# Patient Record
Sex: MALE | Race: BLACK OR AFRICAN AMERICAN | NOT HISPANIC OR LATINO | URBAN - METROPOLITAN AREA
[De-identification: names, ages, dates, MRNs, and addresses within clinical notes are randomized per-mention and may not be internally consistent; named-entity substitution may affect disease eponyms.]

---

## 2017-03-30 ENCOUNTER — INPATIENT (INPATIENT)
Facility: HOSPITAL | Age: 67
LOS: 1 days | Discharge: HOME | DRG: 645 | End: 2017-04-01
Attending: INTERNAL MEDICINE | Admitting: INTERNAL MEDICINE
Payer: COMMERCIAL

## 2017-03-30 VITALS
OXYGEN SATURATION: 96 % | HEIGHT: 67 IN | WEIGHT: 175.93 LBS | HEART RATE: 64 BPM | DIASTOLIC BLOOD PRESSURE: 70 MMHG | SYSTOLIC BLOOD PRESSURE: 154 MMHG | TEMPERATURE: 98 F | RESPIRATION RATE: 16 BRPM

## 2017-03-30 NOTE — ED ADULT TRIAGE NOTE - CHIEF COMPLAINT QUOTE
c/o head ache,swelling to lower lip and to rt. side of face after eating shrimp about 2 weeks a go,denies sob c/o head ache,swelling to lower lip and to rt. side of face after eating shrimp about 2 hours a go,denies sob

## 2017-03-30 NOTE — ED PROVIDER NOTE - NS ED MD SCRIBE ATTENDING SCRIBE SECTIONS
HISTORY OF PRESENT ILLNESS/VITAL SIGNS( Pullset)/DISPOSITION/REVIEW OF SYSTEMS/PHYSICAL EXAM/PAST MEDICAL/SURGICAL/SOCIAL HISTORY/HIV

## 2017-03-30 NOTE — ED PROVIDER NOTE - MEDICAL DECISION MAKING DETAILS
66 y/o M presents to the ED with R facial swelling s/p eating shrimp. 68 y/o M presents to the ED with R facial swelling s/p eating shrimp. Swelling can be allergic reaction vs SVC syndrome. Will obtain labs and imaging and reassess. Provided meds for possible allergic reaction.   Labs show Hypothyroid. CT chest shows goiter with narrowing of trachea. Breathing well now. Will need to admit to see endocrine and start on meds and observe for airway compromise. Pt and family amenable.

## 2017-03-30 NOTE — ED PROVIDER NOTE - OBJECTIVE STATEMENT
68 y/o M pt with PMHx of HTN, hypothyroid and diverticulosis presents to the ED c/o facial swelling x today. Pt reports eating shrimp [id number]. Pt then noticed his cheek began to swell at 2100. Pt states eating shrimp in the past and never developed swelling. Pt denies SOB, drooling, cough, fever, abdominal pain, CP or any other complaints at this time. NKDA. 66 y/o M pt with PMHx of HTN, hypothyroid and diverticulosis presents to the ED c/o facial swelling x today. Pt currently is not on any medications as told by PMD. Pt reports eating shrimp [id number] (1 hour prior to arrival) when symptoms started. Never happened before but has had shrimp multiple times in past. Did not have any abnormal intake or exposures. Pt then noticed his cheek began to swell at . Pt states eating shrimp in the past and never developed swelling. Pt denies SOB, drooling, cough, fever, abdominal pain, CP or any other complaints at this time. NKDA. 68 y/o M pt with PMHx of HTN, hypothyroid and diverticulosis presents to the ED c/o facial swelling x today. Pt currently is not on any medications as told by PMD. Pt reports eating shrimp [id number] (1 hour prior to arrival) when symptoms started. Never happened before but has had shrimp multiple times in past. Did not have any abnormal intake or exposures. Pt then noticed his cheek began to swell at right mandible and advised by son to come to ED. Pt states eating shrimp in the past and never developed swelling. Pt denies SOB, drooling, cough, fever, abdominal pain, CP or any other complaints at this time. NKDA.

## 2017-03-30 NOTE — ED ADULT NURSE NOTE - OBJECTIVE STATEMENT
pt awake alert oriented x 3 not in distress,swelling of lips and right side of face and neck started 2 hours ago after eating dinner

## 2017-03-30 NOTE — ED ADULT NURSE NOTE - CHIEF COMPLAINT QUOTE
c/o head ache,swelling to lower lip and to rt. side of face after eating shrimp about 2 weeks a go,denies sob

## 2017-03-30 NOTE — ED PROVIDER NOTE - PHYSICAL EXAMINATION
GENERAL: No acute distress, non toxic  HEAD: Atraumatic, normocephalic; R mandible swelling   EARS: Externally normal, atraumatic, TMs normal bilaterally  EYES: No jaundice, not injected, no rupture, no foreign bodies  MOUTH: Moist mucous membranes, no open lesion, uvula midline without edema, no exudates, no peritonsilar abscess bilaterally.  NECK: Supple, full range of motion, no swelling, no lymphadenopathy; no stridor   HEART: Regular rate and rhythm, no murmurs, no rubs, no gallops  LUNGS: Clear to auscultation bilaterally without rhonci, rales, or wheezing  ABDOMEN: Soft and non tender in all 4 quadrants, normal bowel sounds, no signs of trauma, no costovertebral tenderness bilaterally  BACK/SPINE: Non tender spine in cervical/thoracic/lumbar regions, no stepoffs palpable  EXTREMITIES: No gross deformities  VASCULAR: Pulses palpable in all extremities, no pitting edema, capillary refill <2 secs  SKIN: Grossly intact without rash or open wounds  PSYCH: Alert and oriented x 3  GAIT: Normal without need for assistance GENERAL: No acute distress, non toxic  HEAD: Atraumatic, normocephalic; R mandible swelling no erythema, non tender at mastoids  EARS: Externally normal, atraumatic, TMs normal bilaterally  EYES: No jaundice, not injected, no rupture, no foreign bodies  MOUTH: Moist mucous membranes, no open lesion, uvula midline without edema, no exudates, no peritonsilar abscess bilaterally.  NECK: Supple, full range of motion, no swelling, no lymphadenopathy; no stridor, no goiter trachea midline  HEART: Regular rate and rhythm, no murmurs, no rubs, no gallops  LUNGS: Clear to auscultation bilaterally without rhonci, rales, or wheezing  ABDOMEN: Soft and non tender in all 4 quadrants, normal bowel sounds, no signs of trauma, no costovertebral tenderness bilaterally  BACK/SPINE: Non tender spine in cervical/thoracic/lumbar regions, no stepoffs palpable  EXTREMITIES: No gross deformities  VASCULAR: Pulses palpable in all extremities, no pitting edema, capillary refill <2 secs  SKIN: Grossly intact without rash or open wounds  PSYCH: Alert and oriented x 3  GAIT: Normal without need for assistance

## 2017-03-30 NOTE — ED PROVIDER NOTE - PROGRESS NOTE DETAILS
No drooling, no stridor. Speaking full sentences. When asked about thyroid issues, pt reports told in past but is not on meds for this. Does not take table salt.

## 2017-03-31 DIAGNOSIS — R22.0 LOCALIZED SWELLING, MASS AND LUMP, HEAD: ICD-10-CM

## 2017-03-31 DIAGNOSIS — Z41.8 ENCOUNTER FOR OTHER PROCEDURES FOR PURPOSES OTHER THAN REMEDYING HEALTH STATE: ICD-10-CM

## 2017-03-31 DIAGNOSIS — E04.9 NONTOXIC GOITER, UNSPECIFIED: ICD-10-CM

## 2017-03-31 DIAGNOSIS — I10 ESSENTIAL (PRIMARY) HYPERTENSION: ICD-10-CM

## 2017-03-31 DIAGNOSIS — E03.9 HYPOTHYROIDISM, UNSPECIFIED: ICD-10-CM

## 2017-03-31 LAB
ALBUMIN SERPL ELPH-MCNC: 3.6 G/DL — SIGNIFICANT CHANGE UP (ref 3.5–5)
ALBUMIN SERPL ELPH-MCNC: 3.6 G/DL — SIGNIFICANT CHANGE UP (ref 3.5–5)
ALP SERPL-CCNC: 85 U/L — SIGNIFICANT CHANGE UP (ref 40–120)
ALP SERPL-CCNC: 92 U/L — SIGNIFICANT CHANGE UP (ref 40–120)
ALT FLD-CCNC: 18 U/L DA — SIGNIFICANT CHANGE UP (ref 10–60)
ALT FLD-CCNC: 18 U/L DA — SIGNIFICANT CHANGE UP (ref 10–60)
ANION GAP SERPL CALC-SCNC: 7 MMOL/L — SIGNIFICANT CHANGE UP (ref 5–17)
ANION GAP SERPL CALC-SCNC: 8 MMOL/L — SIGNIFICANT CHANGE UP (ref 5–17)
AST SERPL-CCNC: 21 U/L — SIGNIFICANT CHANGE UP (ref 10–40)
AST SERPL-CCNC: 24 U/L — SIGNIFICANT CHANGE UP (ref 10–40)
BASOPHILS # BLD AUTO: 0 K/UL — SIGNIFICANT CHANGE UP (ref 0–0.2)
BASOPHILS # BLD AUTO: 0.1 K/UL — SIGNIFICANT CHANGE UP (ref 0–0.2)
BASOPHILS NFR BLD AUTO: 0.4 % — SIGNIFICANT CHANGE UP (ref 0–2)
BASOPHILS NFR BLD AUTO: 1 % — SIGNIFICANT CHANGE UP (ref 0–2)
BILIRUB SERPL-MCNC: 0.2 MG/DL — SIGNIFICANT CHANGE UP (ref 0.2–1.2)
BILIRUB SERPL-MCNC: 0.4 MG/DL — SIGNIFICANT CHANGE UP (ref 0.2–1.2)
BUN SERPL-MCNC: 14 MG/DL — SIGNIFICANT CHANGE UP (ref 7–18)
BUN SERPL-MCNC: 16 MG/DL — SIGNIFICANT CHANGE UP (ref 7–18)
CALCIUM SERPL-MCNC: 8.7 MG/DL — SIGNIFICANT CHANGE UP (ref 8.4–10.5)
CALCIUM SERPL-MCNC: 9.3 MG/DL — SIGNIFICANT CHANGE UP (ref 8.4–10.5)
CHLORIDE SERPL-SCNC: 105 MMOL/L — SIGNIFICANT CHANGE UP (ref 96–108)
CHLORIDE SERPL-SCNC: 106 MMOL/L — SIGNIFICANT CHANGE UP (ref 96–108)
CHOLEST SERPL-MCNC: 139 MG/DL — SIGNIFICANT CHANGE UP (ref 10–199)
CO2 SERPL-SCNC: 26 MMOL/L — SIGNIFICANT CHANGE UP (ref 22–31)
CO2 SERPL-SCNC: 27 MMOL/L — SIGNIFICANT CHANGE UP (ref 22–31)
CREAT SERPL-MCNC: 1.15 MG/DL — SIGNIFICANT CHANGE UP (ref 0.5–1.3)
CREAT SERPL-MCNC: 1.17 MG/DL — SIGNIFICANT CHANGE UP (ref 0.5–1.3)
EOSINOPHIL # BLD AUTO: 0 K/UL — SIGNIFICANT CHANGE UP (ref 0–0.5)
EOSINOPHIL # BLD AUTO: 0.2 K/UL — SIGNIFICANT CHANGE UP (ref 0–0.5)
EOSINOPHIL NFR BLD AUTO: 0 % — SIGNIFICANT CHANGE UP (ref 0–6)
EOSINOPHIL NFR BLD AUTO: 2.4 % — SIGNIFICANT CHANGE UP (ref 0–6)
ERYTHROCYTE [SEDIMENTATION RATE] IN BLOOD: 33 MM/HR — HIGH (ref 0–20)
GLUCOSE SERPL-MCNC: 121 MG/DL — HIGH (ref 70–99)
GLUCOSE SERPL-MCNC: 143 MG/DL — HIGH (ref 70–99)
HBA1C BLD-MCNC: 5.6 % — SIGNIFICANT CHANGE UP (ref 4–5.6)
HCT VFR BLD CALC: 33.6 % — LOW (ref 39–50)
HCT VFR BLD CALC: 36.4 % — LOW (ref 39–50)
HDLC SERPL-MCNC: 82 MG/DL — SIGNIFICANT CHANGE UP (ref 40–125)
HGB BLD-MCNC: 11 G/DL — LOW (ref 13–17)
HGB BLD-MCNC: 11.7 G/DL — LOW (ref 13–17)
LIPID PNL WITH DIRECT LDL SERPL: 45 MG/DL — SIGNIFICANT CHANGE UP
LYMPHOCYTES # BLD AUTO: 0.8 K/UL — LOW (ref 1–3.3)
LYMPHOCYTES # BLD AUTO: 1.9 K/UL — SIGNIFICANT CHANGE UP (ref 1–3.3)
LYMPHOCYTES # BLD AUTO: 10.4 % — LOW (ref 13–44)
LYMPHOCYTES # BLD AUTO: 29.7 % — SIGNIFICANT CHANGE UP (ref 13–44)
MAGNESIUM SERPL-MCNC: 2.1 MG/DL — SIGNIFICANT CHANGE UP (ref 1.8–2.4)
MCHC RBC-ENTMCNC: 28.9 PG — SIGNIFICANT CHANGE UP (ref 27–34)
MCHC RBC-ENTMCNC: 29.4 PG — SIGNIFICANT CHANGE UP (ref 27–34)
MCHC RBC-ENTMCNC: 32.1 GM/DL — SIGNIFICANT CHANGE UP (ref 32–36)
MCHC RBC-ENTMCNC: 32.7 GM/DL — SIGNIFICANT CHANGE UP (ref 32–36)
MCV RBC AUTO: 89.7 FL — SIGNIFICANT CHANGE UP (ref 80–100)
MCV RBC AUTO: 90 FL — SIGNIFICANT CHANGE UP (ref 80–100)
MONOCYTES # BLD AUTO: 0.1 K/UL — SIGNIFICANT CHANGE UP (ref 0–0.9)
MONOCYTES # BLD AUTO: 0.4 K/UL — SIGNIFICANT CHANGE UP (ref 0–0.9)
MONOCYTES NFR BLD AUTO: 1.4 % — LOW (ref 2–14)
MONOCYTES NFR BLD AUTO: 6.8 % — SIGNIFICANT CHANGE UP (ref 2–14)
NEUTROPHILS # BLD AUTO: 3.8 K/UL — SIGNIFICANT CHANGE UP (ref 1.8–7.4)
NEUTROPHILS # BLD AUTO: 6.7 K/UL — SIGNIFICANT CHANGE UP (ref 1.8–7.4)
NEUTROPHILS NFR BLD AUTO: 60 % — SIGNIFICANT CHANGE UP (ref 43–77)
NEUTROPHILS NFR BLD AUTO: 87.7 % — HIGH (ref 43–77)
PHOSPHATE SERPL-MCNC: 3.2 MG/DL — SIGNIFICANT CHANGE UP (ref 2.5–4.5)
PLATELET # BLD AUTO: 307 K/UL — SIGNIFICANT CHANGE UP (ref 150–400)
PLATELET # BLD AUTO: 315 K/UL — SIGNIFICANT CHANGE UP (ref 150–400)
POTASSIUM SERPL-MCNC: 4 MMOL/L — SIGNIFICANT CHANGE UP (ref 3.5–5.3)
POTASSIUM SERPL-MCNC: 4.2 MMOL/L — SIGNIFICANT CHANGE UP (ref 3.5–5.3)
POTASSIUM SERPL-SCNC: 4 MMOL/L — SIGNIFICANT CHANGE UP (ref 3.5–5.3)
POTASSIUM SERPL-SCNC: 4.2 MMOL/L — SIGNIFICANT CHANGE UP (ref 3.5–5.3)
PROT SERPL-MCNC: 9.4 G/DL — HIGH (ref 6–8.3)
PROT SERPL-MCNC: 9.5 G/DL — HIGH (ref 6–8.3)
RBC # BLD: 3.74 M/UL — LOW (ref 4.2–5.8)
RBC # BLD: 4.05 M/UL — LOW (ref 4.2–5.8)
RBC # FLD: 15.3 % — HIGH (ref 10.3–14.5)
RBC # FLD: 15.4 % — HIGH (ref 10.3–14.5)
SODIUM SERPL-SCNC: 139 MMOL/L — SIGNIFICANT CHANGE UP (ref 135–145)
SODIUM SERPL-SCNC: 140 MMOL/L — SIGNIFICANT CHANGE UP (ref 135–145)
T3 SERPL-MCNC: 67 NG/DL — LOW (ref 80–200)
T3FREE SERPL-MCNC: 1.72 PG/ML — LOW (ref 1.8–4.6)
T4 AB SER-ACNC: 3.1 UG/DL — LOW (ref 4.6–12)
T4 FREE SERPL-MCNC: 0.3 NG/DL — LOW (ref 0.9–1.8)
TOTAL CHOLESTEROL/HDL RATIO MEASUREMENT: 1.7 RATIO — LOW (ref 3.4–9.6)
TRIGL SERPL-MCNC: 62 MG/DL — SIGNIFICANT CHANGE UP (ref 10–149)
TSH SERPL-MCNC: 22.4 UU/ML — HIGH (ref 0.34–4.82)
WBC # BLD: 6.3 K/UL — SIGNIFICANT CHANGE UP (ref 3.8–10.5)
WBC # BLD: 7.7 K/UL — SIGNIFICANT CHANGE UP (ref 3.8–10.5)
WBC # FLD AUTO: 6.3 K/UL — SIGNIFICANT CHANGE UP (ref 3.8–10.5)
WBC # FLD AUTO: 7.7 K/UL — SIGNIFICANT CHANGE UP (ref 3.8–10.5)

## 2017-03-31 PROCEDURE — 71020: CPT | Mod: 26

## 2017-03-31 PROCEDURE — 99285 EMERGENCY DEPT VISIT HI MDM: CPT | Mod: 25

## 2017-03-31 PROCEDURE — 71260 CT THORAX DX C+: CPT | Mod: 26

## 2017-03-31 PROCEDURE — 76536 US EXAM OF HEAD AND NECK: CPT | Mod: 26

## 2017-03-31 RX ORDER — LEVOTHYROXINE SODIUM 125 MCG
50 TABLET ORAL DAILY
Qty: 0 | Refills: 0 | Status: DISCONTINUED | OUTPATIENT
Start: 2017-04-01 | End: 2017-04-01

## 2017-03-31 RX ORDER — FAMOTIDINE 10 MG/ML
20 INJECTION INTRAVENOUS ONCE
Qty: 0 | Refills: 0 | Status: COMPLETED | OUTPATIENT
Start: 2017-03-31 | End: 2017-03-31

## 2017-03-31 RX ORDER — AMLODIPINE BESYLATE 2.5 MG/1
5 TABLET ORAL ONCE
Qty: 0 | Refills: 0 | Status: COMPLETED | OUTPATIENT
Start: 2017-03-31 | End: 2017-03-31

## 2017-03-31 RX ORDER — SODIUM CHLORIDE 9 MG/ML
1000 INJECTION INTRAMUSCULAR; INTRAVENOUS; SUBCUTANEOUS ONCE
Qty: 0 | Refills: 0 | Status: COMPLETED | OUTPATIENT
Start: 2017-03-31 | End: 2017-03-31

## 2017-03-31 RX ORDER — ACETAMINOPHEN 500 MG
650 TABLET ORAL ONCE
Qty: 0 | Refills: 0 | Status: COMPLETED | OUTPATIENT
Start: 2017-03-31 | End: 2017-03-31

## 2017-03-31 RX ORDER — AMLODIPINE BESYLATE 2.5 MG/1
2.5 TABLET ORAL DAILY
Qty: 0 | Refills: 0 | Status: DISCONTINUED | OUTPATIENT
Start: 2017-03-31 | End: 2017-03-31

## 2017-03-31 RX ORDER — AMLODIPINE BESYLATE 2.5 MG/1
10 TABLET ORAL DAILY
Qty: 0 | Refills: 0 | Status: DISCONTINUED | OUTPATIENT
Start: 2017-03-31 | End: 2017-04-01

## 2017-03-31 RX ORDER — DIPHENHYDRAMINE HCL 50 MG
25 CAPSULE ORAL ONCE
Qty: 0 | Refills: 0 | Status: COMPLETED | OUTPATIENT
Start: 2017-03-31 | End: 2017-03-31

## 2017-03-31 RX ORDER — LEVOTHYROXINE SODIUM 125 MCG
25 TABLET ORAL DAILY
Qty: 0 | Refills: 0 | Status: DISCONTINUED | OUTPATIENT
Start: 2017-03-31 | End: 2017-03-31

## 2017-03-31 RX ORDER — SODIUM CHLORIDE 9 MG/ML
750 INJECTION INTRAMUSCULAR; INTRAVENOUS; SUBCUTANEOUS
Qty: 0 | Refills: 0 | Status: DISCONTINUED | OUTPATIENT
Start: 2017-03-31 | End: 2017-03-31

## 2017-03-31 RX ADMIN — Medication 650 MILLIGRAM(S): at 00:14

## 2017-03-31 RX ADMIN — Medication 25 MICROGRAM(S): at 05:39

## 2017-03-31 RX ADMIN — SODIUM CHLORIDE 75 MILLILITER(S): 9 INJECTION INTRAMUSCULAR; INTRAVENOUS; SUBCUTANEOUS at 14:55

## 2017-03-31 RX ADMIN — AMLODIPINE BESYLATE 2.5 MILLIGRAM(S): 2.5 TABLET ORAL at 06:47

## 2017-03-31 RX ADMIN — AMLODIPINE BESYLATE 5 MILLIGRAM(S): 2.5 TABLET ORAL at 17:46

## 2017-03-31 RX ADMIN — SODIUM CHLORIDE 1000 MILLILITER(S): 9 INJECTION INTRAMUSCULAR; INTRAVENOUS; SUBCUTANEOUS at 00:20

## 2017-03-31 RX ADMIN — Medication 25 MILLIGRAM(S): at 00:15

## 2017-03-31 RX ADMIN — FAMOTIDINE 20 MILLIGRAM(S): 10 INJECTION INTRAVENOUS at 00:15

## 2017-03-31 RX ADMIN — Medication 125 MILLIGRAM(S): at 00:14

## 2017-03-31 NOTE — H&P ADULT. - ASSESSMENT
67 year old male with past medical history of HTN , diverticulitis , hypothyroidism not on any medication known goiter ( has known about it for 2 years) presented to the ER after his son noticed that his right mandible was swollen iasis being admitted for concern of airway protection because of enlarged goiter , uncontrolled hypothyroidism and right mandible swelling of unclear etiology.

## 2017-03-31 NOTE — H&P ADULT. - PROBLEM SELECTOR PLAN 1
ct chest:noo evidence of SVC obstruction.  Small pericardial effusion. Mild cardiomegaly.  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  TSH 22.40  F/u FT3 T4  per prior [facility] records was on 50 microgram of synthyroid daily   monitor vs and respiratory status  will start on low dose synthyroid   f/u repeat Tsh  Dr Cao endocrinology consult   consider surgery evaluation for possible removal of goiter  not in respiratory distress ct chest:noo evidence of SVC obstruction.  Small pericardial effusion. Mild cardiomegaly.  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  TSH 22.40  F/u FT3 T4  per prior [facility] records was on 50 microgram of synthyroid daily   monitor vs and respiratory status  will start on low dose synthyroid   f/u repeat Tsh  Dr Cao endocrinology consult   consider surgery/surgery evaluation for possible removal of goiter  not in respiratory distress  will inform Dr Cao and Dr Knight for ENT

## 2017-03-31 NOTE — H&P ADULT. - HISTORY OF PRESENT ILLNESS
67 year old male with past medical history of HTN , diverticulitis , hypothyroidism not on any medication known goiter ( has known about it for 2 years) presented to the ER after his son noticed that his right mandible was swollen , patient states he was in his usual state of health till this PM.Pt reports eating shrimp [id number] (1 hour prior to arrival) when symptoms started. Never happened before but has had shrimp multiple times in past. Did not have any abnormal intake or exposures. Pt then noticed his cheek began to swell at right mandible and advised by son to come to ED. Pt states eating shrimp in the past and never developed swelling. Pt denies SOB, drooling, cough, fever, abdominal pain, Chest pain or any other symptoms . he denies any pain at the site and believes the swelling has gone down . patient states he took thyroid medications in the past but has not been taking them for a couple of years , His PCP retired a couple of years and his new PCP never started him on medications , he reports that he has been told in the past that he has goiter , but it has always been asymtomatic . patient denies any constipation , cold intolerance , dry skin , hair loss , he denies easy fatigue or weakness.  patient denies any recent infections , trauma while eating ROS is negative excpet above.    In the ED patient's VS T 98.5 HR 64 bp of 154/70 RR of 16 pulse ox of 98 , initial concern in the ED for allergic reaction patient was givem benadryl , pepcid and one full dose solumedrol . on routine lab work patient was found to have elevated esr of 33 ,  TSH of 22.40, other labs was within normal limits . CXR ( official report pending) showed mediastinal widening , cta chest was done in the ER showing "no evidence of SVC obstruction. Small pericardial effusion. Mild cardiomegaly. 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

## 2017-03-31 NOTE — H&P ADULT. - PROBLEM SELECTOR PLAN 3
right mandible swelling:  unclear etiology  s/p benadryl , solumedrol ,famotidine  no trauma in mouth , no swelling , no signs of airway compromise

## 2017-04-01 ENCOUNTER — TRANSCRIPTION ENCOUNTER (OUTPATIENT)
Age: 67
End: 2017-04-01

## 2017-04-01 VITALS
TEMPERATURE: 98 F | SYSTOLIC BLOOD PRESSURE: 154 MMHG | OXYGEN SATURATION: 100 % | DIASTOLIC BLOOD PRESSURE: 73 MMHG | HEART RATE: 57 BPM | RESPIRATION RATE: 17 BRPM

## 2017-04-01 PROCEDURE — 99238 HOSP IP/OBS DSCHRG MGMT 30/<: CPT

## 2017-04-01 PROCEDURE — 71046 X-RAY EXAM CHEST 2 VIEWS: CPT

## 2017-04-01 PROCEDURE — 96374 THER/PROPH/DIAG INJ IV PUSH: CPT | Mod: 59

## 2017-04-01 PROCEDURE — 71260 CT THORAX DX C+: CPT

## 2017-04-01 PROCEDURE — 96375 TX/PRO/DX INJ NEW DRUG ADDON: CPT

## 2017-04-01 PROCEDURE — 70491 CT SOFT TISSUE NECK W/DYE: CPT | Mod: 26

## 2017-04-01 PROCEDURE — 93005 ELECTROCARDIOGRAM TRACING: CPT

## 2017-04-01 PROCEDURE — 70491 CT SOFT TISSUE NECK W/DYE: CPT

## 2017-04-01 PROCEDURE — 99285 EMERGENCY DEPT VISIT HI MDM: CPT | Mod: 25

## 2017-04-01 PROCEDURE — 80053 COMPREHEN METABOLIC PANEL: CPT

## 2017-04-01 PROCEDURE — 76536 US EXAM OF HEAD AND NECK: CPT

## 2017-04-01 PROCEDURE — 85652 RBC SED RATE AUTOMATED: CPT

## 2017-04-01 PROCEDURE — 85027 COMPLETE CBC AUTOMATED: CPT

## 2017-04-01 PROCEDURE — 84443 ASSAY THYROID STIM HORMONE: CPT

## 2017-04-01 RX ORDER — LEVOTHYROXINE SODIUM 125 MCG
1 TABLET ORAL
Qty: 30 | Refills: 0
Start: 2017-04-01 | End: 2017-05-01

## 2017-04-01 RX ORDER — AMLODIPINE BESYLATE 2.5 MG/1
1 TABLET ORAL
Qty: 30 | Refills: 0
Start: 2017-04-01 | End: 2017-05-01

## 2017-04-01 RX ADMIN — AMLODIPINE BESYLATE 10 MILLIGRAM(S): 2.5 TABLET ORAL at 05:31

## 2017-04-01 RX ADMIN — Medication 50 MICROGRAM(S): at 05:31

## 2017-04-01 NOTE — DISCHARGE NOTE ADULT - MEDICATION SUMMARY - MEDICATIONS TO TAKE
I will START or STAY ON the medications listed below when I get home from the hospital:    amLODIPine 10 mg oral tablet  -- 1 tab(s) by mouth once a day  -- Indication: For HTN (hypertension)    levothyroxine 50 mcg (0.05 mg) oral tablet  -- 1 tab(s) by mouth once a day  -- Indication: For Hypothyroid

## 2017-04-01 NOTE — DISCHARGE NOTE ADULT - PROVIDER TOKENS
TOKEN:'[id number]:'[id number] TOKEN:'[id number]:'[id number]',FREE:[LAST:[Dr. Kaye],FIRST:[Eric],PHONE:[phone]],FAX:[(   )    -],ADDRESS:[[address] [place] [address]]]

## 2017-04-01 NOTE — DISCHARGE NOTE ADULT - CARE PROVIDER_API CALL
Nikky Cao (DAKOTAH), Endocrinology[facility]abDiabetes  [address] [place] [address]  Phone: [phone]  Fax: [phone]    Glen Knight), Otolaryngology  [address] [address]  Phone: [phone]  Fax: [phone] Nikky Cao (DAKOTAH), Endocrinology[facility]abDiabetes  [address] [place] [address]  Phone: [phone]  Fax: [phone]    Glen Knight), Otolaryngology  [address] [address]  Phone: [phone]  Fax: [phone]    [place] [address]  Phone: [phone]  Fax: (   )    -

## 2017-04-01 NOTE — DISCHARGE NOTE ADULT - CARE PLAN
Principal Discharge DX:	Goiter  Goal:	treatment of goiter  Instructions for follow-up, activity and diet:	You have a goiter (enlarged thyroid gland). The gland located in your neck. Imaging (CAT scan of your head and neck) was concerning for the goiter compressing your trachea (the tube in your throat which brings the air to your lungs). Dr. Cao (endocrinologist) started you back on synthroid which may help decrease the size of your goiter. Dr. Knight (ENT) did an exam and was not concerned for compression of upper airway. Follow-up with Dr. Cao in clinic (her information is located below). Also follow-up with Dr. Knight (ear, nose, throat doctor) in clinic [phone].  Secondary Diagnosis:	Facial swelling  Goal:	improvement of facial swelling  Instructions for follow-up, activity and diet:	The swelling of your cheek and jaw have improved, however, due to your poor dentition (teeth) we care concerned of possible infection, although imaging did not show any evidence of an abscess. See your dentist within the week for further evaluation. Follow-up with your primary care doctor.  Secondary Diagnosis:	HTN (hypertension)  Goal:	to control blood pressure  Instructions for follow-up, activity and diet:	You were started on norvasc for your high blood pressure. Follow-up with your primary care doctor.  Secondary Diagnosis:	Hypothyroid  Goal:	to control thyroid hormone levels  Instructions for follow-up, activity and diet:	You were restarted on a medication for your thyroid called synthroid. Take synthroid 50mcg as prescribed. This medication will increase the thyroid hormone levels in the blood and may help to decrease the size of your goiter. Follow-up with Dr. Cao (endocrinologist) in clinic. Principal Discharge DX:	Goiter  Goal:	treatment of goiter  Instructions for follow-up, activity and diet:	You have a goiter (enlarged thyroid gland). The gland located in your neck. Imaging (CAT scan of your head and neck) was concerning for the goiter compressing your trachea (the tube in your throat which brings the air to your lungs). Dr. Cao (endocrinologist) started you back on synthroid which may help decrease the size of your goiter. Dr. Knight (ENT) did an exam and was not concerned for compression of upper airway. Follow-up with Dr. Cao in clinic (her information is located below). Also follow-up with Dr. Knight (ear, nose, throat doctor) in clinic [phone].  Secondary Diagnosis:	Facial swelling  Goal:	improvement of facial swelling  Instructions for follow-up, activity and diet:	The swelling of your cheek and jaw have improved, however, due to your poor dentition (teeth) we care concerned of possible infection, although imaging did not show any evidence of an abscess. See your dentist within the week for further evaluation. Also follow-up with your primary care doctor for allergy testing to seafood. We recommend that you avoid seafood until then.  Secondary Diagnosis:	HTN (hypertension)  Goal:	to control blood pressure  Instructions for follow-up, activity and diet:	You were started on norvasc for your high blood pressure. Follow-up with your primary care doctor.  Secondary Diagnosis:	Hypothyroid  Goal:	to control thyroid hormone levels  Instructions for follow-up, activity and diet:	You were restarted on a medication for your thyroid called synthroid. Take synthroid 50mcg as prescribed. This medication will increase the thyroid hormone levels in the blood and may help to decrease the size of your goiter. Follow-up with Dr. Cao (endocrinologist) in clinic. Principal Discharge DX:	Goiter  Goal:	treatment of goiter  Instructions for follow-up, activity and diet:	You have a goiter (enlarged thyroid gland). The gland located in your neck. Imaging (CAT scan of your head and neck) was concerning for the goiter compressing your trachea (the tube in your throat which brings the air to your lungs). Dr. Cao (endocrinologist) started you back on synthroid which may help decrease the size of your goiter. Dr. Knight (ENT) did an exam and was not concerned for compression of upper airway. Follow-up with Dr. Cao in clinic (her information is located below). Also follow-up with Dr. Knight (ear, nose, throat doctor) in clinic [phone].  Secondary Diagnosis:	Facial swelling  Goal:	improvement of facial swelling  Instructions for follow-up, activity and diet:	The swelling of your cheek and jaw have improved, however, due to your poor dentition (teeth) we care concerned of possible infection, although imaging did not show any evidence of an abscess. See your dentist within the week for further evaluation. Also follow-up with your primary care doctor for allergy testing to seafood. We recommend that you avoid seafood until then.  Secondary Diagnosis:	HTN (hypertension)  Goal:	to control blood pressure  Instructions for follow-up, activity and diet:	You were started on norvasc for your high blood pressure. Follow-up with your primary care doctor.  Secondary Diagnosis:	Hypothyroid  Goal:	to control thyroid hormone levels  Instructions for follow-up, activity and diet:	You were restarted on a medication for your thyroid called synthroid. Take synthroid 50mcg as prescribed. This medication will increase the thyroid hormone levels in the blood and may help to decrease the size of your goiter. Follow-up with Dr. Cao (endocrinologist) in clinic.

## 2017-04-01 NOTE — DISCHARGE NOTE ADULT - PLAN OF CARE
to control blood pressure to control thyroid hormone levels improvement of facial swelling treatment of goiter You have a goiter (enlarged thyroid gland). The gland located in your neck. Imaging (CAT scan of your head and neck) was concerning for the goiter compressing your trachea (the tube in your throat which brings the air to your lungs). Dr. Cao (endocrinologist) started you back on synthroid which may help decrease the size of your goiter. Dr. Knight (ENT) did an exam and was not concerned for compression of upper airway. Follow-up with Dr. Cao in clinic (her information is located below). Also follow-up with Dr. Knight (ear, nose, throat doctor) in clinic [phone]. You were started on norvasc for your high blood pressure. Follow-up with your primary care doctor. You were restarted on a medication for your thyroid called synthroid. Take synthroid 50mcg as prescribed. This medication will increase the thyroid hormone levels in the blood and may help to decrease the size of your goiter. Follow-up with Dr. Cao (endocrinologist) in clinic. The swelling of your cheek and jaw have improved, however, due to your poor dentition (teeth) we care concerned of possible infection, although imaging did not show any evidence of an abscess. See your dentist within the week for further evaluation. Follow-up with your primary care doctor. The swelling of your cheek and jaw have improved, however, due to your poor dentition (teeth) we care concerned of possible infection, although imaging did not show any evidence of an abscess. See your dentist within the week for further evaluation. Also follow-up with your primary care doctor for allergy testing to seafood. We recommend that you avoid seafood until then.

## 2017-04-01 NOTE — DISCHARGE NOTE ADULT - HOSPITAL COURSE
67 year old male with past medical history of HTN , diverticulitis , hypothyroidism not on any medication known goiter ( has known about it for 2 years) presented to the ER after his son noticed that his right mandible was swollen , patient states he was in his usual state of health till this PM.Pt reports eating shrimp [id number] (1 hour prior to arrival) when symptoms started. Never happened before but has had shrimp multiple times in past. Did not have any abnormal intake or exposures. Pt then noticed his cheek began to swell at right mandible and advised by son to come to ED. Pt states eating shrimp in the past and never developed swelling. Pt denies SOB, drooling, cough, fever, abdominal pain, Chest pain or any other symptoms . he denies any pain at the site and believes the swelling has gone down . patient states he took thyroid medications in the past but has not been taking them for a couple of years , His PCP retired a couple of years and his new PCP never started him on medications , he reports that he has been told in the past that he has goiter , but it has always been asymtomatic . patient denies any constipation , cold intolerance , dry skin , hair loss , he denies easy fatigue or weakness.  patient denies any recent infections , trauma while eating ROS is negative excpet above.    In the ED patient's VS T 98.5 HR 64 bp of 154/70 RR of 16 pulse ox of 98 , initial concern in the ED for allergic reaction patient was givem benadryl , pepcid and one full dose solumedrol . on routine lab work patient was found to have elevated esr of 33 ,  TSH of 22.40, other labs was within normal limits . CXR ( official report pending) showed mediastinal widening , cta chest was done in the ER showing "no evidence of SVC obstruction. Small pericardial effusion. Mild cardiomegaly. 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 Swelling of mandible and cheek improved on the right, however, patient may have infection as he has swelling of his right cheek visible within his mouth. US thyroid showed Heterogeneous, enlarged thyroid gland without discrete nodule. Dr. Cao (endo) was consulted. Patient had been initially started on synthroid 25mcg. Per endo synthroid was increased to 50mcg which he will need to continue on discharge. CT neck showed Diffuse thyromegaly with mild tracheal compression at the thoracic inlet and mild rightward tracheal deviation, Bilateral aryepiglottic fold thickening with narrowing of the airway. ENT Dr. Knight was consulted. On laryngoscopic examination upper airway was widely patent. He can follow-up with Dr. Caal in clinic [phone].    Patient is asymptomatic and medically stable for discharge. He will need to follow-up with Dr. Knight and Dr. Cao in clinic.       Patient will need to follow-up with his dentist as he has poor dentition and these findings could be due to possible infection.

## 2017-04-04 DIAGNOSIS — R22.0 LOCALIZED SWELLING, MASS AND LUMP, HEAD: ICD-10-CM

## 2017-04-04 DIAGNOSIS — E03.9 HYPOTHYROIDISM, UNSPECIFIED: ICD-10-CM

## 2017-04-04 DIAGNOSIS — E04.9 NONTOXIC GOITER, UNSPECIFIED: ICD-10-CM

## 2017-04-04 DIAGNOSIS — Z87.891 PERSONAL HISTORY OF NICOTINE DEPENDENCE: ICD-10-CM

## 2017-04-04 DIAGNOSIS — I10 ESSENTIAL (PRIMARY) HYPERTENSION: ICD-10-CM

## 2017-12-13 ENCOUNTER — APPOINTMENT (OUTPATIENT)
Dept: PEDIATRIC ALLERGY IMMUNOLOGY | Facility: CLINIC | Age: 67
End: 2017-12-13

## 2022-04-12 ENCOUNTER — INPATIENT (INPATIENT)
Facility: HOSPITAL | Age: 72
LOS: 21 days | Discharge: NURSING, REHAB OR LONG-TERM CARE | DRG: 64 | End: 2022-05-04
Attending: INTERNAL MEDICINE | Admitting: STUDENT IN AN ORGANIZED HEALTH CARE EDUCATION/TRAINING PROGRAM
Payer: COMMERCIAL

## 2022-04-12 ENCOUNTER — TRANSCRIPTION ENCOUNTER (OUTPATIENT)
Age: 72
End: 2022-04-12

## 2022-04-12 ENCOUNTER — EMERGENCY (EMERGENCY)
Facility: HOSPITAL | Age: 72
LOS: 1 days | Discharge: ANOTHER HOSPITAL | End: 2022-04-12
Attending: STUDENT IN AN ORGANIZED HEALTH CARE EDUCATION/TRAINING PROGRAM
Payer: COMMERCIAL

## 2022-04-12 VITALS
HEIGHT: 67 IN | RESPIRATION RATE: 15 BRPM | OXYGEN SATURATION: 97 % | TEMPERATURE: 98 F | WEIGHT: 167.99 LBS | HEART RATE: 96 BPM | SYSTOLIC BLOOD PRESSURE: 157 MMHG | DIASTOLIC BLOOD PRESSURE: 89 MMHG

## 2022-04-12 VITALS — HEIGHT: 71 IN | WEIGHT: 167.99 LBS

## 2022-04-12 VITALS — HEIGHT: 71 IN

## 2022-04-12 DIAGNOSIS — I61.4 NONTRAUMATIC INTRACEREBRAL HEMORRHAGE IN CEREBELLUM: ICD-10-CM

## 2022-04-12 DIAGNOSIS — G40.901 EPILEPSY, UNSPECIFIED, NOT INTRACTABLE, WITH STATUS EPILEPTICUS: ICD-10-CM

## 2022-04-12 DIAGNOSIS — E03.9 HYPOTHYROIDISM, UNSPECIFIED: ICD-10-CM

## 2022-04-12 DIAGNOSIS — R56.9 UNSPECIFIED CONVULSIONS: ICD-10-CM

## 2022-04-12 DIAGNOSIS — Z20.822 CONTACT WITH AND (SUSPECTED) EXPOSURE TO COVID-19: ICD-10-CM

## 2022-04-12 DIAGNOSIS — I10 ESSENTIAL (PRIMARY) HYPERTENSION: ICD-10-CM

## 2022-04-12 LAB
ALBUMIN SERPL ELPH-MCNC: 3.7 G/DL — SIGNIFICANT CHANGE UP (ref 3.3–5)
ALBUMIN SERPL ELPH-MCNC: 4.2 G/DL — SIGNIFICANT CHANGE UP (ref 3.3–5)
ALP SERPL-CCNC: 87 U/L — SIGNIFICANT CHANGE UP (ref 40–120)
ALP SERPL-CCNC: 89 U/L — SIGNIFICANT CHANGE UP (ref 40–120)
ALT FLD-CCNC: 12 U/L — SIGNIFICANT CHANGE UP (ref 10–45)
ALT FLD-CCNC: 23 U/L — SIGNIFICANT CHANGE UP (ref 12–78)
AMPHET UR-MCNC: NEGATIVE — SIGNIFICANT CHANGE UP
ANION GAP SERPL CALC-SCNC: 10 MMOL/L — SIGNIFICANT CHANGE UP (ref 5–17)
ANION GAP SERPL CALC-SCNC: 13 MMOL/L — SIGNIFICANT CHANGE UP (ref 5–17)
ANION GAP SERPL CALC-SCNC: 13 MMOL/L — SIGNIFICANT CHANGE UP (ref 5–17)
APAP SERPL-MCNC: < 2 UG/ML (ref 10–30)
APPEARANCE UR: ABNORMAL
APTT BLD: 27.9 SEC — SIGNIFICANT CHANGE UP (ref 27.5–35.5)
AST SERPL-CCNC: 23 U/L — SIGNIFICANT CHANGE UP (ref 10–40)
AST SERPL-CCNC: 24 U/L — SIGNIFICANT CHANGE UP (ref 15–37)
BACTERIA # UR AUTO: NEGATIVE — SIGNIFICANT CHANGE UP
BARBITURATES UR SCN-MCNC: NEGATIVE — SIGNIFICANT CHANGE UP
BASE EXCESS BLDA CALC-SCNC: -3.7 MMOL/L — LOW (ref -2–3)
BASOPHILS # BLD AUTO: 0 K/UL — SIGNIFICANT CHANGE UP (ref 0–0.2)
BASOPHILS # BLD AUTO: 0.01 K/UL — SIGNIFICANT CHANGE UP (ref 0–0.2)
BASOPHILS NFR BLD AUTO: 0 % — SIGNIFICANT CHANGE UP (ref 0–2)
BASOPHILS NFR BLD AUTO: 0.1 % — SIGNIFICANT CHANGE UP (ref 0–2)
BENZODIAZ UR-MCNC: POSITIVE
BILIRUB SERPL-MCNC: 0.3 MG/DL — SIGNIFICANT CHANGE UP (ref 0.2–1.2)
BILIRUB SERPL-MCNC: 0.6 MG/DL — SIGNIFICANT CHANGE UP (ref 0.2–1.2)
BILIRUB UR-MCNC: NEGATIVE — SIGNIFICANT CHANGE UP
BLD GP AB SCN SERPL QL: NEGATIVE — SIGNIFICANT CHANGE UP
BLD GP AB SCN SERPL QL: SIGNIFICANT CHANGE UP
BLOOD GAS COMMENTS: SIGNIFICANT CHANGE UP
BLOOD GAS SOURCE: SIGNIFICANT CHANGE UP
BUN SERPL-MCNC: 16 MG/DL — SIGNIFICANT CHANGE UP (ref 7–23)
BUN SERPL-MCNC: 20 MG/DL — SIGNIFICANT CHANGE UP (ref 7–23)
BUN SERPL-MCNC: 21 MG/DL — SIGNIFICANT CHANGE UP (ref 7–23)
CALCIUM SERPL-MCNC: 7.9 MG/DL — LOW (ref 8.4–10.5)
CALCIUM SERPL-MCNC: 8.5 MG/DL — SIGNIFICANT CHANGE UP (ref 8.5–10.1)
CALCIUM SERPL-MCNC: 8.6 MG/DL — SIGNIFICANT CHANGE UP (ref 8.4–10.5)
CHLORIDE SERPL-SCNC: 104 MMOL/L — SIGNIFICANT CHANGE UP (ref 96–108)
CHLORIDE SERPL-SCNC: 105 MMOL/L — SIGNIFICANT CHANGE UP (ref 96–108)
CHLORIDE SERPL-SCNC: 107 MMOL/L — SIGNIFICANT CHANGE UP (ref 96–108)
CO2 BLDA-SCNC: 23 MMOL/L — SIGNIFICANT CHANGE UP (ref 19–24)
CO2 SERPL-SCNC: 21 MMOL/L — LOW (ref 22–31)
COCAINE METAB.OTHER UR-MCNC: NEGATIVE — SIGNIFICANT CHANGE UP
COLOR SPEC: SIGNIFICANT CHANGE UP
CREAT SERPL-MCNC: 1.06 MG/DL — SIGNIFICANT CHANGE UP (ref 0.5–1.3)
CREAT SERPL-MCNC: 1.16 MG/DL — SIGNIFICANT CHANGE UP (ref 0.5–1.3)
CREAT SERPL-MCNC: 1.74 MG/DL — HIGH (ref 0.5–1.3)
DIFF PNL FLD: ABNORMAL
EGFR: 41 ML/MIN/1.73M2 — LOW
EGFR: 67 ML/MIN/1.73M2 — SIGNIFICANT CHANGE UP
EGFR: 75 ML/MIN/1.73M2 — SIGNIFICANT CHANGE UP
EOSINOPHIL # BLD AUTO: 0.02 K/UL — SIGNIFICANT CHANGE UP (ref 0–0.5)
EOSINOPHIL # BLD AUTO: 0.04 K/UL — SIGNIFICANT CHANGE UP (ref 0–0.5)
EOSINOPHIL NFR BLD AUTO: 0.3 % — SIGNIFICANT CHANGE UP (ref 0–6)
EOSINOPHIL NFR BLD AUTO: 1 % — SIGNIFICANT CHANGE UP (ref 0–6)
EPI CELLS # UR: 2 /HPF — SIGNIFICANT CHANGE UP
ETHANOL SERPL-MCNC: <10 MG/DL — SIGNIFICANT CHANGE UP (ref 0–10)
FLUAV AG NPH QL: SIGNIFICANT CHANGE UP
FLUBV AG NPH QL: SIGNIFICANT CHANGE UP
GAS PNL BLDA: SIGNIFICANT CHANGE UP
GLUCOSE BLDC GLUCOMTR-MCNC: 115 MG/DL — HIGH (ref 70–99)
GLUCOSE BLDC GLUCOMTR-MCNC: 194 MG/DL — HIGH (ref 70–99)
GLUCOSE SERPL-MCNC: 118 MG/DL — HIGH (ref 70–99)
GLUCOSE SERPL-MCNC: 119 MG/DL — HIGH (ref 70–99)
GLUCOSE SERPL-MCNC: 201 MG/DL — HIGH (ref 70–99)
GLUCOSE UR QL: NEGATIVE — SIGNIFICANT CHANGE UP
HCO3 BLDA-SCNC: 22 MMOL/L — SIGNIFICANT CHANGE UP (ref 21–28)
HCT VFR BLD CALC: 13.5 % — CRITICAL LOW (ref 39–50)
HCT VFR BLD CALC: 29 % — LOW (ref 39–50)
HCT VFR BLD CALC: 34.1 % — LOW (ref 39–50)
HGB BLD-MCNC: 11.2 G/DL — LOW (ref 13–17)
HGB BLD-MCNC: 4.2 G/DL — CRITICAL LOW (ref 13–17)
HGB BLD-MCNC: 9.5 G/DL — LOW (ref 13–17)
HOROWITZ INDEX BLDA+IHG-RTO: 80 — SIGNIFICANT CHANGE UP
HYALINE CASTS # UR AUTO: 3 /LPF — HIGH (ref 0–2)
IMM GRANULOCYTES NFR BLD AUTO: 0.6 % — SIGNIFICANT CHANGE UP (ref 0–1.5)
INR BLD: 0.98 RATIO — SIGNIFICANT CHANGE UP (ref 0.88–1.16)
KETONES UR-MCNC: NEGATIVE — SIGNIFICANT CHANGE UP
LACTATE SERPL-SCNC: 12.5 MMOL/L — CRITICAL HIGH (ref 0.7–2)
LEUKOCYTE ESTERASE UR-ACNC: NEGATIVE — SIGNIFICANT CHANGE UP
LYMPHOCYTES # BLD AUTO: 0.18 K/UL — LOW (ref 1–3.3)
LYMPHOCYTES # BLD AUTO: 0.56 K/UL — LOW (ref 1–3.3)
LYMPHOCYTES # BLD AUTO: 5 % — LOW (ref 13–44)
LYMPHOCYTES # BLD AUTO: 7 % — LOW (ref 13–44)
MAGNESIUM SERPL-MCNC: 2.3 MG/DL — SIGNIFICANT CHANGE UP (ref 1.6–2.6)
MCHC RBC-ENTMCNC: 29.4 PG — SIGNIFICANT CHANGE UP (ref 27–34)
MCHC RBC-ENTMCNC: 30 PG — SIGNIFICANT CHANGE UP (ref 27–34)
MCHC RBC-ENTMCNC: 30 PG — SIGNIFICANT CHANGE UP (ref 27–34)
MCHC RBC-ENTMCNC: 31.1 G/DL — LOW (ref 32–36)
MCHC RBC-ENTMCNC: 32.8 GM/DL — SIGNIFICANT CHANGE UP (ref 32–36)
MCHC RBC-ENTMCNC: 32.8 GM/DL — SIGNIFICANT CHANGE UP (ref 32–36)
MCV RBC AUTO: 89.5 FL — SIGNIFICANT CHANGE UP (ref 80–100)
MCV RBC AUTO: 91.5 FL — SIGNIFICANT CHANGE UP (ref 80–100)
MCV RBC AUTO: 96.4 FL — SIGNIFICANT CHANGE UP (ref 80–100)
METHADONE UR-MCNC: NEGATIVE — SIGNIFICANT CHANGE UP
MICROCYTES BLD QL: SLIGHT — SIGNIFICANT CHANGE UP
MONOCYTES # BLD AUTO: 0.18 K/UL — SIGNIFICANT CHANGE UP (ref 0–0.9)
MONOCYTES # BLD AUTO: 0.34 K/UL — SIGNIFICANT CHANGE UP (ref 0–0.9)
MONOCYTES NFR BLD AUTO: 4.3 % — SIGNIFICANT CHANGE UP (ref 2–14)
MONOCYTES NFR BLD AUTO: 5 % — SIGNIFICANT CHANGE UP (ref 2–14)
NEUTROPHILS # BLD AUTO: 3.12 K/UL — SIGNIFICANT CHANGE UP (ref 1.8–7.4)
NEUTROPHILS # BLD AUTO: 6.98 K/UL — SIGNIFICANT CHANGE UP (ref 1.8–7.4)
NEUTROPHILS NFR BLD AUTO: 87 % — HIGH (ref 43–77)
NEUTROPHILS NFR BLD AUTO: 87.7 % — HIGH (ref 43–77)
NEUTS BAND # BLD: 2 % — SIGNIFICANT CHANGE UP (ref 0–8)
NITRITE UR-MCNC: NEGATIVE — SIGNIFICANT CHANGE UP
NRBC # BLD: 0 /100 WBCS — SIGNIFICANT CHANGE UP (ref 0–0)
NRBC # BLD: 0 /100 WBCS — SIGNIFICANT CHANGE UP (ref 0–0)
NRBC # BLD: 0 /100 — SIGNIFICANT CHANGE UP (ref 0–0)
NRBC # BLD: SIGNIFICANT CHANGE UP /100 WBCS (ref 0–0)
OPIATES UR-MCNC: NEGATIVE — SIGNIFICANT CHANGE UP
OVALOCYTES BLD QL SMEAR: SLIGHT — SIGNIFICANT CHANGE UP
OXYCODONE UR-MCNC: NEGATIVE — SIGNIFICANT CHANGE UP
PCO2 BLDA: 42 MMHG — SIGNIFICANT CHANGE UP (ref 32–46)
PCP SPEC-MCNC: SIGNIFICANT CHANGE UP
PCP UR-MCNC: NEGATIVE — SIGNIFICANT CHANGE UP
PH BLD: 7.33 — LOW (ref 7.35–7.45)
PH UR: 5.5 — SIGNIFICANT CHANGE UP (ref 5–8)
PHOSPHATE SERPL-MCNC: 1.6 MG/DL — LOW (ref 2.5–4.5)
PLAT MORPH BLD: NORMAL — SIGNIFICANT CHANGE UP
PLATELET # BLD AUTO: 219 K/UL — SIGNIFICANT CHANGE UP (ref 150–400)
PLATELET # BLD AUTO: 246 K/UL — SIGNIFICANT CHANGE UP (ref 150–400)
PLATELET # BLD AUTO: 85 K/UL — LOW (ref 150–400)
PO2 BLDA: 269 MMHG — HIGH (ref 83–108)
POTASSIUM SERPL-MCNC: 3.5 MMOL/L — SIGNIFICANT CHANGE UP (ref 3.5–5.3)
POTASSIUM SERPL-MCNC: 3.6 MMOL/L — SIGNIFICANT CHANGE UP (ref 3.5–5.3)
POTASSIUM SERPL-MCNC: 3.8 MMOL/L — SIGNIFICANT CHANGE UP (ref 3.5–5.3)
POTASSIUM SERPL-SCNC: 3.5 MMOL/L — SIGNIFICANT CHANGE UP (ref 3.5–5.3)
POTASSIUM SERPL-SCNC: 3.6 MMOL/L — SIGNIFICANT CHANGE UP (ref 3.5–5.3)
POTASSIUM SERPL-SCNC: 3.8 MMOL/L — SIGNIFICANT CHANGE UP (ref 3.5–5.3)
PROT SERPL-MCNC: 8.6 G/DL — HIGH (ref 6–8.3)
PROT SERPL-MCNC: 9.8 GM/DL — HIGH (ref 6–8.3)
PROT UR-MCNC: 100 — SIGNIFICANT CHANGE UP
PROTHROM AB SERPL-ACNC: 11.7 SEC — SIGNIFICANT CHANGE UP (ref 10.5–13.4)
RBC # BLD: 1.4 M/UL — LOW (ref 4.2–5.8)
RBC # BLD: 3.17 M/UL — LOW (ref 4.2–5.8)
RBC # BLD: 3.81 M/UL — LOW (ref 4.2–5.8)
RBC # FLD: 13.5 % — SIGNIFICANT CHANGE UP (ref 10.3–14.5)
RBC # FLD: 13.7 % — SIGNIFICANT CHANGE UP (ref 10.3–14.5)
RBC # FLD: 13.9 % — SIGNIFICANT CHANGE UP (ref 10.3–14.5)
RBC BLD AUTO: ABNORMAL
RBC CASTS # UR COMP ASSIST: 143 /HPF — HIGH (ref 0–4)
RH IG SCN BLD-IMP: POSITIVE — SIGNIFICANT CHANGE UP
SALICYLATES SERPL-MCNC: <1.7 MG/DL — LOW (ref 2.8–20)
SAO2 % BLDA: 99.8 % — HIGH (ref 94–98)
SARS-COV-2 RNA SPEC QL NAA+PROBE: SIGNIFICANT CHANGE UP
SODIUM SERPL-SCNC: 138 MMOL/L — SIGNIFICANT CHANGE UP (ref 135–145)
SODIUM SERPL-SCNC: 138 MMOL/L — SIGNIFICANT CHANGE UP (ref 135–145)
SODIUM SERPL-SCNC: 139 MMOL/L — SIGNIFICANT CHANGE UP (ref 135–145)
SP GR SPEC: 1.04 — HIGH (ref 1.01–1.02)
T3 SERPL-MCNC: 70 NG/DL — LOW (ref 80–200)
T4 AB SER-ACNC: 2.3 UG/DL — LOW (ref 4.6–12)
T4 FREE SERPL-MCNC: 0.3 NG/DL — LOW (ref 0.9–1.8)
THC UR QL: NEGATIVE — SIGNIFICANT CHANGE UP
TROPONIN T, HIGH SENSITIVITY RESULT: 33 NG/L — SIGNIFICANT CHANGE UP (ref 0–51)
TSH SERPL-MCNC: 14.4 UIU/ML — HIGH (ref 0.27–4.2)
TSH SERPL-MCNC: 27.5 UIU/ML — HIGH (ref 0.36–3.74)
UROBILINOGEN FLD QL: NEGATIVE — SIGNIFICANT CHANGE UP
WBC # BLD: 3.51 K/UL — LOW (ref 3.8–10.5)
WBC # BLD: 6.13 K/UL — SIGNIFICANT CHANGE UP (ref 3.8–10.5)
WBC # BLD: 7.96 K/UL — SIGNIFICANT CHANGE UP (ref 3.8–10.5)
WBC # FLD AUTO: 3.51 K/UL — LOW (ref 3.8–10.5)
WBC # FLD AUTO: 6.13 K/UL — SIGNIFICANT CHANGE UP (ref 3.8–10.5)
WBC # FLD AUTO: 7.96 K/UL — SIGNIFICANT CHANGE UP (ref 3.8–10.5)
WBC UR QL: 4 /HPF — SIGNIFICANT CHANGE UP (ref 0–5)

## 2022-04-12 PROCEDURE — 93010 ELECTROCARDIOGRAM REPORT: CPT

## 2022-04-12 PROCEDURE — 99291 CRITICAL CARE FIRST HOUR: CPT

## 2022-04-12 PROCEDURE — 71045 X-RAY EXAM CHEST 1 VIEW: CPT | Mod: 26

## 2022-04-12 PROCEDURE — 95720 EEG PHY/QHP EA INCR W/VEEG: CPT

## 2022-04-12 PROCEDURE — 93306 TTE W/DOPPLER COMPLETE: CPT | Mod: 26

## 2022-04-12 PROCEDURE — 70496 CT ANGIOGRAPHY HEAD: CPT | Mod: 26,MA

## 2022-04-12 PROCEDURE — 70450 CT HEAD/BRAIN W/O DYE: CPT | Mod: 26,59,MA

## 2022-04-12 PROCEDURE — 99223 1ST HOSP IP/OBS HIGH 75: CPT | Mod: GC

## 2022-04-12 PROCEDURE — 0042T: CPT

## 2022-04-12 PROCEDURE — 71045 X-RAY EXAM CHEST 1 VIEW: CPT | Mod: 26,77

## 2022-04-12 PROCEDURE — 70498 CT ANGIOGRAPHY NECK: CPT | Mod: 26,MA

## 2022-04-12 PROCEDURE — 70450 CT HEAD/BRAIN W/O DYE: CPT | Mod: 26,77

## 2022-04-12 RX ORDER — LEVOTHYROXINE SODIUM 125 MCG
88 TABLET ORAL DAILY
Refills: 0 | Status: DISCONTINUED | OUTPATIENT
Start: 2022-04-12 | End: 2022-04-13

## 2022-04-12 RX ORDER — CALCIUM GLUCONATE 100 MG/ML
2 VIAL (ML) INTRAVENOUS ONCE
Refills: 0 | Status: COMPLETED | OUTPATIENT
Start: 2022-04-12 | End: 2022-04-13

## 2022-04-12 RX ORDER — ROCURONIUM BROMIDE 10 MG/ML
100 VIAL (ML) INTRAVENOUS ONCE
Refills: 0 | Status: COMPLETED | OUTPATIENT
Start: 2022-04-12 | End: 2022-04-12

## 2022-04-12 RX ORDER — PROPOFOL 10 MG/ML
50 INJECTION, EMULSION INTRAVENOUS
Qty: 1000 | Refills: 0 | Status: DISCONTINUED | OUTPATIENT
Start: 2022-04-12 | End: 2022-04-15

## 2022-04-12 RX ORDER — SENNA PLUS 8.6 MG/1
2 TABLET ORAL AT BEDTIME
Refills: 0 | Status: DISCONTINUED | OUTPATIENT
Start: 2022-04-12 | End: 2022-05-04

## 2022-04-12 RX ORDER — SODIUM CHLORIDE 9 MG/ML
1000 INJECTION, SOLUTION INTRAVENOUS ONCE
Refills: 0 | Status: COMPLETED | OUTPATIENT
Start: 2022-04-12 | End: 2022-04-12

## 2022-04-12 RX ORDER — CHLORHEXIDINE GLUCONATE 213 G/1000ML
15 SOLUTION TOPICAL EVERY 12 HOURS
Refills: 0 | Status: DISCONTINUED | OUTPATIENT
Start: 2022-04-12 | End: 2022-04-15

## 2022-04-12 RX ORDER — LEVETIRACETAM 250 MG/1
1000 TABLET, FILM COATED ORAL EVERY 12 HOURS
Refills: 0 | Status: DISCONTINUED | OUTPATIENT
Start: 2022-04-12 | End: 2022-04-12

## 2022-04-12 RX ORDER — POTASSIUM CHLORIDE 20 MEQ
40 PACKET (EA) ORAL ONCE
Refills: 0 | Status: COMPLETED | OUTPATIENT
Start: 2022-04-12 | End: 2022-04-12

## 2022-04-12 RX ORDER — LEVETIRACETAM 250 MG/1
1000 TABLET, FILM COATED ORAL ONCE
Refills: 0 | Status: COMPLETED | OUTPATIENT
Start: 2022-04-12 | End: 2022-04-12

## 2022-04-12 RX ORDER — LEVETIRACETAM 250 MG/1
750 TABLET, FILM COATED ORAL EVERY 12 HOURS
Refills: 0 | Status: DISCONTINUED | OUTPATIENT
Start: 2022-04-12 | End: 2022-04-12

## 2022-04-12 RX ORDER — SODIUM CHLORIDE 9 MG/ML
500 INJECTION INTRAMUSCULAR; INTRAVENOUS; SUBCUTANEOUS ONCE
Refills: 0 | Status: COMPLETED | OUTPATIENT
Start: 2022-04-12 | End: 2022-04-12

## 2022-04-12 RX ORDER — POTASSIUM CHLORIDE 20 MEQ
20 PACKET (EA) ORAL ONCE
Refills: 0 | Status: DISCONTINUED | OUTPATIENT
Start: 2022-04-12 | End: 2022-04-13

## 2022-04-12 RX ORDER — CHLORHEXIDINE GLUCONATE 213 G/1000ML
15 SOLUTION TOPICAL EVERY 12 HOURS
Refills: 0 | Status: DISCONTINUED | OUTPATIENT
Start: 2022-04-12 | End: 2022-04-12

## 2022-04-12 RX ORDER — SODIUM CHLORIDE 9 MG/ML
1000 INJECTION, SOLUTION INTRAVENOUS
Refills: 0 | Status: DISCONTINUED | OUTPATIENT
Start: 2022-04-12 | End: 2022-04-12

## 2022-04-12 RX ORDER — SODIUM CHLORIDE 9 MG/ML
1000 INJECTION, SOLUTION INTRAVENOUS ONCE
Refills: 0 | Status: DISCONTINUED | OUTPATIENT
Start: 2022-04-12 | End: 2022-04-15

## 2022-04-12 RX ORDER — NICARDIPINE HYDROCHLORIDE 30 MG/1
7.5 CAPSULE, EXTENDED RELEASE ORAL
Qty: 40 | Refills: 0 | Status: DISCONTINUED | OUTPATIENT
Start: 2022-04-12 | End: 2022-04-12

## 2022-04-12 RX ORDER — POTASSIUM PHOSPHATE, MONOBASIC POTASSIUM PHOSPHATE, DIBASIC 236; 224 MG/ML; MG/ML
30 INJECTION, SOLUTION INTRAVENOUS ONCE
Refills: 0 | Status: COMPLETED | OUTPATIENT
Start: 2022-04-12 | End: 2022-04-13

## 2022-04-12 RX ORDER — INSULIN LISPRO 100/ML
VIAL (ML) SUBCUTANEOUS EVERY 6 HOURS
Refills: 0 | Status: DISCONTINUED | OUTPATIENT
Start: 2022-04-12 | End: 2022-04-14

## 2022-04-12 RX ORDER — SODIUM CHLORIDE 9 MG/ML
1000 INJECTION INTRAMUSCULAR; INTRAVENOUS; SUBCUTANEOUS
Refills: 0 | Status: DISCONTINUED | OUTPATIENT
Start: 2022-04-12 | End: 2022-04-12

## 2022-04-12 RX ORDER — LABETALOL HCL 100 MG
5 TABLET ORAL
Refills: 0 | Status: DISCONTINUED | OUTPATIENT
Start: 2022-04-12 | End: 2022-04-12

## 2022-04-12 RX ORDER — LEVETIRACETAM 250 MG/1
750 TABLET, FILM COATED ORAL EVERY 12 HOURS
Refills: 0 | Status: DISCONTINUED | OUTPATIENT
Start: 2022-04-13 | End: 2022-04-13

## 2022-04-12 RX ORDER — SODIUM CHLORIDE 9 MG/ML
500 INJECTION, SOLUTION INTRAVENOUS ONCE
Refills: 0 | Status: COMPLETED | OUTPATIENT
Start: 2022-04-12 | End: 2022-04-12

## 2022-04-12 RX ORDER — GLUCAGON INJECTION, SOLUTION 0.5 MG/.1ML
1 INJECTION, SOLUTION SUBCUTANEOUS ONCE
Refills: 0 | Status: DISCONTINUED | OUTPATIENT
Start: 2022-04-12 | End: 2022-04-12

## 2022-04-12 RX ORDER — FENTANYL CITRATE 50 UG/ML
2 INJECTION INTRAVENOUS
Qty: 2500 | Refills: 0 | Status: DISCONTINUED | OUTPATIENT
Start: 2022-04-12 | End: 2022-04-12

## 2022-04-12 RX ORDER — ETOMIDATE 2 MG/ML
20 INJECTION INTRAVENOUS ONCE
Refills: 0 | Status: COMPLETED | OUTPATIENT
Start: 2022-04-12 | End: 2022-04-12

## 2022-04-12 RX ORDER — PROPOFOL 10 MG/ML
45 INJECTION, EMULSION INTRAVENOUS
Qty: 1000 | Refills: 0 | Status: DISCONTINUED | OUTPATIENT
Start: 2022-04-12 | End: 2022-04-12

## 2022-04-12 RX ORDER — FENTANYL CITRATE 50 UG/ML
50 INJECTION INTRAVENOUS ONCE
Refills: 0 | Status: DISCONTINUED | OUTPATIENT
Start: 2022-04-12 | End: 2022-04-12

## 2022-04-12 RX ORDER — HYDRALAZINE HCL 50 MG
5 TABLET ORAL
Refills: 0 | Status: DISCONTINUED | OUTPATIENT
Start: 2022-04-12 | End: 2022-04-13

## 2022-04-12 RX ORDER — CHLORHEXIDINE GLUCONATE 213 G/1000ML
15 SOLUTION TOPICAL
Refills: 0 | Status: DISCONTINUED | OUTPATIENT
Start: 2022-04-12 | End: 2022-04-12

## 2022-04-12 RX ORDER — DEXTROSE 50 % IN WATER 50 %
12.5 SYRINGE (ML) INTRAVENOUS ONCE
Refills: 0 | Status: DISCONTINUED | OUTPATIENT
Start: 2022-04-12 | End: 2022-04-12

## 2022-04-12 RX ORDER — DEXMEDETOMIDINE HYDROCHLORIDE IN 0.9% SODIUM CHLORIDE 4 UG/ML
0.3 INJECTION INTRAVENOUS
Qty: 200 | Refills: 0 | Status: DISCONTINUED | OUTPATIENT
Start: 2022-04-12 | End: 2022-04-12

## 2022-04-12 RX ORDER — DEXTROSE 50 % IN WATER 50 %
25 SYRINGE (ML) INTRAVENOUS ONCE
Refills: 0 | Status: DISCONTINUED | OUTPATIENT
Start: 2022-04-12 | End: 2022-04-12

## 2022-04-12 RX ORDER — DEXTROSE 50 % IN WATER 50 %
15 SYRINGE (ML) INTRAVENOUS ONCE
Refills: 0 | Status: DISCONTINUED | OUTPATIENT
Start: 2022-04-12 | End: 2022-04-12

## 2022-04-12 RX ORDER — SODIUM CHLORIDE 9 MG/ML
1000 INJECTION, SOLUTION INTRAVENOUS
Refills: 0 | Status: DISCONTINUED | OUTPATIENT
Start: 2022-04-12 | End: 2022-04-13

## 2022-04-12 RX ORDER — CHLORHEXIDINE GLUCONATE 213 G/1000ML
1 SOLUTION TOPICAL
Refills: 0 | Status: DISCONTINUED | OUTPATIENT
Start: 2022-04-12 | End: 2022-04-14

## 2022-04-12 RX ORDER — NICARDIPINE HYDROCHLORIDE 30 MG/1
5 CAPSULE, EXTENDED RELEASE ORAL
Qty: 40 | Refills: 0 | Status: DISCONTINUED | OUTPATIENT
Start: 2022-04-12 | End: 2022-04-15

## 2022-04-12 RX ORDER — FENTANYL CITRATE 50 UG/ML
50 INJECTION INTRAVENOUS
Refills: 0 | Status: DISCONTINUED | OUTPATIENT
Start: 2022-04-12 | End: 2022-04-13

## 2022-04-12 RX ORDER — FENTANYL CITRATE 50 UG/ML
2 INJECTION INTRAVENOUS
Qty: 5000 | Refills: 0 | Status: DISCONTINUED | OUTPATIENT
Start: 2022-04-12 | End: 2022-04-12

## 2022-04-12 RX ORDER — LEVOTHYROXINE SODIUM 125 MCG
88 TABLET ORAL DAILY
Refills: 0 | Status: DISCONTINUED | OUTPATIENT
Start: 2022-04-12 | End: 2022-04-12

## 2022-04-12 RX ORDER — PROPOFOL 10 MG/ML
10 INJECTION, EMULSION INTRAVENOUS
Qty: 1000 | Refills: 0 | Status: DISCONTINUED | OUTPATIENT
Start: 2022-04-12 | End: 2022-04-13

## 2022-04-12 RX ORDER — HYDRALAZINE HCL 50 MG
5 TABLET ORAL
Refills: 0 | Status: DISCONTINUED | OUTPATIENT
Start: 2022-04-12 | End: 2022-04-12

## 2022-04-12 RX ORDER — CHLORHEXIDINE GLUCONATE 213 G/1000ML
15 SOLUTION TOPICAL
Refills: 0 | Status: DISCONTINUED | OUTPATIENT
Start: 2022-04-12 | End: 2022-04-13

## 2022-04-12 RX ORDER — PANTOPRAZOLE SODIUM 20 MG/1
40 TABLET, DELAYED RELEASE ORAL DAILY
Refills: 0 | Status: DISCONTINUED | OUTPATIENT
Start: 2022-04-12 | End: 2022-04-13

## 2022-04-12 RX ADMIN — SODIUM CHLORIDE 2000 MILLILITER(S): 9 INJECTION, SOLUTION INTRAVENOUS at 21:00

## 2022-04-12 RX ADMIN — PROPOFOL 4.57 MICROGRAM(S)/KG/MIN: 10 INJECTION, EMULSION INTRAVENOUS at 19:00

## 2022-04-12 RX ADMIN — PROPOFOL 20.6 MICROGRAM(S)/KG/MIN: 10 INJECTION, EMULSION INTRAVENOUS at 06:52

## 2022-04-12 RX ADMIN — FENTANYL CITRATE 50 MICROGRAM(S): 50 INJECTION INTRAVENOUS at 07:22

## 2022-04-12 RX ADMIN — SODIUM CHLORIDE 1000 MILLILITER(S): 9 INJECTION, SOLUTION INTRAVENOUS at 04:49

## 2022-04-12 RX ADMIN — SODIUM CHLORIDE 1000 MILLILITER(S): 9 INJECTION INTRAMUSCULAR; INTRAVENOUS; SUBCUTANEOUS at 17:41

## 2022-04-12 RX ADMIN — CHLORHEXIDINE GLUCONATE 1 APPLICATION(S): 213 SOLUTION TOPICAL at 21:50

## 2022-04-12 RX ADMIN — PANTOPRAZOLE SODIUM 40 MILLIGRAM(S): 20 TABLET, DELAYED RELEASE ORAL at 13:28

## 2022-04-12 RX ADMIN — Medication 40 MILLIEQUIVALENT(S): at 13:28

## 2022-04-12 RX ADMIN — SODIUM CHLORIDE 100 MILLILITER(S): 9 INJECTION, SOLUTION INTRAVENOUS at 13:28

## 2022-04-12 RX ADMIN — SENNA PLUS 2 TABLET(S): 8.6 TABLET ORAL at 21:50

## 2022-04-12 RX ADMIN — FENTANYL CITRATE 50 MICROGRAM(S): 50 INJECTION INTRAVENOUS at 06:51

## 2022-04-12 RX ADMIN — LEVETIRACETAM 600 MILLIGRAM(S): 250 TABLET, FILM COATED ORAL at 13:28

## 2022-04-12 RX ADMIN — PROPOFOL 22.9 MICROGRAM(S)/KG/MIN: 10 INJECTION, EMULSION INTRAVENOUS at 05:48

## 2022-04-12 RX ADMIN — FENTANYL CITRATE 7.62 MICROGRAM(S)/KG/HR: 50 INJECTION INTRAVENOUS at 06:48

## 2022-04-12 RX ADMIN — Medication 88 MICROGRAM(S): at 13:27

## 2022-04-12 RX ADMIN — Medication 100 MILLIGRAM(S): at 04:48

## 2022-04-12 RX ADMIN — FENTANYL CITRATE 15.2 MICROGRAM(S)/KG/HR: 50 INJECTION INTRAVENOUS at 06:48

## 2022-04-12 RX ADMIN — FENTANYL CITRATE 50 MICROGRAM(S): 50 INJECTION INTRAVENOUS at 16:06

## 2022-04-12 RX ADMIN — SODIUM CHLORIDE 100 MILLILITER(S): 9 INJECTION, SOLUTION INTRAVENOUS at 19:00

## 2022-04-12 RX ADMIN — FENTANYL CITRATE 50 MICROGRAM(S): 50 INJECTION INTRAVENOUS at 15:52

## 2022-04-12 RX ADMIN — ETOMIDATE 20 MILLIGRAM(S): 2 INJECTION INTRAVENOUS at 04:46

## 2022-04-12 RX ADMIN — NICARDIPINE HYDROCHLORIDE 37.5 MG/HR: 30 CAPSULE, EXTENDED RELEASE ORAL at 06:52

## 2022-04-12 NOTE — H&P ADULT - ASSESSMENT
71 yo M with PMH HTN, hypothyroidism who presented to [place] after witnessed seizure at home x5 minutes. Upon arrival to [place], two more witnessed seizures requiring versed 10 mg total, intubated for GCS 6. CTH / CTA showed small R cerebellar hemorrhage. Transferred to [facility] for neurosurgical evaluation and EEG monitoring.       Plan:   Neuro:  - Q1 hour Neuro checks, Q1 hour Vitals  - HOB 30 degrees, Neck midline position  - Maintain normothermia, PO acetaminophen for temp>38 C or pain  - Imaging reviewed, pending repeat CTH   - EEG  - AED: Keppra 750 BID, propofol drip   - Pain management & Sedation: propofol drip, fentanyl PRN   - Turn and Position Q2  /  Activity ad latisha, with assistance  	  CV:  - SBP Goal 100-140  - BP regimen:  	Access: Central line /Midline catheter /PICC  	Arterial line    Pulm:  	Mode: AC/ CMV (Assist Control/ Continuous Mandatory Ventilation)  RR (machine): 14  TV (machine): 450  FiO2: 50  PEEP: 5  ITime: 0.76  MAP: 10  PIP: 30    	Supplemental O2 PRN to maintain Spo2>92%  	Chest PT, OOB, Pulmonary Toilet    GI:  	Nutrition:  	GI prophylaxis  	Bowel regimen  	  Gu:  	Gibson / Voiding / Condom Cath / Pure-wick  	I&O Q1 hour  	Monitor Electrolytes & Renal Function    Heme:  	Monitor H&H  	Antiplatelet / Anticoagulation / ARU / PRU  	Chemical DVT prophylaxis:   		* Lovenox SQ  		* Chemical DVT prophylaxis is contraindicated due to risk of bleeding  	Mechanical DVT Prophylaxis: Maintain B/L LE sequential compression devices  	  ID:  	Monitor WBC and Temperature  	Antibiotic Regimen:  		* Ancef Postoperatively: "Drain Prophylaxis" as per neurosurgeon  	Follow cultures: Pending [  ]   Not applicable [x]  		    Endo  	Monitor BGL, maintain <180  	AUDREY   		100-150 no correction  		150-200  2units  		200-250  4units  		250-300	 6units  		300-350  8units  		350-400  10units  		400+	12units      73 yo M with PMH HTN, hypothyroidism who presented to [place] after witnessed seizure at home x5 minutes. Upon arrival to [place], two more witnessed seizures requiring versed 10 mg total, intubated for GCS 6. CTH / CTA showed small R cerebellar hemorrhage. Transferred to [facility] for neurosurgical evaluation and EEG monitoring.       Plan:   Neuro:  - Q1 hour Neuro checks, Q1 hour Vitals  - HOB 30 degrees, Neck midline position  - Maintain normothermia, PO acetaminophen for temp>38 C or pain  - Imaging reviewed, pending repeat CTH   - EEG  - AED: Keppra 750 BID, propofol drip   - Pain management & Sedation: propofol drip, fentanyl PRN   - Turn and Position Q2  /  Activity ad latisha, with assistance  	  CV:  - SBP Goal 100-140  - BP regimen: hydralazine / labetalol PRN     Pulm:  - Mode: AC/ CMV 14/450/50/5  - Chest PT, OOB, Pulmonary Toilet    GI:  - Nutrition: NPO / OGT   - GI prophylaxis: protonix 2/2 intubation   	  Gu:  - Voiding  - NS @ 75  - I&O Q1 hour  - Monitor Electrolytes & Renal Function    Heme:  - Monitor H&H  - Chemical DVT prophylaxis: * Chemical DVT prophylaxis is contraindicated due to risk of bleeding  - Mechanical DVT Prophylaxis: Maintain B/L LE sequential compression devices  	  ID:  - Monitor WBC and Temperature    Endo  - Monitor BGL, maintain <180  - MISS  - Synthroid 88 mcg    73 yo M with PMH HTN, hypothyroidism who presented to [place] after witnessed seizure at home x5 minutes. Upon arrival to [place], two more witnessed seizures requiring versed 10 mg total, intubated for GCS 6. CTH / CTA showed small R cerebellar hemorrhage. Transferred to [facility] for neurosurgical evaluation and EEG monitoring.       Plan:   Neuro:  - Q1 hour Neuro checks, Q1 hour Vitals  - HOB 30 degrees, Neck midline position  - Maintain normothermia, PO acetaminophen for temp>38 C or pain  - Imaging reviewed, pending repeat CTH   - MRI w/wo after EEG   - EEG  - AED: Keppra 750 BID with 1g load, propofol drip   - Pain management & Sedation: propofol drip, fentanyl PRN   - Turn and Position Q2  /  Activity ad latisha, with assistance  	  CV:  - SBP Goal 100-160  - BP regimen: hydralazine / labetalol PRN   - Lipid panel  - TTE     Pulm:  - Mode: AC/ CMV 14/450/50/5  - Chest PT, OOB, Pulmonary Toilet    GI:  - Nutrition: NPO / OGT, starting Jevity goal @ 50 ml/hr  - GI prophylaxis: protonix 2/2 intubation   - Senna   	  Gu:  - Gibson  - Plasmalyte @ 100   - I&O Q1 hour  - Monitor Electrolytes & Renal Function    Heme:  - Monitor H&H  - Chemical DVT prophylaxis: * Chemical DVT prophylaxis is contraindicated due to risk of bleeding  - Mechanical DVT Prophylaxis: Maintain B/L LE sequential compression devices  	  ID:  - Monitor WBC and Temperature    Endo  - Monitor BGL, maintain <180  - MISS  - Synthroid 88 mcg   - T4, Free T3, thyroid US    71 yo M with PMH HTN, hypothyroidism who presented to [place] after witnessed seizure at home x5 minutes. Upon arrival to [place], two more witnessed seizures requiring versed 10 mg total, intubated for GCS 6. CTH / CTA showed small R cerebellar hemorrhage. Transferred to [facility] for neurosurgical evaluation and EEG monitoring.       Plan:   Neuro:  - Q1 hour Neuro checks, Q1 hour Vitals  - HOB 30 degrees, Neck midline position   - Maintain normothermia, PO acetaminophen for temp>38 C or pain  - Imaging reviewed, pending repeat CTH   - MRI w/wo after EEG   - EEG  - AED: Keppra 750 BID with 1g load, propofol drip   - Pain management & Sedation: propofol drip, fentanyl PRN   - Turn and Position Q2  /  Activity ad latisha, with assistance  	  CV:  - SBP Goal 100-160  - BP regimen: hydralazine / labetalol PRN   - Lipid panel  - TTE     Pulm:  - Mode: AC/ CMV 14/450/50/5  - Chest PT, OOB, Pulmonary Toilet    GI:  - Nutrition: NPO / OGT, starting Jevity goal @ 50 ml/hr  - GI prophylaxis: protonix 2/2 intubation   - Senna   	  Gu:  - Gibson  - Plasmalyte @ 100   - I&O Q1 hour  - Monitor Electrolytes & Renal Function    Heme:  - Monitor H&H  - Chemical DVT prophylaxis: * Chemical DVT prophylaxis is contraindicated due to risk of bleeding  - Mechanical DVT Prophylaxis: Maintain B/L LE sequential compression devices  	  ID:  - Monitor WBC and Temperature    Endo  - Monitor BGL, maintain <180  - MISS  - Synthroid 88 mcg   - T4, Free T3, thyroid US     FULL code  ICU  critically ill due to GTC seizures, status epilepticus, respiratory failure requiring intubation

## 2022-04-12 NOTE — ED PROVIDER NOTE - CRITICAL CARE ATTENDING CONTRIBUTION TO CARE
Due to the a high probability of clinically significant, life threatening deterioration, the patient required high level of preparedness to intervene emergently. I personally spent critical care time directly and personally managing the patient. This included (but not limited to) reviewing  vitals, managing orders, and determining and adjusting plan depending on response to interventions.

## 2022-04-12 NOTE — ED PROVIDER NOTE - OBJECTIVE STATEMENT
72M pmhx htn, hypothyroidism, presents via EMS after family called 911 following a witness seizure, by family, reportedly lasting ~5min. On arrival by EMS pt subsequently had two additional witnessed seizures, each lasting ~1 minute. Following each seizure the patient was given 5mg IV versed (received 10mg collectively and brought to the ED). Pt arrives to the ED obtunded with blood from his mouth (tongue laceration), responsive only to pain /sternal rub. No further history obtainable given patients condition on arrival. Family reports no prior seizure history.

## 2022-04-12 NOTE — ED ADULT NURSE NOTE - CODE STROKE DETAILS
Patient had a witnessed seizure by family , Patient has no history of seizures . Patient was unable to protect his airway on arrival to ED and had to be intubated .

## 2022-04-12 NOTE — ED PROVIDER NOTE - PROGRESS NOTE DETAILS
Attending note (Edison): patient endorsed to me at signout; intubated, borderline hypotensive now, titrated off Cardene infusion and decreased propofol; case discussed with neurosurgery, to be admitted to nsicu.  Hgb 4 at osh but repeat here 11, cancelled transfusion, likely erroneous lab results. Attending note (Edison): case discussed with neurosurgery, to be admitted to NSCU.

## 2022-04-12 NOTE — ED ADULT NURSE NOTE - NSIMPLEMENTINTERV_GEN_ALL_ED
Implemented All Universal Safety Interventions:  [place] to call system. Call bell, personal items and telephone within reach. Instruct patient to call for assistance. Room bathroom lighting operational. Non-slip footwear when patient is off stretcher. Physically safe environment: no spills, clutter or unnecessary equipment. Stretcher in lowest position, wheels locked, appropriate side rails in place.

## 2022-04-12 NOTE — H&P ADULT - NSHPPHYSICALEXAM_GEN_ALL_CORE
Constitutional: intubated  HEENT: PERRLA, EOMI,   Neck: Supple.  Respiratory: Breath sounds are clear bilaterally  Cardiovascular: S1 and S2, regular / irregular rhythm  Gastrointestinal: soft, nontender  Extremities:  no edema  Musculoskeletal: no joint swelling/tenderness, no abnormal movements  Skin: No rashes    Neurological exam:  Off propofol for 15 min; No OE to stim, PERRL 2mm, UE no movement, LLE WD. Constitutional: intubated  HEENT: PERRLA, EOMI,   Neck: Supple.  Respiratory: Breath sounds are clear bilaterally  Cardiovascular: S1 and S2, regular / irregular rhythm  Gastrointestinal: soft, nontender  Extremities:  no edema  Musculoskeletal: no joint swelling/tenderness, no abnormal movements  Skin: No rashes     Neurological exam:  Off propofol for 15 min; No OE to stim, PERRL 2mm, UE no movement, LLE WD.

## 2022-04-12 NOTE — ED ADULT NURSE NOTE - OBJECTIVE STATEMENT
Patient was received in ED unresponsive being ventilated by EMS . Patient was found by family in bed having 2 back to back seizures .Patient was unable to control his airway and had to be intubated .

## 2022-04-12 NOTE — CHART NOTE - NSCHARTNOTEFT_GEN_A_CORE
[facility] EPILEPSY CENTER    ** PRELIMINARY EEG reviewed until  12:35    - Background slowing/suppression.  - No seizures recorded so far.      Final report to be produced after completion of the study tomorrow morning.    -----------------------------  Artem Mustafa MD, AYANNA  Epilepsy Fellow    --------------------------------  EEG Reading Room: [phone]  (weekdays)  On Call Service After Hours: [phone]

## 2022-04-12 NOTE — PATIENT PROFILE ADULT - LIVING ENVIRONMENT
Visit Vitals  /88   Pulse 62   Resp 20   Ht 5' 7\" (1.702 m)   Wt 280 lb (127 kg)   SpO2 98%   BMI 43.85 kg/m²     Denies CP,SOB,dizziness or edema.     BP Log ranges 130/80 to 140/80    EKG today  ECHO today no

## 2022-04-12 NOTE — PROGRESS NOTE ADULT - SUBJECTIVE AND OBJECTIVE BOX
EVENTS:   No acute events overnight.    VITALS:  T(C): , Max: 36.8 (04-12-22 @ 04:08)  HR:  (41 - 108)  BP:  (87/54 - 188/125)  ABP: --  RR:  (14 - 19)  SpO2:  (97% - 100%)  Wt(kg): --  Device: Avea, Mode: AC/ CMV (Assist Control/ Continuous Mandatory Ventilation), RR (machine): 14, TV (machine): 450, FiO2: 40, PEEP: 5, ITime: 1, MAP: 11, PIP: 25    04-12-22 @ 07:01  -  04-12-22 @ 19:05  --------------------------------------------------------  IN: 1608.2 mL / OUT: 500 mL / NET: 1108.2 mL      LABS:  Na: 138 (04-12 @ 07:30), 139 (04-12 @ 05:05)  K: 3.6 (04-12 @ 07:30), 3.5 (04-12 @ 05:05)  Cl: 104 (04-12 @ 07:30), 105 (04-12 @ 05:05)  CO2: 21 (04-12 @ 07:30), 21 (04-12 @ 05:05)  BUN: 20 (04-12 @ 07:30), 21 (04-12 @ 05:05)  Cr: 1.16 (04-12 @ 07:30), 1.74 (04-12 @ 05:05)  Glu: 119(04-12 @ 07:30), 201(04-12 @ 05:05)    Hgb: 11.2 (04-12 @ 07:30), 4.2 (04-12 @ 05:33)  Hct: 34.1 (04-12 @ 07:30), 13.5 (04-12 @ 05:33)  WBC: 7.96 (04-12 @ 07:30), 3.51 (04-12 @ 05:33)  Plt: 246 (04-12 @ 07:30), 85 (04-12 @ 05:33)    INR: 0.98 04-12-22 @ 05:05  PTT: 27.9 04-12-22 @ 05:05    MEDICATIONS:  fentaNYL    Injectable 50 MICROGram(s) IV Push every 2 hours PRN  hydrALAZINE Injectable 5 milliGRAM(s) IV Push every 2 hours PRN  insulin lispro (ADMELOG) corrective regimen sliding scale   SubCutaneous every 6 hours  labetalol Injectable 5 milliGRAM(s) IV Push every 2 hours PRN  levothyroxine 88 MICROGram(s) Oral daily  multiple electrolytes Injection Type 1 1000 milliLiter(s) IV Continuous <Continuous>  pantoprazole  Injectable 40 milliGRAM(s) IV Push daily  propofol Infusion 10 MICROgram(s)/kG/Min IV Continuous <Continuous>  senna 2 Tablet(s) Oral at bedtime    EXAMINATION:  General:  in NAD  HEENT:  MMM  Neuro:  awake, alert, oriented x 3, follows commands, EOMI, face symmetric, no PD, DF 5/5   Cards:  RRR  Respiratory:  no respiratory distress  Abdomen:  soft  Extremities:  no LE edema    Assessment/Plan:   71 yo man with h/o HTN and hypothyroidism,  EVENTS:   Patient seen on evening rounds, no acute events.  VEEG with no seizures.   Afebrile.     VITALS:  T(C): , Max: 36.8 (04-12-22 @ 04:08)  HR:  (41 - 108)  BP:  (87/54 - 188/125)  ABP: --  RR:  (14 - 19)  SpO2:  (97% - 100%)  Wt(kg): --  Device: Avea, Mode: AC/ CMV (Assist Control/ Continuous Mandatory Ventilation), RR (machine): 14, TV (machine): 450, FiO2: 40, PEEP: 5, ITime: 1, MAP: 11, PIP: 25    04-12-22 @ 07:01  -  04-12-22 @ 19:05  --------------------------------------------------------  IN: 1608.2 mL / OUT: 500 mL / NET: 1108.2 mL      LABS:  Na: 138 (04-12 @ 07:30), 139 (04-12 @ 05:05)  K: 3.6 (04-12 @ 07:30), 3.5 (04-12 @ 05:05)  Cl: 104 (04-12 @ 07:30), 105 (04-12 @ 05:05)  CO2: 21 (04-12 @ 07:30), 21 (04-12 @ 05:05)  BUN: 20 (04-12 @ 07:30), 21 (04-12 @ 05:05)  Cr: 1.16 (04-12 @ 07:30), 1.74 (04-12 @ 05:05)  Glu: 119(04-12 @ 07:30), 201(04-12 @ 05:05)    Hgb: 11.2 (04-12 @ 07:30), 4.2 (04-12 @ 05:33)  Hct: 34.1 (04-12 @ 07:30), 13.5 (04-12 @ 05:33)  WBC: 7.96 (04-12 @ 07:30), 3.51 (04-12 @ 05:33)  Plt: 246 (04-12 @ 07:30), 85 (04-12 @ 05:33)    INR: 0.98 04-12-22 @ 05:05  PTT: 27.9 04-12-22 @ 05:05    MEDICATIONS:  fentaNYL    Injectable 50 MICROGram(s) IV Push every 2 hours PRN  hydrALAZINE Injectable 5 milliGRAM(s) IV Push every 2 hours PRN  insulin lispro (ADMELOG) corrective regimen sliding scale   SubCutaneous every 6 hours  labetalol Injectable 5 milliGRAM(s) IV Push every 2 hours PRN  levothyroxine 88 MICROGram(s) Oral daily  multiple electrolytes Injection Type 1 1000 milliLiter(s) IV Continuous <Continuous>  pantoprazole  Injectable 40 milliGRAM(s) IV Push daily  propofol Infusion 10 MICROgram(s)/kG/Min IV Continuous <Continuous>  senna 2 Tablet(s) Oral at bedtime    EXAMINATION:  General:  in NAD  HEENT:  MMM  Neuro:  awake, alert, oriented x 3, follows commands, EOMI, face symmetric, no PD, DF 5/5   Cards:  RRR  Respiratory:  no respiratory distress  Abdomen:  soft  Extremities:  no LE edema    Assessment/Plan:   73 yo man with h/o HTN and hypothyroidism, admitted with 3 seizures at home, intubated at OSH for airway protection, CT head with a tiny hyperdensity in the R cerebellum, stable on subsequent head CT.    - confirm whether EtOH use  - VEEG  - MRI brain w/wo, MRV after VEEG  - restart home lisinopril and metoprolol, hold home HCTZ  - on home synthroid   - Utox EVENTS:   Patient seen on evening rounds, no acute events.  VEEG with no seizures.   Afebrile. No fevers at home as per family.   EtOH use on the weekends only.     VITALS:  T(C): , Max: 36.8 (04-12-22 @ 04:08)  HR:  (41 - 108)  BP:  (87/54 - 188/125)  ABP: --  RR:  (14 - 19)  SpO2:  (97% - 100%)  Wt(kg): --  Device: Avea, Mode: AC/ CMV (Assist Control/ Continuous Mandatory Ventilation), RR (machine): 14, TV (machine): 450, FiO2: 40, PEEP: 5, ITime: 1, MAP: 11, PIP: 25    04-12-22 @ 07:01  -  04-12-22 @ 19:05  --------------------------------------------------------  IN: 1608.2 mL / OUT: 500 mL / NET: 1108.2 mL      LABS:  Na: 138 (04-12 @ 07:30), 139 (04-12 @ 05:05)  K: 3.6 (04-12 @ 07:30), 3.5 (04-12 @ 05:05)  Cl: 104 (04-12 @ 07:30), 105 (04-12 @ 05:05)  CO2: 21 (04-12 @ 07:30), 21 (04-12 @ 05:05)  BUN: 20 (04-12 @ 07:30), 21 (04-12 @ 05:05)  Cr: 1.16 (04-12 @ 07:30), 1.74 (04-12 @ 05:05)  Glu: 119(04-12 @ 07:30), 201(04-12 @ 05:05)    Hgb: 11.2 (04-12 @ 07:30), 4.2 (04-12 @ 05:33)  Hct: 34.1 (04-12 @ 07:30), 13.5 (04-12 @ 05:33)  WBC: 7.96 (04-12 @ 07:30), 3.51 (04-12 @ 05:33)  Plt: 246 (04-12 @ 07:30), 85 (04-12 @ 05:33)    INR: 0.98 04-12-22 @ 05:05  PTT: 27.9 04-12-22 @ 05:05    MEDICATIONS:  fentaNYL    Injectable 50 MICROGram(s) IV Push every 2 hours PRN  hydrALAZINE Injectable 5 milliGRAM(s) IV Push every 2 hours PRN  insulin lispro (ADMELOG) corrective regimen sliding scale   SubCutaneous every 6 hours  labetalol Injectable 5 milliGRAM(s) IV Push every 2 hours PRN  levothyroxine 88 MICROGram(s) Oral daily  multiple electrolytes Injection Type 1 1000 milliLiter(s) IV Continuous <Continuous>  pantoprazole  Injectable 40 milliGRAM(s) IV Push daily  propofol Infusion 10 MICROgram(s)/kG/Min IV Continuous <Continuous>  senna 2 Tablet(s) Oral at bedtime    EXAMINATION:  General:  in NAD  HEENT:  MMM  Neuro:  awake, alert, oriented x 3, follows commands, EOMI, face symmetric, no PD, DF 5/5   Cards:  RRR  Respiratory:  no respiratory distress  Abdomen:  soft  Extremities:  no LE edema    Assessment/Plan:   73 yo man with h/o HTN and hypothyroidism, admitted with 3 seizures at home, intubated at OSH for airway protection, CT head with a tiny hyperdensity in the R cerebellum, stable on subsequent head CT.    - confirm whether EtOH use  - VEEG  - MRI brain w/wo, MRV after VEEG  - restart home lisinopril and metoprolol, hold home HCTZ  - on home synthroid, consult endocrinology in the AM   - Utox EVENTS:   Patient seen on evening rounds, no acute events.  VEEG with no seizures.   Afebrile. No fevers at home as per family.   EtOH use on the weekends only.     VITALS:  T(C): , Max: 36.8 (04-12-22 @ 04:08)  HR:  (41 - 108)  BP:  (87/54 - 188/125)  ABP: --  RR:  (14 - 19)  SpO2:  (97% - 100%)  Wt(kg): --  Device: Avea, Mode: AC/ CMV (Assist Control/ Continuous Mandatory Ventilation), RR (machine): 14, TV (machine): 450, FiO2: 40, PEEP: 5, ITime: 1, MAP: 11, PIP: 25    04-12-22 @ 07:01  -  04-12-22 @ 19:05  --------------------------------------------------------  IN: 1608.2 mL / OUT: 500 mL / NET: 1108.2 mL      LABS:  Na: 138 (04-12 @ 07:30), 139 (04-12 @ 05:05)  K: 3.6 (04-12 @ 07:30), 3.5 (04-12 @ 05:05)  Cl: 104 (04-12 @ 07:30), 105 (04-12 @ 05:05)  CO2: 21 (04-12 @ 07:30), 21 (04-12 @ 05:05)  BUN: 20 (04-12 @ 07:30), 21 (04-12 @ 05:05)  Cr: 1.16 (04-12 @ 07:30), 1.74 (04-12 @ 05:05)  Glu: 119(04-12 @ 07:30), 201(04-12 @ 05:05)    Hgb: 11.2 (04-12 @ 07:30), 4.2 (04-12 @ 05:33)  Hct: 34.1 (04-12 @ 07:30), 13.5 (04-12 @ 05:33)  WBC: 7.96 (04-12 @ 07:30), 3.51 (04-12 @ 05:33)  Plt: 246 (04-12 @ 07:30), 85 (04-12 @ 05:33)    INR: 0.98 04-12-22 @ 05:05  PTT: 27.9 04-12-22 @ 05:05    MEDICATIONS:  fentaNYL    Injectable 50 MICROGram(s) IV Push every 2 hours PRN  hydrALAZINE Injectable 5 milliGRAM(s) IV Push every 2 hours PRN  insulin lispro (ADMELOG) corrective regimen sliding scale   SubCutaneous every 6 hours  labetalol Injectable 5 milliGRAM(s) IV Push every 2 hours PRN  levothyroxine 88 MICROGram(s) Oral daily  multiple electrolytes Injection Type 1 1000 milliLiter(s) IV Continuous <Continuous>  pantoprazole  Injectable 40 milliGRAM(s) IV Push daily  propofol Infusion 10 MICROgram(s)/kG/Min IV Continuous <Continuous>  senna 2 Tablet(s) Oral at bedtime    EXAMINATION: briefly off propofol   General:  in NAD  HEENT:  MMM, intubated   Neuro:  awake, attends, follows some commands, EOMI, face symmetric, arm 5/5 b/l, b/l legs AG  Cards:  RRR  Respiratory:  no respiratory distress  Abdomen:  soft  Extremities:  no LE edema    Assessment/Plan:   73 yo man with h/o HTN and hypothyroidism, admitted with 3 seizures at home (first lifetime seizures), intubated at OSH for airway protection, CT head with a tiny hyperdensity in the R cerebellum, stable on subsequent head CT. Etiology of new onset seizures is not clear, no fevers or AMS at home concerning for meningitis/encephalitis.     - VEEG  - MRI brain w/wo, MRV after VEEG  - propofol for sedation, cannot use precedex 2/2 bradycardia  - switch keppra to Briviact   - SBP goal <160   - hold home lisinopril, metoprolol and HCTZ  - plasmalyte at 100cc/hr   - on home synthroid, consult endocrinology in the AM   - Utox  - hold tube feeds, CPAP, potential extubation tonight  - hold Lov ppx     Patient seen and examined by attending on 4/12/2022.    Patient is critically ill due to seizures and at high risk for neurological deterioration or death due to: seizures, ICH, respiratory failure requiring

## 2022-04-12 NOTE — ED PROVIDER NOTE - OBJECTIVE STATEMENT
72M PMH htn, hypothyroidism, presented to [place] ED via EMS after family called 911 following a witnessed seizure, by family, reportedly lasting ~5min. On arrival by EMS, pt subsequently had two additional witnessed seizures, each lasting ~1 minute. Following each seizure the patient was given 5mg IV versed (received 10mg collectively and brought to [place] ED). Pt arrived to the ED obtunded with blood from his mouth (tongue laceration), responsive only to pain/sternal rub. Patient was subsequently intubated for GCS 6 and CT/CTA performed showing a small cerebellar hemorrhage. He was transferred to [facility] for NSGY eval on Cardene and Propofol gtt. 72M PMH htn, hypothyroidism, presented to [place] ED via EMS after family called 911 following a witnessed seizure, by family, reportedly lasting ~5min. On arrival by EMS, pt subsequently had two additional witnessed seizures, each lasting ~1 minute. Following each seizure the patient was given 5mg IV versed (received 10mg collectively and brought to [place] ED). Pt arrived to the ED obtunded with blood from his mouth (tongue laceration), responsive only to pain/sternal rub. Patient was subsequently intubated for GCS 6 and CT/CTA performed showing a small cerebellar hemorrhage. He was transferred to [facility] for NSGY eval on Cardene and Propofol gtt.    Attendinyo male presents intubated from outside hospital.  pt had seizure x2 prior to arrival at outside hospital.  no fever.  was acting normally yesterday.  found to have small cerebellar hemorrhage on CT and transferred for neurosurgery eval.

## 2022-04-12 NOTE — H&P ADULT - HISTORY OF PRESENT ILLNESS
72M PMH htn, hypothyroidism, presented to [place] ED via EMS after family called 911 following a witnessed seizure, by family, reportedly lasting ~5min.  Enroute to [place], it was reported patient had another 2 witnessed seizures by EMS, each lasting ~1 minute. Following each seizure the patient was given 5mg IV versed (received 10mg collectively and brought to [place] ED). Pt arrived to the ED obtunded with blood from his mouth (tongue laceration), responsive only to pain/sternal rub. Patient was subsequently intubated for GCS 6 and CT/CTA performed showing a small cerebellar hemorrhage ON NO A/C/ THINNERS. He was transferred to [facility] for NSGY eval on Cardene and Propofol gtt.      Upon arrival, wife at bedside and confirms story, [place] 10PM,  sleeping 12am, 2am wife found him frothing at mouth, called EMS, patient subsequently awoke and refused transfer to hospital.  About 1 hour later, seized again, witnessed sounds like GTC.  EMS called again and transferred to hospital with another 2 witnessed seizures.  Hypertensive on arrival to [place].

## 2022-04-12 NOTE — H&P ADULT - NSHPLABSRESULTS_GEN_ALL_CORE
11.2   7.96  )-----------( 246      ( 12 Apr 2022 07:30 )             34.1   04-12    138  |  104  |  20  ----------------------------<  119<H>  3.6   |  21<L>  |  1.16    Ca    8.6      12 Apr 2022 07:30    TPro  8.6<H>  /  Alb  4.2  /  TBili  0.6  /  DBili  x   /  AST  23  /  ALT  12  /  AlkPhos  87  04-12    PT/INR - ( 12 Apr 2022 05:05 )   PT: 11.7 sec;   INR: 0.98 ratio    PTT - ( 12 Apr 2022 05:05 )  PTT:27.9 sec      < from: CT Head No Cont (04.12.22 @ 04:43) >    IMPRESSION:    0.5 cm focus of intraparenchymal hemorrhage inthe right cerebellum as   described.    The findings were discussed with DR. LANTIGUA  on 4/12/2022 4:54 AM.  Hospital policies for call back including read back policies were   followed. The verbal communication call back supplements this written   report.    --- End of Report ---    GRACIE GRAY MD; Attending Radiologist  This document has been electronically signed. Apr 12 2022  5:01AM    < end of copied text >

## 2022-04-12 NOTE — PATIENT PROFILE ADULT - FALL HARM RISK - RISK INTERVENTIONS

## 2022-04-12 NOTE — CONSULT NOTE ADULT - ASSESSMENT
72yoM pm, hx htn, hypothyroid diverticulitis presents to ED intubated, s/p seizures; found to have small RT cerebellar heme on CT head.  Transferred for Neurosurg eval and seizure management    Plan:    No neurosurgical intervention at this time  REPEAT CT HEAD in 4 hours, last CT 4:30am.  Strict BP management until follow-up CTH.  Remain on propofol until in ICU for seizure control.   Neurology consult for seizure    Discussed with patient plans, activity, and medications. Questions answered, and they verbalized understanding.    D/W Dr. Romero   #[phone]

## 2022-04-12 NOTE — ED PROVIDER NOTE - NSRECPHYSICIAN_ED_A_ED_FT
[place] (sp?) / [facility] neurosurgery [place] (sp?) / [facility] neurosurgery

## 2022-04-12 NOTE — ED ADULT TRIAGE NOTE - CHIEF COMPLAINT QUOTE
s/p witnessed seizure at home, that lasted for about 5-10 minutes as per family member. witnessed seizure by emt was given IV versed 10mg. no known hx of seizure  hx of diverticulitis and htn

## 2022-04-12 NOTE — ED PROVIDER NOTE - PHYSICAL EXAMINATION
Constitutional: Unresponsive to verbal. +grimaces to sternal rub, withdraws from pain  ENMT: Airway patent.  Eyes: Clear bilaterally, pupils equal, round and reactive to light. 5mm b/l  Cardiac: Tachycardic, regular rhythm.  Heart sounds S1, S2.  Respiratory: Breath sounds clear and equal bilaterally.  Gastrointestinal: Abdomen soft, non-tender, no guarding.  Neurological: GCS approx. 6 on arrival E1 V2 M3  Skin: No evidence of rash.      Level of Consciousness: 2  Knows Month and Age: 2  Follows commands: 2  Horizontal EOM: 0  Visual Fields: 0  Facial Palsy: 0  Left Arm Motor Drift: 4  Right Arm Motor Drift: 4  Left Leg Motor Drift: 4  Right Leg Motor Drift: 4  Limb Ataxia: 0  Sensation: 2  Language Aphasia: 3  Dysarthria: 0  Extinction / Inattention: 2  NIHSS: 25

## 2022-04-12 NOTE — ED PROVIDER NOTE - CLINICAL SUMMARY MEDICAL DECISION MAKING FREE TEXT BOX
pt presents for status epilepticus without previous seizure history. Pt arrives to ED obtunded responsive to sternal rub only. PERRLA. Likely 2/2 post ictal state and/or over sedation.  concern for possible IC bleed given pressure 200s/100s in field per EMS  Intubated on arrival. Sent to CT scanner for CT/CTA head/neck.

## 2022-04-12 NOTE — PHARMACOTHERAPY INTERVENTION NOTE - COMMENTS
Medication reconciliation completed. Please refer to specifics in home medication list (outpatient medication review). Medications verified with patient's pharmacy: [facility] (# [phone])    Home Medications:  hydroCHLOROthiazide 25 mg oral tablet: 1 tab(s) orally once a day  levothyroxine 88 mcg (0.088 mg) oral tablet: 1 tab(s) orally once a day  lisinopril 40 mg oral tablet: 1 tab(s) orally once a day  metoprolol succinate 50 mg oral tablet, extended release: 1 tab(s) orally once a day    According to pharmacy, patient has not picked up medications since October 7th 2021 (last fill was 90 day supply). Wife confirmed history of non-adherence while at bedside. Reached out to primary care provider's office to confirm medication history but they are closed for the remainder of the week. Answering service will respond to inquiry at earliest convenience.    Recommendations:  > continue levothyroxine for hypothyroidism  > hold home antihypertensives due to renal impairment and bradycardia    Thank you,    Chantelle Acosta PharmD, PGY-1 Pharmacy Resident  Available on [employer] [phone]

## 2022-04-12 NOTE — DISCHARGE NOTE NURSING/CASE MANAGEMENT/SOCIAL WORK - NSDCPEFALRISK_GEN_ALL_CORE
For information on Fall & Injury Prevention, visit: [email or web address] OR  [email or web address] OR  https://www.cdc.gov/steadi/patient.html

## 2022-04-12 NOTE — CONSULT NOTE ADULT - ASSESSMENT
Patient is a 73yo M with pmh of HTN, Hypothyroidism Diverticulosis ( Blood transfusions x2 8 yrs ago presents to [facility] as a transfer from [place] for ICH. Patient LWK was at 2200pm on 4/11. Patient was noted at 0200am have been obtunded with saliva coming out of his mouth. EMS was called, patient came back to baseline and did not want to go to Hospital and EMS left. Patient had a seizure like event witnessed by wife with UE shaking, foaming at the mouth with urinary/fecal incontinence. Neurology consulted for new onset seizures in the setting of ICH. Will start AEDS as patient had 3 seizures.     Impression   New onset of seizures in the setting of IPH in the Rt Cerebellum   Recommendations  serial CT scan to assess bleed   Keep SBP less than 160   Give Keppra 1g IV x1   Followed by Keppra 500mg Q12 hr   vEEG   MRI brain w/wo contrast   Neuro checks   Continue to wean down on sedation   Correction of electrolyte abnormalities  hgb 4.9 will need to repeat CBC   Telemetry   baseline eKG   Continue MIVF       Case to be discussed with Neurology Attending, Dr. Frias.  Patient is a 71yo M with pmh of HTN, Hypothyroidism Diverticulosis ( Blood transfusions x2 8 yrs ago presents to [facility] as a transfer from [place] for ICH. Patient LWK was at 2200pm on 4/11. Patient was noted at 0200am have been obtunded with saliva coming out of his mouth. EMS was called, patient came back to baseline and did not want to go to Hospital and EMS left. Patient had a seizure like event witnessed by wife with UE shaking, foaming at the mouth with urinary/fecal incontinence. Neurology consulted for new onset seizures in the setting of ICH. Will start AEDS as patient had 3 seizures.     Impression   New onset of seizures of unknown cause, With Rt cerebellar IPH.   Recommendations  serial CT scan to assess bleed   Keep SBP less than 160   Give Keppra 1g IV x1   Followed by Keppra 5000mg Q12 hr   vEEG   MRI brain w/wo contrast   LP to further assess once patient is stable   Neuro checks   Continue to wean down on sedation   Correction of electrolyte abnormalities  hgb 4.9 will need to repeat CBC   Telemetry   baseline eKG   Continue MIVF       Case to be discussed with Neurology Attending, Dr. Frias.

## 2022-04-12 NOTE — ED PROVIDER NOTE - CLINICAL SUMMARY MEDICAL DECISION MAKING FREE TEXT BOX
Sky Sellers, PGY3: 72 year old male transfer from [facility] after new onset seizure in the setting of cerebellar hemorrhage. Arrives intubated and sedated on Propofol w/ Cardene gtt for BP control. Multiple lab abnormalities noted however patient does not clinically appear severely anemic. Will repeat labs and transfuse prn. Plan for repeat head CT, labs, neuro and nsgy consults, admit to ICU.

## 2022-04-12 NOTE — PROGRESS NOTE ADULT - ASSESSMENT
73 yo M with PMH HTN, hypothyroidism who presented to [place] after witnessed seizure at home x5 minutes. Upon arrival to [place], two more witnessed seizures requiring versed 10 mg total, intubated for GCS 6. CTH / CTA showed small R cerebellar hemorrhage. Transferred to [facility] for neurosurgical evaluation and EEG monitoring.     status epilepticus  cerebellar heme     Plan:   Neuro:  propofol gtt; vEEG now wean prop if no seizures  - Q1 hour Neuro checks, Q1 hour Vitals  - HOB 30 degrees, Neck midline position   - Maintain normothermia, PO acetaminophen for temp>38 C or pain  - Imaging reviewed, pending repeat CTH   - MRI w/wo after EEG   - AED: Keppra 750 BID with 1g load, propofol drip   - Pain management & Sedation: propofol drip-->transition to precedex if no seizures, fentanyl PRN   - Turn and Position Q2  /  Activity ad latisha, with assistance  	  CV:  - SBP Goal 100-160  - BP regimen: hydralazine / labetalol PRN   - EKG now   - Lipid panel  - TTE     Pulm:  - Mode: AC/ CMV 14/450/40/5  - Chest PT, OOB, Pulmonary Toilet    GI:  - Nutrition: NPO / OGT, starting Jevity goal @ 50 ml/hr  - GI prophylaxis: protonix 2/2 intubation   - Senna   	  Gu:  - Gibson -->d/c  - Plasmalyte @ 100   - I&O Q1 hour  - Monitor Electrolytes & Renal Function    Heme:  - Monitor H&H  - Chemical DVT prophylaxis: * Chemical DVT prophylaxis is contraindicated due to risk of bleeding  - Mechanical DVT Prophylaxis: Maintain B/L LE sequential compression devices  	  ID:  - Monitor WBC and Temperature    Endo  - Monitor BG, goal 120-180  - MISS  - Synthroid 88 mcg   - free T4, T3, thyroid US     FULL code  ICU  critically ill due to GTC seizures, status epilepticus, respiratory failure requiring intubation

## 2022-04-12 NOTE — DISCHARGE NOTE NURSING/CASE MANAGEMENT/SOCIAL WORK - PATIENT PORTAL LINK FT
You can access the FollowMyHealth Patient Portal offered by [facility] by registering at the following website: [email or web address]. By joining [employer]’s FollowMyHealth portal, you will also be able to view your health information using other applications (apps) compatible with our system.

## 2022-04-12 NOTE — ED ADULT NURSE NOTE - OBJECTIVE STATEMENT
Pt is a 72 yr old male with pmh of HTN, hypothyroidism, and seizure disorder coming as a transfer from [place] for a 0.5 cm intraparenchymal hemorrhage in the right cerebellum. Per EMS patient was at home and had a witnessed 5 minute tonic clonic seizure. EMS was called and two more seizures were witnessed- each lasting approx 1 minute- and 10 mg IV Versed was used to stop the seizures. Pt arrived to [place] where he was obtunded with a tongue lac- pt was only responsive to painful stimuli at that time- pt was intubated and placed on a propofol drip at 30 mcg. Pt maintained BP's of over 160's systolic - so Cardene drip was stated as well (at 7.5 mg). Pt arrived to [facility] with size 7.5 ET tube- 25 at the lip- vitals showing stable vitals within blood pressure parameters. Pt is a 72 yr old male with pmh of HTN, hypothyroidism, and seizure disorder coming as a transfer from [place] for a 0.5 cm intraparenchymal hemorrhage in the right cerebellum. Per EMS patient was at home and had a witnessed 5 minute tonic clonic seizure. EMS was called and two more seizures were witnessed- each lasting approx 1 minute- and 10 mg IV Versed was used to stop the seizures. Pt arrived to [place] where he was obtunded with a tongue lac- pt was only responsive to painful stimuli at that time- pt was intubated and placed on a propofol drip at 30 mcg. Pt maintained BP's of over 160's systolic - so Cardene drip was stated as well (at 7.5 mg). Pt arrived to [facility] with a rogel- size 7.5 ET tube- 25 at the lip- vitals showing stable vitals within blood pressure parameters.

## 2022-04-12 NOTE — ED PROVIDER NOTE - PHYSICAL EXAMINATION
GENERAL: unresponsive, sedated  HEENT: NCAT, EOMI, oral mucosa moist, tongue lac w/ scant blood, normal conjunctiva  RESP: CTAB, mechanically ventilated, no wheezes/rhonchi/rales, 7.5 ETT 25cm lip line  CV: tachycardic, no murmurs/rubs/gallops  ABDOMEN: soft, non-tender, distended, no guarding  MSK: no visible deformities  NEURO: L pupil 4mm and reactive, R pupil 3mm and reactive  SKIN: warm, normal color, well perfused, no rash

## 2022-04-12 NOTE — CONSULT NOTE ADULT - SUBJECTIVE AND OBJECTIVE BOX
HPI: 72M PMH htn, hypothyroidism, presented to [place] ED via EMS after family called 911 following a witnessed seizure, by family, reportedly lasting ~5min.  Enroute to [place], it was reported patient had another 2 witnessed seizures by EMS, each lasting ~1 minute. Following each seizure the patient was given 5mg IV versed (received 10mg collectively and brought to [place] ED). Pt arrived to the ED obtunded with blood from his mouth (tongue laceration), responsive only to pain/sternal rub. Patient was subsequently intubated for GCS 6 and CT/CTA performed showing a small cerebellar hemorrhage ON NO A/C/ THINNERS. He was transferred to [facility] for NSGY eval on Cardene and Propofol gtt.      Upon arrival, wife at bedside and confirms story, LKN 10PM,  sleeping 12am, 2am wife found him frothing at mouth, called EMS, patient subsequently awoke and refused transfer to hospital.  About 1 hour later, seized again, witnessed sounds like GTC.  EMS called again and transferred to hospital with another 2 witnessed seizures.  Hypertensive on arrival to [facility].       PAST MEDICAL & SURGICAL HISTORY:  HTN (hypertension)    Diverticulosis    Hypothyroid    No significant past surgical history        FAMILY HISTORY:      Social Hx:  Nonsmoker, no drug or alcohol use    MEDICATIONS  (STANDING):  chlorhexidine 0.12% Liquid 15 milliLiter(s) Oral Mucosa every 12 hours  fentaNYL   Infusion... 2 MICROgram(s)/kG/Hr (7.62 mL/Hr) IV Continuous <Continuous>  niCARdipine Infusion 7.5 mG/Hr (37.5 mL/Hr) IV Continuous <Continuous>  propofol Infusion 45 MICROgram(s)/kG/Min (20.6 mL/Hr) IV Continuous <Continuous>       Allergies    No Known Allergies    Intolerances        ROS: Pertinent positives in HPI, all other ROS were reviewed and are negative.      Vital Signs Last 24 Hrs  T(C): 35.8 (12 Apr 2022 06:30), Max: 36.8 (12 Apr 2022 04:08)  T(F): 96.4 (12 Apr 2022 06:30), Max: 98.2 (12 Apr 2022 04:08)  HR: 59 (12 Apr 2022 08:05) (59 - 108)  BP: 138/74 (12 Apr 2022 08:05) (133/70 - 188/125)  BP(mean): 88 (12 Apr 2022 06:53) (88 - 144)  RR: 18 (12 Apr 2022 08:05) (15 - 19)  SpO2: 100% (12 Apr 2022 08:05) (97% - 100%)        Constitutional: awake and alert.  HEENT: PERRLA, EOMI,   Neck: Supple.  Respiratory: Breath sounds are clear bilaterally  Cardiovascular: S1 and S2, regular / irregular rhythm  Gastrointestinal: soft, nontender  Extremities:  no edema  Vascular: Caritid Bruit - no  Musculoskeletal: no joint swelling/tenderness, no abnormal movements  Skin: No rashes    Neurological exam:    Off propofol for 15 min; No OE to stim, PERRL 2mm, UE nothing, LLE WD.     Labs:     04-12    138  |  104  |  20  ----------------------------<  119<H>  3.6   |  21<L>  |  1.16    Ca    8.6      12 Apr 2022 07:30    TPro  8.6<H>  /  Alb  4.2  /  TBili  0.6  /  DBili  x   /  AST  23  /  ALT  12  /  AlkPhos  87  04-12                          11.2   7.96  )-----------( 246      ( 12 Apr 2022 07:30 )             34.1     < from: CT Head No Cont (04.12.22 @ 04:43) >  IMPRESSION:    0.5 cm focus of intraparenchymal hemorrhage inthe right cerebellum as   described.    The findings were discussed with DR. LANTIGUA  on 4/12/2022 4:54 AM.  Hospital policies for call back including read back policies were   followed. The verbal communication call back supplements this written   report.    < end of copied text >      < from: CT Angio Neck w/ IV Cont (04.12.22 @ 04:48) >  IMPRESSION:    CT PERFUSION: The areas of elevated Tmax do not follow a vascular   distribution and are likely artifactual, related to patient motion.    If symptoms persist consider follow up head CT or MRI, MRA  if no   contraindication.    CTA COW:  Patent intracranial circulation without flow limiting stenosis    CTA NECK: Patent, ECAs, ICAs, no  hemodynamically significant stenosis at    ICA origins by NASCET criteria.    Bilateral vertebral arteries are patent without flow limiting stenosis.    The findings were discussed with DR. LANTIGUA  on 4/12/2022 4:54 AM.  Hospital policies for call back including read back policies were   followed. The verbal communication call back supplements this written   report.    --- End of Report ---    < end of copied text >   HPI: 72M PMH htn, hypothyroidism, presented to [place] ED via EMS after family called 911 following a witnessed seizure, by family, reportedly lasting ~5min.  Enroute to [place], it was reported patient had another 2 witnessed seizures by EMS, each lasting ~1 minute. Following each seizure the patient was given 5mg IV versed (received 10mg collectively and brought to [place] ED). Pt arrived to the ED obtunded with blood from his mouth (tongue laceration), responsive only to pain/sternal rub. Patient was subsequently intubated for GCS 6 and CT/CTA performed showing a small cerebellar hemorrhage ON NO A/C/ THINNERS. He was transferred to [facility] for NSGY eval on Cardene and Propofol gtt.      Upon arrival, wife at bedside and confirms story, LKN 10PM,  sleeping 12am, 2am wife found him frothing at mouth, called EMS, patient subsequently awoke and refused transfer to hospital.  About 1 hour later, seized again, witnessed sounds like GTC.  EMS called again and transferred to hospital with another 2 witnessed seizures.  Hypertensive on arrival to [facility].     ICH 1  NIHSS n/a patient sedated, will re-assess after sedation weaned     PAST MEDICAL & SURGICAL HISTORY:  HTN (hypertension)    Diverticulosis    Hypothyroid    No significant past surgical history        FAMILY HISTORY:      Social Hx:  Nonsmoker, no drug or alcohol use    MEDICATIONS  (STANDING):  chlorhexidine 0.12% Liquid 15 milliLiter(s) Oral Mucosa every 12 hours  fentaNYL   Infusion... 2 MICROgram(s)/kG/Hr (7.62 mL/Hr) IV Continuous <Continuous>  niCARdipine Infusion 7.5 mG/Hr (37.5 mL/Hr) IV Continuous <Continuous>  propofol Infusion 45 MICROgram(s)/kG/Min (20.6 mL/Hr) IV Continuous <Continuous>       Allergies    No Known Allergies    Intolerances        ROS: Pertinent positives in HPI, all other ROS were reviewed and are negative.      Vital Signs Last 24 Hrs  T(C): 35.8 (12 Apr 2022 06:30), Max: 36.8 (12 Apr 2022 04:08)  T(F): 96.4 (12 Apr 2022 06:30), Max: 98.2 (12 Apr 2022 04:08)  HR: 59 (12 Apr 2022 08:05) (59 - 108)  BP: 138/74 (12 Apr 2022 08:05) (133/70 - 188/125)  BP(mean): 88 (12 Apr 2022 06:53) (88 - 144)  RR: 18 (12 Apr 2022 08:05) (15 - 19)  SpO2: 100% (12 Apr 2022 08:05) (97% - 100%)        Constitutional: awake and alert.  HEENT: PERRLA, EOMI,   Neck: Supple.  Respiratory: Breath sounds are clear bilaterally  Cardiovascular: S1 and S2, regular / irregular rhythm  Gastrointestinal: soft, nontender  Extremities:  no edema  Vascular: Caritid Bruit - no  Musculoskeletal: no joint swelling/tenderness, no abnormal movements  Skin: No rashes    Neurological exam:    Off propofol for 15 min; No OE to stim, PERRL 2mm, UE nothing, LLE WD.     Labs:     04-12    138  |  104  |  20  ----------------------------<  119<H>  3.6   |  21<L>  |  1.16    Ca    8.6      12 Apr 2022 07:30    TPro  8.6<H>  /  Alb  4.2  /  TBili  0.6  /  DBili  x   /  AST  23  /  ALT  12  /  AlkPhos  87  04-12                          11.2   7.96  )-----------( 246      ( 12 Apr 2022 07:30 )             34.1     < from: CT Head No Cont (04.12.22 @ 04:43) >  IMPRESSION:    0.5 cm focus of intraparenchymal hemorrhage inthe right cerebellum as   described.    The findings were discussed with DR. LANTIGUA  on 4/12/2022 4:54 AM.  Hospital policies for call back including read back policies were   followed. The verbal communication call back supplements this written   report.    < end of copied text >      < from: CT Angio Neck w/ IV Cont (04.12.22 @ 04:48) >  IMPRESSION:    CT PERFUSION: The areas of elevated Tmax do not follow a vascular   distribution and are likely artifactual, related to patient motion.    If symptoms persist consider follow up head CT or MRI, MRA  if no   contraindication.    CTA COW:  Patent intracranial circulation without flow limiting stenosis    CTA NECK: Patent, ECAs, ICAs, no  hemodynamically significant stenosis at    ICA origins by NASCET criteria.    Bilateral vertebral arteries are patent without flow limiting stenosis.    The findings were discussed with DR. LANTIGUA  on 4/12/2022 4:54 AM.  Hospital policies for call back including read back policies were   followed. The verbal communication call back supplements this written   report.    --- End of Report ---    < end of copied text >

## 2022-04-12 NOTE — ED PROVIDER NOTE - WET READ LAUNCH FT
Per [facility] Protocol, patient was asked prior to biopsy how she would like notified of her breast biopsy results and she elected telephone call from breast navigator. Call to patient today to instruct her that the pathology report from her recent right breast biopsy indicates a benign finding: stromal fibrosis and microcalcifications. Instructed that the performing radiologist reviewed her breast images and the pathology results for concordance. Instructed that she should perform monthly self breast exams, have annual mammogram, and follow her physician's recommendations for any follow up care. Verbalizes understanding. Pathology report routed to Dr. Doroteo Merino.  Electronically signed by David Santillan RN, BSN on 3/12/2021 at 2:54 PM There are no Wet Read(s) to document.

## 2022-04-12 NOTE — CONSULT NOTE ADULT - SUBJECTIVE AND OBJECTIVE BOX
MRN-[id number]  Patient is a 72y old  Male who presents with a chief complaint of   HPI:  Patient is a 71yo M with pmh of HTN, Hypothyroidism Diverticulosis ( Blood transfusions x2 8 yrs ago presents to [facility] as a transfer from [place] for ICH. Patient's wife at bedside and provided history. Patient LWK was at 2200pm on 4/11. Patient was seen going to bed at bed. Patient's wife saw patient at midnight and was asleep. Patient was noted at 0200am have been obtunded with saliva coming out of his mouth. EMS was called, patient came back to [facility] and did not want to go to Hospital and EMS left. Patient had a seizure like event witnessed by wife with UE shaking, foaming at the mouth with urinary/fecal incontinence. Patient was sent to [place] and patient was intubated and given sedation. Patient had reported 1 seizure in the ED at [place] and was transferred to [facility].   Neurology consulted for new onset seizures in the setting of ICH.     PAST MEDICAL & SURGICAL HISTORY:  HTN (hypertension)    Diverticulosis    Hypothyroid    No significant past surgical history      FAMILY HISTORY:    Social Hx:  Nonsmoker, no drug or alcohol use    Home Medications:    MEDICATIONS  (STANDING):  chlorhexidine 0.12% Liquid 15 milliLiter(s) Oral Mucosa every 12 hours  fentaNYL   Infusion... 2 MICROgram(s)/kG/Hr (7.62 mL/Hr) IV Continuous <Continuous>  niCARdipine Infusion 7.5 mG/Hr (37.5 mL/Hr) IV Continuous <Continuous>  propofol Infusion 45 MICROgram(s)/kG/Min (20.6 mL/Hr) IV Continuous <Continuous>    MEDICATIONS  (PRN):    Allergies  No Known Allergies    Intolerances      REVIEW OF SYSTEMS- unable to obtain     Vital Signs Last 24 Hrs  T(C): 35.8 (12 Apr 2022 06:30), Max: 36.8 (12 Apr 2022 04:08)  T(F): 96.4 (12 Apr 2022 06:30), Max: 98.2 (12 Apr 2022 04:08)  HR: 59 (12 Apr 2022 08:05) (59 - 108)  BP: 138/74 (12 Apr 2022 08:05) (133/70 - 188/125)  BP(mean): 88 (12 Apr 2022 06:53) (88 - 144)  RR: 18 (12 Apr 2022 08:05) (15 - 19)  SpO2: 100% (12 Apr 2022 08:05) (97% - 100%)    GENERAL EXAM:  Constitutional: sedated.   HEENT: pupils symmetric  Neck: intubated   Musculoskeletal: no joint swelling/tenderness, no abnormal movements  Skin: no rashes    NEUROLOGICAL EXAM:  MS: Awake to noxious stimuli. Patient on Propofol and Fentanyl.   CN: Pupils symmetric b/l. OCR intact, cough/gag intact   V1-3 intact, no facial asymmetry, t/p midline,   Motor: Strength: Moves all extremities antigravity   Tone: normal. Bulk: normal.   DTR 2+ symm. in biceps/triceps/brachoradialis/patellar b/l.    Plantar flex b/l.   Sensation: w/d to noxious stimuli    Coordination: Deferred  Gait:  Deferred    GCS score E(2) V(NT) M(5)   ICH score 2 ( elevated due to GCS done while intubated and coming out of sedation )   [facility]     Labs:   cbc                      11.2   7.96  )-----------( 246      ( 12 Apr 2022 07:30 )             34.1     [id number]    138  |  104  |  20  ----------------------------<  119<H>  3.6   |  21<L>  |  1.16    Ca    8.6      12 Apr 2022 07:30    TPro  8.6<H>  /  Alb  4.2  /  TBili  0.6  /  DBili  x   /  AST  23  /  ALT  12  /  AlkPhos  87  04-12    CoagsPT/INR - ( 12 Apr 2022 05:05 )   PT: 11.7 sec;   INR: 0.98 ratio         PTT - ( 12 Apr 2022 05:05 )  PTT:27.9 sec      LFTsLIVER FUNCTIONS - ( 12 Apr 2022 07:30 )  Alb: 4.2 g/dL / Pro: 8.6 g/dL / ALK PHOS: 87 U/L / ALT: 12 U/L / AST: 23 U/L / GGT: x             Radiology:  CT head w/o contrast: IMPRESSION:0.5 cm focus of intraparenchymal hemorrhage inthe right cerebellum as   described.    CTA H/N:   IMPRESSION:    CT PERFUSION: The areas of elevated Tmax do not follow a vascular   distribution and are likely artifactual, related to patient motion.    If symptoms persist consider follow up head CT or MRI, MRA  if no   contraindication.    CTA COW:  Patent intracranial circulation without flow limiting stenosis    CTA NECK: Patent, ECAs, ICAs, no  hemodynamically significant stenosis at    ICA origins by NASCET criteria.    Bilateral vertebral arteries are patent without flow limiting stenosis.     MRN-[id number]  Patient is a 72y old  Male who presents with a chief complaint of   HPI:  Patient is a 73yo M with pmh of HTN, Hypothyroidism Diverticulosis ( Blood transfusions x2 8 yrs ago presents to [facility] as a transfer from [place] for ICH. Patient's wife at bedside and provided history. Patient LWK was at 2200pm on 4/11. Patient was seen going to bed at bed. Patient's wife saw patient at midnight and was asleep. Patient was noted at 0200am have been obtunded with saliva coming out of his mouth. EMS was called, patient came back to [facility] and did not want to go to Hospital and EMS left. Patient had a seizure like event witnessed by wife with UE shaking, foaming at the mouth with urinary/fecal incontinence. Patient was sent to [place] and patient was intubated and given sedation. Patient had reported 1 seizure in the ED at [place] and was transferred to [facility].   Neurology consulted for new onset seizures in the setting of ICH.     PAST MEDICAL & SURGICAL HISTORY:  HTN (hypertension)    Diverticulosis    Hypothyroid    No significant past surgical history      FAMILY HISTORY: Non-contributory    Social Hx:  Nonsmoker, no illicit drug or alcohol use    Home Medications:    MEDICATIONS  (STANDING):  chlorhexidine 0.12% Liquid 15 milliLiter(s) Oral Mucosa every 12 hours  fentaNYL   Infusion... 2 MICROgram(s)/kG/Hr (7.62 mL/Hr) IV Continuous <Continuous>  niCARdipine Infusion 7.5 mG/Hr (37.5 mL/Hr) IV Continuous <Continuous>  propofol Infusion 45 MICROgram(s)/kG/Min (20.6 mL/Hr) IV Continuous <Continuous>    MEDICATIONS  (PRN):    Allergies  No Known Allergies    Intolerances      REVIEW OF SYSTEMS- Due to clinical condition unable to obtain     Vital Signs Last 24 Hrs  T(C): 35.8 (12 Apr 2022 06:30), Max: 36.8 (12 Apr 2022 04:08)  T(F): 96.4 (12 Apr 2022 06:30), Max: 98.2 (12 Apr 2022 04:08)  HR: 59 (12 Apr 2022 08:05) (59 - 108)  BP: 138/74 (12 Apr 2022 08:05) (133/70 - 188/125)  BP(mean): 88 (12 Apr 2022 06:53) (88 - 144)  RR: 18 (12 Apr 2022 08:05) (15 - 19)  SpO2: 100% (12 Apr 2022 08:05) (97% - 100%)    GENERAL EXAM:  Constitutional: sedated, intubated  HEENT: pupils symmetric  Musculoskeletal: no joint swelling/tenderness, no abnormal movements  Skin: no rashes  CV/Extremities: Peripheral pulses palpable, no edema  Eyes: Fundoscopy not well visualized    NEUROLOGICAL EXAM:  MS: Awake to noxious stimuli. Patient on Propofol and Fentanyl.   CN: Pupils symmetric b/l. OCR intact, cough/gag intact   V1-3 intact, no facial asymmetry, t/p midline,   Motor: Strength: Moves all extremities antigravity   Tone: normal. Bulk: normal.   DTR 2+ symm. in biceps/triceps/brachoradialis/patellar b/l.    Plantar flex b/l.   Sensation: w/d to noxious stimuli    Coordination: Deferred  Gait:  Deferred    GCS score E(2) V(NT) M(5)   ICH score 2 ( elevated due to GCS done while intubated and coming out of sedation )   [place] [id number]     Labs:   cbc                      11.2   7.96  )-----------( 246      ( 12 Apr 2022 07:30 )             34.1     [id number]    138  |  104  |  20  ----------------------------<  119<H>  3.6   |  21<L>  |  1.16    Ca    8.6      12 Apr 2022 07:30    TPro  8.6<H>  /  Alb  4.2  /  TBili  0.6  /  DBili  x   /  AST  23  /  ALT  12  /  AlkPhos  87  04-12    CoagsPT/INR - ( 12 Apr 2022 05:05 )   PT: 11.7 sec;   INR: 0.98 ratio         PTT - ( 12 Apr 2022 05:05 )  PTT:27.9 sec      LFTsLIVER FUNCTIONS - ( 12 Apr 2022 07:30 )  Alb: 4.2 g/dL / Pro: 8.6 g/dL / ALK PHOS: 87 U/L / ALT: 12 U/L / AST: 23 U/L / GGT: x             Radiology:  CT head w/o contrast: IMPRESSION:0.5 cm focus of intraparenchymal hemorrhage inthe right cerebellum as   described.    CTA H/N:   IMPRESSION:    CT PERFUSION: The areas of elevated Tmax do not follow a vascular   distribution and are likely artifactual, related to patient motion.    If symptoms persist consider follow up head CT or MRI, MRA  if no   contraindication.    CTA COW:  Patent intracranial circulation without flow limiting stenosis    CTA NECK: Patent, ECAs, ICAs, no  hemodynamically significant stenosis at    ICA origins by NASCET criteria.    Bilateral vertebral arteries are patent without flow limiting stenosis.

## 2022-04-12 NOTE — ED PROVIDER NOTE - CARE PLAN
1 Principal Discharge DX:	Cerebellar hemorrhage  Secondary Diagnosis:	Seizure  Secondary Diagnosis:	AMS (altered mental status)

## 2022-04-12 NOTE — PROGRESS NOTE ADULT - SUBJECTIVE AND OBJECTIVE BOX
SUMMARY: 72M PMH htn, hypothyroidism, presented to [place] ED via EMS after family called 911 following a witnessed seizure, by family, reportedly lasting ~5min.  Enroute to [place], it was reported patient had another 2 witnessed seizures by EMS, each lasting ~1 minute. Following each seizure the patient was given 5mg IV versed (received 10mg collectively and brought to [place] ED). Pt arrived to the ED obtunded with blood from his mouth (tongue laceration), responsive only to pain/sternal rub. Patient was subsequently intubated for GCS 6 and CT/CTA performed showing a small cerebellar hemorrhage ON NO A/C/ THINNERS. He was transferred to [facility] for NSGY eval on Cardene and Propofol gtt.      Upon arrival, wife at bedside and confirms story, LKN 10PM,  sleeping 12am, 2am wife found him frothing at mouth, called EMS, patient subsequently awoke and refused transfer to hospital.  About 1 hour later, seized again, witnessed sounds like GTC.  EMS called again and transferred to hospital with another 2 witnessed seizures.  Hypertensive on arrival to [facility].     Adm NSCU on propofol    ADMISSION SCORES:   GCS: 6T HH: MF: NIHSS: ICH Score:    REVIEW OF SYSTEMS: [x] Unable to Assess due to neurologic exam   [ ] All ROS addressed below are non-contributory, except:  Neuro: [ ] Headache [ ] Back pain [ ] Numbness [ ] Weakness [ ] Ataxia [ ] Dizziness [ ] Aphasia [ ] Dysarthria [ ] Visual disturbance  Resp: [ ] Shortness of breath/dyspnea [ ] Orthopnea [ ] Cough  CV: [ ] Chest pain [ ] Palpitation [ ] Lightheadedness [ ] Syncope  Renal: [ ] Thirst [ ] Edema  GI: [ ] Nausea [ ] Emesis [ ] Abdominal pain [ ] Constipation [ ] Diarrhea  Hem: [ ] Hematemesis [ ] bBright red blood per rectum  ID: [ ] Fever [ ] Chills [ ] Dysuria  ENT: [ ] Rhinorrhea    VITALS: [x] Reviewed    IMAGING/DATA: [x] Reviewed    IVF FLUIDS/MEDICATIONS: [x] Reviewed    ALLERGIES: Allergies    No Known Allergies    Intolerances        DEVICES:   [] Restraints [x] PIVs [x] ET tube [] central line [] PICC [] arterial line [] rogel [] NGT/OGT [] EVD [] LD [] WANDY/HMV [] LiCOX [] ICP monitor [] Trach [] PEG [] Chest Tube [] other:    EXAMINATION:  General: intubated  HEENT: Anicteric sclerae  Cardiac: [id number]  Lungs: Clear  Abdomen: Soft, non-tender, +BS  Extremities: No c/c/e  Skin/Incision Site: Clean, dry and intact  Neurologic:   Off propofol for 15 min; No OE to stim, PERRL 2mm, UE no movement, LLE WD. SUMMARY: 72M PMH htn, hypothyroidism, presented to [place] ED via EMS after family called 911 following a witnessed seizure, by family, reportedly lasting ~5min.  Enroute to [place], it was reported patient had another 2 witnessed seizures by EMS, each lasting ~1 minute. Following each seizure the patient was given 5mg IV versed (received 10mg collectively and brought to [place] ED). Pt arrived to the ED obtunded with blood from his mouth (tongue laceration), responsive only to pain/sternal rub. Patient was subsequently intubated for GCS 6 and CT/CTA performed showing a small cerebellar hemorrhage ON NO A/C/ THINNERS. He was transferred to [facility] for NSGY eval on Cardene and Propofol gtt.      Upon arrival, wife at bedside and confirms story, LKN 10PM,  sleeping 12am, 2am wife found him frothing at mouth, called EMS, patient subsequently awoke and refused transfer to hospital.  About 1 hour later, seized again, witnessed sounds like GTC.  EMS called again and transferred to hospital with another 2 witnessed seizures.  Hypertensive on arrival to [facility].     Adm NSCU on propofol    ADMISSION SCORES:   GCS: 6T HH: MF: NIHSS: 22 ICH Score:    REVIEW OF SYSTEMS: [x] Unable to Assess due to neurologic exam   [ ] All ROS addressed below are non-contributory, except:  Neuro: [ ] Headache [ ] Back pain [ ] Numbness [ ] Weakness [ ] Ataxia [ ] Dizziness [ ] Aphasia [ ] Dysarthria [ ] Visual disturbance  Resp: [ ] Shortness of breath/dyspnea [ ] Orthopnea [ ] Cough  CV: [ ] Chest pain [ ] Palpitation [ ] Lightheadedness [ ] Syncope  Renal: [ ] Thirst [ ] Edema  GI: [ ] Nausea [ ] Emesis [ ] Abdominal pain [ ] Constipation [ ] Diarrhea  Hem: [ ] Hematemesis [ ] bBright red blood per rectum  ID: [ ] Fever [ ] Chills [ ] Dysuria  ENT: [ ] Rhinorrhea    VITALS: [x] Reviewed    IMAGING/DATA: [x] Reviewed    IVF FLUIDS/MEDICATIONS: [x] Reviewed    ALLERGIES: Allergies    No Known Allergies    Intolerances        DEVICES:   [] Restraints [x] PIVs [x] ET tube [] central line [] PICC [] arterial line [] rogel [] NGT/OGT [] EVD [] LD [] WANDY/HMV [] LiCOX [] ICP monitor [] Trach [] PEG [] Chest Tube [] other:    EXAMINATION:  General: intubated  HEENT: Anicteric sclerae  Cardiac: [id number]  Lungs: Clear  Abdomen: Soft, non-tender, +BS  Extremities: No c/c/e  Skin/Incision Site: Clean, dry and intact  Neurologic:   Off propofol for 15 min; No OE to stim, PERRL 2mm, UE no movement, LLE WD.

## 2022-04-12 NOTE — CHART NOTE - NSCHARTNOTEFT_GEN_A_CORE
CAPRINI SCORE [CLOT] Score on Admission for     AGE RELATED RISK FACTORS                                                       MOBILITY RELATED FACTORS  [ ] Age 41-60 years                                            (1 Point)                  [ ] Bed rest                                                        (1 Point)  [x ] Age: 61-74 years                                           (2 Points)                 [ ] Plaster cast                                                   (2 Points)  [ ] Age= 75 years                                              (3 Points)                 [ ] Bed bound for more than 72 hours                 (2 Points)    DISEASE RELATED RISK FACTORS                                               GENDER SPECIFIC FACTORS  [ ] Edema in the lower extremities                       (1 Point)                  [ ] Pregnancy                                                     (1 Point)  [ ] Varicose veins                                               (1 Point)                  [ ] Post-partum < 6 weeks                                   (1 Point)             [ ] BMI > 25 Kg/m2                                            (1 Point)                  [ ] Hormonal therapy  or oral contraception          (1 Point)                 [ ] Sepsis (in the previous month)                        (1 Point)                  [ ] History of pregnancy complications                 (1 point)  [ ] Pneumonia or serious lung disease                                               [ ] Unexplained or recurrent                     (1 Point)           (in the previous month)                               (1 Point)  [ ] Abnormal pulmonary function test                     (1 Point)                 SURGERY RELATED RISK FACTORS (include planned surgeries)  [ ] Acute myocardial infarction                              (1 Point)                 [ ]  Section                                             (1 Point)  [ ] Congestive heart failure (in the previous month)  (1 Point)         [ ] Minor surgery                                                  (1 Point)   [ ] Inflammatory bowel disease                             (1 Point)                 [ ] Arthroscopic surgery                                        (2 Points)  [ ] Central venous access                                      (2 Points)                [ ] General surgery lasting more than 45 minutes   (2 Points)       [x ] Stroke (in the previous month)                          (5 Points)               [ ] Elective arthroplasty                                         (5 Points)            [ ] current or past malignancy                              (2 Points)                                                                                                       HEMATOLOGY RELATED FACTORS                                                 TRAUMA RELATED RISK FACTORS  [ ] Prior episodes of VTE                                     (3 Points)                [ ] Fracture of the hip, pelvis, or leg                       (5 Points)  [ ] Positive family history for VTE                         (3 Points)                 [ ] Acute spinal cord injury (in the previous month)  (5 Points)  [ ] Prothrombin 32148 A                                     (3 Points)                 [ ] Paralysis  (less than 1 month)                             (5 Points)  [ ] Factor V Leiden                                             (3 Points)                  [ ] Multiple Trauma within 1 month                        (5 Points)  [ ] Lupus anticoagulants                                     (3 Points)                                                           [ ] Anticardiolipin antibodies                               (3 Points)                                                       [ ] High homocysteine in the blood                      (3 Points)                                             [ ] Other congenital or acquired thrombophilia      (3 Points)                                                [ ] Heparin induced thrombocytopenia                  (3 Points)                                          Total Score [     7     ]    Risk:  Very low 0   Low 1 to 2   Moderate 3 to 4   High =5       VTE Prophylasix Recommednations:  [ x] mechanical pneumatic compression devices                                      [ ] contraindicated: _____________________  [ ] chemo prophylasix                                                                                   [ x] contraindicated ____Bleeding Risk_________________    **** HIGH LIKELIHOOD DVT PRESENT ON ADMISSION  [ x] (please order LE dopplers within 24 hours of admission)

## 2022-04-13 LAB
A1C WITH ESTIMATED AVERAGE GLUCOSE RESULT: 5.9 % — HIGH (ref 4–5.6)
CHOLEST SERPL-MCNC: 128 MG/DL — SIGNIFICANT CHANGE UP
ESTIMATED AVERAGE GLUCOSE: 123 MG/DL — HIGH (ref 68–114)
GAS PNL BLDA: SIGNIFICANT CHANGE UP
GLUCOSE BLDC GLUCOMTR-MCNC: 102 MG/DL — HIGH (ref 70–99)
GLUCOSE BLDC GLUCOMTR-MCNC: 111 MG/DL — HIGH (ref 70–99)
GLUCOSE BLDC GLUCOMTR-MCNC: 127 MG/DL — HIGH (ref 70–99)
GLUCOSE BLDC GLUCOMTR-MCNC: 84 MG/DL — SIGNIFICANT CHANGE UP (ref 70–99)
GLUCOSE BLDC GLUCOMTR-MCNC: 96 MG/DL — SIGNIFICANT CHANGE UP (ref 70–99)
HCV AB S/CO SERPL IA: 0.2 S/CO — SIGNIFICANT CHANGE UP (ref 0–0.99)
HCV AB SERPL-IMP: SIGNIFICANT CHANGE UP
HDLC SERPL-MCNC: 67 MG/DL — SIGNIFICANT CHANGE UP
LIPID PNL WITH DIRECT LDL SERPL: 38 MG/DL — SIGNIFICANT CHANGE UP
NON HDL CHOLESTEROL: 61 MG/DL — SIGNIFICANT CHANGE UP
TRIGL SERPL-MCNC: 116 MG/DL — SIGNIFICANT CHANGE UP
VALPROATE SERPL-MCNC: 82 UG/ML — SIGNIFICANT CHANGE UP (ref 50–100)

## 2022-04-13 PROCEDURE — 99291 CRITICAL CARE FIRST HOUR: CPT

## 2022-04-13 PROCEDURE — 71045 X-RAY EXAM CHEST 1 VIEW: CPT | Mod: 26

## 2022-04-13 PROCEDURE — 95720 EEG PHY/QHP EA INCR W/VEEG: CPT

## 2022-04-13 RX ORDER — SODIUM CHLORIDE 9 MG/ML
1000 INJECTION, SOLUTION INTRAVENOUS
Refills: 0 | Status: DISCONTINUED | OUTPATIENT
Start: 2022-04-13 | End: 2022-04-18

## 2022-04-13 RX ORDER — PROPOFOL 10 MG/ML
10 INJECTION, EMULSION INTRAVENOUS
Qty: 1000 | Refills: 0 | Status: DISCONTINUED | OUTPATIENT
Start: 2022-04-13 | End: 2022-04-13

## 2022-04-13 RX ORDER — POLYETHYLENE GLYCOL 3350 17 G/17G
17 POWDER, FOR SOLUTION ORAL AT BEDTIME
Refills: 0 | Status: DISCONTINUED | OUTPATIENT
Start: 2022-04-13 | End: 2022-04-14

## 2022-04-13 RX ORDER — THIAMINE MONONITRATE (VIT B1) 100 MG
500 TABLET ORAL EVERY 8 HOURS
Refills: 0 | Status: DISCONTINUED | OUTPATIENT
Start: 2022-04-13 | End: 2022-04-14

## 2022-04-13 RX ORDER — AMLODIPINE BESYLATE 2.5 MG/1
10 TABLET ORAL DAILY
Refills: 0 | Status: DISCONTINUED | OUTPATIENT
Start: 2022-04-13 | End: 2022-05-04

## 2022-04-13 RX ORDER — PETROLATUM,WHITE
1 JELLY (GRAM) TOPICAL ONCE
Refills: 0 | Status: COMPLETED | OUTPATIENT
Start: 2022-04-13 | End: 2022-04-13

## 2022-04-13 RX ORDER — HALOPERIDOL DECANOATE 100 MG/ML
2 INJECTION INTRAMUSCULAR ONCE
Refills: 0 | Status: DISCONTINUED | OUTPATIENT
Start: 2022-04-13 | End: 2022-04-14

## 2022-04-13 RX ORDER — BRIVARACETAM 25 MG/1
75 TABLET, FILM COATED ORAL
Refills: 0 | Status: DISCONTINUED | OUTPATIENT
Start: 2022-04-13 | End: 2022-04-13

## 2022-04-13 RX ORDER — HALOPERIDOL DECANOATE 100 MG/ML
2 INJECTION INTRAMUSCULAR ONCE
Refills: 0 | Status: COMPLETED | OUTPATIENT
Start: 2022-04-13 | End: 2022-04-14

## 2022-04-13 RX ORDER — HALOPERIDOL DECANOATE 100 MG/ML
2 INJECTION INTRAMUSCULAR ONCE
Refills: 0 | Status: DISCONTINUED | OUTPATIENT
Start: 2022-04-13 | End: 2022-04-13

## 2022-04-13 RX ORDER — VALPROIC ACID (AS SODIUM SALT) 250 MG/5ML
500 SOLUTION, ORAL ORAL EVERY 8 HOURS
Refills: 0 | Status: DISCONTINUED | OUTPATIENT
Start: 2022-04-13 | End: 2022-04-14

## 2022-04-13 RX ORDER — DEXMEDETOMIDINE HYDROCHLORIDE IN 0.9% SODIUM CHLORIDE 4 UG/ML
0.4 INJECTION INTRAVENOUS
Qty: 200 | Refills: 0 | Status: DISCONTINUED | OUTPATIENT
Start: 2022-04-13 | End: 2022-04-13

## 2022-04-13 RX ORDER — ENOXAPARIN SODIUM 100 MG/ML
40 INJECTION SUBCUTANEOUS
Refills: 0 | Status: COMPLETED | OUTPATIENT
Start: 2022-04-13 | End: 2022-04-16

## 2022-04-13 RX ORDER — HYDRALAZINE HCL 50 MG
10 TABLET ORAL EVERY 4 HOURS
Refills: 0 | Status: DISCONTINUED | OUTPATIENT
Start: 2022-04-13 | End: 2022-05-04

## 2022-04-13 RX ORDER — NICARDIPINE HYDROCHLORIDE 30 MG/1
5 CAPSULE, EXTENDED RELEASE ORAL
Qty: 40 | Refills: 0 | Status: DISCONTINUED | OUTPATIENT
Start: 2022-04-13 | End: 2022-04-13

## 2022-04-13 RX ORDER — VALPROIC ACID (AS SODIUM SALT) 250 MG/5ML
500 SOLUTION, ORAL ORAL EVERY 8 HOURS
Refills: 0 | Status: DISCONTINUED | OUTPATIENT
Start: 2022-04-13 | End: 2022-04-13

## 2022-04-13 RX ORDER — LEVOTHYROXINE SODIUM 125 MCG
70 TABLET ORAL AT BEDTIME
Refills: 0 | Status: DISCONTINUED | OUTPATIENT
Start: 2022-04-13 | End: 2022-04-14

## 2022-04-13 RX ORDER — HALOPERIDOL DECANOATE 100 MG/ML
2 INJECTION INTRAMUSCULAR ONCE
Refills: 0 | Status: COMPLETED | OUTPATIENT
Start: 2022-04-13 | End: 2022-04-13

## 2022-04-13 RX ORDER — VALPROIC ACID (AS SODIUM SALT) 250 MG/5ML
1500 SOLUTION, ORAL ORAL ONCE
Refills: 0 | Status: COMPLETED | OUTPATIENT
Start: 2022-04-13 | End: 2022-04-13

## 2022-04-13 RX ORDER — POTASSIUM CHLORIDE 20 MEQ
10 PACKET (EA) ORAL
Refills: 0 | Status: DISCONTINUED | OUTPATIENT
Start: 2022-04-13 | End: 2022-04-13

## 2022-04-13 RX ADMIN — Medication 105 MILLIGRAM(S): at 23:38

## 2022-04-13 RX ADMIN — Medication 1 APPLICATION(S): at 10:36

## 2022-04-13 RX ADMIN — SODIUM CHLORIDE 100 MILLILITER(S): 9 INJECTION, SOLUTION INTRAVENOUS at 18:37

## 2022-04-13 RX ADMIN — AMLODIPINE BESYLATE 10 MILLIGRAM(S): 2.5 TABLET ORAL at 11:40

## 2022-04-13 RX ADMIN — Medication 55 MILLIGRAM(S): at 18:36

## 2022-04-13 RX ADMIN — Medication 200 GRAM(S): at 03:58

## 2022-04-13 RX ADMIN — DEXMEDETOMIDINE HYDROCHLORIDE IN 0.9% SODIUM CHLORIDE 7.62 MICROGRAM(S)/KG/HR: 4 INJECTION INTRAVENOUS at 18:47

## 2022-04-13 RX ADMIN — DEXMEDETOMIDINE HYDROCHLORIDE IN 0.9% SODIUM CHLORIDE 7.62 MICROGRAM(S)/KG/HR: 4 INJECTION INTRAVENOUS at 10:53

## 2022-04-13 RX ADMIN — Medication 70 MICROGRAM(S): at 23:39

## 2022-04-13 RX ADMIN — ENOXAPARIN SODIUM 40 MILLIGRAM(S): 100 INJECTION SUBCUTANEOUS at 18:36

## 2022-04-13 RX ADMIN — SODIUM CHLORIDE 50 MILLILITER(S): 9 INJECTION, SOLUTION INTRAVENOUS at 23:54

## 2022-04-13 RX ADMIN — Medication 50 MILLIGRAM(S): at 10:38

## 2022-04-13 RX ADMIN — CHLORHEXIDINE GLUCONATE 1 APPLICATION(S): 213 SOLUTION TOPICAL at 23:36

## 2022-04-13 RX ADMIN — HALOPERIDOL DECANOATE 2 MILLIGRAM(S): 100 INJECTION INTRAMUSCULAR at 08:03

## 2022-04-13 RX ADMIN — POTASSIUM PHOSPHATE, MONOBASIC POTASSIUM PHOSPHATE, DIBASIC 83.33 MILLIMOLE(S): 236; 224 INJECTION, SOLUTION INTRAVENOUS at 11:40

## 2022-04-13 RX ADMIN — BRIVARACETAM 75 MILLIGRAM(S): 25 TABLET, FILM COATED ORAL at 05:15

## 2022-04-13 RX ADMIN — Medication 0.5 MILLIGRAM(S): at 05:00

## 2022-04-13 NOTE — OCCUPATIONAL THERAPY INITIAL EVALUATION ADULT - PERTINENT HX OF CURRENT PROBLEM, REHAB EVAL
1 yo M with PMH HTN, hypothyroidism who presented to [place] after witnessed seizure at home x5 minutes. Upon arrival to [place], two more witnessed seizures requiring versed 10 mg total, intubated for GCS 6. CTH / CTA showed small R cerebellar hemorrhage. Transferred to [facility] for neurosurgical evaluation and EEG monitoring. Extubated 4/13, (extremely agitated post extubation, given 0.5mg IV ativan and 2mg IV haldol)

## 2022-04-13 NOTE — EEG REPORT - NS EEG TEXT BOX
[facility]   COMPREHENSIVE EPILEPSY CENTER   REPORT OF CONTINUOUS VIDEO EEG     [facility]: [address], [place] [address], Ph#: [phone]  [facility]: [id number], [place] [address], Ph#: [phone]  [facility]: [address] [place] [address], Ph#: [phone]    Patient Name: PEPITO BALL  Age and : 72y (50)  MRN #: [id number]  Location: [place] [address]  Referring Physician: Jasmin Rossi    Start Time/Date: 11:31 on 22  End Time/Date: 08:00 on 22  Duration: 19 hours 40 mins  _____________________________________________________________  STUDY INFORMATION    EEG Recording Technique:  The patient underwent continuous Video-EEG monitoring, using Telemetry System hardware on the XLTek Digital System. EEG and video data were stored on a computer hard drive with important events saved in digital archive files. The material was reviewed by a physician (electroencephalographer / epileptologist) on a daily basis. Eligio and seizure detection algorithms were utilized and reviewed. An EEG Technician attended to the patient, and was available throughout daytime work hours.  The epilepsy center neurologist was available in person or on call 24-hours per day.    EEG Placement and Labeling of Electrodes:  The EEG was performed utilizing 20 channel referential EEG connections (coronal over temporal over parasagittal montage) using all standard 10-20 electrode placements with EKG, with additional electrodes placed in the inferior temporal region using the modified 10-10 montage electrode placements for elective admissions, or if deemed necessary. Recording was at a sampling rate of 256 samples per second per channel. Time synchronized digital video recording was done simultaneously with EEG recording. A low light infrared camera was used for low light recording.     _____________________________________________________________  HISTORY    Patient is a 72y old  Male who presents with a chief complaint of seizures (2022 06:11)      PERTINENT MEDICATION:  brivaracetam  Injectable 75 milliGRAM(s) IV Push two times a day  propofol Infusion 9.996 MICROgram(s)/kG/Min (4.57 mL/Hr) IV Continuous <Continuous>    _____________________________________________________________  STUDY INTERPRETATION    Findings: The background was continuous and reactive. No posterior dominant rhythm seen.    Background Slowing:  Diffuse irregular theta and polymorphic delta slowing.    Focal Slowing:   None were present.    Sleep Background:  Drowsiness was characterized by fragmentation, attenuation, and slowing of the background activity.    Stage II sleep transients were not recorded.    Other Non-Epileptiform Findings:  None were present.    Interictal Epileptiform Activity:   None were present.    Events:  Clinical events: None recorded.  Seizures: None recorded.    Activation Procedures:   Hyperventilation was not performed.    Photic stimulation was not performed.     Artifacts:  Intermittent myogenic and movement artifacts were noted.    ECG:  The heart rate on single channel ECG was predominantly between 60-90 BPM.    _____________________________________________________________  EEG SUMMARY/CLASSIFICATION    Abnormal EEG in a sedated patient.    - Background slowing, generalized.    _____________________________________________________________  EEG IMPRESSION/CLINICAL CORRELATE    Abnormal EEG study.    - Moderate nonspecific diffuse or multifocal cerebral dysfunction.   - No epileptiform patterns or seizures were recorded x 1 day.    In absence of additional clinical concerns, recommend consideration for discontinuation of current EEG study with reconnection in future if clinically warranted.  ___________________________________________________________    Artem Mustfaa MD, AYANNA  Fellow, [facility]     [facility]   COMPREHENSIVE EPILEPSY CENTER   REPORT OF CONTINUOUS VIDEO EEG     [facility]: [address], [place] [address], Ph#: [phone]  [facility]: [id number], [place] [address], Ph#: [phone]  [facility]: [address] [place] [address], Ph#: [phone]    Patient Name: PEPITO BALL  Age and : 72y (50)  MRN #: [id number]  Location: [place] [address]  Referring Physician: Jasmin Rossi    Start Time/Date: 11:31 on 22  End Time/Date: 08:00 on 22  Duration: 19 hours 40 mins  _____________________________________________________________  STUDY INFORMATION    EEG Recording Technique:  The patient underwent continuous Video-EEG monitoring, using Telemetry System hardware on the XLTek Digital System. EEG and video data were stored on a computer hard drive with important events saved in digital archive files. The material was reviewed by a physician (electroencephalographer / epileptologist) on a daily basis. Eligio and seizure detection algorithms were utilized and reviewed. An EEG Technician attended to the patient, and was available throughout daytime work hours.  The epilepsy center neurologist was available in person or on call 24-hours per day.    EEG Placement and Labeling of Electrodes:  The EEG was performed utilizing 20 channel referential EEG connections (coronal over temporal over parasagittal montage) using all standard 10-20 electrode placements with EKG, with additional electrodes placed in the inferior temporal region using the modified 10-10 montage electrode placements for elective admissions, or if deemed necessary. Recording was at a sampling rate of 256 samples per second per channel. Time synchronized digital video recording was done simultaneously with EEG recording. A low light infrared camera was used for low light recording.     _____________________________________________________________  HISTORY    Patient is a 72y old  Male who presents with a chief complaint of seizures (2022 06:11)      PERTINENT MEDICATION:  brivaracetam  Injectable 75 milliGRAM(s) IV Push two times a day  propofol Infusion 9.996 MICROgram(s)/kG/Min (4.57 mL/Hr) IV Continuous <Continuous>    _____________________________________________________________  STUDY INTERPRETATION    Findings: The background was continuous and reactive. No posterior dominant rhythm seen.    Background Slowing:  Diffuse irregular theta and polymorphic delta slowing.    Focal Slowing:   None were present.    Sleep Background:  Drowsiness was characterized by fragmentation, attenuation, and slowing of the background activity.    Stage II sleep transients were not recorded.    Other Non-Epileptiform Findings:  None were present.    Interictal Epileptiform Activity:   None were present.    Events:  Clinical events: None recorded.  Seizures: None recorded.    Activation Procedures:   Hyperventilation was not performed.    Photic stimulation was not performed.     Artifacts:  Intermittent myogenic and movement artifacts were noted.    ECG:  The heart rate on single channel ECG was predominantly between 60-90 BPM.    _____________________________________________________________  EEG SUMMARY/CLASSIFICATION    Abnormal EEG in a sedated patient.    - Background slowing, generalized.    _____________________________________________________________  EEG IMPRESSION/CLINICAL CORRELATE    Abnormal EEG study.    - Moderate nonspecific diffuse or multifocal cerebral dysfunction.   - No epileptiform patterns or seizures were recorded x 1 day.    In absence of additional clinical concerns, recommend consideration for discontinuation of current EEG study with reconnection in future if clinically warranted.  ___________________________________________________________    Artem Mustafa MD, AYANNA  Fellow, [facility] [place]    Etienne Mcmahon MD  EEG/Epilepsy Attending

## 2022-04-13 NOTE — PHYSICAL THERAPY INITIAL EVALUATION ADULT - PRECAUTIONS/LIMITATIONS, REHAB EVAL
Pt intubated for GCS 6. CT/CTA: small cerebellar hemorrhage. Pt  transferred to [facility] for NSGY eval on Cardene & Propofol. extubated 4/13 ~3am, extremely agitated post-extubation. CTH: 0.5 cm focus of intraparenchymal hemorrhage in the right cerebellum/fall precautions

## 2022-04-13 NOTE — PHYSICAL THERAPY INITIAL EVALUATION ADULT - PERTINENT HX OF CURRENT PROBLEM, REHAB EVAL
PMHx: htn, hypothyroidism. LKN 10PM, pt sleeping 12am, at 2am pt frothing at mouth, called EMS, pt awoke & refused transfer to hospital. ~1hr later, sz'd again (GTC?) EMS called again & transferred to hospital with another 2 witnessed sz's.  lasting ~1min each. Following each sz, pt was given 5mg IV versed. Hypertensive on arrival to [facility], obtunded, blood from mouth (tongue laceration), responsive only to pain/sternal rub. cont...

## 2022-04-13 NOTE — PROGRESS NOTE ADULT - SUBJECTIVE AND OBJECTIVE BOX
HPI:  72M PMH htn, hypothyroidism, presented to [facility] via EMS after family called 911 following a witnessed seizure, by family, reportedly lasting ~5min.  Enroute to [place], it was reported patient had another 2 witnessed seizures by EMS, each lasting ~1 minute. Following each seizure the patient was given 5mg IV versed (received 10mg collectively and brought to [facility] ED). Pt arrived to the ED obtunded with blood from his mouth (tongue laceration), responsive only to pain/sternal rub. Patient was subsequently intubated for GCS 6 and CT/CTA performed showing a small cerebellar hemorrhage ON NO A/C/ THINNERS. He was transferred to [facility] for NSGY eval on Cardene and Propofol gtt.      Upon arrival, wife at bedside and confirms story, LKN 10PM,  sleeping 12am, 2am wife found him frothing at mouth, called EMS, patient subsequently awoke and refused transfer to hospital.  About 1 hour later, seized again, witnessed sounds like GTC.  EMS called again and transferred to hospital with another 2 witnessed seizures.  Hypertensive on arrival to [facility].     extubated overnight around 3am, extremely agitated post extubation, given 0.5mg IV ativan and 2mg IV haldol     VITALS:  T(C): , Max: 37.1 (04-13-22 @ 11:00)  HR:  (41 - 106)  BP:  (93/53 - 192/95)  ABP: --  RR:  (14 - 29)  SpO2:  (89% - 100%)  Wt(kg): --      04-12-22 @ 07:01  -  04-13-22 @ 07:00  --------------------------------------------------------  IN: 2782.8 mL / OUT: 1355 mL / NET: 1427.8 mL    04-13-22 @ 07:01  -  04-13-22 @ 18:45  --------------------------------------------------------  IN: 1270.3 mL / OUT: 1900 mL / NET: -629.7 mL      LABS:  Na: 138 (04-12 @ 23:06), 138 (04-12 @ 07:30), 139 (04-12 @ 05:05)  K: 3.8 (04-12 @ 23:06), 3.6 (04-12 @ 07:30), 3.5 (04-12 @ 05:05)  Cl: 107 (04-12 @ 23:06), 104 (04-12 @ 07:30), 105 (04-12 @ 05:05)  CO2: 21 (04-12 @ 23:06), 21 (04-12 @ 07:30), 21 (04-12 @ 05:05)  BUN: 16 (04-12 @ 23:06), 20 (04-12 @ 07:30), 21 (04-12 @ 05:05)  Cr: 1.06 (04-12 @ 23:06), 1.16 (04-12 @ 07:30), 1.74 (04-12 @ 05:05)  Glu: 118(04-12 @ 23:06), 119(04-12 @ 07:30), 201(04-12 @ 05:05)    Hgb: 9.5 (04-12 @ 23:06), 11.2 (04-12 @ 07:30), 4.2 (04-12 @ 05:33)  Hct: 29.0 (04-12 @ 23:06), 34.1 (04-12 @ 07:30), 13.5 (04-12 @ 05:33)  WBC: 6.13 (04-12 @ 23:06), 7.96 (04-12 @ 07:30), 3.51 (04-12 @ 05:33)  Plt: 219 (04-12 @ 23:06), 246 (04-12 @ 07:30), 85 (04-12 @ 05:33)    INR: 0.98 04-12-22 @ 05:05  PTT: 27.9 04-12-22 @ 05:05    IMAGING:   Recent imaging studies were reviewed.    MEDICATIONS:  amLODIPine   Tablet 10 milliGRAM(s) Oral daily  chlorhexidine 4% Liquid 1 Application(s) Topical <User Schedule>  dexMEDEtomidine Infusion 0.4 MICROgram(s)/kG/Hr IV Continuous <Continuous>  enoxaparin Injectable 40 milliGRAM(s) SubCutaneous <User Schedule>  haloperidol    Injectable 2 milliGRAM(s) IntraMuscular once  hydrALAZINE 10 milliGRAM(s) Oral every 4 hours PRN  insulin lispro (ADMELOG) corrective regimen sliding scale   SubCutaneous every 6 hours  levothyroxine Injectable 70 MICROGram(s) IV Push at bedtime  multiple electrolytes Injection Type 1 1000 milliLiter(s) IV Continuous <Continuous>  senna 2 Tablet(s) Oral at bedtime  valproate sodium IVPB 500 milliGRAM(s) IV Intermittent every 8 hours    EXAMINATION:  General: agitated  HEENT: Anicteric sclerae  Cardiac: [id number]  Lungs: Clear  Abdomen: Soft, non-tender, +BS  Extremities: No c/c/e  Skin/Incision Site: Clean, dry and intact  Neurologic: arousable, EO to stim, L 3mm R 4mm reactive pupils, nonverbal but follows simple commands, RUE 2 fingers, BLE wiggles toes, moves all extremities AG/strong

## 2022-04-13 NOTE — OCCUPATIONAL THERAPY INITIAL EVALUATION ADULT - ORIENTATION, REHAB EVAL
stated current year as 2021 with cues, month as Jan. Stated he's in the [place] when asked what State./person/place

## 2022-04-13 NOTE — PHYSICAL THERAPY INITIAL EVALUATION ADULT - ADDITIONAL COMMENTS
lives with wife in private house with 5-6 steps to enter with bilateral rails, 1 flight inside with a rail, right hand dominant, wears glasses, drives, works for [employer]

## 2022-04-13 NOTE — OCCUPATIONAL THERAPY INITIAL EVALUATION ADULT - GENERAL OBSERVATIONS, REHAB EVAL
Patient received semi-supine in bed, +tele, BP cuff, pulse ox, +IV, condom cath, 2L O2 NC, posey vest, B/L wrist restraints, sequentials donned

## 2022-04-13 NOTE — OCCUPATIONAL THERAPY INITIAL EVALUATION ADULT - PHYSICAL ASSIST/NONPHYSICAL ASSIST: SIT/SUPINE, REHAB EVAL
VITAL SIGNS-Telemetry:  sr   Vital Signs Last 24 Hrs  T(C): 37 (20 @ 06:00), Max: 37.3 (20 @ 15:11)  T(F): 98.6 (20 @ 06:00), Max: 99.1 (20 @ 15:11)  HR: 90 (20 @ 06:00) (82 - 90)  BP: 125/65 (20 @ 06:00) (125/65 - 157/67)  RR: 20 (20 @ 06:00) (18 - 20)  SpO2: 93% (20 @ 06:00) (93% - 100%)          07:01  -   @ 07:00  --------------------------------------------------------  IN: 899 mL / OUT: 1240 mL / NET: -341 mL     07:01  -   @ 11:45  --------------------------------------------------------  IN: 200 mL / OUT: 300 mL / NET: -100 mL    Daily     Daily Weight in k.5 (2020 07:58)    CAPILLARY BLOOD GLUCOSE  146 (2020 13:00)  177 (2020 12:04)  POCT Blood Glucose.: 121 mg/dL (2020 07:45)  POCT Blood Glucose.: 109 mg/dL (2020 21:41)        Pacing Wires        [  ]   Settings:                                  Isolated  [x  ]  Coumadin    [x ] YES          [  ]      NO         Reason:       PHYSICAL EXAM:  Neurology: alert and oriented x 3, nonfocal, no gross deficits  CV : S1S2  Sternal Wound :  CDI , Stable  Lungs: cta  Abdomen: soft, nontender, nondistended, positive bowel sounds, last bowel movement         Extremities:     + edema rle - skin taut - no blanching, RLE inc cdi. no calf tenderness - skin warm to touch +pp    acetaminophen   Tablet .. 650 milliGRAM(s) Oral every 6 hours PRN  aspirin enteric coated 81 milliGRAM(s) Oral daily  aspirin Suppository 300 milliGRAM(s) Rectal once  atorvastatin 40 milliGRAM(s) Oral at bedtime  chlorhexidine 2% Cloths 1 Application(s) Topical <User Schedule>  dextrose 40% Gel 15 Gram(s) Oral once  dextrose 40% Gel 15 Gram(s) Oral once  dextrose 5%. 1000 milliLiter(s) IV Continuous <Continuous>  dextrose 5%. 1000 milliLiter(s) IV Continuous <Continuous>  dextrose 50% Injectable 25 Gram(s) IV Push once  dextrose 50% Injectable 12.5 Gram(s) IV Push once  dextrose 50% Injectable 25 Gram(s) IV Push once  enoxaparin Injectable 40 milliGRAM(s) SubCutaneous daily  famotidine Injectable 20 milliGRAM(s) IV Push every 12 hours  glucagon  Injectable 1 milliGRAM(s) IntraMuscular once  insulin glargine Injectable (LANTUS) 40 Unit(s) SubCutaneous at bedtime  insulin lispro (ADMELOG) corrective regimen sliding scale   SubCutaneous three times a day before meals  insulin lispro (ADMELOG) corrective regimen sliding scale   SubCutaneous at bedtime  insulin lispro Injectable (ADMELOG) 10 Unit(s) SubCutaneous three times a day before meals  metoprolol tartrate 25 milliGRAM(s) Oral three times a day  mupirocin 2% Ointment 1 Application(s) Both Nostrils two times a day  oxyCODONE    IR 5 milliGRAM(s) Oral every 6 hours PRN  polyethylene glycol 3350 17 Gram(s) Oral daily  sodium chloride 0.9%. 1000 milliLiter(s) IV Continuous <Continuous>    Physical Therapy Rec:   Home  [  ]   Home w/ PT  [  ]  Rehab  [ x ]  Discussed with Cardiothoracic Team at AM rounds. verbal cues/nonverbal cues (demo/gestures)/1 person assist

## 2022-04-13 NOTE — OCCUPATIONAL THERAPY INITIAL EVALUATION ADULT - DIAGNOSIS, OT EVAL
Patient with deficits in ADL status and functional mobility due to impaired cognition, balance, and coordination

## 2022-04-13 NOTE — OCCUPATIONAL THERAPY INITIAL EVALUATION ADULT - PLANNED THERAPY INTERVENTIONS, OT EVAL
ADL retraining/balance training/bed mobility training/cognitive, visual perceptual/fine motor coordination training/motor coordination training/transfer training

## 2022-04-13 NOTE — PROGRESS NOTE ADULT - ASSESSMENT
71 yo M with PMH HTN, hypothyroidism who presented to [place] after witnessed seizure at home x5 minutes. Upon arrival to [place], two more witnessed seizures requiring versed 10 mg total, intubated for GCS 6. CTH / CTA showed small R cerebellar hemorrhage. Transferred to [facility] for neurosurgical evaluation and EEG monitoring.     status epilepticus  cerebellar heme     Plan:   Neuro:  propofol gtt; vEEG now wean prop if no seizures  - Q1 hour Neuro checks, Q1 hour Vitals  - HOB 30 degrees, Neck midline position   - Maintain normothermia, PO acetaminophen for temp>38 C or pain  - Imaging reviewed, pending repeat CTH   - MRI w/wo after EEG   - AED: Keppra 750 BID with 1g load, propofol drip   - Pain management & Sedation: propofol drip-->transition to precedex if no seizures, fentanyl PRN   - Turn and Position Q2  /  Activity ad latisha, with assistance  	  CV:  - SBP Goal 100-160  - BP regimen: hydralazine / labetalol PRN   - EKG now   - Lipid panel  - TTE     Pulm:  - Mode: AC/ CMV 14/450/40/5  - Chest PT, OOB, Pulmonary Toilet    GI:  - Nutrition: NPO / OGT, starting Jevity goal @ 50 ml/hr  - GI prophylaxis: protonix 2/2 intubation   - Senna   	  Gu:  - Gibson -->d/c  - Plasmalyte @ 100   - I&O Q1 hour  - Monitor Electrolytes & Renal Function    Heme:  - Monitor H&H  - Chemical DVT prophylaxis: * Chemical DVT prophylaxis is contraindicated due to risk of bleeding  - Mechanical DVT Prophylaxis: Maintain B/L LE sequential compression devices  	  ID:  - Monitor WBC and Temperature    Endo  - Monitor BG, goal 120-180  - MISS  - Synthroid 88 mcg   - free T4, T3, thyroid US     FULL code  ICU  critically ill due to GTC seizures, status epilepticus, respiratory failure requiring intubation 73 yo M with PMH HTN, hypothyroidism who presented to [place] after witnessed seizure at home x5 minutes. Upon arrival to [place], two more witnessed seizures requiring versed 10 mg total, intubated for GCS 6. CTH / CTA showed small R cerebellar hemorrhage. Transferred to [facility] for neurosurgical evaluation and EEG monitoring.     status epilepticus  cerebellar heme     Plan:   Neuro:  vEEG x24hrs more, thus far negative on keppra; switch to VPA for agittion  d/c propofol   haldol+low rate precedex for MRI later this afternoon  VPA for seizures while on vEEG monitor for further outpatient continuity of care  - Q1 hour Neuro checks, Q2 hour Vitals  - HOB 30 degrees, Neck midline position   - Maintain normothermia, PO acetaminophen for temp>38 C or pain  - MRI w/wo  - Pain management & Sedation: propofol drip-->transition to precedex if no seizures, fentanyl PRN   - Turn and Position Q2  /  Activity ad latisha, with assistance  	  CV:  - SBP Goal 100-160  hydralazine IV pushes prn for SBP>160  start norvasc   EKG QTc 440's   - Lipid panel  - TTE     Pulm:  extubated, monitor respiratory status  aspiration precaution  incentive spirometry as able      GI:  - Nutrition: NPO as of now  place NGT  dysphagia screen when able  - Senna   	  Gu:  - Gibson -->d/c  - Plasmalyte @ 100 , JESSICA improving   - I&O Q1 hour  - Monitor Electrolytes & Renal Function    Heme:  - Monitor H&H  - Chemical DVT prophylaxis: stable CTH/heme, start lovenox ppx   - Mechanical DVT Prophylaxis: Maintain B/L LE sequential compression devices  	  ID:  - Monitor WBC and Temperature    Endo  - Monitor BG, goal 120-180  - MISS  - hypothyroid --Synthroid 88 mcg --noncompliant at home, resume     FULL code  ICU  critically ill due to GTC seizures, status epilepticus, respiratory failure requiring intubation

## 2022-04-13 NOTE — OCCUPATIONAL THERAPY INITIAL EVALUATION ADULT - TRANSFER TRAINING, PT EVAL
Patient will transfer to toilet with DME as needed with (S) in 4 weeks. Pt will perform sit <-> stand transfers (I) within 4 weeks

## 2022-04-13 NOTE — OCCUPATIONAL THERAPY INITIAL EVALUATION ADULT - LIVES WITH, PROFILE
Pt lives in a pvt home, 6 steps to enter with handrail. 1 flight to bed and bath. (I) ADLs and functional mobility without AD or durable medical equipment. +Tub with curtain. +/spouse

## 2022-04-13 NOTE — PROGRESS NOTE ADULT - SUBJECTIVE AND OBJECTIVE BOX
SUMMARY: 72M PMH htn, hypothyroidism, presented to [place] ED via EMS after family called 911 following a witnessed seizure, by family, reportedly lasting ~5min.  Enroute to [place], it was reported patient had another 2 witnessed seizures by EMS, each lasting ~1 minute. Following each seizure the patient was given 5mg IV versed (received 10mg collectively and brought to [place] ED). Pt arrived to the ED obtunded with blood from his mouth (tongue laceration), responsive only to pain/sternal rub. Patient was subsequently intubated for GCS 6 and CT/CTA performed showing a small cerebellar hemorrhage ON NO A/C/ THINNERS. He was transferred to [facility] for NSGY eval on Cardene and Propofol gtt.      Upon arrival, wife at bedside and confirms story, LKN 10PM,  sleeping 12am, 2am wife found him frothing at mouth, called EMS, patient subsequently awoke and refused transfer to hospital.  About 1 hour later, seized again, witnessed sounds like GTC.  EMS called again and transferred to hospital with another 2 witnessed seizures.  Hypertensive on arrival to [facility].     Adm NSCU on propofol    ADMISSION SCORES:   GCS: 6T HH: MF: NIHSS: ICH Score:    REVIEW OF SYSTEMS: [x] Unable to Assess due to neurologic exam   [ ] All ROS addressed below are non-contributory, except:  Neuro: [ ] Headache [ ] Back pain [ ] Numbness [ ] Weakness [ ] Ataxia [ ] Dizziness [ ] Aphasia [ ] Dysarthria [ ] Visual disturbance  Resp: [ ] Shortness of breath/dyspnea [ ] Orthopnea [ ] Cough  CV: [ ] Chest pain [ ] Palpitation [ ] Lightheadedness [ ] Syncope  Renal: [ ] Thirst [ ] Edema  GI: [ ] Nausea [ ] Emesis [ ] Abdominal pain [ ] Constipation [ ] Diarrhea  Hem: [ ] Hematemesis [ ] bBright red blood per rectum  ID: [ ] Fever [ ] Chills [ ] Dysuria  ENT: [ ] Rhinorrhea    VITALS: [x] Reviewed    IMAGING/DATA: [x] Reviewed    IVF FLUIDS/MEDICATIONS: [x] Reviewed    ALLERGIES: Allergies    No Known Allergies    Intolerances        DEVICES:   [] Restraints [x] PIVs [x] ET tube [] central line [] PICC [] arterial line [] rogel [] NGT/OGT [] EVD [] LD [] WANDY/HMV [] LiCOX [] ICP monitor [] Trach [] PEG [] Chest Tube [] other:    EXAMINATION:  General: intubated  HEENT: Anicteric sclerae  Cardiac: [id number]  Lungs: Clear  Abdomen: Soft, non-tender, +BS  Extremities: No c/c/e  Skin/Incision Site: Clean, dry and intact  Neurologic:   Off propofol for 15 min; No OE to stim, PERRL 2mm, UE no movement, LLE WD. SUMMARY: 72M PMH htn, hypothyroidism, presented to [place] ED via EMS after family called 911 following a witnessed seizure, by family, reportedly lasting ~5min.  Enroute to [place], it was reported patient had another 2 witnessed seizures by EMS, each lasting ~1 minute. Following each seizure the patient was given 5mg IV versed (received 10mg collectively and brought to [place] ED). Pt arrived to the ED obtunded with blood from his mouth (tongue laceration), responsive only to pain/sternal rub. Patient was subsequently intubated for GCS 6 and CT/CTA performed showing a small cerebellar hemorrhage ON NO A/C/ THINNERS. He was transferred to [facility] for NSGY eval on Cardene and Propofol gtt.      Upon arrival, wife at bedside and confirms story, LKN 10PM,  sleeping 12am, 2am wife found him frothing at mouth, called EMS, patient subsequently awoke and refused transfer to hospital.  About 1 hour later, seized again, witnessed sounds like GTC.  EMS called again and transferred to hospital with another 2 witnessed seizures.  Hypertensive on arrival to [place]     extubated overnight around 3am, extremely agitated post extubation, given 0.5mg IV ativan and 2mg IV haldol     REVIEW OF SYSTEMS: [x] Unable to Assess due to neurologic exam   [ ] All ROS addressed below are non-contributory, except:  Neuro: [ ] Headache [ ] Back pain [ ] Numbness [ ] Weakness [ ] Ataxia [ ] Dizziness [ ] Aphasia [ ] Dysarthria [ ] Visual disturbance  Resp: [ ] Shortness of breath/dyspnea [ ] Orthopnea [ ] Cough  CV: [ ] Chest pain [ ] Palpitation [ ] Lightheadedness [ ] Syncope  Renal: [ ] Thirst [ ] Edema  GI: [ ] Nausea [ ] Emesis [ ] Abdominal pain [ ] Constipation [ ] Diarrhea  Hem: [ ] Hematemesis [ ] bBright red blood per rectum  ID: [ ] Fever [ ] Chills [ ] Dysuria  ENT: [ ] Rhinorrhea    VITALS: [x] Reviewed    IMAGING/DATA: [x] Reviewed    IVF FLUIDS/MEDICATIONS: [x] Reviewed    ALLERGIES: Allergies    No Known Allergies    Intolerances    EXAMINATION:  General: agitated  HEENT: Anicteric sclerae  Cardiac: [id number]  Lungs: Clear  Abdomen: Soft, non-tender, +BS  Extremities: No c/c/e  Skin/Incision Site: Clean, dry and intact  Neurologic: arousable, EO to stim, L 3mm R 4mm reactive pupils, nonverbal but follows simple commands, RUE 2 fingers, BLE wiggles toes, moves all extremities AG/strong

## 2022-04-13 NOTE — PHYSICAL THERAPY INITIAL EVALUATION ADULT - GENERAL OBSERVATIONS, REHAB EVAL
received supine with head of bed elevated +posey, +bilateral wrist restraints, +vEEG, +O2 via nc, +condom cath, +iv

## 2022-04-13 NOTE — PHYSICAL THERAPY INITIAL EVALUATION ADULT - ASR WT BEARING STATUS EVAL
CTP: The areas of elevated Tmax do not follow a vascular distribution and are likely artifactual, related to patient motion. CTA COW:  Patent intracranial circulation without flow limiting stenosis. CTA NECK: Patent, ECAs, ICAs, no  hemodynamically significant stenosis at ICA origins by NASCET criteria. Bilateral vertebral arteries are patent without flow limiting stenosis. CXR: 7mm nodular opacity projects over RLL. Question nipple or pulmonary nodule. Continued L basilar & retrocardiac opacity which may be due to a L pleural effusion with passive atelectasis, atelectasis of other cause, and/or pneumonia./no weight-bearing restrictions

## 2022-04-13 NOTE — PROGRESS NOTE ADULT - ASSESSMENT
71 yo M with PMH HTN, hypothyroidism who presented to [place] after witnessed seizure at home x5 minutes. Upon arrival to [place], two more witnessed seizures requiring versed 10 mg total, intubated for GCS 6. CTH / CTA showed small R cerebellar hemorrhage. Transferred to [facility] for neurosurgical evaluation and EEG monitoring.     status epilepticus  cerebellar heme     Plan:   Neuro:  vEEG x24hrs more, thus far negative on keppra; switch to VPA for agittion  d/c propofol   haldol+low rate precedex for MRI later this afternoon  VPA for seizures while on vEEG monitor for further outpatient continuity of care  - Q1 hour Neuro checks, Q2 hour Vitals  - HOB 30 degrees, Neck midline position   - Maintain normothermia, PO acetaminophen for temp>38 C or pain  - MRI w/wo  - Pain management & Sedation: propofol drip-->transition to precedex if no seizures, fentanyl PRN   - Turn and Position Q2  /  Activity ad latisha, with assistance  	  CV:  - SBP Goal 100-160  hydralazine IV pushes prn for SBP>160  start norvasc   EKG QTc 440's   - Lipid panel  - TTE     Pulm:  extubated, monitor respiratory status  aspiration precaution  incentive spirometry as able      GI:  - Nutrition: NPO as of now  place NGT  dysphagia screen when able  - Senna   	  Gu:  - Gibson -->d/c  - Plasmalyte @ 100 , JESSICA improving   - I&O Q1 hour  - Monitor Electrolytes & Renal Function    Heme:  - Monitor H&H  - Chemical DVT prophylaxis: stable CTH/heme, start lovenox ppx   - Mechanical DVT Prophylaxis: Maintain B/L LE sequential compression devices  	  ID:  - Monitor WBC and Temperature    Endo  - Monitor BG, goal 120-180  - MISS  - hypothyroid --Synthroid 88 mcg --noncompliant at home, resume     FULL code  ICU  critically ill due to GTC seizures, status epilepticus, respiratory failure requiring intubation   73 yo M with PMH HTN, hypothyroidism who presented to [place] after witnessed seizure at home x5 minutes. Upon arrival to [place], two more witnessed seizures requiring versed 10 mg total, intubated for GCS 6. CTH / CTA showed small R cerebellar hemorrhage. Transferred to [facility] for neurosurgical evaluation and EEG monitoring.     status epilepticus  cerebellar heme     Plan:   Neuro:  vEEG, on VPA, therapeutic level   - Q1 hour Neuro checks, Q2 hour Vitals  - Maintain normothermia, PO acetaminophen for temp>38 C or pain  - MRI w/wo tomorrow  - Pain management & Sedation: propofol drip-->transition to precedex if no seizures, fentanyl PRN   - Turn and Position Q2  /  Activity ad latisha, with assistance  	  CV:  HTN  - SBP Goal 100-160  hydralazine IV pushes prn for SBP>160  norvasc 10 mg  EKG QTc 471   - LDL 38  - TTE EF 75%    Pulm:  extubated,   aspiration precaution, keep head elevated  incentive spirometry as able  ABG      GI:  - Nutrition: NPO as of now  place NGT  dysphagia screen when able  - Senna, Miralax   	  Gu:  - Condom cath  - Plasmalyte @ 50 , JESSICA improving   - I&O Q1 hour  - Monitor Electrolytes & Renal Function    Heme:  - Monitor H&H  - Chemical DVT prophylaxis: stable CTH/heme, start lovenox 40 ppx   - Mechanical DVT Prophylaxis: Maintain B/L LE sequential compression devices  -LED at 4/10 negative   	  ID:  - Monitor WBC and Temperature    Endo  - Monitor BG, goal 120-180  - MISS  - hypothyroid --Synthroid 70 mcg  IV (Home dose 88 mcg)  - Endocrinology consult     FULL code  ICU  critically ill due to GTC seizures, status epilepticus, respiratory failure requiring intubation

## 2022-04-14 LAB
ANION GAP SERPL CALC-SCNC: 17 MMOL/L — SIGNIFICANT CHANGE UP (ref 5–17)
BUN SERPL-MCNC: 11 MG/DL — SIGNIFICANT CHANGE UP (ref 7–23)
CALCIUM SERPL-MCNC: 8.8 MG/DL — SIGNIFICANT CHANGE UP (ref 8.4–10.5)
CHLORIDE SERPL-SCNC: 103 MMOL/L — SIGNIFICANT CHANGE UP (ref 96–108)
CO2 SERPL-SCNC: 20 MMOL/L — LOW (ref 22–31)
CREAT SERPL-MCNC: 1.06 MG/DL — SIGNIFICANT CHANGE UP (ref 0.5–1.3)
EGFR: 75 ML/MIN/1.73M2 — SIGNIFICANT CHANGE UP
GLUCOSE BLDC GLUCOMTR-MCNC: 107 MG/DL — HIGH (ref 70–99)
GLUCOSE BLDC GLUCOMTR-MCNC: 116 MG/DL — HIGH (ref 70–99)
GLUCOSE BLDC GLUCOMTR-MCNC: 72 MG/DL — SIGNIFICANT CHANGE UP (ref 70–99)
GLUCOSE BLDC GLUCOMTR-MCNC: 81 MG/DL — SIGNIFICANT CHANGE UP (ref 70–99)
GLUCOSE BLDC GLUCOMTR-MCNC: 91 MG/DL — SIGNIFICANT CHANGE UP (ref 70–99)
GLUCOSE BLDC GLUCOMTR-MCNC: 95 MG/DL — SIGNIFICANT CHANGE UP (ref 70–99)
GLUCOSE BLDC GLUCOMTR-MCNC: 97 MG/DL — SIGNIFICANT CHANGE UP (ref 70–99)
GLUCOSE SERPL-MCNC: 101 MG/DL — HIGH (ref 70–99)
HCT VFR BLD CALC: 34.1 % — LOW (ref 39–50)
HGB BLD-MCNC: 11.1 G/DL — LOW (ref 13–17)
MAGNESIUM SERPL-MCNC: 2.1 MG/DL — SIGNIFICANT CHANGE UP (ref 1.6–2.6)
MCHC RBC-ENTMCNC: 29.8 PG — SIGNIFICANT CHANGE UP (ref 27–34)
MCHC RBC-ENTMCNC: 32.6 GM/DL — SIGNIFICANT CHANGE UP (ref 32–36)
MCV RBC AUTO: 91.7 FL — SIGNIFICANT CHANGE UP (ref 80–100)
NRBC # BLD: 0 /100 WBCS — SIGNIFICANT CHANGE UP (ref 0–0)
PHOSPHATE SERPL-MCNC: 3.5 MG/DL — SIGNIFICANT CHANGE UP (ref 2.5–4.5)
PLATELET # BLD AUTO: 215 K/UL — SIGNIFICANT CHANGE UP (ref 150–400)
POTASSIUM SERPL-MCNC: 3.8 MMOL/L — SIGNIFICANT CHANGE UP (ref 3.5–5.3)
POTASSIUM SERPL-SCNC: 3.8 MMOL/L — SIGNIFICANT CHANGE UP (ref 3.5–5.3)
RBC # BLD: 3.72 M/UL — LOW (ref 4.2–5.8)
RBC # FLD: 13.6 % — SIGNIFICANT CHANGE UP (ref 10.3–14.5)
SODIUM SERPL-SCNC: 140 MMOL/L — SIGNIFICANT CHANGE UP (ref 135–145)
WBC # BLD: 9.22 K/UL — SIGNIFICANT CHANGE UP (ref 3.8–10.5)
WBC # FLD AUTO: 9.22 K/UL — SIGNIFICANT CHANGE UP (ref 3.8–10.5)

## 2022-04-14 PROCEDURE — 95718 EEG PHYS/QHP 2-12 HR W/VEEG: CPT

## 2022-04-14 PROCEDURE — 99233 SBSQ HOSP IP/OBS HIGH 50: CPT

## 2022-04-14 PROCEDURE — 71045 X-RAY EXAM CHEST 1 VIEW: CPT | Mod: 26,76

## 2022-04-14 RX ORDER — DIVALPROEX SODIUM 500 MG/1
500 TABLET, DELAYED RELEASE ORAL EVERY 8 HOURS
Refills: 0 | Status: DISCONTINUED | OUTPATIENT
Start: 2022-04-14 | End: 2022-04-15

## 2022-04-14 RX ORDER — LEVOTHYROXINE SODIUM 125 MCG
88 TABLET ORAL DAILY
Refills: 0 | Status: DISCONTINUED | OUTPATIENT
Start: 2022-04-14 | End: 2022-05-04

## 2022-04-14 RX ORDER — POLYETHYLENE GLYCOL 3350 17 G/17G
17 POWDER, FOR SOLUTION ORAL
Refills: 0 | Status: DISCONTINUED | OUTPATIENT
Start: 2022-04-14 | End: 2022-05-04

## 2022-04-14 RX ORDER — DEXMEDETOMIDINE HYDROCHLORIDE IN 0.9% SODIUM CHLORIDE 4 UG/ML
0.3 INJECTION INTRAVENOUS
Qty: 200 | Refills: 0 | Status: DISCONTINUED | OUTPATIENT
Start: 2022-04-14 | End: 2022-04-14

## 2022-04-14 RX ORDER — THIAMINE MONONITRATE (VIT B1) 100 MG
500 TABLET ORAL EVERY 8 HOURS
Refills: 0 | Status: DISCONTINUED | OUTPATIENT
Start: 2022-04-14 | End: 2022-04-28

## 2022-04-14 RX ORDER — LEVETIRACETAM 250 MG/1
1000 TABLET, FILM COATED ORAL ONCE
Refills: 0 | Status: COMPLETED | OUTPATIENT
Start: 2022-04-14 | End: 2022-04-14

## 2022-04-14 RX ADMIN — DIVALPROEX SODIUM 500 MILLIGRAM(S): 500 TABLET, DELAYED RELEASE ORAL at 21:21

## 2022-04-14 RX ADMIN — LEVETIRACETAM 400 MILLIGRAM(S): 250 TABLET, FILM COATED ORAL at 17:25

## 2022-04-14 RX ADMIN — AMLODIPINE BESYLATE 10 MILLIGRAM(S): 2.5 TABLET ORAL at 05:10

## 2022-04-14 RX ADMIN — Medication 105 MILLIGRAM(S): at 15:46

## 2022-04-14 RX ADMIN — SENNA PLUS 2 TABLET(S): 8.6 TABLET ORAL at 21:21

## 2022-04-14 RX ADMIN — HALOPERIDOL DECANOATE 2 MILLIGRAM(S): 100 INJECTION INTRAMUSCULAR at 00:02

## 2022-04-14 RX ADMIN — Medication 55 MILLIGRAM(S): at 03:13

## 2022-04-14 RX ADMIN — ENOXAPARIN SODIUM 40 MILLIGRAM(S): 100 INJECTION SUBCUTANEOUS at 17:24

## 2022-04-14 RX ADMIN — Medication 55 MILLIGRAM(S): at 11:48

## 2022-04-14 RX ADMIN — SENNA PLUS 2 TABLET(S): 8.6 TABLET ORAL at 05:10

## 2022-04-14 RX ADMIN — Medication 105 MILLIGRAM(S): at 06:29

## 2022-04-14 RX ADMIN — Medication 88 MICROGRAM(S): at 13:16

## 2022-04-14 RX ADMIN — POLYETHYLENE GLYCOL 3350 17 GRAM(S): 17 POWDER, FOR SOLUTION ORAL at 17:25

## 2022-04-14 NOTE — EEG REPORT - NS EEG TEXT BOX
[facility]   COMPREHENSIVE EPILEPSY CENTER   REPORT OF CONTINUOUS VIDEO EEG     [facility]: [address], [place] [address], Ph#: [phone]  [facility]: [id number], [place] [address], Ph#: [phone]  [facility]: [address] [place] [address], Ph#: [phone]    Patient Name: PEPITO BALL  Age and : 72y (50)  MRN #: [id number]  Location: [place] [address]  Referring Physician: Jasmin Rossi    Start Time/Date: 08:00 on 22  End Time/Date: 08:00 on 22  Duration: 24 hours 00 mins  _____________________________________________________________  STUDY INFORMATION    EEG Recording Technique:  The patient underwent continuous Video-EEG monitoring, using Telemetry System hardware on the XLTek Digital System. EEG and video data were stored on a computer hard drive with important events saved in digital archive files. The material was reviewed by a physician (electroencephalographer / epileptologist) on a daily basis. Eligio and seizure detection algorithms were utilized and reviewed. An EEG Technician attended to the patient, and was available throughout daytime work hours.  The epilepsy center neurologist was available in person or on call 24-hours per day.    EEG Placement and Labeling of Electrodes:  The EEG was performed utilizing 20 channel referential EEG connections (coronal over temporal over parasagittal montage) using all standard 10-20 electrode placements with EKG, with additional electrodes placed in the inferior temporal region using the modified 10-10 montage electrode placements for elective admissions, or if deemed necessary. Recording was at a sampling rate of 256 samples per second per channel. Time synchronized digital video recording was done simultaneously with EEG recording. A low light infrared camera was used for low light recording.     _____________________________________________________________  HISTORY    Patient is a 72y old  Male who presents with a chief complaint of seizures (2022 06:11)      PERTINENT MEDICATION:  dexMEDEtomidine Infusion 0.3 MICROgram(s)/kG/Hr (5.72 mL/Hr) IV Continuous <Continuous>  haloperidol    Injectable 2 milliGRAM(s) IntraMuscular once  valproate sodium IVPB 500 milliGRAM(s) IV Intermittent every 8 hours    _____________________________________________________________  STUDY INTERPRETATION    Findings: The background was continuous and reactive. No posterior dominant rhythm seen.    Background Slowing:  Diffuse irregular theta and polymorphic delta slowing.    Focal Slowing:   None were present.    Sleep Background:  Drowsiness was characterized by fragmentation, attenuation, and slowing of the background activity.    Stage II sleep transients were recorded.    Other Non-Epileptiform Findings:  None were present.    Interictal Epileptiform Activity:   None were present.    Events:  Clinical events: None recorded.  Seizures: None recorded.    Activation Procedures:   Hyperventilation was not performed.    Photic stimulation was not performed.     Artifacts:  Intermittent myogenic and movement artifacts were noted.    ECG:  The heart rate on single channel ECG was predominantly between 60-90 BPM.    _____________________________________________________________  EEG SUMMARY/CLASSIFICATION    Abnormal EEG in a sedated patient.    - Background slowing, generalized.    _____________________________________________________________  EEG IMPRESSION/CLINICAL CORRELATE    Abnormal EEG study.    - Moderate nonspecific diffuse or multifocal cerebral dysfunction.   - No epileptiform patterns or seizures were recorded x 2 days.    In absence of additional clinical concerns, recommend consideration for discontinuation of current EEG study with reconnection in future if clinically warranted.  ___________________________________________________________    Artem Mustafa MD, AYANNA  Fellow, [facility]     [facility]   COMPREHENSIVE EPILEPSY CENTER   REPORT OF CONTINUOUS VIDEO EEG     [facility]: [address], [place] [address], Ph#: [phone]  [facility]: [id number], [place] [address], Ph#: [phone]  [facility]: [address] [place] [address], Ph#: [phone]    Patient Name: PEPITO BALL  Age and : 72y (50)  MRN #: [id number]  Location: [place] [address]  Referring Physician: Jasmin Rossi    Start Time/Date: 08:00 on 22  End Time/Date: 08:00 on 22  Duration: 24 hours 00 mins  _____________________________________________________________  STUDY INFORMATION    EEG Recording Technique:  The patient underwent continuous Video-EEG monitoring, using Telemetry System hardware on the XLTek Digital System. EEG and video data were stored on a computer hard drive with important events saved in digital archive files. The material was reviewed by a physician (electroencephalographer / epileptologist) on a daily basis. Eligio and seizure detection algorithms were utilized and reviewed. An EEG Technician attended to the patient, and was available throughout daytime work hours.  The epilepsy center neurologist was available in person or on call 24-hours per day.    EEG Placement and Labeling of Electrodes:  The EEG was performed utilizing 20 channel referential EEG connections (coronal over temporal over parasagittal montage) using all standard 10-20 electrode placements with EKG, with additional electrodes placed in the inferior temporal region using the modified 10-10 montage electrode placements for elective admissions, or if deemed necessary. Recording was at a sampling rate of 256 samples per second per channel. Time synchronized digital video recording was done simultaneously with EEG recording. A low light infrared camera was used for low light recording.     _____________________________________________________________  HISTORY    Patient is a 72y old  Male who presents with a chief complaint of seizures (2022 06:11)      PERTINENT MEDICATION:  dexMEDEtomidine Infusion 0.3 MICROgram(s)/kG/Hr (5.72 mL/Hr) IV Continuous <Continuous>  haloperidol    Injectable 2 milliGRAM(s) IntraMuscular once  valproate sodium IVPB 500 milliGRAM(s) IV Intermittent every 8 hours    _____________________________________________________________  STUDY INTERPRETATION    Findings: The background was continuous and reactive. No posterior dominant rhythm seen.    Background Slowing:  Diffuse irregular theta and polymorphic delta slowing.    Focal Slowing:   None were present.    Sleep Background:  Drowsiness was characterized by fragmentation, attenuation, and slowing of the background activity.    Stage II sleep transients were recorded.    Other Non-Epileptiform Findings:  None were present.    Interictal Epileptiform Activity:   None were present.    Events:  Clinical events: None recorded.  Seizures: None recorded.    Activation Procedures:   Hyperventilation was not performed.    Photic stimulation was not performed.     Artifacts:  Intermittent myogenic and movement artifacts were noted.    ECG:  The heart rate on single channel ECG was predominantly between 60-90 BPM.    _____________________________________________________________  EEG SUMMARY/CLASSIFICATION    Abnormal EEG in a sedated patient.    - Background slowing, generalized.    _____________________________________________________________  EEG IMPRESSION/CLINICAL CORRELATE    Abnormal EEG study.    - Moderate nonspecific diffuse or multifocal cerebral dysfunction.   - No epileptiform patterns or seizures were recorded x 2 days.    In absence of additional clinical concerns, recommend consideration for discontinuation of current EEG study with reconnection in future if clinically warranted.  ___________________________________________________________    Artem Mustafa MD, AYANNA  Fellow, [facility]ilepsy [place]    Etienne Mcmahon MD  EEG/Epilepsy Attending

## 2022-04-14 NOTE — PROGRESS NOTE ADULT - SUBJECTIVE AND OBJECTIVE BOX
SUMMARY: 72M PMH htn, hypothyroidism, presented to [facility] ED via EMS after family called 911 following a witnessed seizure, by family, reportedly lasting ~5min.  Enroute to [place], it was reported patient had another 2 witnessed seizures by EMS, each lasting ~1 minute. Following each seizure the patient was given 5mg IV versed (received 10mg collectively and brought to [facility] ED). Pt arrived to the ED obtunded with blood from his mouth (tongue laceration), responsive only to pain/sternal rub. Patient was subsequently intubated for GCS 6 and CT/CTA performed showing a small cerebellar hemorrhage ON NO A/C/ THINNERS. He was transferred to [facility] for NSGY eval on Cardene and Propofol gtt.      Upon arrival, wife at bedside and confirms story, LKN 10PM,  sleeping 12am, 2am wife found him frothing at mouth, called EMS, patient subsequently awoke and refused transfer to hospital.  About 1 hour later, seized again, witnessed sounds like GTC.  EMS called again and transferred to hospital with another 2 witnessed seizures.  Hypertensive on arrival to [facility].     for mri today    REVIEW OF SYSTEMS: [x] Unable to Assess due to neurologic exam   [ ] All ROS addressed below are non-contributory, except:  Neuro: [ ] Headache [ ] Back pain [ ] Numbness [ ] Weakness [ ] Ataxia [ ] Dizziness [ ] Aphasia [ ] Dysarthria [ ] Visual disturbance  Resp: [ ] Shortness of breath/dyspnea [ ] Orthopnea [ ] Cough  CV: [ ] Chest pain [ ] Palpitation [ ] Lightheadedness [ ] Syncope  Renal: [ ] Thirst [ ] Edema  GI: [ ] Nausea [ ] Emesis [ ] Abdominal pain [ ] Constipation [ ] Diarrhea  Hem: [ ] Hematemesis [ ] bBright red blood per rectum  ID: [ ] Fever [ ] Chills [ ] Dysuria  ENT: [ ] Rhinorrhea    VITALS: [x] Reviewed    IMAGING/DATA: [x] Reviewed    IVF FLUIDS/MEDICATIONS: [x] Reviewed    ALLERGIES: Allergies    No Known Allergies    Intolerances    EXAMINATION:  General: agitated  HEENT: Anicteric sclerae  Cardiac: [id number]  Lungs: Clear  Abdomen: Soft, non-tender, +BS  Extremities: No c/c/e  Skin/Incision Site: Clean, dry and intact  Neurologic: arousable, EO to stim, L 3mm R 4mm reactive pupils, nonverbal but follows simple commands, RUE 2 fingers, BLE wiggles toes, moves all extremities AG/strong   SUMMARY: 72M PMH htn, hypothyroidism, presented to [facility] ED via EMS after family called 911 following a witnessed seizure, by family, reportedly lasting ~5min.  Enroute to [place], it was reported patient had another 2 witnessed seizures by EMS, each lasting ~1 minute. Following each seizure the patient was given 5mg IV versed (received 10mg collectively and brought to [facility] ED). Pt arrived to the ED obtunded with blood from his mouth (tongue laceration), responsive only to pain/sternal rub. Patient was subsequently intubated for GCS 6 and CT/CTA performed showing a small cerebellar hemorrhage ON NO A/C/ THINNERS. He was transferred to [facility] for NSGY eval on Cardene and Propofol gtt.      Upon arrival, wife at bedside and confirms story, LKN 10PM,  sleeping 12am, 2am wife found him frothing at mouth, called EMS, patient subsequently awoke and refused transfer to hospital.  About 1 hour later, seized again, witnessed sounds like GTC.  EMS called again and transferred to hospital with another 2 witnessed seizures.  Hypertensive on arrival to [facility].     for mri today    REVIEW OF SYSTEMS: [x] Unable to Assess due to neurologic exam   [ ] All ROS addressed below are non-contributory, except:  Neuro: [ ] Headache [ ] Back pain [ ] Numbness [ ] Weakness [ ] Ataxia [ ] Dizziness [ ] Aphasia [ ] Dysarthria [ ] Visual disturbance  Resp: [ ] Shortness of breath/dyspnea [ ] Orthopnea [ ] Cough  CV: [ ] Chest pain [ ] Palpitation [ ] Lightheadedness [ ] Syncope  Renal: [ ] Thirst [ ] Edema  GI: [ ] Nausea [ ] Emesis [ ] Abdominal pain [ ] Constipation [ ] Diarrhea  Hem: [ ] Hematemesis [ ] bBright red blood per rectum  ID: [ ] Fever [ ] Chills [ ] Dysuria  ENT: [ ] Rhinorrhea    VITALS: [x] Reviewed    IMAGING/DATA: [x] Reviewed    IVF FLUIDS/MEDICATIONS: [x] Reviewed    ALLERGIES: Allergies    No Known Allergies    Intolerances    EXAMINATION:  General: agitated  HEENT: Anicteric sclerae  Cardiac: [id number]  Lungs: Clear  Abdomen: Soft, non-tender, +BS  Extremities: No c/c/e  Skin/Incision Site: Clean, dry and intact  Neurologic: arousable, aox1 EOMI,  PERRL,follows  commands,moves all extremities AG/strong   SUMMARY: 72M PMH htn, hypothyroidism, presented to [facility] ED via EMS after family called 911 following a witnessed seizure, by family, reportedly lasting ~5min.  Enroute to [place], it was reported patient had another 2 witnessed seizures by EMS, each lasting ~1 minute. Following each seizure the patient was given 5mg IV versed (received 10mg collectively and brought to [facility] ED). Pt arrived to the ED obtunded with blood from his mouth (tongue laceration), responsive only to pain/sternal rub. Patient was subsequently intubated for GCS 6 and CT/CTA performed showing a small cerebellar hemorrhage ON NO A/C/ THINNERS. He was transferred to [facility] for NSGY eval on Cardene and Propofol gtt.      Upon arrival, wife at bedside and confirms story, LKN 10PM,  sleeping 12am, 2am wife found him frothing at mouth, called EMS, patient subsequently awoke and refused transfer to hospital.  About 1 hour later, seized again, witnessed sounds like GTC.  EMS called again and transferred to hospital with another 2 witnessed seizures.  Hypertensive on arrival to [facility].     for mri today  fluctuating agitation, requiring precedex prn     REVIEW OF SYSTEMS: [x] Unable to Assess due to neurologic exam   [ ] All ROS addressed below are non-contributory, except:  Neuro: [ ] Headache [ ] Back pain [ ] Numbness [ ] Weakness [ ] Ataxia [ ] Dizziness [ ] Aphasia [ ] Dysarthria [ ] Visual disturbance  Resp: [ ] Shortness of breath/dyspnea [ ] Orthopnea [ ] Cough  CV: [ ] Chest pain [ ] Palpitation [ ] Lightheadedness [ ] Syncope  Renal: [ ] Thirst [ ] Edema  GI: [ ] Nausea [ ] Emesis [ ] Abdominal pain [ ] Constipation [ ] Diarrhea  Hem: [ ] Hematemesis [ ] bBright red blood per rectum  ID: [ ] Fever [ ] Chills [ ] Dysuria  ENT: [ ] Rhinorrhea    VITALS: [x] Reviewed    IMAGING/DATA: [x] Reviewed    IVF FLUIDS/MEDICATIONS: [x] Reviewed    ALLERGIES: Allergies    No Known Allergies    Intolerances    EXAMINATION:  General: agitated  HEENT: Anicteric sclerae  Cardiac: [id number]  Lungs: Clear  Abdomen: Soft, non-tender, +BS  Extremities: No c/c/e  Skin/Incision Site: Clean, dry and intact  Neurologic: arousable, aox1 EOMI,  PERRL,follows  commands,moves all extremities AG/strong

## 2022-04-14 NOTE — SWALLOW BEDSIDE ASSESSMENT ADULT - SWALLOW EVAL: DIAGNOSIS
Attempted to see pt for bedside swallow evaluation. However, pt currently getting NGT placed-- screaming and agitated. Per discussion with NPAdilene, defer evaluation at this time and f/u at a later time.
73 yo M admitted with seizures; intubated on admission; extubated 4/13. Patient presents on bedside swallow evaluation with overtly functional oropharyngeal swallow. Well oriented to food/utensil. Adequate oral management across textures and no overt signs of laryngeal penetration/aspiration.

## 2022-04-14 NOTE — SWALLOW BEDSIDE ASSESSMENT ADULT - SLP PERTINENT HISTORY OF CURRENT PROBLEM
73 y/o M with PMH of HTN and hypothyroidism, presented to [facility] ED via EMS after family called 911 following a witnessed seizure, by family, reportedly lasting ~5min.  En route to [place], it was reported patient had another 2 witnessed seizures by EMS, each lasting ~1 minute. Following each seizure the patient was given 5mg IV versed. Pt arrived to the ED obtunded with blood from his mouth (tongue laceration), responsive only to pain/sternal rub. Patient was subsequently intubated for GCS 6 and CT/CTA performed showing a small cerebellar hemorrhage ON NO A/C/ THINNERS. He was transferred to [facility] for NSGY eval on Cardene and Propofol gtt for neurosurgical evaluation and EEG monitoring.
71 y/o M with PMH of HTN and hypothyroidism, presented to [facility] ED via EMS after family called 911 following a witnessed seizure, by family, reportedly lasting ~5min.  En route to [place], it was reported patient had another 2 witnessed seizures by EMS, each lasting ~1 minute. Following each seizure the patient was given 5mg IV versed. Pt arrived to the ED obtunded with blood from his mouth (tongue laceration), responsive only to pain/sternal rub. Patient was subsequently intubated for GCS 6 and CT/CTA performed showing a small cerebellar hemorrhage ON NO A/C/ THINNERS. He was transferred to [facility] for NSGY eval on Cardene and Propofol gtt for neurosurgical evaluation and EEG monitoring.

## 2022-04-14 NOTE — PROGRESS NOTE ADULT - THIS PATIENT HAS THE FOLLOWING CONDITION(S)/DIAGNOSES ON THIS ADMISSION:
status epilepticus/Acute Respiratory Failure
status epilepticus/Acute Respiratory Failure
seizures
status epilepticus/Acute Respiratory Failure
Encephalopathy

## 2022-04-14 NOTE — PROGRESS NOTE ADULT - ASSESSMENT
71 yo M with PMH HTN, hypothyroidism who presented to [place] after witnessed seizure at home x5 minutes. Upon arrival to [place], two more witnessed seizures requiring versed 10 mg total, intubated for GCS 6. CTH / CTA showed small R cerebellar hemorrhage. Transferred to [facility] for neurosurgical evaluation and EEG monitoring.     status epilepticus  cerebellar heme     Plan:   Neuro:  vEEG x24hrs more, thus far negative on keppra; switch to VPA for agittion  d/c propofol   haldol+low rate precedex for MRI later this afternoon  VPA for seizures while on vEEG monitor for further outpatient continuity of care  - Q1 hour Neuro checks, Q2 hour Vitals  - HOB 30 degrees, Neck midline position   - Maintain normothermia, PO acetaminophen for temp>38 C or pain  - MRI w/wo  - Pain management & Sedation: propofol drip-->transition to precedex if no seizures, fentanyl PRN   - Turn and Position Q2  /  Activity ad latisha, with assistance  	  CV:  - SBP Goal 100-160  hydralazine IV pushes prn for SBP>160  start norvasc   EKG QTc 440's   - Lipid panel  - TTE     Pulm:  extubated, monitor respiratory status  aspiration precaution  incentive spirometry as able      GI:  - Nutrition: NPO as of now  place NGT  dysphagia screen when able  - Senna   	  Gu:  - Gibson -->d/c  - Plasmalyte @ 100 , JESSICA improving   - I&O Q1 hour  - Monitor Electrolytes & Renal Function    Heme:  - Monitor H&H  - Chemical DVT prophylaxis: stable CTH/heme, start lovenox ppx   - Mechanical DVT Prophylaxis: Maintain B/L LE sequential compression devices  	  ID:  - Monitor WBC and Temperature    Endo  - Monitor BG, goal 120-180  - MISS  - hypothyroid --Synthroid 88 mcg --noncompliant at home, resume     FULL code  ICU  critically ill due to GTC seizures, status epilepticus, respiratory failure requiring intubation 71 yo M with PMH HTN, hypothyroidism who presented to [place] after witnessed seizure at home x5 minutes. Upon arrival to [place], two more witnessed seizures requiring versed 10 mg total, intubated for GCS 6. CTH / CTA showed small R cerebellar hemorrhage. Transferred to [facility] for neurosurgical evaluation and EEG monitoring.     status epilepticus  cerebellar heme     Plan:   Neuro:  Nchecksq4  haldol+low rate precedex for MRI later this afternoon  VPA for seizures switch to oral  DC precedex  	  CV:  - SBP Goal 100-160  hydralazine IV pushes prn for SBP>160   norvasc   resume lisinopril as needed  EKG QTc 440's   - Lipid panel  - TTE     Pulm:  RA IS      GI:  DASH diet  - bowel regimen standing   LBM    Gu:  Voiding  cr normalized  IVL once good po intake    Heme:  - Monitor H&H SQL  - LED check  SCDs  	  ID:afebrile  - Monitor WBC and Temperature    Endo a1c 5.8 dc fingersticks  - Monitor BG, goal 120-180  - MISS  - hypothyroid --Synthroid 88 mcg --noncompliant at home, resume     FULL code  Floor  critically ill due to GTC seizures, status epilepticus, respiratory failure requiring intubation 73 yo M with PMH HTN, hypothyroidism who presented to [place] after witnessed seizure at home x5 minutes. Upon arrival to [place], two more witnessed seizures requiring versed 10 mg total, intubated for GCS 6. CTH / CTA showed small R cerebellar hemorrhage. Transferred to [facility] for neurosurgical evaluation and EEG monitoring.     status epilepticus  cerebellar heme     Plan:   Neuro:  Nchecksq4  haldol+low rate precedex for MRI later this afternoon  VPA for seizures switch to oral  DC precedex  	  CV:  - SBP Goal 100-160  hydralazine IV pushes prn for SBP>160   norvasc   resume lisinopril as needed  EKG QTc 440's   - Lipid panel  - TTE     Pulm:  RA IS      GI:  DASH diet  - bowel regimen standing   LBM    Gu:  Voiding  cr normalized  IVL once good po intake    Heme:  - Monitor H&H SQL  - LED check  SCDs  	  ID:afebrile  - Monitor WBC and Temperature    Endo a1c 5.8 dc fingersticks  - Monitor BG, goal 120-180  - MISS  - hypothyroid --Synthroid 88 mcg --noncompliant at home, resume     FULL code  Floor; neurology consult for transfer  not critically ill but medically complex

## 2022-04-14 NOTE — SWALLOW BEDSIDE ASSESSMENT ADULT - ASR SWALLOW ASPIRATION MONITOR
Monitor for s/s aspiration/laryngeal penetration. If noted:  D/C p.o. intake, provide non-oral nutrition/hydration/meds, and contact this service @ [phone]/change of breathing pattern/cough/gurgly voice/fever/pneumonia/throat clearing/upper respiratory infection

## 2022-04-14 NOTE — EEG REPORT - NS EEG TEXT BOX
[facility]   COMPREHENSIVE EPILEPSY CENTER   REPORT OF CONTINUOUS VIDEO EEG     [facility]: [address], [place] [address], Ph#: [phone]  [facility]: [id number], [place] [address], Ph#: [phone]  [facility]: [address] [place] [address], Ph#: [phone]    Patient Name: PEPITO BALL  Age and : 72y (50)  MRN #: [id number]  Location: [place] [address]  Referring Physician: Jasmin Rossi    Start Time/Date: 08:00 on 22  End Time/Date: 12:00 on 22  Duration: 4 hours 00 mins  _____________________________________________________________  STUDY INFORMATION    EEG Recording Technique:  The patient underwent continuous Video-EEG monitoring, using Telemetry System hardware on the XLTek Digital System. EEG and video data were stored on a computer hard drive with important events saved in digital archive files. The material was reviewed by a physician (electroencephalographer / epileptologist) on a daily basis. Eligio and seizure detection algorithms were utilized and reviewed. An EEG Technician attended to the patient, and was available throughout daytime work hours.  The epilepsy center neurologist was available in person or on call 24-hours per day.    EEG Placement and Labeling of Electrodes:  The EEG was performed utilizing 20 channel referential EEG connections (coronal over temporal over parasagittal montage) using all standard 10-20 electrode placements with EKG, with additional electrodes placed in the inferior temporal region using the modified 10-10 montage electrode placements for elective admissions, or if deemed necessary. Recording was at a sampling rate of 256 samples per second per channel. Time synchronized digital video recording was done simultaneously with EEG recording. A low light infrared camera was used for low light recording.     _____________________________________________________________  HISTORY    Patient is a 72y old  Male who presents with a chief complaint of seizures (2022 06:11)      PERTINENT MEDICATION:  dexMEDEtomidine Infusion 0.3 MICROgram(s)/kG/Hr (5.72 mL/Hr) IV Continuous <Continuous>  haloperidol    Injectable 2 milliGRAM(s) IntraMuscular once  valproate sodium IVPB 500 milliGRAM(s) IV Intermittent every 8 hours    _____________________________________________________________  STUDY INTERPRETATION    Findings: The background was continuous and reactive. No posterior dominant rhythm seen.    Background Slowing:  Diffuse irregular theta and polymorphic delta slowing.    Focal Slowing:   None were present.    Sleep Background:  Drowsiness was characterized by fragmentation, attenuation, and slowing of the background activity.    Stage II sleep transients were recorded.    Other Non-Epileptiform Findings:  None were present.    Interictal Epileptiform Activity:   None were present.    Events:  Clinical events: None recorded.  Seizures: None recorded.    Activation Procedures:   Hyperventilation was not performed.    Photic stimulation was not performed.     Artifacts:  Intermittent myogenic and movement artifacts were noted.    ECG:  The heart rate on single channel ECG was predominantly between 60-90 BPM.    _____________________________________________________________  EEG SUMMARY/CLASSIFICATION    Abnormal EEG in a sedated patient.    - Background slowing, generalized.    _____________________________________________________________  EEG IMPRESSION/CLINICAL CORRELATE    Abnormal EEG study.    - Moderate nonspecific diffuse or multifocal cerebral dysfunction.   - No epileptiform patterns or seizures were recorded x 2 days.      Etienne Mcmahon MD  EEG/Epilepsy Attending  [facility]   COMPREHENSIVE EPILEPSY CENTER   REPORT OF CONTINUOUS VIDEO EEG     [facility]: [address], [place] [address], Ph#: [phone]  [facility]: [id number], [place] [address], Ph#: [phone]  [facility]: [address] [place] [address], Ph#: [phone]    Patient Name: PEPITO BALL  Age and : 72y (50)  MRN #: [id number]  Location: [place] [address]  Referring Physician: Jasmin Rossi    Start Time/Date: 08:00 on 22  End Time/Date: 12:00 on 22  Duration: 4 hours 00 mins  _____________________________________________________________  STUDY INFORMATION    EEG Recording Technique:  The patient underwent continuous Video-EEG monitoring, using Telemetry System hardware on the XLTek Digital System. EEG and video data were stored on a computer hard drive with important events saved in digital archive files. The material was reviewed by a physician (electroencephalographer / epileptologist) on a daily basis. Eligio and seizure detection algorithms were utilized and reviewed. An EEG Technician attended to the patient, and was available throughout daytime work hours.  The epilepsy center neurologist was available in person or on call 24-hours per day.    EEG Placement and Labeling of Electrodes:  The EEG was performed utilizing 20 channel referential EEG connections (coronal over temporal over parasagittal montage) using all standard 10-20 electrode placements with EKG, with additional electrodes placed in the inferior temporal region using the modified 10-10 montage electrode placements for elective admissions, or if deemed necessary. Recording was at a sampling rate of 256 samples per second per channel. Time synchronized digital video recording was done simultaneously with EEG recording. A low light infrared camera was used for low light recording.     _____________________________________________________________  HISTORY    Patient is a 72y old  Male who presents with a chief complaint of seizures (2022 06:11)      PERTINENT MEDICATION:  dexMEDEtomidine Infusion 0.3 MICROgram(s)/kG/Hr (5.72 mL/Hr) IV Continuous <Continuous>  haloperidol    Injectable 2 milliGRAM(s) IntraMuscular once  valproate sodium IVPB 500 milliGRAM(s) IV Intermittent every 8 hours    _____________________________________________________________  STUDY INTERPRETATION    Findings: The background was continuous and reactive. No posterior dominant rhythm seen.    Background Slowing:  Diffuse irregular theta and polymorphic delta slowing.    Focal Slowing:   None were present.    Sleep Background:  Drowsiness was characterized by fragmentation, attenuation, and slowing of the background activity.    Stage II sleep transients were recorded.    Other Non-Epileptiform Findings:  None were present.    Interictal Epileptiform Activity:   None were present.    Events:  Clinical events: None recorded.  Seizures: None recorded.    Activation Procedures:   Hyperventilation was not performed.    Photic stimulation was not performed.     Artifacts:  Intermittent myogenic and movement artifacts were noted.    ECG:  The heart rate on single channel ECG was predominantly between 60-90 BPM.    _____________________________________________________________  EEG SUMMARY/CLASSIFICATION    Abnormal EEG    - Background slowing, generalized.    _____________________________________________________________  EEG IMPRESSION/CLINICAL CORRELATE    Abnormal EEG study.    - Moderate nonspecific diffuse or multifocal cerebral dysfunction.   - No epileptiform patterns or seizures were recorded x 2 days.      Etienne Mcmahon MD  EEG/Epilepsy Attending

## 2022-04-14 NOTE — SWALLOW BEDSIDE ASSESSMENT ADULT - COMMENTS
Hx cont: Plan: Q1 hour Neuro checks/Vitals, repeat CTH, MRI w/wo after EEG, Keppra 750 BID with 1g load, propofol drip. Neuro and Nsx consulted. No neurosurgical intervention at this time. Neuro following for seizures. Extubated overnight around 3am, extremely agitated post extubation, given 0.5mg IV ativan and 2mg IV haldol.     IMAGING:  CXR: 4/12/22  IMPRESSION:  Endotracheal tube terminates below the clavicles and approximately 4.6 cm above the beatrice. Enteric tube extends into the abdomen. Tip not included. Part of the tube is again coiled on itself in the stomach. Unchanged left basilar/retrocardiac opacity, which may represent left pleural effusion with associated passive atelectasis, atelectasis of other cause, and/or pneumonia.    CT HEAD: 4/12/22  IMPRESSION:  0.5 cm focus of intraparenchymal hemorrhage in the right cerebellum as described.     Pt is unknown to this service.
Hx cont: Plan: Q1 hour Neuro checks/Vitals, repeat CTH, MRI w/wo after EEG, Keppra 750 BID with 1g load, propofol drip. Neuro and Nsx consulted. No neurosurgical intervention at this time. Neuro following for seizures. Extubated overnight around 3am, extremely agitated post extubation, given 0.5mg IV ativan and 2mg IV haldol.     IMAGING:  CXR: 4/12/22  IMPRESSION:  Endotracheal tube terminates below the clavicles and approximately 4.6 cm above the beatrice. Enteric tube extends into the abdomen. Tip not included. Part of the tube is again coiled on itself in the stomach. Unchanged left basilar/retrocardiac opacity, which may represent left pleural effusion with associated passive atelectasis, atelectasis of other cause, and/or pneumonia.    CT HEAD: 4/12/22  IMPRESSION:  0.5 cm focus of intraparenchymal hemorrhage in the right cerebellum as described.     Pt is unknown to this service.  4/13: extubated; required haldol for agitation; posey vest

## 2022-04-14 NOTE — SWALLOW BEDSIDE ASSESSMENT ADULT - SPECIFY REASON(S)
to assess the swallow mechanism; r/o dysphagia.
to clinically evaluate pt's leidy-pharyngeal swallow mechanism and r/o dysphagia.

## 2022-04-14 NOTE — CHART NOTE - NSCHARTNOTEFT_GEN_A_CORE
Patient signed out to Epilepsy team ([facility] [id number]) at 445 4/14

## 2022-04-14 NOTE — SWALLOW BEDSIDE ASSESSMENT ADULT - SLP GENERAL OBSERVATIONS
Pt positioned upright; b/l wrist restraints for agitation and safety; had just pulled ngt prior to exam; now off haldol and cooperative for exam; VEEG in progress.

## 2022-04-15 DIAGNOSIS — R56.9 UNSPECIFIED CONVULSIONS: ICD-10-CM

## 2022-04-15 DIAGNOSIS — I61.4 NONTRAUMATIC INTRACEREBRAL HEMORRHAGE IN CEREBELLUM: ICD-10-CM

## 2022-04-15 DIAGNOSIS — I10 ESSENTIAL (PRIMARY) HYPERTENSION: ICD-10-CM

## 2022-04-15 DIAGNOSIS — I82.409 ACUTE EMBOLISM AND THROMBOSIS OF UNSPECIFIED DEEP VEINS OF UNSPECIFIED LOWER EXTREMITY: ICD-10-CM

## 2022-04-15 LAB
ALBUMIN SERPL ELPH-MCNC: 4.2 G/DL — SIGNIFICANT CHANGE UP (ref 3.3–5)
ALP SERPL-CCNC: 94 U/L — SIGNIFICANT CHANGE UP (ref 40–120)
ALT FLD-CCNC: 17 U/L — SIGNIFICANT CHANGE UP (ref 10–45)
ANION GAP SERPL CALC-SCNC: 14 MMOL/L — SIGNIFICANT CHANGE UP (ref 5–17)
AST SERPL-CCNC: 41 U/L — HIGH (ref 10–40)
BILIRUB SERPL-MCNC: 0.6 MG/DL — SIGNIFICANT CHANGE UP (ref 0.2–1.2)
BUN SERPL-MCNC: 9 MG/DL — SIGNIFICANT CHANGE UP (ref 7–23)
CALCIUM SERPL-MCNC: 9.5 MG/DL — SIGNIFICANT CHANGE UP (ref 8.4–10.5)
CHLORIDE SERPL-SCNC: 102 MMOL/L — SIGNIFICANT CHANGE UP (ref 96–108)
CO2 SERPL-SCNC: 24 MMOL/L — SIGNIFICANT CHANGE UP (ref 22–31)
CREAT SERPL-MCNC: 1.06 MG/DL — SIGNIFICANT CHANGE UP (ref 0.5–1.3)
EGFR: 75 ML/MIN/1.73M2 — SIGNIFICANT CHANGE UP
GLUCOSE BLDC GLUCOMTR-MCNC: 101 MG/DL — HIGH (ref 70–99)
GLUCOSE BLDC GLUCOMTR-MCNC: 109 MG/DL — HIGH (ref 70–99)
GLUCOSE BLDC GLUCOMTR-MCNC: 141 MG/DL — HIGH (ref 70–99)
GLUCOSE SERPL-MCNC: 103 MG/DL — HIGH (ref 70–99)
HCT VFR BLD CALC: 39.3 % — SIGNIFICANT CHANGE UP (ref 39–50)
HGB BLD-MCNC: 12.9 G/DL — LOW (ref 13–17)
MCHC RBC-ENTMCNC: 29.9 PG — SIGNIFICANT CHANGE UP (ref 27–34)
MCHC RBC-ENTMCNC: 32.8 GM/DL — SIGNIFICANT CHANGE UP (ref 32–36)
MCV RBC AUTO: 91.2 FL — SIGNIFICANT CHANGE UP (ref 80–100)
NRBC # BLD: 0 /100 WBCS — SIGNIFICANT CHANGE UP (ref 0–0)
NT-PROBNP SERPL-SCNC: 740 PG/ML — HIGH (ref 0–300)
PLATELET # BLD AUTO: 239 K/UL — SIGNIFICANT CHANGE UP (ref 150–400)
POTASSIUM SERPL-MCNC: 4.2 MMOL/L — SIGNIFICANT CHANGE UP (ref 3.5–5.3)
POTASSIUM SERPL-SCNC: 4.2 MMOL/L — SIGNIFICANT CHANGE UP (ref 3.5–5.3)
PROT SERPL-MCNC: 9.4 G/DL — HIGH (ref 6–8.3)
RAPID RVP RESULT: SIGNIFICANT CHANGE UP
RBC # BLD: 4.31 M/UL — SIGNIFICANT CHANGE UP (ref 4.2–5.8)
RBC # FLD: 13.5 % — SIGNIFICANT CHANGE UP (ref 10.3–14.5)
SARS-COV-2 RNA SPEC QL NAA+PROBE: SIGNIFICANT CHANGE UP
SODIUM SERPL-SCNC: 140 MMOL/L — SIGNIFICANT CHANGE UP (ref 135–145)
WBC # BLD: 6.99 K/UL — SIGNIFICANT CHANGE UP (ref 3.8–10.5)
WBC # FLD AUTO: 6.99 K/UL — SIGNIFICANT CHANGE UP (ref 3.8–10.5)

## 2022-04-15 PROCEDURE — 99223 1ST HOSP IP/OBS HIGH 75: CPT

## 2022-04-15 PROCEDURE — 99222 1ST HOSP IP/OBS MODERATE 55: CPT

## 2022-04-15 PROCEDURE — 71045 X-RAY EXAM CHEST 1 VIEW: CPT | Mod: 26

## 2022-04-15 PROCEDURE — 93970 EXTREMITY STUDY: CPT | Mod: 26

## 2022-04-15 RX ORDER — VALPROIC ACID (AS SODIUM SALT) 250 MG/5ML
500 SOLUTION, ORAL ORAL ONCE
Refills: 0 | Status: COMPLETED | OUTPATIENT
Start: 2022-04-15 | End: 2022-04-15

## 2022-04-15 RX ORDER — DIVALPROEX SODIUM 500 MG/1
750 TABLET, DELAYED RELEASE ORAL
Refills: 0 | Status: DISCONTINUED | OUTPATIENT
Start: 2022-04-15 | End: 2022-04-18

## 2022-04-15 RX ORDER — AMLODIPINE BESYLATE 2.5 MG/1
10 TABLET ORAL ONCE
Refills: 0 | Status: COMPLETED | OUTPATIENT
Start: 2022-04-15 | End: 2022-04-15

## 2022-04-15 RX ORDER — LEVOTHYROXINE SODIUM 125 MCG
1 TABLET ORAL
Qty: 0 | Refills: 0 | DISCHARGE

## 2022-04-15 RX ORDER — LEVOTHYROXINE SODIUM 125 MCG
44 TABLET ORAL ONCE
Refills: 0 | Status: DISCONTINUED | OUTPATIENT
Start: 2022-04-15 | End: 2022-04-15

## 2022-04-15 RX ORDER — PIPERACILLIN AND TAZOBACTAM 4; .5 G/20ML; G/20ML
3.38 INJECTION, POWDER, LYOPHILIZED, FOR SOLUTION INTRAVENOUS EVERY 8 HOURS
Refills: 0 | Status: DISCONTINUED | OUTPATIENT
Start: 2022-04-15 | End: 2022-04-16

## 2022-04-15 RX ORDER — QUETIAPINE FUMARATE 200 MG/1
25 TABLET, FILM COATED ORAL ONCE
Refills: 0 | Status: COMPLETED | OUTPATIENT
Start: 2022-04-15 | End: 2022-04-15

## 2022-04-15 RX ORDER — HYDRALAZINE HCL 50 MG
10 TABLET ORAL ONCE
Refills: 0 | Status: COMPLETED | OUTPATIENT
Start: 2022-04-15 | End: 2022-04-15

## 2022-04-15 RX ORDER — METOPROLOL TARTRATE 50 MG
1 TABLET ORAL
Qty: 0 | Refills: 0 | DISCHARGE

## 2022-04-15 RX ORDER — HYDRALAZINE HCL 50 MG
5 TABLET ORAL ONCE
Refills: 0 | Status: COMPLETED | OUTPATIENT
Start: 2022-04-15 | End: 2022-04-15

## 2022-04-15 RX ORDER — LISINOPRIL 2.5 MG/1
1 TABLET ORAL
Qty: 0 | Refills: 0 | DISCHARGE

## 2022-04-15 RX ORDER — LISINOPRIL 2.5 MG/1
20 TABLET ORAL DAILY
Refills: 0 | Status: DISCONTINUED | OUTPATIENT
Start: 2022-04-15 | End: 2022-04-26

## 2022-04-15 RX ORDER — PIPERACILLIN AND TAZOBACTAM 4; .5 G/20ML; G/20ML
3.38 INJECTION, POWDER, LYOPHILIZED, FOR SOLUTION INTRAVENOUS ONCE
Refills: 0 | Status: COMPLETED | OUTPATIENT
Start: 2022-04-15 | End: 2022-04-15

## 2022-04-15 RX ORDER — IPRATROPIUM/ALBUTEROL SULFATE 18-103MCG
3 AEROSOL WITH ADAPTER (GRAM) INHALATION EVERY 6 HOURS
Refills: 0 | Status: DISCONTINUED | OUTPATIENT
Start: 2022-04-15 | End: 2022-05-04

## 2022-04-15 RX ORDER — QUETIAPINE FUMARATE 200 MG/1
50 TABLET, FILM COATED ORAL AT BEDTIME
Refills: 0 | Status: DISCONTINUED | OUTPATIENT
Start: 2022-04-15 | End: 2022-04-19

## 2022-04-15 RX ORDER — LEVOTHYROXINE SODIUM 125 MCG
88 TABLET ORAL ONCE
Refills: 0 | Status: COMPLETED | OUTPATIENT
Start: 2022-04-15 | End: 2022-04-15

## 2022-04-15 RX ADMIN — SENNA PLUS 2 TABLET(S): 8.6 TABLET ORAL at 22:07

## 2022-04-15 RX ADMIN — Medication 105 MILLIGRAM(S): at 09:30

## 2022-04-15 RX ADMIN — ENOXAPARIN SODIUM 40 MILLIGRAM(S): 100 INJECTION SUBCUTANEOUS at 18:58

## 2022-04-15 RX ADMIN — Medication 88 MICROGRAM(S): at 11:16

## 2022-04-15 RX ADMIN — Medication 10 MILLIGRAM(S): at 10:30

## 2022-04-15 RX ADMIN — Medication 3 MILLILITER(S): at 18:58

## 2022-04-15 RX ADMIN — Medication 44 MICROGRAM(S): at 08:44

## 2022-04-15 RX ADMIN — QUETIAPINE FUMARATE 25 MILLIGRAM(S): 200 TABLET, FILM COATED ORAL at 22:06

## 2022-04-15 RX ADMIN — DIVALPROEX SODIUM 750 MILLIGRAM(S): 500 TABLET, DELAYED RELEASE ORAL at 18:57

## 2022-04-15 RX ADMIN — Medication 55 MILLIGRAM(S): at 05:38

## 2022-04-15 RX ADMIN — LISINOPRIL 20 MILLIGRAM(S): 2.5 TABLET ORAL at 14:25

## 2022-04-15 RX ADMIN — Medication 5 MILLIGRAM(S): at 05:39

## 2022-04-15 RX ADMIN — Medication 105 MILLIGRAM(S): at 16:48

## 2022-04-15 RX ADMIN — PIPERACILLIN AND TAZOBACTAM 200 GRAM(S): 4; .5 INJECTION, POWDER, LYOPHILIZED, FOR SOLUTION INTRAVENOUS at 13:02

## 2022-04-15 RX ADMIN — Medication 105 MILLIGRAM(S): at 00:40

## 2022-04-15 RX ADMIN — Medication 3 MILLILITER(S): at 23:47

## 2022-04-15 RX ADMIN — PIPERACILLIN AND TAZOBACTAM 25 GRAM(S): 4; .5 INJECTION, POWDER, LYOPHILIZED, FOR SOLUTION INTRAVENOUS at 16:49

## 2022-04-15 RX ADMIN — Medication 105 MILLIGRAM(S): at 23:46

## 2022-04-15 RX ADMIN — AMLODIPINE BESYLATE 10 MILLIGRAM(S): 2.5 TABLET ORAL at 00:31

## 2022-04-15 RX ADMIN — QUETIAPINE FUMARATE 25 MILLIGRAM(S): 200 TABLET, FILM COATED ORAL at 20:21

## 2022-04-15 RX ADMIN — Medication 10 MILLIGRAM(S): at 08:00

## 2022-04-15 NOTE — EEG REPORT - NS EEG TEXT BOX
Interpretation:    Day 1 – 	Start: 4/14/2022  20:50:59  	End: 4/15/2022  08:00  	Duration: xx hr  mm min    Daily EEG Visual Analysis    FINDINGS:  The background was continuous, spontaneously variable and reactive. During wakefulness, the posterior dominant rhythm consisted of symmetric, well-modulated 9 Hz activity, with amplitude to 30 uV, that attenuated to eye opening.  Low amplitude frontal beta was noted in wakefulness.    Background Slowing:  Theta and delta slowing.    Focal Slowing:   None were present.    Sleep Background:  Drowsiness was characterized by fragmentation, attenuation, and slowing of the background activity.    Sleep was characterized by the presence of vertex waves, symmetric sleep spindles and K-complexes.    Other Non-Epileptiform Findings:  None were present.    Activation Procedures:   Hyperventilation was not performed.    Photic stimulation was not performed.    Interictal Epileptiform Activity:   None were present.    Events:  No events or seizures recorded.    Artifacts:  Intermittent myogenic and movement artifacts were noted.    ECG:  The heart rate on single channel ECG was predominantly between 60-70 BPM.    AEDs:   q8,    EEG Summary:    Abnormal EEG in the awake, drowsy and asleep states.  Mild generalized slowing    Impression/Clinical Correlate:    This is an abnormal EEG record. There is evidence of mild multifocal/diffuse nonspecific cerebral dysfunction. No epileptiform pattern or seizures were seen.    Preliminary Fellow Report, final report pending attending review    Jaxon Andrade MD  Epilepsy Fellow    Reading Room: [phone]  On Call Service After Hours: [phone]    Interpretation:    Day 1 – 	Start: 4/14/2022  20:50:59  	End: 4/15/2022  08:00  	Duration: 11hr  10min    Daily EEG Visual Analysis    FINDINGS:  The background was continuous, spontaneously variable and reactive. During wakefulness, the posterior dominant rhythm consisted of symmetric, well-modulated 9 Hz activity, with amplitude to 30 uV, that attenuated to eye opening.  Low amplitude frontal beta was noted in wakefulness.    Background Slowing:  Theta and delta slowing.    Focal Slowing:   None were present.    Sleep Background:  Drowsiness was characterized by fragmentation, attenuation, and slowing of the background activity.    Sleep was characterized by the presence of vertex waves, symmetric sleep spindles and K-complexes.    Other Non-Epileptiform Findings:  None were present.    Activation Procedures:   Hyperventilation was not performed.    Photic stimulation was not performed.    Interictal Epileptiform Activity:   None were present.    Events:  No events or seizures recorded.    Artifacts:  Intermittent myogenic and movement artifacts were noted.    ECG:  The heart rate on single channel ECG was predominantly between 60-70 BPM.    AEDs:   q8    EEG Summary:    Abnormal EEG in the awake, drowsy and asleep states.  Mild to moderate generalized slowing    Impression/Clinical Correlate:    This is an abnormal EEG record. There is evidence of mild to moderate multifocal/diffuse nonspecific cerebral dysfunction. No epileptiform pattern or seizures were seen.    Preliminary Fellow Report, final report pending attending review    Jaxon Andrade MD  Epilepsy Fellow    Reading Room: [phone]  On Call Service After Hours: [phone]    Interpretation:    Day 1 – 	Start: 4/14/2022  20:50:59  	End: 4/15/2022  08:00  	Duration: 11hr  10min    Daily EEG Visual Analysis    FINDINGS:  The background was continuous, spontaneously variable and reactive. During wakefulness, the posterior dominant rhythm consisted of symmetric, well-modulated 9 Hz activity, with amplitude to 30 uV, that attenuated to eye opening.  Low amplitude frontal beta was noted in wakefulness.    Background Slowing:  Theta and delta slowing.    Focal Slowing:   None were present.    Sleep Background:  Drowsiness was characterized by fragmentation, attenuation, and slowing of the background activity.    Sleep was characterized by the presence of vertex waves, symmetric sleep spindles and K-complexes.    Other Non-Epileptiform Findings:  None were present.    Activation Procedures:   Hyperventilation was not performed.    Photic stimulation was not performed.    Interictal Epileptiform Activity:   None were present.    Events:  No events or seizures recorded.    Artifacts:  Intermittent myogenic and movement artifacts were noted.    ECG:  The heart rate on single channel ECG was predominantly between 60-70 BPM.    AEDs:   q8    EEG Summary:    Abnormal EEG in the awake, drowsy and asleep states.  Mild to moderate generalized slowing    Impression/Clinical Correlate:    This is an abnormal EEG record. There is evidence of mild to moderate multifocal/diffuse nonspecific cerebral dysfunction. No epileptiform pattern or seizures were seen.      Jaxon Andrade MD  Epilepsy Fellow    Reading Room: [phone]  On Call Service After Hours: [phone]    Interpretation:    Day 1 – 	Start: 4/14/2022  20:50:59  	End: 4/15/2022  08:39  	Duration: 11hr  49min    Daily EEG Visual Analysis    FINDINGS:  The background was continuous, spontaneously variable and reactive. During wakefulness, the posterior dominant rhythm consisted of symmetric, well-modulated 9 Hz activity, with amplitude to 30 uV, that attenuated to eye opening.  Low amplitude frontal beta was noted in wakefulness.    Background Slowing:  Theta and delta slowing.    Focal Slowing:   None were present.    Sleep Background:  Drowsiness was characterized by fragmentation, attenuation, and slowing of the background activity.    Sleep was characterized by the presence of vertex waves, symmetric sleep spindles and K-complexes.    Other Non-Epileptiform Findings:  None were present.    Activation Procedures:   Hyperventilation was not performed.    Photic stimulation was not performed.    Interictal Epileptiform Activity:   None were present.    Events:  No events or seizures recorded.    Artifacts:  Intermittent myogenic and movement artifacts were noted.    ECG:  The heart rate on single channel ECG was predominantly between 60-70 BPM.    AEDs:   q8    EEG Summary:    Abnormal EEG in the awake, drowsy and asleep states.  Mild to moderate generalized slowing    Impression/Clinical Correlate:    This is an abnormal EEG record. There is evidence of mild to moderate multifocal/diffuse nonspecific cerebral dysfunction. No epileptiform pattern or seizures were seen.      Jaxon Andrade MD  Epilepsy Fellow    Reading Room: [phone]  On Call Service After Hours: [phone]

## 2022-04-15 NOTE — CONSULT NOTE ADULT - ATTENDING COMMENTS
Keep on lovenox 40mg daily for now  CTA to rule out PE  Repeat duplex Monday    IF there is PE OR if there is propagation of DVT, then will need to consider IVC filter      Dilma [phone]
HPI as per resident note, personally verified by me. Patient was moving all extremities and agitated when sedation stopped, so it had to be restarted. No additional clinical seizures noted.    GTT's:  Propofol 50 mcg/kg/min    Neurological exam:  MS - Intubated, sedated, stuporous, no speech output, does not follow commands. Due to clinical condition unable to assess (ARMANDO) orientation, rep/naming, attn/conc, recent and remote memory, fund of knowledge  CN - PERRL, EOMI by OCR with dysconjugate gaze, (+) face sens/str by corneals b/l, (+) spontaneous respirations (CPAP, 16 bpm), (+) cough. ARMANDO VF, hearing, tongue/palate, trap/SCM  Motor - Normal bulk and dec tone all. Intermittent decorticate posturing when coughing. No other spontaneous movement noted. LUE 3/5, RUE 2/5, BLE's 2/5 as he can localize and withdraw to noxious stimuli in each extremity  Sens - As above  DTR's - 2+ BUE's, 3+ KJ b/l, 2+ AJ b/l, and neutral b/l plantar response  Coord - ARMANDO  Gait and station - ARMANDO    A/P:  Seizure  R cerebellar intracerebral hemorrhage  HTN  Hypothyroidism  Diverticulosis    - Unclear etiology for intracerebral hemorrhage and seizure would not be consistent with subcortical event. Etiology remains broad and includes HTN, other cerebrovascular event, inflammatory, infectious, or neoplastic. No current seizures on exam but need to exclude non-convulsive events  - vEEG to evaluate for seizure activity  - MRI brain w/ and w/o to evaluate for seizure focus and additional etiology  - Continue AED's with Keppra 750mg IV/PO [id number]; will adjust as needed  - Continue to address above medical issues, as you are doing  - Will continue to follow patient with you

## 2022-04-15 NOTE — PROGRESS NOTE ADULT - SUBJECTIVE AND OBJECTIVE BOX
PEPITO BALL  Male  MRN-[id number]    Subjective: [place] [facility] [place] Was agitated this morning. Had elevated sytolic BP at 175. Given Hydralzine x1. Placed on bedtime Seroquel. Taken off EEG and placed in private room.        VITAL SIGNS:  T(F): 98.3  HR: 91  BP: 161/66  RR: 18  SpO2: 94%    Gen: Mild agitated   MS: Oriented x0.Fluent. Follows some simple commands.   CN: EOMI. Face symmetric. T/u midline.    Motor: Normal tone. Spontaneous and voluntary movement seen throughout.    Sensory: Intact to LT throughout   Reflexes: 2+ throughout. Babinski absent bilaterally.   Coordination: No dysmetria on FNF or ataxia on HTS bilaterally   Gait: Normal     LABS:                          12.9   6.99  )-----------( 239      ( 15 Apr 2022 09:22 )             39.3     04-15    140  |  102  |  9   ----------------------------<  103<H>  4.2   |  24  |  1.06    Ca    9.5      15 Apr 2022 09:22  Phos  3.5     04-14  Mg     2.1     04-14    TPro  9.4<H>  /  Alb  4.2  /  TBili  0.6  /  DBili  x   /  AST  41<H>  /  ALT  17  /  AlkPhos  94  04-15        RADIOLOGY & ADDITIONAL STUDIES:             PEPITO BALL  Male  [id number]    Subjective: Transferred from [facility] overnight. Was agitated this morning. Had elevated sytolic BP at 175. Given Hydralzine x1. Placed on bedtime Seroquel. Taken off EEG and placed in private room.        VITAL SIGNS:  T(F): 98.3  HR: 91  BP: 161/66  RR: 18  SpO2: 94%    Gen: Mild agitated   MS: Oriented x0. Intermittently fluent. Follows some simple commands.   CN: EOMI. Face symmetric. T/u midline.    Motor: Normal tone. Spontaneous and voluntary movement seen throughout.    Sensory: Intact to LT throughout   Coordination: unable to assess   Gait: non-ataxic gait    LABS:                          12.9   6.99  )-----------( 239      ( 15 Apr 2022 09:22 )             39.3     04-15    140  |  102  |  9   ----------------------------<  103<H>  4.2   |  24  |  1.06    Ca    9.5      15 Apr 2022 09:22  Phos  3.5     04-14  Mg     2.1     04-14    TPro  9.4<H>  /  Alb  4.2  /  TBili  0.6  /  DBili  x   /  AST  41<H>  /  ALT  17  /  AlkPhos  94  04-15        RADIOLOGY & ADDITIONAL STUDIES:    4/15 vEEG: EEG Summary:  Abnormal EEG in the awake, drowsy and asleep states.  Mild to moderate generalized slowing  Impression/Clinical Correlate:  This is an abnormal EEG record. There is evidence of mild to moderate multifocal/diffuse nonspecific cerebral dysfunction. No epileptiform pattern or seizures were seen.    CT head w/o contrast: IMPRESSION:0.5 cm focus of intraparenchymal hemorrhage inthe right cerebellum as   described.    CTA H/N:   IMPRESSION:  CT PERFUSION: The areas of elevated Tmax do not follow a vascular   distribution and are likely artifactual, related to patient motion.  If symptoms persist consider follow up head CT or MRI, MRA  if no   contraindication.    CTA COW:  Patent intracranial circulation without flow limiting stenosis    CTA NECK: Patent, ECAs, ICAs, no  hemodynamically significant stenosis at    ICA origins by NASCET criteria.  Bilateral vertebral arteries are patent without flow limiting stenosis.

## 2022-04-15 NOTE — CONSULT NOTE ADULT - SUBJECTIVE AND OBJECTIVE BOX
CHIEF COMPLAINT:  Seizure    HISTORY OF PRESENT ILLNESS:  72M PMH htn, hypothyroidism, presented to [place] ED via EMS after family called 911 following a witnessed seizure, by family, reportedly lasting ~5min.  Enroute to [place], it was reported patient had another 2 witnessed seizures by EMS, each lasting ~1 minute. Following each seizure the patient was given 5mg IV versed (received 10mg collectively and brought to [place] ED). Pt arrived to the ED obtunded with blood from his mouth (tongue laceration), responsive only to pain/sternal rub. Patient was subsequently intubated for GCS 6 and CT/CTA performed showing a small cerebellar hemorrhage ON NO A/C/ THINNERS. He was transferred to [facility] for NSGY eval on Cardene and Propofol gtt.      Upon arrival, wife at bedside and confirms story, LKN 10PM,  sleeping 12am, 2am wife found him frothing at mouth, called EMS, patient subsequently awoke and refused transfer to hospital.  About 1 hour later, seized again, witnessed sounds like GTC.  EMS called again and transferred to hospital with another 2 witnessed seizures.  Hypertensive on arrival to [facility].     Cardiology consulted for hypertension.  Pt states that he does not see a cardiologist.  Denies chest pain, SOB, palpitations.     PAST MEDICAL & SURGICAL HISTORY:  HTN (hypertension)    Diverticulosis    Hypothyroid    No significant past surgical history    MEDICATIONS:  amLODIPine   Tablet 10 milliGRAM(s) Oral daily  enoxaparin Injectable 40 milliGRAM(s) SubCutaneous <User Schedule>  hydrALAZINE 10 milliGRAM(s) Oral every 4 hours PRN  hydrALAZINE Injectable 10 milliGRAM(s) IV Push once    diVALproex  milliGRAM(s) Oral every 8 hours  LORazepam   Injectable 1 milliGRAM(s) IV Push once PRN    polyethylene glycol 3350 17 Gram(s) Oral two times a day  senna 2 Tablet(s) Oral at bedtime    levothyroxine 88 MICROGram(s) Oral daily  levothyroxine 88 MICROGram(s) Oral once    multiple electrolytes Injection Type 1 1000 milliLiter(s) IV Continuous <Continuous>  thiamine IVPB 500 milliGRAM(s) IV Intermittent every 8 hours    FAMILY HISTORY:    SOCIAL HISTORY:    [ ] Non-smoker  [ ] Smoker  [ ] Alcohol    Allergies    No Known Allergies    Intolerances    REVIEW OF SYSTEMS:  CONSTITUTIONAL: No fever, weight loss, + fatigue  EYES: No eye pain, visual disturbances, or discharge  ENMT:  No difficulty hearing, tinnitus, vertigo; No sinus or throat pain  NECK: No pain or stiffness  RESPIRATORY: No cough, wheezing, chills or hemoptysis; No Shortness of Breath  CARDIOVASCULAR: No chest pain, palpitations, passing out, dizziness, or leg swelling  GASTROINTESTINAL: No abdominal or epigastric pain. No nausea, vomiting, or hematemesis; No diarrhea or constipation. No melena or hematochezia.  GENITOURINARY: No dysuria, frequency, hematuria, or incontinence  NEUROLOGICAL: No headaches, memory loss, loss of strength, numbness, or tremors  SKIN: No itching, burning, rashes, or lesions   LYMPH Nodes: No enlarged glands  ENDOCRINE: No heat or cold intolerance; No hair loss  MUSCULOSKELETAL: No joint pain or swelling; No muscle, back, or extremity pain  PSYCHIATRIC: No depression, anxiety, mood swings, or difficulty sleeping  HEME/LYMPH: No easy bruising, or bleeding gums  ALLERY AND IMMUNOLOGIC: No hives or eczema	    [ ] All others negative	  [ ] Unable to obtain    PHYSICAL EXAM:  T(C): 37.6 (04-15-22 @ 05:46), Max: 37.6 (04-15-22 @ 05:46)  HR: 87 (04-15-22 @ 08:00) (57 - 87)  BP: 172/88 (04-15-22 @ 08:00) (136/63 - 175/85)  RR: 18 (04-15-22 @ 08:00) (16 - 19)  SpO2: 95% (04-15-22 @ 08:00) (92% - 100%)  Wt(kg): --  I&O's Summary    14 Apr 2022 07:01  -  15 Apr 2022 07:00  --------------------------------------------------------  IN: 890 mL / OUT: 1152 mL / NET: -262 mL    Appearance: NAD  HEENT: dry oral mucosa, PERRL, EOMI	  Lymphatic: No lymphadenopathy  Cardiovascular: Normal S1 S2, No JVD, No murmurs, No edema  Respiratory: Lungs clear to auscultation	  Psychiatry: A & O x 3, Mood & affect appropriate  Gastrointestinal:  Soft, Non-tender, + BS	  Skin: No rashes, No ecchymoses, No cyanosis	  Neurologic: Non-focal  Extremities: Normal range of motion, No clubbing, cyanosis or edema  Vascular: Peripheral pulses palpable 2+ bilaterally    TELEMETRY: 	    ECG:   < from: Transthoracic Echocardiogram (04.12.22 @ 15:11) >    Patient name: PEPITO BALL  YOB: 1950   Age: 72 (M)   MR#: [id number]  Study Date: 4/12/2022  [facility]  Study quality: Technically fair  Referring Physician: Jasmin Rossi MD  Blood Pressure: 158/85 mmHg  Height: 180 cm  Weight: 76 kg  BSA: 2 m2  Heart Rhythm: Normal sinus  Heart Rate: 48 mmHg  ------------------------------------------------------------------------  PROCEDURE: Transthoracic echocardiogram with 2-D, M-Mode  and complete spectral and color flow Doppler.  INDICATION: Other cerebrovascular disease (I67.89)  ------------------------------------------------------------------------  Dimensions:    Normal Values:  LA:     2.5    2.0 - 4.0 cm  Ao:     3.0    2.0 - 3.8cm  SEPTUM: 0.9    0.6 - 1.2 cm  PWT:    0.9    0.6 - 1.1 cm  LVIDd:  3.9    3.0 - 5.6 cm  LVIDs:  2.3    1.8 - 4.0 cm  Derived variables:  LVMI: 54 g/m2  RWT: 0.46  Fractional short: 41 %  EF (Visual Estimate): 75 %  Doppler Peak Velocity (m/sec): AoV=1.0 TV=2.0  ------------------------------------------------------------------------  Observations:  Mitral Valve: Normal mitral valve.  Aortic Valve/Aorta: Calcified aortic valve with normal  opening.  Normal aortic root size.  Left Atrium: Normalleft atrium.  Left Ventricle: Normal left ventricular internal dimensions  and wall thicknesses.  Normal left ventricular systolic function. No segmental  wall motion abnormalities.  Normal diastolic function.  Right Heart: Normal right atrium. Normal right ventricular  size and function.  Normal tricuspid valve. Mild tricuspid regurgitation.  Normal pulmonic valve.  Pericardium/Pleura: Small pericardial effusion.  Left pleural effusion.  Hemodynamic: No evidence of pulmonary hypertension.  ------------------------------------------------------------------------  Conclusions:  Normal left ventricular systolic function. No segmental  wall motion abnormalities.  ------------------------------------------------------------------------  Confirmedon  4/12/2022 - 18:55:58 by Neeraj Cordero MD, FASE    < end of copied text >  	  RADIOLOGY:  < from: CT Head No Cont (04.12.22 @ 04:43) >  ACC: [id number] EXAM:  CT BRAIN                          PROCEDURE DATE:  04/12/2022      INTERPRETATION:  CLINICAL INDICATION:  ams    TECHNIQUE: Noncontrast CT examination of the head was performed using   contiguous 5 mm CT images.    COMPARISON: There are no prior studies available for comparison.    FINDINGS:    There is a 0.5 cm focus of hyperdensity in the right cerebellum which is   new from the prior CT of the neck dated 4/1/2017 likely representing a   small focus of intraparenchymal hemorrhage. There Ventricles and sulci   are normal in size and configuration for the patient's stated age. No   midline shift or other significant mass effect is noted. There is no CT   evidence of acute territorial infarct.    The tympanomastoid spaces are clear. Mild polypoid mucosal thickening of   the maxillary and ethmoid sinuses. Orbits and orbital contents are   unremarkable.    There is no depressed calvarial fracture.    IMPRESSION:    0.5 cm focus of intraparenchymal hemorrhage inthe right cerebellum as   described.    The findings were discussed with DR. LANTIGUA  on 4/12/2022 4:54 AM.  Hospital policies for call back including read back policies were   followed. The verbal communication call back supplements this written   report.    --- End of Report ---  < from: CT Angio Head w/ IV Cont (04.12.22 @ 04:48) >  ACC: [id number] EXAM:  CT ANGIO BRAIN (W)AW IC                        ACC: [id number] EXAM:  CT PERFUSION W MAPS IC                        ACC: [id number] EXAM:  CT ANGIO NECK (W)AW IC                          PROCEDURE DATE:  04/12/2022      INTERPRETATION:  CLINICAL INDICATION: Altered mental status.    TECHNIQUE:    CTA [place] OF [place]:    After the intravenous power injection of non-ionic contrast material,   serial thin sections were obtained through the intracranial circulation   on a multislice CT scanner.  Images were reformatted using a dedicated 3D   software package and viewed on a dedicated workstation in multiple   planes. 90 cc Omnipaque 350 administered and 10 cc discarded.    CTA NECK:    After the intravenous power injectionof non-ionic contrast material,   serial thin sections were obtained through the cervical circulation on a   multislice CT scanner.  Images were reformatted using a dedicated 3D   software package and viewed on a dedicated workstation in multiple planes.    COMPARISON EXAMINATION: None.    FINDINGS:    CT RAPID PERFUSION:    INFARCT CORE: 0 mL    TISSUE AT RISK: 88 mL, Tmax >6s.    MISMATCH RATIO: Infinite    The areas of elevated Tmax do not follow a vascular distribution and are   likely artifactual, related to patient motion.      CTA [place] OF [place]:    ANTERIOR CIRCULATION    ICA  CAVERNOUS, SUPRACLINOID, BIFURCATION SEGMENTS: Patent without flow   limiting stenosis.    ANTERIOR CEREBRAL ARTERIES: Bilateral A1, anterior communicating and A2   anterior cerebral arteries are unremarkable in course and caliber without   flow limiting stenosis.    MIDDLE CEREBRAL ARTERIES: Patent bilateral M1, M2, and distal MCA   branches without flow limiting stenosis.    POSTERIOR CIRCULATION:    VERTEBRAL ARTERIES: Patent without flow limiting stenosis    BASILAR ARTERY: Patent no flow limiting stenosis.    POSTERIOR CEREBRAL ARTERIES: Patent without flow limiting stenosis.    CTA NECK:    GREAT VESSELS: Visualized segments are patent, no flowlimiting stenosis.    COMMON CAROTID ARTERIES:  RIGHT: Patent without flow limiting stenosis  LEFT: Patent without flow limiting stenosis    CAROTID BULBS:  RIGHT: Patent.  LEFT: Patent.    INTERNAL CAROTID ARTERIES:  RIGHT: Patent no evidence for any hemodynamically significant stenosis at   the ICA origin by NASCET criteria.  LEFT: Patent no evidence for any hemodynamically significant stenosis at   the ICA origin by NASCET criteria.    VERTEBRAL ARTERIES:    RIGHT: Patent no evidence for any flow limiting stenosis.  LEFT: Patent no evidence for any flow limiting stenosis.      SOFT TISSUES: There is diffuse thyromegaly again noted. An endotracheal   tube is visualized with its distal tip above the level of the beatrice. The   heart is enlarged    BONES: Unremarkable      IMPRESSION:    CT PERFUSION: The areas of elevated Tmax do not follow a vascular   distribution and are likely artifactual, related to patient motion.    If symptoms persist consider follow up head CT or MRI, MRA  if no   contraindication.    CTA COW:  Patent intracranial circulation without flow limiting stenosis    CTA NECK: Patent, ECAs, ICAs, no  hemodynamically significant stenosis at    ICA origins by NASCET criteria.    Bilateral vertebral arteries are patent without flow limiting stenosis.    The findings were discussed with DR. LANTIGUA  on 4/12/2022 4:54 AM.  Hospital policies for call back including read back policies were   followed. The verbal communication call back supplements this written   report.    --- End of Report ---    < end of copied text >  < from: CT Angio Neck w/ IV Cont (04.12.22 @ 04:48) >  ACC: [id number] EXAM:  CT ANGIO BRAIN (W)AW IC                        ACC: [id number] EXAM:  CT PERFUSION W MAPS IC                        ACC: [id number] EXAM:  CT ANGIO NECK (W)AW IC                          PROCEDURE DATE:  04/12/2022      INTERPRETATION:  CLINICAL INDICATION: Altered mental status.    TECHNIQUE:    CTA [place] OF [place]:    After the intravenous power injection of non-ionic contrast material,   serial thin sections were obtained through the intracranial circulation   on a multislice CT scanner.  Images were reformatted using a dedicated 3D   software package and viewed on a dedicated workstation in multiple   planes. 90 cc Omnipaque 350 administered and 10 cc discarded.    CTA NECK:    After the intravenous power injectionof non-ionic contrast material,   serial thin sections were obtained through the cervical circulation on a   multislice CT scanner.  Images were reformatted using a dedicated 3D   software package and viewed on a dedicated workstation in multiple planes.    COMPARISON EXAMINATION: None.    FINDINGS:    CT RAPID PERFUSION:    INFARCT CORE: 0 mL    TISSUE AT RISK: 88 mL, Tmax >6s.    MISMATCH RATIO: Infinite    The areas of elevated Tmax do not follow a vascular distribution and are   likely artifactual, related to patient motion.    CTA [place] OF [place]:    ANTERIOR CIRCULATION    ICA  CAVERNOUS, SUPRACLINOID, BIFURCATION SEGMENTS: Patent without flow   limiting stenosis.    ANTERIOR CEREBRAL ARTERIES: Bilateral A1, anterior communicating and A2   anterior cerebral arteries are unremarkable in course and caliber without   flow limiting stenosis.    MIDDLE CEREBRAL ARTERIES: Patent bilateral M1, M2, and distal MCA   branches without flow limiting stenosis.    POSTERIOR CIRCULATION:    VERTEBRAL ARTERIES: Patent without flow limiting stenosis    BASILAR ARTERY: Patent no flow limiting stenosis.    POSTERIOR CEREBRAL ARTERIES: Patent without flow limiting stenosis.    CTA NECK:    GREAT VESSELS: Visualized segments are patent, no flowlimiting stenosis.    COMMON CAROTID ARTERIES:  RIGHT: Patent without flow limiting stenosis  LEFT: Patent without flow limiting stenosis    CAROTID BULBS:  RIGHT: Patent.  LEFT: Patent.    INTERNAL CAROTID ARTERIES:  RIGHT: Patent no evidence for any hemodynamically significant stenosis at   the ICA origin by NASCET criteria.  LEFT: Patent no evidence for any hemodynamically significant stenosis at   the ICA origin by NASCET criteria.    VERTEBRAL ARTERIES:    RIGHT: Patent no evidence for any flow limiting stenosis.  LEFT: Patent no evidence for any flow limiting stenosis.      SOFT TISSUES: There is diffuse thyromegaly again noted. An endotracheal   tube is visualized with its distal tip above the level of the beatrice. The   heart is enlarged    BONES: Unremarkable      IMPRESSION:    CT PERFUSION: The areas of elevated Tmax do not follow a vascular   distribution and are likely artifactual, related to patient motion.    If symptoms persist consider follow up head CT or MRI, MRA  if no   contraindication.    CTA COW:  Patent intracranial circulation without flow limiting stenosis    CTA NECK: Patent, ECAs, ICAs, no  hemodynamically significant stenosis at    ICA origins by NASCET criteria.    Bilateral vertebral arteries are patent without flow limiting stenosis.    The findings were discussed with DR. LANTIGUA  on 4/12/2022 4:54 AM.  Hospital policies for call back including read back policies were   followed. The verbal communication call back supplements this written   report.    --- End of Report ---      < end of copied text >    OTHER: 	  	  LABS:	 	    CARDIAC MARKERS: Troponin T, High Sensitivity Result: 33: Specimen not hemolyzed                       12.9   6.99  )-----------( 239      ( 15 Apr 2022 09:22 )             39.3     04-15    140  |  102  |  9   ----------------------------<  103<H>  4.2   |  24  |  1.06    Ca    9.5      15 Apr 2022 09:22  Phos  3.5     04-14  Mg     2.1     04-14    TPro  9.4<H>  /  Alb  4.2  /  TBili  0.6  /  DBili  x   /  AST  41<H>  /  ALT  17  /  AlkPhos  94  04-15    proBNP: Serum Pro-Brain Natriuretic Peptide: 740 pg/mL (04-15 @ 09:22)  Lipid Profile: Lipid Profile in AM (04.13.22 @ 06:20)   Cholesterol, Serum: 128 mg/dL   Triglycerides, Serum: 116 mg/dL   HDL Cholesterol, Serum: 67 mg/dL   Non HDL Cholesterol: 61 LDL Cholesterol Calculated: 38 mg/dL   HgA1c: A1C with Estimated Average Glucose Result: 5.9: Method: Immunoassay   TSH: Thyroid Stimulating Hormone, Serum: 14.40 uIU/mL (04.12.22 @ 09:27)

## 2022-04-15 NOTE — CONSULT NOTE ADULT - SUBJECTIVE AND OBJECTIVE BOX
PULMONARY CONSULT    HPI: 73 y/o M with PMH HTN, hypothyroidism. Presented to [place] ED via EMS after family called 911 following a witnessed seizure, reportedly lasting ~5min.  Enroute to [place], it was reported patient had another 2 witnessed seizures by EMS, each lasting ~1 minute. Pt arrived to the ED obtunded with blood from his mouth (tongue laceration), responsive only to pain/sternal rub. Patient was subsequently intubated for GCS 6 and CT/CTA performed showing a small cerebellar hemorrhage. He was transferred to [facility] for NSGY eval on Cardene and Propofol gtt. Extubated 4/13. Called to consult for increased chest congestion. CXR with RLL opacity. Endorses congested cough, nasal congestion. Denies fevers, SOB, CP.        PAST MEDICAL & SURGICAL HISTORY:  HTN (hypertension)  Diverticulosis  Hypothyroid  No significant past surgical history      Allergies  No Known Allergies      FAMILY HISTORY: non contributory     Social history: former smoker - quit about 5 years ago     Review of Systems: limited, pt confused   CONSTITUTIONAL: No fever, chills, or fatigue  EYES: No eye pain, visual disturbances, or discharge  ENMT:  Per above   NECK: No pain or stiffness  RESPIRATORY: Per above  CARDIOVASCULAR: No chest pain, palpitations, dizziness, or leg swelling  GASTROINTESTINAL: No abdominal or epigastric pain. No nausea, vomiting, or hematemesis; No diarrhea or constipation. No melena or hematochezia.  GENITOURINARY: No dysuria, frequency, hematuria, or incontinence  NEUROLOGICAL: Per above   SKIN: No itching, burning, rashes, or lesions   MUSCULOSKELETAL: No joint pain or swelling; No muscle, back, or extremity pain  PSYCHIATRIC: No depression, anxiety, mood swings, or difficulty sleeping      Medications:  MEDICATIONS  (STANDING):  albuterol/ipratropium for Nebulization 3 milliLiter(s) Nebulizer every 6 hours  amLODIPine   Tablet 10 milliGRAM(s) Oral daily  diVALproex  milliGRAM(s) Oral two times a day  enoxaparin Injectable 40 milliGRAM(s) SubCutaneous <User Schedule>  levothyroxine 88 MICROGram(s) Oral daily  lisinopril 20 milliGRAM(s) Oral daily  multiple electrolytes Injection Type 1 1000 milliLiter(s) (50 mL/Hr) IV Continuous <Continuous>  piperacillin/tazobactam IVPB. 3.375 Gram(s) IV Intermittent once  piperacillin/tazobactam IVPB.. 3.375 Gram(s) IV Intermittent every 8 hours  polyethylene glycol 3350 17 Gram(s) Oral two times a day  QUEtiapine 50 milliGRAM(s) Oral at bedtime  senna 2 Tablet(s) Oral at bedtime  thiamine IVPB 500 milliGRAM(s) IV Intermittent every 8 hours    MEDICATIONS  (PRN):  hydrALAZINE 10 milliGRAM(s) Oral every 4 hours PRN Systolic blood pressure < 160  LORazepam   Injectable 1 milliGRAM(s) IV Push once PRN seizure            Vital Signs Last 24 Hrs  T(C): 36.8 (15 Apr 2022 10:34), Max: 37.6 (15 Apr 2022 05:46)  T(F): 98.3 (15 Apr 2022 10:34), Max: 99.7 (15 Apr 2022 05:46)  HR: 91 (15 Apr 2022 10:34) (57 - 91)  BP: 161/66 (15 Apr 2022 10:34) (147/59 - 175/85)  BP(mean): 82 (14 Apr 2022 17:30) (80 - 82)  RR: 18 (15 Apr 2022 10:34) (16 - 19)  SpO2: 94% (15 Apr 2022 10:34) (94% - 100%)    ABG - ( 13 Apr 2022 23:54 )  pH, Arterial: 7.42  pH, Blood: x     /  pCO2: 40    /  pO2: 82    / HCO3: 26    / Base Excess: 1.3   /  SaO2: 96.8                    04-14 @ 07:01  -  04-15 @ 07:00  --------------------------------------------------------  IN: 890 mL / OUT: 1152 mL / NET: -262 mL          LABS:                        12.9   6.99  )-----------( 239      ( 15 Apr 2022 09:22 )             39.3     04-15    140  |  102  |  9   ----------------------------<  103<H>  4.2   |  24  |  1.06    Ca    9.5      15 Apr 2022 09:22  Phos  3.5     04-14  Mg     2.1     04-14    TPro  9.4<H>  /  Alb  4.2  /  TBili  0.6  /  DBili  x   /  AST  41<H>  /  ALT  17  /  AlkPhos  94  04-15          CAPILLARY BLOOD GLUCOSE      POCT Blood Glucose.: 109 mg/dL (15 Apr 2022 11:32)          Serum Pro-Brain Natriuretic Peptide: 740 pg/mL (04-15-22 @ 09:22)              Physical Examination:    General: No acute distress.      HEENT: Pupils equal, reactive to light.  Symmetric.    PULM: scattered rhonchi     CVS: S1, S2    ABD: Soft, nondistended, nontender, normoactive bowel sounds, no masses    EXT: No edema, nontender    SKIN: Warm and well perfused, no rashes noted.    NEURO: A&O x2      RADIOLOGY REVIEWED  CXR: RLL opacity

## 2022-04-15 NOTE — CONSULT NOTE ADULT - ASSESSMENT
72M PMH htn, hypothyroidism, presented to [place] ED via EMS after family called 911 following a witnessed seizure, by family, reportedly lasting ~5min.

## 2022-04-15 NOTE — CONSULT NOTE ADULT - SUBJECTIVE AND OBJECTIVE BOX
Vascular Cardiology Consult Note    DIRECT SERVICE NUMBER:  [phone]           EMAIL [email or web address]   OFFICE [phone]    CC:  seizure     HPI:    72 M with PMH of HTN, hypothyroidism, and diverticulosis with prior transfusions p/w seizure like activity to [facility], intubated and transferred to [facility]. At [facility], he was found to have R cerebellar hemorrhage. He was later extubated and currently in [facility] for further work up and treatment of likely aspirational PNA. Incidentally he was found to have L soleal vein DVT for which vascular cardiology was consulted.        Allergies    No Known Allergies    Intolerances    	    MEDICATIONS:  amLODIPine   Tablet 10 milliGRAM(s) Oral daily  enoxaparin Injectable 40 milliGRAM(s) SubCutaneous <User Schedule>  hydrALAZINE 10 milliGRAM(s) Oral every 4 hours PRN  lisinopril 20 milliGRAM(s) Oral daily    piperacillin/tazobactam IVPB.. 3.375 Gram(s) IV Intermittent every 8 hours    albuterol/ipratropium for Nebulization 3 milliLiter(s) Nebulizer every 6 hours    diVALproex  milliGRAM(s) Oral two times a day  LORazepam   Injectable 1 milliGRAM(s) IV Push once PRN  QUEtiapine 50 milliGRAM(s) Oral at bedtime    polyethylene glycol 3350 17 Gram(s) Oral two times a day  senna 2 Tablet(s) Oral at bedtime    levothyroxine 88 MICROGram(s) Oral daily    multiple electrolytes Injection Type 1 1000 milliLiter(s) IV Continuous <Continuous>  thiamine IVPB 500 milliGRAM(s) IV Intermittent every 8 hours      PAST MEDICAL & SURGICAL HISTORY:  HTN (hypertension)    Diverticulosis    Hypothyroid    No significant past surgical history        FAMILY HISTORY:      SOCIAL HISTORY:  unchanged    REVIEW OF SYSTEMS:  CONSTITUTIONAL: No fever, weight loss, or fatigue  EYES: No eye pain, visual disturbances, or discharge  ENT:  No difficulty hearing, tinnitus, vertigo; No sinus or throat pain  NECK: No pain or stiffness  RESPIRATORY:    CARDIOVASCULAR:    GASTROINTESTINAL: No abdominal or epigastric pain. No nausea, vomiting, or hematemesis; No diarrhea or constipation. No melena or hematochezia.  GENITOURINARY: No dysuria, frequency, hematuria, or incontinence  NEUROLOGICAL: No headaches, memory loss, loss of strength, numbness, or tremors  SKIN:   LYMPH Nodes: No enlarged glands  ENDOCRINE: No heat or cold intolerance; No hair loss  MUSCULOSKELETAL: No joint pain or swelling; No muscle, back, or extremity pain  PSYCHIATRIC: No depression, anxiety, mood swings, or difficulty sleeping  HEME/LYMPH: No easy bruising, or bleeding gums  ALLERGY AND IMMUNOLOGIC: No hives or eczema	    [ x] All others negative	  [ ] Unable to obtain    PHYSICAL EXAM:  T(C): 36.7 (04-15-22 @ 14:09), Max: 37.6 (04-15-22 @ 05:46)  HR: 102 (04-15-22 @ 14:09) (69 - 102)  BP: 158/76 (04-15-22 @ 14:09) (148/54 - 175/85)  RR: 18 (04-15-22 @ 14:09) (16 - 18)  SpO2: 93% (04-15-22 @ 14:09) (93% - 100%)  Wt(kg): --  I&O's Summary    14 Apr 2022 07:01  -  15 Apr 2022 07:00  --------------------------------------------------------  IN: 890 mL / OUT: 1152 mL / NET: -262 mL    15 Apr 2022 07:01  -  15 Apr 2022 15:12  --------------------------------------------------------  IN: 60 mL / OUT: 0 mL / NET: 60 mL        Appearance:  	  HEENT:   Normal oral mucosa, PERRL, EOMI	  Carotid:   Right: no bruit   Left: no bruit     Lymphatic: No lymphadenopathy  Cardiovascular:  tachycardiac, s1s2, no MRG   Respiratory: + rhonchi   Psychiatry:  AAO x 3  Gastrointestinal:  Soft, Non-tender, + BS	  Skin: No rashes, No ecchymoses, No cyanosis	  Neurologic:  AOx3   Extremities:  dry skin, no edema     Vascular Pulse Exam:  Right DP: [x]palpable []non-palpable []audible      Left DP :   [x]palpable []non-palpable []audible  Right PT: [x]palpable [] non-palpable []audible   Left PT:  [x] palpable [] non-palpable []audible         Foot Exam:    no sign of ischemia or cyanosis     LABS:	 	    CBC Full  -  ( 15 Apr 2022 09:22 )  WBC Count : 6.99 K/uL  Hemoglobin : 12.9 g/dL  Hematocrit : 39.3 %  Platelet Count - Automated : 239 K/uL  Mean Cell Volume : 91.2 fl  Mean Cell Hemoglobin : 29.9 pg  Mean Cell Hemoglobin Concentration : 32.8 gm/dL  Auto Neutrophil # : x  Auto Lymphocyte # : x  Auto Monocyte # : x  Auto Eosinophil # : x  Auto Basophil # : x  Auto Neutrophil % : x  Auto Lymphocyte % : x  Auto Monocyte % : x  Auto Eosinophil % : x  Auto Basophil % : x    04-15    140  |  102  |  9   ----------------------------<  103<H>  4.2   |  24  |  1.06  04-14    140  |  103  |  11  ----------------------------<  101<H>  3.8   |  20<L>  |  1.06    Ca    9.5      15 Apr 2022 09:22  Ca    8.8      14 Apr 2022 00:20  Phos  3.5     04-14  Mg     2.1     04-14    TPro  9.4<H>  /  Alb  4.2  /  TBili  0.6  /  DBili  x   /  AST  41<H>  /  ALT  17  /  AlkPhos  94  04-15          Assessment:  1. L soleal vein DVT   2. New onset seizure in the setting of R cerebellar hemorrhage   3. Tachycardia      Plan:  1. recommend CTA to r/o PE    2. recommend repeat LE US on Monday to re-assess L DVT   3. If there is significant PE or proximal propagation of previous known soleal vein DVT, pt will need IVC filter. If not, pt can be clinically monitored and maintained on prophylactic dose of AC   4. cont prophylactic dose subQ Lovenox  for now   5. Monitor on telemetry      Thank you      Vascular Cardiology Service    Please call with any questions:   DIRECT SERVICE NUMBER:  [phone]  Office [phone]  email:  [email or web address]     Vascular Cardiology Consult Note    DIRECT SERVICE NUMBER:  [phone]           EMAIL [email or web address]   OFFICE [phone]    CC:  seizure     HPI:    72 M with PMH of HTN, hypothyroidism, and diverticulosis with prior transfusions p/w seizure like activity to [facility], intubated and transferred to [facility]. At [facility], he was found to have R cerebellar hemorrhage. He was later extubated and currently in [facility] for [facility] work up and treatment of likely aspirational PNA. Incidentally he was found to have L soleal vein DVT for which vascular cardiology was consulted.        Allergies    No Known Allergies    Intolerances    	    MEDICATIONS:  amLODIPine   Tablet 10 milliGRAM(s) Oral daily  enoxaparin Injectable 40 milliGRAM(s) SubCutaneous <User Schedule>  hydrALAZINE 10 milliGRAM(s) Oral every 4 hours PRN  lisinopril 20 milliGRAM(s) Oral daily    piperacillin/tazobactam IVPB.. 3.375 Gram(s) IV Intermittent every 8 hours    albuterol/ipratropium for Nebulization 3 milliLiter(s) Nebulizer every 6 hours    diVALproex  milliGRAM(s) Oral two times a day  LORazepam   Injectable 1 milliGRAM(s) IV Push once PRN  QUEtiapine 50 milliGRAM(s) Oral at bedtime    polyethylene glycol 3350 17 Gram(s) Oral two times a day  senna 2 Tablet(s) Oral at bedtime    levothyroxine 88 MICROGram(s) Oral daily    multiple electrolytes Injection Type 1 1000 milliLiter(s) IV Continuous <Continuous>  thiamine IVPB 500 milliGRAM(s) IV Intermittent every 8 hours      PAST MEDICAL & SURGICAL HISTORY:  HTN (hypertension)    Diverticulosis    Hypothyroid    No significant past surgical history        FAMILY HISTORY:      SOCIAL HISTORY:  unchanged    REVIEW OF SYSTEMS:  CONSTITUTIONAL: No fever, weight loss, or fatigue  EYES: No eye pain, visual disturbances, or discharge  ENT:  No difficulty hearing, tinnitus, vertigo; No sinus or throat pain  NECK: No pain or stiffness  RESPIRATORY:  COugh  CARDIOVASCULAR:  NO CP   GASTROINTESTINAL: No abdominal or epigastric pain. No nausea, vomiting, or hematemesis; No diarrhea or constipation. No melena or hematochezia.  GENITOURINARY: No dysuria, frequency, hematuria, or incontinence  NEUROLOGICAL: No headaches, memory loss, loss of strength, numbness, or tremors  SKIN:   LYMPH Nodes: No enlarged glands  ENDOCRINE: No heat or cold intolerance; No hair loss  MUSCULOSKELETAL: No joint pain or swelling; No muscle, back, or extremity pain  PSYCHIATRIC: No depression, anxiety, mood swings, or difficulty sleeping  HEME/LYMPH: No easy bruising, or bleeding gums  ALLERGY AND IMMUNOLOGIC: No hives or eczema	    [ x] All others negative	  [ ] Unable to obtain    PHYSICAL EXAM:  T(C): 36.7 (04-15-22 @ 14:09), Max: 37.6 (04-15-22 @ 05:46)  HR: 102 (04-15-22 @ 14:09) (69 - 102)  BP: 158/76 (04-15-22 @ 14:09) (148/54 - 175/85)  RR: 18 (04-15-22 @ 14:09) (16 - 18)  SpO2: 93% (04-15-22 @ 14:09) (93% - 100%)  Wt(kg): --  I&O's Summary    14 Apr 2022 07:01  -  15 Apr 2022 07:00  --------------------------------------------------------  IN: 890 mL / OUT: 1152 mL / NET: -262 mL    15 Apr 2022 07:01  -  15 Apr 2022 15:12  --------------------------------------------------------  IN: 60 mL / OUT: 0 mL / NET: 60 mL        Appearance:  	  HEENT:   Normal oral mucosa, PERRL, EOMI	  Carotid:   Right: no bruit   Left: no bruit     Lymphatic: No lymphadenopathy  Cardiovascular:  tachycardiac, s1s2, no MRG   Respiratory: + rhonchi   Psychiatry:  AAO x 3  Gastrointestinal:  Soft, Non-tender, + BS	  Skin: No rashes, No ecchymoses, No cyanosis	  Neurologic:  AOx3   Extremities:  dry skin, no edema     Vascular Pulse Exam:  Right DP: [x]palpable []non-palpable []audible      Left DP :   [x]palpable []non-palpable []audible  Right PT: [x]palpable [] non-palpable []audible   Left PT:  [x] palpable [] non-palpable []audible         Foot Exam:    no sign of ischemia or cyanosis     LABS:	 	    CBC Full  -  ( 15 Apr 2022 09:22 )  WBC Count : 6.99 K/uL  Hemoglobin : 12.9 g/dL  Hematocrit : 39.3 %  Platelet Count - Automated : 239 K/uL  Mean Cell Volume : 91.2 fl  Mean Cell Hemoglobin : 29.9 pg  Mean Cell Hemoglobin Concentration : 32.8 gm/dL  Auto Neutrophil # : x  Auto Lymphocyte # : x  Auto Monocyte # : x  Auto Eosinophil # : x  Auto Basophil # : x  Auto Neutrophil % : x  Auto Lymphocyte % : x  Auto Monocyte % : x  Auto Eosinophil % : x  Auto Basophil % : x    04-15    140  |  102  |  9   ----------------------------<  103<H>  4.2   |  24  |  1.06  04-14    140  |  103  |  11  ----------------------------<  101<H>  3.8   |  20<L>  |  1.06    Ca    9.5      15 Apr 2022 09:22  Ca    8.8      14 Apr 2022 00:20  Phos  3.5     04-14  Mg     2.1     04-14    TPro  9.4<H>  /  Alb  4.2  /  TBili  0.6  /  DBili  x   /  AST  41<H>  /  ALT  17  /  AlkPhos  94  04-15          Assessment:  1. L soleal vein DVT   2. New onset seizure in the setting of R cerebellar hemorrhage   3. Tachycardia      Plan:  1. recommend CTA to r/o PE    2. recommend repeat LE US on Monday to re-assess L DVT   3. If there is significant PE or proximal propagation of previous known soleal vein DVT, pt will need IVC filter. If not, pt can be clinically monitored and maintained on prophylactic dose of AC   4. cont prophylactic dose subQ Lovenox  for now   5. Monitor on telemetry      Thank you      Vascular Cardiology Service    Please call with any questions:   DIRECT SERVICE NUMBER:  [phone]  Office [phone]  email:  [email or web address]

## 2022-04-15 NOTE — PROGRESS NOTE ADULT - ASSESSMENT
73yo M with pmh of HTN, Hypothyroidism Diverticulosis ( Blood transfusions x2 8 yrs ago presents to [facility] as a transfer from [place] for ICH. Patient LWK was at 2200pm on 4/11. Patient was noted at 0200am have been obtunded with saliva coming out of his mouth. EMS was called, patient came back to baseline and did not want to go to Hospital and EMS left. Patient had a seizure like event witnessed by wife with UE shaking, foaming at the mouth with urinary/fecal incontinence. Neurology consulted for new onset seizures in the setting of ICH while patient was in NSCU. Was started on VPA as patient had 3 seizures. Transferred from NSCU to EMU 4/15 after stable IPH noted.     Impression: New onset of seizures of unknown cause, With Rt cerebellar IPH.      Plan:  -MRI brain w/ w/o under anesthesia 4/15 Monday, with LP to be performed subsequently  -off vEEG  -Continue  mg PO BID  -Start Seroquel 50mg qHS  -Avoid standing benzos  -Cardiology(Fouzia) c/s->for HTN. Start lisinopril 20mg qday  -Rescue: Ativan 1mg IV for GTC > 3min    -Management & disposition discussed with neuro attending, Dr. Colvin 71yo M with pmh of HTN, Hypothyroidism Diverticulosis ( Blood transfusions x2 8 yrs ago presents to [facility] as a transfer from [place] for ICH. Patient LWK was at 2200pm on 4/11. Patient was noted at 0200am have been obtunded with saliva coming out of his mouth. EMS was called, patient came back to baseline and did not want to go to Hospital and EMS left. Patient had a seizure like event witnessed by wife with UE shaking, foaming at the mouth with urinary/fecal incontinence. Neurology consulted for new onset seizures in the setting of ICH while patient was in NSCU. Was started on VPA as patient had 3 seizures. Transferred from NSCU to EMU 4/15 after stable IPH noted.     Impression: New onset of seizures of unknown cause, With Rt cerebellar IPH.      Plan:  -MRI brain w/ w/o under anesthesia 4/15 Monday, with LP to be performed subsequently  -off vEEG  -Continue  mg PO BID  -Start Seroquel 50mg qHS  -Avoid standing benzos  -Cardiology([facility]) c/s->for HTN. Start lisinopril 20mg qday  -Pum c/s->CXR showing PNA. CT chest w/o ordered. Started on Zosyn  -Rescue: Ativan 1mg IV for GTC > 3min    -Management & disposition discussed with neuro attending, Dr. Colvin

## 2022-04-15 NOTE — CONSULT NOTE ADULT - ASSESSMENT
73 y/o M with PMH HTN, hypothyroidism. Presents with seizures, found to have cerebellar hemorrhage. Intubated for airway protection at OSH, extubated 4/13. Called to consult for increased chest congestion. CXR with RLL opacity.

## 2022-04-15 NOTE — CONSULT NOTE ADULT - PROBLEM SELECTOR RECOMMENDATION 3
required cardene gtt  SBP goal 100-160  c/w amlodipine 10mg PO daily  add lisinopril 20mg PO daily  c/w hydralazine 10mg PO q4hrs PRN

## 2022-04-15 NOTE — CONSULT NOTE ADULT - SUBJECTIVE AND OBJECTIVE BOX
HPI:  72M PMH htn, hypothyroidism, presented to [facility] via EMS after family called 911 following a witnessed seizure, by family, reportedly lasting ~5min.  Enroute to [place], it was reported patient had another 2 witnessed seizures by EMS, each lasting ~1 minute. Following each seizure the patient was given 5mg IV versed (received 10mg collectively and brought to [facility]). Pt arrived to the ED obtunded with blood from his mouth (tongue laceration), responsive only to pain/sternal rub. Patient was subsequently intubated for GCS 6 and CT/CTA performed showing a small cerebellar hemorrhage ON NO A/C/ THINNERS. He was transferred to [facility] for NSGY eval on Cardene and Propofol gtt.      Upon arrival, wife at bedside and confirms story, LKN 10PM,  sleeping 12am, 2am wife found him frothing at mouth, called EMS, patient subsequently awoke and refused transfer to hospital.  About 1 hour later, seized again, witnessed sounds like GTC.  EMS called again and transferred to hospital with another 2 witnessed seizures.  Hypertensive on arrival to [facility].  (12 Apr 2022 09:29)    Patient was admitted on 4/12, CTA with small right cerebellar hemorrhage, now in EMU. Patient seen today, denies weakness, pain.     REVIEW OF SYSTEMS  Constitutional - No fever, No weight loss, No fatigue  HEENT - No eye pain, No visual disturbances, No difficulty hearing, No tinnitus, No vertigo, No neck pain  Respiratory - No cough, No wheezing, No shortness of breath  Cardiovascular - No chest pain, No palpitations  Gastrointestinal - No abdominal pain, No nausea, No vomiting, No diarrhea, No constipation  Genitourinary - No dysuria, No frequency, No hematuria, No incontinence  Psychiatric - No depression, No anxiety    VITALS  T(C): 36.8 (04-15-22 @ 10:34), Max: 37.6 (04-15-22 @ 05:46)  HR: 91 (04-15-22 @ 10:34) (57 - 91)  BP: 161/66 (04-15-22 @ 10:34) (136/63 - 175/85)  RR: 18 (04-15-22 @ 10:34) (16 - 19)  SpO2: 94% (04-15-22 @ 10:34) (92% - 100%)  Wt(kg): --    PAST MEDICAL & SURGICAL HISTORY  HTN (hypertension)    Diverticulitis    Diverticulosis    Hypothyroid    No significant past surgical history        SOCIAL HISTORY  Smoking - Denied  EtOH - Denied   Drugs - Denied    FUNCTIONAL HISTORY  Lives pvt home, 6 steps to enter, one flight to bed  Independent AMB and ADLs PTA, works for [employer]    CURRENT FUNCTIONAL STATUS  4/14 SLP  functional oropharyngeal swallow  regular diet    4/14 PT  bed mobility mod assist   transfers min assist x 2  gait mod assist x2, 4 side steps   follows 75% commands    4/14 OT  bed mobility mod assist  transfers min assist x 1-2      FAMILY HISTORY   No pertinent family history in first degree relatives        RECENT LABS/IMAGING  CBC Full  -  ( 15 Apr 2022 09:22 )  WBC Count : 6.99 K/uL  RBC Count : 4.31 M/uL  Hemoglobin : 12.9 g/dL  Hematocrit : 39.3 %  Platelet Count - Automated : 239 K/uL  Mean Cell Volume : 91.2 fl  Mean Cell Hemoglobin : 29.9 pg  Mean Cell Hemoglobin Concentration : 32.8 gm/dL  Auto Neutrophil # : x  Auto Lymphocyte # : x  Auto Monocyte # : x  Auto Eosinophil # : x  Auto Basophil # : x  Auto Neutrophil % : x  Auto Lymphocyte % : x  Auto Monocyte % : x  Auto Eosinophil % : x  Auto Basophil % : x    04-15    140  |  102  |  9   ----------------------------<  103<H>  4.2   |  24  |  1.06    Ca    9.5      15 Apr 2022 09:22  Phos  3.5     04-14  Mg     2.1     04-14    TPro  9.4<H>  /  Alb  4.2  /  TBili  0.6  /  DBili  x   /  AST  41<H>  /  ALT  17  /  AlkPhos  94  04-15    < from: CT Head No Cont (04.12.22 @ 10:08) >      INTERPRETATION:  INDICATIONS:  Seizure, right cerebellar hemorrhage    TECHNIQUE:  Serial axial images were obtained from the skull base to the   vertex without intravenous contrast using portable technique.    COMPARISON EXAMINATION: 4/12/2022 at 4:45 AM    FINDINGS:  VENTRICLES AND SULCI: Age-appropriate involutional change  INTRA-AXIAL: Small focus of high attenuation in the right cerebellum is   again seen relatively unchanged compared with the prior. Microvascular   ischemic changes involving the periventricular and subcortical white   matter.  EXTRA-AXIAL:  No mass or collection is seen.  VISUALIZED SINUSES:  Clear.  VISUALIZED MASTOIDS:  Clear.  CALVARIUM:  Normal.  MISCELLANEOUS: NG and ET tube are apparent    IMPRESSION:  Interval stability compared with the prior study of earlier the same day    < end of copied text >      ALLERGIES  No Known Allergies      MEDICATIONS   amLODIPine   Tablet 10 milliGRAM(s) Oral daily  diVALproex  milliGRAM(s) Oral every 8 hours  enoxaparin Injectable 40 milliGRAM(s) SubCutaneous <User Schedule>  hydrALAZINE 10 milliGRAM(s) Oral every 4 hours PRN  hydrALAZINE Injectable 10 milliGRAM(s) IV Push once  levothyroxine 88 MICROGram(s) Oral daily  levothyroxine 88 MICROGram(s) Oral once  lisinopril 20 milliGRAM(s) Oral daily  LORazepam   Injectable 1 milliGRAM(s) IV Push once PRN  multiple electrolytes Injection Type 1 1000 milliLiter(s) IV Continuous <Continuous>  polyethylene glycol 3350 17 Gram(s) Oral two times a day  senna 2 Tablet(s) Oral at bedtime  thiamine IVPB 500 milliGRAM(s) IV Intermittent every 8 hours      ----------------------------------------------------------------------------------------  PHYSICAL EXAM  Constitutional - NAD, Comfortable, in bed, +EEG  Chest - Breathing comfortably  Cardiovascular - S1S2   Abdomen - Soft   Extremities - No C/C/E, No calf tenderness   Neurologic Exam -               follows commands, slow processing     Cognitive - Awake, Alert, AAO to self, place: hospital, not month or year     Communication - Fluent, No dysarthria     Motor - moves all ext     Sensory - Intact to LT    Psychiatric - Mood stable, Affect WNL  ----------------------------------------------------------------------------------------  ASSESSMENT/PLAN  72yMale h/o HTN, hypothyroid with functional deficits after cerebellar hemorrhage  VPA for seizures, EMU/EEG monitoring  agitation  Pain - Tylenol  DVT PPX - SCDs, lovenox   Rehab - Will continue to follow for ongoing rehab needs and recommendations.    Recommend ACUTE inpatient rehabilitation for the functional deficits consisting of 3 hours of therapy/day & 24 hour RN/daily PMR physician for comorbid medical management. Patient will be able to tolerate 3 hours a day.

## 2022-04-15 NOTE — CONSULT NOTE ADULT - PROBLEM SELECTOR RECOMMENDATION 4
CXR with RLL opacity, not present prior - atelectasis vs. underlying PNA   -Pt with congested cough and nasal congestion. RVP negative.  -Start Duoneb q6h  -WBC normal, afebrile  -Start Zosyn for now  -Check CTA chest to r/o PNA and r/o PE given LE DVT   -Check procalcitonin in AM  -Normoxic, keep sats >90% with O2 PRN

## 2022-04-15 NOTE — CONSULT NOTE ADULT - PROBLEM SELECTOR RECOMMENDATION 3
CXR with RLL opacity, not present prior  -Pt with congested cough and nasal congestion. RVP negative.  -Start Duoneb q6h  -WBC normal, afebrile  -Start Zosyn for now  -Check CT chest non contrast   -Check procalcitonin in AM  -Normoxic, keep sats >90% with O2 PRN LE duplex prelim L soleal vein DVT  -F/u official report

## 2022-04-16 DIAGNOSIS — J98.11 ATELECTASIS: ICD-10-CM

## 2022-04-16 DIAGNOSIS — I26.99 OTHER PULMONARY EMBOLISM WITHOUT ACUTE COR PULMONALE: ICD-10-CM

## 2022-04-16 LAB
ALBUMIN SERPL ELPH-MCNC: 3.8 G/DL — SIGNIFICANT CHANGE UP (ref 3.3–5)
ALP SERPL-CCNC: 78 U/L — SIGNIFICANT CHANGE UP (ref 40–120)
ALT FLD-CCNC: 15 U/L — SIGNIFICANT CHANGE UP (ref 10–45)
ANION GAP SERPL CALC-SCNC: 17 MMOL/L — SIGNIFICANT CHANGE UP (ref 5–17)
AST SERPL-CCNC: 35 U/L — SIGNIFICANT CHANGE UP (ref 10–40)
BILIRUB SERPL-MCNC: 0.7 MG/DL — SIGNIFICANT CHANGE UP (ref 0.2–1.2)
BUN SERPL-MCNC: 11 MG/DL — SIGNIFICANT CHANGE UP (ref 7–23)
CALCIUM SERPL-MCNC: 9.5 MG/DL — SIGNIFICANT CHANGE UP (ref 8.4–10.5)
CHLORIDE SERPL-SCNC: 104 MMOL/L — SIGNIFICANT CHANGE UP (ref 96–108)
CO2 SERPL-SCNC: 24 MMOL/L — SIGNIFICANT CHANGE UP (ref 22–31)
CREAT SERPL-MCNC: 1.54 MG/DL — HIGH (ref 0.5–1.3)
EGFR: 48 ML/MIN/1.73M2 — LOW
GLUCOSE BLDC GLUCOMTR-MCNC: 83 MG/DL — SIGNIFICANT CHANGE UP (ref 70–99)
GLUCOSE BLDC GLUCOMTR-MCNC: 91 MG/DL — SIGNIFICANT CHANGE UP (ref 70–99)
GLUCOSE SERPL-MCNC: 91 MG/DL — SIGNIFICANT CHANGE UP (ref 70–99)
HCT VFR BLD CALC: 37.7 % — LOW (ref 39–50)
HGB BLD-MCNC: 12.2 G/DL — LOW (ref 13–17)
MCHC RBC-ENTMCNC: 29.7 PG — SIGNIFICANT CHANGE UP (ref 27–34)
MCHC RBC-ENTMCNC: 32.4 GM/DL — SIGNIFICANT CHANGE UP (ref 32–36)
MCV RBC AUTO: 91.7 FL — SIGNIFICANT CHANGE UP (ref 80–100)
NRBC # BLD: 0 /100 WBCS — SIGNIFICANT CHANGE UP (ref 0–0)
PLATELET # BLD AUTO: 249 K/UL — SIGNIFICANT CHANGE UP (ref 150–400)
POTASSIUM SERPL-MCNC: 3.8 MMOL/L — SIGNIFICANT CHANGE UP (ref 3.5–5.3)
POTASSIUM SERPL-SCNC: 3.8 MMOL/L — SIGNIFICANT CHANGE UP (ref 3.5–5.3)
PROCALCITONIN SERPL-MCNC: 0.12 NG/ML — HIGH (ref 0.02–0.1)
PROT SERPL-MCNC: 8.6 G/DL — HIGH (ref 6–8.3)
RBC # BLD: 4.11 M/UL — LOW (ref 4.2–5.8)
RBC # FLD: 13.9 % — SIGNIFICANT CHANGE UP (ref 10.3–14.5)
SODIUM SERPL-SCNC: 145 MMOL/L — SIGNIFICANT CHANGE UP (ref 135–145)
WBC # BLD: 7.8 K/UL — SIGNIFICANT CHANGE UP (ref 3.8–10.5)
WBC # FLD AUTO: 7.8 K/UL — SIGNIFICANT CHANGE UP (ref 3.8–10.5)

## 2022-04-16 PROCEDURE — 71275 CT ANGIOGRAPHY CHEST: CPT | Mod: 26

## 2022-04-16 PROCEDURE — 99233 SBSQ HOSP IP/OBS HIGH 50: CPT

## 2022-04-16 RX ORDER — ENOXAPARIN SODIUM 100 MG/ML
40 INJECTION SUBCUTANEOUS ONCE
Refills: 0 | Status: DISCONTINUED | OUTPATIENT
Start: 2022-04-17 | End: 2022-04-17

## 2022-04-16 RX ORDER — OLANZAPINE 15 MG/1
2.5 TABLET, FILM COATED ORAL ONCE
Refills: 0 | Status: COMPLETED | OUTPATIENT
Start: 2022-04-16 | End: 2022-04-16

## 2022-04-16 RX ORDER — SODIUM CHLORIDE 9 MG/ML
1000 INJECTION INTRAMUSCULAR; INTRAVENOUS; SUBCUTANEOUS
Refills: 0 | Status: DISCONTINUED | OUTPATIENT
Start: 2022-04-16 | End: 2022-04-18

## 2022-04-16 RX ADMIN — Medication 105 MILLIGRAM(S): at 10:41

## 2022-04-16 RX ADMIN — QUETIAPINE FUMARATE 50 MILLIGRAM(S): 200 TABLET, FILM COATED ORAL at 21:56

## 2022-04-16 RX ADMIN — PIPERACILLIN AND TAZOBACTAM 25 GRAM(S): 4; .5 INJECTION, POWDER, LYOPHILIZED, FOR SOLUTION INTRAVENOUS at 09:12

## 2022-04-16 RX ADMIN — Medication 3 MILLILITER(S): at 18:40

## 2022-04-16 RX ADMIN — Medication 3 MILLILITER(S): at 11:29

## 2022-04-16 RX ADMIN — Medication 88 MICROGRAM(S): at 06:19

## 2022-04-16 RX ADMIN — ENOXAPARIN SODIUM 40 MILLIGRAM(S): 100 INJECTION SUBCUTANEOUS at 18:37

## 2022-04-16 RX ADMIN — AMLODIPINE BESYLATE 10 MILLIGRAM(S): 2.5 TABLET ORAL at 06:16

## 2022-04-16 RX ADMIN — SENNA PLUS 2 TABLET(S): 8.6 TABLET ORAL at 21:55

## 2022-04-16 RX ADMIN — PIPERACILLIN AND TAZOBACTAM 25 GRAM(S): 4; .5 INJECTION, POWDER, LYOPHILIZED, FOR SOLUTION INTRAVENOUS at 01:06

## 2022-04-16 RX ADMIN — Medication 105 MILLIGRAM(S): at 18:36

## 2022-04-16 RX ADMIN — SODIUM CHLORIDE 100 MILLILITER(S): 9 INJECTION INTRAMUSCULAR; INTRAVENOUS; SUBCUTANEOUS at 22:00

## 2022-04-16 RX ADMIN — OLANZAPINE 2.5 MILLIGRAM(S): 15 TABLET, FILM COATED ORAL at 06:25

## 2022-04-16 RX ADMIN — Medication 3 MILLILITER(S): at 06:19

## 2022-04-16 RX ADMIN — DIVALPROEX SODIUM 750 MILLIGRAM(S): 500 TABLET, DELAYED RELEASE ORAL at 06:18

## 2022-04-16 RX ADMIN — LISINOPRIL 20 MILLIGRAM(S): 2.5 TABLET ORAL at 06:18

## 2022-04-16 RX ADMIN — DIVALPROEX SODIUM 750 MILLIGRAM(S): 500 TABLET, DELAYED RELEASE ORAL at 18:36

## 2022-04-16 NOTE — PROGRESS NOTE ADULT - PROBLEM SELECTOR PLAN 3
required cardene gtt  SBP goal 100-160  c/w amlodipine 10mg PO daily  c/w lisinopril 20mg PO daily  c/w hydralazine 10mg PO q4hrs PRN

## 2022-04-16 NOTE — PROGRESS NOTE ADULT - ASSESSMENT
72M PMH htn, hypothyroidism, presented to [place] ED via EMS after family called 911 following a witnessed seizure, by family, reportedly lasting ~5min.

## 2022-04-16 NOTE — PROGRESS NOTE ADULT - SUBJECTIVE AND OBJECTIVE BOX
Subjective: Patient seen and examined. No new events except as noted.     REVIEW OF SYSTEMS:    CONSTITUTIONAL: + weakness, fevers or chills  EYES/ENT: No visual changes;  No vertigo or throat pain   NECK: No pain or stiffness  RESPIRATORY: No cough, wheezing, hemoptysis; No shortness of breath  CARDIOVASCULAR: No chest pain or palpitations  GASTROINTESTINAL: No abdominal or epigastric pain. No nausea, vomiting, or hematemesis; No diarrhea or constipation. No melena or hematochezia.  GENITOURINARY: No dysuria, frequency or hematuria  NEUROLOGICAL: No numbness or weakness  SKIN: No itching, burning, rashes, or lesions   All other review of systems is negative unless indicated above.    MEDICATIONS:  MEDICATIONS  (STANDING):  albuterol/ipratropium for Nebulization 3 milliLiter(s) Nebulizer every 6 hours  amLODIPine   Tablet 10 milliGRAM(s) Oral daily  diVALproex  milliGRAM(s) Oral two times a day  enoxaparin Injectable 40 milliGRAM(s) SubCutaneous <User Schedule>  levothyroxine 88 MICROGram(s) Oral daily  lisinopril 20 milliGRAM(s) Oral daily  multiple electrolytes Injection Type 1 1000 milliLiter(s) (50 mL/Hr) IV Continuous <Continuous>  piperacillin/tazobactam IVPB.. 3.375 Gram(s) IV Intermittent every 8 hours  polyethylene glycol 3350 17 Gram(s) Oral two times a day  QUEtiapine 50 milliGRAM(s) Oral at bedtime  senna 2 Tablet(s) Oral at bedtime  thiamine IVPB 500 milliGRAM(s) IV Intermittent every 8 hours    PHYSICAL EXAM:  T(C): 36.6 (04-16-22 @ 06:00), Max: 36.8 (04-15-22 @ 10:34)  HR: 95 (04-16-22 @ 06:00) (87 - 103)  BP: 142/72 (04-16-22 @ 06:00) (126/67 - 172/88)  RR: 19 (04-16-22 @ 06:00) (17 - 20)  SpO2: 98% (04-16-22 @ 06:00) (93% - 100%)  Wt(kg): --  I&O's Summary    15 Apr 2022 07:01  -  16 Apr 2022 07:00  --------------------------------------------------------  IN: 60 mL / OUT: 0 mL / NET: 60 mL    Appearance: Normal	  HEENT:   Normal oral mucosa, PERRL, EOMI	  Lymphatic: No lymphadenopathy , no edema  Cardiovascular: Normal S1 S2, No JVD, No murmurs , Peripheral pulses palpable 2+ bilaterally  Respiratory: Lungs clear to auscultation, normal effort 	  Gastrointestinal:  Soft, Non-tender, + BS	  Skin: No rashes, No ecchymoses, No cyanosis, warm to touch  Musculoskeletal: Normal range of motion, normal strength  Psychiatry:  Mood & affect appropriate  Ext: No edema    LABS:    CARDIAC MARKERS:                       12.9   6.99  )-----------( 239      ( 15 Apr 2022 09:22 )             39.3     04-15    140  |  102  |  9   ----------------------------<  103<H>  4.2   |  24  |  1.06    Ca    9.5      15 Apr 2022 09:22    TPro  9.4<H>  /  Alb  4.2  /  TBili  0.6  /  DBili  x   /  AST  41<H>  /  ALT  17  /  AlkPhos  94  04-15    proBNP: Serum Pro-Brain Natriuretic Peptide: 740 pg/mL (04-15 @ 09:22)    Lipid Profile:   HgA1c:   TSH:     TELEMETRY: 	    ECG:  	  RADIOLOGY:   DIAGNOSTIC TESTING:  [ ] Echocardiogram:  [ ]  Catheterization:  [ ] Stress Test:    OTHER: 	   Subjective: Patient seen and examined. No new events except as noted.   Lethargic, s/p Zyprexa.     REVIEW OF SYSTEMS:    CONSTITUTIONAL: + weakness, fevers or chills  EYES/ENT: No visual changes;  No vertigo or throat pain   NECK: No pain or stiffness  RESPIRATORY: No cough, wheezing, hemoptysis; No shortness of breath  CARDIOVASCULAR: No chest pain or palpitations  GASTROINTESTINAL: No abdominal or epigastric pain. No nausea, vomiting, or hematemesis; No diarrhea or constipation. No melena or hematochezia.  GENITOURINARY: No dysuria, frequency or hematuria  NEUROLOGICAL: No numbness or weakness  SKIN: No itching, burning, rashes, or lesions   All other review of systems is negative unless indicated above.    MEDICATIONS:  MEDICATIONS  (STANDING):  albuterol/ipratropium for Nebulization 3 milliLiter(s) Nebulizer every 6 hours  amLODIPine   Tablet 10 milliGRAM(s) Oral daily  diVALproex  milliGRAM(s) Oral two times a day  enoxaparin Injectable 40 milliGRAM(s) SubCutaneous <User Schedule>  levothyroxine 88 MICROGram(s) Oral daily  lisinopril 20 milliGRAM(s) Oral daily  multiple electrolytes Injection Type 1 1000 milliLiter(s) (50 mL/Hr) IV Continuous <Continuous>  piperacillin/tazobactam IVPB.. 3.375 Gram(s) IV Intermittent every 8 hours  polyethylene glycol 3350 17 Gram(s) Oral two times a day  QUEtiapine 50 milliGRAM(s) Oral at bedtime  senna 2 Tablet(s) Oral at bedtime  thiamine IVPB 500 milliGRAM(s) IV Intermittent every 8 hours    PHYSICAL EXAM:  T(C): 36.6 (04-16-22 @ 06:00), Max: 36.8 (04-15-22 @ 10:34)  HR: 95 (04-16-22 @ 06:00) (87 - 103)  BP: 142/72 (04-16-22 @ 06:00) (126/67 - 172/88)  RR: 19 (04-16-22 @ 06:00) (17 - 20)  SpO2: 98% (04-16-22 @ 06:00) (93% - 100%)  Wt(kg): --  I&O's Summary    15 Apr 2022 07:01  -  16 Apr 2022 07:00  --------------------------------------------------------  IN: 60 mL / OUT: 0 mL / NET: 60 mL    Appearance: NAD	  HEENT: dry oral mucosa, PERRL, EOMI	  Lymphatic: No lymphadenopathy , no edema  Cardiovascular: Normal S1 S2, No JVD, No murmurs , Peripheral pulses palpable 2+ bilaterally  Respiratory: Lungs clear to auscultation, normal effort 	  Gastrointestinal:  Soft, Non-tender, + BS	  Skin: No rashes, No ecchymoses, No cyanosis, warm to touch  Musculoskeletal: Normal range of motion, normal strength  Psychiatry: Lethargic, slurred speech, A&Ox1-2, following commands  Ext: No edema    LABS:    CARDIAC MARKERS:                       12.9   6.99  )-----------( 239      ( 15 Apr 2022 09:22 )             39.3     04-15    140  |  102  |  9   ----------------------------<  103<H>  4.2   |  24  |  1.06    Ca    9.5      15 Apr 2022 09:22    TPro  9.4<H>  /  Alb  4.2  /  TBili  0.6  /  DBili  x   /  AST  41<H>  /  ALT  17  /  AlkPhos  94  04-15    proBNP: Serum Pro-Brain Natriuretic Peptide: 740 pg/mL (04-15 @ 09:22)    Lipid Profile:   HgA1c:   TSH:     TELEMETRY: SR    ECG:  	  RADIOLOGY:   DIAGNOSTIC TESTING:  [ ] Echocardiogram:  [ ]  Catheterization:  [ ] Stress Test:    OTHER:

## 2022-04-16 NOTE — PROGRESS NOTE ADULT - ASSESSMENT
Assessment: 71 yo male with a PMHx of HTN, Hypothyroidism, and Diverticulosis (requiring blood transfusions x2 8 yrs ago) who presented to [facility] on 4/12 as a transfer from [place] for ICH. Patient LKN was at 2200pm on 4/11. Patient was noted on 4/12 @ 0200 to be obtunded with saliva coming out of his mouth. EMS was called, patient came back to baseline and did not want to go to hospital and EMS left. Patient then had a seizure-like event witnessed by wife with UE shaking, foaming at the mouth, and urinary/fecal incontinence. Neurology consulted for new onset seizures in the setting of ICH while patient was in NSCU. No seizures seen on EEG thus far during this admission. Transferred from NSCU to EMU on 4/15 after stable IPH noted.     Impression: New onset of seizures of unknown cause, with R cerebellar IPH.      Plan:  [] Neuro checks, vitals Q4HR.  [] Telemetry.  [] Seizure/Fall/Aspiration precautions.  [] SBP goal: <150.  [] Off EEG.  [] Pending MRI brain w/wo under anesthesia on 4/18, with LP to be performed subsequently.  [] VA Duplex lower extremities pending, patient with known L soleal vein DVT.  [] c/w Depakote 750MG PO BID.  [] c/w Seroquel 50MG PO QHS.  [] Cardiology following (Dr. Fragoso), c/w Lisinopril 20MG PO QD, Hydralazine 10MG PO Q4HR  [] Pulmonology following, on Zosyn for PNA.   [] 4/16 CTA Chest PE Protocol Prelim Report: suspicious for R upper lobe segmental pulmonary artery PE. f/u final report.  [] DVT PPx: Lovenox 40MG SC QD.  [] Diet: Regular.  [] Seizure Rescue: Ativan 1MG IVP x1 for convulsions/GTCs lasting > 3 minutes, VPA 1G IV x1 for convulsions/GTCs that are refractory to Ativan.    Case seen and discussed with epilepsy attending Dr. Naidu.     Assessment: 73 yo male with a PMHx of HTN, Hypothyroidism, and Diverticulosis (requiring blood transfusions x2 8 yrs ago) who presented to [facility] on 4/12 as a transfer from [place] for ICH. Patient LKN was at 2200pm on 4/11. Patient was noted on 4/12 @ 0200 to be obtunded with saliva coming out of his mouth. EMS was called, patient came back to baseline and did not want to go to hospital and EMS left. Patient then had a seizure-like event witnessed by wife with UE shaking, foaming at the mouth, and urinary/fecal incontinence. Neurology consulted for new onset seizures in the setting of ICH while patient was in NSCU. No seizures seen on EEG thus far during this admission. Transferred from NSCU to EMU on 4/15 after stable IPH noted.     Impression: New onset of seizures of unknown cause, with R cerebellar IPH.      Plan:  [] Neuro checks, vitals Q4HR.  [] Telemetry.  [] Seizure/Fall/Aspiration precautions.  [] SBP goal: <150.  [] Off EEG.  [] Pending MRI brain w/wo under anesthesia on 4/18, with LP to be performed subsequently.  [] VA Duplex lower extremities pending, patient with known L soleal vein DVT.  [] c/w Depakote 750MG PO BID.  [] c/w Seroquel 50MG PO QHS.  [] Cardiology following (Dr. Fragoso), c/w Lisinopril 20MG PO QD, Hydralazine 10MG PO Q4HR  [] Pulmonology following, on Zosyn for PNA.   [] 4/16 CTA Chest PE Protocol Prelim Report: suspicious for R upper lobe segmental pulmonary artery PE. f/u final report.  [] Urgent Repeat CTA Chest PE Protocol ordered. May need to give PO sedative prior to scan.  [] DVT PPx: Lovenox 40MG SC QD.  [] Diet: Regular.  [] Seizure Rescue: Ativan 1MG IVP x1 for convulsions/GTCs lasting > 3 minutes, VPA 1G IV x1 for convulsions/GTCs that are refractory to Ativan.    Case seen and discussed with epilepsy attending Dr. Naidu.     Assessment: 73 yo male with a PMHx of HTN, Hypothyroidism, and Diverticulosis (requiring blood transfusions x2 8 yrs ago) who presented to [facility] on 4/12 as a transfer from [place] for ICH. Patient LKN was at 2200pm on 4/11. Patient was noted on 4/12 @ 0200 to be obtunded with saliva coming out of his mouth. EMS was called, patient came back to baseline and did not want to go to hospital and EMS left. Patient then had a seizure-like event witnessed by wife with UE shaking, foaming at the mouth, and urinary/fecal incontinence. Neurology consulted for new onset seizures in the setting of ICH while patient was in NSCU. No seizures seen on EEG thus far during this admission. Transferred from NSCU to EMU on 4/15 after stable IPH noted.     Impression: New onset of seizures of unknown cause, with R cerebellar IPH.      Plan:  [] Neuro checks, vitals Q4HR.  [] Telemetry.  [] Seizure/Fall/Aspiration precautions.  [] SBP goal: <150.  [] Off EEG.  [] Pending MRI brain w/wo under anesthesia on 4/18, with LP to be performed subsequently. Hold Lovenox after Sunday AM dose. Will need to be NPO after MN on Sunday.  [] VA Duplex lower extremities pending, patient with known L soleal vein DVT.  [] c/w Depakote 750MG PO BID.  [] c/w Seroquel 50MG PO QHS.  [] Cardiology following (Dr. Fragoso), c/w Lisinopril 20MG PO QD, Hydralazine 10MG PO Q4HR  [] Pulmonology following, on Zosyn for PNA.   [x] 4/16 CTA Chest PE Protocol: as above, patient with small R upper lobe segmental branch PE.  [] 4/16: Vascular Neurology aware of case, spoke to stroke fellow Dr. Abby Deutsch. From vascular neurology standpoint no contraindication for full dose AC if needed in setting of PE.  [] Vascular Cardiology following (Dr. Perera): Spoke with Dr. Perera today. Would continue Lovenox 40MG SC QD for now. Will obtain MRI Head w/wo to further risk-stratify patient prior to deciding on full dose AC vs IVC Filter.   [] DVT PPx: Lovenox 40MG SC QD.  [] Diet: Regular.  [] Seizure Rescue: Ativan 1MG IVP x1 for convulsions/GTCs lasting > 3 minutes, VPA 1G IV x1 for convulsions/GTCs that are refractory to Ativan.    Case seen and discussed with epilepsy attending Dr. Naidu.     Assessment: 73 yo male with a PMHx of HTN, Hypothyroidism, and Diverticulosis (requiring blood transfusions x2 8 yrs ago) who presented to [facility] on 4/12 as a transfer from [place] for ICH. Patient LKN was at 2200pm on 4/11. Patient was noted on 4/12 @ 0200 to be obtunded with saliva coming out of his mouth. EMS was called, patient came back to baseline and did not want to go to hospital and EMS left. Patient then had a seizure-like event witnessed by wife with UE shaking, foaming at the mouth, and urinary/fecal incontinence. Neurology consulted for new onset seizures in the setting of ICH while patient was in NSCU. No seizures seen on EEG thus far during this admission. Transferred from NSCU to EMU on 4/15 after stable IPH noted.     Impression: New onset of seizures of unknown cause, with R cerebellar IPH.      Plan:  [] Neuro checks, vitals Q4HR.  [] Telemetry.  [] Seizure/Fall/Aspiration precautions.  [] SBP goal: <150.  [] Off EEG.  [] Pending MRI brain w/wo under anesthesia on 4/18, with LP to be performed subsequently. Hold Lovenox after Sunday AM dose. Will need to be NPO after MN on Sunday.  [] VA Duplex lower extremities pending, patient with known L soleal vein DVT.  [] c/w Depakote 750MG PO BID.  [] c/w Seroquel 50MG PO QHS.  [] Cardiology following (Dr. Fragoso), c/w Lisinopril 20MG PO QD, Hydralazine 10MG PO Q4HR  [] Pulmonology following, Luis Daniel DC'd as no PNA seen on CTA Chest.   [x] 4/16 CTA Chest PE Protocol: as above, patient with small R upper lobe segmental branch PE.  [] 4/16: Vascular Neurology aware of case, spoke to stroke fellow Dr. Abby Deutsch. From vascular neurology standpoint no contraindication for full dose AC if needed in setting of PE.  [] Vascular Cardiology following (Dr. Perera): Spoke with Dr. Perera today. Would continue Lovenox 40MG SC QD for now. Will obtain MRI Head w/wo to further risk-stratify patient prior to deciding on full dose AC vs IVC Filter.   [] DVT PPx: Lovenox 40MG SC QD.  [] Diet: Regular.  [] Seizure Rescue: Ativan 1MG IVP x1 for convulsions/GTCs lasting > 3 minutes, VPA 1G IV x1 for convulsions/GTCs that are refractory to Ativan.    Case seen and discussed with epilepsy attending Dr. Naidu.     Assessment: 71 yo male with a PMHx of HTN, Hypothyroidism, and Diverticulosis (requiring blood transfusions x2 8 yrs ago) who presented to [facility] on 4/12 as a transfer from [place] for ICH. Patient LKN was at 2200pm on 4/11. Patient was noted on 4/12 @ 0200 to be obtunded with saliva coming out of his mouth. EMS was called, patient came back to baseline and did not want to go to hospital and EMS left. Patient then had a seizure-like event witnessed by wife with UE shaking, foaming at the mouth, and urinary/fecal incontinence. Neurology consulted for new onset seizures in the setting of ICH while patient was in NSCU. No seizures seen on EEG thus far during this admission. Transferred from NSCU to EMU on 4/15 after stable IPH noted.     Impression: New onset of seizures of unknown cause, with R cerebellar IPH.      Plan:  [] Neuro checks, vitals Q4HR.  [] Telemetry.  [] Seizure/Fall/Aspiration precautions.  [] SBP goal: <150.  [] Off EEG.  [] Pending MRI brain w/wo under anesthesia on 4/18, with LP to be performed subsequently. Hold Lovenox after Sunday AM dose. Will need to be NPO after MN on Sunday.  [] VA Duplex lower extremities pending, patient with known L soleal vein DVT.  [] c/w Depakote 750MG PO BID.  [] c/w Seroquel 50MG PO QHS.  [] Cardiology following (Dr. Fragoso), c/w Lisinopril 20MG PO QD, Hydralazine 10MG PO Q4HR  [] Pulmonology following, Luis Daniel DC'd as no PNA seen on CTA Chest.   [x] 4/16 CTA Chest PE Protocol: as above, patient with small R upper lobe segmental branch PE.  [] 4/16: Vascular Neurology aware of case, spoke to stroke fellow Dr. Abby Deutsch. From vascular neurology standpoint no contraindication for full dose AC if needed in setting of PE.  [] Vascular Cardiology following (Dr. Perera): Spoke with Dr. Perera today. Would continue Lovenox 40MG SC QD for now. Will obtain MRI Head w/wo to further risk-stratify patient prior to deciding on full dose AC vs IVC Filter.   [] 4/16: Developed JESSICA after CTA Chest, likely due to contrast. Started IVF NS @ 100ml/hr. f/u AM CMP.  [] DVT PPx: Lovenox 40MG SC QD.  [] Diet: Regular.  [] Seizure Rescue: Ativan 1MG IVP x1 for convulsions/GTCs lasting > 3 minutes, VPA 1G IV x1 for convulsions/GTCs that are refractory to Ativan.    Case seen and discussed with epilepsy attending Dr. Naidu.

## 2022-04-16 NOTE — PROVIDER CONTACT NOTE (CHANGE IN STATUS NOTIFICATION) - ASSESSMENT
Pt attempting to get out of bed while putting leg over side rail. Attempted to reorient patient. Unsuccessful in doing so.

## 2022-04-16 NOTE — PROGRESS NOTE ADULT - SUBJECTIVE AND OBJECTIVE BOX
MRN-[id number]    Subjective: 73 yo male seen and examined at bedside.    PAST MEDICAL & SURGICAL HISTORY:  HTN (hypertension)    Diverticulosis    Hypothyroid    No significant past surgical history    FAMILY HISTORY:    Social Hx:  Nonsmoker, no drug or alcohol use    Home Medications:  levothyroxine 75 mcg (0.075 mg) oral tablet: 1 tab(s) orally once a day (15 Apr 2022 14:31)  lisinopril 30 mg oral tablet: 1 tab(s) orally once a day (15 Apr 2022 14:32)    MEDICATIONS  (STANDING):  albuterol/ipratropium for Nebulization 3 milliLiter(s) Nebulizer every 6 hours  amLODIPine   Tablet 10 milliGRAM(s) Oral daily  diVALproex  milliGRAM(s) Oral two times a day  enoxaparin Injectable 40 milliGRAM(s) SubCutaneous <User Schedule>  levothyroxine 88 MICROGram(s) Oral daily  lisinopril 20 milliGRAM(s) Oral daily  multiple electrolytes Injection Type 1 1000 milliLiter(s) (50 mL/Hr) IV Continuous <Continuous>  piperacillin/tazobactam IVPB.. 3.375 Gram(s) IV Intermittent every 8 hours  polyethylene glycol 3350 17 Gram(s) Oral two times a day  QUEtiapine 50 milliGRAM(s) Oral at bedtime  senna 2 Tablet(s) Oral at bedtime  thiamine IVPB 500 milliGRAM(s) IV Intermittent every 8 hours    MEDICATIONS  (PRN):  hydrALAZINE 10 milliGRAM(s) Oral every 4 hours PRN Systolic blood pressure < 160  LORazepam   Injectable 1 milliGRAM(s) IV Push once PRN seizure    Allergies  fish (Hives; Angioedema)  No Known Drug Allergies    ROS: Pertinent positives above, all other ROS were reviewed and are negative.      Vital Signs Last 24 Hrs  T(C): 36.6 (16 Apr 2022 06:00), Max: 36.8 (15 Apr 2022 10:34)  T(F): 97.9 (16 Apr 2022 06:00), Max: 98.3 (15 Apr 2022 10:34)  HR: 95 (16 Apr 2022 06:00) (91 - 103)  BP: 142/72 (16 Apr 2022 06:00) (126/67 - 161/66)  RR: 19 (16 Apr 2022 06:00) (17 - 20)  SpO2: 98% (16 Apr 2022 06:00) (93% - 100%)    GENERAL EXAM:  Constitutional:   Head:  Extremities:    NEUROLOGICAL EXAM:    Labs:   cbc                      12.2   7.80  )-----------( 249      ( 16 Apr 2022 07:55 )             37.7     [id number]    145  |  104  |  11  ----------------------------<  91  3.8   |  24  |  1.54<H>    Ca    9.5      16 Apr 2022 07:55    TPro  8.6<H>  /  Alb  3.8  /  TBili  0.7  /  DBili  x   /  AST  35  /  ALT  15  /  AlkPhos  78  04-16    [id number] Chol 128 LDL -- HDL 67 Trig 116    LIVER FUNCTIONS - ( 16 Apr 2022 07:55 )  Alb: 3.8 g/dL / Pro: 8.6 g/dL / ALK PHOS: 78 U/L / ALT: 15 U/L / AST: 35 U/L / GGT: x           Radiology/EEGs:  -04/12 CTH: 0.5 cm focus of intraparenchymal hemorrhage in the right cerebellum.  -04/12 CT PERFUSION: The areas of elevated Tmax do not follow a vascular distribution and are likely artifactual, related to patient motion. If symptoms persist consider follow up head CT or MRI, MRA  if no contraindication.  -04/12 CTA COW:  Patent intracranial circulation without flow limiting stenosis.  -04/12 CTA NECK: Patent, ECAs, ICAs, no  hemodynamically significant stenosis at ICA origins by NASCET criteria. Bilateral vertebral arteries are patent without flow limiting stenosis.  -04/12 Repeat CTH: Interval stability compared with the prior study of earlier the same day.  -04/16 CTA Chest PE Protocol, Prelim Report: Filling defect within a right upper lobe segmental pulmonary artery suspicious for pulmonary embolism. Further evaluation can be obtained with V/Q scan or repeat CTA.    -04/13 EEG SUMMARY/CLASSIFICATION:  Abnormal EEG in a sedated patient.  - Background slowing, generalized.  _____________________________________________________________  EEG IMPRESSION/CLINICAL CORRELATE:  Abnormal EEG study.  - Moderate nonspecific diffuse or multifocal cerebral dysfunction.   - No epileptiform patterns or seizures were recorded x 1 day.    -04/14 EEG SUMMARY/CLASSIFICATION:  Abnormal EEG in a sedated patient.  - Background slowing, generalized.  _____________________________________________________________  EEG IMPRESSION/CLINICAL CORRELATE:  Abnormal EEG study.  - Moderate nonspecific diffuse or multifocal cerebral dysfunction.   - No epileptiform patterns or seizures were recorded x 2 days.    -04/15 EEG Summary:  Abnormal EEG in the awake, drowsy and asleep states.  Mild to moderate generalized slowing  Impression/Clinical Correlate:  This is an abnormal EEG record. There is evidence of mild to moderate multifocal/diffuse nonspecific cerebral dysfunction. No epileptiform pattern or seizures were seen. [id number]    Subjective: 73 yo male seen and examined at bedside. No events/seizures overnight.    PAST MEDICAL & SURGICAL HISTORY:  HTN (hypertension)    Diverticulosis    Hypothyroid    No significant past surgical history    Social Hx:  Nonsmoker, no drug or alcohol use    Home Medications:  levothyroxine 75 mcg (0.075 mg) oral tablet: 1 tab(s) orally once a day (15 Apr 2022 14:31)  lisinopril 30 mg oral tablet: 1 tab(s) orally once a day (15 Apr 2022 14:32)    MEDICATIONS  (STANDING):  albuterol/ipratropium for Nebulization 3 milliLiter(s) Nebulizer every 6 hours  amLODIPine   Tablet 10 milliGRAM(s) Oral daily  diVALproex  milliGRAM(s) Oral two times a day  enoxaparin Injectable 40 milliGRAM(s) SubCutaneous <User Schedule>  levothyroxine 88 MICROGram(s) Oral daily  lisinopril 20 milliGRAM(s) Oral daily  multiple electrolytes Injection Type 1 1000 milliLiter(s) (50 mL/Hr) IV Continuous <Continuous>  piperacillin/tazobactam IVPB.. 3.375 Gram(s) IV Intermittent every 8 hours  polyethylene glycol 3350 17 Gram(s) Oral two times a day  QUEtiapine 50 milliGRAM(s) Oral at bedtime  senna 2 Tablet(s) Oral at bedtime  thiamine IVPB 500 milliGRAM(s) IV Intermittent every 8 hours    MEDICATIONS  (PRN):  hydrALAZINE 10 milliGRAM(s) Oral every 4 hours PRN Systolic blood pressure < 160  LORazepam   Injectable 1 milliGRAM(s) IV Push once PRN seizure    Allergies  fish (Hives; Angioedema)  No Known Drug Allergies    ROS: Pertinent positives above, all other ROS were reviewed and are negative.      Vital Signs Last 24 Hrs  T(C): 36.6 (16 Apr 2022 06:00), Max: 36.8 (15 Apr 2022 10:34)  T(F): 97.9 (16 Apr 2022 06:00), Max: 98.3 (15 Apr 2022 10:34)  HR: 95 (16 Apr 2022 06:00) (91 - 103)  BP: 142/72 (16 Apr 2022 06:00) (126/67 - 161/66)  RR: 19 (16 Apr 2022 06:00) (17 - 20)  SpO2: 98% (16 Apr 2022 06:00) (93% - 100%)    GENERAL EXAM:  Constitutional: Lying in bed, NAD.   Head: Normocephalic, atraumatic.  Extremities: No edema.    NEUROLOGICAL EXAM:  MS: Sleepy, eyes open spontaneously. Speech is fluent, not slurred. Follows commands.  CN: EOMI. Face symmetric.   Motor: Moves all extremities.  Sensory: Intact to LT throughout.  Coordination/Gait: Not assessed.    Labs:   cbc                      12.2   7.80  )-----------( 249      ( 16 Apr 2022 07:55 )             37.7     [id number]    145  |  104  |  11  ----------------------------<  91  3.8   |  24  |  1.54<H>    Ca    9.5      16 Apr 2022 07:55    TPro  8.6<H>  /  Alb  3.8  /  TBili  0.7  /  DBili  x   /  AST  35  /  ALT  15  /  AlkPhos  78  04-16    [id number] Chol 128 LDL -- HDL 67 Trig 116    LIVER FUNCTIONS - ( 16 Apr 2022 07:55 )  Alb: 3.8 g/dL / Pro: 8.6 g/dL / ALK PHOS: 78 U/L / ALT: 15 U/L / AST: 35 U/L / GGT: x           Radiology/EEGs:  -04/12 CTH: 0.5 cm focus of intraparenchymal hemorrhage in the right cerebellum.  -04/12 CT PERFUSION: The areas of elevated Tmax do not follow a vascular distribution and are likely artifactual, related to patient motion. If symptoms persist consider follow up head CT or MRI, MRA  if no contraindication.  -04/12 CTA COW:  Patent intracranial circulation without flow limiting stenosis.  -04/12 CTA NECK: Patent, ECAs, ICAs, no  hemodynamically significant stenosis at ICA origins by NASCET criteria. Bilateral vertebral arteries are patent without flow limiting stenosis.  -04/12 Repeat CTH: Interval stability compared with the prior study of earlier the same day.  -04/16 CTA Chest PE Protocol: Small pulmonary bolus in the right upper lobe segmental branch seen on series 3 image 80. No associated right heart strain or pulmonary infarction noted. Partial right lower lobe atelectasis with elevation of right hemidiaphragm.    -04/13 EEG SUMMARY/CLASSIFICATION:  Abnormal EEG in a sedated patient.  - Background slowing, generalized.  _____________________________________________________________  EEG IMPRESSION/CLINICAL CORRELATE:  Abnormal EEG study.  - Moderate nonspecific diffuse or multifocal cerebral dysfunction.   - No epileptiform patterns or seizures were recorded x 1 day.    -04/14 EEG SUMMARY/CLASSIFICATION:  Abnormal EEG in a sedated patient.  - Background slowing, generalized.  _____________________________________________________________  EEG IMPRESSION/CLINICAL CORRELATE:  Abnormal EEG study.  - Moderate nonspecific diffuse or multifocal cerebral dysfunction.   - No epileptiform patterns or seizures were recorded x 2 days.    -04/15 EEG Summary:  Abnormal EEG in the awake, drowsy and asleep states.  Mild to moderate generalized slowing  Impression/Clinical Correlate:  This is an abnormal EEG record. There is evidence of mild to moderate multifocal/diffuse nonspecific cerebral dysfunction. No epileptiform pattern or seizures were seen.

## 2022-04-16 NOTE — PROGRESS NOTE ADULT - ASSESSMENT
73 y/o M with PMH HTN, hypothyroidism. Presents with seizures, found to have cerebellar hemorrhage. Intubated for airway protection at OSH, extubated 4/13. Called to consult for increased chest congestion. CTA chest with atelectasis.

## 2022-04-16 NOTE — PROGRESS NOTE ADULT - SUBJECTIVE AND OBJECTIVE BOX
Follow-up Pulm Progress Note    No new respiratory events overnight.  Denies SOB/CP.     Medications:  MEDICATIONS  (STANDING):  albuterol/ipratropium for Nebulization 3 milliLiter(s) Nebulizer every 6 hours  amLODIPine   Tablet 10 milliGRAM(s) Oral daily  diVALproex  milliGRAM(s) Oral two times a day  enoxaparin Injectable 40 milliGRAM(s) SubCutaneous <User Schedule>  levothyroxine 88 MICROGram(s) Oral daily  lisinopril 20 milliGRAM(s) Oral daily  multiple electrolytes Injection Type 1 1000 milliLiter(s) (50 mL/Hr) IV Continuous <Continuous>  polyethylene glycol 3350 17 Gram(s) Oral two times a day  QUEtiapine 50 milliGRAM(s) Oral at bedtime  senna 2 Tablet(s) Oral at bedtime  thiamine IVPB 500 milliGRAM(s) IV Intermittent every 8 hours    MEDICATIONS  (PRN):  hydrALAZINE 10 milliGRAM(s) Oral every 4 hours PRN Systolic blood pressure < 160  LORazepam   Injectable 1 milliGRAM(s) IV Push once PRN seizure          Vital Signs Last 24 Hrs  T(C): 37.1 (16 Apr 2022 13:32), Max: 37.1 (16 Apr 2022 09:48)  T(F): 98.8 (16 Apr 2022 13:32), Max: 98.8 (16 Apr 2022 13:32)  HR: 93 (16 Apr 2022 13:32) (90 - 103)  BP: 146/66 (16 Apr 2022 13:32) (103/66 - 158/76)  BP(mean): --  RR: 18 (16 Apr 2022 13:32) (17 - 20)  SpO2: 95% (16 Apr 2022 09:48) (93% - 100%)          04-15 @ 07:01  -  04-16 @ 07:00  --------------------------------------------------------  IN: 60 mL / OUT: 0 mL / NET: 60 mL          LABS:                        12.2   7.80  )-----------( 249      ( 16 Apr 2022 07:55 )             37.7     04-16    145  |  104  |  11  ----------------------------<  91  3.8   |  24  |  1.54<H>    Ca    9.5      16 Apr 2022 07:55    TPro  8.6<H>  /  Alb  3.8  /  TBili  0.7  /  DBili  x   /  AST  35  /  ALT  15  /  AlkPhos  78  04-16          CAPILLARY BLOOD GLUCOSE      POCT Blood Glucose.: 83 mg/dL (16 Apr 2022 11:39)        Procalcitonin, Serum: 0.12 ng/mL (04-16-22 @ 07:55)    Serum Pro-Brain Natriuretic Peptide: 740 pg/mL (04-15-22 @ 09:22)        Physical Examination:  PULM: few scattered rhonchi - clears with cough   CVS: S1, S2 heard    RADIOLOGY REVIEWED    CT chest: < from: CT Angio Chest PE Protocol w/ IV Cont (04.16.22 @ 02:00) >  FINDINGS:    Pulmonary arteries: Small pulmonary bolus in the right upper lobe   segmental branch seen on series 3 image 80. No associated right heart   strain or pulmonary infarction noted.    LUNGS AND AIRWAYS: Minimal tracheal secretions.  Partial right lower lobe   atelectasis with elevation of right hemidiaphragm.  PLEURA: Trace right pleural effusion..  MEDIASTINUM AND SIMONE: No lymphadenopathy.  VESSELS: Within normal limits.  HEART: Cardiomegaly. No evidence of right heart strain. Small pericardial   fluid along the right atrium  CHEST WALL AND LOWER NECK: Enlarged thyroid goiter with mild tracheal   narrowing  VISUALIZED UPPER ABDOMEN: Within normal limits.  BONES: Degenerative changes of the spine.    IMPRESSION:  Small pulmonary bolus in the right upper lobe segmental branch seen on   series 3 image 80. No associated right heart strain or pulmonary   infarction noted.    Partial right lower lobe atelectasis with elevation of right   hemidiaphragm.      < end of copied text >      LE duplex: < from: [facility] Duplex Lower Ext Vein Scan, Bilat (04.15.22 @ 14:29) >  Acute below the knee DVT confined to the left soleal vein.    < end of copied text >

## 2022-04-17 LAB
ALBUMIN SERPL ELPH-MCNC: 3.5 G/DL — SIGNIFICANT CHANGE UP (ref 3.3–5)
ALP SERPL-CCNC: 74 U/L — SIGNIFICANT CHANGE UP (ref 40–120)
ALT FLD-CCNC: 15 U/L — SIGNIFICANT CHANGE UP (ref 10–45)
ANION GAP SERPL CALC-SCNC: 17 MMOL/L — SIGNIFICANT CHANGE UP (ref 5–17)
AST SERPL-CCNC: 29 U/L — SIGNIFICANT CHANGE UP (ref 10–40)
BASOPHILS # BLD AUTO: 0.03 K/UL — SIGNIFICANT CHANGE UP (ref 0–0.2)
BASOPHILS NFR BLD AUTO: 0.6 % — SIGNIFICANT CHANGE UP (ref 0–2)
BILIRUB SERPL-MCNC: 0.4 MG/DL — SIGNIFICANT CHANGE UP (ref 0.2–1.2)
BUN SERPL-MCNC: 13 MG/DL — SIGNIFICANT CHANGE UP (ref 7–23)
CALCIUM SERPL-MCNC: 9 MG/DL — SIGNIFICANT CHANGE UP (ref 8.4–10.5)
CHLORIDE SERPL-SCNC: 107 MMOL/L — SIGNIFICANT CHANGE UP (ref 96–108)
CO2 SERPL-SCNC: 22 MMOL/L — SIGNIFICANT CHANGE UP (ref 22–31)
CREAT SERPL-MCNC: 1.66 MG/DL — HIGH (ref 0.5–1.3)
EGFR: 44 ML/MIN/1.73M2 — LOW
EOSINOPHIL # BLD AUTO: 0.14 K/UL — SIGNIFICANT CHANGE UP (ref 0–0.5)
EOSINOPHIL NFR BLD AUTO: 2.6 % — SIGNIFICANT CHANGE UP (ref 0–6)
GLUCOSE SERPL-MCNC: 79 MG/DL — SIGNIFICANT CHANGE UP (ref 70–99)
HCT VFR BLD CALC: 37.9 % — LOW (ref 39–50)
HGB BLD-MCNC: 12 G/DL — LOW (ref 13–17)
IMM GRANULOCYTES NFR BLD AUTO: 0.2 % — SIGNIFICANT CHANGE UP (ref 0–1.5)
LYMPHOCYTES # BLD AUTO: 1.57 K/UL — SIGNIFICANT CHANGE UP (ref 1–3.3)
LYMPHOCYTES # BLD AUTO: 29 % — SIGNIFICANT CHANGE UP (ref 13–44)
MAGNESIUM SERPL-MCNC: 2.2 MG/DL — SIGNIFICANT CHANGE UP (ref 1.6–2.6)
MCHC RBC-ENTMCNC: 29.7 PG — SIGNIFICANT CHANGE UP (ref 27–34)
MCHC RBC-ENTMCNC: 31.7 GM/DL — LOW (ref 32–36)
MCV RBC AUTO: 93.8 FL — SIGNIFICANT CHANGE UP (ref 80–100)
MONOCYTES # BLD AUTO: 0.55 K/UL — SIGNIFICANT CHANGE UP (ref 0–0.9)
MONOCYTES NFR BLD AUTO: 10.2 % — SIGNIFICANT CHANGE UP (ref 2–14)
NEUTROPHILS # BLD AUTO: 3.11 K/UL — SIGNIFICANT CHANGE UP (ref 1.8–7.4)
NEUTROPHILS NFR BLD AUTO: 57.4 % — SIGNIFICANT CHANGE UP (ref 43–77)
NRBC # BLD: 0 /100 WBCS — SIGNIFICANT CHANGE UP (ref 0–0)
PHOSPHATE SERPL-MCNC: 4.5 MG/DL — SIGNIFICANT CHANGE UP (ref 2.5–4.5)
PLATELET # BLD AUTO: 213 K/UL — SIGNIFICANT CHANGE UP (ref 150–400)
POTASSIUM SERPL-MCNC: 3.6 MMOL/L — SIGNIFICANT CHANGE UP (ref 3.5–5.3)
POTASSIUM SERPL-SCNC: 3.6 MMOL/L — SIGNIFICANT CHANGE UP (ref 3.5–5.3)
PROT SERPL-MCNC: 8.1 G/DL — SIGNIFICANT CHANGE UP (ref 6–8.3)
RBC # BLD: 4.04 M/UL — LOW (ref 4.2–5.8)
RBC # FLD: 14.1 % — SIGNIFICANT CHANGE UP (ref 10.3–14.5)
SODIUM SERPL-SCNC: 146 MMOL/L — HIGH (ref 135–145)
WBC # BLD: 5.41 K/UL — SIGNIFICANT CHANGE UP (ref 3.8–10.5)
WBC # FLD AUTO: 5.41 K/UL — SIGNIFICANT CHANGE UP (ref 3.8–10.5)

## 2022-04-17 PROCEDURE — 95720 EEG PHY/QHP EA INCR W/VEEG: CPT

## 2022-04-17 PROCEDURE — 99233 SBSQ HOSP IP/OBS HIGH 50: CPT

## 2022-04-17 RX ADMIN — Medication 105 MILLIGRAM(S): at 16:33

## 2022-04-17 RX ADMIN — QUETIAPINE FUMARATE 50 MILLIGRAM(S): 200 TABLET, FILM COATED ORAL at 22:03

## 2022-04-17 RX ADMIN — Medication 3 MILLILITER(S): at 18:12

## 2022-04-17 RX ADMIN — SODIUM CHLORIDE 100 MILLILITER(S): 9 INJECTION INTRAMUSCULAR; INTRAVENOUS; SUBCUTANEOUS at 06:23

## 2022-04-17 RX ADMIN — SENNA PLUS 2 TABLET(S): 8.6 TABLET ORAL at 22:03

## 2022-04-17 RX ADMIN — DIVALPROEX SODIUM 750 MILLIGRAM(S): 500 TABLET, DELAYED RELEASE ORAL at 06:21

## 2022-04-17 RX ADMIN — Medication 105 MILLIGRAM(S): at 23:22

## 2022-04-17 RX ADMIN — DIVALPROEX SODIUM 750 MILLIGRAM(S): 500 TABLET, DELAYED RELEASE ORAL at 18:08

## 2022-04-17 RX ADMIN — Medication 105 MILLIGRAM(S): at 00:39

## 2022-04-17 RX ADMIN — Medication 88 MICROGRAM(S): at 06:21

## 2022-04-17 RX ADMIN — Medication 3 MILLILITER(S): at 12:07

## 2022-04-17 RX ADMIN — Medication 3 MILLILITER(S): at 06:22

## 2022-04-17 RX ADMIN — Medication 3 MILLILITER(S): at 00:39

## 2022-04-17 RX ADMIN — LISINOPRIL 20 MILLIGRAM(S): 2.5 TABLET ORAL at 06:22

## 2022-04-17 RX ADMIN — AMLODIPINE BESYLATE 10 MILLIGRAM(S): 2.5 TABLET ORAL at 06:22

## 2022-04-17 RX ADMIN — Medication 3 MILLILITER(S): at 23:17

## 2022-04-17 RX ADMIN — POLYETHYLENE GLYCOL 3350 17 GRAM(S): 17 POWDER, FOR SOLUTION ORAL at 06:22

## 2022-04-17 RX ADMIN — Medication 105 MILLIGRAM(S): at 08:07

## 2022-04-17 NOTE — PROGRESS NOTE ADULT - ASSESSMENT
Assessment: 71 yo male with a PMHx of HTN, Hypothyroidism, and Diverticulosis (requiring blood transfusions x2 8 yrs ago) who presented to [facility] on 4/12 as a transfer from [place] for ICH. Patient LKN was at 2200pm on 4/11. Patient was noted on 4/12 @ 0200 to be obtunded with saliva coming out of his mouth. EMS was called, patient came back to baseline and did not want to go to hospital and EMS left. Patient then had a seizure-like event witnessed by wife with UE shaking, foaming at the mouth, and urinary/fecal incontinence. Neurology consulted for new onset seizures in the setting of ICH while patient was in NSCU. No seizures seen on EEG thus far during this admission. Transferred from NSCU to EMU on 4/15 after stable IPH noted.     Impression: New onset of seizures of unknown cause, with R cerebellar IPH.      Plan:  [] Neuro checks, vitals Q4HR.  [] Telemetry.  [] Seizure/Fall/Aspiration precautions.  [] SBP goal: <150.  [] Off EEG.  [] Pending MRI brain w/wo under anesthesia on 4/18, with LP to be performed subsequently. Hold Lovenox after Sunday AM dose. Will need to be NPO after MN on Sunday.  [] VA Duplex lower extremities pending, patient with known L soleal vein DVT.  [] c/w Depakote 750MG PO BID.  [] c/w Seroquel 50MG PO QHS.  [] Cardiology following (Dr. Fragoso), c/w Lisinopril 20MG PO QD, Hydralazine 10MG PO Q4HR  [] Pulmonology following, Luis Daniel DC'd as no PNA seen on CTA Chest.   [x] 4/16 CTA Chest PE Protocol: as above, patient with small R upper lobe segmental branch PE.  [] 4/16: Vascular Neurology aware of case, spoke to stroke fellow Dr. Abby Deutsch. From vascular neurology standpoint no contraindication for full dose AC if needed in setting of PE.  [] Vascular Cardiology following (Dr. Perera): Spoke with Dr. Perera. Would continue Lovenox 40MG SC QD for now. Will obtain MRI Head w/wo to further risk-stratify patient prior to deciding on full dose AC vs IVC Filter.   [] 4/16: Developed JESSICA after CTA Chest, likely due to contrast. Continue IVF NS @ 100ml/hr.   [] DVT PPx: Lovenox 40MG SC QD.  [] Diet: Regular.  [] Seizure Rescue: Ativan 1MG IVP x1 for convulsions/GTCs lasting > 3 minutes, VPA 1G IV x1 for convulsions/GTCs that are refractory to Ativan.    Case seen and discussed with epilepsy attending Dr. Naidu.

## 2022-04-17 NOTE — PROGRESS NOTE ADULT - SUBJECTIVE AND OBJECTIVE BOX
Subjective: Patient seen and examined. No new events except as noted.   Resting comfortably.     REVIEW OF SYSTEMS:    CONSTITUTIONAL: + weakness, fevers or chills  EYES/ENT: No visual changes;  No vertigo or throat pain   NECK: No pain or stiffness  RESPIRATORY: No cough, wheezing, hemoptysis; No shortness of breath  CARDIOVASCULAR: No chest pain or palpitations  GASTROINTESTINAL: No abdominal or epigastric pain. No nausea, vomiting, or hematemesis; No diarrhea or constipation. No melena or hematochezia.  GENITOURINARY: No dysuria, frequency or hematuria  NEUROLOGICAL: No numbness or weakness  SKIN: No itching, burning, rashes, or lesions   All other review of systems is negative unless indicated above.    MEDICATIONS:  MEDICATIONS  (STANDING):  albuterol/ipratropium for Nebulization 3 milliLiter(s) Nebulizer every 6 hours  amLODIPine   Tablet 10 milliGRAM(s) Oral daily  diVALproex  milliGRAM(s) Oral two times a day  enoxaparin Injectable 40 milliGRAM(s) SubCutaneous once  levothyroxine 88 MICROGram(s) Oral daily  lisinopril 20 milliGRAM(s) Oral daily  multiple electrolytes Injection Type 1 1000 milliLiter(s) (50 mL/Hr) IV Continuous <Continuous>  polyethylene glycol 3350 17 Gram(s) Oral two times a day  QUEtiapine 50 milliGRAM(s) Oral at bedtime  senna 2 Tablet(s) Oral at bedtime  sodium chloride 0.9%. 1000 milliLiter(s) (100 mL/Hr) IV Continuous <Continuous>  thiamine IVPB 500 milliGRAM(s) IV Intermittent every 8 hours    PHYSICAL EXAM:  T(C): 36.4 (04-17-22 @ 01:38), Max: 37.1 (04-16-22 @ 09:48)  HR: 71 (04-17-22 @ 01:38) (71 - 95)  BP: 148/77 (04-17-22 @ 01:38) (103/66 - 148/77)  RR: 19 (04-17-22 @ 01:38) (18 - 20)  SpO2: 96% (04-17-22 @ 01:38) (95% - 98%)  Wt(kg): --  I&O's Summary    15 Apr 2022 07:01  -  16 Apr 2022 07:00  --------------------------------------------------------  IN: 60 mL / OUT: 0 mL / NET: 60 mL    16 Apr 2022 07:01  -  17 Apr 2022 05:25  --------------------------------------------------------  IN: 360 mL / OUT: 150 mL / NET: 210 mL    Appearance: NAD	  HEENT: dry oral mucosa, PERRL, EOMI	  Lymphatic: No lymphadenopathy , no edema  Cardiovascular: Normal S1 S2, No JVD, No murmurs , Peripheral pulses palpable 2+ bilaterally  Respiratory: Lungs clear to auscultation, normal effort 	  Gastrointestinal:  Soft, Non-tender, + BS	  Skin: No rashes, No ecchymoses, No cyanosis, warm to touch  NEUROLOGICAL EXAM:  MS: Sleepy, eyes open spontaneously. Speech is fluent, not slurred. Follows commands.  CN: EOMI. Face symmetric.   Motor: Moves all extremities.  Sensory: Intact to LT throughout.  Coordination/Gait: Not assessed.  Ext: No edema    LABS:    CARDIAC MARKERS:                        12.2   7.80  )-----------( 249      ( 16 Apr 2022 07:55 )             37.7     04-16    145  |  104  |  11  ----------------------------<  91  3.8   |  24  |  1.54<H>    Ca    9.5      16 Apr 2022 07:55    TPro  8.6<H>  /  Alb  3.8  /  TBili  0.7  /  DBili  x   /  AST  35  /  ALT  15  /  AlkPhos  78  04-16    proBNP:   Lipid Profile:   HgA1c:   TSH:     TELEMETRY: SR	    ECG:  	  RADIOLOGY:   DIAGNOSTIC TESTING:  [ ] Echocardiogram:  [ ]  Catheterization:  [ ] Stress Test:    OTHER: 	 Subjective: Patient seen and examined. No new events except as noted.   Resting comfortably but periods of agitation.  Has bad cough as per RN - ?chest xray - on NC.    REVIEW OF SYSTEMS:    CONSTITUTIONAL: + weakness, fevers or chills  EYES/ENT: No visual changes;  No vertigo or throat pain   NECK: No pain or stiffness  RESPIRATORY: No cough, wheezing, hemoptysis; No shortness of breath  CARDIOVASCULAR: No chest pain or palpitations  GASTROINTESTINAL: No abdominal or epigastric pain. No nausea, vomiting, or hematemesis; No diarrhea or constipation. No melena or hematochezia.  GENITOURINARY: No dysuria, frequency or hematuria  NEUROLOGICAL: No numbness or weakness  SKIN: No itching, burning, rashes, or lesions   All other review of systems is negative unless indicated above.    MEDICATIONS:  MEDICATIONS  (STANDING):  albuterol/ipratropium for Nebulization 3 milliLiter(s) Nebulizer every 6 hours  amLODIPine   Tablet 10 milliGRAM(s) Oral daily  diVALproex  milliGRAM(s) Oral two times a day  enoxaparin Injectable 40 milliGRAM(s) SubCutaneous once  levothyroxine 88 MICROGram(s) Oral daily  lisinopril 20 milliGRAM(s) Oral daily  multiple electrolytes Injection Type 1 1000 milliLiter(s) (50 mL/Hr) IV Continuous <Continuous>  polyethylene glycol 3350 17 Gram(s) Oral two times a day  QUEtiapine 50 milliGRAM(s) Oral at bedtime  senna 2 Tablet(s) Oral at bedtime  sodium chloride 0.9%. 1000 milliLiter(s) (100 mL/Hr) IV Continuous <Continuous>  thiamine IVPB 500 milliGRAM(s) IV Intermittent every 8 hours    PHYSICAL EXAM:  T(C): 36.4 (04-17-22 @ 01:38), Max: 37.1 (04-16-22 @ 09:48)  HR: 71 (04-17-22 @ 01:38) (71 - 95)  BP: 148/77 (04-17-22 @ 01:38) (103/66 - 148/77)  RR: 19 (04-17-22 @ 01:38) (18 - 20)  SpO2: 96% (04-17-22 @ 01:38) (95% - 98%)  Wt(kg): --  I&O's Summary    15 Apr 2022 07:01  -  16 Apr 2022 07:00  --------------------------------------------------------  IN: 60 mL / OUT: 0 mL / NET: 60 mL    16 Apr 2022 07:01  -  17 Apr 2022 05:25  --------------------------------------------------------  IN: 360 mL / OUT: 150 mL / NET: 210 mL    Appearance: NAD	  HEENT: dry oral mucosa, PERRL, EOMI	  Lymphatic: No lymphadenopathy , no edema  Cardiovascular: Normal S1 S2, No JVD, No murmurs , Peripheral pulses palpable 2+ bilaterally  Respiratory: Lungs clear to auscultation, normal effort, NC 	  Gastrointestinal:  Soft, Non-tender, + BS	  Skin: No rashes, No ecchymoses, No cyanosis, warm to touch  NEUROLOGICAL EXAM:  MS: Sleepy, eyes open spontaneously. Speech is fluent, not slurred. Follows commands.  CN: EOMI. Face symmetric.   Motor: Moves all extremities.  Sensory: Intact to LT throughout.  Coordination/Gait: Not assessed.  Ext: No edema    LABS:    CARDIAC MARKERS:                        12.2   7.80  )-----------( 249      ( 16 Apr 2022 07:55 )             37.7     04-16    145  |  104  |  11  ----------------------------<  91  3.8   |  24  |  1.54<H>    Ca    9.5      16 Apr 2022 07:55    TPro  8.6<H>  /  Alb  3.8  /  TBili  0.7  /  DBili  x   /  AST  35  /  ALT  15  /  AlkPhos  78  04-16    proBNP:   Lipid Profile:   HgA1c:   TSH:     TELEMETRY: SR	    ECG:  	  RADIOLOGY:   DIAGNOSTIC TESTING:  [ ] Echocardiogram:  [ ]  Catheterization:  [ ] Stress Test:    OTHER: 	 Subjective: Patient seen and examined. No new events except as noted.   Resting comfortably but periods of agitation.  Has bad cough as per RN - recent chest xray with atelectasis - on NC.    REVIEW OF SYSTEMS:    CONSTITUTIONAL: + weakness, fevers or chills  EYES/ENT: No visual changes;  No vertigo or throat pain   NECK: No pain or stiffness  RESPIRATORY: No cough, wheezing, hemoptysis; No shortness of breath  CARDIOVASCULAR: No chest pain or palpitations  GASTROINTESTINAL: No abdominal or epigastric pain. No nausea, vomiting, or hematemesis; No diarrhea or constipation. No melena or hematochezia.  GENITOURINARY: No dysuria, frequency or hematuria  NEUROLOGICAL: No numbness or weakness  SKIN: No itching, burning, rashes, or lesions   All other review of systems is negative unless indicated above.    MEDICATIONS:  MEDICATIONS  (STANDING):  albuterol/ipratropium for Nebulization 3 milliLiter(s) Nebulizer every 6 hours  amLODIPine   Tablet 10 milliGRAM(s) Oral daily  diVALproex  milliGRAM(s) Oral two times a day  enoxaparin Injectable 40 milliGRAM(s) SubCutaneous once  levothyroxine 88 MICROGram(s) Oral daily  lisinopril 20 milliGRAM(s) Oral daily  multiple electrolytes Injection Type 1 1000 milliLiter(s) (50 mL/Hr) IV Continuous <Continuous>  polyethylene glycol 3350 17 Gram(s) Oral two times a day  QUEtiapine 50 milliGRAM(s) Oral at bedtime  senna 2 Tablet(s) Oral at bedtime  sodium chloride 0.9%. 1000 milliLiter(s) (100 mL/Hr) IV Continuous <Continuous>  thiamine IVPB 500 milliGRAM(s) IV Intermittent every 8 hours    PHYSICAL EXAM:  T(C): 36.4 (04-17-22 @ 01:38), Max: 37.1 (04-16-22 @ 09:48)  HR: 71 (04-17-22 @ 01:38) (71 - 95)  BP: 148/77 (04-17-22 @ 01:38) (103/66 - 148/77)  RR: 19 (04-17-22 @ 01:38) (18 - 20)  SpO2: 96% (04-17-22 @ 01:38) (95% - 98%)  Wt(kg): --  I&O's Summary    15 Apr 2022 07:01  -  16 Apr 2022 07:00  --------------------------------------------------------  IN: 60 mL / OUT: 0 mL / NET: 60 mL    16 Apr 2022 07:01  -  17 Apr 2022 05:25  --------------------------------------------------------  IN: 360 mL / OUT: 150 mL / NET: 210 mL    Appearance: NAD	  HEENT: dry oral mucosa, PERRL, EOMI	  Lymphatic: No lymphadenopathy , no edema  Cardiovascular: Normal S1 S2, No JVD, No murmurs , Peripheral pulses palpable 2+ bilaterally  Respiratory: Lungs clear to auscultation, normal effort, NC 	  Gastrointestinal:  Soft, Non-tender, + BS	  Skin: No rashes, No ecchymoses, No cyanosis, warm to touch  NEUROLOGICAL EXAM:  MS: Sleepy, eyes open spontaneously. Speech is fluent, not slurred. Follows commands.  CN: EOMI. Face symmetric.   Motor: Moves all extremities.  Sensory: Intact to LT throughout.  Coordination/Gait: Not assessed.  Ext: No edema    LABS:    CARDIAC MARKERS:                        12.2   7.80  )-----------( 249      ( 16 Apr 2022 07:55 )             37.7     04-16    145  |  104  |  11  ----------------------------<  91  3.8   |  24  |  1.54<H>    Ca    9.5      16 Apr 2022 07:55    TPro  8.6<H>  /  Alb  3.8  /  TBili  0.7  /  DBili  x   /  AST  35  /  ALT  15  /  AlkPhos  78  04-16    proBNP:   Lipid Profile:   HgA1c:   TSH:     TELEMETRY: SR	    ECG:  	  RADIOLOGY:   DIAGNOSTIC TESTING:  [ ] Echocardiogram:  [ ]  Catheterization:  [ ] Stress Test:    OTHER:

## 2022-04-17 NOTE — PROGRESS NOTE ADULT - ASSESSMENT
72M PMH htn, hypothyroidism, presented to [place] ED via EMS after family called 911 following a witnessed seizure, by family, reportedly lasting ~5min.

## 2022-04-17 NOTE — PROGRESS NOTE ADULT - SUBJECTIVE AND OBJECTIVE BOX
MRN-[id number]    Subjective: 73 yo male seen and examined at bedside. No seizures overnight. De-sat'd to 80s and placed on NC 6L, improved sats to 90s. Currently on NC.     PAST MEDICAL & SURGICAL HISTORY:  HTN (hypertension)    Diverticulosis    Hypothyroid    No significant past surgical history    Social Hx:  Nonsmoker, no drug or alcohol use    Home Medications:  levothyroxine 75 mcg (0.075 mg) oral tablet: 1 tab(s) orally once a day (15 Apr 2022 14:31)  lisinopril 30 mg oral tablet: 1 tab(s) orally once a day (15 Apr 2022 14:32)    MEDICATIONS  (STANDING):  albuterol/ipratropium for Nebulization 3 milliLiter(s) Nebulizer every 6 hours  amLODIPine   Tablet 10 milliGRAM(s) Oral daily  diVALproex  milliGRAM(s) Oral two times a day  enoxaparin Injectable 40 milliGRAM(s) SubCutaneous <User Schedule>  levothyroxine 88 MICROGram(s) Oral daily  lisinopril 20 milliGRAM(s) Oral daily  multiple electrolytes Injection Type 1 1000 milliLiter(s) (50 mL/Hr) IV Continuous <Continuous>  piperacillin/tazobactam IVPB.. 3.375 Gram(s) IV Intermittent every 8 hours  polyethylene glycol 3350 17 Gram(s) Oral two times a day  QUEtiapine 50 milliGRAM(s) Oral at bedtime  senna 2 Tablet(s) Oral at bedtime  thiamine IVPB 500 milliGRAM(s) IV Intermittent every 8 hours    MEDICATIONS  (PRN):  hydrALAZINE 10 milliGRAM(s) Oral every 4 hours PRN Systolic blood pressure < 160  LORazepam   Injectable 1 milliGRAM(s) IV Push once PRN seizure    Allergies  fish (Hives; Angioedema)  No Known Drug Allergies    ROS: Pertinent positives above, all other ROS were reviewed and are negative.      Vital Signs Last 24 Hrs  T(C): 36.6 (17 Apr 2022 05:00), Max: 37.1 (16 Apr 2022 13:32)  T(F): 97.8 (17 Apr 2022 05:00), Max: 98.8 (16 Apr 2022 13:32)  HR: 79 (17 Apr 2022 05:00) (71 - 95)  BP: 150/75 (17 Apr 2022 05:00) (135/65 - 150/75)  BP(mean): --  RR: 20 (17 Apr 2022 05:00) (18 - 20)  SpO2: 94% (17 Apr 2022 05:00) (94% - 98%)    GENERAL EXAM:  Constitutional: NAD.   Head: Normocephalic, atraumatic.  Extremities: No edema.    NEUROLOGICAL EXAM:  MS: Sleepy, eyes open spontaneously. Speech is fluent, not slurred. Follows commands.  CN: EOMI. Face symmetric.   Motor: Moves all extremities.  Sensory: Intact to LT throughout.  Coordination/Gait: Not assessed.    Labs:                         12.0   5.41  )-----------( 213      ( 17 Apr 2022 06:26 )             37.9       04-17    146<H>  |  107  |  13  ----------------------------<  79  3.6   |  22  |  1.66<H>    Ca    9.0      17 Apr 2022 06:26  Phos  4.5     04-17  Mg     2.2     04-17    TPro  8.1  /  Alb  3.5  /  TBili  0.4  /  DBili  x   /  AST  29  /  ALT  15  /  AlkPhos  74  04-17      Radiology/EEGs:  -04/12 CTH: 0.5 cm focus of intraparenchymal hemorrhage in the right cerebellum.  -04/12 CT PERFUSION: The areas of elevated Tmax do not follow a vascular distribution and are likely artifactual, related to patient motion. If symptoms persist consider follow up head CT or MRI, MRA  if no contraindication.  -04/12 CTA COW:  Patent intracranial circulation without flow limiting stenosis.  -04/12 CTA NECK: Patent, ECAs, ICAs, no  hemodynamically significant stenosis at ICA origins by NASCET criteria. Bilateral vertebral arteries are patent without flow limiting stenosis.  -04/12 Repeat CTH: Interval stability compared with the prior study of earlier the same day.  -04/16 CTA Chest PE Protocol: Small pulmonary bolus in the right upper lobe segmental branch seen on series 3 image 80. No associated right heart strain or pulmonary infarction noted. Partial right lower lobe atelectasis with elevation of right hemidiaphragm.    -04/13 EEG SUMMARY/CLASSIFICATION:  Abnormal EEG in a sedated patient.  - Background slowing, generalized.  _____________________________________________________________  EEG IMPRESSION/CLINICAL CORRELATE:  Abnormal EEG study.  - Moderate nonspecific diffuse or multifocal cerebral dysfunction.   - No epileptiform patterns or seizures were recorded x 1 day.    -04/14 EEG SUMMARY/CLASSIFICATION:  Abnormal EEG in a sedated patient.  - Background slowing, generalized.  _____________________________________________________________  EEG IMPRESSION/CLINICAL CORRELATE:  Abnormal EEG study.  - Moderate nonspecific diffuse or multifocal cerebral dysfunction.   - No epileptiform patterns or seizures were recorded x 2 days.    -04/15 EEG Summary:  Abnormal EEG in the awake, drowsy and asleep states.  Mild to moderate generalized slowing  Impression/Clinical Correlate:  This is an abnormal EEG record. There is evidence of mild to moderate multifocal/diffuse nonspecific cerebral dysfunction. No epileptiform pattern or seizures were seen.

## 2022-04-18 LAB
ALBUMIN SERPL ELPH-MCNC: 3.7 G/DL — SIGNIFICANT CHANGE UP (ref 3.3–5)
ALP SERPL-CCNC: 68 U/L — SIGNIFICANT CHANGE UP (ref 40–120)
ALT FLD-CCNC: 14 U/L — SIGNIFICANT CHANGE UP (ref 10–45)
ANION GAP SERPL CALC-SCNC: 16 MMOL/L — SIGNIFICANT CHANGE UP (ref 5–17)
APPEARANCE CSF: CLEAR — SIGNIFICANT CHANGE UP
APTT BLD: 28.6 SEC — SIGNIFICANT CHANGE UP (ref 27.5–35.5)
AST SERPL-CCNC: 29 U/L — SIGNIFICANT CHANGE UP (ref 10–40)
BASOPHILS # BLD AUTO: 0.03 K/UL — SIGNIFICANT CHANGE UP (ref 0–0.2)
BASOPHILS NFR BLD AUTO: 0.5 % — SIGNIFICANT CHANGE UP (ref 0–2)
BILIRUB SERPL-MCNC: 0.4 MG/DL — SIGNIFICANT CHANGE UP (ref 0.2–1.2)
BUN SERPL-MCNC: 9 MG/DL — SIGNIFICANT CHANGE UP (ref 7–23)
CALCIUM SERPL-MCNC: 9.4 MG/DL — SIGNIFICANT CHANGE UP (ref 8.4–10.5)
CHLORIDE SERPL-SCNC: 107 MMOL/L — SIGNIFICANT CHANGE UP (ref 96–108)
CO2 SERPL-SCNC: 26 MMOL/L — SIGNIFICANT CHANGE UP (ref 22–31)
COLOR CSF: SIGNIFICANT CHANGE UP
CREAT SERPL-MCNC: 1.26 MG/DL — SIGNIFICANT CHANGE UP (ref 0.5–1.3)
CSF PCR RESULT: SIGNIFICANT CHANGE UP
EGFR: 61 ML/MIN/1.73M2 — SIGNIFICANT CHANGE UP
EOSINOPHIL # BLD AUTO: 0.23 K/UL — SIGNIFICANT CHANGE UP (ref 0–0.5)
EOSINOPHIL NFR BLD AUTO: 3.7 % — SIGNIFICANT CHANGE UP (ref 0–6)
GLUCOSE BLDC GLUCOMTR-MCNC: 134 MG/DL — HIGH (ref 70–99)
GLUCOSE BLDC GLUCOMTR-MCNC: 92 MG/DL — SIGNIFICANT CHANGE UP (ref 70–99)
GLUCOSE CSF-MCNC: 75 MG/DL — HIGH (ref 40–70)
GLUCOSE SERPL-MCNC: 99 MG/DL — SIGNIFICANT CHANGE UP (ref 70–99)
GRAM STN FLD: SIGNIFICANT CHANGE UP
HCT VFR BLD CALC: 38.3 % — LOW (ref 39–50)
HGB BLD-MCNC: 12.1 G/DL — LOW (ref 13–17)
IMM GRANULOCYTES NFR BLD AUTO: 0.3 % — SIGNIFICANT CHANGE UP (ref 0–1.5)
INR BLD: 1.05 RATIO — SIGNIFICANT CHANGE UP (ref 0.88–1.16)
LABORATORY COMMENT REPORT: SIGNIFICANT CHANGE UP
LDH CSF L TO P-CCNC: 44 U/L — SIGNIFICANT CHANGE UP
LDH FLD-CCNC: 44 U/L — SIGNIFICANT CHANGE UP
LYMPHOCYTES # BLD AUTO: 1.65 K/UL — SIGNIFICANT CHANGE UP (ref 1–3.3)
LYMPHOCYTES # BLD AUTO: 26.4 % — SIGNIFICANT CHANGE UP (ref 13–44)
LYMPHOCYTES # CSF: 83 % — HIGH (ref 40–80)
MCHC RBC-ENTMCNC: 29.8 PG — SIGNIFICANT CHANGE UP (ref 27–34)
MCHC RBC-ENTMCNC: 31.6 GM/DL — LOW (ref 32–36)
MCV RBC AUTO: 94.3 FL — SIGNIFICANT CHANGE UP (ref 80–100)
MONOCYTES # BLD AUTO: 0.66 K/UL — SIGNIFICANT CHANGE UP (ref 0–0.9)
MONOCYTES NFR BLD AUTO: 10.6 % — SIGNIFICANT CHANGE UP (ref 2–14)
MONOS+MACROS NFR CSF: 17 % — SIGNIFICANT CHANGE UP (ref 15–45)
NEUTROPHILS # BLD AUTO: 3.66 K/UL — SIGNIFICANT CHANGE UP (ref 1.8–7.4)
NEUTROPHILS # CSF: 0 % — SIGNIFICANT CHANGE UP (ref 0–6)
NEUTROPHILS NFR BLD AUTO: 58.5 % — SIGNIFICANT CHANGE UP (ref 43–77)
NRBC # BLD: 0 /100 WBCS — SIGNIFICANT CHANGE UP (ref 0–0)
NRBC NFR CSF: 1 /UL — SIGNIFICANT CHANGE UP (ref 0–5)
PLATELET # BLD AUTO: 224 K/UL — SIGNIFICANT CHANGE UP (ref 150–400)
POTASSIUM SERPL-MCNC: 3.4 MMOL/L — LOW (ref 3.5–5.3)
POTASSIUM SERPL-SCNC: 3.4 MMOL/L — LOW (ref 3.5–5.3)
PROT CSF-MCNC: 69 MG/DL — HIGH (ref 15–45)
PROT SERPL-MCNC: 8.1 G/DL — SIGNIFICANT CHANGE UP (ref 6–8.3)
PROTHROM AB SERPL-ACNC: 12.1 SEC — SIGNIFICANT CHANGE UP (ref 10.5–13.4)
RBC # BLD: 4.06 M/UL — LOW (ref 4.2–5.8)
RBC # CSF: 1 /UL — HIGH (ref 0–0)
RBC # FLD: 14 % — SIGNIFICANT CHANGE UP (ref 10.3–14.5)
SODIUM SERPL-SCNC: 149 MMOL/L — HIGH (ref 135–145)
SOURCE HSV 1/2: SIGNIFICANT CHANGE UP
SPECIMEN SOURCE: SIGNIFICANT CHANGE UP
TUBE TYPE: SIGNIFICANT CHANGE UP
WBC # BLD: 6.25 K/UL — SIGNIFICANT CHANGE UP (ref 3.8–10.5)
WBC # FLD AUTO: 6.25 K/UL — SIGNIFICANT CHANGE UP (ref 3.8–10.5)

## 2022-04-18 PROCEDURE — 62328 DX LMBR SPI PNXR W/FLUOR/CT: CPT

## 2022-04-18 PROCEDURE — 70553 MRI BRAIN STEM W/O & W/DYE: CPT | Mod: 26

## 2022-04-18 PROCEDURE — 99233 SBSQ HOSP IP/OBS HIGH 50: CPT

## 2022-04-18 PROCEDURE — 99232 SBSQ HOSP IP/OBS MODERATE 35: CPT

## 2022-04-18 RX ORDER — HEPARIN SODIUM 5000 [USP'U]/ML
6500 INJECTION INTRAVENOUS; SUBCUTANEOUS EVERY 6 HOURS
Refills: 0 | Status: DISCONTINUED | OUTPATIENT
Start: 2022-04-18 | End: 2022-04-18

## 2022-04-18 RX ORDER — IMMUNE GLOBULIN (HUMAN) 10 G/100ML
30 INJECTION INTRAVENOUS; SUBCUTANEOUS DAILY
Refills: 0 | Status: COMPLETED | OUTPATIENT
Start: 2022-04-18 | End: 2022-04-22

## 2022-04-18 RX ORDER — HEPARIN SODIUM 5000 [USP'U]/ML
INJECTION INTRAVENOUS; SUBCUTANEOUS
Qty: 25000 | Refills: 0 | Status: DISCONTINUED | OUTPATIENT
Start: 2022-04-18 | End: 2022-04-18

## 2022-04-18 RX ORDER — SODIUM CHLORIDE 9 MG/ML
1000 INJECTION, SOLUTION INTRAVENOUS
Refills: 0 | Status: DISCONTINUED | OUTPATIENT
Start: 2022-04-18 | End: 2022-04-19

## 2022-04-18 RX ORDER — HEPARIN SODIUM 5000 [USP'U]/ML
1400 INJECTION INTRAVENOUS; SUBCUTANEOUS
Qty: 25000 | Refills: 0 | Status: DISCONTINUED | OUTPATIENT
Start: 2022-04-18 | End: 2022-04-19

## 2022-04-18 RX ORDER — ENOXAPARIN SODIUM 100 MG/ML
40 INJECTION SUBCUTANEOUS
Refills: 0 | Status: DISCONTINUED | OUTPATIENT
Start: 2022-04-18 | End: 2022-04-18

## 2022-04-18 RX ORDER — PANTOPRAZOLE SODIUM 20 MG/1
40 TABLET, DELAYED RELEASE ORAL
Refills: 0 | Status: DISCONTINUED | OUTPATIENT
Start: 2022-04-18 | End: 2022-05-04

## 2022-04-18 RX ORDER — LACOSAMIDE 50 MG/1
200 TABLET ORAL ONCE
Refills: 0 | Status: DISCONTINUED | OUTPATIENT
Start: 2022-04-18 | End: 2022-04-18

## 2022-04-18 RX ORDER — HEPARIN SODIUM 5000 [USP'U]/ML
3000 INJECTION INTRAVENOUS; SUBCUTANEOUS EVERY 6 HOURS
Refills: 0 | Status: DISCONTINUED | OUTPATIENT
Start: 2022-04-18 | End: 2022-04-18

## 2022-04-18 RX ORDER — DIPHENHYDRAMINE HCL 50 MG
25 CAPSULE ORAL DAILY
Refills: 0 | Status: COMPLETED | OUTPATIENT
Start: 2022-04-18 | End: 2022-04-22

## 2022-04-18 RX ORDER — LACOSAMIDE 50 MG/1
100 TABLET ORAL EVERY 12 HOURS
Refills: 0 | Status: DISCONTINUED | OUTPATIENT
Start: 2022-04-18 | End: 2022-04-24

## 2022-04-18 RX ORDER — ACETAMINOPHEN 500 MG
650 TABLET ORAL DAILY
Refills: 0 | Status: COMPLETED | OUTPATIENT
Start: 2022-04-18 | End: 2022-04-22

## 2022-04-18 RX ADMIN — Medication 3 MILLILITER(S): at 17:20

## 2022-04-18 RX ADMIN — Medication 650 MILLIGRAM(S): at 17:13

## 2022-04-18 RX ADMIN — Medication 88 MICROGRAM(S): at 05:10

## 2022-04-18 RX ADMIN — IMMUNE GLOBULIN (HUMAN) 50 GRAM(S): 10 INJECTION INTRAVENOUS; SUBCUTANEOUS at 17:21

## 2022-04-18 RX ADMIN — Medication 25 MILLIGRAM(S): at 16:45

## 2022-04-18 RX ADMIN — LACOSAMIDE 120 MILLIGRAM(S): 50 TABLET ORAL at 23:50

## 2022-04-18 RX ADMIN — SODIUM CHLORIDE 60 MILLILITER(S): 9 INJECTION, SOLUTION INTRAVENOUS at 15:11

## 2022-04-18 RX ADMIN — Medication 3 MILLILITER(S): at 05:09

## 2022-04-18 RX ADMIN — Medication 105 MILLIGRAM(S): at 16:04

## 2022-04-18 RX ADMIN — LACOSAMIDE 200 MILLIGRAM(S): 50 TABLET ORAL at 17:10

## 2022-04-18 RX ADMIN — QUETIAPINE FUMARATE 50 MILLIGRAM(S): 200 TABLET, FILM COATED ORAL at 23:18

## 2022-04-18 RX ADMIN — PANTOPRAZOLE SODIUM 40 MILLIGRAM(S): 20 TABLET, DELAYED RELEASE ORAL at 17:19

## 2022-04-18 RX ADMIN — LISINOPRIL 20 MILLIGRAM(S): 2.5 TABLET ORAL at 05:10

## 2022-04-18 RX ADMIN — DIVALPROEX SODIUM 750 MILLIGRAM(S): 500 TABLET, DELAYED RELEASE ORAL at 05:11

## 2022-04-18 RX ADMIN — HEPARIN SODIUM 14 UNIT(S)/HR: 5000 INJECTION INTRAVENOUS; SUBCUTANEOUS at 18:35

## 2022-04-18 RX ADMIN — Medication 650 MILLIGRAM(S): at 16:45

## 2022-04-18 RX ADMIN — AMLODIPINE BESYLATE 10 MILLIGRAM(S): 2.5 TABLET ORAL at 05:10

## 2022-04-18 NOTE — PROGRESS NOTE ADULT - SUBJECTIVE AND OBJECTIVE BOX
Vascular Cardiology  Progress note    SERVICE LINE [phone]              EMAIL [email or web address]   OFFICE [phone]    CC:  seizure    INTERVAL HISTORY:  Patient found to have right upper lobe segmental PE on CT. Lower extremity duplex not done yet         Allergies    fish (Hives; Angioedema)  No Known Drug Allergies    Intolerances    	    MEDICATIONS:  amLODIPine   Tablet 10 milliGRAM(s) Oral daily  hydrALAZINE 10 milliGRAM(s) Oral every 4 hours PRN  lisinopril 20 milliGRAM(s) Oral daily      albuterol/ipratropium for Nebulization 3 milliLiter(s) Nebulizer every 6 hours    diVALproex  milliGRAM(s) Oral two times a day  LORazepam   Injectable 1 milliGRAM(s) IV Push once PRN  QUEtiapine 50 milliGRAM(s) Oral at bedtime    polyethylene glycol 3350 17 Gram(s) Oral two times a day  senna 2 Tablet(s) Oral at bedtime    levothyroxine 88 MICROGram(s) Oral daily    multiple electrolytes Injection Type 1 1000 milliLiter(s) IV Continuous <Continuous>  sodium chloride 0.9%. 1000 milliLiter(s) IV Continuous <Continuous>  thiamine IVPB 500 milliGRAM(s) IV Intermittent every 8 hours      PAST MEDICAL & SURGICAL HISTORY:  HTN (hypertension)    Diverticulosis    Hypothyroid    No significant past surgical history        FAMILY HISTORY:      SOCIAL HISTORY:  unchanged    REVIEW OF SYSTEMS:	  	  [X ] Unable to obtain    PHYSICAL EXAM:  T(C): 36.8 (04-18-22 @ 01:05), Max: 37.4 (04-17-22 @ 13:38)  HR: 84 (04-18-22 @ 01:05) (77 - 96)  BP: 138/61 (04-18-22 @ 01:05) (138/61 - 163/80)  RR: 18 (04-18-22 @ 01:05) (18 - 20)  SpO2: 93% (04-18-22 @ 01:05) (91% - 93%)  Wt(kg): --  I&O's Summary    17 Apr 2022 07:01  -  18 Apr 2022 07:00  --------------------------------------------------------  IN: 0 mL / OUT: 300 mL / NET: -300 mL        Appearance: tired appearing  HEENT:   Normal oral mucosa, PERRL, EOMI	  Carotid:   Right: no bruit   Left: no bruit     Lymphatic: No lymphadenopathy  Cardiovascular:  tachycardiac, s1s2, no MRG   Respiratory: + rhonchi   Psychiatry:  AAO x 3  Gastrointestinal:  Soft, Non-tender, + BS	  Skin: No rashes, No ecchymoses, No cyanosis	  Neurologic:  AOx3   Extremities:  dry skin, no edema     Vascular Pulse Exam:  Right DP: [x]palpable []non-palpable []audible      Left DP :   [x]palpable []non-palpable []audible  Right PT: [x]palpable [] non-palpable []audible   Left PT:  [x] palpable [] non-palpable []audible      LABS:	 	    CBC Full  -  ( 18 Apr 2022 07:19 )  WBC Count : 6.25 K/uL  Hemoglobin : 12.1 g/dL  Hematocrit : 38.3 %  Platelet Count - Automated : 224 K/uL  Mean Cell Volume : 94.3 fl  Mean Cell Hemoglobin : 29.8 pg  Mean Cell Hemoglobin Concentration : 31.6 gm/dL  Auto Neutrophil # : 3.66 K/uL  Auto Lymphocyte # : 1.65 K/uL  Auto Monocyte # : 0.66 K/uL  Auto Eosinophil # : 0.23 K/uL  Auto Basophil # : 0.03 K/uL  Auto Neutrophil % : 58.5 %  Auto Lymphocyte % : 26.4 %  Auto Monocyte % : 10.6 %  Auto Eosinophil % : 3.7 %  Auto Basophil % : 0.5 %    04-18    149<H>  |  107  |  9   ----------------------------<  99  3.4<L>   |  26  |  1.26  04-17    146<H>  |  107  |  13  ----------------------------<  79  3.6   |  22  |  1.66<H>    Ca    9.4      18 Apr 2022 07:19  Ca    9.0      17 Apr 2022 06:26  Phos  4.5     04-17  Mg     2.2     04-17    TPro  8.1  /  Alb  3.7  /  TBili  0.4  /  DBili  x   /  AST  29  /  ALT  14  /  AlkPhos  68  04-18  TPro  8.1  /  Alb  3.5  /  TBili  0.4  /  DBili  x   /  AST  29  /  ALT  15  /  AlkPhos  74  04-17          Assessment:  1. L soleal vein DVT with small RUL PE   2. 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 Continue with lovenox ppx SQ daily    2. Repeat LE US on today to re-assess L DVT as well as MRI to assess extent of hemorrhage and risk of full dose AC. Will make decision about full dose AC after this information is available along with the neurology team.       Thank you      Vascular Cardiology Service    Please call with any questions:   DIRECT SERVICE NUMBER:  [phone]  Office [phone]  email:  [email or web address]       Vascular Cardiology  Progress note    SERVICE LINE [phone]              EMAIL [email or web address]   OFFICE [phone]    CC:  seizure    INTERVAL HISTORY:  Patient found to have right upper lobe segmental PE on CT. Lower extremity duplex not done yet         Allergies    fish (Hives; Angioedema)  No Known Drug Allergies    Intolerances    	    MEDICATIONS:  amLODIPine   Tablet 10 milliGRAM(s) Oral daily  hydrALAZINE 10 milliGRAM(s) Oral every 4 hours PRN  lisinopril 20 milliGRAM(s) Oral daily      albuterol/ipratropium for Nebulization 3 milliLiter(s) Nebulizer every 6 hours    diVALproex  milliGRAM(s) Oral two times a day  LORazepam   Injectable 1 milliGRAM(s) IV Push once PRN  QUEtiapine 50 milliGRAM(s) Oral at bedtime    polyethylene glycol 3350 17 Gram(s) Oral two times a day  senna 2 Tablet(s) Oral at bedtime    levothyroxine 88 MICROGram(s) Oral daily    multiple electrolytes Injection Type 1 1000 milliLiter(s) IV Continuous <Continuous>  sodium chloride 0.9%. 1000 milliLiter(s) IV Continuous <Continuous>  thiamine IVPB 500 milliGRAM(s) IV Intermittent every 8 hours      PAST MEDICAL & SURGICAL HISTORY:  HTN (hypertension)    Diverticulosis    Hypothyroid    No significant past surgical history        FAMILY HISTORY:      SOCIAL HISTORY:  unchanged    REVIEW OF SYSTEMS:	  	  [X ] Unable to obtain    PHYSICAL EXAM:  T(C): 36.8 (04-18-22 @ 01:05), Max: 37.4 (04-17-22 @ 13:38)  HR: 84 (04-18-22 @ 01:05) (77 - 96)  BP: 138/61 (04-18-22 @ 01:05) (138/61 - 163/80)  RR: 18 (04-18-22 @ 01:05) (18 - 20)  SpO2: 93% (04-18-22 @ 01:05) (91% - 93%)  Wt(kg): --  I&O's Summary    17 Apr 2022 07:01  -  18 Apr 2022 07:00  --------------------------------------------------------  IN: 0 mL / OUT: 300 mL / NET: -300 mL        Appearance: tired appearing  HEENT:   Normal oral mucosa, PERRL, EOMI	  Carotid:   Right: no bruit   Left: no bruit     Lymphatic: No lymphadenopathy  Cardiovascular:  tachycardiac, s1s2, no MRG   Respiratory: + rhonchi   Psychiatry:  AAO x 3  Gastrointestinal:  Soft, Non-tender, + BS	  Skin: No rashes, No ecchymoses, No cyanosis	  Neurologic:  AOx3   Extremities:  dry skin, no edema     Vascular Pulse Exam:  Right DP: [x]palpable []non-palpable []audible      Left DP :   [x]palpable []non-palpable []audible  Right PT: [x]palpable [] non-palpable []audible   Left PT:  [x] palpable [] non-palpable []audible      LABS:	 	    CBC Full  -  ( 18 Apr 2022 07:19 )  WBC Count : 6.25 K/uL  Hemoglobin : 12.1 g/dL  Hematocrit : 38.3 %  Platelet Count - Automated : 224 K/uL  Mean Cell Volume : 94.3 fl  Mean Cell Hemoglobin : 29.8 pg  Mean Cell Hemoglobin Concentration : 31.6 gm/dL  Auto Neutrophil # : 3.66 K/uL  Auto Lymphocyte # : 1.65 K/uL  Auto Monocyte # : 0.66 K/uL  Auto Eosinophil # : 0.23 K/uL  Auto Basophil # : 0.03 K/uL  Auto Neutrophil % : 58.5 %  Auto Lymphocyte % : 26.4 %  Auto Monocyte % : 10.6 %  Auto Eosinophil % : 3.7 %  Auto Basophil % : 0.5 %    04-18    149<H>  |  107  |  9   ----------------------------<  99  3.4<L>   |  26  |  1.26  04-17    146<H>  |  107  |  13  ----------------------------<  79  3.6   |  22  |  1.66<H>    Ca    9.4      18 Apr 2022 07:19  Ca    9.0      17 Apr 2022 06:26  Phos  4.5     04-17  Mg     2.2     04-17    TPro  8.1  /  Alb  3.7  /  TBili  0.4  /  DBili  x   /  AST  29  /  ALT  14  /  AlkPhos  68  04-18  TPro  8.1  /  Alb  3.5  /  TBili  0.4  /  DBili  x   /  AST  29  /  ALT  15  /  AlkPhos  74  04-17          Assessment:  1. L soleal vein DVT with small RUL PE   2. 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 MRI with persistent small foci of hemorrhage, if okay from neuro standpoint may start heparin cautiously and monitor neuro status closely   3. Patient is euvolemic on exam, TTE with normal EF and no diastolic dysfunction, this would not be effected by IVIG     Thank you      Vascular Cardiology Service    Please call with any questions:   DIRECT SERVICE NUMBER:  [phone]  Office [phone]  email:  [email or web address]

## 2022-04-18 NOTE — PROGRESS NOTE ADULT - SUBJECTIVE AND OBJECTIVE BOX
Follow-up Pulm Progress Note    No new respiratory events overnight.  Denies SOB/CP.     Medications:  MEDICATIONS  (STANDING):  albuterol/ipratropium for Nebulization 3 milliLiter(s) Nebulizer every 6 hours  amLODIPine   Tablet 10 milliGRAM(s) Oral daily  enoxaparin Injectable 40 milliGRAM(s) SubCutaneous <User Schedule>  lacosamide IVPB 100 milliGRAM(s) IV Intermittent every 12 hours  levothyroxine 88 MICROGram(s) Oral daily  lisinopril 20 milliGRAM(s) Oral daily  multiple electrolytes Injection Type 1 1000 milliLiter(s) (50 mL/Hr) IV Continuous <Continuous>  polyethylene glycol 3350 17 Gram(s) Oral two times a day  QUEtiapine 50 milliGRAM(s) Oral at bedtime  senna 2 Tablet(s) Oral at bedtime  thiamine IVPB 500 milliGRAM(s) IV Intermittent every 8 hours    MEDICATIONS  (PRN):  hydrALAZINE 10 milliGRAM(s) Oral every 4 hours PRN Systolic blood pressure < 160  LORazepam   Injectable 1 milliGRAM(s) IV Push once PRN seizure          Vital Signs Last 24 Hrs  T(C): 36.9 (18 Apr 2022 11:00), Max: 37.2 (17 Apr 2022 15:26)  T(F): 98.4 (18 Apr 2022 11:00), Max: 99 (17 Apr 2022 15:26)  HR: 89 (18 Apr 2022 12:00) (80 - 98)  BP: 158/92 (18 Apr 2022 12:00) (138/61 - 178/63)  BP(mean): --  RR: 17 (18 Apr 2022 12:00) (17 - 19)  SpO2: 93% (18 Apr 2022 12:00) (91% - 93%)          04-17 @ 07:01  -  04-18 @ 07:00  --------------------------------------------------------  IN: 0 mL / OUT: 300 mL / NET: -300 mL          LABS:                        12.1   6.25  )-----------( 224      ( 18 Apr 2022 07:19 )             38.3     04-18    149<H>  |  107  |  9   ----------------------------<  99  3.4<L>   |  26  |  1.26    Ca    9.4      18 Apr 2022 07:19  Phos  4.5     04-17  Mg     2.2     04-17    TPro  8.1  /  Alb  3.7  /  TBili  0.4  /  DBili  x   /  AST  29  /  ALT  14  /  AlkPhos  68  04-18            PT/INR - ( 18 Apr 2022 07:19 )   PT: 12.1 sec;   INR: 1.05 ratio         PTT - ( 18 Apr 2022 07:19 )  PTT:28.6 sec    Procalcitonin, Serum: 0.12 ng/mL (04-16-22 @ 07:55)              Physical Examination:  PULM: decreased BS  CVS: S1, S2 heard    RADIOLOGY REVIEWED    CT chest: < from: CT Angio Chest PE Protocol w/ IV Cont (04.16.22 @ 02:00) >  FINDINGS:    Pulmonary arteries: Small pulmonary bolus in the right upper lobe   segmental branch seen on series 3 image 80. No associated right heart   strain or pulmonary infarction noted.    LUNGS AND AIRWAYS: Minimal tracheal secretions.  Partial right lower lobe   atelectasis with elevation of right hemidiaphragm.  PLEURA: Trace right pleural effusion..  MEDIASTINUM AND SIMONE: No lymphadenopathy.  VESSELS: Within normal limits.  HEART: Cardiomegaly. No evidence of right heart strain. Small pericardial   fluid along the right atrium  CHEST WALL AND LOWER NECK: Enlarged thyroid goiter with mild tracheal   narrowing  VISUALIZED UPPER ABDOMEN: Within normal limits.  BONES: Degenerative changes of the spine.    IMPRESSION:  Small pulmonary bolus in the right upper lobe segmental branch seen on   series 3 image 80. No associated right heart strain or pulmonary   infarction noted.    Partial right lower lobe atelectasis with elevation of right   hemidiaphragm.      < end of copied text >      LE duplex: < from: [facility] Duplex Lower Ext Vein Scan, Bilat (04.15.22 @ 14:29) >  Acute below the knee DVT confined to the left soleal vein.    < end of copied text >

## 2022-04-18 NOTE — PROGRESS NOTE ADULT - SUBJECTIVE AND OBJECTIVE BOX
Cardiovascular Disease Progress Note    Overnight events: No acute events overnight.  Mr. Dobbins is in no distress.   Otherwise review of systems negative    Objective Findings:  T(C): 36.9 (04-18-22 @ 11:00), Max: 37.4 (04-17-22 @ 13:38)  HR: 89 (04-18-22 @ 12:00) (80 - 98)  BP: 158/92 (04-18-22 @ 12:00) (138/61 - 178/63)  RR: 17 (04-18-22 @ 12:00) (17 - 20)  SpO2: 93% (04-18-22 @ 12:00) (91% - 93%)  Wt(kg): --  Daily Height in cm: 180.34 (18 Apr 2022 07:29)    Daily       Physical Exam:  Gen: NAD; Patient resting comfortably  HEENT: EOMI, Normocephalic/ atraumatic  CV: RRR, normal S1 + S2, no m/r/g  Lungs:  Normal respiratory effort; clear to auscultation bilaterally  Abd: soft, non-tender; bowel sounds present  Ext: No edema; warm and well perfused    Telemetry: N/a    Laboratory Data:                        12.1   6.25  )-----------( 224      ( 18 Apr 2022 07:19 )             38.3     04-18    149<H>  |  107  |  9   ----------------------------<  99  3.4<L>   |  26  |  1.26    Ca    9.4      18 Apr 2022 07:19  Phos  4.5     04-17  Mg     2.2     04-17    TPro  8.1  /  Alb  3.7  /  TBili  0.4  /  DBili  x   /  AST  29  /  ALT  14  /  AlkPhos  68  04-18    PT/INR - ( 18 Apr 2022 07:19 )   PT: 12.1 sec;   INR: 1.05 ratio         PTT - ( 18 Apr 2022 07:19 )  PTT:28.6 sec          Inpatient Medications:  MEDICATIONS  (STANDING):  albuterol/ipratropium for Nebulization 3 milliLiter(s) Nebulizer every 6 hours  amLODIPine   Tablet 10 milliGRAM(s) Oral daily  diVALproex  milliGRAM(s) Oral two times a day  enoxaparin Injectable 40 milliGRAM(s) SubCutaneous <User Schedule>  levothyroxine 88 MICROGram(s) Oral daily  lisinopril 20 milliGRAM(s) Oral daily  multiple electrolytes Injection Type 1 1000 milliLiter(s) (50 mL/Hr) IV Continuous <Continuous>  polyethylene glycol 3350 17 Gram(s) Oral two times a day  QUEtiapine 50 milliGRAM(s) Oral at bedtime  senna 2 Tablet(s) Oral at bedtime  thiamine IVPB 500 milliGRAM(s) IV Intermittent every 8 hours      Assessment:  72M PMH htn, hypothyroidism, who presented to [place] ED via EMS after family called 911 following a witnessed seizure.    Plan of Care:    #DVT  - Pt with below knee DVT and small RUL PE  - AC management as per vascular team  - Given recent cerebellar hemorrhage pt currently not on full dose AC    #HTN  - BP acceptable  - TTE shows normal LV systolic function  - Continue Amlodipine, Lisinopril    #Seizure activity  - S/p extubation  - EEG with no seizure activity  - Management as per neurology  - Pt to undergo LP today  - Pt optimized to proceed from a cardiac standpoint.    #Cerebellar Hemorrhage  - Rt sided hemorrhage  - Management as per neuro      #ACP (advance care planning)-  Advanced care planning was addressed. Continue with current cardiac management.  Risks, benefits and alternatives of medical treatment and procedures were discussed in detail and all questions were answered. 30 minutes spent addressing advance care plans.          Over 25 minutes spent on total encounter; more than 50% of the visit was spent counseling and/or coordinating care by the attending physician.      Tio Dockery D.O.   Cardiovascular Disease  [phone] Cardiovascular Disease Progress Note    Overnight events: No acute events overnight.  Mr. Dobbins is in no distress.   Otherwise review of systems negative    Objective Findings:  T(C): 36.9 (04-18-22 @ 11:00), Max: 37.4 (04-17-22 @ 13:38)  HR: 89 (04-18-22 @ 12:00) (80 - 98)  BP: 158/92 (04-18-22 @ 12:00) (138/61 - 178/63)  RR: 17 (04-18-22 @ 12:00) (17 - 20)  SpO2: 93% (04-18-22 @ 12:00) (91% - 93%)  Wt(kg): --  Daily Height in cm: 180.34 (18 Apr 2022 07:29)    Daily       Physical Exam:  Gen: NAD; Patient resting comfortably  HEENT: EOMI, Normocephalic/ atraumatic  CV: RRR, normal S1 + S2, no m/r/g  Lungs:  Normal respiratory effort; clear to auscultation bilaterally  Abd: soft, non-tender; bowel sounds present  Ext: No edema; warm and well perfused    Telemetry: N/a    Laboratory Data:                        12.1   6.25  )-----------( 224      ( 18 Apr 2022 07:19 )             38.3     04-18    149<H>  |  107  |  9   ----------------------------<  99  3.4<L>   |  26  |  1.26    Ca    9.4      18 Apr 2022 07:19  Phos  4.5     04-17  Mg     2.2     04-17    TPro  8.1  /  Alb  3.7  /  TBili  0.4  /  DBili  x   /  AST  29  /  ALT  14  /  AlkPhos  68  04-18    PT/INR - ( 18 Apr 2022 07:19 )   PT: 12.1 sec;   INR: 1.05 ratio         PTT - ( 18 Apr 2022 07:19 )  PTT:28.6 sec          Inpatient Medications:  MEDICATIONS  (STANDING):  albuterol/ipratropium for Nebulization 3 milliLiter(s) Nebulizer every 6 hours  amLODIPine   Tablet 10 milliGRAM(s) Oral daily  diVALproex  milliGRAM(s) Oral two times a day  enoxaparin Injectable 40 milliGRAM(s) SubCutaneous <User Schedule>  levothyroxine 88 MICROGram(s) Oral daily  lisinopril 20 milliGRAM(s) Oral daily  multiple electrolytes Injection Type 1 1000 milliLiter(s) (50 mL/Hr) IV Continuous <Continuous>  polyethylene glycol 3350 17 Gram(s) Oral two times a day  QUEtiapine 50 milliGRAM(s) Oral at bedtime  senna 2 Tablet(s) Oral at bedtime  thiamine IVPB 500 milliGRAM(s) IV Intermittent every 8 hours      Assessment:  72M PMH htn, hypothyroidism, who presented to [place] ED via EMS after family called 911 following a witnessed seizure.    Plan of Care:    #DVT  - Pt with below knee DVT and small RUL PE  - AC management as per vascular team  - Given recent cerebellar hemorrhage pt currently not on full dose AC    #HTN  - BP acceptable  - TTE shows normal LV systolic function  - Continue Amlodipine, Lisinopril    #Seizure activity  - S/p extubation  - EEG with no seizure activity  - Management as per neurology  - Pt to undergo LP today  - Pt optimized to proceed from a cardiac standpoint.  - Neuro team requesting cardiac clearance to start Vimpat.   - Mr. Dobbins has no cardiac arrythmia contraindicating the use of this medication  - During initiation of medication, Monitor on tele for arrythmia, Obtain EKG to assess QT intervals.     #Cerebellar Hemorrhage  - Rt sided hemorrhage  - Management as per neuro      #ACP (advance care planning)-  Advanced care planning was addressed. Continue with current cardiac management.  Risks, benefits and alternatives of medical treatment and procedures were discussed in detail and all questions were answered. 30 minutes spent addressing advance care plans.          Over 25 minutes spent on total encounter; more than 50% of the visit was spent counseling and/or coordinating care by the attending physician.      Tio Dockery D.O.   Cardiovascular Disease  [phone]

## 2022-04-18 NOTE — PRE-ANESTHESIA EVALUATION ADULT - NSANTHOSAYNRD_GEN_A_CORE
No. CARMENCITA screening performed.  STOP BANG Legend: 0-2 = LOW Risk; 3-4 = INTERMEDIATE Risk; 5-8 = HIGH Risk

## 2022-04-18 NOTE — PROGRESS NOTE ADULT - SUBJECTIVE AND OBJECTIVE BOX
S/P fluoroscopically guided lumbar puncture at l2-l3 level.    16 cc of clear CSF was obtained.  Anesthesia was present for M.A.C. CSF specimens hand delivered to lab for analysis.   Pt brought with anesthesia to the IR PACU with anesthesia present.    No immediate complications.    PAYAM Mcgregor  Available on [employer] Teams  Spectralink [phone]  Ext [phone]

## 2022-04-18 NOTE — PRE-ANESTHESIA EVALUATION ADULT - NSANTHVITALSIGNSFT_GEN_ALL_CORE
Vital Signs Last 24 Hrs  T(C): 36.8 (18 Apr 2022 01:05), Max: 37.4 (17 Apr 2022 13:38)  T(F): 98.3 (18 Apr 2022 01:05), Max: 99.4 (17 Apr 2022 13:38)  HR: 84 (18 Apr 2022 01:05) (77 - 96)  BP: 138/61 (18 Apr 2022 01:05) (138/61 - 163/80)  BP(mean): --  RR: 18 (18 Apr 2022 01:05) (18 - 20)  SpO2: 93% (18 Apr 2022 01:05) (91% - 93%)

## 2022-04-18 NOTE — PROGRESS NOTE ADULT - SUBJECTIVE AND OBJECTIVE BOX
MRN-[id number]    Subjective: 71 yo male seen and examined at bedside. No seizures overnight.    PAST MEDICAL & SURGICAL HISTORY:  HTN (hypertension)    Diverticulosis    Hypothyroid    No significant past surgical history    Social Hx:  Nonsmoker, no drug or alcohol use    Home Medications:  levothyroxine 75 mcg (0.075 mg) oral tablet: 1 tab(s) orally once a day (15 Apr 2022 14:31)  lisinopril 30 mg oral tablet: 1 tab(s) orally once a day (15 Apr 2022 14:32)    MEDICATIONS  (STANDING):  albuterol/ipratropium for Nebulization 3 milliLiter(s) Nebulizer every 6 hours  amLODIPine   Tablet 10 milliGRAM(s) Oral daily  diVALproex  milliGRAM(s) Oral two times a day  enoxaparin Injectable 40 milliGRAM(s) SubCutaneous <User Schedule>  levothyroxine 88 MICROGram(s) Oral daily  lisinopril 20 milliGRAM(s) Oral daily  multiple electrolytes Injection Type 1 1000 milliLiter(s) (50 mL/Hr) IV Continuous <Continuous>  piperacillin/tazobactam IVPB.. 3.375 Gram(s) IV Intermittent every 8 hours  polyethylene glycol 3350 17 Gram(s) Oral two times a day  QUEtiapine 50 milliGRAM(s) Oral at bedtime  senna 2 Tablet(s) Oral at bedtime  thiamine IVPB 500 milliGRAM(s) IV Intermittent every 8 hours    MEDICATIONS  (PRN):  hydrALAZINE 10 milliGRAM(s) Oral every 4 hours PRN Systolic blood pressure < 160  LORazepam   Injectable 1 milliGRAM(s) IV Push once PRN seizure    Allergies  fish (Hives; Angioedema)  No Known Drug Allergies    ROS: Pertinent positives above, all other ROS were reviewed and are negative.      Vital Signs Last 24 Hrs  T(C): 36.8 (18 Apr 2022 01:05), Max: 37.4 (17 Apr 2022 13:38)  T(F): 98.3 (18 Apr 2022 01:05), Max: 99.4 (17 Apr 2022 13:38)  HR: 84 (18 Apr 2022 01:05) (77 - 96)  BP: 138/61 (18 Apr 2022 01:05) (138/61 - 163/80)  BP(mean): --  RR: 18 (18 Apr 2022 01:05) (18 - 20)  SpO2: 93% (18 Apr 2022 01:05) (91% - 93%)    GENERAL EXAM:  Constitutional: NAD.   Head: Normocephalic, atraumatic.  Extremities: No edema.    NEUROLOGICAL EXAM:  MS: Sleepy, eyes open spontaneously. Speech is fluent, not slurred. Follows commands.  CN: EOMI. Face symmetric.   Motor: Moves all extremities.  Sensory: Intact to LT throughout.  Coordination/Gait: Not assessed.    Labs:                                               12.1   6.25  )-----------( 224      ( 18 Apr 2022 07:19 )             38.3      04-18    149<H>  |  107  |  9   ----------------------------<  99  3.4<L>   |  26  |  1.26    Ca    9.4      18 Apr 2022 07:19  Phos  4.5     04-17  Mg     2.2     04-17    TPro  8.1  /  Alb  3.7  /  TBili  0.4  /  DBili  x   /  AST  29  /  ALT  14  /  AlkPhos  68  04-18        Radiology/EEGs:  -04/12 CTH: 0.5 cm focus of intraparenchymal hemorrhage in the right cerebellum.  -04/12 CT PERFUSION: The areas of elevated Tmax do not follow a vascular distribution and are likely artifactual, related to patient motion. If symptoms persist consider follow up head CT or MRI, MRA  if no contraindication.  -04/12 CTA COW:  Patent intracranial circulation without flow limiting stenosis.  -04/12 CTA NECK: Patent, ECAs, ICAs, no  hemodynamically significant stenosis at ICA origins by NASCET criteria. Bilateral vertebral arteries are patent without flow limiting stenosis.  -04/12 Repeat CTH: Interval stability compared with the prior study of earlier the same day.  -04/16 CTA Chest PE Protocol: Small pulmonary bolus in the right upper lobe segmental branch seen on series 3 image 80. No associated right heart strain or pulmonary infarction noted. Partial right lower lobe atelectasis with elevation of right hemidiaphragm.    -04/13 EEG SUMMARY/CLASSIFICATION:  Abnormal EEG in a sedated patient.  - Background slowing, generalized.  _____________________________________________________________  EEG IMPRESSION/CLINICAL CORRELATE:  Abnormal EEG study.  - Moderate nonspecific diffuse or multifocal cerebral dysfunction.   - No epileptiform patterns or seizures were recorded x 1 day.    -04/14 EEG SUMMARY/CLASSIFICATION:  Abnormal EEG in a sedated patient.  - Background slowing, generalized.  _____________________________________________________________  EEG IMPRESSION/CLINICAL CORRELATE:  Abnormal EEG study.  - Moderate nonspecific diffuse or multifocal cerebral dysfunction.   - No epileptiform patterns or seizures were recorded x 2 days.    -04/15 EEG Summary:  Abnormal EEG in the awake, drowsy and asleep states.  Mild to moderate generalized slowing  Impression/Clinical Correlate:  This is an abnormal EEG record. There is evidence of mild to moderate multifocal/diffuse nonspecific cerebral dysfunction. No epileptiform pattern or seizures were seen.

## 2022-04-18 NOTE — EEG REPORT - NS EEG TEXT BOX
Interpretation:    Day 2 – 	Start: 4/17/2022 1304  	End: 4/18/2022  08:00  	Duration: 18h56m    Daily EEG Visual Analysis    FINDINGS:  The background was continuous, spontaneously variable and reactive. During wakefulness, the posterior dominant rhythm consisted of symmetric, well-modulated 9 Hz activity, with amplitude to 30 uV, that attenuated to eye opening.  Low amplitude frontal beta was noted in wakefulness.    Background Slowing:  None    Focal Slowing:   None were present.    Sleep Background:  Drowsiness was characterized by fragmentation, attenuation, and slowing of the background activity.    Sleep was characterized by the presence of vertex waves, symmetric sleep spindles and K-complexes.    Other Non-Epileptiform Findings:  None were present.    Activation Procedures:   Hyperventilation was not performed.    Photic stimulation was not performed.    Interictal Epileptiform Activity:   None were present.    Events:  No events or seizures recorded.    Artifacts:  Intermittent myogenic and movement artifacts were noted.    ECG:  The heart rate on single channel ECG was predominantly between 60-70 BPM.    AEDs:   q8    EEG Summary:    Normal EEG in the awake, drowsy and asleep states.    Impression/Clinical Correlate:    This is an normal EEG record. No epileptiform pattern or seizures were seen.    Preliminary Fellow Report, final report pending attending review    Jaxon Andrade MD  Epilepsy Fellow    Reading Room: [phone]  On Call Service After Hours: [phone]    Interpretation:    Day 2 – 	Start: 4/17/2022 1304  	End: 4/18/2022  08:00  	Duration: 18h56m    Daily EEG Visual Analysis    FINDINGS:  The background was continuous, spontaneously variable and reactive. During wakefulness, the posterior dominant rhythm consisted of symmetric, well-modulated 9 Hz activity, with amplitude to 30 uV, that attenuated to eye opening.  Low amplitude frontal beta was noted in wakefulness.    Background Slowing:  None    Focal Slowing:   None were present.    Sleep Background:  Drowsiness was characterized by fragmentation, attenuation, and slowing of the background activity.    Sleep was characterized by the presence of vertex waves, symmetric sleep spindles and K-complexes.    Other Non-Epileptiform Findings:  None were present.    Activation Procedures:   Hyperventilation was not performed.    Photic stimulation was not performed.    Interictal Epileptiform Activity:   None were present.    Events:  No events or seizures recorded.    Artifacts:  Intermittent myogenic and movement artifacts were noted.    ECG:  The heart rate on single channel ECG was predominantly between 60-70 BPM.    AEDs:   q8    EEG Summary:    Normal EEG in the awake, drowsy and asleep states.    Impression/Clinical Correlate:    This is an normal EEG record. No epileptiform pattern or seizures were seen.      Jaxon Andrade MD  Epilepsy Fellow    Reading Room: [phone]  On Call Service After Hours: [phone]     Brandon August MD  Neurology Attending Physician

## 2022-04-18 NOTE — CONSULT NOTE ADULT - ASSESSMENT
Patient is a 72 year old Male with a PMHx of HTN, hypothyroidism, who presented to [facility]  via transfer from [facility]. Patient originally presented to to [place] ED via EMS after family called 911 following a witnessed seizure, by family, reportedly lasting ~5min.  Enroute to [place], patient was reported to have had another 2 witnessed seizures by EMS, each lasting ~1 minute. Following each seizure the patient was given 5mg IV versed (received 10mg collectively and brought to [place] ED). Pt arrived to the ED obtunded with blood from his mouth (tongue laceration), responsive only to pain/sternal rub. Patient was subsequently intubated for GCS 6 and CT/CTA performed showing a small cerebellar hemorrhage.      Seizures  - Previously intubated, now extubated  - Neuro checks per protocol  - Monitor on Tele  - Possible IVIG per neuro   - Planning for LP today  - Management as per neurology   - Fall, seizure, aspiration precautions    Cerebellar hemorrhage  - Right sided hemorrhage   - Awaiting MR results     DVT/ PE  - Patient with acute below knee DVT and RUL PE & Recent cerebellar infiltrate  - Vascular cardio eval appreciated; f/u recs  - Awaiting MR brain results for possible AC vs IVC filter   - F/u vascular recs    Hypothyroid   - TSH elevated, T4 low  -     HyperNa  - Na level increased to 149  - Recommend increased fluid intake for hydration       JESSICA  - Improving, continue to monitor and trend on daily labs  - Avoid nephrotoxic agents      Atelectasis   - Duoneb and Chest PT  - Appreciate pulm recs   HTN  - TTE EF of 75%, normal LV systolic function   - Continue with Amlodipine and Lisinopril   - Monitor BP, VS and patient closely       Pre-DM  - A1C of 5.9  - Outpatient follow up              Patient is a 72 year old Male with a PMHx of HTN, hypothyroidism, who presented to [facility]  via transfer from [facility]. Patient originally presented to to [place] ED via EMS after family called 911 following a witnessed seizure, by family, reportedly lasting ~5min.  Enroute to [place], patient was reported to have had another 2 witnessed seizures by EMS, each lasting ~1 minute. Following each seizure the patient was given 5mg IV versed (received 10mg collectively and brought to [place] ED). Pt arrived to the ED obtunded with blood from his mouth (tongue laceration), responsive only to pain/sternal rub. Patient was subsequently intubated for GCS 6 and CT/CTA performed showing a small cerebellar hemorrhage.      Seizures  - Previously intubated, now extubated  - Neuro checks per protocol  - Monitor on Tele  - Possible IVIG per neuro   - Planning for LP today  - Management as per neurology   - Fall, seizure, aspiration precautions      Cerebellar hemorrhage  - Right sided hemorrhage   - Awaiting MR results       DVT/ PE  - Patient with acute below knee DVT and RUL PE & Recent cerebellar infiltrate  - Vascular cardio eval appreciated; f/u recs  - Awaiting MR brain results for possible AC vs IVC filter   - F/u vascular recs      Hypothyroid   - TSH elevated, T4 low  - Was on synthroid 50 at home, increased to 88 on this hospital admissinon  - Repeat TFTs in 3-4 weeks outpatient   - F/u thyroid US       HyperNa  - Na level increased to 149  - Started on D5 1/2 NS X 6 hours  - Recheck BMP S/P IVF  - Avoid overcorrection  - Monitor closely       JESSICA  - Improving, continue to monitor and trend on daily labs  - Avoid nephrotoxic agents      Atelectasis   - Duoneb and Chest PT  - Appreciate pulm recs       HTN  - TTE EF of 75%, normal LV systolic function   - Continue with Amlodipine and Lisinopril   - Monitor BP, VS and patient closely       Pre-DM  - A1C of 5.9  - Outpatient follow up              Patient is a 72 year old Male with a PMHx of HTN, hypothyroidism, who presented to [facility]  via transfer from [facility]. Patient originally presented to to [place] ED via EMS after family called 911 following a witnessed seizure, by family, reportedly lasting ~5min.  Enroute to [place], patient was reported to have had another 2 witnessed seizures by EMS, each lasting ~1 minute. Following each seizure the patient was given 5mg IV versed (received 10mg collectively and brought to [place] ED). Pt arrived to the ED obtunded with blood from his mouth (tongue laceration), responsive only to pain/sternal rub. Patient was subsequently intubated for GCS 6 and CT/CTA performed showing a small cerebellar hemorrhage.      Seizures  - Previously intubated, now extubated  - Neuro checks per protocol  - Monitor on Tele  - Possible IVIG per neuro   - Planning for LP today  - Management as per neurology   - Fall, seizure, aspiration precautions      Cerebellar hemorrhage  - Right sided hemorrhage   - Awaiting MR results       DVT/ PE  - Patient with acute below knee DVT and RUL PE & Recent cerebellar infiltrate  - Vascular cardio eval appreciated; f/u recs  - Awaiting MR brain results for possible AC vs IVC filter   - F/u vascular recs  - On lovenox 40 currently pending MR results       Hypothyroid   - TSH elevated, T4 low  - Was on synthroid 50 at home, increased to 88 on this hospital admission  - Repeat TFTs in 3-4 weeks outpatient   - F/u thyroid US       HyperNa  - Na level increased to 149  - Started on D5 1/2 NS X 6 hours  - Recheck BMP S/P IVF  - Avoid overcorrection  - Monitor closely       JESSICA  - Improving, continue to monitor and trend on daily labs  - Avoid nephrotoxic agents      Atelectasis   - Duoneb and Chest PT  - Appreciate pulm recs       HTN  - TTE EF of 75%, normal LV systolic function   - Continue with Amlodipine and Lisinopril   - Monitor BP, VS and patient closely       Pre-DM  - A1C of 5.9  - Outpatient follow up              Patient is a 72 year old Male with a PMHx of HTN, hypothyroidism, who presented to [facility]  via transfer from [facility]. Patient originally presented to to [place] ED via EMS after family called 911 following a witnessed seizure, by family, reportedly lasting ~5min.  Enroute to [place], patient was reported to have had another 2 witnessed seizures by EMS, each lasting ~1 minute. Following each seizure the patient was given 5mg IV versed (received 10mg collectively and brought to [place] ED). Pt arrived to the ED obtunded with blood from his mouth (tongue laceration), responsive only to pain/sternal rub. Patient was subsequently intubated for GCS 6 and CT/CTA performed showing a small cerebellar hemorrhage.      Seizures  - Previously intubated, now extubated  - Neuro checks per protocol  - Monitor on Tele  - Possible IVIG per neuro   - Planning for LP today  - Management as per neurology   - Fall, seizure, aspiration precautions      Cerebellar hemorrhage  - Right sided hemorrhage   - Awaiting MR results       DVT/ PE  - Patient with acute below knee DVT and RUL PE & Recent cerebellar infiltrate  - Vascular cardio eval appreciated; f/u recs  - Awaiting MR brain results for possible AC vs IVC filter   - F/u vascular recs  - On lovenox 40 currently pending MR results       Hypothyroid   - Imaging w/ Enlarged goiter   - TSH elevated, T4 low  - Was on synthroid 50 at home, increased to 88 on this hospital admission  - Repeat TFTs in 3-4 weeks outpatient   - F/u thyroid US       HyperNa  - Na level increased to 149  - Started on D5 1/2 NS X 6 hours  - Recheck BMP S/P IVF  - Avoid overcorrection  - Monitor closely       JESSICA  - Improving, continue to monitor and trend on daily labs  - Avoid nephrotoxic agents      Atelectasis   - Duoneb and Chest PT  - Appreciate pulm recs       HTN  - TTE EF of 75%, normal LV systolic function   - Continue with Amlodipine and Lisinopril   - Monitor BP, VS and patient closely       Pre-DM  - A1C of 5.9  - Outpatient follow up              Patient is a 72 year old Male with a PMHx of HTN, hypothyroidism, who presented to [facility]  via transfer from [facility]. Patient originally presented to to [place] ED via EMS after family called 911 following a witnessed seizure, by family, reportedly lasting ~5min.  Enroute to [place], patient was reported to have had another 2 witnessed seizures by EMS, each lasting ~1 minute. Following each seizure the patient was given 5mg IV versed (received 10mg collectively and brought to [place] ED). Pt arrived to the ED obtunded with blood from his mouth (tongue laceration), responsive only to pain/sternal rub. Patient was subsequently intubated for GCS 6 and CT/CTA performed showing a small cerebellar hemorrhage.      Seizures  - Previously intubated, now extubated  - Neuro checks per protocol  - Monitor on Tele  - Possible IVIG per neuro   - Planning for LP today  - Management as per neurology   - Fall, seizure, aspiration precautions      Cerebellar hemorrhage  - Right sided hemorrhage   - Awaiting MR results       DVT/ PE  - Patient with acute below knee DVT and RUL PE & Recent cerebellar infiltrate  - Vascular cardio eval appreciated; f/u recs  - Awaiting MR brain results for possible AC vs IVC filter   - F/u vascular recs  - On lovenox 40 currently pending MR results       Hypothyroid   - Imaging w/ Enlarged goiter   - TSH elevated, T4 low  - Was on synthroid 75 at home, increased to 88 on this hospital admission  - Repeat TFTs in 3-4 weeks outpatient   - F/u thyroid US       HyperNa  - Na level increased to 149  - Started on D5 1/2 NS X 6 hours  - Recheck BMP S/P IVF  - Avoid overcorrection  - Monitor closely       JESSICA  - Improving, continue to monitor and trend on daily labs  - Avoid nephrotoxic agents      Atelectasis   - Duoneb and Chest PT  - Appreciate pulm recs       HTN  - TTE EF of 75%, normal LV systolic function   - Continue with Amlodipine and Lisinopril   - Monitor BP, VS and patient closely       Pre-DM  - A1C of 5.9  - Outpatient follow up              Patient is a 72 year old Male with a PMHx of HTN, hypothyroidism, who presented to [facility]  via transfer from [facility]. Patient originally presented to to [place] ED via EMS after family called 911 following a witnessed seizure, by family, reportedly lasting ~5min.  Enroute to [place], patient was reported to have had another 2 witnessed seizures by EMS, each lasting ~1 minute. Following each seizure the patient was given 5mg IV versed (received 10mg collectively and brought to [place] ED). Pt arrived to the ED obtunded with blood from his mouth (tongue laceration), responsive only to pain/sternal rub. Patient was subsequently intubated for GCS 6 and CT/CTA performed showing a small cerebellar hemorrhage.      Seizures  - Previously intubated, now extubated  - Neuro checks per protocol  - Monitor on Tele  - Possible IVIG per neuro   - Planning for LP today  - Management as per neurology   - Fall, seizure, aspiration precautions      Cerebellar hemorrhage  - Right sided hemorrhage   - Awaiting MR results       DVT/ PE  - Patient with acute below knee DVT and RUL PE & Recent cerebellar infiltrate  - Vascular cardio eval appreciated; f/u recs  - Awaiting MR brain results for possible AC vs IVC filter   - F/u vascular recs  - On lovenox 40 currently pending MR results   - repeat DVT study       Hypothyroid   - Imaging w/ Enlarged goiter   - TSH elevated, T4 low  - Was on synthroid 75 at home, increased to 88 on this hospital admission  - Repeat TFTs in 3-4 weeks outpatient   - F/u thyroid US       HyperNa  - Na level increased to 149  - Started on D5 1/2 NS X 6 hours  - Recheck BMP S/P IVF  - Avoid overcorrection  - Monitor closely       JESSICA  - Improving, continue to monitor and trend on daily labs  - Avoid nephrotoxic agents      Atelectasis   - Duoneb and Chest PT  - Appreciate pulm recs       HTN  - TTE EF of 75%, normal LV systolic function   - Continue with Amlodipine and Lisinopril   - Monitor BP, VS and patient closely       Pre-DM  - A1C of 5.9  - Outpatient follow up

## 2022-04-18 NOTE — CONSULT NOTE ADULT - NS ATTEND AMEND GEN_ALL_CORE FT
Pt care and plan discussed and reviewed with PA. Plan as outlined above edited by me to reflect our discussion.
check cta chest  dw pt and family

## 2022-04-18 NOTE — PROGRESS NOTE ADULT - ASSESSMENT
71 y/o M with PMH HTN, hypothyroidism. Presents with seizures, found to have cerebellar hemorrhage. Intubated for airway protection at OSH, extubated 4/13. Called to consult for increased chest congestion. CTA chest with atelectasis and small RUL segmental branch PE.

## 2022-04-18 NOTE — PROGRESS NOTE ADULT - PROBLEM SELECTOR PLAN 3
CTA chest with RLL atelectasis  -Zosyn d/c'd  -Chest congestion improved. Continue Duoneb q6h for now.   -Chest PT q6h

## 2022-04-18 NOTE — PRE PROCEDURE NOTE - PRE PROCEDURE EVALUATION
Neuroradiology pre-op note    HPI: 72y Male with PMHx of HTN, Hypothyroidism, and Diverticulosis (requiring blood transfusions x2 8 yrs ago) who presented to [facility] on 4/12 as a transfer from [place] for ICH. Pt with new onset of seizures of unknown cause, with R cerebellar IPH.      Anesthesia was already present with patient and sedated him during an MRI and now escorted him to the radiology department for LP.    Diagnosis: new onset of seizures of unknown cause, with R cerebellar IPH.      Procedure: fluoroscopically guided LP                            12.1   6.25  )-----------( 224      ( 18 Apr 2022 07:19 )             38.3     04-18    149<H>  |  107  |  9   ----------------------------<  99  3.4<L>   |  26  |  1.26    Ca    9.4      18 Apr 2022 07:19  Phos  4.5     04-17  Mg     2.2     04-17    TPro  8.1  /  Alb  3.7  /  TBili  0.4  /  DBili  x   /  AST  29  /  ALT  14  /  AlkPhos  68  04-18    PT/INR - ( 18 Apr 2022 07:19 )   PT: 12.1 sec;   INR: 1.05 ratio    PTT - ( 18 Apr 2022 07:19 )  PTT:28.6 sec    Assessment & Plan:  72yMale with new onset of seizures of unknown cause, with R cerebellar IPH.      -Informed consent was already obtained. After risks, benefits, alternatives discussion via telephone the pt's family verbalized understanding and gave telephone consent. Risks including bleeding, infection, nerve damage, and/or headaches were discussed.    PAYMA Mcgregor  Available on [employer] Teams  Spectralink [id number]  Ext [phone]

## 2022-04-18 NOTE — PROGRESS NOTE ADULT - ASSESSMENT
Assessment: 73 yo male with a PMHx of HTN, Hypothyroidism, and Diverticulosis (requiring blood transfusions x2 8 yrs ago) who presented to [facility] on 4/12 as a transfer from [place] for ICH. Patient LKN was at 2200pm on 4/11. Patient was noted on 4/12 @ 0200 to be obtunded with saliva coming out of his mouth. EMS was called, patient came back to baseline and did not want to go to hospital and EMS left. Patient then had a seizure-like event witnessed by wife with UE shaking, foaming at the mouth, and urinary/fecal incontinence. Neurology consulted for new onset seizures in the setting of ICH while patient was in NSCU. No seizures seen on EEG thus far during this admission. Transferred from NSCU to EMU on 4/15 after stable IPH noted.     Impression: New onset of seizures of unknown cause, with R cerebellar IPH.      Plan:  [] Neuro checks, vitals Q4HR.  [] Telemetry.  [] Seizure/Fall/Aspiration precautions.  [] SBP goal: <150.  [] Off EEG for MRI and LP under anesthesia  [] Pending MRI brain w/wo under anesthesia on 4/18, with LP to be performed subsequently. Hold Lovenox after Sunday AM dose. Will need to be NPO after MN on Sunday.  [] VA Duplex lower extremities pending, patient with known L soleal vein DVT.  [] c/w Depakote 750MG PO BID.  [] c/w Seroquel 50MG PO QHS.  [] Cardiology following (Dr. Fragoso), c/w Lisinopril 20MG PO QD, Hydralazine 10MG PO Q4HR  [] Pulmonology following, Luis Daniel DC'd as no PNA seen on CTA Chest.   [x] 4/16 CTA Chest PE Protocol: as above, patient with small R upper lobe segmental branch PE.  [] 4/16: Vascular Neurology aware of case, spoke to stroke fellow Dr. Abby Deutsch. From vascular neurology standpoint no contraindication for full dose AC if needed in setting of PE.  [] Vascular Cardiology following (Dr. Perera): Spoke with Dr. Perera. Would continue Lovenox 40MG SC QD for now. Will obtain MRI Head w/wo to further risk-stratify patient prior to deciding on full dose AC vs IVC Filter.   [x] 4/16: Developed JESSICA after CTA Chest, likely due to contrast. Continue IVF NS @ 100ml/hr. Resolved.   [] DVT PPx: Lovenox 40MG SC QD.  [] Diet: Regular.  [] Seizure Rescue: Ativan 1MG IVP x1 for convulsions lasting > 3 minutes, VPA 1G IV x1 for convulsions that are refractory to Ativan.    Case to be seen and discussed with epilepsy attending Dr. Maldonado.     Assessment: 73 yo male with a PMHx of HTN, Hypothyroidism, and Diverticulosis (requiring blood transfusions x2 8 yrs ago) who presented to [facility] on 4/12 as a transfer from [place] for ICH. Patient LKN was at 2200pm on 4/11. Patient was noted on 4/12 @ 0200 to be obtunded with saliva coming out of his mouth. EMS was called, patient came back to baseline and did not want to go to hospital and EMS left. Patient then had a seizure-like event witnessed by wife with UE shaking, foaming at the mouth, and urinary/fecal incontinence. Neurology consulted for new onset seizures in the setting of ICH while patient was in NSCU. No seizures seen on EEG thus far during this admission. Transferred from NSCU to EMU on 4/15 after stable IPH noted.     Impression: New onset of seizures of unknown cause, with R cerebellar IPH.      Plan:  [] Neuro checks, vitals Q4HR.  [] Telemetry.  [] Seizure/Fall/Aspiration precautions.  [] SBP goal: <150.  [] To start IVIG, possibly today for 4 days, pending clearance from vascular cardiology  [] Off EEG for MRI and LP under anesthesia. No need to re-hook.  [] Pending MRI brain w/wo under anesthesia on 4/18, with LP to be performed subsequently. Hold Lovenox after Sunday AM dose. Will need to be NPO after MN on Sunday.  [] VA Duplex lower extremities pending, patient with known L soleal vein DVT.  [] c/w Depakote 750MG PO BID for now. Plan to give Vimpat 200mg IV load x1, followed by 100mg BID IV pending cardiology clearance.    [] c/w Seroquel 50MG PO QHS.  [] Cardiology following (Dr. Fragoso), c/w Lisinopril 20MG PO QD, Hydralazine 10MG PO Q4HR. Will re-evaluate to give clearance for Vimpat.   [] Pulmonology following, Luis Daniel DC'd as no PNA seen on CTA Chest.   [x] 4/16 CTA Chest PE Protocol: as above, patient with small R upper lobe segmental branch PE.  [] 4/16: Vascular Neurology aware of case, spoke to stroke fellow Dr. Abby Deutsch. From vascular neurology standpoint no contraindication for full dose AC if needed in setting of PE.  [] Vascular Cardiology following (Dr. Perera): Spoke with Dr. Perera. Would continue Lovenox 40MG SC QD for now. Will obtain MRI Head w/wo to further risk-stratify patient prior to deciding on full dose AC vs IVC Filter. To evaluate to give clearance for IVIG x 4days.   [x] 4/16: Developed JESSICA after CTA Chest, likely due to contrast. Resolved.  DC'd IVF NS @ 100ml/hr.   [] DVT PPx: Lovenox 40MG SC QD.  [] Diet: Regular.  [] Seizure Rescue: Ativan 1MG IVP x1 for convulsions lasting > 3 minutes, VPA 1G IV x1 for convulsions that are refractory to Ativan.    Case to be seen and discussed with epilepsy attending Dr. Maldonado.     Assessment: 73 yo male with a PMHx of HTN, Hypothyroidism, and Diverticulosis (requiring blood transfusions x2 8 yrs ago) who presented to [facility] on 4/12 as a transfer from [place] for ICH. Patient LKN was at 2200pm on 4/11. Patient was noted on 4/12 @ 0200 to be obtunded with saliva coming out of his mouth. EMS was called, patient came back to baseline and did not want to go to hospital and EMS left. Patient then had a seizure-like event witnessed by wife with UE shaking, foaming at the mouth, and urinary/fecal incontinence. Neurology consulted for new onset seizures in the setting of ICH while patient was in NSCU. No seizures seen on EEG thus far during this admission. Transferred from NSCU to EMU on 4/15 after stable IPH noted.     Impression: New onset of seizures of unknown cause, with R cerebellar IPH.      Plan:  [] Neuro checks, vitals Q4HR.  [] Telemetry.  [] Seizure/Fall/Aspiration precautions.  [] SBP goal: <150.  [] To start IVIG, possibly today for 4 days, pending clearance from vascular cardiology  [] Off EEG for MRI and LP under anesthesia. No need to re-hook.  [] Pending MRI brain w/wo under anesthesia on 4/18, with LP to be performed subsequently. Hold Lovenox after Sunday AM dose. Will need to be NPO after MN on Sunday.  [] VA Duplex lower extremities pending, patient with known L soleal vein DVT.  [] c/w Depakote 750MG PO BID for now. Plan to give Vimpat 200mg IV load x1, followed by 100mg BID IV pending cardiology clearance.    [] c/w Seroquel 50MG PO QHS.  [] Cardiology following (Dr. Fragoso), c/w Lisinopril 20MG PO QD, Hydralazine 10MG PO Q4HR. Will re-evaluate to give clearance for Vimpat.   [] Pulmonology following, Luis Daniel DC'd as no PNA seen on CTA Chest.   [x] 4/16 CTA Chest PE Protocol: as above, patient with small R upper lobe segmental branch PE.  [] 4/16: Vascular Neurology aware of case, spoke to stroke fellow Dr. Abby Deutsch. From vascular neurology standpoint no contraindication for full dose AC if needed in setting of PE.  [] Vascular Cardiology following (Dr. Perera): Spoke with Dr. Perera. Would continue Lovenox 40MG SC QD for now. Will obtain MRI Head w/wo to further risk-stratify patient prior to deciding on full dose AC vs IVC Filter. To evaluate to give clearance for IVIG x 4days.   [x] 4/16: Developed JESSICA after CTA Chest, likely due to contrast. Resolved.  DC'd IVF NS @ 100ml/hr.   [] Rheum w/u serum: myomarker panel, SCL 70, Centromere, Marialuisa 1, TPO, Thyroglobulin Ab, C3 C4, ROSIBEL, Sjogren labs, APLS, Ribosomal P ab, ANCA, MPO, PR3, CK, Aldolase, Myoglobin, GGT, ESR, DAIANA, C- ANCA, DS-DAIANA, Urine prot/creat ratio, anticardiolipin Ab, Lupus anticoagulant, B2 glycoprotein AB,  RF, CCP, anti-GBM.  [] DVT PPx: Lovenox 40MG SC QD.  [] Diet: Regular.  [] Seizure Rescue: Ativan 1MG IVP x1 for convulsions lasting > 3 minutes, VPA 1G IV x1 for convulsions that are refractory to Ativan.    Case to be seen and discussed with epilepsy attending Dr. Maldonado.

## 2022-04-18 NOTE — CONSULT NOTE ADULT - SUBJECTIVE AND OBJECTIVE BOX
HPI:  Patient is a 72 year old Male with a PMHx of HTN, hypothyroidism, who presented to [facility]  via transfer from OSH. Patient originally presented to to [place] ED via EMS after family called 911 following a witnessed seizure, by family, reportedly lasting ~5min.  Enroute to [place], patient was reported to have had another 2 witnessed seizures by EMS, each lasting ~1 minute. Following each seizure the patient was given 5mg IV versed (received 10mg collectively and brought to [place] ED). Pt arrived to the ED obtunded with blood from his mouth (tongue laceration), responsive only to pain/sternal rub. Patient was subsequently intubated for GCS 6 and CT/CTA performed showing a small cerebellar hemorrhage. Patient is not on any A/C or blood thinners. Patient was then transferred to [facility] for NSGY eval on Cardene and Propofol gtt.      REVIEW OF SYSTEMS:    CONSTITUTIONAL: No weakness, fevers or chills  EYES/ENT: No visual changes;  No vertigo or throat pain   NECK: No pain or stiffness  RESPIRATORY: No cough, wheezing, hemoptysis; No shortness of breath  CARDIOVASCULAR: No chest pain or palpitations  GASTROINTESTINAL: No abdominal or epigastric pain. No nausea, vomiting, or hematemesis; No diarrhea or constipation. No melena or hematochezia.  GENITOURINARY: No dysuria, frequency or hematuria  NEUROLOGICAL: +History of seizures   SKIN: No itching, burning, rashes, or lesions   All other review of systems is negative unless indicated above.        PAST MEDICAL & SURGICAL HISTORY:  HTN (hypertension)    Diverticulosis    Hypothyroid    No significant past surgical history        MEDICATIONS  (STANDING):  albuterol/ipratropium for Nebulization 3 milliLiter(s) Nebulizer every 6 hours  amLODIPine   Tablet 10 milliGRAM(s) Oral daily  diVALproex  milliGRAM(s) Oral two times a day  enoxaparin Injectable 40 milliGRAM(s) SubCutaneous <User Schedule>  levothyroxine 88 MICROGram(s) Oral daily  lisinopril 20 milliGRAM(s) Oral daily  multiple electrolytes Injection Type 1 1000 milliLiter(s) (50 mL/Hr) IV Continuous <Continuous>  polyethylene glycol 3350 17 Gram(s) Oral two times a day  QUEtiapine 50 milliGRAM(s) Oral at bedtime  senna 2 Tablet(s) Oral at bedtime  thiamine IVPB 500 milliGRAM(s) IV Intermittent every 8 hours    MEDICATIONS  (PRN):  hydrALAZINE 10 milliGRAM(s) Oral every 4 hours PRN Systolic blood pressure < 160  LORazepam   Injectable 1 milliGRAM(s) IV Push once PRN seizure        Allergies    fish (Hives; Angioedema)  No Known Drug Allergies    Intolerances          Vital Signs Last 24 Hrs  T(C): 36.9 (18 Apr 2022 11:00), Max: 37.4 (17 Apr 2022 13:38)  T(F): 98.4 (18 Apr 2022 11:00), Max: 99.4 (17 Apr 2022 13:38)  HR: 89 (18 Apr 2022 12:00) (80 - 98)  BP: 158/92 (18 Apr 2022 12:00) (138/61 - 178/63)  BP(mean): --  RR: 17 (18 Apr 2022 12:00) (17 - 20)  SpO2: 93% (18 Apr 2022 12:00) (91% - 93%)      Appearance: Weak appearing male, laying down in increased observation room 	  HEENT:   Normal oral mucosa, PERRL, EOMI	  Lymphatic: No lymphadenopathy , no edema  Cardiovascular: Normal S1 S2, No JVD, No murmurs , Peripheral pulses palpable 2+ bilaterally  Respiratory: Lungs clear to auscultation, normal effort 	  Gastrointestinal:  Soft, Non-tender, + BS	  Skin: No rashes, No ecchymoses, No cyanosis, warm to touch  Musculoskeletal: Normal range of motion, normal strength  Psychiatry:  Mood & affect appropriate  Ext: No LE edema B/L                             12.1   6.25  )-----------( 224      ( 18 Apr 2022 07:19 )             38.3               04-18    149<H>  |  107  |  9   ----------------------------<  99  3.4<L>   |  26  |  1.26    Ca    9.4      18 Apr 2022 07:19  Phos  4.5     04-17  Mg     2.2     04-17    TPro  8.1  /  Alb  3.7  /  TBili  0.4  /  DBili  x   /  AST  29  /  ALT  14  /  AlkPhos  68  04-18    PT/INR - ( 18 Apr 2022 07:19 )   PT: 12.1 sec;   INR: 1.05 ratio         PTT - ( 18 Apr 2022 07:19 )  PTT:28.6 sec                 < from: Xray Chest 1 View- PORTABLE-Routine (Xray Chest 1 View- PORTABLE-Routine .) (04.15.22 @ 09:48) >    ACC: [id number] EXAM:  XR CHEST PORTABLE ROUTINE 1V                          PROCEDURE DATE:  04/15/2022          INTERPRETATION:  Chest one view    HISTORY: Pulmonary congestion    COMPARISON STUDY: 4/14/2022    Frontal expiratory view of the chest shows the heart to be similar in   size. The lungs show right base atelectasis or developing infiltrate with   small right effusion and there is no evidence of pneumothorax nor left   pleural effusion.    IMPRESSION:  Right base atelectasis/infiltrate.        Thank you for the courtesy of this referral.    --- End of Report ---    < end of copied text >          < from: CT Angio Chest PE Protocol w/ IV Cont (04.16.22 @ 02:00) >    ACC: [id number] EXAM:  CT ANGIO CHEST PULM ART [facility]                          PROCEDURE DATE:  04/16/2022          INTERPRETATION:  CLINICAL INFORMATION: Tachycardia with deep venous   thrombosis    COMPARISON: CT chest 3/31/2017    CONTRAST/COMPLICATIONS:  IV Contrast: Omnipaque 350  85 cc administered   15 cc discarded  Oral Contrast: NONE  Complications: None reported at time of study completion    PROCEDURE:  CT Angiography of the Chest.  Sagittal and coronal reformats were performed as well as 3D (MIP)   reconstructions.    FINDINGS:    Pulmonary arteries: Small pulmonary bolus in the right upper lobe   segmental branch seen on series 3 image 80. No associated right heart   strain or pulmonary infarction noted.    LUNGS AND AIRWAYS: Minimal tracheal secretions.  Partial right lower lobe   atelectasis with elevation of right hemidiaphragm.  PLEURA: Trace right pleural effusion..  MEDIASTINUM AND SIMONE: No lymphadenopathy.  VESSELS: Within normal limits.  HEART: Cardiomegaly. No evidence of right heart strain. Small pericardial   fluid along the right atrium  CHEST WALL AND LOWER NECK: Enlarged thyroid goiter with mild tracheal   narrowing  VISUALIZED UPPER ABDOMEN: Within normal limits.  BONES: Degenerative changes of the spine.    IMPRESSION:  Small pulmonary bolus in the right upper lobe segmental branch seen on   series 3 image 80. No associated right heart strain or pulmonary   infarction noted.    Partial right lower lobe atelectasis with elevation of right   hemidiaphragm.    Findings were discussed with LAN Coffey 4/16/2022 at 5:52 AM by Dr. Davis.    --- End of Report ---      < end of copied text >      < from: CT Head No Cont (04.12.22 @ 10:08) >    ACC: [id number] EXAM:  CT BRAIN                          PROCEDURE DATE:  04/12/2022          INTERPRETATION:  INDICATIONS:  Seizure, right cerebellar hemorrhage    TECHNIQUE:  Serial axial images were obtained from the skull base to the   vertex without intravenous contrast using portable technique.    COMPARISON EXAMINATION: 4/12/2022 at 4:45 AM    FINDINGS:  VENTRICLES AND SULCI: Age-appropriate involutional change  INTRA-AXIAL: Small focus of high attenuation in the right cerebellum is   again seen relatively unchanged compared with the prior. Microvascular   ischemic changes involving the periventricular and subcortical white   matter.  EXTRA-AXIAL:  No mass or collection is seen.  VISUALIZED SINUSES:  Clear.  VISUALIZED MASTOIDS:  Clear.  CALVARIUM:  Normal.  MISCELLANEOUS: NG and ET tube are apparent    IMPRESSION:  Interval stability compared with the prior study of earlier the same day    --- End of Report ---            FUNMILAYO JEFFERS MD; Attending Radiologist  This document has been electronically signed. Apr 12 2022 10:47AM    < end of copied text >            < from: CT Perfusion w/ Maps w/ IV Cont (04.12.22 @ 04:49) >    ACC: [id number] EXAM:  CT ANGIO BRAIN (W)AW IC                        ACC: [id number] EXAM:  CT PERFUSION W MAPS IC                        ACC: [id number] EXAM:  CT ANGIO NECK (W)AW IC                          PROCEDURE DATE:  04/12/2022          INTERPRETATION:  CLINICAL INDICATION: Altered mental status.    TECHNIQUE:    CTA [place] OF [place]ZQU[place]DO:    After the intravenous power injection of non-ionic contrast material,   serial thin sections were obtained through the intracranial circulation   on a multislice CT scanner.  Images were reformatted using a dedicated 3D   software package and viewed on a dedicated workstation in multiple   planes. 90 cc Omnipaque 350 administered and 10 cc discarded.    CTA NECK:    After the intravenous power injectionof non-ionic contrast material,   serial thin sections were obtained through the cervical circulation on a   multislice CT scanner.  Images were reformatted using a dedicated 3D   software package and viewed on a dedicated workstation in multiple planes.      COMPARISON EXAMINATION: None.    FINDINGS:    CT RAPID PERFUSION:    INFARCT CORE: 0 mL    TISSUE AT RISK: 88 mL, Tmax >6s.    MISMATCH RATIO: Infinite    The areas of elevated Tmax do not follow a vascular distribution and are   likely artifactual, related to patient motion.      CTA [place] OF [place]:    ANTERIOR CIRCULATION    ICA  CAVERNOUS, SUPRACLINOID, BIFURCATION SEGMENTS: Patent without flow   limiting stenosis.    ANTERIOR CEREBRAL ARTERIES: Bilateral A1, anterior communicating and A2   anterior cerebral arteries are unremarkable in course and caliber without   flow limiting stenosis.    MIDDLE CEREBRAL ARTERIES: Patent bilateral M1, M2, and distal MCA   branches without flow limiting stenosis.    POSTERIOR CIRCULATION:    VERTEBRAL ARTERIES: Patent without flow limiting stenosis    BASILAR ARTERY: Patent no flow limiting stenosis.    POSTERIOR CEREBRAL ARTERIES: Patent without flow limiting stenosis.    CTA NECK:    GREAT VESSELS: Visualized segments are patent, no flowlimiting stenosis.    COMMON CAROTID ARTERIES:  RIGHT: Patent without flow limiting stenosis  LEFT: Patent without flow limiting stenosis    CAROTID BULBS:  RIGHT: Patent.  LEFT: Patent.    INTERNAL CAROTID ARTERIES:  RIGHT: Patent no evidence for any hemodynamically significant stenosis at   the ICA origin by NASCET criteria.  LEFT: Patent no evidence for any hemodynamically significant stenosis at   the ICA origin by NASCET criteria.    VERTEBRAL ARTERIES:    RIGHT: Patent no evidence for any flow limiting stenosis.  LEFT: Patent no evidence for any flow limiting stenosis.      SOFT TISSUES: There is diffuse thyromegaly again noted. An endotracheal   tube is visualized with its distal tip above the level of the beatrice. The   heart is enlarged    BONES: Unremarkable      IMPRESSION:    CT PERFUSION: The areas of elevated Tmax do not follow a vascular   distribution and are likely artifactual, related to patient motion.    If symptoms persist consider follow up head CT or MRI, MRA  if no   contraindication.    CTA COW:  Patent intracranial circulation without flow limiting stenosis    CTA NECK: Patent, ECAs, ICAs, no  hemodynamically significant stenosis at    ICA origins by NASCET criteria.    Bilateral vertebral arteries are patent without flow limiting stenosis.    The findings were discussed with DR. LANTIGUA  on 4/12/2022 4:54 AM.  Hospital policies for call back including read back policies were   followed. The verbal communication call back supplements this written   report.    --- End of Report ---            GRACIE GRAY MD; Attending Radiologist  This document has been electronically signed. Apr 12 2022  5:21AM    < end of copied text >                  < from: VA Duplex Lower Ext Vein Scan, Bilat (04.15.22 @ 14:29) >    ACC: [id number] EXAM:  DUPLEX SCAN EXT VEINS LOWER BI                          PROCEDURE DATE:  04/15/2022          INTERPRETATION:  CLINICAL INFORMATION: Intracranial hemorrhage, prolonged   bedrest, high risk for DVT.    COMPARISON: None available.    TECHNIQUE: Duplex sonography of the BILATERAL LOWER extremity veins with   color and spectral Doppler, with and without compression.    FINDINGS:    RIGHT:  Normal compressibility of the RIGHT common femoral, femoral and popliteal   veins.  Doppler examination shows normal spontaneous and phasic flow.  No RIGHT calf vein thrombosis is detected.    LEFT:  Normal compressibility of the LEFT common femoral, femoral and popliteal   veins.  Doppler examination shows normal spontaneous and phasic flow.  There is occlusive thrombus in the soleal vein of the calf. Absence of   compression and venous flow is noted here.    IMPRESSION:  Acute below the knee DVT confined to the left soleal vein.    Examination findings were conveyed to physician's assistant Mati by   vascular technologist Onelia at 1330 hours on 4/15/2022 with read back.    --- End of Report ---    < end of copied text >        < from: Transthoracic Echocardiogram (04.12.22 @ 15:11) >  Patient name: PEPITO BALL  YOB: 1950   Age: 72 (M)   MR#: [id number]  Study Date: 4/12/2022  [facility] Jos Hess RDCS  Study quality: Technically fair  Referring Physician: Jsamin Rossi MD  Blood Pressure: 158/85 mmHg  Height: 180 cm  Weight: 76 kg  BSA: 2 m2  Heart Rhythm: Normal sinus  Heart Rate: 48 mmHg  ------------------------------------------------------------------------  PROCEDURE: Transthoracic echocardiogram with 2-D, M-Mode  and complete spectral and color flow Doppler.  INDICATION: Other cerebrovascular disease (I67.89)  ------------------------------------------------------------------------  Dimensions:    Normal Values:  LA:     2.5    2.0 - 4.0 cm  Ao:     3.0    2.0 - 3.8cm  SEPTUM: 0.9    0.6 - 1.2 cm  PWT:    0.9    0.6 - 1.1 cm  LVIDd:  3.9    3.0 - 5.6 cm  LVIDs:  2.3    1.8 - 4.0 cm  Derived variables:  LVMI: 54 g/m2  RWT: 0.46  Fractional short: 41 %  EF (Visual Estimate): 75 %  Doppler Peak Velocity (m/sec): AoV=1.0 TV=2.0  ------------------------------------------------------------------------  Observations:  Mitral Valve: Normal mitral valve.  Aortic Valve/Aorta: Calcified aortic valve with normal  opening.  Normal aortic root size.  Left Atrium: Normalleft atrium.  Left Ventricle: Normal left ventricular internal dimensions  and wall thicknesses.  Normal left ventricular systolic function. No segmental  wall motion abnormalities.  Normal diastolic function.  Right Heart: Normal right atrium. Normal right ventricular  size and function.  Normal tricuspid valve. Mild tricuspid regurgitation.  Normal pulmonic valve.  Pericardium/Pleura: Small pericardial effusion.  Left pleural effusion.  Hemodynamic: No evidence of pulmonary hypertension.  ------------------------------------------------------------------------  Conclusions:  Normal left ventricular systolic function. No segmental  wall motion abnormalities.  ------------------------------------------------------------------------  Confirmedon  4/12/2022 - 18:55:58 by Neeraj Cordero MD, FASE  ----------------------------------------------------------    < end of copied text >     HPI:  Patient is a 72 year old Male with a PMHx of HTN, hypothyroidism, who presented to [facility]  via transfer from OSH. Patient originally presented to to [place] ED via EMS after family called 911 following a witnessed seizure, by family, reportedly lasting ~5min.  Enroute to [place], patient was reported to have had another 2 witnessed seizures by EMS, each lasting ~1 minute. Following each seizure the patient was given 5mg IV versed (received 10mg collectively and brought to [place] ED). Pt arrived to the ED obtunded with blood from his mouth (tongue laceration), responsive only to pain/sternal rub. Patient was subsequently intubated for GCS 6 and CT/CTA performed showing a small cerebellar hemorrhage. Patient is not on any A/C or blood thinners. Patient was then transferred to [facility] for NSGY eval on Cardene and Propofol gtt.      REVIEW OF SYSTEMS:    CONSTITUTIONAL: +Generalized weakness   EYES/ENT: No visual changes;  No vertigo or throat pain   NECK: +Goiter   RESPIRATORY: No cough, wheezing, hemoptysis; No shortness of breath  CARDIOVASCULAR: No chest pain or palpitations  GASTROINTESTINAL: No abdominal or epigastric pain. No nausea, vomiting, or hematemesis; No diarrhea or constipation. No melena or hematochezia.  GENITOURINARY: No dysuria, frequency or hematuria  NEUROLOGICAL: +History of seizures   SKIN: No itching, burning, rashes, or lesions   All other review of systems is negative unless indicated above.        PAST MEDICAL & SURGICAL HISTORY:  HTN (hypertension)    Diverticulosis    Hypothyroid    No significant past surgical history        MEDICATIONS  (STANDING):  albuterol/ipratropium for Nebulization 3 milliLiter(s) Nebulizer every 6 hours  amLODIPine   Tablet 10 milliGRAM(s) Oral daily  diVALproex  milliGRAM(s) Oral two times a day  enoxaparin Injectable 40 milliGRAM(s) SubCutaneous <User Schedule>  levothyroxine 88 MICROGram(s) Oral daily  lisinopril 20 milliGRAM(s) Oral daily  multiple electrolytes Injection Type 1 1000 milliLiter(s) (50 mL/Hr) IV Continuous <Continuous>  polyethylene glycol 3350 17 Gram(s) Oral two times a day  QUEtiapine 50 milliGRAM(s) Oral at bedtime  senna 2 Tablet(s) Oral at bedtime  thiamine IVPB 500 milliGRAM(s) IV Intermittent every 8 hours    MEDICATIONS  (PRN):  hydrALAZINE 10 milliGRAM(s) Oral every 4 hours PRN Systolic blood pressure < 160  LORazepam   Injectable 1 milliGRAM(s) IV Push once PRN seizure        Allergies    fish (Hives; Angioedema)  No Known Drug Allergies    Intolerances          Vital Signs Last 24 Hrs  T(C): 36.9 (18 Apr 2022 11:00), Max: 37.4 (17 Apr 2022 13:38)  T(F): 98.4 (18 Apr 2022 11:00), Max: 99.4 (17 Apr 2022 13:38)  HR: 89 (18 Apr 2022 12:00) (80 - 98)  BP: 158/92 (18 Apr 2022 12:00) (138/61 - 178/63)  BP(mean): --  RR: 17 (18 Apr 2022 12:00) (17 - 20)  SpO2: 93% (18 Apr 2022 12:00) (91% - 93%)      Appearance: Weak appearing male, laying down in increased observation room 	  HEENT:   Normal oral mucosa, PERRL, EOMI	  Lymphatic: No lymphadenopathy , no edema  Cardiovascular: Normal S1 S2, No JVD, No murmurs , Peripheral pulses palpable 2+ bilaterally  Respiratory: Lungs clear to auscultation, normal effort 	  Gastrointestinal:  Soft, Non-tender, + BS	  Skin: No rashes, No ecchymoses, No cyanosis, warm to touch  Musculoskeletal: Normal range of motion, normal strength  Psychiatry:  Mood & affect appropriate  Ext: No LE edema B/L                             12.1   6.25  )-----------( 224      ( 18 Apr 2022 07:19 )             38.3               04-18    149<H>  |  107  |  9   ----------------------------<  99  3.4<L>   |  26  |  1.26    Ca    9.4      18 Apr 2022 07:19  Phos  4.5     04-17  Mg     2.2     04-17    TPro  8.1  /  Alb  3.7  /  TBili  0.4  /  DBili  x   /  AST  29  /  ALT  14  /  AlkPhos  68  04-18    PT/INR - ( 18 Apr 2022 07:19 )   PT: 12.1 sec;   INR: 1.05 ratio         PTT - ( 18 Apr 2022 07:19 )  PTT:28.6 sec                 < from: Xray Chest 1 View- PORTABLE-Routine (Xray Chest 1 View- PORTABLE-Routine .) (04.15.22 @ 09:48) >    ACC: [id number] EXAM:  XR CHEST PORTABLE ROUTINE 1V                          PROCEDURE DATE:  04/15/2022          INTERPRETATION:  Chest one view    HISTORY: Pulmonary congestion    COMPARISON STUDY: 4/14/2022    Frontal expiratory view of the chest shows the heart to be similar in   size. The lungs show right base atelectasis or developing infiltrate with   small right effusion and there is no evidence of pneumothorax nor left   pleural effusion.    IMPRESSION:  Right base atelectasis/infiltrate.        Thank you for the courtesy of this referral.    --- End of Report ---    < end of copied text >          < from: CT Angio Chest PE Protocol w/ IV Cont (04.16.22 @ 02:00) >    ACC: [id number] EXAM:  CT ANGIO CHEST PULM ART [facility]                          PROCEDURE DATE:  04/16/2022          INTERPRETATION:  CLINICAL INFORMATION: Tachycardia with deep venous   thrombosis    COMPARISON: CT chest 3/31/2017    CONTRAST/COMPLICATIONS:  IV Contrast: Omnipaque 350  85 cc administered   15 cc discarded  Oral Contrast: NONE  Complications: None reported at time of study completion    PROCEDURE:  CT Angiography of the Chest.  Sagittal and coronal reformats were performed as well as 3D (MIP)   reconstructions.    FINDINGS:    Pulmonary arteries: Small pulmonary bolus in the right upper lobe   segmental branch seen on series 3 image 80. No associated right heart   strain or pulmonary infarction noted.    LUNGS AND AIRWAYS: Minimal tracheal secretions.  Partial right lower lobe   atelectasis with elevation of right hemidiaphragm.  PLEURA: Trace right pleural effusion..  MEDIASTINUM AND SIMONE: No lymphadenopathy.  VESSELS: Within normal limits.  HEART: Cardiomegaly. No evidence of right heart strain. Small pericardial   fluid along the right atrium  CHEST WALL AND LOWER NECK: Enlarged thyroid goiter with mild tracheal   narrowing  VISUALIZED UPPER ABDOMEN: Within normal limits.  BONES: Degenerative changes of the spine.    IMPRESSION:  Small pulmonary bolus in the right upper lobe segmental branch seen on   series 3 image 80. No associated right heart strain or pulmonary   infarction noted.    Partial right lower lobe atelectasis with elevation of right   hemidiaphragm.    Findings were discussed with LAN Coffey 4/16/2022 at 5:52 AM by Dr. Davis.    --- End of Report ---      < end of copied text >      < from: CT Head No Cont (04.12.22 @ 10:08) >    ACC: [id number] EXAM:  CT BRAIN                          PROCEDURE DATE:  04/12/2022          INTERPRETATION:  INDICATIONS:  Seizure, right cerebellar hemorrhage    TECHNIQUE:  Serial axial images were obtained from the skull base to the   vertex without intravenous contrast using portable technique.    COMPARISON EXAMINATION: 4/12/2022 at 4:45 AM    FINDINGS:  VENTRICLES AND SULCI: Age-appropriate involutional change  INTRA-AXIAL: Small focus of high attenuation in the right cerebellum is   again seen relatively unchanged compared with the prior. Microvascular   ischemic changes involving the periventricular and subcortical white   matter.  EXTRA-AXIAL:  No mass or collection is seen.  VISUALIZED SINUSES:  Clear.  VISUALIZED MASTOIDS:  Clear.  CALVARIUM:  Normal.  MISCELLANEOUS: NG and ET tube are apparent    IMPRESSION:  Interval stability compared with the prior study of earlier the same day    --- End of Report ---            FUNMILAYO JEFFERS MD; Attending Radiologist  This document has been electronically signed. Apr 12 2022 10:47AM    < end of copied text >            < from: CT Perfusion w/ Maps w/ IV Cont (04.12.22 @ 04:49) >    ACC: [id number] EXAM:  CT ANGIO BRAIN (W)AW IC                        ACC: [id number] EXAM:  CT PERFUSION W MAPS IC                        ACC: [id number] EXAM:  CT ANGIO NECK (W)AW IC                          PROCEDURE DATE:  04/12/2022          INTERPRETATION:  CLINICAL INDICATION: Altered mental status.    TECHNIQUE:    CTA [place] OF IZQUIERDO:    After the intravenous power injection of non-ionic contrast material,   serial thin sections were obtained through the intracranial circulation   on a multislice CT scanner.  Images were reformatted using a dedicated 3D   software package and viewed on a dedicated workstation in multiple   planes. 90 cc Omnipaque 350 administered and 10 cc discarded.    CTA NECK:    After the intravenous power injectionof non-ionic contrast material,   serial thin sections were obtained through the cervical circulation on a   multislice CT scanner.  Images were reformatted using a dedicated 3D   software package and viewed on a dedicated workstation in multiple planes.      COMPARISON EXAMINATION: None.    FINDINGS:    CT RAPID PERFUSION:    INFARCT CORE: 0 mL    TISSUE AT RISK: 88 mL, Tmax >6s.    MISMATCH RATIO: Infinite    The areas of elevated Tmax do not follow a vascular distribution and are   likely artifactual, related to patient motion.      CTA [place] OF IZQUIERDO:    ANTERIOR CIRCULATION    ICA  CAVERNOUS, SUPRACLINOID, BIFURCATION SEGMENTS: Patent without flow   limiting stenosis.    ANTERIOR CEREBRAL ARTERIES: Bilateral A1, anterior communicating and A2   anterior cerebral arteries are unremarkable in course and caliber without   flow limiting stenosis.    MIDDLE CEREBRAL ARTERIES: Patent bilateral M1, M2, and distal MCA   branches without flow limiting stenosis.    POSTERIOR CIRCULATION:    VERTEBRAL ARTERIES: Patent without flow limiting stenosis    BASILAR ARTERY: Patent no flow limiting stenosis.    POSTERIOR CEREBRAL ARTERIES: Patent without flow limiting stenosis.    CTA NECK:    GREAT VESSELS: Visualized segments are patent, no flowlimiting stenosis.    COMMON CAROTID ARTERIES:  RIGHT: Patent without flow limiting stenosis  LEFT: Patent without flow limiting stenosis    CAROTID BULBS:  RIGHT: Patent.  LEFT: Patent.    INTERNAL CAROTID ARTERIES:  RIGHT: Patent no evidence for any hemodynamically significant stenosis at   the ICA origin by NASCET criteria.  LEFT: Patent no evidence for any hemodynamically significant stenosis at   the ICA origin by NASCET criteria.    VERTEBRAL ARTERIES:    RIGHT: Patent no evidence for any flow limiting stenosis.  LEFT: Patent no evidence for any flow limiting stenosis.      SOFT TISSUES: There is diffuse thyromegaly again noted. An endotracheal   tube is visualized with its distal tip above the level of the beatrice. The   heart is enlarged    BONES: Unremarkable      IMPRESSION:    CT PERFUSION: The areas of elevated Tmax do not follow a vascular   distribution and are likely artifactual, related to patient motion.    If symptoms persist consider follow up head CT or MRI, MRA  if no   contraindication.    CTA COW:  Patent intracranial circulation without flow limiting stenosis    CTA NECK: Patent, ECAs, ICAs, no  hemodynamically significant stenosis at    ICA origins by NASCET criteria.    Bilateral vertebral arteries are patent without flow limiting stenosis.    The findings were discussed with DR. LANTIGUA  on 4/12/2022 4:54 AM.  Hospital policies for call back including read back policies were   followed. The verbal communication call back supplements this written   report.    --- End of Report ---            GRACIE GRAY MD; Attending Radiologist  This document has been electronically signed. Apr 12 2022  5:21AM    < end of copied text >                  < from: [facility] Duplex Lower Ext Vein Scan, Bilat (04.15.22 @ 14:29) >    ACC: [id number] EXAM:  DUPLEX SCAN EXT VEINS LOWER BI                          PROCEDURE DATE:  04/15/2022          INTERPRETATION:  CLINICAL INFORMATION: Intracranial hemorrhage, prolonged   bedrest, high risk for DVT.    COMPARISON: None available.    TECHNIQUE: Duplex sonography of the BILATERAL LOWER extremity veins with   color and spectral Doppler, with and without compression.    FINDINGS:    RIGHT:  Normal compressibility of the RIGHT common femoral, femoral and popliteal   veins.  Doppler examination shows normal spontaneous and phasic flow.  No RIGHT calf vein thrombosis is detected.    LEFT:  Normal compressibility of the LEFT common femoral, femoral and popliteal   veins.  Doppler examination shows normal spontaneous and phasic flow.  There is occlusive thrombus in the soleal vein of the calf. Absence of   compression and venous flow is noted here.    IMPRESSION:  Acute below the knee DVT confined to the left soleal vein.    Examination findings were conveyed to physician's assistant Mati by   vascular technologist Onelia at 1330 hours on 4/15/2022 with read back.    --- End of Report ---    < end of copied text >        < from: Transthoracic Echocardiogram (04.12.22 @ 15:11) >  Patient name: PEPITO BALL  YOB: 1950   Age: 72 (M)   MR#: [id number]  Study Date: 4/12/2022  [facility]  Study quality: Technically fair  Referring Physician: Jasmin Rossi MD  Blood Pressure: 158/85 mmHg  Height: 180 cm  Weight: 76 kg  BSA: 2 m2  Heart Rhythm: Normal sinus  Heart Rate: 48 mmHg  ------------------------------------------------------------------------  PROCEDURE: Transthoracic echocardiogram with 2-D, M-Mode  and complete spectral and color flow Doppler.  INDICATION: Other cerebrovascular disease (I67.89)  ------------------------------------------------------------------------  Dimensions:    Normal Values:  LA:     2.5    2.0 - 4.0 cm  Ao:     3.0    2.0 - 3.8cm  SEPTUM: 0.9    0.6 - 1.2 cm  PWT:    0.9    0.6 - 1.1 cm  LVIDd:  3.9    3.0 - 5.6 cm  LVIDs:  2.3    1.8 - 4.0 cm  Derived variables:  LVMI: 54 g/m2  RWT: 0.46  Fractional short: 41 %  EF (Visual Estimate): 75 %  Doppler Peak Velocity (m/sec): AoV=1.0 TV=2.0  ------------------------------------------------------------------------  Observations:  Mitral Valve: Normal mitral valve.  Aortic Valve/Aorta: Calcified aortic valve with normal  opening.  Normal aortic root size.  Left Atrium: Normalleft atrium.  Left Ventricle: Normal left ventricular internal dimensions  and wall thicknesses.  Normal left ventricular systolic function. No segmental  wall motion abnormalities.  Normal diastolic function.  Right Heart: Normal right atrium. Normal right ventricular  size and function.  Normal tricuspid valve. Mild tricuspid regurgitation.  Normal pulmonic valve.  Pericardium/Pleura: Small pericardial effusion.  Left pleural effusion.  Hemodynamic: No evidence of pulmonary hypertension.  ------------------------------------------------------------------------  Conclusions:  Normal left ventricular systolic function. No segmental  wall motion abnormalities.  ------------------------------------------------------------------------  Confirmedon  4/12/2022 - 18:55:58 by Neeraj Cordero MD, FASE  ----------------------------------------------------------    < end of copied text >

## 2022-04-18 NOTE — PRE-ANESTHESIA EVALUATION ADULT - NSANTHPMHFT_GEN_ALL_CORE
72 M w/ HTN, Hypothyroidism, and Diverticulosis (requiring blood transfusions x 2 8 yrs ago) w/ ICH and new onset seizures having MRI 72 M w/ HTN, Hypothyroidism, and Diverticulosis (requiring blood transfusions x 2 8 yrs ago) w/ ICH and new onset seizures

## 2022-04-19 LAB
ALBUMIN CSF-MCNC: 32.6 MG/DL — HIGH (ref 14–25)
ALBUMIN CSF-MCNC: 32.6 MG/DL — HIGH (ref 14–25)
ALBUMIN SERPL ELPH-MCNC: 3.7 G/DL — SIGNIFICANT CHANGE UP (ref 3.3–5)
ALBUMIN SERPL ELPH-MCNC: 3096 MG/DL — LOW (ref 3500–5200)
ALBUMIN SERPL ELPH-MCNC: 3096 MG/DL — LOW (ref 3500–5200)
ALP SERPL-CCNC: 66 U/L — SIGNIFICANT CHANGE UP (ref 40–120)
ALT FLD-CCNC: 15 U/L — SIGNIFICANT CHANGE UP (ref 10–45)
ANION GAP SERPL CALC-SCNC: 14 MMOL/L — SIGNIFICANT CHANGE UP (ref 5–17)
ANION GAP SERPL CALC-SCNC: 14 MMOL/L — SIGNIFICANT CHANGE UP (ref 5–17)
ANTI-RIBONUCLEAR PROTEIN: 0.8 AI — SIGNIFICANT CHANGE UP
APTT BLD: 45.2 SEC — HIGH (ref 27.5–35.5)
APTT BLD: 59.7 SEC — HIGH (ref 27.5–35.5)
APTT BLD: 61.9 SEC — HIGH (ref 27.5–35.5)
APTT BLD: 94.1 SEC — HIGH (ref 27.5–35.5)
AST SERPL-CCNC: 25 U/L — SIGNIFICANT CHANGE UP (ref 10–40)
BASOPHILS # BLD AUTO: 0.03 K/UL — SIGNIFICANT CHANGE UP (ref 0–0.2)
BASOPHILS NFR BLD AUTO: 0.5 % — SIGNIFICANT CHANGE UP (ref 0–2)
BILIRUB SERPL-MCNC: 0.4 MG/DL — SIGNIFICANT CHANGE UP (ref 0.2–1.2)
BUN SERPL-MCNC: 10 MG/DL — SIGNIFICANT CHANGE UP (ref 7–23)
BUN SERPL-MCNC: 10 MG/DL — SIGNIFICANT CHANGE UP (ref 7–23)
CALCIUM SERPL-MCNC: 9.1 MG/DL — SIGNIFICANT CHANGE UP (ref 8.4–10.5)
CALCIUM SERPL-MCNC: 9.3 MG/DL — SIGNIFICANT CHANGE UP (ref 8.4–10.5)
CENTROMERE AB SER-ACNC: <0.2 AI — SIGNIFICANT CHANGE UP
CHLORIDE SERPL-SCNC: 101 MMOL/L — SIGNIFICANT CHANGE UP (ref 96–108)
CHLORIDE SERPL-SCNC: 103 MMOL/L — SIGNIFICANT CHANGE UP (ref 96–108)
CK SERPL-CCNC: 394 U/L — HIGH (ref 30–200)
CO2 SERPL-SCNC: 23 MMOL/L — SIGNIFICANT CHANGE UP (ref 22–31)
CO2 SERPL-SCNC: 26 MMOL/L — SIGNIFICANT CHANGE UP (ref 22–31)
COVID-19 NUCLEOCAPSID GAM AB INTERP: POSITIVE
COVID-19 NUCLEOCAPSID TOTAL GAM ANTIBODY RESULT: 24.2 INDEX — HIGH
COVID-19 SPIKE DOMAIN AB INTERP: POSITIVE
COVID-19 SPIKE DOMAIN ANTIBODY RESULT: >250 U/ML — HIGH
CREAT SERPL-MCNC: 1.07 MG/DL — SIGNIFICANT CHANGE UP (ref 0.5–1.3)
CREAT SERPL-MCNC: 1.23 MG/DL — SIGNIFICANT CHANGE UP (ref 0.5–1.3)
CRYPTOC AG CSF-ACNC: NEGATIVE — SIGNIFICANT CHANGE UP
DRVVT SCREEN TO CONFIRM RATIO: SIGNIFICANT CHANGE UP
DSDNA AB FLD-ACNC: <0.2 AI — SIGNIFICANT CHANGE UP
EGFR: 62 ML/MIN/1.73M2 — SIGNIFICANT CHANGE UP
EGFR: 74 ML/MIN/1.73M2 — SIGNIFICANT CHANGE UP
ENA JO1 AB SER-ACNC: <0.2 AI — SIGNIFICANT CHANGE UP
ENA SCL70 AB SER-ACNC: <0.2 AI — SIGNIFICANT CHANGE UP
ENA SM AB FLD QL: <0.2 AI — SIGNIFICANT CHANGE UP
ENA SS-A AB FLD IA-ACNC: 0.6 AI — SIGNIFICANT CHANGE UP
EOSINOPHIL # BLD AUTO: 0.18 K/UL — SIGNIFICANT CHANGE UP (ref 0–0.5)
EOSINOPHIL NFR BLD AUTO: 3.1 % — SIGNIFICANT CHANGE UP (ref 0–6)
GLUCOSE BLDC GLUCOMTR-MCNC: 169 MG/DL — HIGH (ref 70–99)
GLUCOSE BLDC GLUCOMTR-MCNC: 203 MG/DL — HIGH (ref 70–99)
GLUCOSE SERPL-MCNC: 121 MG/DL — HIGH (ref 70–99)
GLUCOSE SERPL-MCNC: 222 MG/DL — HIGH (ref 70–99)
HCT VFR BLD CALC: 34.9 % — LOW (ref 39–50)
HGB BLD-MCNC: 11 G/DL — LOW (ref 13–17)
IGG CSF-MCNC: 12.2 MG/DL — HIGH
IGG CSF-MCNC: 12.2 MG/DL — HIGH
IGG FLD-MCNC: 2813 MG/DL — HIGH (ref 610–1660)
IGG FLD-MCNC: 2813 MG/DL — HIGH (ref 610–1660)
IGG SYNTH RATE SER+CSF CALC-MRATE: -14.5 MG/DAY — SIGNIFICANT CHANGE UP
IGG SYNTH RATE SER+CSF CALC-MRATE: -14.5 MG/DAY — SIGNIFICANT CHANGE UP
IGG/ALB CLEAR SER+CSF-RTO: 0.4 — SIGNIFICANT CHANGE UP
IGG/ALB CLEAR SER+CSF-RTO: 0.4 — SIGNIFICANT CHANGE UP
IGG/ALB CSF: 0.37 RATIO — HIGH
IGG/ALB CSF: 0.37 RATIO — HIGH
IGG/ALB SER: 0.91 RATIO — SIGNIFICANT CHANGE UP
IGG/ALB SER: 0.91 RATIO — SIGNIFICANT CHANGE UP
IMM GRANULOCYTES NFR BLD AUTO: 0.5 % — SIGNIFICANT CHANGE UP (ref 0–1.5)
LA NT DPL PPP QL: 44.5 SEC — SIGNIFICANT CHANGE UP
LYMPHOCYTES # BLD AUTO: 0.94 K/UL — LOW (ref 1–3.3)
LYMPHOCYTES # BLD AUTO: 16 % — SIGNIFICANT CHANGE UP (ref 13–44)
MCHC RBC-ENTMCNC: 29.6 PG — SIGNIFICANT CHANGE UP (ref 27–34)
MCHC RBC-ENTMCNC: 31.5 GM/DL — LOW (ref 32–36)
MCV RBC AUTO: 93.8 FL — SIGNIFICANT CHANGE UP (ref 80–100)
MONOCYTES # BLD AUTO: 0.62 K/UL — SIGNIFICANT CHANGE UP (ref 0–0.9)
MONOCYTES NFR BLD AUTO: 10.6 % — SIGNIFICANT CHANGE UP (ref 2–14)
MYELOPEROXIDASE AB SER-ACNC: <5 UNITS — SIGNIFICANT CHANGE UP
MYELOPEROXIDASE CELLS FLD QL: NEGATIVE — SIGNIFICANT CHANGE UP
MYOGLOBIN SERPL-MCNC: 89 NG/ML — HIGH (ref 28–72)
NEUTROPHILS # BLD AUTO: 4.06 K/UL — SIGNIFICANT CHANGE UP (ref 1.8–7.4)
NEUTROPHILS NFR BLD AUTO: 69.3 % — SIGNIFICANT CHANGE UP (ref 43–77)
NORMALIZED SCT PPP-RTO: 0.95 RATIO — SIGNIFICANT CHANGE UP (ref 0–1.16)
NORMALIZED SCT PPP-RTO: SIGNIFICANT CHANGE UP
NRBC # BLD: 0 /100 WBCS — SIGNIFICANT CHANGE UP (ref 0–0)
PLATELET # BLD AUTO: 229 K/UL — SIGNIFICANT CHANGE UP (ref 150–400)
POTASSIUM SERPL-MCNC: 3.3 MMOL/L — LOW (ref 3.5–5.3)
POTASSIUM SERPL-MCNC: 4.4 MMOL/L — SIGNIFICANT CHANGE UP (ref 3.5–5.3)
POTASSIUM SERPL-SCNC: 3.3 MMOL/L — LOW (ref 3.5–5.3)
POTASSIUM SERPL-SCNC: 4.4 MMOL/L — SIGNIFICANT CHANGE UP (ref 3.5–5.3)
PROT CSF-MCNC: 68 MG/DL — HIGH (ref 15–45)
PROT SERPL-MCNC: 9 G/DL — HIGH (ref 6–8.3)
RBC # BLD: 3.72 M/UL — LOW (ref 4.2–5.8)
RBC # FLD: 14 % — SIGNIFICANT CHANGE UP (ref 10.3–14.5)
RHEUMATOID FACT SERPL-ACNC: <10 IU/ML — SIGNIFICANT CHANGE UP (ref 0–13)
RIBOSOMAL P AB SER-ACNC: <0.2 AI — SIGNIFICANT CHANGE UP
SARS-COV-2 IGG+IGM SERPL QL IA: 24.2 INDEX — HIGH
SARS-COV-2 IGG+IGM SERPL QL IA: >250 U/ML — HIGH
SARS-COV-2 IGG+IGM SERPL QL IA: POSITIVE
SARS-COV-2 IGG+IGM SERPL QL IA: POSITIVE
SODIUM SERPL-SCNC: 138 MMOL/L — SIGNIFICANT CHANGE UP (ref 135–145)
SODIUM SERPL-SCNC: 143 MMOL/L — SIGNIFICANT CHANGE UP (ref 135–145)
THYROGLOB AB FLD IA-ACNC: 469 IU/ML — HIGH
THYROGLOB AB SERPL-ACNC: 469 IU/ML — HIGH
THYROPEROXIDASE AB SERPL-ACNC: HIGH IU/ML
WBC # BLD: 5.86 K/UL — SIGNIFICANT CHANGE UP (ref 3.8–10.5)
WBC # FLD AUTO: 5.86 K/UL — SIGNIFICANT CHANGE UP (ref 3.8–10.5)

## 2022-04-19 PROCEDURE — 70450 CT HEAD/BRAIN W/O DYE: CPT | Mod: 26

## 2022-04-19 PROCEDURE — 95720 EEG PHY/QHP EA INCR W/VEEG: CPT

## 2022-04-19 PROCEDURE — 99232 SBSQ HOSP IP/OBS MODERATE 35: CPT

## 2022-04-19 PROCEDURE — 99233 SBSQ HOSP IP/OBS HIGH 50: CPT

## 2022-04-19 PROCEDURE — 93010 ELECTROCARDIOGRAM REPORT: CPT

## 2022-04-19 RX ORDER — HALOPERIDOL DECANOATE 100 MG/ML
2.5 INJECTION INTRAMUSCULAR ONCE
Refills: 0 | Status: COMPLETED | OUTPATIENT
Start: 2022-04-19 | End: 2022-04-19

## 2022-04-19 RX ORDER — QUETIAPINE FUMARATE 200 MG/1
100 TABLET, FILM COATED ORAL AT BEDTIME
Refills: 0 | Status: DISCONTINUED | OUTPATIENT
Start: 2022-04-19 | End: 2022-04-24

## 2022-04-19 RX ORDER — RISPERIDONE 4 MG/1
0.5 TABLET ORAL
Refills: 0 | Status: DISCONTINUED | OUTPATIENT
Start: 2022-04-20 | End: 2022-04-28

## 2022-04-19 RX ORDER — POTASSIUM CHLORIDE 20 MEQ
40 PACKET (EA) ORAL ONCE
Refills: 0 | Status: COMPLETED | OUTPATIENT
Start: 2022-04-19 | End: 2022-04-19

## 2022-04-19 RX ORDER — HEPARIN SODIUM 5000 [USP'U]/ML
1150 INJECTION INTRAVENOUS; SUBCUTANEOUS
Qty: 25000 | Refills: 0 | Status: DISCONTINUED | OUTPATIENT
Start: 2022-04-19 | End: 2022-04-20

## 2022-04-19 RX ADMIN — HALOPERIDOL DECANOATE 2.5 MILLIGRAM(S): 100 INJECTION INTRAMUSCULAR at 23:05

## 2022-04-19 RX ADMIN — POLYETHYLENE GLYCOL 3350 17 GRAM(S): 17 POWDER, FOR SOLUTION ORAL at 05:49

## 2022-04-19 RX ADMIN — PANTOPRAZOLE SODIUM 40 MILLIGRAM(S): 20 TABLET, DELAYED RELEASE ORAL at 05:51

## 2022-04-19 RX ADMIN — LISINOPRIL 20 MILLIGRAM(S): 2.5 TABLET ORAL at 05:48

## 2022-04-19 RX ADMIN — Medication 3 MILLILITER(S): at 18:02

## 2022-04-19 RX ADMIN — HALOPERIDOL DECANOATE 2.5 MILLIGRAM(S): 100 INJECTION INTRAMUSCULAR at 21:38

## 2022-04-19 RX ADMIN — Medication 3 MILLILITER(S): at 05:47

## 2022-04-19 RX ADMIN — Medication 105 MILLIGRAM(S): at 23:06

## 2022-04-19 RX ADMIN — HEPARIN SODIUM 13.5 UNIT(S)/HR: 5000 INJECTION INTRAVENOUS; SUBCUTANEOUS at 05:52

## 2022-04-19 RX ADMIN — SODIUM CHLORIDE 60 MILLILITER(S): 9 INJECTION, SOLUTION INTRAVENOUS at 05:52

## 2022-04-19 RX ADMIN — AMLODIPINE BESYLATE 10 MILLIGRAM(S): 2.5 TABLET ORAL at 05:49

## 2022-04-19 RX ADMIN — Medication 650 MILLIGRAM(S): at 12:15

## 2022-04-19 RX ADMIN — LACOSAMIDE 120 MILLIGRAM(S): 50 TABLET ORAL at 21:39

## 2022-04-19 RX ADMIN — Medication 3 MILLILITER(S): at 00:56

## 2022-04-19 RX ADMIN — Medication 3 MILLILITER(S): at 23:06

## 2022-04-19 RX ADMIN — Medication 3 MILLILITER(S): at 12:07

## 2022-04-19 RX ADMIN — Medication 88 MICROGRAM(S): at 05:48

## 2022-04-19 RX ADMIN — Medication 25 MILLIGRAM(S): at 12:10

## 2022-04-19 RX ADMIN — HEPARIN SODIUM 13.5 UNIT(S)/HR: 5000 INJECTION INTRAVENOUS; SUBCUTANEOUS at 01:47

## 2022-04-19 RX ADMIN — SODIUM CHLORIDE 60 MILLILITER(S): 9 INJECTION, SOLUTION INTRAVENOUS at 01:02

## 2022-04-19 RX ADMIN — Medication 105 MILLIGRAM(S): at 00:56

## 2022-04-19 RX ADMIN — Medication 40 MILLIEQUIVALENT(S): at 18:49

## 2022-04-19 RX ADMIN — Medication 105 MILLIGRAM(S): at 16:49

## 2022-04-19 RX ADMIN — IMMUNE GLOBULIN (HUMAN) 50 GRAM(S): 10 INJECTION INTRAVENOUS; SUBCUTANEOUS at 12:50

## 2022-04-19 RX ADMIN — Medication 58 MILLIGRAM(S): at 05:49

## 2022-04-19 RX ADMIN — LACOSAMIDE 120 MILLIGRAM(S): 50 TABLET ORAL at 09:12

## 2022-04-19 RX ADMIN — Medication 105 MILLIGRAM(S): at 09:14

## 2022-04-19 NOTE — PROGRESS NOTE ADULT - SUBJECTIVE AND OBJECTIVE BOX
Vascular Cardiology  Progress note    SERVICE LINE [phone]              EMAIL [email or web address]   OFFICE [phone]    CC:  seizures    INTERVAL HISTORY:  Started on heparin drip overnight         Allergies    fish (Hives; Angioedema)  No Known Drug Allergies    Intolerances    	    MEDICATIONS:  amLODIPine   Tablet 10 milliGRAM(s) Oral daily  heparin  Infusion 1150 Unit(s)/Hr IV Continuous <Continuous>  hydrALAZINE 10 milliGRAM(s) Oral every 4 hours PRN  lisinopril 20 milliGRAM(s) Oral daily      albuterol/ipratropium for Nebulization 3 milliLiter(s) Nebulizer every 6 hours  diphenhydrAMINE Injectable 25 milliGRAM(s) IV Push daily    acetaminophen     Tablet .. 650 milliGRAM(s) Oral daily  lacosamide IVPB 100 milliGRAM(s) IV Intermittent every 12 hours  LORazepam   Injectable 1 milliGRAM(s) IV Push once PRN  QUEtiapine 50 milliGRAM(s) Oral at bedtime    pantoprazole    Tablet 40 milliGRAM(s) Oral before breakfast  polyethylene glycol 3350 17 Gram(s) Oral two times a day  senna 2 Tablet(s) Oral at bedtime    levothyroxine 88 MICROGram(s) Oral daily  methylPREDNISolone sodium succinate IVPB 1000 milliGRAM(s) IV Intermittent daily    immune   globulin 10% (GAMMAGARD) IVPB 30 Gram(s) IV Intermittent daily  potassium chloride   Powder 40 milliEquivalent(s) Oral once  thiamine IVPB 500 milliGRAM(s) IV Intermittent every 8 hours      PAST MEDICAL & SURGICAL HISTORY:  HTN (hypertension)    Diverticulosis    Hypothyroid    No significant past surgical history        FAMILY HISTORY:      SOCIAL HISTORY:  unchanged    REVIEW OF SYSTEMS:  CONSTITUTIONAL: No fever, weight loss, or fatigue  EYES: No eye pain, visual disturbances, or discharge  ENMT:  No difficulty hearing, tinnitus, vertigo; No sinus or throat pain  NECK: No pain or stiffness  RESPIRATORY: No cough, wheezing, chills or hemoptysis; No Shortness of Breath  CARDIOVASCULAR: No chest pain, palpitations, passing out, dizziness, or leg swelling  GASTROINTESTINAL: No abdominal or epigastric pain. No nausea, vomiting, or hematemesis; No diarrhea or constipation. No melena or hematochezia.  GENITOURINARY: No dysuria, frequency, hematuria, or incontinence  NEUROLOGICAL: No headaches, memory loss, loss of strength, numbness, or tremors  SKIN: No itching, burning, rashes, or lesions   LYMPH Nodes: No enlarged glands  ENDOCRINE: No heat or cold intolerance; No hair loss  MUSCULOSKELETAL: No joint pain or swelling; No muscle, back, or extremity pain  PSYCHIATRIC: No depression, anxiety, mood swings, or difficulty sleeping  HEME/LYMPH: No easy bruising, or bleeding gums  ALLERGY AND IMMUNOLOGIC: No hives or eczema	    [X] All others negative	  [ ] Unable to obtain    PHYSICAL EXAM:  T(C): 37.2 (04-19-22 @ 09:54), Max: 37.2 (04-18-22 @ 21:00)  HR: 99 (04-19-22 @ 09:54) (80 - 99)  BP: 180/89 (04-19-22 @ 09:54) (123/86 - 180/89)  RR: 18 (04-19-22 @ 09:54) (17 - 20)  SpO2: 93% (04-19-22 @ 09:54) (92% - 96%)  Wt(kg): --  I&O's Summary    18 Apr 2022 07:01  -  19 Apr 2022 07:00  --------------------------------------------------------  IN: 0 mL / OUT: 600 mL / NET: -600 mL      Appearance: tired appearing  HEENT:   Normal oral mucosa, PERRL, EOMI	  Carotid:   Right: no bruit   Left: no bruit     Lymphatic: No lymphadenopathy  Cardiovascular:  tachycardiac, s1s2, no MRG   Respiratory: + rhonchi   Psychiatry:  AAO x 3  Gastrointestinal:  Soft, Non-tender, + BS	  Skin: No rashes, No ecchymoses, No cyanosis	  Neurologic:  AOx3   Extremities:  dry skin, no edema     Vascular Pulse Exam:  Right DP: [x]palpable []non-palpable []audible      Left DP :   [x]palpable []non-palpable []audible  Right PT: [x]palpable [] non-palpable []audible   Left PT:  [x] palpable [] non-palpable []audible        LABS:	 	    CBC Full  -  ( 19 Apr 2022 05:56 )  WBC Count : 5.86 K/uL  Hemoglobin : 11.0 g/dL  Hematocrit : 34.9 %  Platelet Count - Automated : 229 K/uL  Mean Cell Volume : 93.8 fl  Mean Cell Hemoglobin : 29.6 pg  Mean Cell Hemoglobin Concentration : 31.5 gm/dL  Auto Neutrophil # : 4.06 K/uL  Auto Lymphocyte # : 0.94 K/uL  Auto Monocyte # : 0.62 K/uL  Auto Eosinophil # : 0.18 K/uL  Auto Basophil # : 0.03 K/uL  Auto Neutrophil % : 69.3 %  Auto Lymphocyte % : 16.0 %  Auto Monocyte % : 10.6 %  Auto Eosinophil % : 3.1 %  Auto Basophil % : 0.5 %    04-19    143  |  103  |  10  ----------------------------<  121<H>  3.3<L>   |  26  |  1.23  04-19    138  |  101  |  10  ----------------------------<  222<H>  4.4   |  23  |  1.07    Ca    9.3      19 Apr 2022 05:56  Ca    9.1      19 Apr 2022 00:37    TPro  9.0<H>  /  Alb  3.7  /  TBili  0.4  /  DBili  x   /  AST  25  /  ALT  15  /  AlkPhos  66  04-19  TPro  8.1  /  Alb  3.7  /  TBili  0.4  /  DBili  x   /  AST  29  /  ALT  14  /  AlkPhos  68  04-18      Assessment:  1. L soleal vein DVT with small RUL PE   2. 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 MRI with persistent small foci of hemorrhage, patient restarted on heparin drip, monitor PTT closely  3. Patient is euvolemic on exam, TTE with normal EF and no diastolic dysfunction, this would not be effected by IVIG         Thank you      Vascular Cardiology Service    Please call with any questions:   DIRECT SERVICE NUMBER:  [phone]  Office [phone]  email:  [email or web address]

## 2022-04-19 NOTE — PROGRESS NOTE ADULT - PROBLEM SELECTOR PLAN 3
CTA chest with RLL atelectasis  -Zosyn d/c'd  -Chest congestion improved. Can change nebs to q6h PRN.   -Chest PT q6h

## 2022-04-19 NOTE — PROGRESS NOTE ADULT - SUBJECTIVE AND OBJECTIVE BOX
Follow-up Pulm Progress Note    Seen earlier  Alert, confused  Sats 98% RA    Medications:  MEDICATIONS  (STANDING):  acetaminophen     Tablet .. 650 milliGRAM(s) Oral daily  albuterol/ipratropium for Nebulization 3 milliLiter(s) Nebulizer every 6 hours  amLODIPine   Tablet 10 milliGRAM(s) Oral daily  diphenhydrAMINE Injectable 25 milliGRAM(s) IV Push daily  heparin  Infusion 1150 Unit(s)/Hr (11.5 mL/Hr) IV Continuous <Continuous>  immune   globulin 10% (GAMMAGARD) IVPB 30 Gram(s) IV Intermittent daily  lacosamide IVPB 100 milliGRAM(s) IV Intermittent every 12 hours  levothyroxine 88 MICROGram(s) Oral daily  lisinopril 20 milliGRAM(s) Oral daily  methylPREDNISolone sodium succinate IVPB 1000 milliGRAM(s) IV Intermittent daily  pantoprazole    Tablet 40 milliGRAM(s) Oral before breakfast  polyethylene glycol 3350 17 Gram(s) Oral two times a day  potassium chloride   Powder 40 milliEquivalent(s) Oral once  QUEtiapine 100 milliGRAM(s) Oral at bedtime  senna 2 Tablet(s) Oral at bedtime  thiamine IVPB 500 milliGRAM(s) IV Intermittent every 8 hours    MEDICATIONS  (PRN):  hydrALAZINE 10 milliGRAM(s) Oral every 4 hours PRN Systolic blood pressure < 160  LORazepam   Injectable 1 milliGRAM(s) IV Push once PRN seizure          Vital Signs Last 24 Hrs  T(C): 36.9 (19 Apr 2022 14:38), Max: 37.2 (18 Apr 2022 21:00)  T(F): 98.5 (19 Apr 2022 14:38), Max: 99 (18 Apr 2022 21:00)  HR: 82 (19 Apr 2022 14:38) (82 - 99)  BP: 165/89 (19 Apr 2022 14:38) (123/86 - 180/89)  BP(mean): --  RR: 18 (19 Apr 2022 14:38) (18 - 20)  SpO2: 97% (19 Apr 2022 14:38) (92% - 97%)          04-18 @ 07:01  -  04-19 @ 07:00  --------------------------------------------------------  IN: 0 mL / OUT: 600 mL / NET: -600 mL          LABS:                        11.0   5.86  )-----------( 229      ( 19 Apr 2022 05:56 )             34.9     04-19    143  |  103  |  10  ----------------------------<  121<H>  3.3<L>   |  26  |  1.23    Ca    9.3      19 Apr 2022 05:56    TPro  9.0<H>  /  Alb  3.7  /  TBili  0.4  /  DBili  x   /  AST  25  /  ALT  15  /  AlkPhos  66  04-19      CARDIAC MARKERS ( 19 Apr 2022 05:56 )  x     / x     / 394 U/L / x     / x          CAPILLARY BLOOD GLUCOSE      POCT Blood Glucose.: 134 mg/dL (18 Apr 2022 21:09)    PT/INR - ( 18 Apr 2022 07:19 )   PT: 12.1 sec;   INR: 1.05 ratio         PTT - ( 19 Apr 2022 05:56 )  PTT:94.1 sec        DAIANA -- 04-19 @ 09:09  Anti SS-1 --  Anti SS-2 --  Anti RNP --  RF <10 04-19 @ 09:09    Atypical ANCA -- 04-19 @ 09:09  c-ANCA titer -- 04-19 @ 09:09  c-ANCA -- 04-19 @ 09:09  p-ANCA -- 04-19 @ 09:09          Physical Examination:  PULM: decreased BS  CVS: S1, S2 heard    RADIOLOGY REVIEWED    CT chest: < from: CT Angio Chest PE Protocol w/ IV Cont (04.16.22 @ 02:00) >  FINDINGS:    Pulmonary arteries: Small pulmonary bolus in the right upper lobe   segmental branch seen on series 3 image 80. No associated right heart   strain or pulmonary infarction noted.    LUNGS AND AIRWAYS: Minimal tracheal secretions.  Partial right lower lobe   atelectasis with elevation of right hemidiaphragm.  PLEURA: Trace right pleural effusion..  MEDIASTINUM AND SIMONE: No lymphadenopathy.  VESSELS: Within normal limits.  HEART: Cardiomegaly. No evidence of right heart strain. Small pericardial   fluid along the right atrium  CHEST WALL AND LOWER NECK: Enlarged thyroid goiter with mild tracheal   narrowing  VISUALIZED UPPER ABDOMEN: Within normal limits.  BONES: Degenerative changes of the spine.    IMPRESSION:  Small pulmonary bolus in the right upper lobe segmental branch seen on   series 3 image 80. No associated right heart strain or pulmonary   infarction noted.    Partial right lower lobe atelectasis with elevation of right   hemidiaphragm.      < end of copied text >      LE duplex: < from: [facility] Duplex Lower Ext Vein Scan, Bilat (04.15.22 @ 14:29) >  Acute below the knee DVT confined to the left soleal vein.    < end of copied text >

## 2022-04-19 NOTE — PROGRESS NOTE ADULT - ASSESSMENT
Assessment: 73 yo male with a PMHx of HTN, Hypothyroidism, and Diverticulosis (requiring blood transfusions x2 8 yrs ago) who presented to [facility] on 4/12 as a transfer from [place] for ICH. Patient LKN was at 2200pm on 4/11. Patient was noted on 4/12 @ 0200 to be obtunded with saliva coming out of his mouth. EMS was called, patient came back to baseline and did not want to go to hospital and EMS left. Patient then had a seizure-like event witnessed by wife with UE shaking, foaming at the mouth, and urinary/fecal incontinence. Neurology consulted for new onset seizures in the setting of ICH while patient was in NSCU. No seizures seen on EEG thus far during this admission. Transferred from NSCU to EMU on 4/15 after stable IPH noted.     Impression: New onset of seizures of unknown cause, with R cerebellar IPH.      Plan:  [] Neuro checks, vitals Q4HR.  [] Telemetry.  [] Seizure/Fall/Aspiration precautions.  [] IVIG (day 2), for total 5 days, last day 4/22  [] On Heparin gtt, goal PTT 50-70  [] Off EEG  [] S/p MRI brain w/wo under anesthesia on 4/18, with LP to be performed subsequently.   [] D/c'd Depakote 750MG PO BID.   [] C/w  Vimpat 100mg BID IV. S/p Vimpat load 200mg IV x2. Obtained cardiology clearance.     [] c/w Seroquel 50MG PO QHS.  [] Cardiology following (Dr. Fragoso), c/w Lisinopril 20MG PO QD, Hydralazine 10MG PO Q4HR. Will re-evaluate to give clearance for Vimpat.   [] Pulmonology following, Luis Daniel DC'd as no PNA seen on CTA Chest.   [x] 4/16 CTA Chest PE Protocol: as above, patient with small R upper lobe segmental branch PE.  [] 4/16: Vascular Neurology aware of case, spoke to stroke fellow Dr. Abby Deutsch. From vascular neurology standpoint no contraindication for full dose AC if needed in setting of PE.  [] Vascular Cardiology following (Dr. Perera): Spoke with Dr. Perera/vascular cardiology team. Started Heparin gtt.   [x] 4/16: Developed JESSICA after CTA Chest, likely due to contrast. Resolved.    [] Rheum w/u serum: myomarker panel, SCL 70, Centromere, Marialuisa 1, TPO, Thyroglobulin Ab, C3 C4, ROSIBEL, Sjogren labs, APLS, Ribosomal P ab, ANCA, MPO, PR3, CK, Aldolase, Myoglobin, GGT, ESR, DAIANA, C- ANCA, DS-DAIANA, Urine prot/creat ratio, anticardiolipin Ab, Lupus anticoagulant, B2 glycoprotein AB,  RF, CCP, anti-GBM.  [] Electrolyte repletion as needed  [] DVT PPx: On heparin gtt  [] Diet: Regular.  [] Seizure Rescue: Ativan 1MG IVP x1 for convulsions lasting > 3 minutes, VPA 1G IV x1 for convulsions that are refractory to Ativan.    Case to be seen and discussed with epilepsy attending Dr. Maldonado.   Assessment: 73 yo male with a PMHx of HTN, Hypothyroidism, and Diverticulosis (requiring blood transfusions x2 8 yrs ago) who presented to [facility] on 4/12 as a transfer from [place] for ICH. Patient LKN was at 2200pm on 4/11. Patient was noted on 4/12 @ 0200 to be obtunded with saliva coming out of his mouth. EMS was called, patient came back to baseline and did not want to go to hospital and EMS left. Patient then had a seizure-like event witnessed by wife with UE shaking, foaming at the mouth, and urinary/fecal incontinence. Neurology consulted for new onset seizures in the setting of ICH while patient was in NSCU. No seizures seen on EEG thus far during this admission. Transferred from NSCU to EMU on 4/15 after stable IPH noted.     Impression: New onset of seizures of unknown cause, with R cerebellar IPH.      Plan:  [] Place back on vEEG  [] IVIG (day 2), for total 5 days, last day 4/22  [] On Heparin gtt, goal PTT 50-70  [] S/p MRI brain w/wo under anesthesia on 4/18, with LP to be performed subsequently.   [] D/c'd Depakote 750MG PO BID.   [] C/w  Vimpat 100mg BID IV. S/p Vimpat load 200mg IV x2. Obtained cardiology clearance.     [] c/w Seroquel 50MG PO QHS.  [] Cardiology following (Dr. Fragoso), c/w Lisinopril 20MG PO QD, Hydralazine 10MG PO Q4HR. Will re-evaluate to give clearance for Vimpat.   [] Pulmonology following, Luis Daniel DC'd as no PNA seen on CTA Chest.   [x] 4/16 CTA Chest PE Protocol: as above, patient with small R upper lobe segmental branch PE.  [] 4/16: Vascular Neurology aware of case, spoke to stroke fellow Dr. Abby Deutsch. From vascular neurology standpoint no contraindication for full dose AC if needed in setting of PE.  [] Vascular Cardiology following (Dr. Perera): Spoke with Dr. Perera/vascular cardiology team. Started Heparin gtt.   [x] 4/16: Developed JESSICA after CTA Chest, likely due to contrast. Resolved.    [] Rheum w/u serum: myomarker panel, SCL 70, Centromere, Marialuisa 1, TPO, Thyroglobulin Ab, C3 C4, ROSIBEL, Sjogren labs, APLS, Ribosomal P ab, ANCA, MPO, PR3, CK, Aldolase, Myoglobin, GGT, ESR, DAIANA, C- ANCA, DS-DAIANA, Urine prot/creat ratio, anticardiolipin Ab, Lupus anticoagulant, B2 glycoprotein AB,  RF, CCP, anti-GBM.  [] Electrolyte repletion as needed  [] Neuro checks, vitals Q4HR.  [] Telemetry.  [] Seizure/Fall/Aspiration precautions.  [] DVT PPx: On heparin gtt  [] Diet: Regular.  [] Seizure Rescue: Ativan 1MG IVP x1 for convulsions lasting > 3 minutes, VPA 1G IV x1 for convulsions that are refractory to Ativan.    Case to be seen and discussed with epilepsy attending Dr. Maldonado.   Assessment: 73 yo male with a PMHx of HTN, Hypothyroidism, and Diverticulosis (requiring blood transfusions x2 8 yrs ago) who presented to [facility] on 4/12 as a transfer from [place] for ICH. Patient LKN was at 2200pm on 4/11. Patient was noted on 4/12 @ 0200 to be obtunded with saliva coming out of his mouth. EMS was called, patient came back to baseline and did not want to go to hospital and EMS left. Patient then had a seizure-like event witnessed by wife with UE shaking, foaming at the mouth, and urinary/fecal incontinence. Neurology consulted for new onset seizures in the setting of ICH while patient was in NSCU. No seizures seen on EEG thus far during this admission. Transferred from NSCU to EMU on 4/15 after stable IPH noted.     Impression: New onset of seizures of unknown cause, with R cerebellar IPH.      Plan:  [] Place back on vEEG  [] IVIG (day 2), for total 5 days, last day 4/22  [] On Heparin gtt, goal PTT 50-70  [] S/p MRI brain w/wo under anesthesia on 4/18, with LP to be performed subsequently.   [] D/c'd Depakote 750MG PO BID.   [] C/w  Vimpat 100mg BID IV. S/p Vimpat load 200mg IV x2. Obtained cardiology clearance.     [] Increase Seroquel to 100MG PO QHS.  [] Add Risperidone 0.5mg qAM  [] Continuos pulse ox  [] Cardiology following (Dr. Fragoso), c/w Lisinopril 20MG PO QD, Hydralazine 10MG PO Q4HR. Will re-evaluate to give clearance for Vimpat.   [] Pulmonology following, Luis Daniel DC'd as no PNA seen on CTA Chest.   [x] 4/16 CTA Chest PE Protocol: as above, patient with small R upper lobe segmental branch PE.  [] 4/16: Vascular Neurology aware of case, spoke to stroke fellow Dr. Abby Deutsch. From vascular neurology standpoint no contraindication for full dose AC if needed in setting of PE.  [] Vascular Cardiology following (Dr. Perera): Spoke with Dr. Perera/vascular cardiology team. Started Heparin gtt.   [x] 4/16: Developed JESSICA after CTA Chest, likely due to contrast. Resolved.    [] Rheum w/u serum: myomarker panel, SCL 70, Centromere, Marialuisa 1, TPO, Thyroglobulin Ab, C3 C4, ROSIBEL, Sjogren labs, APLS, Ribosomal P ab, ANCA, MPO, PR3, CK, Aldolase, Myoglobin, GGT, ESR, DAIANA, C- ANCA, DS-DAIANA, Urine prot/creat ratio, anticardiolipin Ab, Lupus anticoagulant, B2 glycoprotein AB,  RF, CCP, anti-GBM.  [] Electrolyte repletion as needed  [] Neuro checks, vitals Q4HR.  [] Telemetry.  [] Seizure/Fall/Aspiration precautions.  [] DVT PPx: On heparin gtt  [] Diet: Regular.  [] Seizure Rescue: Ativan 1MG IVP x1 for convulsions lasting > 3 minutes, VPA 1G IV x1 for convulsions that are refractory to Ativan.    Case to be seen and discussed with epilepsy attending Dr. Maldonado.   Assessment: 73 yo male with a PMHx of HTN, Hypothyroidism, and Diverticulosis (requiring blood transfusions x2 8 yrs ago) who presented to [facility] on 4/12 as a transfer from [place] for ICH. Patient LKN was at 2200pm on 4/11. Patient was noted on 4/12 @ 0200 to be obtunded with saliva coming out of his mouth. EMS was called, patient came back to baseline and did not want to go to hospital and EMS left. Patient then had a seizure-like event witnessed by wife with UE shaking, foaming at the mouth, and urinary/fecal incontinence. Neurology consulted for new onset seizures in the setting of ICH while patient was in NSCU. No seizures seen on EEG thus far during this admission. Transferred from NSCU to EMU on 4/15 after stable IPH noted.     Impression: New onset of seizures of unknown cause, with R cerebellar IPH.      Plan:  [] Place back on vEEG  [] IVIG (day 2), for total 5 days, last day 4/22  [] On Heparin gtt, goal PTT 50-70  [] S/p MRI brain w/wo under anesthesia on 4/18, S/p LP    [] D/c'd Depakote 750MG PO BID.   [] C/w  Vimpat 100mg BID IV. S/p Vimpat load 200mg IV x2. Obtained cardiology clearance.     [] Increase Seroquel to 100MG PO QHS.  [] Add Risperidone 0.5mg qAM  [] Continuos pulse ox  [] Cardiology following (Dr. Fragoso), c/w Lisinopril 20MG PO QD, Hydralazine 10MG PO Q4HR. Will re-evaluate to give clearance for Vimpat.   [] Pulmonology following, Luis Daniel DC'd as no PNA seen on CTA Chest.   [x] 4/16 CTA Chest PE Protocol: as above, patient with small R upper lobe segmental branch PE.  [x] 4/16: Vascular Neurology aware of case, spoke to stroke fellow Dr. Abby Deutsch. From vascular neurology standpoint no contraindication for full dose AC if needed in setting of PE.  [] Vascular Cardiology following (Dr. Perera): Spoke with Dr. Perera/vascular cardiology team. Started Heparin gtt.   [x] 4/16: Developed JESSICA after CTA Chest, likely due to contrast. Resolved.    [] Rheum w/u serum: myomarker panel, SCL 70, Centromere, Marialuisa 1, TPO, Thyroglobulin Ab, C3 C4, ROSIBEL, Sjogren labs, APLS, Ribosomal P ab, ANCA, MPO, PR3, CK, Aldolase, Myoglobin, GGT, ESR, DAIANA, C- ANCA, DS-DAIANA, Urine prot/creat ratio, anticardiolipin Ab, Lupus anticoagulant, B2 glycoprotein AB,  RF, CCP, anti-GBM.  [] Electrolyte repletion as needed  [] Neuro checks, vitals Q4HR.  [] Telemetry.  [] Seizure/Fall/Aspiration precautions.  [] DVT PPx: On heparin gtt  [] Diet: Regular.  [] Seizure Rescue: Ativan 1MG IVP x1 for convulsions lasting > 3 minutes, VPA 1G IV x1 for convulsions that are refractory to Ativan.    Case to be seen and discussed with epilepsy attending Dr. Maldonado.

## 2022-04-19 NOTE — PROGRESS NOTE ADULT - SUBJECTIVE AND OBJECTIVE BOX
Name of Patient : PEPITO BALL  MRN: [id number]  Date of visit: 04-19-22 @ 16:52      Subjective: Patient seen and examined. No new events except as noted.   Patient seen earlier this AM. In increased observation room. In Wrist restraints.   Started on Full dose heparin s/p MR brain yesterday.     REVIEW OF SYSTEMS:    CONSTITUTIONAL: +Generalized weakness   EYES/ENT: No visual changes;  No vertigo or throat pain   NECK: +Goiter  RESPIRATORY: No cough, wheezing, hemoptysis; No shortness of breath  CARDIOVASCULAR: No chest pain or palpitations  GASTROINTESTINAL: No abdominal or epigastric pain. No nausea, vomiting, or hematemesis; No diarrhea or constipation. No melena or hematochezia.  GENITOURINARY: No dysuria, frequency or hematuria  NEUROLOGICAL: +H/O seizures   SKIN: No itching, burning, rashes, or lesions   All other review of systems is negative unless indicated above.    MEDICATIONS:  MEDICATIONS  (STANDING):  acetaminophen     Tablet .. 650 milliGRAM(s) Oral daily  albuterol/ipratropium for Nebulization 3 milliLiter(s) Nebulizer every 6 hours  amLODIPine   Tablet 10 milliGRAM(s) Oral daily  diphenhydrAMINE Injectable 25 milliGRAM(s) IV Push daily  heparin  Infusion 1150 Unit(s)/Hr (11.5 mL/Hr) IV Continuous <Continuous>  immune   globulin 10% (GAMMAGARD) IVPB 30 Gram(s) IV Intermittent daily  lacosamide IVPB 100 milliGRAM(s) IV Intermittent every 12 hours  levothyroxine 88 MICROGram(s) Oral daily  lisinopril 20 milliGRAM(s) Oral daily  methylPREDNISolone sodium succinate IVPB 1000 milliGRAM(s) IV Intermittent daily  pantoprazole    Tablet 40 milliGRAM(s) Oral before breakfast  polyethylene glycol 3350 17 Gram(s) Oral two times a day  potassium chloride   Powder 40 milliEquivalent(s) Oral once  QUEtiapine 100 milliGRAM(s) Oral at bedtime  senna 2 Tablet(s) Oral at bedtime  thiamine IVPB 500 milliGRAM(s) IV Intermittent every 8 hours      PHYSICAL EXAM:  T(C): 36.7 (04-19-22 @ 16:36), Max: 37.2 (04-18-22 @ 21:00)  HR: 69 (04-19-22 @ 16:36) (69 - 99)  BP: 160/95 (04-19-22 @ 16:36) (123/86 - 180/89)  RR: 18 (04-19-22 @ 16:36) (18 - 20)  SpO2: 94% (04-19-22 @ 16:36) (92% - 97%)  Wt(kg): --  I&O's Summary    18 Apr 2022 07:01  -  19 Apr 2022 07:00  --------------------------------------------------------  IN: 0 mL / OUT: 600 mL / NET: -600 mL    19 Apr 2022 07:01  -  19 Apr 2022 16:52  --------------------------------------------------------  IN: 360 mL / OUT: 0 mL / NET: 360 mL          Appearance: Appearance: Weak appearing male, laying down in increased observation room 	  HEENT:  PERRLA   Lymphatic: No lymphadenopathy   Cardiovascular: Normal S1 S2, no JVD  Respiratory: normal effort , clear  Gastrointestinal:  Soft, Non-tender  Skin: No rashes,  warm to touch  Psychiatry:  Mood & affect appropriate  Musculoskeletal: No edema; +Wrist restraints           04-18-22 @ 07:01  -  04-19-22 @ 07:00  --------------------------------------------------------  IN: 0 mL / OUT: 600 mL / NET: -600 mL    04-19-22 @ 07:01  -  04-19-22 @ 16:52  --------------------------------------------------------  IN: 360 mL / OUT: 0 mL / NET: 360 mL                                  11.0   5.86  )-----------( 229      ( 19 Apr 2022 05:56 )             34.9               04-19    143  |  103  |  10  ----------------------------<  121<H>  3.3<L>   |  26  |  1.23    Ca    9.3      19 Apr 2022 05:56    TPro  9.0<H>  /  Alb  3.7  /  TBili  0.4  /  DBili  x   /  AST  25  /  ALT  15  /  AlkPhos  66  04-19    PT/INR - ( 18 Apr 2022 07:19 )   PT: 12.1 sec;   INR: 1.05 ratio         PTT - ( 19 Apr 2022 16:27 )  PTT:45.2 sec       CARDIAC MARKERS ( 19 Apr 2022 05:56 )  x     / x     / 394 U/L / x     / x                    < from: MR Head w/wo IV Cont (04.18.22 @ 09:55) >  IMPRESSION:  Confluent FLAIR hyperintensity along the ventricles is nonspecific but   most commonly seen in the setting of chronic white matter microvascular   change.    Small foci of prior hemorrhage versus calcification involving the right   cerebellar hemisphere.    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    --- End of Report ---    < end of copied text >

## 2022-04-19 NOTE — PROGRESS NOTE ADULT - SUBJECTIVE AND OBJECTIVE BOX
MRN-[id number]    Subjective: 73 yo male seen and examined at bedside. No seizures overnight.    PAST MEDICAL & SURGICAL HISTORY:  HTN (hypertension)    Diverticulosis    Hypothyroid    No significant past surgical history    Social Hx:  Nonsmoker, no drug or alcohol use    Home Medications:  levothyroxine 75 mcg (0.075 mg) oral tablet: 1 tab(s) orally once a day (15 Apr 2022 14:31)  lisinopril 30 mg oral tablet: 1 tab(s) orally once a day (15 Apr 2022 14:32)    MEDICATIONS  (STANDING):  albuterol/ipratropium for Nebulization 3 milliLiter(s) Nebulizer every 6 hours  amLODIPine   Tablet 10 milliGRAM(s) Oral daily  diVALproex  milliGRAM(s) Oral two times a day  enoxaparin Injectable 40 milliGRAM(s) SubCutaneous <User Schedule>  levothyroxine 88 MICROGram(s) Oral daily  lisinopril 20 milliGRAM(s) Oral daily  multiple electrolytes Injection Type 1 1000 milliLiter(s) (50 mL/Hr) IV Continuous <Continuous>  piperacillin/tazobactam IVPB.. 3.375 Gram(s) IV Intermittent every 8 hours  polyethylene glycol 3350 17 Gram(s) Oral two times a day  QUEtiapine 50 milliGRAM(s) Oral at bedtime  senna 2 Tablet(s) Oral at bedtime  thiamine IVPB 500 milliGRAM(s) IV Intermittent every 8 hours    MEDICATIONS  (PRN):  hydrALAZINE 10 milliGRAM(s) Oral every 4 hours PRN Systolic blood pressure < 160  LORazepam   Injectable 1 milliGRAM(s) IV Push once PRN seizure    Allergies  fish (Hives; Angioedema)  No Known Drug Allergies    ROS: Pertinent positives above, all other ROS were reviewed and are negative.      Vital Signs Last 24 Hrs  T(C): 37.2 (19 Apr 2022 05:25), Max: 37.2 (18 Apr 2022 21:00)  T(F): 98.9 (19 Apr 2022 05:25), Max: 99 (18 Apr 2022 21:00)  HR: 93 (19 Apr 2022 05:25) (80 - 99)  BP: 159/84 (19 Apr 2022 05:25) (123/86 - 178/63)  BP(mean): --  RR: 20 (19 Apr 2022 05:25) (17 - 20)  SpO2: 93% (19 Apr 2022 05:25) (92% - 96%)    GENERAL EXAM:  Constitutional: NAD.   Head: Normocephalic, atraumatic.  Extremities: No edema.    NEUROLOGICAL EXAM:  MS: Sleepy, eyes open spontaneously. Speech is fluent, not slurred. Follows commands.  CN: EOMI. Face symmetric.   Motor: Moves all extremities.  Sensory: Intact to LT throughout.  Coordination/Gait: Not assessed.    Labs:                                                                     11.0   5.86  )-----------( 229      ( 19 Apr 2022 05:56 )             34.9     04-19    143  |  103  |  10  ----------------------------<  121<H>  3.3<L>   |  26  |  1.23    Ca    9.3      19 Apr 2022 05:56    TPro  9.0<H>  /  Alb  3.7  /  TBili  0.4  /  DBili  x   /  AST  25  /  ALT  15  /  AlkPhos  66  04-19          Radiology/EEGs:  -04/12 CTH: 0.5 cm focus of intraparenchymal hemorrhage in the right cerebellum.  -04/12 CT PERFUSION: The areas of elevated Tmax do not follow a vascular distribution and are likely artifactual, related to patient motion. If symptoms persist consider follow up head CT or MRI, MRA  if no contraindication.  -04/12 CTA COW:  Patent intracranial circulation without flow limiting stenosis.  -04/12 CTA NECK: Patent, ECAs, ICAs, no  hemodynamically significant stenosis at ICA origins by NASCET criteria. Bilateral vertebral arteries are patent without flow limiting stenosis.  -04/12 Repeat CTH: Interval stability compared with the prior study of earlier the same day.  -04/16 CTA Chest PE Protocol: Small pulmonary bolus in the right upper lobe segmental branch seen on series 3 image 80. No associated right heart strain or pulmonary infarction noted. Partial right lower lobe atelectasis with elevation of right hemidiaphragm.    -04/13 EEG SUMMARY/CLASSIFICATION:  Abnormal EEG in a sedated patient.  - Background slowing, generalized.  _____________________________________________________________  EEG IMPRESSION/CLINICAL CORRELATE:  Abnormal EEG study.  - Moderate nonspecific diffuse or multifocal cerebral dysfunction.   - No epileptiform patterns or seizures were recorded x 1 day.    -04/14 EEG SUMMARY/CLASSIFICATION:  Abnormal EEG in a sedated patient.  - Background slowing, generalized.  _____________________________________________________________  EEG IMPRESSION/CLINICAL CORRELATE:  Abnormal EEG study.  - Moderate nonspecific diffuse or multifocal cerebral dysfunction.   - No epileptiform patterns or seizures were recorded x 2 days.    -04/15 EEG Summary:  Abnormal EEG in the awake, drowsy and asleep states.  Mild to moderate generalized slowing  Impression/Clinical Correlate:  This is an abnormal EEG record. There is evidence of mild to moderate multifocal/diffuse nonspecific cerebral dysfunction. No epileptiform pattern or seizures were seen.

## 2022-04-19 NOTE — PROGRESS NOTE ADULT - SUBJECTIVE AND OBJECTIVE BOX
Cardiovascular Disease Progress Note    Overnight events: No acute events overnight.  Pt denies chest pain or SOB.   Otherwise review of systems negative    Objective Findings:  T(C): 37.2 (04-19-22 @ 09:54), Max: 37.2 (04-18-22 @ 21:00)  HR: 98 (04-19-22 @ 12:15) (93 - 99)  BP: 150/85 (04-19-22 @ 12:15) (123/86 - 180/89)  RR: 18 (04-19-22 @ 09:54) (18 - 20)  SpO2: 93% (04-19-22 @ 12:15) (92% - 96%)  Wt(kg): --  Daily     Daily       Physical Exam:  Gen: NAD; Patient resting comfortably  HEENT: EOMI, Normocephalic/ atraumatic  CV: RRR, normal S1 + S2, no m/r/g  Lungs:  Normal respiratory effort; clear to auscultation bilaterally  Abd: soft, non-tender; bowel sounds present  Ext: No edema; warm and well perfused    Telemetry: Sinus rhythm with occasional PVCs    Laboratory Data:                        11.0   5.86  )-----------( 229      ( 19 Apr 2022 05:56 )             34.9     04-19    143  |  103  |  10  ----------------------------<  121<H>  3.3<L>   |  26  |  1.23    Ca    9.3      19 Apr 2022 05:56    TPro  9.0<H>  /  Alb  3.7  /  TBili  0.4  /  DBili  x   /  AST  25  /  ALT  15  /  AlkPhos  66  04-19    PT/INR - ( 18 Apr 2022 07:19 )   PT: 12.1 sec;   INR: 1.05 ratio         PTT - ( 19 Apr 2022 05:56 )  PTT:94.1 sec  CARDIAC MARKERS ( 19 Apr 2022 05:56 )  x     / x     / 394 U/L / x     / x              Inpatient Medications:  MEDICATIONS  (STANDING):  acetaminophen     Tablet .. 650 milliGRAM(s) Oral daily  albuterol/ipratropium for Nebulization 3 milliLiter(s) Nebulizer every 6 hours  amLODIPine   Tablet 10 milliGRAM(s) Oral daily  diphenhydrAMINE Injectable 25 milliGRAM(s) IV Push daily  heparin  Infusion 1150 Unit(s)/Hr (11.5 mL/Hr) IV Continuous <Continuous>  immune   globulin 10% (GAMMAGARD) IVPB 30 Gram(s) IV Intermittent daily  lacosamide IVPB 100 milliGRAM(s) IV Intermittent every 12 hours  levothyroxine 88 MICROGram(s) Oral daily  lisinopril 20 milliGRAM(s) Oral daily  methylPREDNISolone sodium succinate IVPB 1000 milliGRAM(s) IV Intermittent daily  pantoprazole    Tablet 40 milliGRAM(s) Oral before breakfast  polyethylene glycol 3350 17 Gram(s) Oral two times a day  potassium chloride   Powder 40 milliEquivalent(s) Oral once  QUEtiapine 100 milliGRAM(s) Oral at bedtime  senna 2 Tablet(s) Oral at bedtime  thiamine IVPB 500 milliGRAM(s) IV Intermittent every 8 hours      Assessment:  72M PMH htn, hypothyroidism, who presented to [place] ED via EMS after family called 911 following a witnessed seizure.    Plan of Care:    #DVT  - Pt with below knee DVT and small RUL PE  - AC management as per vascular team  - Pt started on Heparin infusion    #HTN  - BP acceptable  - TTE shows normal LV systolic function  - Continue Amlodipine, Lisinopril    #Seizure activity  - S/p extubation  - EEG with no seizure activity  - Management as per neurology  - Pt to undergo LP today  - Pt optimized to proceed from a cardiac standpoint.  - Neuro team requesting cardiac clearance to start Vimpat.   - EKG today shows sinus with no QTC prolongation.     #Cerebellar Hemorrhage  - Rt sided hemorrhage  - Management as per neuro          Over 25 minutes spent on total encounter; more than 50% of the visit was spent counseling and/or coordinating care by the attending physician.      Tio Dockery D.O.   Cardiovascular Disease  [phone]

## 2022-04-19 NOTE — PROGRESS NOTE ADULT - ASSESSMENT
Patient is a 72 year old Male with a PMHx of HTN, hypothyroidism, who presented to [facility]  via transfer from [place]. Patient originally presented to to [place] ED via EMS after family called 911 following a witnessed seizure, by family, reportedly lasting ~5min.  Enroute to [place], patient was reported to have had another 2 witnessed seizures by EMS, each lasting ~1 minute. Following each seizure the patient was given 5mg IV versed (received 10mg collectively and brought to [place] ED). Pt arrived to the ED obtunded with blood from his mouth (tongue laceration), responsive only to pain/sternal rub. Patient was subsequently intubated for GCS 6 and CT/CTA performed showing a small cerebellar hemorrhage.      Seizures  - Previously intubated, now extubated  - Neuro checks per protocol  - Monitor on Tele  - Possible IVIG per neuro   - S/P LP  - IVIG X 5 days   - Management as per neurology   - Fall, seizure, aspiration precautions  - S/P MR brain; f/u neuro recs      Cerebellar hemorrhage  - Right sided hemorrhage   - MR brain as above; f/u neuro recs  - F/U repeat CT H s/p heparin gtt; monitor mental status     DVT/ PE  - Patient with acute below knee DVT and RUL PE & Recent cerebellar infiltrate  - Vascular cardio eval appreciated; f/u recs  - Awaiting MR brain results for possible AC vs IVC filter --> Started on Full dose heparin  - F/u vascular recs  - Started on Full dose heparin, monitor PTT, Hgb and for any signs/symptoms of bleeding--> if stable CT H and Hgb after 24-48 hours can transition to NOAC if cleared from Neuro standpoint   - Repeat DVT study no longer indicated as patient on full dose tx     Hypothyroid   - Imaging w/ Enlarged goiter   - TSH elevated, T4 low  - Was on synthroid 75 at home, increased to 88 on this hospital admission  - Repeat TFTs in 3-4 weeks outpatient   - F/u thyroid US       HyperNa  - S/p D5 1/2 NS   - Avoid overcorrection  - Monitor closely   - Na of 143; improving       JESSICA  - Improving, continue to monitor and trend on daily labs  - Avoid nephrotoxic agents      Atelectasis   - Duoneb and Chest PT  - Appreciate pulm recs       HTN  - TTE EF of 75%, normal LV systolic function   - Continue with Amlodipine and Lisinopril   - Monitor BP, VS and patient closely       Pre-DM  - A1C of 5.9  - Outpatient follow up

## 2022-04-20 LAB
ALBUMIN SERPL ELPH-MCNC: 4.2 G/DL — SIGNIFICANT CHANGE UP (ref 3.3–5)
ALDOLASE SERPL-CCNC: 7.7 U/L — SIGNIFICANT CHANGE UP (ref 3.3–10.3)
ALP SERPL-CCNC: 76 U/L — SIGNIFICANT CHANGE UP (ref 40–120)
ALT FLD-CCNC: 15 U/L — SIGNIFICANT CHANGE UP (ref 10–45)
ANION GAP SERPL CALC-SCNC: 13 MMOL/L — SIGNIFICANT CHANGE UP (ref 5–17)
APTT BLD: 54.5 SEC — HIGH (ref 27.5–35.5)
APTT BLD: 63.7 SEC — HIGH (ref 27.5–35.5)
AST SERPL-CCNC: 25 U/L — SIGNIFICANT CHANGE UP (ref 10–40)
AUTO DIFF PNL BLD: NEGATIVE — SIGNIFICANT CHANGE UP
BASOPHILS # BLD AUTO: 0.01 K/UL — SIGNIFICANT CHANGE UP (ref 0–0.2)
BASOPHILS NFR BLD AUTO: 0.1 % — SIGNIFICANT CHANGE UP (ref 0–2)
BILIRUB SERPL-MCNC: 0.5 MG/DL — SIGNIFICANT CHANGE UP (ref 0.2–1.2)
BUN SERPL-MCNC: 20 MG/DL — SIGNIFICANT CHANGE UP (ref 7–23)
C-ANCA SER-ACNC: NEGATIVE — SIGNIFICANT CHANGE UP
CALCIUM SERPL-MCNC: 9.8 MG/DL — SIGNIFICANT CHANGE UP (ref 8.4–10.5)
CHLORIDE SERPL-SCNC: 98 MMOL/L — SIGNIFICANT CHANGE UP (ref 96–108)
CO2 SERPL-SCNC: 27 MMOL/L — SIGNIFICANT CHANGE UP (ref 22–31)
CREAT SERPL-MCNC: 1.24 MG/DL — SIGNIFICANT CHANGE UP (ref 0.5–1.3)
EGFR: 62 ML/MIN/1.73M2 — SIGNIFICANT CHANGE UP
EOSINOPHIL # BLD AUTO: 0 K/UL — SIGNIFICANT CHANGE UP (ref 0–0.5)
EOSINOPHIL NFR BLD AUTO: 0 % — SIGNIFICANT CHANGE UP (ref 0–6)
GLUCOSE BLDC GLUCOMTR-MCNC: 173 MG/DL — HIGH (ref 70–99)
GLUCOSE BLDC GLUCOMTR-MCNC: 180 MG/DL — HIGH (ref 70–99)
GLUCOSE BLDC GLUCOMTR-MCNC: 186 MG/DL — HIGH (ref 70–99)
GLUCOSE SERPL-MCNC: 194 MG/DL — HIGH (ref 70–99)
HCT VFR BLD CALC: 41.7 % — SIGNIFICANT CHANGE UP (ref 39–50)
HGB BLD-MCNC: 13.2 G/DL — SIGNIFICANT CHANGE UP (ref 13–17)
IMM GRANULOCYTES NFR BLD AUTO: 0.6 % — SIGNIFICANT CHANGE UP (ref 0–1.5)
INNER EAR 68KD AB FLD QL: <1.5 U/L — SIGNIFICANT CHANGE UP (ref 0–3.1)
LYMPHOCYTES # BLD AUTO: 0.69 K/UL — LOW (ref 1–3.3)
LYMPHOCYTES # BLD AUTO: 6.4 % — LOW (ref 13–44)
MCHC RBC-ENTMCNC: 29.5 PG — SIGNIFICANT CHANGE UP (ref 27–34)
MCHC RBC-ENTMCNC: 31.7 GM/DL — LOW (ref 32–36)
MCV RBC AUTO: 93.3 FL — SIGNIFICANT CHANGE UP (ref 80–100)
MONOCYTES # BLD AUTO: 0.33 K/UL — SIGNIFICANT CHANGE UP (ref 0–0.9)
MONOCYTES NFR BLD AUTO: 3.1 % — SIGNIFICANT CHANGE UP (ref 2–14)
NEUTROPHILS # BLD AUTO: 9.71 K/UL — HIGH (ref 1.8–7.4)
NEUTROPHILS NFR BLD AUTO: 89.8 % — HIGH (ref 43–77)
NRBC # BLD: 0 /100 WBCS — SIGNIFICANT CHANGE UP (ref 0–0)
P-ANCA SER-ACNC: NEGATIVE — SIGNIFICANT CHANGE UP
PLATELET # BLD AUTO: 263 K/UL — SIGNIFICANT CHANGE UP (ref 150–400)
POTASSIUM SERPL-MCNC: 4 MMOL/L — SIGNIFICANT CHANGE UP (ref 3.5–5.3)
POTASSIUM SERPL-SCNC: 4 MMOL/L — SIGNIFICANT CHANGE UP (ref 3.5–5.3)
PROT SERPL-MCNC: 10.7 G/DL — HIGH (ref 6–8.3)
RBC # BLD: 4.47 M/UL — SIGNIFICANT CHANGE UP (ref 4.2–5.8)
RBC # FLD: 14 % — SIGNIFICANT CHANGE UP (ref 10.3–14.5)
SARS-COV-2 RNA SPEC QL NAA+PROBE: SIGNIFICANT CHANGE UP
SODIUM SERPL-SCNC: 138 MMOL/L — SIGNIFICANT CHANGE UP (ref 135–145)
VDRL CSF-TITR: SIGNIFICANT CHANGE UP
WBC # BLD: 10.8 K/UL — HIGH (ref 3.8–10.5)
WBC # FLD AUTO: 10.8 K/UL — HIGH (ref 3.8–10.5)

## 2022-04-20 PROCEDURE — 74177 CT ABD & PELVIS W/CONTRAST: CPT | Mod: 26

## 2022-04-20 PROCEDURE — 71260 CT THORAX DX C+: CPT | Mod: 26

## 2022-04-20 PROCEDURE — 99232 SBSQ HOSP IP/OBS MODERATE 35: CPT | Mod: GC

## 2022-04-20 PROCEDURE — 99233 SBSQ HOSP IP/OBS HIGH 50: CPT

## 2022-04-20 PROCEDURE — 99232 SBSQ HOSP IP/OBS MODERATE 35: CPT

## 2022-04-20 PROCEDURE — 76536 US EXAM OF HEAD AND NECK: CPT | Mod: 26

## 2022-04-20 RX ORDER — APIXABAN 2.5 MG/1
5 TABLET, FILM COATED ORAL
Refills: 0 | Status: DISCONTINUED | OUTPATIENT
Start: 2022-04-20 | End: 2022-05-04

## 2022-04-20 RX ADMIN — Medication 3 MILLILITER(S): at 05:44

## 2022-04-20 RX ADMIN — POLYETHYLENE GLYCOL 3350 17 GRAM(S): 17 POWDER, FOR SOLUTION ORAL at 05:44

## 2022-04-20 RX ADMIN — QUETIAPINE FUMARATE 100 MILLIGRAM(S): 200 TABLET, FILM COATED ORAL at 21:42

## 2022-04-20 RX ADMIN — Medication 105 MILLIGRAM(S): at 12:00

## 2022-04-20 RX ADMIN — LACOSAMIDE 120 MILLIGRAM(S): 50 TABLET ORAL at 21:41

## 2022-04-20 RX ADMIN — Medication 88 MICROGRAM(S): at 05:43

## 2022-04-20 RX ADMIN — AMLODIPINE BESYLATE 10 MILLIGRAM(S): 2.5 TABLET ORAL at 05:44

## 2022-04-20 RX ADMIN — Medication 3 MILLILITER(S): at 11:22

## 2022-04-20 RX ADMIN — HEPARIN SODIUM 11.5 UNIT(S)/HR: 5000 INJECTION INTRAVENOUS; SUBCUTANEOUS at 05:54

## 2022-04-20 RX ADMIN — APIXABAN 5 MILLIGRAM(S): 2.5 TABLET, FILM COATED ORAL at 21:48

## 2022-04-20 RX ADMIN — SENNA PLUS 2 TABLET(S): 8.6 TABLET ORAL at 21:43

## 2022-04-20 RX ADMIN — Medication 25 MILLIGRAM(S): at 17:00

## 2022-04-20 RX ADMIN — Medication 58 MILLIGRAM(S): at 05:43

## 2022-04-20 RX ADMIN — Medication 650 MILLIGRAM(S): at 17:37

## 2022-04-20 RX ADMIN — Medication 650 MILLIGRAM(S): at 17:00

## 2022-04-20 RX ADMIN — LACOSAMIDE 120 MILLIGRAM(S): 50 TABLET ORAL at 09:00

## 2022-04-20 RX ADMIN — RISPERIDONE 0.5 MILLIGRAM(S): 4 TABLET ORAL at 08:56

## 2022-04-20 RX ADMIN — LISINOPRIL 20 MILLIGRAM(S): 2.5 TABLET ORAL at 05:43

## 2022-04-20 RX ADMIN — Medication 3 MILLILITER(S): at 23:29

## 2022-04-20 RX ADMIN — Medication 105 MILLIGRAM(S): at 23:29

## 2022-04-20 RX ADMIN — PANTOPRAZOLE SODIUM 40 MILLIGRAM(S): 20 TABLET, DELAYED RELEASE ORAL at 05:44

## 2022-04-20 RX ADMIN — IMMUNE GLOBULIN (HUMAN) 50 GRAM(S): 10 INJECTION INTRAVENOUS; SUBCUTANEOUS at 17:24

## 2022-04-20 RX ADMIN — Medication 3 MILLILITER(S): at 18:50

## 2022-04-20 NOTE — PROGRESS NOTE ADULT - ASSESSMENT
71 yo male with a PMHx of HTN, Hypothyroidism, and Diverticulosis (requiring blood transfusions x2 8 yrs ago) who presented to [facility] on 4/12 as a transfer from [place] for ICH. Patient LKN was at 2200pm on 4/11. Patient was noted on 4/12 @ 0200 to be obtunded with saliva coming out of his mouth. EMS was called, patient came back to baseline and did not want to go to hospital and EMS left. Patient then had a seizure-like event witnessed by wife with UE shaking, foaming at the mouth, and urinary/fecal incontinence. Neurology consulted for new onset seizures in the setting of ICH while patient was in NSCU. No seizures seen on EEG thus far during this admission. Transferred from NSCU to EMU on 4/15 after stable IPH noted.     Impression: New onset of seizures of unknown cause, with R cerebellar IPH.      Plan:  [] Place back on vEEG  [] IVIG (day 3), for total 5 days, last day 4/22  [x] Heparin DC'd on 4/20/22, transitioned to Eliquis 5mg BID PO   [x] S/p MRI brain w/wo under anesthesia on 4/18, S/p LP    [x] D/c'd Depakote 750MG PO BID.   [x] C/w  Vimpat 100mg BID IV. S/p Vimpat load 200mg IV x2. Obtained cardiology clearance.     [x] Seroquel 100MG PO QHS.  [x] Add Risperidone 0.5mg qAM  [] Endocrinology curbsided regarding TSH and TPO-ab, recommend rechecking T4 levels following increase in Levothyroxine  [x] Continuos pulse ox  [x] Cardiology following (Dr. Fragoso), c/w Lisinopril 20MG PO QD, Hydralazine 10MG PO Q4HR. Will re-evaluate to give clearance for Vimpat.   [] Pulmonology following, Zosyn DC'd as no PNA seen on CTA Chest.   [x] 4/16 CTA Chest PE Protocol: as above, patient with small R upper lobe segmental branch PE.  [x] 4/16: Vascular Neurology aware of case, spoke to stroke fellow Dr. Abby Deutsch. From vascular neurology standpoint no contraindication for full dose AC if needed in setting of PE.  [x] Vascular Cardiology following (Dr. Perera): Spoke with Dr. Perera/vascular cardiology team.   [x] 4/16: Developed JESSICA after CTA Chest, likely due to contrast. Resolved.    [] Rheum w/u serum: myomarker panel, SCL 70, Centromere, Marialuisa 1, TPO, Thyroglobulin Ab, C3 C4, ROSIBEL, Sjogren labs, APLS, Ribosomal P ab, ANCA, MPO, PR3, CK, Aldolase, Myoglobin, GGT, ESR, DAIANA, C- ANCA, DS-DAIANA, Urine prot/creat ratio, anticardiolipin Ab, Lupus anticoagulant, B2 glycoprotein AB,  RF, CCP, anti-GBM.  [] Electrolyte repletion as needed  [] Neuro checks, vitals Q4HR.  [] Telemetry.  [] Seizure/Fall/Aspiration precautions.  [] DVT PPx: On heparin gtt  [] Diet: Regular.  [] Seizure Rescue: Ativan 1MG IVP x1 for convulsions lasting > 3 minutes, VPA 1G IV x1 for convulsions that are refractory to Ativan.    Case seen and discussed with epilepsy attending Dr. Maldonado.

## 2022-04-20 NOTE — PROGRESS NOTE ADULT - SUBJECTIVE AND OBJECTIVE BOX
Vascular Cardiology  Progress note    SERVICE LINE [phone]              EMAIL [email or web address]   OFFICE [phone]    CC:  seizure    INTERVAL HISTORY:  Patient more lethargic this morning but is arousable and responds appropriately          Allergies    fish (Hives; Angioedema)  No Known Drug Allergies    Intolerances    	    MEDICATIONS:  amLODIPine   Tablet 10 milliGRAM(s) Oral daily  heparin  Infusion 1150 Unit(s)/Hr IV Continuous <Continuous>  hydrALAZINE 10 milliGRAM(s) Oral every 4 hours PRN  lisinopril 20 milliGRAM(s) Oral daily      albuterol/ipratropium for Nebulization 3 milliLiter(s) Nebulizer every 6 hours  diphenhydrAMINE Injectable 25 milliGRAM(s) IV Push daily    acetaminophen     Tablet .. 650 milliGRAM(s) Oral daily  lacosamide IVPB 100 milliGRAM(s) IV Intermittent every 12 hours  LORazepam   Injectable 1 milliGRAM(s) IV Push once PRN  QUEtiapine 100 milliGRAM(s) Oral at bedtime  risperiDONE   Tablet 0.5 milliGRAM(s) Oral <User Schedule>    pantoprazole    Tablet 40 milliGRAM(s) Oral before breakfast  polyethylene glycol 3350 17 Gram(s) Oral two times a day  senna 2 Tablet(s) Oral at bedtime    levothyroxine 88 MICROGram(s) Oral daily  methylPREDNISolone sodium succinate IVPB 1000 milliGRAM(s) IV Intermittent daily    immune   globulin 10% (GAMMAGARD) IVPB 30 Gram(s) IV Intermittent daily  thiamine IVPB 500 milliGRAM(s) IV Intermittent every 8 hours      PAST MEDICAL & SURGICAL HISTORY:  HTN (hypertension)    Diverticulosis    Hypothyroid    No significant past surgical history        FAMILY HISTORY:      SOCIAL HISTORY:  unchanged    REVIEW OF SYSTEMS:    [X ] Unable to obtain    PHYSICAL EXAM:  T(C): 36.9 (04-20-22 @ 09:55), Max: 37.2 (04-19-22 @ 12:45)  HR: 70 (04-20-22 @ 09:55) (68 - 93)  BP: 165/83 (04-20-22 @ 09:55) (155/68 - 172/95)  RR: 18 (04-20-22 @ 09:55) (18 - 18)  SpO2: 95% (04-20-22 @ 09:55) (94% - 98%)  Wt(kg): --  I&O's Summary    19 Apr 2022 07:01  -  20 Apr 2022 07:00  --------------------------------------------------------  IN: 360 mL / OUT: 0 mL / NET: 360 mL       Appearance: tired appearing  HEENT:   Normal oral mucosa, PERRL, EOMI	  Carotid:   Right: no bruit   Left: no bruit     Lymphatic: No lymphadenopathy  Cardiovascular:  tachycardiac, s1s2, no MRG   Respiratory: + rhonchi   Psychiatry:  AAO x 3  Gastrointestinal:  Soft, Non-tender, + BS	  Skin: No rashes, No ecchymoses, No cyanosis	  Neurologic:  AOx3   Extremities:  dry skin, no edema     Vascular Pulse Exam:  Right DP: [x]palpable []non-palpable []audible      Left DP :   [x]palpable []non-palpable []audible  Right PT: [x]palpable [] non-palpable []audible   Left PT:  [x] palpable [] non-palpable []audible      LABS:	 	    CBC Full  -  ( 19 Apr 2022 05:56 )  WBC Count : 5.86 K/uL  Hemoglobin : 11.0 g/dL  Hematocrit : 34.9 %  Platelet Count - Automated : 229 K/uL  Mean Cell Volume : 93.8 fl  Mean Cell Hemoglobin : 29.6 pg  Mean Cell Hemoglobin Concentration : 31.5 gm/dL  Auto Neutrophil # : 4.06 K/uL  Auto Lymphocyte # : 0.94 K/uL  Auto Monocyte # : 0.62 K/uL  Auto Eosinophil # : 0.18 K/uL  Auto Basophil # : 0.03 K/uL  Auto Neutrophil % : 69.3 %  Auto Lymphocyte % : 16.0 %  Auto Monocyte % : 10.6 %  Auto Eosinophil % : 3.1 %  Auto Basophil % : 0.5 %    04-19    143  |  103  |  10  ----------------------------<  121<H>  3.3<L>   |  26  |  1.23  04-19    138  |  101  |  10  ----------------------------<  222<H>  4.4   |  23  |  1.07    Ca    9.3      19 Apr 2022 05:56  Ca    9.1      19 Apr 2022 00:37    TPro  9.0<H>  /  Alb  3.7  /  TBili  0.4  /  DBili  x   /  AST  25  /  ALT  15  /  AlkPhos  66  04-19  TPro  x   /  Alb  3096<L>  /  TBili  x   /  DBili  x   /  AST  x   /  ALT  x   /  AlkPhos  x   04-18          Assessment:  1. L soleal vein DVT with small RUL PE   2. 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 MRI with persistent small foci of hemorrhage, patient restarted on heparin drip, monitor PTT closely. repeat CTH today, will consider switching to oral in the next few days  3. Patient is euvolemic on exam after 3 doses of IVIG      Thank you      Vascular Cardiology Service    Please call with any questions:   DIRECT SERVICE NUMBER:  [phone]  Office [phone]  email:  [email or web address]

## 2022-04-20 NOTE — PROGRESS NOTE ADULT - SUBJECTIVE AND OBJECTIVE BOX
Name of Patient : PEPITO BALL  MRN: [id number]  Date of visit: 04-20-22 @ 16:10      Subjective: Patient seen and examined. No new events except as noted.   Patient seen earlier this AM in neuro increased observation room.  Reports feeling "Fine"  Denies generalized pain    REVIEW OF SYSTEMS:    CONSTITUTIONAL: +generalized weakness   EYES/ENT: No visual changes;  No vertigo or throat pain   NECK: No pain or stiffness  RESPIRATORY: No cough, wheezing, hemoptysis; No shortness of breath  CARDIOVASCULAR: No chest pain or palpitations  GASTROINTESTINAL: No abdominal or epigastric pain. No nausea, vomiting, or hematemesis; No diarrhea or constipation. No melena or hematochezia.  GENITOURINARY: No dysuria, frequency or hematuria  NEUROLOGICAL: +H/O Seizures   SKIN: No itching, burning, rashes, or lesions   All other review of systems is negative unless indicated above.    MEDICATIONS:  MEDICATIONS  (STANDING):  acetaminophen     Tablet .. 650 milliGRAM(s) Oral daily  albuterol/ipratropium for Nebulization 3 milliLiter(s) Nebulizer every 6 hours  amLODIPine   Tablet 10 milliGRAM(s) Oral daily  apixaban 5 milliGRAM(s) Oral two times a day  diphenhydrAMINE Injectable 25 milliGRAM(s) IV Push daily  immune   globulin 10% (GAMMAGARD) IVPB 30 Gram(s) IV Intermittent daily  lacosamide IVPB 100 milliGRAM(s) IV Intermittent every 12 hours  levothyroxine 88 MICROGram(s) Oral daily  lisinopril 20 milliGRAM(s) Oral daily  methylPREDNISolone sodium succinate IVPB 1000 milliGRAM(s) IV Intermittent daily  pantoprazole    Tablet 40 milliGRAM(s) Oral before breakfast  polyethylene glycol 3350 17 Gram(s) Oral two times a day  QUEtiapine 100 milliGRAM(s) Oral at bedtime  risperiDONE   Tablet 0.5 milliGRAM(s) Oral <User Schedule>  senna 2 Tablet(s) Oral at bedtime  thiamine IVPB 500 milliGRAM(s) IV Intermittent every 8 hours      PHYSICAL EXAM:  T(C): 37 (04-20-22 @ 13:57), Max: 37.1 (04-20-22 @ 05:44)  HR: 80 (04-20-22 @ 13:57) (69 - 92)  BP: 157/84 (04-20-22 @ 13:57) (157/84 - 172/95)  RR: 18 (04-20-22 @ 13:57) (18 - 18)  SpO2: 98% (04-20-22 @ 13:57) (94% - 98%)  Wt(kg): --  I&O's Summary    19 Apr 2022 07:01  -  20 Apr 2022 07:00  --------------------------------------------------------  IN: 360 mL / OUT: 0 mL / NET: 360 mL          Appearance: Appearance: Weak appearing male, laying down in increased observation room 	  HEENT:  PERRLA   Lymphatic: No lymphadenopathy   Cardiovascular: Normal S1 S2, no JVD  Respiratory: normal effort , clear  Gastrointestinal:  Soft, Non-tender  Skin: No rashes,  warm to touch  Psychiatry:  Mood & affect appropriate  Musculoskeletal: No edema; +Wrist restraints             04-19-22 @ 07:01  -  04-20-22 @ 07:00  --------------------------------------------------------  IN: 360 mL / OUT: 0 mL / NET: 360 mL                                  13.2   10.80 )-----------( 263      ( 20 Apr 2022 12:25 )             41.7               04-20    138  |  98  |  20  ----------------------------<  194<H>  4.0   |  27  |  1.24    Ca    9.8      20 Apr 2022 12:25    TPro  10.7<H>  /  Alb  4.2  /  TBili  0.5  /  DBili  x   /  AST  25  /  ALT  15  /  AlkPhos  76  04-20    PTT - ( 20 Apr 2022 12:25 )  PTT:63.7 sec       CARDIAC MARKERS ( 19 Apr 2022 05:56 )  x     / x     / 394 U/L / x     / x                        < from: CT Head No Cont (04.19.22 @ 18:26) >  IMPRESSION:    Previously seen focus of increased density in the right cerebellar   hemisphere is decreased in attenuation. The focus is slightly increased   in size, currently measuring 7 mm, previously measuring 5 mm.    --- End of Report ---    < end of copied text >      < from: US Thyroid (04.20.22 @ 12:10) >    IMPRESSION:    Enlarged heterogeneous thyroid gland with moderate increased vascularity.   No focal nodule or mass.  Compared with previous study dated 3/31/2017, the thyroid gland is   slightly larger in size and the vascularity has definitively increased.  Recommend TSH level and thyroid hormone levels to exclude   hyperthyroidism. Graves' disease or Hashimoto's thyroiditis are both   considerations. Please correlate.  Elevated thyroglobulin levels can be seen in both Graves' disease and   Hashimoto's thyroiditis.            --- End of Report ---        < end of copied text >

## 2022-04-20 NOTE — PROGRESS NOTE ADULT - SUBJECTIVE AND OBJECTIVE BOX
Cardiovascular Disease Progress Note    Overnight events: No acute events overnight.  Mr. Dobbins denies chest pain or SOB.   Otherwise review of systems negative    Objective Findings:  T(C): 37 (04-20-22 @ 13:57), Max: 37.1 (04-20-22 @ 05:44)  HR: 80 (04-20-22 @ 13:57) (68 - 92)  BP: 157/84 (04-20-22 @ 13:57) (157/84 - 172/95)  RR: 18 (04-20-22 @ 13:57) (18 - 18)  SpO2: 98% (04-20-22 @ 13:57) (94% - 98%)  Wt(kg): --  Daily     Daily       Physical Exam:  Gen: NAD; Patient resting comfortably  HEENT: EOMI, Normocephalic/ atraumatic  CV: RRR, normal S1 + S2, no m/r/g  Lungs:  Normal respiratory effort; clear to auscultation bilaterally  Abd: soft, non-tender; bowel sounds present  Ext: No edema; warm and well perfused    Telemetry: Sinus with occasional PVCs    Laboratory Data:                        13.2   10.80 )-----------( 263      ( 20 Apr 2022 12:25 )             41.7     04-20    138  |  98  |  20  ----------------------------<  194<H>  4.0   |  27  |  1.24    Ca    9.8      20 Apr 2022 12:25    TPro  10.7<H>  /  Alb  4.2  /  TBili  0.5  /  DBili  x   /  AST  25  /  ALT  15  /  AlkPhos  76  04-20    PTT - ( 20 Apr 2022 12:25 )  PTT:63.7 sec  CARDIAC MARKERS ( 19 Apr 2022 05:56 )  x     / x     / 394 U/L / x     / x              Inpatient Medications:  MEDICATIONS  (STANDING):  acetaminophen     Tablet .. 650 milliGRAM(s) Oral daily  albuterol/ipratropium for Nebulization 3 milliLiter(s) Nebulizer every 6 hours  amLODIPine   Tablet 10 milliGRAM(s) Oral daily  apixaban 5 milliGRAM(s) Oral two times a day  diphenhydrAMINE Injectable 25 milliGRAM(s) IV Push daily  immune   globulin 10% (GAMMAGARD) IVPB 30 Gram(s) IV Intermittent daily  lacosamide IVPB 100 milliGRAM(s) IV Intermittent every 12 hours  levothyroxine 88 MICROGram(s) Oral daily  lisinopril 20 milliGRAM(s) Oral daily  methylPREDNISolone sodium succinate IVPB 1000 milliGRAM(s) IV Intermittent daily  pantoprazole    Tablet 40 milliGRAM(s) Oral before breakfast  polyethylene glycol 3350 17 Gram(s) Oral two times a day  QUEtiapine 100 milliGRAM(s) Oral at bedtime  risperiDONE   Tablet 0.5 milliGRAM(s) Oral <User Schedule>  senna 2 Tablet(s) Oral at bedtime  thiamine IVPB 500 milliGRAM(s) IV Intermittent every 8 hours      Assessment:  72M PMH htn, hypothyroidism, who presented to [place] ED via EMS after family called 911 following a witnessed seizure.    Plan of Care:    #DVT  - Pt with below knee DVT and small RUL PE  - AC management as per vascular team  - Pt started on Eliquis    #HTN  - BP acceptable  - TTE shows normal LV systolic function  - Continue Amlodipine, Lisinopril    #Seizure activity  - S/p extubation  - EEG with no seizure activity  - Management as per neurology  - Neuro team requesting cardiac clearance to start Vimpat.   - EKG shows sinus with no QTC prolongation.     #Cerebellar Hemorrhage  - Rt sided hemorrhage  - Management as per neuro      #ACP (advance care planning)-  Advanced care planning was addressed.   Risks, benefits and alternatives of medical treatment and procedures were addressed. 30 minutes spent addressing advance care plans.        Over 25 minutes spent on total encounter; more than 50% of the visit was spent counseling and/or coordinating care by the attending physician.      Tio Dockery D.O.   Cardiovascular Disease  [phone]

## 2022-04-20 NOTE — PROGRESS NOTE ADULT - SUBJECTIVE AND OBJECTIVE BOX
Follow-up Pulm Progress Note    confused at times   Sats 96% 2LNC     Medications:  MEDICATIONS  (STANDING):  acetaminophen     Tablet .. 650 milliGRAM(s) Oral daily  albuterol/ipratropium for Nebulization 3 milliLiter(s) Nebulizer every 6 hours  amLODIPine   Tablet 10 milliGRAM(s) Oral daily  diphenhydrAMINE Injectable 25 milliGRAM(s) IV Push daily  heparin  Infusion 1150 Unit(s)/Hr (11.5 mL/Hr) IV Continuous <Continuous>  immune   globulin 10% (GAMMAGARD) IVPB 30 Gram(s) IV Intermittent daily  lacosamide IVPB 100 milliGRAM(s) IV Intermittent every 12 hours  levothyroxine 88 MICROGram(s) Oral daily  lisinopril 20 milliGRAM(s) Oral daily  methylPREDNISolone sodium succinate IVPB 1000 milliGRAM(s) IV Intermittent daily  pantoprazole    Tablet 40 milliGRAM(s) Oral before breakfast  polyethylene glycol 3350 17 Gram(s) Oral two times a day  QUEtiapine 100 milliGRAM(s) Oral at bedtime  risperiDONE   Tablet 0.5 milliGRAM(s) Oral <User Schedule>  senna 2 Tablet(s) Oral at bedtime  thiamine IVPB 500 milliGRAM(s) IV Intermittent every 8 hours    MEDICATIONS  (PRN):  hydrALAZINE 10 milliGRAM(s) Oral every 4 hours PRN Systolic blood pressure < 160  LORazepam   Injectable 1 milliGRAM(s) IV Push once PRN seizure          Vital Signs Last 24 Hrs  T(C): 36.9 (20 Apr 2022 09:55), Max: 37.2 (19 Apr 2022 12:45)  T(F): 98.5 (20 Apr 2022 09:55), Max: 98.9 (19 Apr 2022 12:45)  HR: 70 (20 Apr 2022 09:55) (68 - 93)  BP: 165/83 (20 Apr 2022 09:55) (155/68 - 172/95)  BP(mean): --  RR: 18 (20 Apr 2022 09:55) (18 - 18)  SpO2: 95% (20 Apr 2022 09:55) (94% - 98%)          04-19 @ 07:01  -  04-20 @ 07:00  --------------------------------------------------------  IN: 360 mL / OUT: 0 mL / NET: 360 mL          LABS:                        11.0   5.86  )-----------( 229      ( 19 Apr 2022 05:56 )             34.9     04-19    143  |  103  |  10  ----------------------------<  121<H>  3.3<L>   |  26  |  1.23    Ca    9.3      19 Apr 2022 05:56    TPro  9.0<H>  /  Alb  3.7  /  TBili  0.4  /  DBili  x   /  AST  25  /  ALT  15  /  AlkPhos  66  04-19      CARDIAC MARKERS ( 19 Apr 2022 05:56 )  x     / x     / 394 U/L / x     / x          CAPILLARY BLOOD GLUCOSE      POCT Blood Glucose.: 186 mg/dL (20 Apr 2022 12:05)    PTT - ( 20 Apr 2022 06:25 )  PTT:54.5 sec        DAIANA -- 04-19 @ 10:05  Anti SS-1 0.6  Anti SS-2 <0.2  Anti RNP 0.8  RF -- 04-19 @ 10:05    Atypical ANCA -- 04-19 @ 10:05  c-ANCA titer -- 04-19 @ 10:05  c-ANCA -- 04-19 @ 10:05  p-ANCA -- 04-19 @ 10:05  DAIANA -- 04-19 @ 09:09  Anti SS-1 --  Anti SS-2 --  Anti RNP --  RF <10 04-19 @ 09:09    Atypical ANCA -- 04-19 @ 09:09  c-ANCA titer -- 04-19 @ 09:09  c-ANCA -- 04-19 @ 09:09  p-ANCA -- 04-19 @ 09:09          Physical Examination:  PULM: decreased BS  CVS: S1, S2 heard        RADIOLOGY REVIEWED    CT chest: < from: CT Angio Chest PE Protocol w/ IV Cont (04.16.22 @ 02:00) >  FINDINGS:    Pulmonary arteries: Small pulmonary bolus in the right upper lobe   segmental branch seen on series 3 image 80. No associated right heart   strain or pulmonary infarction noted.    LUNGS AND AIRWAYS: Minimal tracheal secretions.  Partial right lower lobe   atelectasis with elevation of right hemidiaphragm.  PLEURA: Trace right pleural effusion..  MEDIASTINUM AND SIMONE: No lymphadenopathy.  VESSELS: Within normal limits.  HEART: Cardiomegaly. No evidence of right heart strain. Small pericardial   fluid along the right atrium  CHEST WALL AND LOWER NECK: Enlarged thyroid goiter with mild tracheal   narrowing  VISUALIZED UPPER ABDOMEN: Within normal limits.  BONES: Degenerative changes of the spine.    IMPRESSION:  Small pulmonary bolus in the right upper lobe segmental branch seen on   series 3 image 80. No associated right heart strain or pulmonary   infarction noted.    Partial right lower lobe atelectasis with elevation of right   hemidiaphragm.      < end of copied text >      LE duplex: < from: [facility] Duplex Lower Ext Vein Scan, Bilat (04.15.22 @ 14:29) >  Acute below the knee DVT confined to the left soleal vein.    < end of copied text >

## 2022-04-20 NOTE — PROGRESS NOTE ADULT - ASSESSMENT
Patient is a 72 year old Male with a PMHx of HTN, hypothyroidism, who presented to [facility]  via transfer from OSH. Patient originally presented to to [place] ED via EMS after family called 911 following a witnessed seizure, by family, reportedly lasting ~5min.  Enroute to [place], patient was reported to have had another 2 witnessed seizures by EMS, each lasting ~1 minute. Following each seizure the patient was given 5mg IV versed (received 10mg collectively and brought to [place] ED). Pt arrived to the ED obtunded with blood from his mouth (tongue laceration), responsive only to pain/sternal rub. Patient was subsequently intubated for GCS 6 and CT/CTA performed showing a small cerebellar hemorrhage.      Seizures  - Previously intubated, now extubated  - Neuro checks per protocol  - Monitor on Tele  - Possible IVIG per neuro   - S/P LP  - IVIG X 5 days   - Management as per neurology   - Fall, seizure, aspiration precautions  - S/P MR brain; f/u neuro recs  - S/P repeat CTH as above, per neuro      Cerebellar hemorrhage  - Right sided hemorrhage   - MR brain as above; f/u neuro recs  - F/U repeat CT H s/p heparin gtt; monitor mental status   - Transitioned to Eliquis 5 BID   - Monitor H/H closely and for any signs/symptoms of bleeding     DVT/ PE  - Patient with acute below knee DVT and RUL PE & Recent cerebellar infiltrate  - Vascular cardio eval appreciated; f/u recs  - Awaiting MR brain results for possible AC vs IVC filter --> Started on Full dose heparin  - F/u vascular recs  - Started on Full dose heparin, monitor PTT, Hgb and for any signs/symptoms of bleeding--> if stable CT H and Hgb after 24-48 hours can transition to NOAC if cleared from Neuro standpoint   - Repeat DVT study no longer indicated as patient on full dose tx   - Monitor H/H closely and for any signs/symptoms of bleeding     Hypothyroid   - Imaging w/ Enlarged goiter   - TSH elevated, T4 low  - Was on synthroid 75 at home, increased to 88 on this hospital admission  - Repeat TFTs in 3-4 weeks outpatient   - F/u thyroid US -- as above  - Thyroid antibodies elevated ? Hoshimoto's --> F/U endo       HyperNa  - S/p D5 1/2 NS   - Avoid overcorrection  - Monitor closely   - Na of 138;  improving       JESSICA  - Improving, continue to monitor and trend on daily labs  - Avoid nephrotoxic agents      Atelectasis   - Duoneb and Chest PT  - Appreciate pulm recs       HTN  - TTE EF of 75%, normal LV systolic function   - Continue with Amlodipine and Lisinopril   - Monitor BP, VS and patient closely       Pre-DM  - A1C of 5.9  - Outpatient follow up

## 2022-04-20 NOTE — PROGRESS NOTE ADULT - PROBLEM SELECTOR PLAN 3
CTA chest with RLL atelectasis  -Zosyn d/c'd  -Chest congestion improved. Duoneb q6h.   -Chest PT q6h  -On/off O2. Keep sats >90% with O2 PRN.

## 2022-04-20 NOTE — PROGRESS NOTE ADULT - ASSESSMENT
73 y/o M with PMH HTN, hypothyroidism. Presents with seizures, found to have cerebellar hemorrhage. Intubated for airway protection at OSH, extubated 4/13. Called to consult for increased chest congestion. CTA chest with atelectasis and small RUL segmental branch PE.

## 2022-04-20 NOTE — PROGRESS NOTE ADULT - SUBJECTIVE AND OBJECTIVE BOX
SUBJECTIVE:     Patient interviewed and examined at the bedside on the morning of 4/20/22    PAST MEDICAL & SURGICAL HISTORY:  HTN (hypertension)    Diverticulosis    Hypothyroid    No significant past surgical history      FAMILY HISTORY:    SOCIAL HISTORY:   T/E/D:   Occupation:   Lives with:     MEDICATIONS (HOME):  Home Medications:  levothyroxine 75 mcg (0.075 mg) oral tablet: 1 tab(s) orally once a day (15 Apr 2022 14:31)  lisinopril 30 mg oral tablet: 1 tab(s) orally once a day (15 Apr 2022 14:32)    MEDICATIONS  (STANDING):  acetaminophen     Tablet .. 650 milliGRAM(s) Oral daily  albuterol/ipratropium for Nebulization 3 milliLiter(s) Nebulizer every 6 hours  amLODIPine   Tablet 10 milliGRAM(s) Oral daily  apixaban 5 milliGRAM(s) Oral two times a day  diphenhydrAMINE Injectable 25 milliGRAM(s) IV Push daily  immune   globulin 10% (GAMMAGARD) IVPB 30 Gram(s) IV Intermittent daily  lacosamide IVPB 100 milliGRAM(s) IV Intermittent every 12 hours  levothyroxine 88 MICROGram(s) Oral daily  lisinopril 20 milliGRAM(s) Oral daily  methylPREDNISolone sodium succinate IVPB 1000 milliGRAM(s) IV Intermittent daily  pantoprazole    Tablet 40 milliGRAM(s) Oral before breakfast  polyethylene glycol 3350 17 Gram(s) Oral two times a day  QUEtiapine 100 milliGRAM(s) Oral at bedtime  risperiDONE   Tablet 0.5 milliGRAM(s) Oral <User Schedule>  senna 2 Tablet(s) Oral at bedtime  thiamine IVPB 500 milliGRAM(s) IV Intermittent every 8 hours    MEDICATIONS  (PRN):  hydrALAZINE 10 milliGRAM(s) Oral every 4 hours PRN Systolic blood pressure < 160  LORazepam   Injectable 1 milliGRAM(s) IV Push once PRN seizure    ALLERGIES/INTOLERANCES:  Allergies  fish (Hives; Angioedema)  No Known Drug Allergies    Intolerances    VITALS & EXAMINATION:  Vital Signs Last 24 Hrs  T(C): 36.9 (20 Apr 2022 09:55), Max: 37.1 (20 Apr 2022 05:44)  T(F): 98.5 (20 Apr 2022 09:55), Max: 98.7 (20 Apr 2022 05:44)  HR: 70 (20 Apr 2022 09:55) (68 - 92)  BP: 165/83 (20 Apr 2022 09:55) (156/77 - 172/95)  BP(mean): --  RR: 18 (20 Apr 2022 09:55) (18 - 18)  SpO2: 95% (20 Apr 2022 09:55) (94% - 98%)    GENERAL EXAM:  Constitutional: NAD.   Head: Normocephalic, atraumatic.  Extremities: No edema.    NEUROLOGICAL EXAM:  MS: Sleepy, eyes open spontaneously. Speech is fluent, not slurred. Follows commands.  CN: EOMI. Face symmetric.   Motor: Moves all extremities.  Sensory: Intact to LT throughout.  Coordination/Gait: Not assessed.    LABORATORY:  CBC                       13.2   10.80 )-----------( 263      ( 20 Apr 2022 12:25 )             41.7     Chem 04-20    138  |  98  |  20  ----------------------------<  194<H>  4.0   |  27  |  1.24    Ca    9.8      20 Apr 2022 12:25    TPro  10.7<H>  /  Alb  4.2  /  TBili  0.5  /  DBili  x   /  AST  25  /  ALT  15  /  AlkPhos  76  04-20    LFTs LIVER FUNCTIONS - ( 20 Apr 2022 12:25 )  Alb: 4.2 g/dL / Pro: 10.7 g/dL / ALK PHOS: 76 U/L / ALT: 15 U/L / AST: 25 U/L / GGT: x           Coagulopathy PTT - ( 20 Apr 2022 12:25 )  PTT:63.7 sec  Lipid Panel 04-13 Chol 128 LDL -- HDL 67 Trig 116  A1c   Cardiac enzymes CARDIAC MARKERS ( 19 Apr 2022 05:56 )  x     / x     / 394 U/L / x     / x          U/A   CSF  Immunological  Other    STUDIES & IMAGING:  Studies (EKG, EEG, EMG, etc):     EEG Summary: 4/20/22    Abnormal EEG in the awake, drowsy and asleep states.  -Mild generalized slowing    Impression/Clinical Correlate:    This is an abnormal EEG record. There is evidence of mild multifocal/diffuse nonspecific cerebral dysfunction, not specific for etiology. No epileptiform pattern or seizures were seen.        Radiology (XR, CT, MR, U/S, TTE/STEPHANIE):    CT Head No Cont (04.19.22 @ 18:26)     FINDINGS:    Previously seen focus of increased density in the right cerebellar   hemisphere is decreased in attenuation. The focus is slightly increased   in size, currently measuring 7 mm, previously measuring 5 mm.    No hydrocephalus, midline shift, or effacement of basal cisterns.    Moderate white matter microvascular ischemic disease.    No appreciable brain edema.    IMPRESSION:    Previously seen focus of increased density in the right cerebellar   hemisphere is decreased in attenuation. The focus is slightly increased   in size, currently measuring 7 mm, previously measuring 5 mm.         MR Head w/wo IV Cont (04.18.22 @ 09:55)     FINDINGS:  VENTRICLES AND SULCI:  No hydrocephalus.  INTRA-AXIAL:  Small foci of susceptibility artifact are present within   the right cerebellar hemisphere and the left frontal, occipital, and   parietal lobes. There is patchy increased FLAIR signal along the lateral   ventricles bilaterally and in the bilateral centrum semiovaleThe   bilateral thalamic and right basal ganglia chronic infarcts are present   with hemosiderin deposition. Bilateral temporal lobes are symmetric   without focal lesion. No abnormal intracranial enhancement is seen.  EXTRA-AXIAL:  No mass or collection.  VISUALIZED SINUSES:  Mild mucosal thickening of the left sphenoid sinus,   bilateral maxillary sinuses, and ethmoid air cells.  VISUALIZED MASTOIDS:  There is a right mastoid effusion, new from prior.  CALVARIUM:  Intact.  CAROTID FLOW VOIDS:  Present.    IMPRESSION:  Confluent FLAIR hyperintensity along the ventricles is nonspecific but   most commonly seen in the setting of chronic white matter microvascular   change.    Small foci of prior hemorrhage versus calcification involving the right   cerebellar hemisphere.    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

## 2022-04-20 NOTE — EEG REPORT - NS EEG TEXT BOX
Interpretation:    Day 3 – 	Start: 4/19/2022 14:13  	End: 4/20/2022  08:00  	Duration: ~17h47m    Daily EEG Visual Analysis    FINDINGS:  The background was continuous, spontaneously variable and reactive. During wakefulness, the posterior dominant rhythm consisted of symmetric, well-modulated 9 Hz activity, with amplitude to 30 uV, that attenuated to eye opening.  Low amplitude frontal beta was noted in wakefulness.    Background Slowing:  Theta/delta slowing    Focal Slowing:   None were present.    Sleep Background:  Drowsiness was characterized by fragmentation, attenuation, and slowing of the background activity.    Sleep was characterized by the presence of vertex waves, symmetric sleep spindles and K-complexes.    Other Non-Epileptiform Findings:  None were present.    Activation Procedures:   Hyperventilation was not performed.    Photic stimulation was not performed.    Interictal Epileptiform Activity:   None were present.    Events:  No events or seizures recorded.    Artifacts:  Intermittent myogenic and movement artifacts were noted.    ECG:  The heart rate on single channel ECG was predominantly between 60-70 BPM.    AEDs:   bid    EEG Summary:    Abnormal EEG in the awake, drowsy and asleep states.  -Mild generalized slowing    Impression/Clinical Correlate:    This is an abnormal EEG record. There is evidence of mild multifocal/diffuse nonspecific cerebral dysfunction, not specific for etiology. No epileptiform pattern or seizures were seen.    Preliminary Fellow Report, final report pending attending review    Jaxon Andrade MD  Epilepsy Fellow    Reading Room: [phone]  On Call Service After Hours: [phone]    Interpretation:    Day 3 – 	Start: 4/19/2022 14:13  	End: 4/20/2022  08:00  	Duration: ~17h47m    Daily EEG Visual Analysis    FINDINGS:  The background was continuous, spontaneously variable and reactive. During wakefulness, the posterior dominant rhythm consisted of symmetric, well-modulated 9 Hz activity, with amplitude to 30 uV, that attenuated to eye opening.  Low amplitude frontal beta was noted in wakefulness.    Background Slowing:  Theta/delta slowing    Focal Slowing:   None were present.    Sleep Background:  Drowsiness was characterized by fragmentation, attenuation, and slowing of the background activity.    Sleep was characterized by the presence of vertex waves, symmetric sleep spindles and K-complexes.    Other Non-Epileptiform Findings:  None were present.    Activation Procedures:   Hyperventilation was not performed.    Photic stimulation was not performed.    Interictal Epileptiform Activity:   None were present.    Events:  No events or seizures recorded.    Artifacts:  Intermittent myogenic and movement artifacts were noted.    ECG:  The heart rate on single channel ECG was predominantly between  BPM.    AEDs:   bid    EEG Summary:    Abnormal EEG in the awake, drowsy and asleep states.  -Mild generalized slowing    Impression/Clinical Correlate:    This is an abnormal EEG record. There is evidence of mild multifocal/diffuse nonspecific cerebral dysfunction, not specific for etiology. No epileptiform pattern or seizures were seen.    Preliminary Fellow Report, final report pending attending review    Jaxon Andrade MD  Epilepsy Fellow    Reading Room: [phone]  On Call Service After Hours: [phone]    Interpretation:    Day 3 – 	Start: 4/19/2022 14:13  	End: 4/20/2022  11:21  	Duration: ~21h8m    Daily EEG Visual Analysis    FINDINGS:  The background was continuous, spontaneously variable and reactive. During wakefulness, the posterior dominant rhythm consisted of symmetric, well-modulated 9 Hz activity, with amplitude to 30 uV, that attenuated to eye opening.  Low amplitude frontal beta was noted in wakefulness.    Background Slowing:  Theta/delta slowing    Focal Slowing:   None were present.    Sleep Background:  Drowsiness was characterized by fragmentation, attenuation, and slowing of the background activity.    Sleep was characterized by the presence of vertex waves, symmetric sleep spindles and K-complexes.    Other Non-Epileptiform Findings:  None were present.    Activation Procedures:   Hyperventilation was not performed.    Photic stimulation was not performed.    Interictal Epileptiform Activity:   None were present.    Events:  No events or seizures recorded.    Artifacts:  Intermittent myogenic and movement artifacts were noted.    ECG:  The heart rate on single channel ECG was predominantly between  BPM.    AEDs:   bid    EEG Summary:    Abnormal EEG in the awake, drowsy and asleep states.  -Mild generalized slowing    Impression/Clinical Correlate:    This is an abnormal EEG record. There is evidence of mild multifocal/diffuse nonspecific cerebral dysfunction, not specific for etiology. No epileptiform pattern or seizures were seen.    Preliminary Fellow Report, final report pending attending review    Jaxon Andrade MD  Epilepsy Fellow    Reading Room: [phone]  On Call Service After Hours: [phone]    Interpretation:    Day 3 – 	Start: 4/19/2022 14:13  	End: 4/20/2022  11:21  	Duration: ~21h8m    Daily EEG Visual Analysis    FINDINGS:  The background was continuous, spontaneously variable and reactive. During wakefulness, the posterior dominant rhythm consisted of symmetric, well-modulated 9 Hz activity, with amplitude to 30 uV, that attenuated to eye opening.  Low amplitude frontal beta was noted in wakefulness.    Background Slowing:  Theta/delta slowing    Focal Slowing:   None were present.    Sleep Background:  Drowsiness was characterized by fragmentation, attenuation, and slowing of the background activity.    Sleep was characterized by the presence of vertex waves, symmetric sleep spindles and K-complexes.    Other Non-Epileptiform Findings:  None were present.    Activation Procedures:   Hyperventilation was not performed.    Photic stimulation was not performed.    Interictal Epileptiform Activity:   None were present.    Events:  No events or seizures recorded.    Artifacts:  Intermittent myogenic and movement artifacts were noted.    ECG:  The heart rate on single channel ECG was predominantly between  BPM.    AEDs:   bid    EEG Summary:    Abnormal EEG in the awake, drowsy and asleep states.  -Mild generalized slowing    Impression/Clinical Correlate:    This is an abnormal EEG record. There is evidence of mild multifocal/diffuse nonspecific cerebral dysfunction, not specific for etiology. No epileptiform pattern or seizures were seen.      Jaxon Andrade MD  Epilepsy Fellow    Reading Room: [phone]  On Call Service After Hours: [phone]     Brandon August MD  Neurology Attending Physician

## 2022-04-21 LAB
ANA PAT FLD IF-IMP: ABNORMAL
ANA TITR SER: ABNORMAL
ANION GAP SERPL CALC-SCNC: 14 MMOL/L — SIGNIFICANT CHANGE UP (ref 5–17)
BUN SERPL-MCNC: 35 MG/DL — HIGH (ref 7–23)
C3 SERPL-MCNC: 165 MG/DL — HIGH (ref 81–157)
C4 SERPL-MCNC: 24 MG/DL — SIGNIFICANT CHANGE UP (ref 13–39)
CALCIUM SERPL-MCNC: 9 MG/DL — SIGNIFICANT CHANGE UP (ref 8.4–10.5)
CARDIOLIPIN AB SER-ACNC: POSITIVE
CHLORIDE SERPL-SCNC: 99 MMOL/L — SIGNIFICANT CHANGE UP (ref 96–108)
CO2 SERPL-SCNC: 25 MMOL/L — SIGNIFICANT CHANGE UP (ref 22–31)
CREAT SERPL-MCNC: 1.39 MG/DL — HIGH (ref 0.5–1.3)
CULTURE RESULTS: SIGNIFICANT CHANGE UP
EGFR: 54 ML/MIN/1.73M2 — LOW
GLUCOSE BLDC GLUCOMTR-MCNC: 268 MG/DL — HIGH (ref 70–99)
GLUCOSE SERPL-MCNC: 125 MG/DL — HIGH (ref 70–99)
HAV IGM SER-ACNC: SIGNIFICANT CHANGE UP
HBV CORE IGM SER-ACNC: SIGNIFICANT CHANGE UP
HBV SURFACE AG SER-ACNC: SIGNIFICANT CHANGE UP
HCT VFR BLD CALC: 32.4 % — LOW (ref 39–50)
HCV AB S/CO SERPL IA: 0.28 S/CO — SIGNIFICANT CHANGE UP (ref 0–0.99)
HCV AB SERPL-IMP: SIGNIFICANT CHANGE UP
HGB BLD-MCNC: 10.4 G/DL — LOW (ref 13–17)
MCHC RBC-ENTMCNC: 29.8 PG — SIGNIFICANT CHANGE UP (ref 27–34)
MCHC RBC-ENTMCNC: 32.1 GM/DL — SIGNIFICANT CHANGE UP (ref 32–36)
MCV RBC AUTO: 92.8 FL — SIGNIFICANT CHANGE UP (ref 80–100)
NRBC # BLD: 0 /100 WBCS — SIGNIFICANT CHANGE UP (ref 0–0)
OLIGOCLONAL BANDS CSF ELPH-IMP: SIGNIFICANT CHANGE UP
OLIGOCLONAL BANDS CSF ELPH-IMP: SIGNIFICANT CHANGE UP
PLATELET # BLD AUTO: 253 K/UL — SIGNIFICANT CHANGE UP (ref 150–400)
POTASSIUM SERPL-MCNC: 4.5 MMOL/L — SIGNIFICANT CHANGE UP (ref 3.5–5.3)
POTASSIUM SERPL-SCNC: 4.5 MMOL/L — SIGNIFICANT CHANGE UP (ref 3.5–5.3)
RBC # BLD: 3.49 M/UL — LOW (ref 4.2–5.8)
RBC # FLD: 14.3 % — SIGNIFICANT CHANGE UP (ref 10.3–14.5)
SODIUM SERPL-SCNC: 138 MMOL/L — SIGNIFICANT CHANGE UP (ref 135–145)
SPECIMEN SOURCE: SIGNIFICANT CHANGE UP
T3 SERPL-MCNC: 45 NG/DL — LOW (ref 80–200)
T4 AB SER-ACNC: 4.2 UG/DL — LOW (ref 4.6–12)
T4 AB SER-ACNC: 4.6 UG/DL — SIGNIFICANT CHANGE UP (ref 4.6–12)
T4 FREE SERPL-MCNC: 0.7 NG/DL — LOW (ref 0.9–1.8)
TSH SERPL-MCNC: 6.67 UIU/ML — HIGH (ref 0.27–4.2)
WBC # BLD: 9.38 K/UL — SIGNIFICANT CHANGE UP (ref 3.8–10.5)
WBC # FLD AUTO: 9.38 K/UL — SIGNIFICANT CHANGE UP (ref 3.8–10.5)

## 2022-04-21 PROCEDURE — 99232 SBSQ HOSP IP/OBS MODERATE 35: CPT

## 2022-04-21 PROCEDURE — 71045 X-RAY EXAM CHEST 1 VIEW: CPT | Mod: 26

## 2022-04-21 RX ORDER — SODIUM CHLORIDE 9 MG/ML
3 INJECTION INTRAMUSCULAR; INTRAVENOUS; SUBCUTANEOUS EVERY 6 HOURS
Refills: 0 | Status: DISCONTINUED | OUTPATIENT
Start: 2022-04-21 | End: 2022-04-25

## 2022-04-21 RX ADMIN — APIXABAN 5 MILLIGRAM(S): 2.5 TABLET, FILM COATED ORAL at 08:05

## 2022-04-21 RX ADMIN — SODIUM CHLORIDE 3 MILLILITER(S): 9 INJECTION INTRAMUSCULAR; INTRAVENOUS; SUBCUTANEOUS at 18:18

## 2022-04-21 RX ADMIN — Medication 25 MILLIGRAM(S): at 12:44

## 2022-04-21 RX ADMIN — Medication 88 MICROGRAM(S): at 08:06

## 2022-04-21 RX ADMIN — RISPERIDONE 0.5 MILLIGRAM(S): 4 TABLET ORAL at 08:38

## 2022-04-21 RX ADMIN — Medication 650 MILLIGRAM(S): at 12:43

## 2022-04-21 RX ADMIN — LACOSAMIDE 120 MILLIGRAM(S): 50 TABLET ORAL at 09:52

## 2022-04-21 RX ADMIN — APIXABAN 5 MILLIGRAM(S): 2.5 TABLET, FILM COATED ORAL at 18:17

## 2022-04-21 RX ADMIN — SENNA PLUS 2 TABLET(S): 8.6 TABLET ORAL at 21:42

## 2022-04-21 RX ADMIN — Medication 650 MILLIGRAM(S): at 13:00

## 2022-04-21 RX ADMIN — Medication 105 MILLIGRAM(S): at 23:30

## 2022-04-21 RX ADMIN — Medication 105 MILLIGRAM(S): at 15:54

## 2022-04-21 RX ADMIN — AMLODIPINE BESYLATE 10 MILLIGRAM(S): 2.5 TABLET ORAL at 08:07

## 2022-04-21 RX ADMIN — LISINOPRIL 20 MILLIGRAM(S): 2.5 TABLET ORAL at 08:06

## 2022-04-21 RX ADMIN — PANTOPRAZOLE SODIUM 40 MILLIGRAM(S): 20 TABLET, DELAYED RELEASE ORAL at 08:06

## 2022-04-21 RX ADMIN — POLYETHYLENE GLYCOL 3350 17 GRAM(S): 17 POWDER, FOR SOLUTION ORAL at 08:07

## 2022-04-21 RX ADMIN — Medication 58 MILLIGRAM(S): at 06:52

## 2022-04-21 RX ADMIN — Medication 3 MILLILITER(S): at 23:36

## 2022-04-21 RX ADMIN — Medication 3 MILLILITER(S): at 18:18

## 2022-04-21 RX ADMIN — SODIUM CHLORIDE 3 MILLILITER(S): 9 INJECTION INTRAMUSCULAR; INTRAVENOUS; SUBCUTANEOUS at 23:37

## 2022-04-21 RX ADMIN — QUETIAPINE FUMARATE 100 MILLIGRAM(S): 200 TABLET, FILM COATED ORAL at 21:43

## 2022-04-21 RX ADMIN — LACOSAMIDE 120 MILLIGRAM(S): 50 TABLET ORAL at 21:43

## 2022-04-21 RX ADMIN — POLYETHYLENE GLYCOL 3350 17 GRAM(S): 17 POWDER, FOR SOLUTION ORAL at 18:17

## 2022-04-21 RX ADMIN — Medication 105 MILLIGRAM(S): at 08:38

## 2022-04-21 RX ADMIN — IMMUNE GLOBULIN (HUMAN) 50 GRAM(S): 10 INJECTION INTRAVENOUS; SUBCUTANEOUS at 13:14

## 2022-04-21 RX ADMIN — Medication 3 MILLILITER(S): at 12:44

## 2022-04-21 NOTE — CHART NOTE - NSCHARTNOTEFT_GEN_A_CORE
Endocrine called for tashi mancilla. Noted to have improved Free T4 0.7 with increased dose of LT4 88mcg daily. c/w current dose of LT4 88mcg daily. Recommend repeat TSH and Free T4 in 1 week. He should f/u with PMD in 1-2 weeks after discharge. Patient can also f/u at Endocrine Office [address] [place] [address] [phone]. Endocrine team will sign off. Discussed with primary team.     Carolynn Merritt DO

## 2022-04-21 NOTE — PROGRESS NOTE ADULT - PROBLEM SELECTOR PLAN 3
CTA chest 4/16 with RLL atelectasis. CT chest 4/20 with increase in RLL atelectasis.  -Zosyn d/c'd  -Chest congestion at times Duoneb q6h.   -Start sodium chloride 3% 3cc q6h  -Chest PT q6h  -On/off O2. Keep sats >90% with O2 PRN (currently 2LNC).

## 2022-04-21 NOTE — PROGRESS NOTE ADULT - SUBJECTIVE AND OBJECTIVE BOX
Vascular Cardiology  Progress note    SERVICE LINE [phone]              EMAIL [email or web address]   OFFICE [phone]    CC:  seizure    INTERVAL HISTORY:  On apixaban 5mg BID         Allergies    fish (Hives; Angioedema)  No Known Drug Allergies    Intolerances    	    MEDICATIONS:  amLODIPine   Tablet 10 milliGRAM(s) Oral daily  apixaban 5 milliGRAM(s) Oral two times a day  hydrALAZINE 10 milliGRAM(s) Oral every 4 hours PRN  lisinopril 20 milliGRAM(s) Oral daily      albuterol/ipratropium for Nebulization 3 milliLiter(s) Nebulizer every 6 hours  diphenhydrAMINE Injectable 25 milliGRAM(s) IV Push daily    acetaminophen     Tablet .. 650 milliGRAM(s) Oral daily  lacosamide IVPB 100 milliGRAM(s) IV Intermittent every 12 hours  LORazepam   Injectable 1 milliGRAM(s) IV Push once PRN  QUEtiapine 100 milliGRAM(s) Oral at bedtime  risperiDONE   Tablet 0.5 milliGRAM(s) Oral <User Schedule>    pantoprazole    Tablet 40 milliGRAM(s) Oral before breakfast  polyethylene glycol 3350 17 Gram(s) Oral two times a day  senna 2 Tablet(s) Oral at bedtime    levothyroxine 88 MICROGram(s) Oral daily  methylPREDNISolone sodium succinate IVPB 1000 milliGRAM(s) IV Intermittent daily    immune   globulin 10% (GAMMAGARD) IVPB 30 Gram(s) IV Intermittent daily  thiamine IVPB 500 milliGRAM(s) IV Intermittent every 8 hours      PAST MEDICAL & SURGICAL HISTORY:  HTN (hypertension)    Diverticulosis    Hypothyroid    No significant past surgical history        FAMILY HISTORY:      SOCIAL HISTORY:  unchanged    REVIEW OF SYSTEMS:  [X ] Unable to obtain    PHYSICAL EXAM:  T(C): 36.9 (04-21-22 @ 08:00), Max: 37.1 (04-20-22 @ 17:33)  HR: 65 (04-21-22 @ 08:00) (58 - 82)  BP: 133/76 (04-21-22 @ 08:00) (133/76 - 169/92)  RR: 20 (04-21-22 @ 08:00) (16 - 20)  SpO2: 99% (04-21-22 @ 08:00) (94% - 99%)  Wt(kg): --  I&O's Summary    20 Apr 2022 07:01  -  21 Apr 2022 07:00  --------------------------------------------------------  IN: 0 mL / OUT: 150 mL / NET: -150 mL     Appearance: tired appearing  HEENT:   Normal oral mucosa, PERRL, EOMI	  Carotid:   Right: no bruit   Left: no bruit     Lymphatic: No lymphadenopathy  Cardiovascular:  tachycardiac, s1s2, no MRG   Respiratory: + rhonchi   Psychiatry:  AAO x 3  Gastrointestinal:  Soft, Non-tender, + BS	  Skin: No rashes, No ecchymoses, No cyanosis	  Neurologic:  AOx3   Extremities:  dry skin, no edema     Vascular Pulse Exam:  Right DP: [x]palpable []non-palpable []audible      Left DP :   [x]palpable []non-palpable []audible  Right PT: [x]palpable [] non-palpable []audible   Left PT:  [x] palpable [] non-palpable []audible        LABS:	 	    CBC Full  -  ( 21 Apr 2022 07:26 )  WBC Count : 9.38 K/uL  Hemoglobin : 10.4 g/dL  Hematocrit : 32.4 %  Platelet Count - Automated : 253 K/uL  Mean Cell Volume : 92.8 fl  Mean Cell Hemoglobin : 29.8 pg  Mean Cell Hemoglobin Concentration : 32.1 gm/dL  Auto Neutrophil # : x  Auto Lymphocyte # : x  Auto Monocyte # : x  Auto Eosinophil # : x  Auto Basophil # : x  Auto Neutrophil % : x  Auto Lymphocyte % : x  Auto Monocyte % : x  Auto Eosinophil % : x  Auto Basophil % : x    04-21    138  |  99  |  35<H>  ----------------------------<  125<H>  4.5   |  25  |  1.39<H>  04-20    138  |  98  |  20  ----------------------------<  194<H>  4.0   |  27  |  1.24    Ca    9.0      21 Apr 2022 07:26  Ca    9.8      20 Apr 2022 12:25    TPro  10.7<H>  /  Alb  4.2  /  TBili  0.5  /  DBili  x   /  AST  25  /  ALT  15  /  AlkPhos  76  04-20        Assessment:  1. L soleal vein DVT with small RUL PE   2. 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 MRI with persistent small foci of hemorrhage, patient started on eliquis 5mg BID   2.  Patient is euvolemic on exam after 3 doses of IVIG      Thank you      Vascular Cardiology Service    Please call with any questions:   DIRECT SERVICE NUMBER:  [phone]  Office [phone]  email:  [email or web address]

## 2022-04-21 NOTE — PROGRESS NOTE ADULT - ASSESSMENT
73 yo male with a PMHx of HTN, Hypothyroidism, and Diverticulosis (requiring blood transfusions x2 8 yrs ago) who presented to [facility] on 4/12 as a transfer from [facility] for ICH. Patient LKN was at 2200pm on 4/11. Patient was noted on 4/12 @ 0200 to be obtunded with saliva coming out of his mouth. EMS was called, patient came back to baseline and did not want to go to hospital and EMS left. Patient then had a seizure-like event witnessed by wife with UE shaking, foaming at the mouth, and urinary/fecal incontinence. Neurology consulted for new onset seizures in the setting of ICH while patient was in NSCU. No seizures seen on EEG thus far during this admission. Transferred from NSCU to EMU on 4/15 after stable IPH noted.     Impression: New onset of seizures of unknown cause, with R cerebellar IPH.      Plan:  [] Off vEEG  [] IVIG (day 4), for total 5 days, last day 4/22  [x] Heparin DC'd on 4/20/22, transitioned to Eliquis 5mg BID PO   [x] S/p MRI brain w/wo under anesthesia on 4/18, S/p LP    [x] D/c'd Depakote 750MG PO BID.   [x] C/w  Vimpat 100mg BID IV. S/p Vimpat load 200mg IV x2. Obtained cardiology clearance.     [x] Seroquel 100MG PO QHS.  [x] Add Risperidone 0.5mg qAM  [] Endocrinology curbsided regarding TSH and TPO-ab, recommend rechecking T4 levels following increase in Levothyroxine  [x] Continuos pulse ox  [x] Cardiology following (Dr. Fragoso), c/w Lisinopril 20MG PO QD, Hydralazine 10MG PO Q4HR. Will re-evaluate to give clearance for Vimpat.   [] Pulmonology following, Zosyn DC'd as no PNA seen on CTA Chest.   [x] 4/16 CTA Chest PE Protocol: as above, patient with small R upper lobe segmental branch PE.  [x] 4/16: Vascular Neurology aware of case, spoke to stroke fellow Dr. Abby Deutsch. From vascular neurology standpoint no contraindication for full dose AC if needed in setting of PE.  [x] Vascular Cardiology following (Dr. Perera): Spoke with Dr. Perera/vascular cardiology team.   [x] 4/16: Developed JESSICA after CTA Chest, likely due to contrast. 4/21: BUN/Cr 38/1.4    [] Rheum w/u serum: myomarker panel, SCL 70, Centromere, Marialuisa 1, TPO, Thyroglobulin Ab, C3 C4, ROSIBEL, Sjogren labs, APLS, Ribosomal P ab, ANCA, MPO, PR3, CK, Aldolase, Myoglobin, GGT, ESR, DAIANA, C- ANCA, DS-DAIANA, Urine prot/creat ratio, anticardiolipin Ab, Lupus anticoagulant, B2 glycoprotein AB,  RF, CCP, anti-GBM.  [] Electrolyte repletion as needed  [] Neuro checks, vitals Q4HR.  [] Telemetry.  [] Seizure/Fall/Aspiration precautions.  [] DVT PPx: On heparin gtt  [] Diet: Regular.  [] Seizure Rescue: Ativan 1MG IVP x1 for convulsions lasting > 3 minutes, VPA 1G IV x1 for convulsions that are refractory to Ativan.    Case seen and discussed with epilepsy attending Dr. Maldonado.

## 2022-04-21 NOTE — PROGRESS NOTE ADULT - SUBJECTIVE AND OBJECTIVE BOX
Follow-up Pulm Progress Note    Reported to have congested cough overnight   More alert today  No new respiratory events overnight.  Denies SOB/CP.   Sats 96% 2LNC     Medications:  MEDICATIONS  (STANDING):  acetaminophen     Tablet .. 650 milliGRAM(s) Oral daily  albuterol/ipratropium for Nebulization 3 milliLiter(s) Nebulizer every 6 hours  amLODIPine   Tablet 10 milliGRAM(s) Oral daily  apixaban 5 milliGRAM(s) Oral two times a day  diphenhydrAMINE Injectable 25 milliGRAM(s) IV Push daily  immune   globulin 10% (GAMMAGARD) IVPB 30 Gram(s) IV Intermittent daily  lacosamide IVPB 100 milliGRAM(s) IV Intermittent every 12 hours  levothyroxine 88 MICROGram(s) Oral daily  lisinopril 20 milliGRAM(s) Oral daily  methylPREDNISolone sodium succinate IVPB 1000 milliGRAM(s) IV Intermittent daily  pantoprazole    Tablet 40 milliGRAM(s) Oral before breakfast  polyethylene glycol 3350 17 Gram(s) Oral two times a day  QUEtiapine 100 milliGRAM(s) Oral at bedtime  risperiDONE   Tablet 0.5 milliGRAM(s) Oral <User Schedule>  senna 2 Tablet(s) Oral at bedtime  thiamine IVPB 500 milliGRAM(s) IV Intermittent every 8 hours    MEDICATIONS  (PRN):  hydrALAZINE 10 milliGRAM(s) Oral every 4 hours PRN Systolic blood pressure < 160  LORazepam   Injectable 1 milliGRAM(s) IV Push once PRN seizure          Vital Signs Last 24 Hrs  T(C): 36.9 (21 Apr 2022 08:00), Max: 37.1 (20 Apr 2022 17:33)  T(F): 98.5 (21 Apr 2022 08:00), Max: 98.7 (20 Apr 2022 17:33)  HR: 65 (21 Apr 2022 08:00) (58 - 82)  BP: 133/76 (21 Apr 2022 08:00) (133/76 - 169/92)  BP(mean): --  RR: 20 (21 Apr 2022 08:00) (16 - 20)  SpO2: 99% (21 Apr 2022 08:00) (94% - 99%)          04-20 @ 07:01  -  04-21 @ 07:00  --------------------------------------------------------  IN: 0 mL / OUT: 150 mL / NET: -150 mL          LABS:                        10.4   9.38  )-----------( 253      ( 21 Apr 2022 07:26 )             32.4     04-21    138  |  99  |  35<H>  ----------------------------<  125<H>  4.5   |  25  |  1.39<H>    Ca    9.0      21 Apr 2022 07:26    TPro  10.7<H>  /  Alb  4.2  /  TBili  0.5  /  DBili  x   /  AST  25  /  ALT  15  /  AlkPhos  76  04-20          CAPILLARY BLOOD GLUCOSE      POCT Blood Glucose.: 173 mg/dL (20 Apr 2022 21:32)    PTT - ( 20 Apr 2022 12:25 )  PTT:63.7 sec        DAIANA -- 04-19 @ 10:05  Anti SS-1 0.6  Anti SS-2 <0.2  Anti RNP 0.8  RF -- 04-19 @ 10:05    Atypical ANCA Negative 04-19 @ 10:05  c-ANCA titer -- 04-19 @ 10:05  c-ANCA Negative 04-19 @ 10:05  p-ANCA Negative 04-19 @ 10:05  DAIANA -- 04-19 @ 09:09  Anti SS-1 --  Anti SS-2 --  Anti RNP --  RF <10 04-19 @ 09:09    Atypical ANCA -- 04-19 @ 09:09  c-ANCA titer -- 04-19 @ 09:09  c-ANCA -- 04-19 @ 09:09  p-ANCA -- 04-19 @ 09:09          Physical Examination:  PULM: few scattered rhonchi   CVS: S1, S2 heard    RADIOLOGY REVIEWED  CT chest: < from: CT Chest w/ IV Cont (04.20.22 @ 17:16) >  FINDINGS:  CHEST:  LUNGS AND LARGE AIRWAYS: Tracheal secretions. Near complete atelectasis   of the right lower lobe. Atelectatic changes, though to a lesser degree,   involving the middle and left lower lobes.  PLEURA: No pleural effusion.  VESSELS: The known pulmonary embolus in the right upper lobe identified   on the prior CTA of 4/16/2022 is not well seen on the current exam, which   is not tailored for the assessment of the pulmonary arterial system.  HEART: Heart size is normal. Aortic valve calcification. Trace   pericardial effusion, similar to the prior exam.  MEDIASTINUM AND SIMONE: No lymphadenopathy.  CHEST WALL AND LOWER NECK: Enlarged thyroid with mild tracheal narrowing,   similar to the prior exam.    ABDOMEN AND PELVIS:  Mild motion degradation in the mid to lower abdomen.    LIVER: Within normal limits.  BILE DUCTS: Normal caliber.  GALLBLADDER: Mild layering hyperdensity within the gallbladder, which may   reflect vicarious excretion of contrast or sludge.  SPLEEN: Within normal limits.  PANCREAS: Within normal limits.  ADRENALS: Within normal limits.  KIDNEYS/URETERS: A few subcentimeter hypodense foci within the right   kidney that are too small to characterize. No hydronephrosis.    BLADDER: Underdistended. Mild perivesicular fat stranding.  REPRODUCTIVE ORGANS: Mildly enlarged prostate. There is a 7 mm hyperdense   nodular focus within the region of the lateral mid right peripheral zone   (series 3 image 280).    BOWEL: No bowel obstruction. Colonic diverticulosis. Appendix is normal.  PERITONEUM: No ascites.  VESSELS: Atherosclerotic changes.  RETROPERITONEUM/LYMPH NODES: No lymphadenopathy.  ABDOMINALWALL: Small umbilical hernia containing a portion of transverse   colon.  BONES: Degenerative changes.    IMPRESSION:  Subcentimeter hyperdense nodular focus in the right aspect of the   prostate gland, indeterminate but potentially an exophytic BPH nodule.   Neoplasm is not excluded. Suggest correlation with PSA, and consider   further evaluation with prostate MRI.    Mild perivesicular fat stranding. Question cystitis. Correlate with   urinalysis.    Right greater than left lung base atelectasis, with near complete   atelectasis of the right lower lobe.    < end of copied text >

## 2022-04-21 NOTE — PROGRESS NOTE ADULT - SUBJECTIVE AND OBJECTIVE BOX
Name of Patient : PEPITO BALL  MRN: [id number]  Date of visit: 04-21-22 @ 17:08      Subjective: Patient seen and examined. No new events except as noted.   Patient seen earlier this AM  On contact precautions due to covid exposure  Denies CP, palpitations      REVIEW OF SYSTEMS:    CONSTITUTIONAL: +Generalized weakness   EYES/ENT: No visual changes;  No vertigo or throat pain   NECK: No pain or stiffness  RESPIRATORY: No cough, wheezing, hemoptysis; No shortness of breath  CARDIOVASCULAR: No chest pain or palpitations  GASTROINTESTINAL: No abdominal or epigastric pain. No nausea, vomiting, or hematemesis; No diarrhea or constipation. No melena or hematochezia.  GENITOURINARY: No dysuria, frequency or hematuria  NEUROLOGICAL: +H/O Seizures   SKIN: No itching, burning, rashes, or lesions   All other review of systems is negative unless indicated above.      MEDICATIONS:  MEDICATIONS  (STANDING):  acetaminophen     Tablet .. 650 milliGRAM(s) Oral daily  albuterol/ipratropium for Nebulization 3 milliLiter(s) Nebulizer every 6 hours  amLODIPine   Tablet 10 milliGRAM(s) Oral daily  apixaban 5 milliGRAM(s) Oral two times a day  diphenhydrAMINE Injectable 25 milliGRAM(s) IV Push daily  immune   globulin 10% (GAMMAGARD) IVPB 30 Gram(s) IV Intermittent daily  lacosamide IVPB 100 milliGRAM(s) IV Intermittent every 12 hours  levothyroxine 88 MICROGram(s) Oral daily  lisinopril 20 milliGRAM(s) Oral daily  methylPREDNISolone sodium succinate IVPB 1000 milliGRAM(s) IV Intermittent daily  pantoprazole    Tablet 40 milliGRAM(s) Oral before breakfast  polyethylene glycol 3350 17 Gram(s) Oral two times a day  QUEtiapine 100 milliGRAM(s) Oral at bedtime  risperiDONE   Tablet 0.5 milliGRAM(s) Oral <User Schedule>  senna 2 Tablet(s) Oral at bedtime  sodium chloride 3%  Inhalation 3 milliLiter(s) Inhalation every 6 hours  thiamine IVPB 500 milliGRAM(s) IV Intermittent every 8 hours      PHYSICAL EXAM:  T(C): 36.6 (04-21-22 @ 13:20), Max: 37.1 (04-20-22 @ 17:33)  HR: 96 (04-21-22 @ 13:20) (58 - 96)  BP: 148/78 (04-21-22 @ 13:20) (133/76 - 169/92)  RR: 19 (04-21-22 @ 13:20) (16 - 20)  SpO2: 95% (04-21-22 @ 13:20) (94% - 99%)  Wt(kg): --  I&O's Summary    20 Apr 2022 07:01  -  21 Apr 2022 07:00  --------------------------------------------------------  IN: 0 mL / OUT: 150 mL / NET: -150 mL    21 Apr 2022 07:01  -  21 Apr 2022 17:08  --------------------------------------------------------  IN: 540 mL / OUT: 0 mL / NET: 540 mL            Appearance: Appearance: Weak appearing male, laying down in increased observation room 	  HEENT:  PERRLA   Lymphatic: No lymphadenopathy   Cardiovascular: Normal S1 S2, no JVD  Respiratory: normal effort , clear  Gastrointestinal:  Soft, Non-tender  Skin: No rashes,  warm to touch  Psychiatry:  Mood & affect appropriate  Musculoskeletal: No edema; +Wrist restraints               04-20-22 @ 07:01  -  04-21-22 @ 07:00  --------------------------------------------------------  IN: 0 mL / OUT: 150 mL / NET: -150 mL    04-21-22 @ 07:01  -  04-21-22 @ 17:08  --------------------------------------------------------  IN: 540 mL / OUT: 0 mL / NET: 540 mL                              10.4   9.38  )-----------( 253      ( 21 Apr 2022 07:26 )             32.4               04-21    138  |  99  |  35<H>  ----------------------------<  125<H>  4.5   |  25  |  1.39<H>    Ca    9.0      21 Apr 2022 07:26    TPro  10.7<H>  /  Alb  4.2  /  TBili  0.5  /  DBili  x   /  AST  25  /  ALT  15  /  AlkPhos  76  04-20    PTT - ( 20 Apr 2022 12:25 )  PTT:63.7 sec                       < from: CT Chest w/ IV Cont (04.20.22 @ 17:16) >  IMPRESSION:  Subcentimeter hyperdense nodular focus in the right aspect of the   prostate gland, indeterminate but potentially an exophytic BPH nodule.   Neoplasm is not excluded. Suggest correlation with PSA, and consider   further evaluation with prostate MRI.    Mild perivesicular fat stranding. Question cystitis. Correlate with   urinalysis.    Right greater than left lung base atelectasis, with near complete   atelectasis of the right lower lobe.      Findings were discussed with LAN Romero 4/21/2022 9:10 AM by Dr. Flores with read back confirmation.    --- End of Report ---    < end of copied text >

## 2022-04-21 NOTE — PROGRESS NOTE ADULT - SUBJECTIVE AND OBJECTIVE BOX
Cardiovascular Disease Progress Note    Overnight events: No acute events overnight.  Pt is in no distress.   Otherwise review of systems negative    Objective Findings:  T(C): 36.9 (04-21-22 @ 08:00), Max: 37.1 (04-20-22 @ 17:33)  HR: 65 (04-21-22 @ 08:00) (58 - 82)  BP: 133/76 (04-21-22 @ 08:00) (133/76 - 169/92)  RR: 20 (04-21-22 @ 08:00) (16 - 20)  SpO2: 99% (04-21-22 @ 08:00) (94% - 99%)  Wt(kg): --  Daily     Daily       Physical Exam:  Gen: NAD; Patient resting comfortably  HEENT: EOMI, Normocephalic/ atraumatic  CV: RRR, normal S1 + S2, no m/r/g  Lungs:  Normal respiratory effort; clear to auscultation bilaterally  Abd: soft, non-tender; bowel sounds present  Ext: No edema; warm and well perfused    Telemetry: Sinus with PVCs    Laboratory Data:                        10.4   9.38  )-----------( 253      ( 21 Apr 2022 07:26 )             32.4     04-21    138  |  99  |  35<H>  ----------------------------<  125<H>  4.5   |  25  |  1.39<H>    Ca    9.0      21 Apr 2022 07:26    TPro  10.7<H>  /  Alb  4.2  /  TBili  0.5  /  DBili  x   /  AST  25  /  ALT  15  /  AlkPhos  76  04-20    PTT - ( 20 Apr 2022 12:25 )  PTT:63.7 sec          Inpatient Medications:  MEDICATIONS  (STANDING):  acetaminophen     Tablet .. 650 milliGRAM(s) Oral daily  albuterol/ipratropium for Nebulization 3 milliLiter(s) Nebulizer every 6 hours  amLODIPine   Tablet 10 milliGRAM(s) Oral daily  apixaban 5 milliGRAM(s) Oral two times a day  diphenhydrAMINE Injectable 25 milliGRAM(s) IV Push daily  immune   globulin 10% (GAMMAGARD) IVPB 30 Gram(s) IV Intermittent daily  lacosamide IVPB 100 milliGRAM(s) IV Intermittent every 12 hours  levothyroxine 88 MICROGram(s) Oral daily  lisinopril 20 milliGRAM(s) Oral daily  methylPREDNISolone sodium succinate IVPB 1000 milliGRAM(s) IV Intermittent daily  pantoprazole    Tablet 40 milliGRAM(s) Oral before breakfast  polyethylene glycol 3350 17 Gram(s) Oral two times a day  QUEtiapine 100 milliGRAM(s) Oral at bedtime  risperiDONE   Tablet 0.5 milliGRAM(s) Oral <User Schedule>  senna 2 Tablet(s) Oral at bedtime  sodium chloride 3%  Inhalation 3 milliLiter(s) Inhalation every 6 hours  thiamine IVPB 500 milliGRAM(s) IV Intermittent every 8 hours      Assessment:  72M PMH htn, hypothyroidism, who presented to [place] ED via EMS after family called 911 following a witnessed seizure.    Plan of Care:    #DVT  - Pt with below knee DVT and small RUL PE  - AC management as per vascular team  - Pt started on Eliquis    #HTN  - BP acceptable  - TTE shows normal LV systolic function  - Continue Amlodipine, Lisinopril    #Seizure activity  - S/p extubation  - EEG with no seizure activity  - Management as per neurology  - EKG shows sinus rhythm with no QTC prolongation.     #Cerebellar Hemorrhage  - Rt sided hemorrhage  - Management as per neuro          Over 25 minutes spent on total encounter; more than 50% of the visit was spent counseling and/or coordinating care by the attending physician.      Tio Dockery D.O.   Cardiovascular Disease  [phone]

## 2022-04-21 NOTE — PROGRESS NOTE ADULT - ASSESSMENT
Patient is a 72 year old Male with a PMHx of HTN, hypothyroidism, who presented to [facility]  via transfer from OSH. Patient originally presented to to [place] ED via EMS after family called 911 following a witnessed seizure, by family, reportedly lasting ~5min.  Enroute to [place], patient was reported to have had another 2 witnessed seizures by EMS, each lasting ~1 minute. Following each seizure the patient was given 5mg IV versed (received 10mg collectively and brought to [place] ED). Pt arrived to the ED obtunded with blood from his mouth (tongue laceration), responsive only to pain/sternal rub. Patient was subsequently intubated for GCS 6 and CT/CTA performed showing a small cerebellar hemorrhage.      Seizures  - Previously intubated, now extubated  - Neuro checks per protocol  - Monitor on Tele  - Possible IVIG per neuro   - S/P LP  - IVIG X 5 days   - Management as per neurology   - Fall, seizure, aspiration precautions  - S/P MR brain; f/u neuro recs  - S/P repeat CTH as above, per neuro      Cerebellar hemorrhage  - Right sided hemorrhage   - MR brain as above; f/u neuro recs  - F/U repeat CT H s/p heparin gtt; monitor mental status   - Transitioned to Eliquis 5 BID   - Monitor H/H closely and for any signs/symptoms of bleeding     DVT/ PE  - Patient with acute below knee DVT and RUL PE & Recent cerebellar infiltrate  - Vascular cardio eval appreciated; f/u recs  - Awaiting MR brain results for possible AC vs IVC filter --> Started on Full dose heparin  - F/u vascular recs  - Started on Full dose heparin, monitor PTT, Hgb and for any signs/symptoms of bleeding--> if stable CT H and Hgb after 24-48 hours can transition to NOAC if cleared from Neuro standpoint   - Repeat DVT study no longer indicated as patient on full dose tx   - Monitor H/H closely and for any signs/symptoms of bleeding     Hypothyroid   - Imaging w/ Enlarged goiter   - TSH elevated, T4 low  - Was on synthroid 75 at home, increased to 88 on this hospital admission  - Repeat TFTs in 3-4 weeks outpatient   - F/u thyroid US -- as above  - Thyroid antibodies elevated ? Hoshimoto's --> F/U endo - per discussion w/ endo cont to monitor TFTS for now       HyperNa  - S/p D5 1/2 NS   - Avoid overcorrection  - Monitor closely   - Na of 138;  improving       JESSICA  - Elevated Cr to 1.39 this AM likely due LESLIE due to CT Chest w/ IV C  - Recommend IVF at 60cc/ Hr X 12 hours for gentle IV Hydration   - Continue to monitor and trend on daily labs  - Avoid nephrotoxic agents      Atelectasis   - Duoneb and Chest PT  - Appreciate pulm recs       HTN  - TTE EF of 75%, normal LV systolic function   - Continue with Amlodipine and Lisinopril   - Monitor BP, VS and patient closely       Pre-DM  - A1C of 5.9  - Outpatient follow up       Prostate   - Hyperdensity noted on CT Scan   - Check PSA (noted order is in)     Patient is a 72 year old Male with a PMHx of HTN, hypothyroidism, who presented to [facility]  via transfer from OSH. Patient originally presented to to [place] ED via EMS after family called 911 following a witnessed seizure, by family, reportedly lasting ~5min.  Enroute to [place], patient was reported to have had another 2 witnessed seizures by EMS, each lasting ~1 minute. Following each seizure the patient was given 5mg IV versed (received 10mg collectively and brought to [place] ED). Pt arrived to the ED obtunded with blood from his mouth (tongue laceration), responsive only to pain/sternal rub. Patient was subsequently intubated for GCS 6 and CT/CTA performed showing a small cerebellar hemorrhage.      Seizures  - Previously intubated, now extubated  - Neuro checks per protocol  - Monitor on Tele  - Possible IVIG per neuro   - S/P LP  - IVIG X 5 days   - Management as per neurology   - Fall, seizure, aspiration precautions  - S/P MR brain; f/u neuro recs  - S/P repeat CTH as above, per neuro      Cerebellar hemorrhage  - Right sided hemorrhage   - MR brain as above; f/u neuro recs  - F/U repeat CT H s/p heparin gtt; monitor mental status   - Transitioned to Eliquis 5 BID   - Monitor H/H closely and for any signs/symptoms of bleeding     DVT/ PE  - Patient with acute below knee DVT and RUL PE & Recent cerebellar infiltrate  - Vascular cardio eval appreciated; f/u recs  - Awaiting MR brain results for possible AC vs IVC filter --> Started on Full dose heparin  - F/u vascular recs  - Started on Full dose heparin, monitor PTT, Hgb and for any signs/symptoms of bleeding--> if stable CT H and Hgb after 24-48 hours can transition to NOAC if cleared from Neuro standpoint   - Repeat DVT study no longer indicated as patient on full dose tx   - Monitor H/H closely and for any signs/symptoms of bleeding     Hypothyroid   - Imaging w/ Enlarged goiter   - TSH elevated, T4 low  - Was on synthroid 75 at home, increased to 88 on this hospital admission  - Repeat TFTs in 3-4 weeks outpatient   - F/u thyroid US -- as above  - Thyroid antibodies elevated ? Hoshimoto's --> F/U endo - per discussion w/ endo cont to monitor TFTS for now       HyperNa  - S/p D5 1/2 NS   - Avoid overcorrection  - Monitor closely   - Na of 138;  improving       JESSICA  - Elevated Cr to 1.39 this AM likely due LESLIE due to CT Chest w/ IV C  - Recommend IVF at 60cc/ Hr X 12 hours for gentle IV Hydration   - Continue to monitor and trend on daily labs  - Avoid nephrotoxic agents      Atelectasis   - Duoneb and Chest PT  - Appreciate pulm recs       HTN  - TTE EF of 75%, normal LV systolic function   - Continue with Amlodipine and Lisinopril   - Monitor BP, VS and patient closely       Pre-DM  - A1C of 5.9  - Outpatient follow up       Prostate   - Hyperdensity noted on CT Scan   - Check PSA (noted order is in)    Covid exposure  - Monitor patient, VS and O2 saturation closely  - If febrile recommend to check [place]  - Serial Covid tests   - Precautions are per protocol   - On Full dose AC currently

## 2022-04-21 NOTE — PROGRESS NOTE ADULT - SUBJECTIVE AND OBJECTIVE BOX
SUBJECTIVE:     Patient interviewed and examined at the bedside on the morning of 4/20/22    PAST MEDICAL & SURGICAL HISTORY:  HTN (hypertension)    Diverticulosis    Hypothyroid    No significant past surgical history      FAMILY HISTORY:    SOCIAL HISTORY:   T/E/D:   Occupation:   Lives with:     MEDICATIONS (HOME):  Home Medications:  levothyroxine 75 mcg (0.075 mg) oral tablet: 1 tab(s) orally once a day (15 Apr 2022 14:31)  lisinopril 30 mg oral tablet: 1 tab(s) orally once a day (15 Apr 2022 14:32)    MEDICATIONS  (STANDING):  acetaminophen     Tablet .. 650 milliGRAM(s) Oral daily  albuterol/ipratropium for Nebulization 3 milliLiter(s) Nebulizer every 6 hours  amLODIPine   Tablet 10 milliGRAM(s) Oral daily  apixaban 5 milliGRAM(s) Oral two times a day  diphenhydrAMINE Injectable 25 milliGRAM(s) IV Push daily  immune   globulin 10% (GAMMAGARD) IVPB 30 Gram(s) IV Intermittent daily  lacosamide IVPB 100 milliGRAM(s) IV Intermittent every 12 hours  levothyroxine 88 MICROGram(s) Oral daily  lisinopril 20 milliGRAM(s) Oral daily  methylPREDNISolone sodium succinate IVPB 1000 milliGRAM(s) IV Intermittent daily  pantoprazole    Tablet 40 milliGRAM(s) Oral before breakfast  polyethylene glycol 3350 17 Gram(s) Oral two times a day  QUEtiapine 100 milliGRAM(s) Oral at bedtime  risperiDONE   Tablet 0.5 milliGRAM(s) Oral <User Schedule>  senna 2 Tablet(s) Oral at bedtime  thiamine IVPB 500 milliGRAM(s) IV Intermittent every 8 hours    MEDICATIONS  (PRN):  hydrALAZINE 10 milliGRAM(s) Oral every 4 hours PRN Systolic blood pressure < 160  LORazepam   Injectable 1 milliGRAM(s) IV Push once PRN seizure    ALLERGIES/INTOLERANCES:  Allergies  fish (Hives; Angioedema)  No Known Drug Allergies    Intolerances    Vital Signs Last 24 Hrs  T(C): 36.9 (21 Apr 2022 08:00), Max: 37.1 (20 Apr 2022 17:33)  T(F): 98.5 (21 Apr 2022 08:00), Max: 98.7 (20 Apr 2022 17:33)  HR: 65 (21 Apr 2022 08:00) (58 - 82)  BP: 133/76 (21 Apr 2022 08:00) (133/76 - 169/92)  BP(mean): --  RR: 20 (21 Apr 2022 08:00) (16 - 20)  SpO2: 99% (21 Apr 2022 08:00) (94% - 99%)    GENERAL EXAM:  Constitutional: NAD.   Head: Normocephalic, atraumatic.  Extremities: No edema.    NEUROLOGICAL EXAM:  MS: Sleepy, eyes open spontaneously. Speech is fluent, not slurred. Follows commands.  CN: EOMI. Face symmetric.   Motor: Moves all extremities.  Sensory: Intact to LT throughout.  Coordination/Gait: Not assessed.    LABORATORY:                        10.4   9.38  )-----------( 253      ( 21 Apr 2022 07:26 )             32.4       04-21    138  |  99  |  35<H>  ----------------------------<  125<H>  4.5   |  25  |  1.39<H>    Ca    9.0      21 Apr 2022 07:26    TPro  10.7<H>  /  Alb  4.2  /  TBili  0.5  /  DBili  x   /  AST  25  /  ALT  15  /  AlkPhos  76  04-20         U/A   CSF  Immunological  Other    STUDIES & IMAGING:  Studies (EKG, EEG, EMG, etc):     EEG Summary: 4/20/22    Abnormal EEG in the awake, drowsy and asleep states.  -Mild generalized slowing    Impression/Clinical Correlate:    This is an abnormal EEG record. There is evidence of mild multifocal/diffuse nonspecific cerebral dysfunction, not specific for etiology. No epileptiform pattern or seizures were seen.        Radiology (XR, CT, MR, U/S, TTE/STEPHANIE):    CT Head No Cont (04.19.22 @ 18:26)     FINDINGS:    Previously seen focus of increased density in the right cerebellar   hemisphere is decreased in attenuation. The focus is slightly increased   in size, currently measuring 7 mm, previously measuring 5 mm.    No hydrocephalus, midline shift, or effacement of basal cisterns.    Moderate white matter microvascular ischemic disease.    No appreciable brain edema.    IMPRESSION:    Previously seen focus of increased density in the right cerebellar   hemisphere is decreased in attenuation. The focus is slightly increased   in size, currently measuring 7 mm, previously measuring 5 mm.         MR Head w/wo IV Cont (04.18.22 @ 09:55)     FINDINGS:  VENTRICLES AND SULCI:  No hydrocephalus.  INTRA-AXIAL:  Small foci of susceptibility artifact are present within   the right cerebellar hemisphere and the left frontal, occipital, and   parietal lobes. There is patchy increased FLAIR signal along the lateral   ventricles bilaterally and in the bilateral centrum semiovaleThe   bilateral thalamic and right basal ganglia chronic infarcts are present   with hemosiderin deposition. Bilateral temporal lobes are symmetric   without focal lesion. No abnormal intracranial enhancement is seen.  EXTRA-AXIAL:  No mass or collection.  VISUALIZED SINUSES:  Mild mucosal thickening of the left sphenoid sinus,   bilateral maxillary sinuses, and ethmoid air cells.  VISUALIZED MASTOIDS:  There is a right mastoid effusion, new from prior.  CALVARIUM:  Intact.  CAROTID FLOW VOIDS:  Present.    IMPRESSION:  Confluent FLAIR hyperintensity along the ventricles is nonspecific but   most commonly seen in the setting of chronic white matter microvascular   change.    Small foci of prior hemorrhage versus calcification involving the right   cerebellar hemisphere.    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

## 2022-04-22 LAB
ALBUMIN SERPL ELPH-MCNC: 3.1 G/DL — LOW (ref 3.3–5)
ALP SERPL-CCNC: 54 U/L — SIGNIFICANT CHANGE UP (ref 40–120)
ALT FLD-CCNC: 14 U/L — SIGNIFICANT CHANGE UP (ref 10–45)
ANION GAP SERPL CALC-SCNC: 9 MMOL/L — SIGNIFICANT CHANGE UP (ref 5–17)
AST SERPL-CCNC: 25 U/L — SIGNIFICANT CHANGE UP (ref 10–40)
BASOPHILS # BLD AUTO: 0.01 K/UL — SIGNIFICANT CHANGE UP (ref 0–0.2)
BASOPHILS NFR BLD AUTO: 0.1 % — SIGNIFICANT CHANGE UP (ref 0–2)
BILIRUB SERPL-MCNC: 0.4 MG/DL — SIGNIFICANT CHANGE UP (ref 0.2–1.2)
BUN SERPL-MCNC: 48 MG/DL — HIGH (ref 7–23)
CALCIUM SERPL-MCNC: 8.4 MG/DL — SIGNIFICANT CHANGE UP (ref 8.4–10.5)
CARDIOLIPIN IGM SER-MCNC: 12.6 GPL — HIGH (ref 0–12.5)
CARDIOLIPIN IGM SER-MCNC: 33.3 MPL — HIGH (ref 0–12.5)
CHLORIDE SERPL-SCNC: 106 MMOL/L — SIGNIFICANT CHANGE UP (ref 96–108)
CO2 SERPL-SCNC: 26 MMOL/L — SIGNIFICANT CHANGE UP (ref 22–31)
CREAT SERPL-MCNC: 1.55 MG/DL — HIGH (ref 0.5–1.3)
DEPRECATED CARDIOLIPIN IGA SER: <5 APL — SIGNIFICANT CHANGE UP (ref 0–12.5)
DSDNA AB SER-ACNC: 20 IU/ML — SIGNIFICANT CHANGE UP
EGFR: 47 ML/MIN/1.73M2 — LOW
EOSINOPHIL # BLD AUTO: 0 K/UL — SIGNIFICANT CHANGE UP (ref 0–0.5)
EOSINOPHIL NFR BLD AUTO: 0 % — SIGNIFICANT CHANGE UP (ref 0–6)
GAMMA INTERFERON BACKGROUND BLD IA-ACNC: 0 IU/ML — SIGNIFICANT CHANGE UP
GLUCOSE BLDC GLUCOMTR-MCNC: 135 MG/DL — HIGH (ref 70–99)
GLUCOSE BLDC GLUCOMTR-MCNC: 164 MG/DL — HIGH (ref 70–99)
GLUCOSE BLDC GLUCOMTR-MCNC: 221 MG/DL — HIGH (ref 70–99)
GLUCOSE SERPL-MCNC: 143 MG/DL — HIGH (ref 70–99)
HCT VFR BLD CALC: 31.2 % — LOW (ref 39–50)
HGB BLD-MCNC: 10 G/DL — LOW (ref 13–17)
IMM GRANULOCYTES NFR BLD AUTO: 0.5 % — SIGNIFICANT CHANGE UP (ref 0–1.5)
LYMPHOCYTES # BLD AUTO: 0.8 K/UL — LOW (ref 1–3.3)
LYMPHOCYTES # BLD AUTO: 8.3 % — LOW (ref 13–44)
M TB IFN-G BLD-IMP: NEGATIVE — SIGNIFICANT CHANGE UP
M TB IFN-G CD4+ BCKGRND COR BLD-ACNC: 0 IU/ML — SIGNIFICANT CHANGE UP
M TB IFN-G CD4+CD8+ BCKGRND COR BLD-ACNC: 0 IU/ML — SIGNIFICANT CHANGE UP
MCHC RBC-ENTMCNC: 29.9 PG — SIGNIFICANT CHANGE UP (ref 27–34)
MCHC RBC-ENTMCNC: 32.1 GM/DL — SIGNIFICANT CHANGE UP (ref 32–36)
MCV RBC AUTO: 93.1 FL — SIGNIFICANT CHANGE UP (ref 80–100)
MONOCYTES # BLD AUTO: 0.54 K/UL — SIGNIFICANT CHANGE UP (ref 0–0.9)
MONOCYTES NFR BLD AUTO: 5.6 % — SIGNIFICANT CHANGE UP (ref 2–14)
NEUTROPHILS # BLD AUTO: 8.24 K/UL — HIGH (ref 1.8–7.4)
NEUTROPHILS NFR BLD AUTO: 85.5 % — HIGH (ref 43–77)
NRBC # BLD: 0 /100 WBCS — SIGNIFICANT CHANGE UP (ref 0–0)
PLATELET # BLD AUTO: 261 K/UL — SIGNIFICANT CHANGE UP (ref 150–400)
POTASSIUM SERPL-MCNC: 3.9 MMOL/L — SIGNIFICANT CHANGE UP (ref 3.5–5.3)
POTASSIUM SERPL-SCNC: 3.9 MMOL/L — SIGNIFICANT CHANGE UP (ref 3.5–5.3)
PROT SERPL-MCNC: 8.9 G/DL — HIGH (ref 6–8.3)
PSA FREE FLD-MCNC: 0.23 NG/ML — SIGNIFICANT CHANGE UP
PSA FREE FLD-MCNC: 38 % — SIGNIFICANT CHANGE UP
PSA SERPL-MCNC: 0.61 NG/ML — SIGNIFICANT CHANGE UP (ref 0–4)
QUANT TB PLUS MITOGEN MINUS NIL: 1.2 IU/ML — SIGNIFICANT CHANGE UP
RBC # BLD: 3.35 M/UL — LOW (ref 4.2–5.8)
RBC # FLD: 14.3 % — SIGNIFICANT CHANGE UP (ref 10.3–14.5)
SODIUM SERPL-SCNC: 141 MMOL/L — SIGNIFICANT CHANGE UP (ref 135–145)
WBC # BLD: 9.64 K/UL — SIGNIFICANT CHANGE UP (ref 3.8–10.5)
WBC # FLD AUTO: 9.64 K/UL — SIGNIFICANT CHANGE UP (ref 3.8–10.5)

## 2022-04-22 PROCEDURE — 99232 SBSQ HOSP IP/OBS MODERATE 35: CPT | Mod: GC

## 2022-04-22 RX ORDER — SODIUM CHLORIDE 9 MG/ML
1000 INJECTION INTRAMUSCULAR; INTRAVENOUS; SUBCUTANEOUS
Refills: 0 | Status: DISCONTINUED | OUTPATIENT
Start: 2022-04-22 | End: 2022-04-29

## 2022-04-22 RX ORDER — DIPHENHYDRAMINE HCL 50 MG
25 CAPSULE ORAL ONCE
Refills: 0 | Status: COMPLETED | OUTPATIENT
Start: 2022-04-22 | End: 2022-04-22

## 2022-04-22 RX ORDER — HALOPERIDOL DECANOATE 100 MG/ML
3 INJECTION INTRAMUSCULAR ONCE
Refills: 0 | Status: COMPLETED | OUTPATIENT
Start: 2022-04-22 | End: 2022-04-22

## 2022-04-22 RX ORDER — HALOPERIDOL DECANOATE 100 MG/ML
2.5 INJECTION INTRAMUSCULAR ONCE
Refills: 0 | Status: COMPLETED | OUTPATIENT
Start: 2022-04-22 | End: 2022-04-22

## 2022-04-22 RX ORDER — ACETAMINOPHEN 500 MG
650 TABLET ORAL ONCE
Refills: 0 | Status: COMPLETED | OUTPATIENT
Start: 2022-04-22 | End: 2022-04-22

## 2022-04-22 RX ADMIN — LACOSAMIDE 120 MILLIGRAM(S): 50 TABLET ORAL at 08:57

## 2022-04-22 RX ADMIN — SODIUM CHLORIDE 3 MILLILITER(S): 9 INJECTION INTRAMUSCULAR; INTRAVENOUS; SUBCUTANEOUS at 23:20

## 2022-04-22 RX ADMIN — Medication 260 MILLIGRAM(S): at 13:02

## 2022-04-22 RX ADMIN — Medication 3 MILLILITER(S): at 12:27

## 2022-04-22 RX ADMIN — SODIUM CHLORIDE 50 MILLILITER(S): 9 INJECTION INTRAMUSCULAR; INTRAVENOUS; SUBCUTANEOUS at 16:04

## 2022-04-22 RX ADMIN — Medication 650 MILLIGRAM(S): at 13:02

## 2022-04-22 RX ADMIN — Medication 25 MILLIGRAM(S): at 12:48

## 2022-04-22 RX ADMIN — SENNA PLUS 2 TABLET(S): 8.6 TABLET ORAL at 22:18

## 2022-04-22 RX ADMIN — Medication 1 MILLIGRAM(S): at 03:07

## 2022-04-22 RX ADMIN — Medication 105 MILLIGRAM(S): at 23:19

## 2022-04-22 RX ADMIN — SODIUM CHLORIDE 3 MILLILITER(S): 9 INJECTION INTRAMUSCULAR; INTRAVENOUS; SUBCUTANEOUS at 08:00

## 2022-04-22 RX ADMIN — APIXABAN 5 MILLIGRAM(S): 2.5 TABLET, FILM COATED ORAL at 17:49

## 2022-04-22 RX ADMIN — Medication 3 MILLILITER(S): at 08:00

## 2022-04-22 RX ADMIN — QUETIAPINE FUMARATE 100 MILLIGRAM(S): 200 TABLET, FILM COATED ORAL at 22:18

## 2022-04-22 RX ADMIN — Medication 3 MILLILITER(S): at 17:51

## 2022-04-22 RX ADMIN — Medication 1 MILLIGRAM(S): at 06:19

## 2022-04-22 RX ADMIN — AMLODIPINE BESYLATE 10 MILLIGRAM(S): 2.5 TABLET ORAL at 17:49

## 2022-04-22 RX ADMIN — HALOPERIDOL DECANOATE 3 MILLIGRAM(S): 100 INJECTION INTRAMUSCULAR at 01:42

## 2022-04-22 RX ADMIN — RISPERIDONE 0.5 MILLIGRAM(S): 4 TABLET ORAL at 17:50

## 2022-04-22 RX ADMIN — LISINOPRIL 20 MILLIGRAM(S): 2.5 TABLET ORAL at 17:49

## 2022-04-22 RX ADMIN — Medication 25 MILLIGRAM(S): at 03:07

## 2022-04-22 RX ADMIN — SODIUM CHLORIDE 3 MILLILITER(S): 9 INJECTION INTRAMUSCULAR; INTRAVENOUS; SUBCUTANEOUS at 12:27

## 2022-04-22 RX ADMIN — HALOPERIDOL DECANOATE 2.5 MILLIGRAM(S): 100 INJECTION INTRAMUSCULAR at 06:19

## 2022-04-22 RX ADMIN — Medication 58 MILLIGRAM(S): at 07:45

## 2022-04-22 RX ADMIN — Medication 3 MILLILITER(S): at 23:20

## 2022-04-22 RX ADMIN — Medication 105 MILLIGRAM(S): at 07:45

## 2022-04-22 RX ADMIN — IMMUNE GLOBULIN (HUMAN) 50 GRAM(S): 10 INJECTION INTRAVENOUS; SUBCUTANEOUS at 13:24

## 2022-04-22 RX ADMIN — POLYETHYLENE GLYCOL 3350 17 GRAM(S): 17 POWDER, FOR SOLUTION ORAL at 17:50

## 2022-04-22 RX ADMIN — SODIUM CHLORIDE 3 MILLILITER(S): 9 INJECTION INTRAMUSCULAR; INTRAVENOUS; SUBCUTANEOUS at 17:52

## 2022-04-22 RX ADMIN — Medication 105 MILLIGRAM(S): at 16:02

## 2022-04-22 RX ADMIN — LACOSAMIDE 120 MILLIGRAM(S): 50 TABLET ORAL at 22:17

## 2022-04-22 NOTE — PROGRESS NOTE ADULT - PROBLEM SELECTOR PLAN 3
CTA chest 4/16 with RLL atelectasis. CT chest 4/20 with increase in RLL atelectasis.  -Zosyn d/c'd  -Chest congestion at times Duoneb q6h.   -Sodium chloride 3% 3cc q6h  -Chest PT q6h  -On/off O2. Keep sats >90% with O2 PRN (currently 2LNC).  -Pt with agitation at times, waxing/waning mental status. Monitor closely for fevers, increase in WBC.

## 2022-04-22 NOTE — PROGRESS NOTE ADULT - SUBJECTIVE AND OBJECTIVE BOX
Follow-up Pulm Progress Note    pt noted to be agitated this AM  pt currently lethargic s/p Haldol and Ativan   Sats high 90s on 2LNC     Medications:  MEDICATIONS  (STANDING):  acetaminophen     Tablet .. 650 milliGRAM(s) Oral daily  albuterol/ipratropium for Nebulization 3 milliLiter(s) Nebulizer every 6 hours  amLODIPine   Tablet 10 milliGRAM(s) Oral daily  apixaban 5 milliGRAM(s) Oral two times a day  diphenhydrAMINE Injectable 25 milliGRAM(s) IV Push daily  immune   globulin 10% (GAMMAGARD) IVPB 30 Gram(s) IV Intermittent daily  lacosamide IVPB 100 milliGRAM(s) IV Intermittent every 12 hours  levothyroxine 88 MICROGram(s) Oral daily  lisinopril 20 milliGRAM(s) Oral daily  methylPREDNISolone sodium succinate IVPB 1000 milliGRAM(s) IV Intermittent daily  pantoprazole    Tablet 40 milliGRAM(s) Oral before breakfast  polyethylene glycol 3350 17 Gram(s) Oral two times a day  QUEtiapine 100 milliGRAM(s) Oral at bedtime  risperiDONE   Tablet 0.5 milliGRAM(s) Oral <User Schedule>  senna 2 Tablet(s) Oral at bedtime  sodium chloride 3%  Inhalation 3 milliLiter(s) Inhalation every 6 hours  thiamine IVPB 500 milliGRAM(s) IV Intermittent every 8 hours    MEDICATIONS  (PRN):  hydrALAZINE 10 milliGRAM(s) Oral every 4 hours PRN Systolic blood pressure < 160          Vital Signs Last 24 Hrs  T(C): 36.8 (22 Apr 2022 07:46), Max: 36.8 (21 Apr 2022 17:00)  T(F): 98.2 (22 Apr 2022 07:46), Max: 98.3 (21 Apr 2022 17:00)  HR: 77 (22 Apr 2022 07:46) (77 - 96)  BP: 146/74 (22 Apr 2022 07:46) (132/75 - 173/86)  BP(mean): --  RR: 20 (22 Apr 2022 07:46) (18 - 20)  SpO2: 98% (22 Apr 2022 07:46) (95% - 98%)          04-21 @ 07:01  -  04-22 @ 07:00  --------------------------------------------------------  IN: 660 mL / OUT: 0 mL / NET: 660 mL          LABS:                        10.0   9.64  )-----------( 261      ( 22 Apr 2022 10:02 )             31.2     04-22    141  |  106  |  48<H>  ----------------------------<  143<H>  3.9   |  26  |  1.55<H>    Ca    8.4      22 Apr 2022 10:00    TPro  8.9<H>  /  Alb  3.1<L>  /  TBili  0.4  /  DBili  x   /  AST  25  /  ALT  14  /  AlkPhos  54  04-22          CAPILLARY BLOOD GLUCOSE      POCT Blood Glucose.: 135 mg/dL (22 Apr 2022 09:14)    PTT - ( 20 Apr 2022 12:25 )  PTT:63.7 sec        DAIANA 1:160 04-19 @ 10:05  Anti SS-1 0.6  Anti SS-2 <0.2  Anti RNP 0.8  RF -- 04-19 @ 10:05    Atypical ANCA Negative 04-19 @ 10:05  c-ANCA titer -- 04-19 @ 10:05  c-ANCA Negative 04-19 @ 10:05  p-ANCA Negative 04-19 @ 10:05  DAIANA -- 04-19 @ 09:09  Anti SS-1 --  Anti SS-2 --  Anti RNP --  RF <10 04-19 @ 09:09    Atypical ANCA -- 04-19 @ 09:09  c-ANCA titer -- 04-19 @ 09:09  c-ANCA -- 04-19 @ 09:09  p-ANCA -- 04-19 @ 09:09            Physical Examination:  PULM: scattered rhonchi   CVS: S1, S2 heard    RADIOLOGY REVIEWED  CT chest: < from: CT Chest w/ IV Cont (04.20.22 @ 17:16) >  FINDINGS:  CHEST:  LUNGS AND LARGE AIRWAYS: Tracheal secretions. Near complete atelectasis   of the right lower lobe. Atelectatic changes, though to a lesser degree,   involving the middle and left lower lobes.  PLEURA: No pleural effusion.  VESSELS: The known pulmonary embolus in the right upper lobe identified   on the prior CTA of 4/16/2022 is not well seen on the current exam, which   is not tailored for the assessment of the pulmonary arterial system.  HEART: Heart size is normal. Aortic valve calcification. Trace   pericardial effusion, similar to the prior exam.  MEDIASTINUM AND SIMONE: No lymphadenopathy.  CHEST WALL AND LOWER NECK: Enlarged thyroid with mild tracheal narrowing,   similar to the prior exam.    ABDOMEN AND PELVIS:  Mild motion degradation in the mid to lower abdomen.    LIVER: Within normal limits.  BILE DUCTS: Normal caliber.  GALLBLADDER: Mild layering hyperdensity within the gallbladder, which may   reflect vicarious excretion of contrast or sludge.  SPLEEN: Within normal limits.  PANCREAS: Within normal limits.  ADRENALS: Within normal limits.  KIDNEYS/URETERS: A few subcentimeter hypodense foci within the right   kidney that are too small to characterize. No hydronephrosis.    BLADDER: Underdistended. Mild perivesicular fat stranding.  REPRODUCTIVE ORGANS: Mildly enlarged prostate. There is a 7 mm hyperdense   nodular focus within the region of the lateral mid right peripheral zone   (series 3 image 280).    BOWEL: No bowel obstruction. Colonic diverticulosis. Appendix is normal.  PERITONEUM: No ascites.  VESSELS: Atherosclerotic changes.  RETROPERITONEUM/LYMPH NODES: No lymphadenopathy.  ABDOMINALWALL: Small umbilical hernia containing a portion of transverse   colon.  BONES: Degenerative changes.    IMPRESSION:  Subcentimeter hyperdense nodular focus in the right aspect of the   prostate gland, indeterminate but potentially an exophytic BPH nodule.   Neoplasm is not excluded. Suggest correlation with PSA, and consider   further evaluation with prostate MRI.    Mild perivesicular fat stranding. Question cystitis. Correlate with   urinalysis.    Right greater than left lung base atelectasis, with near complete   atelectasis of the right lower lobe.    < end of copied text >

## 2022-04-22 NOTE — PROGRESS NOTE ADULT - SUBJECTIVE AND OBJECTIVE BOX
Cardiovascular Disease Progress Note Covering for Dr. Fragoso    Overnight events: No acute events overnight.  Pt agitated during encounter. He does deny chest pain.  Otherwise review of systems negative    Objective Findings:  T(C): 36.7 (04-22-22 @ 12:23), Max: 36.8 (04-21-22 @ 17:00)  HR: 64 (04-22-22 @ 12:23) (64 - 89)  BP: 153/72 (04-22-22 @ 12:23) (132/75 - 173/86)  RR: 19 (04-22-22 @ 12:23) (18 - 20)  SpO2: 97% (04-22-22 @ 12:23) (95% - 98%)  Wt(kg): --  Daily     Daily       Physical Exam:  Gen: NAD; Patient resting comfortably  HEENT: EOMI, Normocephalic/ atraumatic  CV: RRR, normal S1 + S2, no m/r/g  Lungs:  Normal respiratory effort; clear to auscultation bilaterally  Abd: soft, non-tender; bowel sounds present  Ext: No edema; warm and well perfused    Telemetry: Sinus with ocassional PVCs    Laboratory Data:                        10.0   9.64  )-----------( 261      ( 22 Apr 2022 10:02 )             31.2     04-22    141  |  106  |  48<H>  ----------------------------<  143<H>  3.9   |  26  |  1.55<H>    Ca    8.4      22 Apr 2022 10:00    TPro  8.9<H>  /  Alb  3.1<L>  /  TBili  0.4  /  DBili  x   /  AST  25  /  ALT  14  /  AlkPhos  54  04-22              Inpatient Medications:  MEDICATIONS  (STANDING):  albuterol/ipratropium for Nebulization 3 milliLiter(s) Nebulizer every 6 hours  amLODIPine   Tablet 10 milliGRAM(s) Oral daily  apixaban 5 milliGRAM(s) Oral two times a day  immune   globulin 10% (GAMMAGARD) IVPB 30 Gram(s) IV Intermittent daily  lacosamide IVPB 100 milliGRAM(s) IV Intermittent every 12 hours  levothyroxine 88 MICROGram(s) Oral daily  lisinopril 20 milliGRAM(s) Oral daily  methylPREDNISolone sodium succinate IVPB 1000 milliGRAM(s) IV Intermittent daily  pantoprazole    Tablet 40 milliGRAM(s) Oral before breakfast  polyethylene glycol 3350 17 Gram(s) Oral two times a day  QUEtiapine 100 milliGRAM(s) Oral at bedtime  risperiDONE   Tablet 0.5 milliGRAM(s) Oral <User Schedule>  senna 2 Tablet(s) Oral at bedtime  sodium chloride 3%  Inhalation 3 milliLiter(s) Inhalation every 6 hours  thiamine IVPB 500 milliGRAM(s) IV Intermittent every 8 hours      Assessment:  72M PMH htn, hypothyroidism, who presented to [place] ED via EMS after family called 911 following a witnessed seizure.    Plan of Care:    #DVT  - Pt with below knee DVT and small RUL PE  - AC management as per vascular team  - Pt started on Eliquis    #HTN  - BP acceptable  - TTE shows normal LV systolic function  - Continue Amlodipine, Lisinopril    #Seizure activity  - EEG with no seizure activity  - Management as per neurology  - EKG shows sinus rhythm with no QTC prolongation.     #Cerebellar Hemorrhage  - Rt sided hemorrhage  - Management as per neuro            Over 25 minutes spent on total encounter; more than 50% of the visit was spent counseling and/or coordinating care by the attending physician.      Tio Dockery D.O.   Cardiovascular Disease  [phone]

## 2022-04-22 NOTE — PROGRESS NOTE ADULT - ASSESSMENT
Patient is a 72 year old Male with a PMHx of HTN, hypothyroidism, who presented to [facility]  via transfer from OSH. Patient originally presented to to [place] ED via EMS after family called 911 following a witnessed seizure, by family, reportedly lasting ~5min.  Enroute to [place], patient was reported to have had another 2 witnessed seizures by EMS, each lasting ~1 minute. Following each seizure the patient was given 5mg IV versed (received 10mg collectively and brought to [place] ED). Pt arrived to the ED obtunded with blood from his mouth (tongue laceration), responsive only to pain/sternal rub. Patient was subsequently intubated for GCS 6 and CT/CTA performed showing a small cerebellar hemorrhage.      Seizures  - Previously intubated, now extubated  - Neuro checks per protocol  - Monitor on Tele  - Possible IVIG per neuro   - S/P LP  - Course of IVIG complete 04/22  - Management as per neurology   - Fall, seizure, aspiration precautions  - S/P MR brain; f/u neuro recs  - S/P repeat CTH as above, per neuro      Cerebellar hemorrhage  - Right sided hemorrhage   - MR brain as above; f/u neuro recs  - F/U repeat CT H s/p heparin gtt; monitor mental status   - Transitioned to Eliquis 5 BID   - Monitor H/H closely and for any signs/symptoms of bleeding     DVT/ PE  - Patient with acute below knee DVT and RUL PE & Recent cerebellar infiltrate  - Vascular cardio eval appreciated; f/u recs  - Awaiting MR brain results for possible AC vs IVC filter --> Started on Full dose heparin  - F/u vascular recs  - Started on Full dose heparin, monitor PTT, Hgb and for any signs/symptoms of bleeding--> if stable CT H and Hgb after 24-48 hours can transition to NOAC if cleared from Neuro standpoint   - Repeat DVT study no longer indicated as patient on full dose tx   - Monitor H/H closely and for any signs/symptoms of bleeding     Hypothyroid   - Imaging w/ Enlarged goiter   - TSH elevated, T4 low  - Was on synthroid 75 at home, increased to 88 on this hospital admission  - Repeat TFTs in 3-4 weeks outpatient   - F/u thyroid US -- as above  - Thyroid antibodies elevated ? Hoshimoto's --> F/U endo - per discussion w/ endo cont to monitor TFTS for now   - Endo      HyperNa  - S/p D5 1/2 NS   - Avoid overcorrection  - Monitor closely   - Na of 138;  improving       JESSICA  - Elevated Cr to 1.39 this AM likely due LESLIE due to CT Chest w/ IV C  - Recommend IVF at 60cc/ Hr X 12 hours for gentle IV Hydration   - Continue to monitor and trend on daily labs  - Avoid nephrotoxic agents      Atelectasis   - Duoneb and Chest PT  - Appreciate pulm recs       HTN  - TTE EF of 75%, normal LV systolic function   - Continue with Amlodipine and Lisinopril   - Monitor BP, VS and patient closely       Pre-DM  - A1C of 5.9  - Outpatient follow up       Prostate   - Hyperdensity noted on CT Scan   - Check PSA (noted order is in)    Covid exposure  - Monitor patient, VS and O2 saturation closely  - If febrile recommend to check [place] Cx  - Serial Covid tests   - Precautions are per protocol   - On Full dose AC currently   Patient is a 72 year old Male with a PMHx of HTN, hypothyroidism, who presented to [facility]  via transfer from OSH. Patient originally presented to to [place] ED via EMS after family called 911 following a witnessed seizure, by family, reportedly lasting ~5min.  Enroute to [place], patient was reported to have had another 2 witnessed seizures by EMS, each lasting ~1 minute. Following each seizure the patient was given 5mg IV versed (received 10mg collectively and brought to [place] ED). Pt arrived to the ED obtunded with blood from his mouth (tongue laceration), responsive only to pain/sternal rub. Patient was subsequently intubated for GCS 6 and CT/CTA performed showing a small cerebellar hemorrhage.      Seizures  - Previously intubated, now extubated  - Neuro checks per protocol  - Monitor on Tele  - Possible IVIG per neuro   - S/P LP  - Course of IVIG complete 04/22  - Management as per neurology   - Fall, seizure, aspiration precautions  - S/P MR brain; f/u neuro recs  - S/P repeat CTH as above, per neuro      Cerebellar hemorrhage  - Right sided hemorrhage   - MR brain as above; f/u neuro recs  - F/U repeat CT H s/p heparin gtt; monitor mental status   - Transitioned to Eliquis 5 BID   - Monitor H/H closely and for any signs/symptoms of bleeding     DVT/ PE  - Patient with acute below knee DVT and RUL PE & Recent cerebellar infiltrate  - Vascular cardio eval appreciated; f/u recs  - Awaiting MR brain results for possible AC vs IVC filter --> Started on Full dose heparin  - F/u vascular recs  - Started on Full dose heparin, monitor PTT, Hgb and for any signs/symptoms of bleeding--> if stable CT H and Hgb after 24-48 hours can transition to NOAC if cleared from Neuro standpoint   - Repeat DVT study no longer indicated as patient on full dose tx   - Monitor H/H closely and for any signs/symptoms of bleeding     Hypothyroid   - Imaging w/ Enlarged goiter   - TSH elevated, T4 low  - Was on synthroid 75 at home, increased to 88 on this hospital admission  - Repeat TFTs in 3-4 weeks outpatient   - F/u thyroid US -- as above  - Thyroid antibodies elevated ? Hoshimoto's --> F/U endo - per discussion w/ endo cont to monitor TFTS for now   - Endo curbside recs noted- cont to monitor on current dose and repeat TFTs in 1 week       HyperNa  - S/p D5 1/2 NS   - Avoid overcorrection  - Monitor closely   - Na of 141      JESSICA  - Elevated Cr to 1.55  likely due LESLIE due to CT Chest w/ IV C  - Started on IVF at 50 cc/Hr X 24 hours for gentle IV Hydration  - Continue to monitor and trend on daily labs  - Avoid nephrotoxic agents    B/L Supraclavicular swelling  - F/U US findings  - Cont to monitor closely     Atelectasis   - Duoneb and Chest PT  - Appreciate pulm recs       HTN  - TTE EF of 75%, normal LV systolic function   - Continue with Amlodipine and Lisinopril   - Monitor BP, VS and patient closely   - Patient lethargic and unable to/ refusing medications, resume once able to       Pre-DM  - A1C of 5.9  - Outpatient follow up       Prostate   - Hyperdensity noted on CT Scan   - Check PSA-- in lab; f/u results     Covid exposure  - Monitor patient, VS and O2 saturation closely  - If febrile recommend to check Ames Cx  - Serial Covid tests   - Precautions are per protocol   - On Full dose AC currently

## 2022-04-22 NOTE — PROGRESS NOTE ADULT - SUBJECTIVE AND OBJECTIVE BOX
Name of Patient : PEPITO BALL  MRN: [id number]  Date of visit: 04-22-22 @ 13:20      Subjective: Patient seen and examined. No new events except as noted.   Patient seen earlier this AM. Patient lethargic and sleepy this AM S/P receiving Haldol and Ativan for agitation earlier this AM.   BP elevated, patient refusing medication.   Last day of IVIG today 04/22.   REVIEW OF SYSTEMS:    CONSTITUTIONAL: No weakness, fevers or chills  EYES/ENT: No visual changes;  No vertigo or throat pain   NECK: No pain or stiffness  RESPIRATORY: No cough, wheezing, hemoptysis; No shortness of breath  CARDIOVASCULAR: No chest pain or palpitations  GASTROINTESTINAL: No abdominal or epigastric pain. No nausea, vomiting, or hematemesis; No diarrhea or constipation. No melena or hematochezia.  GENITOURINARY: No dysuria, frequency or hematuria  NEUROLOGICAL: No numbness or weakness  SKIN: No itching, burning, rashes, or lesions   All other review of systems is negative unless indicated above.    MEDICATIONS:  MEDICATIONS  (STANDING):  albuterol/ipratropium for Nebulization 3 milliLiter(s) Nebulizer every 6 hours  amLODIPine   Tablet 10 milliGRAM(s) Oral daily  apixaban 5 milliGRAM(s) Oral two times a day  immune   globulin 10% (GAMMAGARD) IVPB 30 Gram(s) IV Intermittent daily  lacosamide IVPB 100 milliGRAM(s) IV Intermittent every 12 hours  levothyroxine 88 MICROGram(s) Oral daily  lisinopril 20 milliGRAM(s) Oral daily  methylPREDNISolone sodium succinate IVPB 1000 milliGRAM(s) IV Intermittent daily  pantoprazole    Tablet 40 milliGRAM(s) Oral before breakfast  polyethylene glycol 3350 17 Gram(s) Oral two times a day  QUEtiapine 100 milliGRAM(s) Oral at bedtime  risperiDONE   Tablet 0.5 milliGRAM(s) Oral <User Schedule>  senna 2 Tablet(s) Oral at bedtime  sodium chloride 3%  Inhalation 3 milliLiter(s) Inhalation every 6 hours  thiamine IVPB 500 milliGRAM(s) IV Intermittent every 8 hours      PHYSICAL EXAM:  T(C): 36.7 (04-22-22 @ 12:23), Max: 36.8 (04-21-22 @ 17:00)  HR: 64 (04-22-22 @ 12:23) (64 - 89)  BP: 153/72 (04-22-22 @ 12:23) (132/75 - 173/86)  RR: 19 (04-22-22 @ 12:23) (18 - 20)  SpO2: 97% (04-22-22 @ 12:23) (95% - 98%)  Wt(kg): --  I&O's Summary    21 Apr 2022 07:01  -  22 Apr 2022 07:00  --------------------------------------------------------  IN: 660 mL / OUT: 0 mL / NET: 660 mL    22 Apr 2022 07:01  -  22 Apr 2022 13:20  --------------------------------------------------------  IN: 0 mL / OUT: 0 mL / NET: 0 mL          Appearance: Normal	  HEENT:  PERRLA   Lymphatic: No lymphadenopathy   Cardiovascular: Normal S1 S2, no JVD  Respiratory: normal effort , clear  Gastrointestinal:  Soft, Non-tender  Skin: No rashes,  warm to touch  Psychiatry:  Mood & affect appropriate  Musculuskeletal: No edema            04-21-22 @ 07:01  -  04-22-22 @ 07:00  --------------------------------------------------------  IN: 660 mL / OUT: 0 mL / NET: 660 mL    04-22-22 @ 07:01  -  04-22-22 @ 13:20  --------------------------------------------------------  IN: 0 mL / OUT: 0 mL / NET: 0 mL             Name of Patient : PEPITO BALL  MRN: [id number]  Date of visit: 04-22-22 @ 13:20      Subjective: Patient seen and examined. No new events except as noted.   Patient seen earlier this AM. Patient lethargic and sleepy this AM S/P receiving Haldol and Ativan for agitation earlier this AM.   BP elevated, patient refusing medication.   Last day of IVIG today 04/22.   REVIEW OF SYSTEMS:    CONSTITUTIONAL: No weakness, fevers or chills  EYES/ENT: No visual changes;  No vertigo or throat pain   NECK: No pain or stiffness  RESPIRATORY: No cough, wheezing, hemoptysis; No shortness of breath  CARDIOVASCULAR: No chest pain or palpitations  GASTROINTESTINAL: No abdominal or epigastric pain. No nausea, vomiting, or hematemesis; No diarrhea or constipation. No melena or hematochezia.  GENITOURINARY: No dysuria, frequency or hematuria  NEUROLOGICAL: No numbness or weakness  SKIN: No itching, burning, rashes, or lesions   All other review of systems is negative unless indicated above.    MEDICATIONS:  MEDICATIONS  (STANDING):  albuterol/ipratropium for Nebulization 3 milliLiter(s) Nebulizer every 6 hours  amLODIPine   Tablet 10 milliGRAM(s) Oral daily  apixaban 5 milliGRAM(s) Oral two times a day  immune   globulin 10% (GAMMAGARD) IVPB 30 Gram(s) IV Intermittent daily  lacosamide IVPB 100 milliGRAM(s) IV Intermittent every 12 hours  levothyroxine 88 MICROGram(s) Oral daily  lisinopril 20 milliGRAM(s) Oral daily  methylPREDNISolone sodium succinate IVPB 1000 milliGRAM(s) IV Intermittent daily  pantoprazole    Tablet 40 milliGRAM(s) Oral before breakfast  polyethylene glycol 3350 17 Gram(s) Oral two times a day  QUEtiapine 100 milliGRAM(s) Oral at bedtime  risperiDONE   Tablet 0.5 milliGRAM(s) Oral <User Schedule>  senna 2 Tablet(s) Oral at bedtime  sodium chloride 3%  Inhalation 3 milliLiter(s) Inhalation every 6 hours  thiamine IVPB 500 milliGRAM(s) IV Intermittent every 8 hours      PHYSICAL EXAM:  T(C): 36.7 (04-22-22 @ 12:23), Max: 36.8 (04-21-22 @ 17:00)  HR: 64 (04-22-22 @ 12:23) (64 - 89)  BP: 153/72 (04-22-22 @ 12:23) (132/75 - 173/86)  RR: 19 (04-22-22 @ 12:23) (18 - 20)  SpO2: 97% (04-22-22 @ 12:23) (95% - 98%)  Wt(kg): --  I&O's Summary    21 Apr 2022 07:01  -  22 Apr 2022 07:00  --------------------------------------------------------  IN: 660 mL / OUT: 0 mL / NET: 660 mL    22 Apr 2022 07:01  -  22 Apr 2022 13:20  --------------------------------------------------------  IN: 0 mL / OUT: 0 mL / NET: 0 mL          Appearance: Normal	  HEENT:  PERRL; B/L Supraclavicular swelling L>R  Lymphatic: No lymphadenopathy   Cardiovascular: Normal S1 S2, no JVD  Respiratory: normal effort , clear  Gastrointestinal:  Soft, Non-tender  Skin: No rashes,  warm to touch  Psychiatry:  Mood & affect appropriate  Musculoskeletal: No edema            04-21-22 @ 07:01  -  04-22-22 @ 07:00  --------------------------------------------------------  IN: 660 mL / OUT: 0 mL / NET: 660 mL    04-22-22 @ 07:01  -  04-22-22 @ 13:20  --------------------------------------------------------  IN: 0 mL / OUT: 0 mL / NET: 0 mL                                10.0   9.64  )-----------( 261      ( 22 Apr 2022 10:02 )             31.2               04-22    141  |  106  |  48<H>  ----------------------------<  143<H>  3.9   |  26  |  1.55<H>    Ca    8.4      22 Apr 2022 10:00    TPro  8.9<H>  /  Alb  3.1<L>  /  TBili  0.4  /  DBili  x   /  AST  25  /  ALT  14  /  AlkPhos  54  04-22

## 2022-04-22 NOTE — PROGRESS NOTE ADULT - ASSESSMENT
71 yo male with a PMHx of HTN, Hypothyroidism, and Diverticulosis (requiring blood transfusions x2 8 yrs ago) who presented to [facility] on 4/12 as a transfer from [place] for ICH. Patient LKN was at 2200pm on 4/11. Patient was noted on 4/12 @ 0200 to be obtunded with saliva coming out of his mouth. EMS was called, patient came back to baseline and did not want to go to hospital and EMS left. Patient then had a seizure-like event witnessed by wife with UE shaking, foaming at the mouth, and urinary/fecal incontinence. Neurology consulted for new onset seizures in the setting of ICH while patient was in NSCU. No seizures seen on EEG thus far during this admission. Transferred from NSCU to EMU on 4/15 after stable IPH noted.     Impression: New onset of seizures of unknown cause, with R cerebellar IPH.      Plan:  [] Off vEEG  [x] IVIG (day 5), for total 5 days, last day 4/22  [x] Heparin DC'd on 4/20/22, transitioned to Eliquis 5mg BID PO   [x] S/p MRI brain w/wo under anesthesia on 4/18, S/p LP    [x] D/c'd Depakote 750MG PO BID.   [x] C/w  Vimpat 100mg BID IV. S/p Vimpat load 200mg IV x2. Obtained cardiology clearance.     [x] Seroquel 100MG PO QHS.  [x] Add Risperidone 0.5mg qAM  [x] Endocrinology curbsided regarding TSH and TPO-ab, recommend rechecking T4 levels following increase in Levothyroxine  [x] Continuos pulse ox  [x] Cardiology following (Dr. Fragoso), c/w Lisinopril 20MG PO QD, Hydralazine 10MG PO Q4HR. Will re-evaluate to give clearance for Vimpat.   [] Pulmonology following, Zosyn DC'd as no PNA seen on CTA Chest.   [x] 4/16 CTA Chest PE Protocol: as above, patient with small R upper lobe segmental branch PE.  [x] 4/16: Vascular Neurology aware of case, spoke to stroke fellow Dr. Abby Deutsch. From vascular neurology standpoint no contraindication for full dose AC if needed in setting of PE.  [x] Vascular Cardiology following (Dr. Perera): Spoke with Dr. Perera/vascular cardiology team.   [x] 4/16: Developed JESSICA after CTA Chest, likely due to contrast. 4/21: BUN/Cr 38/1.4    [x] Rheum w/u serum: myomarker panel, SCL 70, Centromere, Marialuisa 1, TPO, Thyroglobulin Ab, C3 C4, ROSIBEL, Sjogren labs, APLS, Ribosomal P ab, ANCA, MPO, PR3, CK, Aldolase, Myoglobin, GGT, ESR, DAIANA, C- ANCA, DS-DAIANA, Urine prot/creat ratio, anticardiolipin Ab, Lupus anticoagulant, B2 glycoprotein AB,  RF, CCP, anti-GBM.  [] Electrolyte repletion as needed  [] Neuro checks, vitals Q4HR.  [] Telemetry.  [] Seizure/Fall/Aspiration precautions.  [] DVT PPx: On heparin gtt  [] Diet: Regular.  [] Seizure Rescue: Ativan 1MG IVP x1 for convulsions lasting > 3 minutes, VPA 1G IV x1 for convulsions that are refractory to Ativan.    Case seen and discussed with epilepsy attending Dr. Maldonado.   73 yo male with a PMHx of HTN, Hypothyroidism, and Diverticulosis (requiring blood transfusions x2 8 yrs ago) who presented to [facility] on 4/12 as a transfer from [place] for ICH. Patient LKN was at 2200pm on 4/11. Patient was noted on 4/12 @ 0200 to be obtunded with saliva coming out of his mouth. EMS was called, patient came back to baseline and did not want to go to hospital and EMS left. Patient then had a seizure-like event witnessed by wife with UE shaking, foaming at the mouth, and urinary/fecal incontinence. Neurology consulted for new onset seizures in the setting of ICH while patient was in NSCU. No seizures seen on EEG thus far during this admission. Transferred from NSCU to EMU on 4/15 after stable IPH noted.     Impression: New onset of seizures of unknown cause, with R cerebellar IPH.      Plan:  [] Off vEEG  [x] IVIG (day 5), for total 5 days, last day 4/22  [x] Heparin DC'd on 4/20/22, transitioned to Eliquis 5mg BID PO   [x] S/p MRI brain w/wo under anesthesia on 4/18, S/p LP    [x] D/c'd Depakote 750MG PO BID.   [x] C/w  Vimpat 100mg BID IV. S/p Vimpat load 200mg IV x2. Obtained cardiology clearance.     [x] Seroquel 100MG PO QHS.  [x] Add Risperidone 0.5mg qAM  [x] Endocrinology curbsided regarding TSH and TPO-ab, recommend rechecking T4 levels following increase in Levothyroxine  [x] Continuos pulse ox  [x] Cardiology following (Dr. Fragoso), c/w Lisinopril 20MG PO QD, Hydralazine 10MG PO Q4HR. Will re-evaluate to give clearance for Vimpat.   [x] CT C/A/P concerning for possible prostate nodule, PSA pending  [x] 4/16 CTA Chest PE Protocol: as above, patient with small R upper lobe segmental branch PE.  [x] 4/16: Vascular Neurology aware of case, spoke to stroke fellow Dr. Abby Deutsch. From vascular neurology standpoint no contraindication for full dose AC if needed in setting of PE.  [x] Vascular Cardiology following (Dr. Perera): Spoke with Dr. Perera/vascular cardiology team.   [x] 4/16: Developed JESSICA after CTA Chest, likely due to contrast. 4/21: BUN/Cr 38/1.4    [x] Rheum w/u serum: myomarker panel, SCL 70, Centromere, Marialuisa 1, TPO, Thyroglobulin Ab, C3 C4, ROSIBEL, Sjogren labs, APLS, Ribosomal P ab, ANCA, MPO, PR3, CK, Aldolase, Myoglobin, GGT, ESR, DAIANA, C- ANCA, DS-DAIANA, Urine prot/creat ratio, anticardiolipin Ab, Lupus anticoagulant, B2 glycoprotein AB,  RF, CCP, anti-GBM.  [] Electrolyte repletion as needed  [] Neuro checks, vitals Q4HR.  [] Telemetry.  [] Seizure/Fall/Aspiration precautions.  [] DVT PPx: On heparin gtt  [] Diet: Regular.  [] Seizure Rescue: Ativan 1MG IVP x1 for convulsions lasting > 3 minutes, VPA 1G IV x1 for convulsions that are refractory to Ativan.    Case seen and discussed with epilepsy attending Dr. Maldonado.

## 2022-04-22 NOTE — PROVIDER CONTACT NOTE (MEDICATION) - SITUATION
Pt was given haldol & ativan IV at 0600 for agitation. Pt has been lethargic, cannot take PO meds> Morning 0600 meds have not been given d/t lethargy (ELIQUIS, amlodipine, lisinopril, risperidone, synthroid, protonix).Tylenol for IVIG is now due.

## 2022-04-22 NOTE — PROGRESS NOTE ADULT - SUBJECTIVE AND OBJECTIVE BOX
SUBJECTIVE:     Patient interviewed and examined at the bedside on the morning of 4/22/22    PAST MEDICAL & SURGICAL HISTORY:  HTN (hypertension)    Diverticulosis    Hypothyroid    No significant past surgical history      FAMILY HISTORY:    SOCIAL HISTORY:   T/E/D:   Occupation:   Lives with:     MEDICATIONS (HOME):  Home Medications:  levothyroxine 75 mcg (0.075 mg) oral tablet: 1 tab(s) orally once a day (15 Apr 2022 14:31)  lisinopril 30 mg oral tablet: 1 tab(s) orally once a day (15 Apr 2022 14:32)    MEDICATIONS  (STANDING):  acetaminophen     Tablet .. 650 milliGRAM(s) Oral daily  albuterol/ipratropium for Nebulization 3 milliLiter(s) Nebulizer every 6 hours  amLODIPine   Tablet 10 milliGRAM(s) Oral daily  apixaban 5 milliGRAM(s) Oral two times a day  diphenhydrAMINE Injectable 25 milliGRAM(s) IV Push daily  immune   globulin 10% (GAMMAGARD) IVPB 30 Gram(s) IV Intermittent daily  lacosamide IVPB 100 milliGRAM(s) IV Intermittent every 12 hours  levothyroxine 88 MICROGram(s) Oral daily  lisinopril 20 milliGRAM(s) Oral daily  methylPREDNISolone sodium succinate IVPB 1000 milliGRAM(s) IV Intermittent daily  pantoprazole    Tablet 40 milliGRAM(s) Oral before breakfast  polyethylene glycol 3350 17 Gram(s) Oral two times a day  QUEtiapine 100 milliGRAM(s) Oral at bedtime  risperiDONE   Tablet 0.5 milliGRAM(s) Oral <User Schedule>  senna 2 Tablet(s) Oral at bedtime  sodium chloride 3%  Inhalation 3 milliLiter(s) Inhalation every 6 hours  thiamine IVPB 500 milliGRAM(s) IV Intermittent every 8 hours    MEDICATIONS  (PRN):  hydrALAZINE 10 milliGRAM(s) Oral every 4 hours PRN Systolic blood pressure < 160    ALLERGIES/INTOLERANCES:  Allergies  fish (Hives; Angioedema)  No Known Drug Allergies    Intolerances    VITALS & EXAMINATION:  Vital Signs Last 24 Hrs  T(C): 36.8 (22 Apr 2022 07:46), Max: 36.8 (21 Apr 2022 17:00)  T(F): 98.2 (22 Apr 2022 07:46), Max: 98.3 (21 Apr 2022 17:00)  HR: 77 (22 Apr 2022 07:46) (77 - 96)  BP: 146/74 (22 Apr 2022 07:46) (132/75 - 173/86)  BP(mean): --  RR: 20 (22 Apr 2022 07:46) (18 - 20)  SpO2: 98% (22 Apr 2022 07:46) (95% - 98%)    GENERAL EXAM:  Constitutional: NAD.   Head: Normocephalic, atraumatic.  Extremities: No edema.    NEUROLOGICAL EXAM:  MS: Sleepy, eyes open spontaneously. Speech is fluent, not slurred. Follows commands.  CN: EOMI. Face symmetric.   Motor: Moves all extremities.  Sensory: Intact to LT throughout.  Coordination/Gait: Not assessed.    LABORATORY:  CBC                       10.0   9.64  )-----------( 261      ( 22 Apr 2022 10:02 )             31.2     Chem 04-22    141  |  106  |  48<H>  ----------------------------<  143<H>  3.9   |  26  |  1.55<H>    Ca    8.4      22 Apr 2022 10:00    TPro  8.9<H>  /  Alb  3.1<L>  /  TBili  0.4  /  DBili  x   /  AST  25  /  ALT  14  /  AlkPhos  54  04-22    LFTs LIVER FUNCTIONS - ( 22 Apr 2022 10:00 )  Alb: 3.1 g/dL / Pro: 8.9 g/dL / ALK PHOS: 54 U/L / ALT: 14 U/L / AST: 25 U/L / GGT: x           Coagulopathy PTT - ( 20 Apr 2022 12:25 )  PTT:63.7 sec  Lipid Panel 04-13 Chol 128 LDL -- HDL 67 Trig 116  A1c   Cardiac enzymes     U/A   CSF  Immunological  Other    STUDIES & IMAGING:  Studies (EKG, EEG, EMG, etc):     EEG Summary: 4/20/22    Abnormal EEG in the awake, drowsy and asleep states.  -Mild generalized slowing    Impression/Clinical Correlate:    This is an abnormal EEG record. There is evidence of mild multifocal/diffuse nonspecific cerebral dysfunction, not specific for etiology. No epileptiform pattern or seizures were seen.        Radiology (XR, CT, MR, U/S, TTE/STEPHANIE):    CT Head No Cont (04.19.22 @ 18:26)     FINDINGS:    Previously seen focus of increased density in the right cerebellar   hemisphere is decreased in attenuation. The focus is slightly increased   in size, currently measuring 7 mm, previously measuring 5 mm.    No hydrocephalus, midline shift, or effacement of basal cisterns.    Moderate white matter microvascular ischemic disease.    No appreciable brain edema.    IMPRESSION:    Previously seen focus of increased density in the right cerebellar   hemisphere is decreased in attenuation. The focus is slightly increased   in size, currently measuring 7 mm, previously measuring 5 mm.        MR Head w/wo IV Cont (04.18.22 @ 09:55)     FINDINGS:  VENTRICLES AND SULCI:  No hydrocephalus.  INTRA-AXIAL:  Small foci of susceptibility artifact are present within   the right cerebellar hemisphere and the left frontal, occipital, and   parietal lobes. There is patchy increased FLAIR signal along the lateral   ventricles bilaterally and in the bilateral centrum semiovaleThe   bilateral thalamic and right basal ganglia chronic infarcts are present   with hemosiderin deposition. Bilateral temporal lobes are symmetric   without focal lesion. No abnormal intracranial enhancement is seen.  EXTRA-AXIAL:  No mass or collection.  VISUALIZED SINUSES:  Mild mucosal thickening of the left sphenoid sinus,   bilateral maxillary sinuses, and ethmoid air cells.  VISUALIZED MASTOIDS:  There is a right mastoid effusion, new from prior.  CALVARIUM:  Intact.  CAROTID FLOW VOIDS:  Present.    IMPRESSION:  Confluent FLAIR hyperintensity along the ventricles is nonspecific but   most commonly seen in the setting of chronic white matter microvascular   change.    Small foci of prior hemorrhage versus calcification involving the right   cerebellar hemisphere.    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

## 2022-04-23 LAB
ALBUMIN SERPL ELPH-MCNC: 3.1 G/DL — LOW (ref 3.3–5)
ALP SERPL-CCNC: 56 U/L — SIGNIFICANT CHANGE UP (ref 40–120)
ALT FLD-CCNC: 19 U/L — SIGNIFICANT CHANGE UP (ref 10–45)
ANION GAP SERPL CALC-SCNC: 12 MMOL/L — SIGNIFICANT CHANGE UP (ref 5–17)
AST SERPL-CCNC: 27 U/L — SIGNIFICANT CHANGE UP (ref 10–40)
BILIRUB SERPL-MCNC: 0.5 MG/DL — SIGNIFICANT CHANGE UP (ref 0.2–1.2)
BUN SERPL-MCNC: 41 MG/DL — HIGH (ref 7–23)
CALCIUM SERPL-MCNC: 8.6 MG/DL — SIGNIFICANT CHANGE UP (ref 8.4–10.5)
CHLORIDE SERPL-SCNC: 105 MMOL/L — SIGNIFICANT CHANGE UP (ref 96–108)
CO2 SERPL-SCNC: 23 MMOL/L — SIGNIFICANT CHANGE UP (ref 22–31)
CREAT SERPL-MCNC: 1.3 MG/DL — SIGNIFICANT CHANGE UP (ref 0.5–1.3)
EGFR: 58 ML/MIN/1.73M2 — LOW
GLUCOSE BLDC GLUCOMTR-MCNC: 248 MG/DL — HIGH (ref 70–99)
GLUCOSE SERPL-MCNC: 165 MG/DL — HIGH (ref 70–99)
HCT VFR BLD CALC: 36.3 % — LOW (ref 39–50)
HGB BLD-MCNC: 11.4 G/DL — LOW (ref 13–17)
JCPYV DNA # CSF NAA+PROBE: SIGNIFICANT CHANGE UP COPIES/ML
MCHC RBC-ENTMCNC: 29.6 PG — SIGNIFICANT CHANGE UP (ref 27–34)
MCHC RBC-ENTMCNC: 31.4 GM/DL — LOW (ref 32–36)
MCV RBC AUTO: 94.3 FL — SIGNIFICANT CHANGE UP (ref 80–100)
NRBC # BLD: 0 /100 WBCS — SIGNIFICANT CHANGE UP (ref 0–0)
PLATELET # BLD AUTO: 223 K/UL — SIGNIFICANT CHANGE UP (ref 150–400)
POTASSIUM SERPL-MCNC: 4.3 MMOL/L — SIGNIFICANT CHANGE UP (ref 3.5–5.3)
POTASSIUM SERPL-SCNC: 4.3 MMOL/L — SIGNIFICANT CHANGE UP (ref 3.5–5.3)
PROT SERPL-MCNC: 9.7 G/DL — HIGH (ref 6–8.3)
RBC # BLD: 3.85 M/UL — LOW (ref 4.2–5.8)
RBC # FLD: 14.6 % — HIGH (ref 10.3–14.5)
SODIUM SERPL-SCNC: 140 MMOL/L — SIGNIFICANT CHANGE UP (ref 135–145)
WBC # BLD: 6.72 K/UL — SIGNIFICANT CHANGE UP (ref 3.8–10.5)
WBC # FLD AUTO: 6.72 K/UL — SIGNIFICANT CHANGE UP (ref 3.8–10.5)

## 2022-04-23 PROCEDURE — 99233 SBSQ HOSP IP/OBS HIGH 50: CPT | Mod: GC

## 2022-04-23 RX ORDER — VALPROIC ACID (AS SODIUM SALT) 250 MG/5ML
1000 SOLUTION, ORAL ORAL ONCE
Refills: 0 | Status: DISCONTINUED | OUTPATIENT
Start: 2022-04-23 | End: 2022-05-04

## 2022-04-23 RX ADMIN — SODIUM CHLORIDE 3 MILLILITER(S): 9 INJECTION INTRAMUSCULAR; INTRAVENOUS; SUBCUTANEOUS at 17:03

## 2022-04-23 RX ADMIN — LACOSAMIDE 120 MILLIGRAM(S): 50 TABLET ORAL at 19:53

## 2022-04-23 RX ADMIN — Medication 3 MILLILITER(S): at 05:06

## 2022-04-23 RX ADMIN — Medication 105 MILLIGRAM(S): at 16:57

## 2022-04-23 RX ADMIN — SODIUM CHLORIDE 3 MILLILITER(S): 9 INJECTION INTRAMUSCULAR; INTRAVENOUS; SUBCUTANEOUS at 12:43

## 2022-04-23 RX ADMIN — LISINOPRIL 20 MILLIGRAM(S): 2.5 TABLET ORAL at 05:05

## 2022-04-23 RX ADMIN — Medication 88 MICROGRAM(S): at 05:05

## 2022-04-23 RX ADMIN — Medication 105 MILLIGRAM(S): at 07:50

## 2022-04-23 RX ADMIN — AMLODIPINE BESYLATE 10 MILLIGRAM(S): 2.5 TABLET ORAL at 05:05

## 2022-04-23 RX ADMIN — APIXABAN 5 MILLIGRAM(S): 2.5 TABLET, FILM COATED ORAL at 05:05

## 2022-04-23 RX ADMIN — QUETIAPINE FUMARATE 100 MILLIGRAM(S): 200 TABLET, FILM COATED ORAL at 19:52

## 2022-04-23 RX ADMIN — SODIUM CHLORIDE 3 MILLILITER(S): 9 INJECTION INTRAMUSCULAR; INTRAVENOUS; SUBCUTANEOUS at 05:07

## 2022-04-23 RX ADMIN — RISPERIDONE 0.5 MILLIGRAM(S): 4 TABLET ORAL at 07:50

## 2022-04-23 RX ADMIN — Medication 3 MILLILITER(S): at 17:02

## 2022-04-23 RX ADMIN — SODIUM CHLORIDE 50 MILLILITER(S): 9 INJECTION INTRAMUSCULAR; INTRAVENOUS; SUBCUTANEOUS at 09:18

## 2022-04-23 RX ADMIN — SENNA PLUS 2 TABLET(S): 8.6 TABLET ORAL at 19:53

## 2022-04-23 RX ADMIN — Medication 3 MILLILITER(S): at 12:43

## 2022-04-23 RX ADMIN — LACOSAMIDE 120 MILLIGRAM(S): 50 TABLET ORAL at 09:18

## 2022-04-23 RX ADMIN — APIXABAN 5 MILLIGRAM(S): 2.5 TABLET, FILM COATED ORAL at 17:03

## 2022-04-23 RX ADMIN — PANTOPRAZOLE SODIUM 40 MILLIGRAM(S): 20 TABLET, DELAYED RELEASE ORAL at 05:06

## 2022-04-23 RX ADMIN — POLYETHYLENE GLYCOL 3350 17 GRAM(S): 17 POWDER, FOR SOLUTION ORAL at 05:06

## 2022-04-23 RX ADMIN — Medication 58 MILLIGRAM(S): at 05:06

## 2022-04-23 NOTE — PROGRESS NOTE ADULT - SUBJECTIVE AND OBJECTIVE BOX
Cardiovascular Disease Progress Note Covering for Dr. Fragoso    Overnight events: No acute events overnight.  Pt denies chest pain or SOB.   Otherwise review of systems negative    Objective Findings:  T(C): 36.8 (04-23-22 @ 13:12), Max: 37.2 (04-22-22 @ 21:35)  HR: 65 (04-23-22 @ 13:12) (55 - 72)  BP: 143/83 (04-23-22 @ 13:12) (134/71 - 162/81)  RR: 18 (04-23-22 @ 13:12) (18 - 19)  SpO2: 95% (04-23-22 @ 13:12) (94% - 97%)  Wt(kg): --  Daily     Daily       Physical Exam:  Gen: NAD; Patient resting comfortably  HEENT: EOMI, Normocephalic/ atraumatic  CV: RRR, normal S1 + S2, no m/r/g  Lungs:  Normal respiratory effort; clear to auscultation bilaterally  Abd: soft, non-tender; bowel sounds present  Ext: No edema; warm and well perfused    Telemetry: Sinus with PVCs    Laboratory Data:                        11.4   6.72  )-----------( 223      ( 23 Apr 2022 07:17 )             36.3     04-23    140  |  105  |  41<H>  ----------------------------<  165<H>  4.3   |  23  |  1.30    Ca    8.6      23 Apr 2022 07:10    TPro  9.7<H>  /  Alb  3.1<L>  /  TBili  0.5  /  DBili  x   /  AST  27  /  ALT  19  /  AlkPhos  56  04-23              Inpatient Medications:  MEDICATIONS  (STANDING):  albuterol/ipratropium for Nebulization 3 milliLiter(s) Nebulizer every 6 hours  amLODIPine   Tablet 10 milliGRAM(s) Oral daily  apixaban 5 milliGRAM(s) Oral two times a day  lacosamide IVPB 100 milliGRAM(s) IV Intermittent every 12 hours  levothyroxine 88 MICROGram(s) Oral daily  lisinopril 20 milliGRAM(s) Oral daily  pantoprazole    Tablet 40 milliGRAM(s) Oral before breakfast  polyethylene glycol 3350 17 Gram(s) Oral two times a day  QUEtiapine 100 milliGRAM(s) Oral at bedtime  risperiDONE   Tablet 0.5 milliGRAM(s) Oral <User Schedule>  senna 2 Tablet(s) Oral at bedtime  sodium chloride 0.9%. 1000 milliLiter(s) (50 mL/Hr) IV Continuous <Continuous>  sodium chloride 3%  Inhalation 3 milliLiter(s) Inhalation every 6 hours  thiamine IVPB 500 milliGRAM(s) IV Intermittent every 8 hours      Assessment:  72M PMH htn, hypothyroidism, who presented to [facility] via EMS after family called 911 following a witnessed seizure.    Plan of Care:    #DVT  - Pt with below knee DVT and small RUL PE  - AC management as per vascular team  - Continue Eliquis    #HTN  - BP acceptable  - TTE shows normal LV systolic function  - Continue Amlodipine, Lisinopril    #Seizure activity  - EEG with no seizure activity  - Management as per neurology  - EKG shows sinus rhythm with no QTC prolongation.     #Cerebellar Hemorrhage  - Rt sided hemorrhage  - Management as per neuro        #ACP (advance care planning)-  Advanced care planning was addressed. Pt stable from a cardiac standpoint.  Risks, benefits and alternatives of medical treatment and procedures were addressed. 30 minutes spent addressing advance care plans.          Over 25 minutes spent on total encounter; more than 50% of the visit was spent counseling and/or coordinating care by the attending physician.      Tio Dockery D.O.   Cardiovascular Disease  [phone]

## 2022-04-23 NOTE — PROGRESS NOTE ADULT - ASSESSMENT
Assessment: 73 yo male with a PMHx of HTN, Hypothyroidism, and Diverticulosis (requiring blood transfusions x2 8 yrs ago) who presented to [facility] on 4/12 as a transfer from [place] for ICH. Patient LKN was at 2200pm on 4/11. Patient was noted on 4/12 @ 0200 to be obtunded with saliva coming out of his mouth. EMS was called, patient came back to baseline and did not want to go to hospital and EMS left. Patient then had a seizure-like event witnessed by wife with UE shaking, foaming at the mouth, and urinary/fecal incontinence. Neurology consulted for new onset seizures in the setting of ICH while patient was in NSCU. No seizures seen on EEG thus far during this admission. Transferred from NSCU to EMU on 4/15 after stable IPH noted. MRI Head w/wo shows R cerebellar IPH vs calcification.    Impression: New onset of seizures of unknown cause. R cerebellar density IPH vs calcification.      Plan:  [] Neuro checks, vitals Q4HR.  [] Telemetry.  [] Seizure/Fall/Aspiration precautions.  [] SBP goal: <150.  [] Off EEG.  [x] MRI Head w/wo: results as above.  [x] 4/18 s/p LP: CSF glucose 75 <H>, protein 69 <H>, TNC 1, culture NG.  [x] 4/15 VA Duplex lower extremities: acute L soleal vein DVT.  [] c/w Vimpat 100MG IV Q12HR.  [] c/w Seroquel 100MG PO QHS.  [] c/w Risperidone 0.5MG PO QD.  [] Cardiology following (Dr. Fragoso), c/w Lisinopril 20MG PO QD, Hydralazine 10MG PO Q4HR  [] Pulmonology following, Luis Daniel DC'd as no PNA seen on CTA Chest.   [x] 4/16 CTA Chest PE Protocol: as above, patient with small R upper lobe segmental branch PE.  [] Vascular Cardiology following (Dr. Perrea): on Eliquis 5MG PO Q12HR for acute PE.  [] DVT PPx: on full dose AC for PE.  [] Diet: Regular + Ensure.  [] Seizure Rescue: Ativan 1MG IVP x1 for convulsions/GTCs lasting > 3 minutes, VPA 1G IV x1 for convulsions/GTCs that are refractory to Ativan.    Case seen and discussed with epilepsy attending Dr. Colvin. Assessment: 71 yo male with a PMHx of HTN, Hypothyroidism, and Diverticulosis (requiring blood transfusions x2 8 yrs ago) who presented to [facility] on 4/12 as a transfer from [place] for ICH. Patient LKN was at 2200pm on 4/11. Patient was noted on 4/12 @ 0200 to be obtunded with saliva coming out of his mouth. EMS was called, patient came back to baseline and did not want to go to hospital and EMS left. Patient then had a seizure-like event witnessed by wife with UE shaking, foaming at the mouth, and urinary/fecal incontinence. Neurology consulted for new onset seizures in the setting of ICH while patient was in NSCU. No seizures seen on EEG thus far during this admission. Transferred from NSCU to EMU on 4/15 after stable IPH noted. MRI Head w/wo shows R cerebellar IPH vs calcification.    Impression: New onset of seizures of unknown cause. R cerebellar density IPH vs calcification.      Plan:  [] Neuro checks, vitals Q4HR.  [] Telemetry.  [] Seizure/Fall/Aspiration precautions.  [] SBP goal: <150.  [] Off EEG.  [x] MRI Head w/wo: results as above.  [x] 4/18 s/p LP: CSF glucose 75 <H>, protein 69 <H>, TNC 1, culture NG.  [x] 4/15 VA Duplex lower extremities: acute L soleal vein DVT.  [x] s/p IVIG QD x5 days (last day 4/22).  [x] s/p Solumedrol 1G IV QD x5 days (last day today, 4/23).  [] Will start steroid taper on 4/24. c/w GI PPx while on steroids.   [] c/w Vimpat 100MG IV Q12HR.  [] c/w Seroquel 100MG PO QHS.  [] c/w Risperidone 0.5MG PO QD.  [] Cardiology following (Dr. Fragoso), c/w Lisinopril 20MG PO QD, Hydralazine 10MG PO Q4HR.  [] Pulmonology following, recommendations appreciated.  [x] 4/16 CTA Chest PE Protocol: as above, patient with small R upper lobe segmental branch PE.  [] Vascular Cardiology following (Dr. Perera): on Eliquis 5MG PO Q12HR for acute PE.  [] DVT PPx: on full dose AC for PE.  [] Diet: Regular + Ensure.  [] Seizure Rescue: Ativan 1MG IVP x1 for convulsions/GTCs lasting > 3 minutes, VPA 1G IV x1 for convulsions/GTCs that are refractory to Ativan.    Case seen and discussed with epilepsy attending Dr. Colvin. Assessment: 71 yo male with a PMHx of HTN, Hypothyroidism, and Diverticulosis (requiring blood transfusions x2 8 yrs ago) who presented to [facility] on 4/12 as a transfer from [place] for ICH. Patient LKN was at 2200pm on 4/11. Patient was noted on 4/12 @ 0200 to be obtunded with saliva coming out of his mouth. EMS was called, patient came back to baseline and did not want to go to hospital and EMS left. Patient then had a seizure-like event witnessed by wife with UE shaking, foaming at the mouth, and urinary/fecal incontinence. Neurology consulted for new onset seizures in the setting of ICH while patient was in NSCU. No seizures seen on EEG thus far during this admission. Transferred from NSCU to EMU on 4/15 after stable IPH noted. MRI Head w/wo shows R cerebellar IPH vs calcification.    Impression: New onset of seizures of unknown cause. R cerebellar density IPH vs calcification.      Plan:  [] Neuro checks, vitals Q4HR.  [] Telemetry.  [] Seizure/Fall/Aspiration precautions.  [] SBP goal: <150.  [] Off EEG.  [x] MRI Head w/wo: results as above.  [x] 4/18 s/p LP: CSF glucose 75 <H>, protein 69 <H>, TNC 1, culture NG.  [x] 4/15 VA Duplex lower extremities: acute L soleal vein DVT.  [x] s/p IVIG QD x5 days (last day 4/22).  [x] s/p Solumedrol 1G IV QD x5 days (last day today, 4/23).  [] Will start steroid taper on 4/24. c/w GI PPx while on steroids.   [] c/w Vimpat 100MG IV Q12HR.  [] c/w Seroquel 100MG PO QHS.  [] c/w Risperidone 0.5MG PO QD.  [] Cardiology following (Dr. Fragoso), c/w Lisinopril 20MG PO QD, Hydralazine 10MG PO Q4HR.  [] Pulmonology following, recommendations appreciated.  [] Medicine following (Dr. Bernabe): f/u US of neck soft tissue.  [x] 4/16 CTA Chest PE Protocol: as above, patient with small R upper lobe segmental branch PE.  [] Vascular Cardiology following (Dr. Perera): on Eliquis 5MG PO Q12HR for acute PE.  [] DVT PPx: on full dose AC for PE.  [] Diet: Regular + Ensure.  [] Seizure Rescue: Ativan 1MG IVP x1 for convulsions/GTCs lasting > 3 minutes, VPA 1G IV x1 for convulsions/GTCs that are refractory to Ativan.    Case seen and discussed with epilepsy attending Dr. Colvin.

## 2022-04-23 NOTE — PROGRESS NOTE ADULT - ASSESSMENT
Patient is a 72 year old Male with a PMHx of HTN, hypothyroidism, who presented to [facility]  via transfer from OSH. Patient originally presented to to [place] ED via EMS after family called 911 following a witnessed seizure, by family, reportedly lasting ~5min.  Enroute to [place], patient was reported to have had another 2 witnessed seizures by EMS, each lasting ~1 minute. Following each seizure the patient was given 5mg IV versed (received 10mg collectively and brought to [place] ED). Pt arrived to the ED obtunded with blood from his mouth (tongue laceration), responsive only to pain/sternal rub. Patient was subsequently intubated for GCS 6 and CT/CTA performed showing a small cerebellar hemorrhage.      Seizures  - Previously intubated, now extubated  - Neuro checks per protocol  - Monitor on Tele  - Possible IVIG per neuro   - S/P LP  - Course of IVIG complete 04/22  - Management as per neurology   - Fall, seizure, aspiration precautions  - S/P MR brain; f/u neuro recs  - S/P repeat CTH as above, per neuro      Cerebellar hemorrhage  - Right sided hemorrhage   - MR brain as above; f/u neuro recs  - F/U repeat CT H s/p heparin gtt; monitor mental status   - Transitioned to Eliquis 5 BID   - Monitor H/H closely and for any signs/symptoms of bleeding     DVT/ PE  - Patient with acute below knee DVT and RUL PE & Recent cerebellar infiltrate  - Vascular cardio eval appreciated; f/u recs  - Awaiting MR brain results for possible AC vs IVC filter --> Started on Full dose heparin  - F/u vascular recs  - Started on Full dose heparin, monitor PTT, Hgb and for any signs/symptoms of bleeding--> if stable CT H and Hgb after 24-48 hours can transition to NOAC if cleared from Neuro standpoint   - Repeat DVT study no longer indicated as patient on full dose tx   - Monitor H/H closely and for any signs/symptoms of bleeding     Hypothyroid   - Imaging w/ Enlarged goiter   - TSH elevated, T4 low  - Was on synthroid 75 at home, increased to 88 on this hospital admission  - Repeat TFTs in 3-4 weeks outpatient   - F/u thyroid US -- as above  - Thyroid antibodies elevated ? Hoshimoto's --> F/U endo - per discussion w/ endo cont to monitor TFTS for now   - Endo curbside recs noted- cont to monitor on current dose and repeat TFTs in 1 week       HyperNa  - S/p D5 1/2 NS   - Avoid overcorrection  - Monitor closely       JESSICA  - Elevated Cr to 1.55  likely due LESLIE due to CT Chest w/ IV C  - Started on IVF at 50 cc/Hr X 24 hours for gentle IV Hydration  - Continue to monitor and trend on daily labs  - Avoid nephrotoxic agents    B/L Supraclavicular swelling  - F/U US findings  - Cont to monitor closely     Atelectasis   - Duoneb and Chest PT  - Appreciate pulm recs       HTN  - TTE EF of 75%, normal LV systolic function   - Continue with Amlodipine and Lisinopril   - Monitor BP, VS and patient closely   - Patient lethargic and unable to/ refusing medications, resume once able to       Pre-DM  - A1C of 5.9  - Outpatient follow up       Prostate   - Hyperdensity noted on CT Scan   - Check PSA-- in lab; f/u results     Covid exposure  - Monitor patient, VS and O2 saturation closely  - If febrile recommend to check [place] [facility]  - Serial Covid tests   - Precautions are per protocol   - On Full dose AC currently

## 2022-04-23 NOTE — PROGRESS NOTE ADULT - SUBJECTIVE AND OBJECTIVE BOX
Name of Patient : PEPITO BALL  MRN: [id number]  Date of visit: 04-23-22       Subjective: Patient seen and examined. No new events except as noted.   sleepy    MEDICATIONS:  MEDICATIONS  (STANDING):  albuterol/ipratropium for Nebulization 3 milliLiter(s) Nebulizer every 6 hours  amLODIPine   Tablet 10 milliGRAM(s) Oral daily  apixaban 5 milliGRAM(s) Oral two times a day  lacosamide IVPB 100 milliGRAM(s) IV Intermittent every 12 hours  levothyroxine 88 MICROGram(s) Oral daily  lisinopril 20 milliGRAM(s) Oral daily  pantoprazole    Tablet 40 milliGRAM(s) Oral before breakfast  polyethylene glycol 3350 17 Gram(s) Oral two times a day  QUEtiapine 100 milliGRAM(s) Oral at bedtime  risperiDONE   Tablet 0.5 milliGRAM(s) Oral <User Schedule>  senna 2 Tablet(s) Oral at bedtime  sodium chloride 0.9%. 1000 milliLiter(s) (50 mL/Hr) IV Continuous <Continuous>  sodium chloride 3%  Inhalation 3 milliLiter(s) Inhalation every 6 hours  thiamine IVPB 500 milliGRAM(s) IV Intermittent every 8 hours      PHYSICAL EXAM:  T(C): 36.8 (04-23-22 @ 20:43), Max: 37.1 (04-23-22 @ 01:35)  HR: 100 (04-23-22 @ 20:43) (55 - 100)  BP: 171/77 (04-23-22 @ 20:43) (134/71 - 171/77)  RR: 18 (04-23-22 @ 20:43) (18 - 19)  SpO2: 95% (04-23-22 @ 20:43) (95% - 97%)  Wt(kg): --  I&O's Summary    22 Apr 2022 07:01  -  23 Apr 2022 07:00  --------------------------------------------------------  IN: 100 mL / OUT: 0 mL / NET: 100 mL    23 Apr 2022 07:01  -  23 Apr 2022 23:41  --------------------------------------------------------  IN: 250 mL / OUT: 0 mL / NET: 250 mL          Appearance: arousable, follows simple commands   HEENT:  PERRLA   Lymphatic: No lymphadenopathy   Cardiovascular: Normal S1 S2, no JVD  Respiratory: normal effort , clear  Gastrointestinal:  Soft, Non-tender  Skin: No rashes,  warm to touch  Psychiatry:  Mood & affect appropriate  Musculuskeletal: No edema      All labs, Imaging and EKGs personally reviewed     04-22-22 @ 07:01  -  04-23-22 @ 07:00  --------------------------------------------------------  IN: 100 mL / OUT: 0 mL / NET: 100 mL    04-23-22 @ 07:01  -  04-23-22 @ 23:41  --------------------------------------------------------  IN: 250 mL / OUT: 0 mL / NET: 250 mL                          11.4   6.72  )-----------( 223      ( 23 Apr 2022 07:17 )             36.3               04-23    140  |  105  |  41<H>  ----------------------------<  165<H>  4.3   |  23  |  1.30    Ca    8.6      23 Apr 2022 07:10    TPro  9.7<H>  /  Alb  3.1<L>  /  TBili  0.5  /  DBili  x   /  AST  27  /  ALT  19  /  AlkPhos  56  04-23

## 2022-04-23 NOTE — PROGRESS NOTE ADULT - SUBJECTIVE AND OBJECTIVE BOX
MRN-[id number]    Subjective: 71 yo male seen and examined at bedside. Agitated overnight. Lethargic this morning.    PAST MEDICAL & SURGICAL HISTORY:  HTN (hypertension)    Diverticulosis    Hypothyroid    No significant past surgical history    Social Hx:  Nonsmoker, no drug or alcohol use    Home Medications:  levothyroxine 75 mcg (0.075 mg) oral tablet: 1 tab(s) orally once a day (15 Apr 2022 14:31)  lisinopril 30 mg oral tablet: 1 tab(s) orally once a day (15 Apr 2022 14:32)    MEDICATIONS  (STANDING):  albuterol/ipratropium for Nebulization 3 milliLiter(s) Nebulizer every 6 hours  amLODIPine   Tablet 10 milliGRAM(s) Oral daily  apixaban 5 milliGRAM(s) Oral two times a day  lacosamide IVPB 100 milliGRAM(s) IV Intermittent every 12 hours  levothyroxine 88 MICROGram(s) Oral daily  lisinopril 20 milliGRAM(s) Oral daily  pantoprazole    Tablet 40 milliGRAM(s) Oral before breakfast  polyethylene glycol 3350 17 Gram(s) Oral two times a day  QUEtiapine 100 milliGRAM(s) Oral at bedtime  risperiDONE   Tablet 0.5 milliGRAM(s) Oral <User Schedule>  senna 2 Tablet(s) Oral at bedtime  sodium chloride 0.9%. 1000 milliLiter(s) (50 mL/Hr) IV Continuous <Continuous>  sodium chloride 3%  Inhalation 3 milliLiter(s) Inhalation every 6 hours  thiamine IVPB 500 milliGRAM(s) IV Intermittent every 8 hours    MEDICATIONS  (PRN):  hydrALAZINE 10 milliGRAM(s) Oral every 4 hours PRN Systolic blood pressure < 160    Allergies  fish (Hives; Angioedema)  No Known Drug Allergies	    ROS: Pertinent positives above, all other ROS were reviewed and are negative.      Vital Signs Last 24 Hrs  T(C): 36.8 (23 Apr 2022 13:12), Max: 37.2 (22 Apr 2022 21:35)  T(F): 98.3 (23 Apr 2022 13:12), Max: 99 (22 Apr 2022 21:35)  HR: 65 (23 Apr 2022 13:12) (55 - 72)  BP: 143/83 (23 Apr 2022 13:12) (134/71 - 162/81)  RR: 18 (23 Apr 2022 13:12) (18 - 19)  SpO2: 95% (23 Apr 2022 13:12) (94% - 97%)    GENERAL EXAM:  Constitutional: Lying in bed, NAD.  Head: Normocephalic, atraumatic.  Extremities: No edema.    NEUROLOGICAL EXAM:  MS: Lethargic, eyes closed. Eyes open to voice. Oriented to self and hospital. Cannot state month, year, or president. Speech is fluent, not slurred. Follows commands.  CN: EOMI. Face symmetric.  Motor: Moves all extremities.   Sensory: Intact to LT throughout.  Coordination/Gait: Not assessed.    Labs:   cbc                      11.4   6.72  )-----------( 223      ( 23 Apr 2022 07:17 )             36.3     [id number]    140  |  105  |  41<H>  ----------------------------<  165<H>  4.3   |  23  |  1.30    Ca    8.6      23 Apr 2022 07:10    TPro  9.7<H>  /  Alb  3.1<L>  /  TBili  0.5  /  DBili  x   /  AST  27  /  ALT  19  /  AlkPhos  56  04-23    [id number] Chol 128 LDL -- HDL 67 Trig 116    LIVER FUNCTIONS - ( 23 Apr 2022 07:10 )  Alb: 3.1 g/dL / Pro: 9.7 g/dL / ALK PHOS: 56 U/L / ALT: 19 U/L / AST: 27 U/L / GGT: x           Radiology/EEGs:  -04/12 CTH: 0.5 cm focus of intraparenchymal hemorrhage in the right cerebellum.  -04/12 CT PERFUSION: The areas of elevated Tmax do not follow a vascular distribution and are likely artifactual, related to patient motion. If symptoms persist consider follow up head CT or MRI, MRA  if no contraindication.  -04/12 CTA COW:  Patent intracranial circulation without flow limiting stenosis.  -04/12 CTA NECK: Patent, ECAs, ICAs, no  hemodynamically significant stenosis at ICA origins by NASCET criteria. Bilateral vertebral arteries are patent without flow limiting stenosis.  -04/12 Repeat CTH: Interval stability compared with the prior study of earlier the same day.  -04/16 CTA Chest PE Protocol: Small pulmonary bolus in the right upper lobe segmental branch seen on series 3 image 80. No associated right heart strain or pulmonary infarction noted. Partial right lower lobe atelectasis with elevation of right hemidiaphragm.  -04/17 CTH: Previously seen focus of increased density in the right cerebellar hemisphere is decreased in attenuation. The focus is slightly increased in size, currently measuring 7 mm, previously measuring 5 mm.  -04/18 MRI Head w/wo: Confluent FLAIR hyperintensity along the ventricles is nonspecific but most commonly seen in the setting of chronic white matter microvascular change. Small foci of prior hemorrhage versus calcification involving the right   cerebellar hemisphere. Scattered foci of susceptibility within the bilateral parietal and leftfrontal lobe consistent with chronic microhemorrhage versus cavernoma. 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    -04/13 EEG SUMMARY/CLASSIFICATION:  Abnormal EEG in a sedated patient.  - Background slowing, generalized.  _____________________________________________________________  EEG IMPRESSION/CLINICAL CORRELATE:  Abnormal EEG study.  - Moderate nonspecific diffuse or multifocal cerebral dysfunction.   - No epileptiform patterns or seizures were recorded x 1 day.    -04/14 EEG SUMMARY/CLASSIFICATION:  Abnormal EEG in a sedated patient.  - Background slowing, generalized.  _____________________________________________________________  EEG IMPRESSION/CLINICAL CORRELATE:  Abnormal EEG study.  - Moderate nonspecific diffuse or multifocal cerebral dysfunction.   - No epileptiform patterns or seizures were recorded x 2 days.    -04/15 EEG Summary:  Abnormal EEG in the awake, drowsy and asleep states.  Mild to moderate generalized slowing  Impression/Clinical Correlate:  This is an abnormal EEG record. There is evidence of mild to moderate multifocal/diffuse nonspecific cerebral dysfunction. No epileptiform pattern or seizures were seen.    -04/18 EEG Summary:  Normal EEG in the awake, drowsy and asleep states.  Impression/Clinical Correlate:  This is an normal EEG record. No epileptiform pattern or seizures were seen.    -04/20 EEG Summary:  Abnormal EEG in the awake, drowsy and asleep states.  -Mild generalized slowing  Impression/Clinical Correlate:  This is an abnormal EEG record. There is evidence of mild multifocal/diffuse nonspecific cerebral dysfunction, not specific for etiology. No epileptiform pattern or seizures were seen. MRN-[id number]    Subjective: 71 yo male seen and examined at bedside. Agitated overnight. Lethargic this morning.    PAST MEDICAL & SURGICAL HISTORY:  HTN (hypertension)    Diverticulosis    Hypothyroid    No significant past surgical history    Social Hx:  Nonsmoker, no drug or alcohol use    Home Medications:  levothyroxine 75 mcg (0.075 mg) oral tablet: 1 tab(s) orally once a day (15 Apr 2022 14:31)  lisinopril 30 mg oral tablet: 1 tab(s) orally once a day (15 Apr 2022 14:32)    MEDICATIONS  (STANDING):  albuterol/ipratropium for Nebulization 3 milliLiter(s) Nebulizer every 6 hours  amLODIPine   Tablet 10 milliGRAM(s) Oral daily  apixaban 5 milliGRAM(s) Oral two times a day  lacosamide IVPB 100 milliGRAM(s) IV Intermittent every 12 hours  levothyroxine 88 MICROGram(s) Oral daily  lisinopril 20 milliGRAM(s) Oral daily  pantoprazole    Tablet 40 milliGRAM(s) Oral before breakfast  polyethylene glycol 3350 17 Gram(s) Oral two times a day  QUEtiapine 100 milliGRAM(s) Oral at bedtime  risperiDONE   Tablet 0.5 milliGRAM(s) Oral <User Schedule>  senna 2 Tablet(s) Oral at bedtime  sodium chloride 0.9%. 1000 milliLiter(s) (50 mL/Hr) IV Continuous <Continuous>  sodium chloride 3%  Inhalation 3 milliLiter(s) Inhalation every 6 hours  thiamine IVPB 500 milliGRAM(s) IV Intermittent every 8 hours    MEDICATIONS  (PRN):  hydrALAZINE 10 milliGRAM(s) Oral every 4 hours PRN Systolic blood pressure < 160    Allergies  fish (Hives; Angioedema)  No Known Drug Allergies	    ROS: Pertinent positives above, all other ROS were reviewed and are negative.      Vital Signs Last 24 Hrs  T(C): 36.8 (23 Apr 2022 13:12), Max: 37.2 (22 Apr 2022 21:35)  T(F): 98.3 (23 Apr 2022 13:12), Max: 99 (22 Apr 2022 21:35)  HR: 65 (23 Apr 2022 13:12) (55 - 72)  BP: 143/83 (23 Apr 2022 13:12) (134/71 - 162/81)  RR: 18 (23 Apr 2022 13:12) (18 - 19)  SpO2: 95% (23 Apr 2022 13:12) (94% - 97%)    GENERAL EXAM:  Constitutional: Lying in bed, NAD.  Head: Normocephalic, atraumatic.  Neck: L supraclavicular mass/edema. Nontender to palpation.  Extremities: No edema.    NEUROLOGICAL EXAM:  MS: Lethargic, eyes closed. Eyes open to voice. Oriented to self and hospital. Cannot state month, year, or president. Speech is fluent, not slurred. Follows commands.  CN: EOMI. Face symmetric.  Motor: Moves all extremities.   Sensory: Intact to LT throughout.  Coordination/Gait: Not assessed.    Labs:   cbc                      11.4   6.72  )-----------( 223      ( 23 Apr 2022 07:17 )             36.3     [id number]    140  |  105  |  41<H>  ----------------------------<  165<H>  4.3   |  23  |  1.30    Ca    8.6      23 Apr 2022 07:10    TPro  9.7<H>  /  Alb  3.1<L>  /  TBili  0.5  /  DBili  x   /  AST  27  /  ALT  19  /  AlkPhos  56  04-23    [id number] Chol 128 LDL -- HDL 67 Trig 116    LIVER FUNCTIONS - ( 23 Apr 2022 07:10 )  Alb: 3.1 g/dL / Pro: 9.7 g/dL / ALK PHOS: 56 U/L / ALT: 19 U/L / AST: 27 U/L / GGT: x           Radiology/EEGs:  -04/12 CTH: 0.5 cm focus of intraparenchymal hemorrhage in the right cerebellum.  -04/12 CT PERFUSION: The areas of elevated Tmax do not follow a vascular distribution and are likely artifactual, related to patient motion. If symptoms persist consider follow up head CT or MRI, MRA  if no contraindication.  -04/12 CTA COW:  Patent intracranial circulation without flow limiting stenosis.  -04/12 CTA NECK: Patent, ECAs, ICAs, no  hemodynamically significant stenosis at ICA origins by NASCET criteria. Bilateral vertebral arteries are patent without flow limiting stenosis.  -04/12 Repeat CTH: Interval stability compared with the prior study of earlier the same day.  -04/16 CTA Chest PE Protocol: Small pulmonary bolus in the right upper lobe segmental branch seen on series 3 image 80. No associated right heart strain or pulmonary infarction noted. Partial right lower lobe atelectasis with elevation of right hemidiaphragm.  -04/17 CTH: Previously seen focus of increased density in the right cerebellar hemisphere is decreased in attenuation. The focus is slightly increased in size, currently measuring 7 mm, previously measuring 5 mm.  -04/18 MRI Head w/wo: Confluent FLAIR hyperintensity along the ventricles is nonspecific but most commonly seen in the setting of chronic white matter microvascular change. Small foci of prior hemorrhage versus calcification involving the right   cerebellar hemisphere. Scattered foci of susceptibility within the bilateral parietal and leftfrontal lobe consistent with chronic microhemorrhage versus cavernoma. 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  -04/20 CT Chest/Abdomen/Pelvis w/ contrast: Subcentimeter hyperdense nodular focus in the right aspect of the prostate gland, indeterminate but potentially an exophytic BPH nodule. Neoplasm is not excluded. Suggest correlation with PSA, and consider further evaluation with prostate MRI. Mild perivesicular fat stranding. Question cystitis. Correlate with urinalysis. Right greater than left lung base atelectasis, with near complete atelectasis of the right lower lobe.    -04/13 EEG SUMMARY/CLASSIFICATION:  Abnormal EEG in a sedated patient.  - Background slowing, generalized.  _____________________________________________________________  EEG IMPRESSION/CLINICAL CORRELATE:  Abnormal EEG study.  - Moderate nonspecific diffuse or multifocal cerebral dysfunction.   - No epileptiform patterns or seizures were recorded x 1 day.    -04/14 EEG SUMMARY/CLASSIFICATION:  Abnormal EEG in a sedated patient.  - Background slowing, generalized.  _____________________________________________________________  EEG IMPRESSION/CLINICAL CORRELATE:  Abnormal EEG study.  - Moderate nonspecific diffuse or multifocal cerebral dysfunction.   - No epileptiform patterns or seizures were recorded x 2 days.    -04/15 EEG Summary:  Abnormal EEG in the awake, drowsy and asleep states.  Mild to moderate generalized slowing  Impression/Clinical Correlate:  This is an abnormal EEG record. There is evidence of mild to moderate multifocal/diffuse nonspecific cerebral dysfunction. No epileptiform pattern or seizures were seen.    -04/18 EEG Summary:  Normal EEG in the awake, drowsy and asleep states.  Impression/Clinical Correlate:  This is an normal EEG record. No epileptiform pattern or seizures were seen.    -04/20 EEG Summary:  Abnormal EEG in the awake, drowsy and asleep states.  -Mild generalized slowing  Impression/Clinical Correlate:  This is an abnormal EEG record. There is evidence of mild multifocal/diffuse nonspecific cerebral dysfunction, not specific for etiology. No epileptiform pattern or seizures were seen.

## 2022-04-24 LAB — GLUCOSE BLDC GLUCOMTR-MCNC: 166 MG/DL — HIGH (ref 70–99)

## 2022-04-24 PROCEDURE — 99233 SBSQ HOSP IP/OBS HIGH 50: CPT | Mod: GC

## 2022-04-24 RX ORDER — LACOSAMIDE 50 MG/1
150 TABLET ORAL EVERY 12 HOURS
Refills: 0 | Status: DISCONTINUED | OUTPATIENT
Start: 2022-04-24 | End: 2022-04-26

## 2022-04-24 RX ORDER — QUETIAPINE FUMARATE 200 MG/1
50 TABLET, FILM COATED ORAL AT BEDTIME
Refills: 0 | Status: DISCONTINUED | OUTPATIENT
Start: 2022-04-24 | End: 2022-05-04

## 2022-04-24 RX ADMIN — SENNA PLUS 2 TABLET(S): 8.6 TABLET ORAL at 20:30

## 2022-04-24 RX ADMIN — Medication 3 MILLILITER(S): at 12:00

## 2022-04-24 RX ADMIN — Medication 3 MILLILITER(S): at 18:12

## 2022-04-24 RX ADMIN — SODIUM CHLORIDE 3 MILLILITER(S): 9 INJECTION INTRAMUSCULAR; INTRAVENOUS; SUBCUTANEOUS at 05:23

## 2022-04-24 RX ADMIN — AMLODIPINE BESYLATE 10 MILLIGRAM(S): 2.5 TABLET ORAL at 05:14

## 2022-04-24 RX ADMIN — Medication 105 MILLIGRAM(S): at 00:00

## 2022-04-24 RX ADMIN — QUETIAPINE FUMARATE 100 MILLIGRAM(S): 200 TABLET, FILM COATED ORAL at 20:30

## 2022-04-24 RX ADMIN — RISPERIDONE 0.5 MILLIGRAM(S): 4 TABLET ORAL at 05:21

## 2022-04-24 RX ADMIN — Medication 10 MILLIGRAM(S): at 21:05

## 2022-04-24 RX ADMIN — Medication 88 MICROGRAM(S): at 05:22

## 2022-04-24 RX ADMIN — SODIUM CHLORIDE 3 MILLILITER(S): 9 INJECTION INTRAMUSCULAR; INTRAVENOUS; SUBCUTANEOUS at 12:17

## 2022-04-24 RX ADMIN — SODIUM CHLORIDE 3 MILLILITER(S): 9 INJECTION INTRAMUSCULAR; INTRAVENOUS; SUBCUTANEOUS at 18:11

## 2022-04-24 RX ADMIN — APIXABAN 5 MILLIGRAM(S): 2.5 TABLET, FILM COATED ORAL at 05:14

## 2022-04-24 RX ADMIN — Medication 105 MILLIGRAM(S): at 20:30

## 2022-04-24 RX ADMIN — POLYETHYLENE GLYCOL 3350 17 GRAM(S): 17 POWDER, FOR SOLUTION ORAL at 05:22

## 2022-04-24 RX ADMIN — LACOSAMIDE 120 MILLIGRAM(S): 50 TABLET ORAL at 09:08

## 2022-04-24 RX ADMIN — Medication 3 MILLILITER(S): at 05:15

## 2022-04-24 RX ADMIN — Medication 10 MILLIGRAM(S): at 14:40

## 2022-04-24 RX ADMIN — PANTOPRAZOLE SODIUM 40 MILLIGRAM(S): 20 TABLET, DELAYED RELEASE ORAL at 05:14

## 2022-04-24 RX ADMIN — LISINOPRIL 20 MILLIGRAM(S): 2.5 TABLET ORAL at 05:14

## 2022-04-24 RX ADMIN — Medication 105 MILLIGRAM(S): at 07:56

## 2022-04-24 RX ADMIN — Medication 105 MILLIGRAM(S): at 16:10

## 2022-04-24 NOTE — PROGRESS NOTE ADULT - ASSESSMENT
72M PMH htn, hypothyroidism, presented to [place] ED via EMS after family called 911 following a witnessed seizure, by family, reportedly lasting ~5min.

## 2022-04-24 NOTE — PROGRESS NOTE ADULT - SUBJECTIVE AND OBJECTIVE BOX
Name of Patient : PEPITO BALL  MRN: [id number]  Date of visit: 04-24-22 @ 23:31      Subjective: Patient seen and examined. No new events except as noted.     REVIEW OF SYSTEMS:    CONSTITUTIONAL: No weakness, fevers or chills  EYES/ENT: No visual changes;  No vertigo or throat pain   NECK: No pain or stiffness  RESPIRATORY: No cough, wheezing, hemoptysis; No shortness of breath  CARDIOVASCULAR: No chest pain or palpitations  GASTROINTESTINAL: No abdominal or epigastric pain. No nausea, vomiting, or hematemesis; No diarrhea or constipation. No melena or hematochezia.  GENITOURINARY: No dysuria, frequency or hematuria  NEUROLOGICAL: No numbness or weakness  SKIN: No itching, burning, rashes, or lesions   All other review of systems is negative unless indicated above.    MEDICATIONS:  MEDICATIONS  (STANDING):  albuterol/ipratropium for Nebulization 3 milliLiter(s) Nebulizer every 6 hours  amLODIPine   Tablet 10 milliGRAM(s) Oral daily  apixaban 5 milliGRAM(s) Oral two times a day  lacosamide IVPB 150 milliGRAM(s) IV Intermittent every 12 hours  levothyroxine 88 MICROGram(s) Oral daily  lisinopril 20 milliGRAM(s) Oral daily  pantoprazole    Tablet 40 milliGRAM(s) Oral before breakfast  polyethylene glycol 3350 17 Gram(s) Oral two times a day  predniSONE   Tablet 70 milliGRAM(s) Oral once  QUEtiapine 50 milliGRAM(s) Oral at bedtime  risperiDONE   Tablet 0.5 milliGRAM(s) Oral <User Schedule>  senna 2 Tablet(s) Oral at bedtime  sodium chloride 0.9%. 1000 milliLiter(s) (50 mL/Hr) IV Continuous <Continuous>  sodium chloride 3%  Inhalation 3 milliLiter(s) Inhalation every 6 hours  thiamine IVPB 500 milliGRAM(s) IV Intermittent every 8 hours      PHYSICAL EXAM:  T(C): 37.1 (04-24-22 @ 21:04), Max: 37.1 (04-24-22 @ 21:04)  HR: 82 (04-24-22 @ 21:04) (58 - 90)  BP: 184/79 (04-24-22 @ 21:04) (143/72 - 186/80)  RR: 18 (04-24-22 @ 21:04) (18 - 18)  SpO2: 97% (04-24-22 @ 21:04) (93% - 97%)  Wt(kg): --  I&O's Summary    23 Apr 2022 07:01  -  24 Apr 2022 07:00  --------------------------------------------------------  IN: 250 mL / OUT: 0 mL / NET: 250 mL    24 Apr 2022 07:01  -  24 Apr 2022 23:31  --------------------------------------------------------  IN: 120 mL / OUT: 200 mL / NET: -80 mL          Appearance: Normal	  HEENT:  PERRLA   Lymphatic: No lymphadenopathy   Cardiovascular: Normal S1 S2, no JVD  Respiratory: normal effort , clear  Gastrointestinal:  Soft, Non-tender  Skin: No rashes,  warm to touch  Psychiatry:  Mood & affect appropriate  Musculuskeletal: No edema      All labs, Imaging and EKGs personally reviewed     04-23-22 @ 07:01  -  04-24-22 @ 07:00  --------------------------------------------------------  IN: 250 mL / OUT: 0 mL / NET: 250 mL    04-24-22 @ 07:01  -  04-24-22 @ 23:31  --------------------------------------------------------  IN: 120 mL / OUT: 200 mL / NET: -80 mL                            11.4   6.72  )-----------( 223      ( 23 Apr 2022 07:17 )             36.3               04-23    140  |  105  |  41<H>  ----------------------------<  165<H>  4.3   |  23  |  1.30    Ca    8.6      23 Apr 2022 07:10    TPro  9.7<H>  /  Alb  3.1<L>  /  TBili  0.5  /  DBili  x   /  AST  27  /  ALT  19  /  AlkPhos  56  04-23

## 2022-04-24 NOTE — PROGRESS NOTE ADULT - ASSESSMENT
Patient is a 72 year old Male with a PMHx of HTN, hypothyroidism, who presented to [facility]  via transfer from OSH. Patient originally presented to to [place] ED via EMS after family called 911 following a witnessed seizure, by family, reportedly lasting ~5min.  Enroute to [place], patient was reported to have had another 2 witnessed seizures by EMS, each lasting ~1 minute. Following each seizure the patient was given 5mg IV versed (received 10mg collectively and brought to [place] ED). Pt arrived to the ED obtunded with blood from his mouth (tongue laceration), responsive only to pain/sternal rub. Patient was subsequently intubated for GCS 6 and CT/CTA performed showing a small cerebellar hemorrhage.      Seizures  - Previously intubated, now extubated  - Neuro checks per protocol  - Monitor on Tele  - Possible IVIG per neuro   - S/P LP  - Course of IVIG complete 04/22  - Management as per neurology   - Fall, seizure, aspiration precautions  - S/P MR brain; f/u neuro recs  - S/P repeat CTH as above, per neuro      Cerebellar hemorrhage  - Right sided hemorrhage   - MR brain as above; f/u neuro recs  - F/U repeat CT H s/p heparin gtt; monitor mental status   - Transitioned to Eliquis 5 BID   - Monitor H/H closely and for any signs/symptoms of bleeding     DVT/ PE  - Patient with acute below knee DVT and RUL PE & Recent cerebellar infiltrate  - Vascular cardio eval appreciated; f/u recs  - Awaiting MR brain results for possible AC vs IVC filter --> Started on Full dose heparin  - F/u vascular recs  - Started on Full dose heparin, monitor PTT, Hgb and for any signs/symptoms of bleeding--> if stable CT H and Hgb after 24-48 hours can transition to NOAC if cleared from Neuro standpoint   - Repeat DVT study no longer indicated as patient on full dose tx   - Monitor H/H closely and for any signs/symptoms of bleeding     Hypothyroid   - Imaging w/ Enlarged goiter   - TSH elevated, T4 low  - Was on synthroid 75 at home, increased to 88 on this hospital admission  - Repeat TFTs in 3-4 weeks outpatient   - F/u thyroid US -- as above  - Thyroid antibodies elevated ? Hoshimoto's --> F/U endo - per discussion w/ endo cont to monitor TFTS for now   - Endo curbside recs noted- cont to monitor on current dose and repeat TFTs in 1 week       HyperNa  - S/p D5 1/2 NS   - Avoid overcorrection  - Monitor closely       JESSICA  - Elevated Cr to 1.55  likely due LESLIE due to CT Chest w/ IV C  - Started on IVF at 50 cc/Hr X 24 hours for gentle IV Hydration  - Continue to monitor and trend on daily labs  - Avoid nephrotoxic agents    B/L Supraclavicular swelling  - F/U US findings  - Cont to monitor closely     Atelectasis   - Duoneb and Chest PT  - Appreciate pulm recs       HTN  - TTE EF of 75%, normal LV systolic function   - Continue with Amlodipine and Lisinopril   - Monitor BP, VS and patient closely   - Patient lethargic and unable to/ refusing medications, resume once able to       Pre-DM  - A1C of 5.9  - Outpatient follow up       Prostate   - Hyperdensity noted on CT Scan   - Check PSA-noted     Covid exposure  - Monitor patient, VS and O2 saturation closely  - If febrile recommend to check [place] [facility]  - Serial Covid tests   - Precautions are per protocol   - On Full dose AC currently

## 2022-04-24 NOTE — PROGRESS NOTE ADULT - SUBJECTIVE AND OBJECTIVE BOX
Subjective: There were no overnight events noted.     objective  Home Medications:  levothyroxine 75 mcg (0.075 mg) oral tablet: 1 tab(s) orally once a day (15 Apr 2022 14:31)  lisinopril 30 mg oral tablet: 1 tab(s) orally once a day (15 Apr 2022 14:32)    MEDICATIONS  (STANDING):  albuterol/ipratropium for Nebulization 3 milliLiter(s) Nebulizer every 6 hours  amLODIPine   Tablet 10 milliGRAM(s) Oral daily  apixaban 5 milliGRAM(s) Oral two times a day  lacosamide IVPB 100 milliGRAM(s) IV Intermittent every 12 hours  levothyroxine 88 MICROGram(s) Oral daily  lisinopril 20 milliGRAM(s) Oral daily  pantoprazole    Tablet 40 milliGRAM(s) Oral before breakfast  polyethylene glycol 3350 17 Gram(s) Oral two times a day  QUEtiapine 100 milliGRAM(s) Oral at bedtime  risperiDONE   Tablet 0.5 milliGRAM(s) Oral <User Schedule>  senna 2 Tablet(s) Oral at bedtime  sodium chloride 0.9%. 1000 milliLiter(s) (50 mL/Hr) IV Continuous <Continuous>  sodium chloride 3%  Inhalation 3 milliLiter(s) Inhalation every 6 hours  thiamine IVPB 500 milliGRAM(s) IV Intermittent every 8 hours    MEDICATIONS  (PRN):  hydrALAZINE 10 milliGRAM(s) Oral every 4 hours PRN Systolic blood pressure < 160  LORazepam   Injectable 1 milliGRAM(s) IV Push once PRN Seizure  valproate sodium IVPB 1000 milliGRAM(s) IV Intermittent once PRN Seizures    Vital Signs Last 24 Hrs  T(C): 36.9 (24 Apr 2022 09:48), Max: 36.9 (24 Apr 2022 09:48)  T(F): 98.4 (24 Apr 2022 09:48), Max: 98.4 (24 Apr 2022 09:48)  HR: 58 (24 Apr 2022 09:48) (58 - 100)  BP: 143/72 (24 Apr 2022 09:48) (143/72 - 171/77)  BP(mean): --  RR: 18 (24 Apr 2022 09:48) (18 - 18)  SpO2: 94% (24 Apr 2022 09:48) (94% - 97%)    GENERAL EXAM:  Constitutional: awake and alert. NAD  HEENT: PERRL, EOMI  Neck: Supple  Musculoskeletal: no joint swelling/tenderness, no abnormal movements  Skin: no rashes    NEUROLOGICAL EXAM:  MS: AAOX2 to person and place. ( year 2023 and month August). Able to name correct president- Mike Velez. Speech is fluent, Follows commands.   CN: VFF, EOMI, PERRL,  V1-3 intact, no facial asymmetry, t/p midline,  Motor: Strength: 5/5 in the UE/ LE  Tone: normal. Bulk: normal.    Plantar flex b/l.   Sensation: intact to LT/Temperature 4x.   Coordination: FTN intact   Gait:  Deffered      Labs:   cbc                      11.4   6.72  )-----------( 223      ( 23 Apr 2022 07:17 )             36.3     [id number]    140  |  105  |  41<H>  ----------------------------<  165<H>  4.3   |  23  |  1.30    Ca    8.6      23 Apr 2022 07:10    TPro  9.7<H>  /  Alb  3.1<L>  /  TBili  0.5  /  DBili  x   /  AST  27  /  ALT  19  /  AlkPhos  56  04-23      [id number] Chol 128 LDL -- HDL 67 Trig 116    LFTsLIVER FUNCTIONS - ( 23 Apr 2022 07:10 )  Alb: 3.1 g/dL / Pro: 9.7 g/dL / ALK PHOS: 56 U/L / ALT: 19 U/L / AST: 27 U/L / GGT: x               Radiology:  EEG 4/20  EEG Summary:    Abnormal EEG in the awake, drowsy and asleep states.  -Mild generalized slowing    Impression/Clinical Correlate:    This is an abnormal EEG record. There is evidence of mild multifocal/diffuse nonspecific cerebral dysfunction, not specific for etiology. No epileptiform pattern or seizures were seen.

## 2022-04-24 NOTE — PROGRESS NOTE ADULT - SUBJECTIVE AND OBJECTIVE BOX
Subjective: Patient seen and examined. No new events except as noted.     REVIEW OF SYSTEMS:    CONSTITUTIONAL: + weakness, fevers or chills  EYES/ENT: No visual changes;  No vertigo or throat pain   NECK: No pain or stiffness  RESPIRATORY: No cough, wheezing, hemoptysis; No shortness of breath  CARDIOVASCULAR: No chest pain or palpitations  GASTROINTESTINAL: No abdominal or epigastric pain. No nausea, vomiting, or hematemesis; No diarrhea or constipation. No melena or hematochezia.  GENITOURINARY: No dysuria, frequency or hematuria  NEUROLOGICAL: No numbness or weakness  SKIN: No itching, burning, rashes, or lesions   All other review of systems is negative unless indicated above.    MEDICATIONS:  MEDICATIONS  (STANDING):  albuterol/ipratropium for Nebulization 3 milliLiter(s) Nebulizer every 6 hours  amLODIPine   Tablet 10 milliGRAM(s) Oral daily  apixaban 5 milliGRAM(s) Oral two times a day  lacosamide IVPB 150 milliGRAM(s) IV Intermittent every 12 hours  levothyroxine 88 MICROGram(s) Oral daily  lisinopril 20 milliGRAM(s) Oral daily  pantoprazole    Tablet 40 milliGRAM(s) Oral before breakfast  polyethylene glycol 3350 17 Gram(s) Oral two times a day  predniSONE   Tablet 70 milliGRAM(s) Oral once  QUEtiapine 100 milliGRAM(s) Oral at bedtime  risperiDONE   Tablet 0.5 milliGRAM(s) Oral <User Schedule>  senna 2 Tablet(s) Oral at bedtime  sodium chloride 0.9%. 1000 milliLiter(s) (50 mL/Hr) IV Continuous <Continuous>  sodium chloride 3%  Inhalation 3 milliLiter(s) Inhalation every 6 hours  thiamine IVPB 500 milliGRAM(s) IV Intermittent every 8 hours    PHYSICAL EXAM:  T(C): 36.9 (04-24-22 @ 09:48), Max: 36.9 (04-24-22 @ 09:48)  HR: 58 (04-24-22 @ 09:48) (58 - 100)  BP: 143/72 (04-24-22 @ 09:48) (143/72 - 171/77)  RR: 18 (04-24-22 @ 09:48) (18 - 18)  SpO2: 94% (04-24-22 @ 09:48) (94% - 97%)  Wt(kg): --  I&O's Summary    23 Apr 2022 07:01  -  24 Apr 2022 07:00  --------------------------------------------------------  IN: 250 mL / OUT: 0 mL / NET: 250 mL    Appearance: NAD  HEENT: dry oral mucosa, PERRL, EOMI	  Lymphatic: No lymphadenopathy , no edema  Cardiovascular: Normal S1 S2, No JVD, No murmurs , Peripheral pulses palpable 2+ bilaterally  Respiratory: Lungs clear to auscultation, normal effort 	  Gastrointestinal:  Soft, Non-tender, + BS	  Skin: No rashes, No ecchymoses, No cyanosis, warm to touch  NEUROLOGICAL EXAM:  MS: AAOX2 to person and place. ( year 2023 and month August). Able to name correct president- Mike Velez. Speech is fluent, Follows commands.   CN: VFF, EOMI, PERRL,  V1-3 intact, no facial asymmetry, t/p midline,  Motor: Strength: 5/5 in the UE/ LE  Tone: normal. Bulk: normal.    Plantar flex b/l.   Sensation: intact to LT/Temperature 4x.   Coordination: FTN intact   Gait:  Deffered  Ext: No edema    LABS:    CARDIAC MARKERS:                        11.4   6.72  )-----------( 223      ( 23 Apr 2022 07:17 )             36.3     04-23    140  |  105  |  41<H>  ----------------------------<  165<H>  4.3   |  23  |  1.30    Ca    8.6      23 Apr 2022 07:10    TPro  9.7<H>  /  Alb  3.1<L>  /  TBili  0.5  /  DBili  x   /  AST  27  /  ALT  19  /  AlkPhos  56  04-23    proBNP:   Lipid Profile:   HgA1c:   TSH:     TELEMETRY: SR    ECG:  	  RADIOLOGY:   DIAGNOSTIC TESTING:  [ ] Echocardiogram:  [ ]  Catheterization:  [ ] Stress Test:    OTHER:

## 2022-04-24 NOTE — PROGRESS NOTE ADULT - ASSESSMENT
73 yo male with a PMHx of HTN, Hypothyroidism, and Diverticulosis (requiring blood transfusions x2 8 yrs ago) who presented to [facility] on 4/12 as a transfer from [place] for ICH. Patient LKN was at 2200pm on 4/11. Patient was noted on 4/12 @ 0200 to be obtunded with saliva coming out of his mouth. EMS was called, patient came back to baseline and did not want to go to hospital and EMS left. Patient then had a seizure-like event witnessed by wife with UE shaking, foaming at the mouth, and urinary/fecal incontinence. Neurology consulted for new onset seizures in the setting of ICH while patient was in NSCU. No seizures seen on EEG thus far during this admission. Transferred from NSCU to EMU on 4/15 after stable IPH noted. MRI Head w/wo shows R cerebellar IPH vs calcification.    Impression: New onset of seizures of unknown cause. R cerebellar density IPH vs calcification.      Plan:  [x] Neuro checks, vitals Q4HR.  [x] Telemetry.  [x] Seizure/Fall/Aspiration precautions.  [x] SBP goal: <150.  [x] Off EEG.  [x] MRI Head w/wo: results as above.  [x] 4/18 s/p LP: CSF glucose 75 <H>, protein 69 <H>, TNC 1, culture NG.  [x] 4/15 VA Duplex lower extremities: acute L soleal vein DVT.  [x] s/p IVIG QD x5 days (last day 4/22).  [x] s/p Solumedrol 1G IV QD x5 days (last day today, 4/23).  [x] Will start steroid taper on 4/24. Solumedrol 70mg then decrease by 10mg daily.    c/w GI PPx while on steroids.   [x] Increase to Vimpat 150MG IV Q12HR.  [x] c/w Seroquel 100MG PO QHS.  [x] c/w Risperidone 0.5MG PO QD.  [x] Cardiology following (Dr. Fragoso), c/w Lisinopril 20MG PO QD, Hydralazine 10MG PO Q4HR.  [x] Pulmonology following, recommendations appreciated.  [x] Medicine following (Dr. Bernabe): f/u US of neck soft tissue.  [x] 4/16 CTA Chest PE Protocol: as above, patient with small R upper lobe segmental branch PE.  [x] Vascular Cardiology following (Dr. Perrea): on Eliquis 5MG PO Q12HR for acute PE.  [x] DVT PPx: on full dose AC for PE.  [x] Diet: Regular + Ensure.  [x] Seizure Rescue: Ativan 1MG IVP x1 for convulsions/GTCs lasting > 3 minutes, VPA 1G IV x1 for convulsions/GTCs that are refractory to Ativan.    Case seen and discussed with epilepsy attending Dr. Colvin.

## 2022-04-25 LAB
B BURGDOR DNA SPEC QL NAA+PROBE: NEGATIVE — SIGNIFICANT CHANGE UP
B2 GLYCOPROT1 AB SER QL: POSITIVE
MBP CSF-MCNC: 5.4 NG/ML — SIGNIFICANT CHANGE UP (ref 0–5.4)
SARS-COV-2 RNA SPEC QL NAA+PROBE: SIGNIFICANT CHANGE UP
WNV IGG CSF IA-ACNC: POSITIVE
WNV IGM CSF IA-ACNC: NEGATIVE — SIGNIFICANT CHANGE UP

## 2022-04-25 PROCEDURE — 99232 SBSQ HOSP IP/OBS MODERATE 35: CPT

## 2022-04-25 PROCEDURE — 99233 SBSQ HOSP IP/OBS HIGH 50: CPT

## 2022-04-25 RX ADMIN — Medication 3 MILLILITER(S): at 11:31

## 2022-04-25 RX ADMIN — SENNA PLUS 2 TABLET(S): 8.6 TABLET ORAL at 20:25

## 2022-04-25 RX ADMIN — Medication 88 MICROGRAM(S): at 06:15

## 2022-04-25 RX ADMIN — QUETIAPINE FUMARATE 50 MILLIGRAM(S): 200 TABLET, FILM COATED ORAL at 20:27

## 2022-04-25 RX ADMIN — RISPERIDONE 0.5 MILLIGRAM(S): 4 TABLET ORAL at 09:44

## 2022-04-25 RX ADMIN — LACOSAMIDE 130 MILLIGRAM(S): 50 TABLET ORAL at 18:10

## 2022-04-25 RX ADMIN — APIXABAN 5 MILLIGRAM(S): 2.5 TABLET, FILM COATED ORAL at 16:58

## 2022-04-25 RX ADMIN — Medication 10 MILLIGRAM(S): at 06:15

## 2022-04-25 RX ADMIN — Medication 60 MILLIGRAM(S): at 09:58

## 2022-04-25 RX ADMIN — Medication 105 MILLIGRAM(S): at 16:40

## 2022-04-25 RX ADMIN — PANTOPRAZOLE SODIUM 40 MILLIGRAM(S): 20 TABLET, DELAYED RELEASE ORAL at 06:18

## 2022-04-25 RX ADMIN — AMLODIPINE BESYLATE 10 MILLIGRAM(S): 2.5 TABLET ORAL at 06:19

## 2022-04-25 RX ADMIN — Medication 10 MILLIGRAM(S): at 20:26

## 2022-04-25 RX ADMIN — LISINOPRIL 20 MILLIGRAM(S): 2.5 TABLET ORAL at 06:15

## 2022-04-25 RX ADMIN — Medication 105 MILLIGRAM(S): at 09:44

## 2022-04-25 RX ADMIN — POLYETHYLENE GLYCOL 3350 17 GRAM(S): 17 POWDER, FOR SOLUTION ORAL at 06:15

## 2022-04-25 RX ADMIN — LACOSAMIDE 130 MILLIGRAM(S): 50 TABLET ORAL at 06:17

## 2022-04-25 RX ADMIN — Medication 3 MILLILITER(S): at 06:16

## 2022-04-25 RX ADMIN — SODIUM CHLORIDE 3 MILLILITER(S): 9 INJECTION INTRAMUSCULAR; INTRAVENOUS; SUBCUTANEOUS at 06:16

## 2022-04-25 RX ADMIN — Medication 3 MILLILITER(S): at 16:59

## 2022-04-25 RX ADMIN — APIXABAN 5 MILLIGRAM(S): 2.5 TABLET, FILM COATED ORAL at 06:15

## 2022-04-25 NOTE — PROGRESS NOTE ADULT - PROBLEM SELECTOR PLAN 3
required cardene gtt  SBP goal 100-160  c/w amlodipine 10mg PO daily  increase lisinopril to 40mg PO daily  c/w hydralazine 10mg PO q4hrs PRN

## 2022-04-25 NOTE — PROGRESS NOTE ADULT - SUBJECTIVE AND OBJECTIVE BOX
Follow-up Pulm Progress Note    much more alert  OOB to chair, seen working with OT  Sats mid 90s on RA     Medications:  MEDICATIONS  (STANDING):  albuterol/ipratropium for Nebulization 3 milliLiter(s) Nebulizer every 6 hours  amLODIPine   Tablet 10 milliGRAM(s) Oral daily  apixaban 5 milliGRAM(s) Oral two times a day  lacosamide IVPB 150 milliGRAM(s) IV Intermittent every 12 hours  levothyroxine 88 MICROGram(s) Oral daily  lisinopril 20 milliGRAM(s) Oral daily  pantoprazole    Tablet 40 milliGRAM(s) Oral before breakfast  polyethylene glycol 3350 17 Gram(s) Oral two times a day  predniSONE   Tablet 70 milliGRAM(s) Oral once  QUEtiapine 50 milliGRAM(s) Oral at bedtime  risperiDONE   Tablet 0.5 milliGRAM(s) Oral <User Schedule>  senna 2 Tablet(s) Oral at bedtime  sodium chloride 0.9%. 1000 milliLiter(s) (50 mL/Hr) IV Continuous <Continuous>  thiamine IVPB 500 milliGRAM(s) IV Intermittent every 8 hours    MEDICATIONS  (PRN):  hydrALAZINE 10 milliGRAM(s) Oral every 4 hours PRN Systolic blood pressure < 160  LORazepam   Injectable 1 milliGRAM(s) IV Push once PRN Seizure  valproate sodium IVPB 1000 milliGRAM(s) IV Intermittent once PRN Seizure          Vital Signs Last 24 Hrs  T(C): 36.4 (25 Apr 2022 10:21), Max: 37.1 (24 Apr 2022 21:04)  T(F): 97.6 (25 Apr 2022 10:21), Max: 98.8 (25 Apr 2022 01:31)  HR: 97 (25 Apr 2022 10:21) (63 - 97)  BP: 132/74 (25 Apr 2022 10:21) (132/74 - 186/80)  BP(mean): --  RR: 18 (25 Apr 2022 10:21) (18 - 18)  SpO2: 94% (25 Apr 2022 10:21) (93% - 97%)          04-24 @ 07:01  -  04-25 @ 07:00  --------------------------------------------------------  IN: 120 mL / OUT: 200 mL / NET: -80 mL            CAPILLARY BLOOD GLUCOSE      POCT Blood Glucose.: 166 mg/dL (24 Apr 2022 16:18)            DAIANA 1:160 04-19 @ 10:05  Anti SS-1 0.6  Anti SS-2 <0.2  Anti RNP 0.8  RF -- 04-19 @ 10:05    Atypical ANCA Negative 04-19 @ 10:05  c-ANCA titer -- 04-19 @ 10:05  c-ANCA Negative 04-19 @ 10:05  p-ANCA Negative 04-19 @ 10:05  DAIANA -- 04-19 @ 09:09  Anti SS-1 --  Anti SS-2 --  Anti RNP --  RF <10 04-19 @ 09:09    Atypical ANCA -- 04-19 @ 09:09  c-ANCA titer -- 04-19 @ 09:09  c-ANCA -- 04-19 @ 09:09  p-ANCA -- 04-19 @ 09:09          Physical Examination:  PULM: scattered rhonchi   CVS: S1, S2 heard    RADIOLOGY REVIEWED  CT chest: < from: CT Chest w/ IV Cont (04.20.22 @ 17:16) >  FINDINGS:  CHEST:  LUNGS AND LARGE AIRWAYS: Tracheal secretions. Near complete atelectasis   of the right lower lobe. Atelectatic changes, though to a lesser degree,   involving the middle and left lower lobes.  PLEURA: No pleural effusion.  VESSELS: The known pulmonary embolus in the right upper lobe identified   on the prior CTA of 4/16/2022 is not well seen on the current exam, which   is not tailored for the assessment of the pulmonary arterial system.  HEART: Heart size is normal. Aortic valve calcification. Trace   pericardial effusion, similar to the prior exam.  MEDIASTINUM AND SIMONE: No lymphadenopathy.  CHEST WALL AND LOWER NECK: Enlarged thyroid with mild tracheal narrowing,   similar to the prior exam.    ABDOMEN AND PELVIS:  Mild motion degradation in the mid to lower abdomen.    LIVER: Within normal limits.  BILE DUCTS: Normal caliber.  GALLBLADDER: Mild layering hyperdensity within the gallbladder, which may   reflect vicarious excretion of contrast or sludge.  SPLEEN: Within normal limits.  PANCREAS: Within normal limits.  ADRENALS: Within normal limits.  KIDNEYS/URETERS: A few subcentimeter hypodense foci within the right   kidney that are too small to characterize. No hydronephrosis.    BLADDER: Underdistended. Mild perivesicular fat stranding.  REPRODUCTIVE ORGANS: Mildly enlarged prostate. There is a 7 mm hyperdense   nodular focus within the region of the lateral mid right peripheral zone   (series 3 image 280).    BOWEL: No bowel obstruction. Colonic diverticulosis. Appendix is normal.  PERITONEUM: No ascites.  VESSELS: Atherosclerotic changes.  RETROPERITONEUM/LYMPH NODES: No lymphadenopathy.  ABDOMINALWALL: Small umbilical hernia containing a portion of transverse   colon.  BONES: Degenerative changes.    IMPRESSION:  Subcentimeter hyperdense nodular focus in the right aspect of the   prostate gland, indeterminate but potentially an exophytic BPH nodule.   Neoplasm is not excluded. Suggest correlation with PSA, and consider   further evaluation with prostate MRI.    Mild perivesicular fat stranding. Question cystitis. Correlate with   urinalysis.    Right greater than left lung base atelectasis, with near complete   atelectasis of the right lower lobe.    < end of copied text >

## 2022-04-25 NOTE — PROGRESS NOTE ADULT - PROBLEM SELECTOR PLAN 3
CTA chest 4/16 with RLL atelectasis. CT chest 4/20 with increase in RLL atelectasis.  -Zosyn d/c'd  -Chest congestion at times. Now much improved.  -Continue Duoneb q6h.   -Sodium chloride 3% d/c'd   -Chest PT q6h  -Keep sats >90% with O2 PRN (currently RA).  -Pt with agitation at times, waxing/waning mental status. Monitor closely for fevers, increase in WBC. mental status seems improved at this time.

## 2022-04-25 NOTE — PROGRESS NOTE ADULT - SUBJECTIVE AND OBJECTIVE BOX
no new complaints     REVIEW OF SYSTEMS  Constitutional - No fever,  No fatigue  HEENT - No vertigo, No neck pain  Neurological - No headaches, No memory loss, No loss of strength, No numbness, No tremors  Skin - No rashes, No lesions   Musculoskeletal - No joint pain, No joint swelling, No muscle pain  Psychiatric - No depression, No anxiety    FUNCTIONAL PROGRESS  4/19 PT  bed mobility min assist x2  transfers min assist x2  gait mod assist x 2    4/25 OT  transfers contact guard with RW  followed 75% single step commands     VITALS  T(C): 37.1 (04-25-22 @ 12:58), Max: 37.1 (04-24-22 @ 21:04)  HR: 95 (04-25-22 @ 12:58) (72 - 97)  BP: 150/75 (04-25-22 @ 12:58) (132/74 - 184/79)  RR: 18 (04-25-22 @ 12:58) (18 - 18)  SpO2: 94% (04-25-22 @ 12:58) (93% - 97%)  Wt(kg): --    MEDICATIONS   albuterol/ipratropium for Nebulization 3 milliLiter(s) every 6 hours  amLODIPine   Tablet 10 milliGRAM(s) daily  apixaban 5 milliGRAM(s) two times a day  hydrALAZINE 10 milliGRAM(s) every 4 hours PRN  lacosamide IVPB 150 milliGRAM(s) every 12 hours  levothyroxine 88 MICROGram(s) daily  lisinopril 20 milliGRAM(s) daily  LORazepam   Injectable 1 milliGRAM(s) once PRN  pantoprazole    Tablet 40 milliGRAM(s) before breakfast  polyethylene glycol 3350 17 Gram(s) two times a day  predniSONE   Tablet 70 milliGRAM(s) once  QUEtiapine 50 milliGRAM(s) at bedtime  risperiDONE   Tablet 0.5 milliGRAM(s) <User Schedule>  senna 2 Tablet(s) at bedtime  sodium chloride 0.9%. 1000 milliLiter(s) <Continuous>  thiamine IVPB 500 milliGRAM(s) every 8 hours  valproate sodium IVPB 1000 milliGRAM(s) once PRN      RECENT LABS - Reviewed      ------------------------------------------------------------------------------  PHYSICAL EXAM  Constitutional - NAD, Comfortable, in chair   Chest - Breathing comfortably  Cardiovascular - S1S2   Abdomen - Soft   Extremities - No C/C/E, No calf tenderness   Neurologic Exam -               follows commands, slow processing     Cognitive - Awake, Alert, AAO to self, place: hospital, April 2022     Communication - Fluent, No dysarthria     Motor - moves all ext     Sensory - Intact to LT    Psychiatric - Mood stable, Affect WNL  ----------------------------------------------------------------------------------------  ASSESSMENT/PLAN  72yMale h/o HTN, hypothyroid with functional deficits after cerebellar hemorrhage  VPA, vimpat for seizures, s/p IVIG, steroid taper   Pulmonary embolism, L soleal vein DVT, on eliquis   agitation stable on seroquel, risperidone   on quarantine for covid exposure   Pain - Tylenol  DVT PPX - SCDs, lovenox   Rehab - Will continue to follow for ongoing rehab needs and recommendations.   continue bedside therapy    Recommend ACUTE inpatient rehabilitation for the functional deficits consisting of 3 hours of therapy/day & 24 hour RN/daily PMR physician for comorbid medical management. Patient will be able to tolerate 3 hours a day.

## 2022-04-25 NOTE — PROGRESS NOTE ADULT - SUBJECTIVE AND OBJECTIVE BOX
Subjective: There were no overnight events noted.     Objective:  MEDICATIONS  (STANDING):  albuterol/ipratropium for Nebulization 3 milliLiter(s) Nebulizer every 6 hours  amLODIPine   Tablet 10 milliGRAM(s) Oral daily  apixaban 5 milliGRAM(s) Oral two times a day  lacosamide IVPB 150 milliGRAM(s) IV Intermittent every 12 hours  levothyroxine 88 MICROGram(s) Oral daily  lisinopril 20 milliGRAM(s) Oral daily  pantoprazole    Tablet 40 milliGRAM(s) Oral before breakfast  polyethylene glycol 3350 17 Gram(s) Oral two times a day  predniSONE   Tablet 70 milliGRAM(s) Oral once  predniSONE   Tablet 60 milliGRAM(s) Oral once  QUEtiapine 50 milliGRAM(s) Oral at bedtime  risperiDONE   Tablet 0.5 milliGRAM(s) Oral <User Schedule>  senna 2 Tablet(s) Oral at bedtime  sodium chloride 0.9%. 1000 milliLiter(s) (50 mL/Hr) IV Continuous <Continuous>  sodium chloride 3%  Inhalation 3 milliLiter(s) Inhalation every 6 hours  thiamine IVPB 500 milliGRAM(s) IV Intermittent every 8 hours    MEDICATIONS  (PRN):  hydrALAZINE 10 milliGRAM(s) Oral every 4 hours PRN Systolic blood pressure < 160  LORazepam   Injectable 1 milliGRAM(s) IV Push once PRN Seizure  valproate sodium IVPB 1000 milliGRAM(s) IV Intermittent once PRN Seizure    Vital Signs Last 24 Hrs:  T(C): 37.1 (25 Apr 2022 01:31), Max: 37.1 (24 Apr 2022 21:04)  T(F): 98.8 (25 Apr 2022 01:31), Max: 98.8 (25 Apr 2022 01:31)  HR: 77 (25 Apr 2022 01:31) (58 - 82)  BP: 159/68 (25 Apr 2022 01:31) (143/72 - 186/80)  RR: 18 (25 Apr 2022 01:31) (18 - 18)  SpO2: 95% (25 Apr 2022 01:31) (93% - 97%)    GENERAL EXAM:  Constitutional: awake and alert. NAD  HEENT: PERRL, EOMI  Neck: Supple  Musculoskeletal: no joint swelling/tenderness, no abnormal movements  Skin: no rashes    NEUROLOGICAL EXAM:  MS: AAOX2 to person and place. ( year 2023 and month August). Able to name correct president- Mike Velez. Speech is fluent, Follows commands.   CN: VFF, EOMI, PERRL,  V1-3 intact, no facial asymmetry, t/p midline,  Motor: Strength: 5/5 in the UE/ LE  Tone: normal. Bulk: normal.    Plantar flex b/l.   Sensation: intact to LT/Temperature 4x.   Coordination: FTN intact   Gait:  Deferred      Radiology:  EEG 4/20  EEG Summary:  Abnormal EEG in the awake, drowsy and asleep states.  -Mild generalized slowing  Impression/Clinical Correlate:  This is an abnormal EEG record. There is evidence of mild multifocal/diffuse nonspecific cerebral dysfunction, not specific for etiology. No epileptiform pattern or seizures were seen.   Subjective:       MEDICATIONS  (STANDING):  albuterol/ipratropium for Nebulization 3 milliLiter(s) Nebulizer every 6 hours  amLODIPine   Tablet 10 milliGRAM(s) Oral daily  apixaban 5 milliGRAM(s) Oral two times a day  lacosamide IVPB 150 milliGRAM(s) IV Intermittent every 12 hours  levothyroxine 88 MICROGram(s) Oral daily  lisinopril 20 milliGRAM(s) Oral daily  pantoprazole    Tablet 40 milliGRAM(s) Oral before breakfast  polyethylene glycol 3350 17 Gram(s) Oral two times a day  predniSONE   Tablet 70 milliGRAM(s) Oral once  predniSONE   Tablet 60 milliGRAM(s) Oral once  QUEtiapine 50 milliGRAM(s) Oral at bedtime  risperiDONE   Tablet 0.5 milliGRAM(s) Oral <User Schedule>  senna 2 Tablet(s) Oral at bedtime  sodium chloride 0.9%. 1000 milliLiter(s) (50 mL/Hr) IV Continuous <Continuous>  sodium chloride 3%  Inhalation 3 milliLiter(s) Inhalation every 6 hours  thiamine IVPB 500 milliGRAM(s) IV Intermittent every 8 hours    MEDICATIONS  (PRN):  hydrALAZINE 10 milliGRAM(s) Oral every 4 hours PRN Systolic blood pressure < 160  LORazepam   Injectable 1 milliGRAM(s) IV Push once PRN Seizure  valproate sodium IVPB 1000 milliGRAM(s) IV Intermittent once PRN Seizure    Vital Signs Last 24 Hrs:  T(C): 37.1 (25 Apr 2022 01:31), Max: 37.1 (24 Apr 2022 21:04)  T(F): 98.8 (25 Apr 2022 01:31), Max: 98.8 (25 Apr 2022 01:31)  HR: 77 (25 Apr 2022 01:31) (58 - 82)  BP: 159/68 (25 Apr 2022 01:31) (143/72 - 186/80)  RR: 18 (25 Apr 2022 01:31) (18 - 18)  SpO2: 95% (25 Apr 2022 01:31) (93% - 97%)    GENERAL EXAM:  Constitutional: awake and alert. NAD  HEENT: PERRL, EOMI  Neck: Supple  Musculoskeletal: no joint swelling/tenderness, no abnormal movements  Skin: no rashes    NEUROLOGICAL EXAM:  MS: AAOX2 to person and place. ( year 2023 and month August). Able to name correct president- Mike Velez. Speech is fluent, Follows commands.   CN: VFF, EOMI, PERRL,  V1-3 intact, no facial asymmetry, t/p midline,  Motor: Strength: 5/5 in the UE/ LE  Tone: normal. Bulk: normal.    Plantar flex b/l.   Sensation: intact to LT/Temperature 4x.   Coordination: FTN intact   Gait:  Deferred      Radiology:  EEG 4/20  EEG Summary:  Abnormal EEG in the awake, drowsy and asleep states.  -Mild generalized slowing  Impression/Clinical Correlate:  This is an abnormal EEG record. There is evidence of mild multifocal/diffuse nonspecific cerebral dysfunction, not specific for etiology. No epileptiform pattern or seizures were seen.   Subjective: No overnight events. No complaints at this time      MEDICATIONS  (STANDING):  albuterol/ipratropium for Nebulization 3 milliLiter(s) Nebulizer every 6 hours  amLODIPine   Tablet 10 milliGRAM(s) Oral daily  apixaban 5 milliGRAM(s) Oral two times a day  lacosamide IVPB 150 milliGRAM(s) IV Intermittent every 12 hours  levothyroxine 88 MICROGram(s) Oral daily  lisinopril 20 milliGRAM(s) Oral daily  pantoprazole    Tablet 40 milliGRAM(s) Oral before breakfast  polyethylene glycol 3350 17 Gram(s) Oral two times a day  predniSONE   Tablet 70 milliGRAM(s) Oral once  predniSONE   Tablet 60 milliGRAM(s) Oral once  QUEtiapine 50 milliGRAM(s) Oral at bedtime  risperiDONE   Tablet 0.5 milliGRAM(s) Oral <User Schedule>  senna 2 Tablet(s) Oral at bedtime  sodium chloride 0.9%. 1000 milliLiter(s) (50 mL/Hr) IV Continuous <Continuous>  sodium chloride 3%  Inhalation 3 milliLiter(s) Inhalation every 6 hours  thiamine IVPB 500 milliGRAM(s) IV Intermittent every 8 hours    MEDICATIONS  (PRN):  hydrALAZINE 10 milliGRAM(s) Oral every 4 hours PRN Systolic blood pressure < 160  LORazepam   Injectable 1 milliGRAM(s) IV Push once PRN Seizure  valproate sodium IVPB 1000 milliGRAM(s) IV Intermittent once PRN Seizure    Vital Signs Last 24 Hrs:  T(C): 37.1 (25 Apr 2022 01:31), Max: 37.1 (24 Apr 2022 21:04)  T(F): 98.8 (25 Apr 2022 01:31), Max: 98.8 (25 Apr 2022 01:31)  HR: 77 (25 Apr 2022 01:31) (58 - 82)  BP: 159/68 (25 Apr 2022 01:31) (143/72 - 186/80)  RR: 18 (25 Apr 2022 01:31) (18 - 18)  SpO2: 95% (25 Apr 2022 01:31) (93% - 97%)    GENERAL EXAM:  Constitutional: awake and alert. NAD  HEENT: PERRL, EOMI  Neck: Supple  Musculoskeletal: no joint swelling/tenderness, no abnormal movements  Skin: no rashes    NEUROLOGICAL EXAM:  MS: AAOX2 to person and place. ( year 2023 and month August). Able to name correct president- Mike Velez. Speech is fluent, Follows commands.   CN: VFF, EOMI, PERRL,  V1-3 intact, no facial asymmetry, t/p midline,  Motor: Strength: 5/5 in the UE/ LE  Tone: normal. Bulk: normal.    Plantar flex b/l.   Sensation: intact to LT/Temperature 4x.   Coordination: FTN intact   Gait:  Deferred      Radiology:  EEG 4/20  EEG Summary:  Abnormal EEG in the awake, drowsy and asleep states.  -Mild generalized slowing  Impression/Clinical Correlate:  This is an abnormal EEG record. There is evidence of mild multifocal/diffuse nonspecific cerebral dysfunction, not specific for etiology. No epileptiform pattern or seizures were seen.

## 2022-04-25 NOTE — PROGRESS NOTE ADULT - ASSESSMENT
73 yo male with a PMHx of HTN, Hypothyroidism, and Diverticulosis (requiring blood transfusions x2 8 yrs ago) who presented to [facility] on 4/12 as a transfer from [place] for ICH. Patient LKN was at 2200pm on 4/11. Patient was noted on 4/12 @ 0200 to be obtunded with saliva coming out of his mouth. EMS was called, patient came back to baseline and did not want to go to hospital and EMS left. Patient then had a seizure-like event witnessed by wife with UE shaking, foaming at the mouth, and urinary/fecal incontinence. Neurology consulted for new onset seizures in the setting of ICH while patient was in NSCU. No seizures seen on EEG thus far during this admission. Transferred from NSCU to EMU on 4/15 after stable IPH noted. MRI Head w/wo shows R cerebellar IPH vs calcification.    Impression: New onset of seizures of unknown cause. R cerebellar density IPH vs calcification.      Plan:  [] Ultrasound soft tissue of neck  [] COVID PCR for exposure  [x] Neuro checks, vitals Q4HR.  [x] Telemetry.  [x] Seizure/Fall/Aspiration precautions.  [x] SBP goal: <150.  [x] Off EEG.  [x] MRI Head w/wo: results as above.  [x] 4/18 s/p LP: CSF glucose 75 <H>, protein 69 <H>, TNC 1, culture NG.  [x] 4/15 VA Duplex lower extremities: acute L soleal vein DVT.  [x] s/p IVIG QD x5 days (last day 4/22).  [x] s/p Solumedrol 1G IV QD x5 days  [x] Steroid taper started on 4/24. Solumedrol 60mg today then decrease by 10mg daily.    c/w GI PPx while on steroids.   [x] Increase to Vimpat 150MG IV Q12HR.  [x] c/w Seroquel 100MG PO QHS.  [x] c/w Risperidone 0.5MG PO QD.  [x] Cardiology following (Dr. Fragoso), c/w Lisinopril 20MG PO QD, Hydralazine 10MG PO Q4HR.  [x] Pulmonology following, recommendations appreciated.  [x] Medicine following (Dr. Bernabe): f/u US of neck soft tissue.  [x] 4/16 CTA Chest PE Protocol: as above, patient with small R upper lobe segmental branch PE.  [x] Vascular Cardiology following (Dr. Perera): on Eliquis 5MG PO Q12HR for acute PE.  [x] DVT PPx: on full dose AC for PE.  [x] Diet: Regular + Ensure.  [x] Seizure Rescue: Ativan 1MG IVP x1 for convulsions/GTCs lasting > 3 minutes, VPA 1G IV x1 for convulsions/GTCs that are refractory to Ativan.    CORE MEASURES:        AED levels [] Sent [] Pending [] Resulted     LFTs [] normal [] elevated      Plan and education provided to [] patient []family at bedside [] awaiting for family     Seizure Semiology  [] Tonic clonic  [] Clonic  [] Tonic  [] Unresponsive  [] Focal with impaired awareness  [] Focal without impaired awareness    Obtain screening lower extremity venous ultrasound in patients who meet 1 or more of the following criteria as patient is high risk for DVT/PE on admission:   [] History of DVT/PE  []Hypercoagulable states (Factor V Leiden, Cancer, OCP, etc. )  []Prolonged immobility (hemiplegia/hemiparesis/post operative or any other extended immobilization)  [] Transferred from outside facility (Rehab or Long term care) 71 yo male with a PMHx of HTN, Hypothyroidism, and Diverticulosis (requiring blood transfusions x2 8 yrs ago) who presented to [facility] on 4/12 as a transfer from [place] for ICH. Patient LKN was at 2200pm on 4/11. Patient was noted on 4/12 @ 0200 to be obtunded with saliva coming out of his mouth. EMS was called, patient came back to baseline and did not want to go to hospital and EMS left. Patient then had a seizure-like event witnessed by wife with UE shaking, foaming at the mouth, and urinary/fecal incontinence. Neurology consulted for new onset seizures in the setting of ICH while patient was in NSCU. No seizures seen on EEG thus far during this admission. Transferred from NSCU to EMU on 4/15 after stable IPH noted. MRI Head w/wo shows R cerebellar IPH vs calcification.    Impression: New onset of seizures of unknown cause. R cerebellar density IPH vs calcification.      Plan:  [] COVID PCR for exposure  [] Awaiting ROEL placement- 3 more days of Quarrantine  [x] Neuro checks, vitals Q4HR.  [x] Telemetry.  [x] Seizure/Fall/Aspiration precautions.  [x] SBP goal: <150.  [x] Off EEG.  [x] MRI Head w/wo: results as above.  [x] 4/18 s/p LP: CSF glucose 75 <H>, protein 69 <H>, TNC 1, culture NG.  [x] 4/15 VA Duplex lower extremities: acute L soleal vein DVT.  [x] s/p IVIG QD x5 days (last day 4/22).  [x] s/p Solumedrol 1G IV QD x5 days  [x] Steroid taper started on 4/24. Solumedrol 60mg today then decrease by 10mg daily.    c/w GI PPx while on steroids.   [x] Increase to Vimpat 150MG IV Q12HR.  [x] c/w Seroquel 100MG PO QHS.  [x] c/w Risperidone 0.5MG PO QD.  [x] Cardiology following (Dr. Fragoso), c/w Lisinopril 20MG PO QD, Hydralazine 10MG PO Q4HR.  [x] Pulmonology following, recommendations appreciated.  [x] Medicine following (Dr. Bernabe): f/u US of neck soft tissue.  [x] 4/16 CTA Chest PE Protocol: as above, patient with small R upper lobe segmental branch PE.  [x] Vascular Cardiology following (Dr. Perera): on Eliquis 5MG PO Q12HR for acute PE.  [x] DVT PPx: on full dose AC for PE.  [x] Diet: Regular + Ensure.  [x] Seizure Rescue: Ativan 1MG IVP x1 for convulsions/GTCs lasting > 3 minutes, VPA 1G IV x1 for convulsions/GTCs that are refractory to Ativan.    CORE MEASURES:        AED levels [] Sent [] Pending [] Resulted     LFTs [] normal [] elevated      Plan and education provided to [] patient []family at bedside [] awaiting for family     Seizure Semiology  [] Tonic clonic  [] Clonic  [] Tonic  [] Unresponsive  [] Focal with impaired awareness  [] Focal without impaired awareness    Obtain screening lower extremity venous ultrasound in patients who meet 1 or more of the following criteria as patient is high risk for DVT/PE on admission:   [] History of DVT/PE  []Hypercoagulable states (Factor V Leiden, Cancer, OCP, etc. )  []Prolonged immobility (hemiplegia/hemiparesis/post operative or any other extended immobilization)  [] Transferred from outside facility (Rehab or Long term care)

## 2022-04-25 NOTE — PROGRESS NOTE ADULT - SUBJECTIVE AND OBJECTIVE BOX
Subjective: Patient seen and examined. No new events except as noted.     REVIEW OF SYSTEMS:    CONSTITUTIONAL: + weakness, fevers or chills  EYES/ENT: No visual changes;  No vertigo or throat pain   NECK: No pain or stiffness  RESPIRATORY: No cough, wheezing, hemoptysis; No shortness of breath  CARDIOVASCULAR: No chest pain or palpitations  GASTROINTESTINAL: No abdominal or epigastric pain. No nausea, vomiting, or hematemesis; No diarrhea or constipation. No melena or hematochezia.  GENITOURINARY: No dysuria, frequency or hematuria  NEUROLOGICAL: No numbness or weakness  SKIN: No itching, burning, rashes, or lesions   All other review of systems is negative unless indicated above.    MEDICATIONS:  MEDICATIONS  (STANDING):  albuterol/ipratropium for Nebulization 3 milliLiter(s) Nebulizer every 6 hours  amLODIPine   Tablet 10 milliGRAM(s) Oral daily  apixaban 5 milliGRAM(s) Oral two times a day  lacosamide IVPB 150 milliGRAM(s) IV Intermittent every 12 hours  levothyroxine 88 MICROGram(s) Oral daily  lisinopril 20 milliGRAM(s) Oral daily  pantoprazole    Tablet 40 milliGRAM(s) Oral before breakfast  polyethylene glycol 3350 17 Gram(s) Oral two times a day  predniSONE   Tablet 70 milliGRAM(s) Oral once  QUEtiapine 50 milliGRAM(s) Oral at bedtime  risperiDONE   Tablet 0.5 milliGRAM(s) Oral <User Schedule>  senna 2 Tablet(s) Oral at bedtime  sodium chloride 0.9%. 1000 milliLiter(s) (50 mL/Hr) IV Continuous <Continuous>  sodium chloride 3%  Inhalation 3 milliLiter(s) Inhalation every 6 hours  thiamine IVPB 500 milliGRAM(s) IV Intermittent every 8 hours    PHYSICAL EXAM:  T(C): 36.4 (04-25-22 @ 10:21), Max: 37.1 (04-24-22 @ 21:04)  HR: 97 (04-25-22 @ 10:21) (63 - 97)  BP: 132/74 (04-25-22 @ 10:21) (132/74 - 186/80)  RR: 18 (04-25-22 @ 10:21) (18 - 18)  SpO2: 94% (04-25-22 @ 10:21) (93% - 97%)  Wt(kg): --  I&O's Summary    24 Apr 2022 07:01  -  25 Apr 2022 07:00  --------------------------------------------------------  IN: 120 mL / OUT: 200 mL / NET: -80 mL    Appearance: NAD  HEENT: dry oral mucosa, PERRL, EOMI	  Lymphatic: No lymphadenopathy , no edema  Cardiovascular: Normal S1 S2, No JVD, No murmurs , Peripheral pulses palpable 2+ bilaterally  Respiratory: Lungs clear to auscultation, normal effort 	  Gastrointestinal:  Soft, Non-tender, + BS	  Skin: No rashes, No ecchymoses, No cyanosis, warm to touch  NEUROLOGICAL EXAM:  MS: AAOX2 to person and place. ( year 2023 and month August). Able to name correct president- Mike Velez. Speech is fluent, Follows commands.   CN: VFF, EOMI, PERRL,  V1-3 intact, no facial asymmetry, t/p midline,  Motor: Strength: 5/5 in the UE/ LE  Tone: normal. Bulk: normal.    Plantar flex b/l.   Sensation: intact to LT/Temperature 4x.   Coordination: FTN intact   Gait:  Deffered  Ext: No edema    LABS:    CARDIAC MARKERS:    proBNP:   Lipid Profile:   HgA1c:   TSH:     TELEMETRY: 	    ECG:  	  RADIOLOGY:   DIAGNOSTIC TESTING:  [ ] Echocardiogram:  [ ]  Catheterization:  [ ] Stress Test:    OTHER:

## 2022-04-25 NOTE — PROGRESS NOTE ADULT - ASSESSMENT
72M PMH htn, hypothyroidism, presented to [facility] via EMS after family called 911 following a witnessed seizure, by family, reportedly lasting ~5min.

## 2022-04-25 NOTE — PROGRESS NOTE ADULT - SUBJECTIVE AND OBJECTIVE BOX
Name of Patient : PEPITO BALL  MRN: [id number]  Date of visit: 04-25-22 @ 11:08      Subjective: Patient seen and examined. No new events except as noted.   Patient seen OOB TC. Denies CP, palpitations, SOB, or dyspnea  Awaiting Soft tissue US and Neck    REVIEW OF SYSTEMS:    CONSTITUTIONAL: No weakness, fevers or chills  EYES/ENT: No visual changes;  No vertigo or throat pain   NECK: No pain or stiffness  RESPIRATORY: No cough, wheezing, hemoptysis; No shortness of breath  CARDIOVASCULAR: No chest pain or palpitations  GASTROINTESTINAL: No abdominal or epigastric pain. No nausea, vomiting, or hematemesis; No diarrhea or constipation. No melena or hematochezia.  GENITOURINARY: No dysuria, frequency or hematuria  NEUROLOGICAL: No numbness or weakness  SKIN: No itching, burning, rashes, or lesions   All other review of systems is negative unless indicated above.    MEDICATIONS:  MEDICATIONS  (STANDING):  albuterol/ipratropium for Nebulization 3 milliLiter(s) Nebulizer every 6 hours  amLODIPine   Tablet 10 milliGRAM(s) Oral daily  apixaban 5 milliGRAM(s) Oral two times a day  lacosamide IVPB 150 milliGRAM(s) IV Intermittent every 12 hours  levothyroxine 88 MICROGram(s) Oral daily  lisinopril 20 milliGRAM(s) Oral daily  pantoprazole    Tablet 40 milliGRAM(s) Oral before breakfast  polyethylene glycol 3350 17 Gram(s) Oral two times a day  predniSONE   Tablet 70 milliGRAM(s) Oral once  QUEtiapine 50 milliGRAM(s) Oral at bedtime  risperiDONE   Tablet 0.5 milliGRAM(s) Oral <User Schedule>  senna 2 Tablet(s) Oral at bedtime  sodium chloride 0.9%. 1000 milliLiter(s) (50 mL/Hr) IV Continuous <Continuous>  sodium chloride 3%  Inhalation 3 milliLiter(s) Inhalation every 6 hours  thiamine IVPB 500 milliGRAM(s) IV Intermittent every 8 hours      PHYSICAL EXAM:  T(C): 36.4 (04-25-22 @ 10:21), Max: 37.1 (04-24-22 @ 21:04)  HR: 97 (04-25-22 @ 10:21) (63 - 97)  BP: 132/74 (04-25-22 @ 10:21) (132/74 - 186/80)  RR: 18 (04-25-22 @ 10:21) (18 - 18)  SpO2: 94% (04-25-22 @ 10:21) (93% - 97%)  Wt(kg): --  I&O's Summary    24 Apr 2022 07:01  -  25 Apr 2022 07:00  --------------------------------------------------------  IN: 120 mL / OUT: 200 mL / NET: -80 mL    25 Apr 2022 07:01  -  25 Apr 2022 11:08  --------------------------------------------------------  IN: 600 mL / OUT: 0 mL / NET: 600 mL          Appearance: Normal	  HEENT:  PERRLA   Lymphatic: No lymphadenopathy   Cardiovascular: Normal S1 S2, no JVD  Respiratory: normal effort , clear  Gastrointestinal:  Soft, Non-tender  Skin: No rashes,  warm to touch  Psychiatry:  Mood & affect appropriate  Musculuskeletal: No edema            04-24-22 @ 07:01  -  04-25-22 @ 07:00  --------------------------------------------------------  IN: 120 mL / OUT: 200 mL / NET: -80 mL    04-25-22 @ 07:01  -  04-25-22 @ 11:08  --------------------------------------------------------  IN: 600 mL / OUT: 0 mL / NET: 600 mL             Name of Patient : PEPITO BALL  MRN: [id number]  Date of visit: 04-25-22 @ 11:08      Subjective: Patient seen and examined. No new events except as noted.   Patient seen OOB TC. Denies CP, palpitations, SOB, or dyspnea. Denies abdominal pain or discomfort.   Awaiting Soft tissue US and Neck    REVIEW OF SYSTEMS:    CONSTITUTIONAL: No weakness, fevers or chills  EYES/ENT: No visual changes;  No vertigo or throat pain   NECK: No pain or stiffness  RESPIRATORY: No cough, wheezing, hemoptysis; No shortness of breath  CARDIOVASCULAR: No chest pain or palpitations  GASTROINTESTINAL: No abdominal or epigastric pain. No nausea, vomiting, or hematemesis; No diarrhea or constipation. No melena or hematochezia.  GENITOURINARY: No dysuria, frequency or hematuria  NEUROLOGICAL: No numbness or weakness  SKIN: No itching, burning, rashes, or lesions   All other review of systems is negative unless indicated above.    MEDICATIONS:  MEDICATIONS  (STANDING):  albuterol/ipratropium for Nebulization 3 milliLiter(s) Nebulizer every 6 hours  amLODIPine   Tablet 10 milliGRAM(s) Oral daily  apixaban 5 milliGRAM(s) Oral two times a day  lacosamide IVPB 150 milliGRAM(s) IV Intermittent every 12 hours  levothyroxine 88 MICROGram(s) Oral daily  lisinopril 20 milliGRAM(s) Oral daily  pantoprazole    Tablet 40 milliGRAM(s) Oral before breakfast  polyethylene glycol 3350 17 Gram(s) Oral two times a day  predniSONE   Tablet 70 milliGRAM(s) Oral once  QUEtiapine 50 milliGRAM(s) Oral at bedtime  risperiDONE   Tablet 0.5 milliGRAM(s) Oral <User Schedule>  senna 2 Tablet(s) Oral at bedtime  sodium chloride 0.9%. 1000 milliLiter(s) (50 mL/Hr) IV Continuous <Continuous>  sodium chloride 3%  Inhalation 3 milliLiter(s) Inhalation every 6 hours  thiamine IVPB 500 milliGRAM(s) IV Intermittent every 8 hours      PHYSICAL EXAM:  T(C): 36.4 (04-25-22 @ 10:21), Max: 37.1 (04-24-22 @ 21:04)  HR: 97 (04-25-22 @ 10:21) (63 - 97)  BP: 132/74 (04-25-22 @ 10:21) (132/74 - 186/80)  RR: 18 (04-25-22 @ 10:21) (18 - 18)  SpO2: 94% (04-25-22 @ 10:21) (93% - 97%)  Wt(kg): --  I&O's Summary    24 Apr 2022 07:01  -  25 Apr 2022 07:00  --------------------------------------------------------  IN: 120 mL / OUT: 200 mL / NET: -80 mL    25 Apr 2022 07:01  -  25 Apr 2022 11:08  --------------------------------------------------------  IN: 600 mL / OUT: 0 mL / NET: 600 mL          Appearance: Normal	  HEENT:  PERRLA; Supra clavicular swelling L > R  Lymphatic: No lymphadenopathy   Cardiovascular: Normal S1 S2, no JVD  Respiratory: normal effort , clear  Gastrointestinal:  Soft, Non-tender  Skin: No rashes,  warm to touch  Psychiatry:  Mood & affect appropriate  Musculoskeletal: No edema            04-24-22 @ 07:01  -  04-25-22 @ 07:00  --------------------------------------------------------  IN: 120 mL / OUT: 200 mL / NET: -80 mL    04-25-22 @ 07:01  -  04-25-22 @ 11:08  --------------------------------------------------------  IN: 600 mL / OUT: 0 mL / NET: 600 mL          Culture - Fungal, CSF (04.18.22 @ 13:46)   Specimen Source: .CSF CSF lumbar   Culture Results: Testing in progress    Culture - CSF with Gram Stain . (04.18.22 @ 13:46)   Gram Stain: No polymorphonuclear leukocytes   No organisms seen by cytocentrifuge   Specimen Source: .CSF CSF lumbar   Culture Results: No growth at 3 days.

## 2022-04-25 NOTE — PROGRESS NOTE ADULT - ASSESSMENT
Patient is a 72 year old Male with a PMHx of HTN, hypothyroidism, who presented to [facility]  via transfer from OSH. Patient originally presented to to [place] ED via EMS after family called 911 following a witnessed seizure, by family, reportedly lasting ~5min.  Enroute to [place], patient was reported to have had another 2 witnessed seizures by EMS, each lasting ~1 minute. Following each seizure the patient was given 5mg IV versed (received 10mg collectively and brought to [place] ED). Pt arrived to the ED obtunded with blood from his mouth (tongue laceration), responsive only to pain/sternal rub. Patient was subsequently intubated for GCS 6 and CT/CTA performed showing a small cerebellar hemorrhage.      Seizures  - Previously intubated, now extubated  - Neuro checks per protocol  - Monitor on Tele  - Possible IVIG per neuro   - S/P LP  - Course of IVIG complete 04/22  - Management as per neurology   - Fall, seizure, aspiration precautions  - S/P MR brain; f/u neuro recs  - S/P repeat CTH as above, per neuro      Cerebellar hemorrhage  - Right sided hemorrhage   - MR brain as above; f/u neuro recs  - F/U repeat CT H s/p heparin gtt; monitor mental status   - Transitioned to Eliquis 5 BID   - Monitor H/H closely and for any signs/symptoms of bleeding     DVT/ PE  - Patient with acute below knee DVT and RUL PE & Recent cerebellar infiltrate  - Vascular cardio eval appreciated; f/u recs  - Awaiting MR brain results for possible AC vs IVC filter --> Started on Full dose heparin  - F/u vascular recs  - Started on Full dose heparin, monitor PTT, Hgb and for any signs/symptoms of bleeding--> if stable CT H and Hgb after 24-48 hours can transition to NOAC if cleared from Neuro standpoint   - Repeat DVT study no longer indicated as patient on full dose tx   - Monitor H/H closely and for any signs/symptoms of bleeding     Hypothyroid   - Imaging w/ Enlarged goiter   - TSH elevated, T4 low  - Was on synthroid 75 at home, increased to 88 on this hospital admission  - Repeat TFTs in 3-4 weeks outpatient   - F/u thyroid US -- as above  - Thyroid antibodies elevated ? Hoshimoto's --> F/U endo - per discussion w/ endo cont to monitor TFTS for now   - Endo curbside recs noted- cont to monitor on current dose and repeat TFTs in 1 week       HyperNa  - S/p D5 1/2 NS   - Avoid overcorrection  - Monitor closely       JESSICA  - Elevated Cr to 1.55  likely due LESLIE due to CT Chest w/ IV C  - Started on IVF at 50 cc/Hr X 24 hours for gentle IV Hydration  - Continue to monitor and trend on daily labs  - Avoid nephrotoxic agents    B/L Supraclavicular swelling  - F/U US findings  - Cont to monitor closely     Atelectasis   - Duoneb and Chest PT  - Appreciate pulm recs       HTN  - TTE EF of 75%, normal LV systolic function   - Continue with Amlodipine and Lisinopril   - Monitor BP, VS and patient closely   - Patient lethargic and unable to/ refusing medications, resume once able to       Pre-DM  - A1C of 5.9  - Outpatient follow up       Prostate   - Hyperdensity noted on CT Scan   - Check PSA-noted     Covid exposure  - Monitor patient, VS and O2 saturation closely  - If febrile recommend to check Ames Cx  - Serial Covid tests   - Precautions are per protocol   - On Full dose AC currently   Patient is a 72 year old Male with a PMHx of HTN, hypothyroidism, who presented to [facility]  via transfer from OSH. Patient originally presented to to [place] ED via EMS after family called 911 following a witnessed seizure, by family, reportedly lasting ~5min.  Enroute to [place], patient was reported to have had another 2 witnessed seizures by EMS, each lasting ~1 minute. Following each seizure the patient was given 5mg IV versed (received 10mg collectively and brought to [place] ED). Pt arrived to the ED obtunded with blood from his mouth (tongue laceration), responsive only to pain/sternal rub. Patient was subsequently intubated for GCS 6 and CT/CTA performed showing a small cerebellar hemorrhage.      Seizures  - Previously intubated, now extubated  - Neuro checks per protocol  - Monitor on Tele  - S/P LP- CSF with no growth   - Course of IVIG complete 04/22  - Management as per neurology   - Fall, seizure, aspiration precautions  - S/P MR brain; f/u neuro recs  - S/P repeat CTH as above, per neuro  - Steroid taper started 04/24 per neuro       Cerebellar hemorrhage  - Right sided hemorrhage   - MR brain as above; f/u neuro recs  - F/U repeat CT H s/p heparin gtt; monitor mental status   - Transitioned to Eliquis 5 BID   - Monitor H/H closely and for any signs/symptoms of bleeding     DVT/ PE  - Patient with acute below knee DVT and RUL PE & Recent cerebellar infiltrate  - Vascular cardio eval appreciated; f/u recs  - Awaiting MR brain results for possible AC vs IVC filter --> Started on Full dose heparin  - F/u vascular recs  - Started on Full dose heparin, monitor PTT, Hgb and for any signs/symptoms of bleeding--> if stable CT H and Hgb after 24-48 hours can transition to NOAC if cleared from Neuro standpoint   - Repeat DVT study no longer indicated as patient on full dose tx   - Monitor H/H closely and for any signs/symptoms of bleeding     Hypothyroid   - Imaging w/ Enlarged goiter   - TSH elevated, T4 low  - Was on synthroid 75 at home, increased to 88 on this hospital admission  - Repeat TFTs in 3-4 weeks outpatient   - F/u thyroid US -- as above  - Thyroid antibodies elevated ? Hoshimoto's --> F/U endo - per discussion w/ endo cont to monitor TFTS for now   - Endo curbside recs noted- cont to monitor on current dose and repeat TFTs in 1 week       HyperNa  - Na 140 on AM labs  - Improved, cont to monitor       JESSICA  - Continue to monitor and trend on daily labs  - Avoid nephrotoxic agents  - Improved to 1.30 on AM labs     B/L Supraclavicular swelling  - F/U US findings-- pending   - Cont to monitor closely     Atelectasis   - Duoneb and Chest PT  - Appreciate pulm recs       HTN  - TTE EF of 75%, normal LV systolic function   - Continue with Amlodipine and Lisinopril- ACEI increased to 40 per cardio, monitor Cr  - Monitor BP, VS and patient closely         Pre-DM  - A1C of 5.9  - Outpatient follow up       Prostate   - Hyperdensity noted on CT Scan   - Check PSA-noted     Covid exposure  - Monitor patient, VS and O2 saturation closely  - If febrile recommend to check Ames Cx  - Serial Covid tests   - Precautions are per protocol   - On Full dose AC currently      PPX  - Eliquis 5 BID  - PPI

## 2022-04-26 LAB
ALBUMIN SERPL ELPH-MCNC: 3.2 G/DL — LOW (ref 3.3–5)
ALP SERPL-CCNC: 61 U/L — SIGNIFICANT CHANGE UP (ref 40–120)
ALT FLD-CCNC: 20 U/L — SIGNIFICANT CHANGE UP (ref 10–45)
ANION GAP SERPL CALC-SCNC: 13 MMOL/L — SIGNIFICANT CHANGE UP (ref 5–17)
ANION GAP SERPL CALC-SCNC: 8 MMOL/L — SIGNIFICANT CHANGE UP (ref 5–17)
AST SERPL-CCNC: 29 U/L — SIGNIFICANT CHANGE UP (ref 10–40)
B2 GLYCOPROT1 IGA SER QL: 5.1 SAU — SIGNIFICANT CHANGE UP
B2 GLYCOPROT1 IGG SER-ACNC: 5.7 SGU — SIGNIFICANT CHANGE UP
BASOPHILS # BLD AUTO: 0 K/UL — SIGNIFICANT CHANGE UP (ref 0–0.2)
BASOPHILS NFR BLD AUTO: 0 % — SIGNIFICANT CHANGE UP (ref 0–2)
BILIRUB SERPL-MCNC: 0.3 MG/DL — SIGNIFICANT CHANGE UP (ref 0.2–1.2)
BUN SERPL-MCNC: 30 MG/DL — HIGH (ref 7–23)
BUN SERPL-MCNC: 32 MG/DL — HIGH (ref 7–23)
CALCIUM SERPL-MCNC: 7.9 MG/DL — LOW (ref 8.4–10.5)
CALCIUM SERPL-MCNC: 9.2 MG/DL — SIGNIFICANT CHANGE UP (ref 8.4–10.5)
CHLORIDE SERPL-SCNC: 109 MMOL/L — HIGH (ref 96–108)
CHLORIDE SERPL-SCNC: 98 MMOL/L — SIGNIFICANT CHANGE UP (ref 96–108)
CO2 SERPL-SCNC: 20 MMOL/L — LOW (ref 22–31)
CO2 SERPL-SCNC: 25 MMOL/L — SIGNIFICANT CHANGE UP (ref 22–31)
CREAT SERPL-MCNC: 0.83 MG/DL — SIGNIFICANT CHANGE UP (ref 0.5–1.3)
CREAT SERPL-MCNC: 1.03 MG/DL — SIGNIFICANT CHANGE UP (ref 0.5–1.3)
EGFR: 77 ML/MIN/1.73M2 — SIGNIFICANT CHANGE UP
EGFR: 93 ML/MIN/1.73M2 — SIGNIFICANT CHANGE UP
EOSINOPHIL # BLD AUTO: 0 K/UL — SIGNIFICANT CHANGE UP (ref 0–0.5)
EOSINOPHIL NFR BLD AUTO: 0 % — SIGNIFICANT CHANGE UP (ref 0–6)
GLUCOSE SERPL-MCNC: 108 MG/DL — HIGH (ref 70–99)
GLUCOSE SERPL-MCNC: 129 MG/DL — HIGH (ref 70–99)
HCT VFR BLD CALC: 34.9 % — LOW (ref 39–50)
HGB BLD-MCNC: 11.5 G/DL — LOW (ref 13–17)
LYMPHOCYTES # BLD AUTO: 17.5 % — SIGNIFICANT CHANGE UP (ref 13–44)
LYMPHOCYTES # BLD AUTO: 2.49 K/UL — SIGNIFICANT CHANGE UP (ref 1–3.3)
MAGNESIUM SERPL-MCNC: 1.7 MG/DL — SIGNIFICANT CHANGE UP (ref 1.6–2.6)
MAGNESIUM SERPL-MCNC: 1.9 MG/DL — SIGNIFICANT CHANGE UP (ref 1.6–2.6)
MANUAL SMEAR VERIFICATION: SIGNIFICANT CHANGE UP
MCHC RBC-ENTMCNC: 29.7 PG — SIGNIFICANT CHANGE UP (ref 27–34)
MCHC RBC-ENTMCNC: 33 GM/DL — SIGNIFICANT CHANGE UP (ref 32–36)
MCV RBC AUTO: 90.2 FL — SIGNIFICANT CHANGE UP (ref 80–100)
MONOCYTES # BLD AUTO: 1.25 K/UL — HIGH (ref 0–0.9)
MONOCYTES NFR BLD AUTO: 8.8 % — SIGNIFICANT CHANGE UP (ref 2–14)
NEUTROPHILS # BLD AUTO: 10.48 K/UL — HIGH (ref 1.8–7.4)
NEUTROPHILS NFR BLD AUTO: 73.7 % — SIGNIFICANT CHANGE UP (ref 43–77)
PHOSPHATE SERPL-MCNC: 2.9 MG/DL — SIGNIFICANT CHANGE UP (ref 2.5–4.5)
PHOSPHATE SERPL-MCNC: 3.5 MG/DL — SIGNIFICANT CHANGE UP (ref 2.5–4.5)
PLAT MORPH BLD: NORMAL — SIGNIFICANT CHANGE UP
PLATELET # BLD AUTO: 351 K/UL — SIGNIFICANT CHANGE UP (ref 150–400)
POTASSIUM SERPL-MCNC: 4.5 MMOL/L — SIGNIFICANT CHANGE UP (ref 3.5–5.3)
POTASSIUM SERPL-MCNC: 4.6 MMOL/L — SIGNIFICANT CHANGE UP (ref 3.5–5.3)
POTASSIUM SERPL-SCNC: 4.5 MMOL/L — SIGNIFICANT CHANGE UP (ref 3.5–5.3)
POTASSIUM SERPL-SCNC: 4.6 MMOL/L — SIGNIFICANT CHANGE UP (ref 3.5–5.3)
PROT SERPL-MCNC: 8.6 G/DL — HIGH (ref 6–8.3)
PSA FLD-MCNC: 5.07 NG/ML — HIGH (ref 0–4)
RBC # BLD: 3.87 M/UL — LOW (ref 4.2–5.8)
RBC # FLD: 14.1 % — SIGNIFICANT CHANGE UP (ref 10.3–14.5)
RBC BLD AUTO: SIGNIFICANT CHANGE UP
SODIUM SERPL-SCNC: 136 MMOL/L — SIGNIFICANT CHANGE UP (ref 135–145)
SODIUM SERPL-SCNC: 137 MMOL/L — SIGNIFICANT CHANGE UP (ref 135–145)
WBC # BLD: 14.22 K/UL — HIGH (ref 3.8–10.5)
WBC # FLD AUTO: 14.22 K/UL — HIGH (ref 3.8–10.5)

## 2022-04-26 PROCEDURE — 99232 SBSQ HOSP IP/OBS MODERATE 35: CPT

## 2022-04-26 PROCEDURE — 99232 SBSQ HOSP IP/OBS MODERATE 35: CPT | Mod: GC

## 2022-04-26 RX ORDER — LISINOPRIL 2.5 MG/1
40 TABLET ORAL DAILY
Refills: 0 | Status: DISCONTINUED | OUTPATIENT
Start: 2022-04-27 | End: 2022-05-04

## 2022-04-26 RX ORDER — LEVOTHYROXINE SODIUM 125 MCG
1 TABLET ORAL
Qty: 0 | Refills: 0 | DISCHARGE
Start: 2022-04-26

## 2022-04-26 RX ORDER — LISINOPRIL 2.5 MG/1
1 TABLET ORAL
Qty: 0 | Refills: 0 | DISCHARGE

## 2022-04-26 RX ORDER — LISINOPRIL 2.5 MG/1
20 TABLET ORAL ONCE
Refills: 0 | Status: COMPLETED | OUTPATIENT
Start: 2022-04-26 | End: 2022-04-26

## 2022-04-26 RX ORDER — RISPERIDONE 4 MG/1
1 TABLET ORAL
Qty: 0 | Refills: 0 | DISCHARGE
Start: 2022-04-26

## 2022-04-26 RX ORDER — IPRATROPIUM/ALBUTEROL SULFATE 18-103MCG
3 AEROSOL WITH ADAPTER (GRAM) INHALATION
Qty: 0 | Refills: 0 | DISCHARGE
Start: 2022-04-26

## 2022-04-26 RX ORDER — SENNA PLUS 8.6 MG/1
2 TABLET ORAL
Qty: 0 | Refills: 0 | DISCHARGE
Start: 2022-04-26

## 2022-04-26 RX ORDER — LISINOPRIL 2.5 MG/1
1 TABLET ORAL
Qty: 0 | Refills: 0 | DISCHARGE
Start: 2022-04-26

## 2022-04-26 RX ORDER — POLYETHYLENE GLYCOL 3350 17 G/17G
17 POWDER, FOR SOLUTION ORAL
Qty: 0 | Refills: 0 | DISCHARGE
Start: 2022-04-26

## 2022-04-26 RX ORDER — LACOSAMIDE 50 MG/1
150 TABLET ORAL
Refills: 0 | Status: DISCONTINUED | OUTPATIENT
Start: 2022-04-26 | End: 2022-05-04

## 2022-04-26 RX ORDER — QUETIAPINE FUMARATE 200 MG/1
1 TABLET, FILM COATED ORAL
Qty: 0 | Refills: 0 | DISCHARGE
Start: 2022-04-26

## 2022-04-26 RX ORDER — LISINOPRIL 2.5 MG/1
20 TABLET ORAL DAILY
Refills: 0 | Status: DISCONTINUED | OUTPATIENT
Start: 2022-04-26 | End: 2022-04-26

## 2022-04-26 RX ORDER — LEVOTHYROXINE SODIUM 125 MCG
1 TABLET ORAL
Qty: 0 | Refills: 0 | DISCHARGE

## 2022-04-26 RX ORDER — LACOSAMIDE 50 MG/1
1 TABLET ORAL
Qty: 0 | Refills: 0 | DISCHARGE
Start: 2022-04-26

## 2022-04-26 RX ORDER — APIXABAN 2.5 MG/1
1 TABLET, FILM COATED ORAL
Qty: 0 | Refills: 0 | DISCHARGE
Start: 2022-04-26

## 2022-04-26 RX ORDER — PANTOPRAZOLE SODIUM 20 MG/1
1 TABLET, DELAYED RELEASE ORAL
Qty: 0 | Refills: 0 | DISCHARGE
Start: 2022-04-26

## 2022-04-26 RX ORDER — AMLODIPINE BESYLATE 2.5 MG/1
1 TABLET ORAL
Qty: 0 | Refills: 0 | DISCHARGE
Start: 2022-04-26

## 2022-04-26 RX ADMIN — Medication 10 MILLIGRAM(S): at 05:07

## 2022-04-26 RX ADMIN — Medication 105 MILLIGRAM(S): at 21:50

## 2022-04-26 RX ADMIN — QUETIAPINE FUMARATE 50 MILLIGRAM(S): 200 TABLET, FILM COATED ORAL at 21:46

## 2022-04-26 RX ADMIN — Medication 105 MILLIGRAM(S): at 12:01

## 2022-04-26 RX ADMIN — Medication 3 MILLILITER(S): at 23:02

## 2022-04-26 RX ADMIN — SENNA PLUS 2 TABLET(S): 8.6 TABLET ORAL at 21:46

## 2022-04-26 RX ADMIN — LACOSAMIDE 150 MILLIGRAM(S): 50 TABLET ORAL at 17:05

## 2022-04-26 RX ADMIN — APIXABAN 5 MILLIGRAM(S): 2.5 TABLET, FILM COATED ORAL at 05:07

## 2022-04-26 RX ADMIN — Medication 3 MILLILITER(S): at 17:04

## 2022-04-26 RX ADMIN — Medication 105 MILLIGRAM(S): at 05:08

## 2022-04-26 RX ADMIN — Medication 3 MILLILITER(S): at 05:08

## 2022-04-26 RX ADMIN — POLYETHYLENE GLYCOL 3350 17 GRAM(S): 17 POWDER, FOR SOLUTION ORAL at 05:08

## 2022-04-26 RX ADMIN — LACOSAMIDE 130 MILLIGRAM(S): 50 TABLET ORAL at 05:08

## 2022-04-26 RX ADMIN — Medication 3 MILLILITER(S): at 11:03

## 2022-04-26 RX ADMIN — AMLODIPINE BESYLATE 10 MILLIGRAM(S): 2.5 TABLET ORAL at 05:07

## 2022-04-26 RX ADMIN — RISPERIDONE 0.5 MILLIGRAM(S): 4 TABLET ORAL at 08:17

## 2022-04-26 RX ADMIN — LISINOPRIL 20 MILLIGRAM(S): 2.5 TABLET ORAL at 05:07

## 2022-04-26 RX ADMIN — APIXABAN 5 MILLIGRAM(S): 2.5 TABLET, FILM COATED ORAL at 17:05

## 2022-04-26 RX ADMIN — Medication 88 MICROGRAM(S): at 05:07

## 2022-04-26 RX ADMIN — LISINOPRIL 20 MILLIGRAM(S): 2.5 TABLET ORAL at 10:42

## 2022-04-26 RX ADMIN — PANTOPRAZOLE SODIUM 40 MILLIGRAM(S): 20 TABLET, DELAYED RELEASE ORAL at 08:17

## 2022-04-26 NOTE — PROGRESS NOTE ADULT - ASSESSMENT
71 yo male with a PMHx of HTN, Hypothyroidism, and Diverticulosis (requiring blood transfusions x2 8 yrs ago) who presented to [facility] on 4/12 as a transfer from [place] for ICH. Patient LKN was at 2200pm on 4/11. Patient was noted on 4/12 @ 0200 to be obtunded with saliva coming out of his mouth. EMS was called, patient came back to baseline and did not want to go to hospital and EMS left. Patient then had a seizure-like event witnessed by wife with UE shaking, foaming at the mouth, and urinary/fecal incontinence. Neurology consulted for new onset seizures in the setting of ICH while patient was in NSCU. No seizures seen on EEG thus far during this admission. Transferred from NSCU to EMU on 4/15 after stable IPH noted. MRI Head w/wo shows R cerebellar IPH vs calcification.    Impression: New onset of seizures of unknown cause. R cerebellar density IPH vs calcification.      Plan:  [] COVID PCR for exposure  [] Awaiting ROEL placement- 2 more days of Quarrantine  [x] Neuro checks, vitals Q4HR.  [x] Telemetry.  [x] Seizure/Fall/Aspiration precautions.  [x] SBP goal: <150.  [x] Off EEG.  [x] MRI Head w/wo: results as above.  [x] 4/18 s/p LP: CSF glucose 75 <H>, protein 69 <H>, TNC 1, culture NG.  [x] 4/15 VA Duplex lower extremities: acute L soleal vein DVT.  [x] s/p IVIG QD x5 days (last day 4/22).  [x] s/p Solumedrol 1G IV QD x5 days  [x] Steroid taper started on 4/24. Solumedrol 60mg today then decrease by 10mg daily.    c/w GI PPx while on steroids.   [x] Increase to Vimpat 150MG IV Q12HR.  [x] c/w Seroquel 100MG PO QHS.  [x] c/w Risperidone 0.5MG PO QD.  [x] Cardiology following (Dr. Fragoso), c/w Lisinopril 20MG PO QD, Hydralazine 10MG PO Q4HR.  [x] Pulmonology following, recommendations appreciated.  [x] Medicine following (Dr. Bernabe): f/u US of neck soft tissue.  [x] 4/16 CTA Chest PE Protocol: as above, patient with small R upper lobe segmental branch PE.  [x] Vascular Cardiology following (Dr. Perera): on Eliquis 5MG PO Q12HR for acute PE.  [x] DVT PPx: on full dose AC for PE.  [x] Diet: Regular + Ensure.  [x] Seizure Rescue: Ativan 1MG IVP x1 for convulsions/GTCs lasting > 3 minutes, VPA 1G IV x1 for convulsions/GTCs that are refractory to Ativan.    CORE MEASURES:        AED levels [] Sent [] Pending [] Resulted     LFTs [] normal [] elevated      Plan and education provided to [] patient []family at bedside [] awaiting for family     Seizure Semiology  [] Tonic clonic  [] Clonic  [] Tonic  [] Unresponsive  [] Focal with impaired awareness  [] Focal without impaired awareness    Obtain screening lower extremity venous ultrasound in patients who meet 1 or more of the following criteria as patient is high risk for DVT/PE on admission:   [] History of DVT/PE  []Hypercoagulable states (Factor V Leiden, Cancer, OCP, etc. )  []Prolonged immobility (hemiplegia/hemiparesis/post operative or any other extended immobilization)  [] Transferred from outside facility (Rehab or Long term care)    Case discussed w/ neuro attending Dr. Maldonado

## 2022-04-26 NOTE — PROGRESS NOTE ADULT - ASSESSMENT
72M PMH htn, hypothyroidism, presented to [place] ED via EMS after family called 911 following a witnessed seizure, by family, reportedly lasting ~5min.

## 2022-04-26 NOTE — PROGRESS NOTE ADULT - SUBJECTIVE AND OBJECTIVE BOX
Subjective: No overnight events. No complaints at this time      MEDICATIONS  (STANDING):  albuterol/ipratropium for Nebulization 3 milliLiter(s) Nebulizer every 6 hours  amLODIPine   Tablet 10 milliGRAM(s) Oral daily  apixaban 5 milliGRAM(s) Oral two times a day  lacosamide IVPB 150 milliGRAM(s) IV Intermittent every 12 hours  levothyroxine 88 MICROGram(s) Oral daily  lisinopril 20 milliGRAM(s) Oral daily  pantoprazole    Tablet 40 milliGRAM(s) Oral before breakfast  polyethylene glycol 3350 17 Gram(s) Oral two times a day  predniSONE   Tablet 70 milliGRAM(s) Oral once  predniSONE   Tablet 60 milliGRAM(s) Oral once  QUEtiapine 50 milliGRAM(s) Oral at bedtime  risperiDONE   Tablet 0.5 milliGRAM(s) Oral <User Schedule>  senna 2 Tablet(s) Oral at bedtime  sodium chloride 0.9%. 1000 milliLiter(s) (50 mL/Hr) IV Continuous <Continuous>  sodium chloride 3%  Inhalation 3 milliLiter(s) Inhalation every 6 hours  thiamine IVPB 500 milliGRAM(s) IV Intermittent every 8 hours    MEDICATIONS  (PRN):  hydrALAZINE 10 milliGRAM(s) Oral every 4 hours PRN Systolic blood pressure < 160  LORazepam   Injectable 1 milliGRAM(s) IV Push once PRN Seizure  valproate sodium IVPB 1000 milliGRAM(s) IV Intermittent once PRN Seizure    Vital Signs Last 24 Hrs  T(C): 36.8 (26 Apr 2022 05:00), Max: 37.1 (25 Apr 2022 12:58)  T(F): 98.2 (26 Apr 2022 05:00), Max: 98.8 (25 Apr 2022 21:46)  HR: 96 (26 Apr 2022 05:00) (95 - 101)  BP: 170/72 (26 Apr 2022 05:00) (132/74 - 184/91)  BP(mean): --  RR: 18 (26 Apr 2022 05:00) (18 - 18)  SpO2: 96% (26 Apr 2022 05:00) (93% - 96%)    GENERAL EXAM:  Constitutional: awake and alert. NAD  HEENT: PERRL, EOMI    NEUROLOGICAL EXAM:  MS: AAOX2 to person and place. ( year 2023 and month August). Speech is fluent, Follows commands.   CN: VFF, EOMI, PERRL,  V1-3 intact, no facial asymmetry, t/p midline,  Motor: Strength: 5/5 in the UE/ LE  Tone: normal. Bulk: normal.    Plantar flex b/l.   Sensation: intact to LT/Temperature 4x.   Coordination: FTN intact   Gait:  Deferred      Radiology:  EEG 4/20  EEG Summary:  Abnormal EEG in the awake, drowsy and asleep states.  -Mild generalized slowing  Impression/Clinical Correlate:  This is an abnormal EEG record. There is evidence of mild multifocal/diffuse nonspecific cerebral dysfunction, not specific for etiology. No epileptiform pattern or seizures were seen.

## 2022-04-26 NOTE — PROGRESS NOTE ADULT - PROBLEM SELECTOR PLAN 3
required cardene gtt  SBP goal <150  c/w amlodipine 10mg PO daily  c/w lisinopril to 40mg PO daily  start labetalol 200mg PO TID  c/w hydralazine 10mg PO q4hrs PRN

## 2022-04-26 NOTE — PROGRESS NOTE ADULT - SUBJECTIVE AND OBJECTIVE BOX
Follow-up Pulm Progress Note    alert, OOB to chair  Sats 95% RA  Denies SOB/CP     Medications:  MEDICATIONS  (STANDING):  albuterol/ipratropium for Nebulization 3 milliLiter(s) Nebulizer every 6 hours  amLODIPine   Tablet 10 milliGRAM(s) Oral daily  apixaban 5 milliGRAM(s) Oral two times a day  lacosamide 150 milliGRAM(s) Oral two times a day  levothyroxine 88 MICROGram(s) Oral daily  pantoprazole    Tablet 40 milliGRAM(s) Oral before breakfast  polyethylene glycol 3350 17 Gram(s) Oral two times a day  predniSONE   Tablet 70 milliGRAM(s) Oral once  predniSONE   Tablet 50 milliGRAM(s) Oral once  QUEtiapine 50 milliGRAM(s) Oral at bedtime  risperiDONE   Tablet 0.5 milliGRAM(s) Oral <User Schedule>  senna 2 Tablet(s) Oral at bedtime  sodium chloride 0.9%. 1000 milliLiter(s) (50 mL/Hr) IV Continuous <Continuous>  thiamine IVPB 500 milliGRAM(s) IV Intermittent every 8 hours    MEDICATIONS  (PRN):  hydrALAZINE 10 milliGRAM(s) Oral every 4 hours PRN Systolic blood pressure < 160  LORazepam   Injectable 1 milliGRAM(s) IV Push once PRN Seizure  valproate sodium IVPB 1000 milliGRAM(s) IV Intermittent once PRN Seizure          Vital Signs Last 24 Hrs  T(C): 36.8 (26 Apr 2022 09:36), Max: 37.1 (25 Apr 2022 12:58)  T(F): 98.2 (26 Apr 2022 09:36), Max: 98.8 (25 Apr 2022 21:46)  HR: 66 (26 Apr 2022 09:36) (66 - 101)  BP: 168/79 (26 Apr 2022 09:36) (150/75 - 184/91)  BP(mean): --  RR: 18 (26 Apr 2022 09:36) (18 - 18)  SpO2: 95% (26 Apr 2022 09:36) (93% - 96%)          04-25 @ 07:01  -  04-26 @ 07:00  --------------------------------------------------------  IN: 600 mL / OUT: 300 mL / NET: 300 mL          LABS:                CAPILLARY BLOOD GLUCOSE      POCT Blood Glucose.: 166 mg/dL (24 Apr 2022 16:18)            DAIANA 1:160 04-19 @ 10:05  Anti SS-1 0.6  Anti SS-2 <0.2  Anti RNP 0.8  RF -- 04-19 @ 10:05    Atypical ANCA Negative 04-19 @ 10:05  c-ANCA titer -- 04-19 @ 10:05  c-ANCA Negative 04-19 @ 10:05  p-ANCA Negative 04-19 @ 10:05  DAIANA -- 04-19 @ 09:09  Anti SS-1 --  Anti SS-2 --  Anti RNP --  RF <10 04-19 @ 09:09    Atypical ANCA -- 04-19 @ 09:09  c-ANCA titer -- 04-19 @ 09:09  c-ANCA -- 04-19 @ 09:09  p-ANCA -- 04-19 @ 09:09                  Physical Examination:  PULM: scattered rhonchi   CVS: S1, S2 heard    RADIOLOGY REVIEWED  CT chest: < from: CT Chest w/ IV Cont (04.20.22 @ 17:16) >  FINDINGS:  CHEST:  LUNGS AND LARGE AIRWAYS: Tracheal secretions. Near complete atelectasis   of the right lower lobe. Atelectatic changes, though to a lesser degree,   involving the middle and left lower lobes.  PLEURA: No pleural effusion.  VESSELS: The known pulmonary embolus in the right upper lobe identified   on the prior CTA of 4/16/2022 is not well seen on the current exam, which   is not tailored for the assessment of the pulmonary arterial system.  HEART: Heart size is normal. Aortic valve calcification. Trace   pericardial effusion, similar to the prior exam.  MEDIASTINUM AND SIMONE: No lymphadenopathy.  CHEST WALL AND LOWER NECK: Enlarged thyroid with mild tracheal narrowing,   similar to the prior exam.    ABDOMEN AND PELVIS:  Mild motion degradation in the mid to lower abdomen.    LIVER: Within normal limits.  BILE DUCTS: Normal caliber.  GALLBLADDER: Mild layering hyperdensity within the gallbladder, which may   reflect vicarious excretion of contrast or sludge.  SPLEEN: Within normal limits.  PANCREAS: Within normal limits.  ADRENALS: Within normal limits.  KIDNEYS/URETERS: A few subcentimeter hypodense foci within the right   kidney that are too small to characterize. No hydronephrosis.    BLADDER: Underdistended. Mild perivesicular fat stranding.  REPRODUCTIVE ORGANS: Mildly enlarged prostate. There is a 7 mm hyperdense   nodular focus within the region of the lateral mid right peripheral zone   (series 3 image 280).    BOWEL: No bowel obstruction. Colonic diverticulosis. Appendix is normal.  PERITONEUM: No ascites.  VESSELS: Atherosclerotic changes.  RETROPERITONEUM/LYMPH NODES: No lymphadenopathy.  ABDOMINALWALL: Small umbilical hernia containing a portion of transverse   colon.  BONES: Degenerative changes.    IMPRESSION:  Subcentimeter hyperdense nodular focus in the right aspect of the   prostate gland, indeterminate but potentially an exophytic BPH nodule.   Neoplasm is not excluded. Suggest correlation with PSA, and consider   further evaluation with prostate MRI.    Mild perivesicular fat stranding. Question cystitis. Correlate with   urinalysis.    Right greater than left lung base atelectasis, with near complete   atelectasis of the right lower lobe.    < end of copied text >

## 2022-04-26 NOTE — PROGRESS NOTE ADULT - SUBJECTIVE AND OBJECTIVE BOX
Name of Patient : PEPITO BALL  MRN: [id number]  Date of visit: 04-26-22 @ 16:47      Subjective: Patient seen and examined. No new events except as noted.   Patient seen earlier this AM. OOB TC. Denies CP, palpitations, SOB or dyspnea. Denies abdominal pain or discomfort.   Wants to go home.   No complaints     REVIEW OF SYSTEMS:    CONSTITUTIONAL: No weakness, fevers or chills  EYES/ENT: No visual changes;  No vertigo or throat pain   NECK: No pain or stiffness  RESPIRATORY: No cough, wheezing, hemoptysis; No shortness of breath  CARDIOVASCULAR: No chest pain or palpitations  GASTROINTESTINAL: No abdominal or epigastric pain. No nausea, vomiting, or hematemesis; No diarrhea or constipation. No melena or hematochezia.  GENITOURINARY: No dysuria, frequency or hematuria  NEUROLOGICAL: No numbness or weakness  SKIN: No itching, burning, rashes, or lesions   All other review of systems is negative unless indicated above.    MEDICATIONS:  MEDICATIONS  (STANDING):  albuterol/ipratropium for Nebulization 3 milliLiter(s) Nebulizer every 6 hours  amLODIPine   Tablet 10 milliGRAM(s) Oral daily  apixaban 5 milliGRAM(s) Oral two times a day  lacosamide 150 milliGRAM(s) Oral two times a day  levothyroxine 88 MICROGram(s) Oral daily  pantoprazole    Tablet 40 milliGRAM(s) Oral before breakfast  polyethylene glycol 3350 17 Gram(s) Oral two times a day  predniSONE   Tablet 70 milliGRAM(s) Oral once  predniSONE   Tablet 50 milliGRAM(s) Oral once  QUEtiapine 50 milliGRAM(s) Oral at bedtime  risperiDONE   Tablet 0.5 milliGRAM(s) Oral <User Schedule>  senna 2 Tablet(s) Oral at bedtime  sodium chloride 0.9%. 1000 milliLiter(s) (50 mL/Hr) IV Continuous <Continuous>  thiamine IVPB 500 milliGRAM(s) IV Intermittent every 8 hours      PHYSICAL EXAM:  T(C): 36.9 (04-26-22 @ 12:34), Max: 37.1 (04-25-22 @ 21:46)  HR: 87 (04-26-22 @ 12:34) (66 - 101)  BP: 130/75 (04-26-22 @ 12:34) (130/75 - 184/91)  RR: 18 (04-26-22 @ 12:34) (18 - 18)  SpO2: 95% (04-26-22 @ 12:34) (93% - 96%)  Wt(kg): --  I&O's Summary    25 Apr 2022 07:01  -  26 Apr 2022 07:00  --------------------------------------------------------  IN: 600 mL / OUT: 300 mL / NET: 300 mL    26 Apr 2022 07:01  -  26 Apr 2022 16:47  --------------------------------------------------------  IN: 100 mL / OUT: 400 mL / NET: -300 mL          Appearance: Normal	  HEENT:  PERRLA   Lymphatic: No lymphadenopathy   Cardiovascular: Normal S1 S2, no JVD  Respiratory: normal effort , clear  Gastrointestinal:  Soft, Non-tender  Skin: No rashes,  warm to touch  Psychiatry:  Mood & affect appropriate  Musculoskeletal: No edema        04-25-22 @ 07:01  -  04-26-22 @ 07:00  --------------------------------------------------------  IN: 600 mL / OUT: 300 mL / NET: 300 mL    04-26-22 @ 07:01  -  04-26-22 @ 16:47  --------------------------------------------------------  IN: 100 mL / OUT: 400 mL / NET: -300 mL                                11.5   14.22 )-----------( 351      ( 26 Apr 2022 15:32 )             34.9               04-26    136  |  98  |  32<H>  ----------------------------<  129<H>  4.6   |  25  |  1.03    Ca    9.2      26 Apr 2022 15:32  Phos  3.5     04-26  Mg     1.9     04-26    TPro  8.6<H>  /  Alb  3.2<L>  /  TBili  0.3  /  DBili  x   /  AST  29  /  ALT  20  /  AlkPhos  61  04-26

## 2022-04-26 NOTE — PROGRESS NOTE ADULT - SUBJECTIVE AND OBJECTIVE BOX
Subjective: Patient seen and examined. No new events except as noted.     REVIEW OF SYSTEMS:    CONSTITUTIONAL: + weakness, fevers or chills  EYES/ENT: No visual changes;  No vertigo or throat pain   NECK: No pain or stiffness  RESPIRATORY: No cough, wheezing, hemoptysis; No shortness of breath  CARDIOVASCULAR: No chest pain or palpitations  GASTROINTESTINAL: No abdominal or epigastric pain. No nausea, vomiting, or hematemesis; No diarrhea or constipation. No melena or hematochezia.  GENITOURINARY: No dysuria, frequency or hematuria  NEUROLOGICAL: No numbness or weakness  SKIN: No itching, burning, rashes, or lesions   All other review of systems is negative unless indicated above.    MEDICATIONS:  MEDICATIONS  (STANDING):  albuterol/ipratropium for Nebulization 3 milliLiter(s) Nebulizer every 6 hours  amLODIPine   Tablet 10 milliGRAM(s) Oral daily  apixaban 5 milliGRAM(s) Oral two times a day  lacosamide IVPB 150 milliGRAM(s) IV Intermittent every 12 hours  levothyroxine 88 MICROGram(s) Oral daily  pantoprazole    Tablet 40 milliGRAM(s) Oral before breakfast  polyethylene glycol 3350 17 Gram(s) Oral two times a day  predniSONE   Tablet 70 milliGRAM(s) Oral once  predniSONE   Tablet 50 milliGRAM(s) Oral once  QUEtiapine 50 milliGRAM(s) Oral at bedtime  risperiDONE   Tablet 0.5 milliGRAM(s) Oral <User Schedule>  senna 2 Tablet(s) Oral at bedtime  sodium chloride 0.9%. 1000 milliLiter(s) (50 mL/Hr) IV Continuous <Continuous>  thiamine IVPB 500 milliGRAM(s) IV Intermittent every 8 hours    PHYSICAL EXAM:  T(C): 36.8 (04-26-22 @ 09:36), Max: 37.1 (04-25-22 @ 12:58)  HR: 66 (04-26-22 @ 09:36) (66 - 101)  BP: 168/79 (04-26-22 @ 09:36) (150/75 - 184/91)  RR: 18 (04-26-22 @ 09:36) (18 - 18)  SpO2: 95% (04-26-22 @ 09:36) (93% - 96%)  Wt(kg): --  I&O's Summary    25 Apr 2022 07:01  -  26 Apr 2022 07:00  --------------------------------------------------------  IN: 600 mL / OUT: 300 mL / NET: 300 mL    Appearance: NAD  HEENT: Normal oral mucosa, PERRL, EOMI	  Lymphatic: No lymphadenopathy , no edema  Cardiovascular: Normal S1 S2, No JVD, No murmurs , Peripheral pulses palpable 2+ bilaterally  Respiratory: Lungs clear to auscultation, normal effort 	  Gastrointestinal:  Soft, Non-tender, + BS	  Skin: No rashes, No ecchymoses, No cyanosis, warm to touch  NEUROLOGICAL EXAM:  MS: AAOX2 to person and place. ( year 2023 and month August). Speech is fluent, Follows commands.   CN: VFF, EOMI, PERRL,  V1-3 intact, no facial asymmetry, t/p midline,  Motor: Strength: 5/5 in the UE/ LE  Tone: normal. Bulk: normal.   Plantar flex b/l.   Sensation: intact to LT/Temperature 4x.   Coordination: FTN intact   Gait:  Deferred    Ext: No edema    LABS:    CARDIAC MARKERS:    proBNP:   Lipid Profile:   HgA1c:   TSH:     TELEMETRY: 	    ECG:  	  RADIOLOGY:   DIAGNOSTIC TESTING:  [ ] Echocardiogram:  [ ]  Catheterization:  [ ] Stress Test:    OTHER:

## 2022-04-26 NOTE — PROGRESS NOTE ADULT - ASSESSMENT
Patient is a 72 year old Male with a PMHx of HTN, hypothyroidism, who presented to [facility]  via transfer from OSH. Patient originally presented to to [place] ED via EMS after family called 911 following a witnessed seizure, by family, reportedly lasting ~5min.  Enroute to [place], patient was reported to have had another 2 witnessed seizures by EMS, each lasting ~1 minute. Following each seizure the patient was given 5mg IV versed (received 10mg collectively and brought to [place] ED). Pt arrived to the ED obtunded with blood from his mouth (tongue laceration), responsive only to pain/sternal rub. Patient was subsequently intubated for GCS 6 and CT/CTA performed showing a small cerebellar hemorrhage.      Seizures  - Previously intubated, now extubated  - Neuro checks per protocol  - Monitor on Tele  - S/P LP- CSF with no growth   - Course of IVIG complete 04/22  - Management as per neurology   - Fall, seizure, aspiration precautions  - S/P MR brain; f/u neuro recs  - S/P repeat CTH as above, per neuro  - Steroid taper started 04/24 per neuro     Cerebellar hemorrhage  - Right sided hemorrhage   - MR brain as above; f/u neuro recs  - F/U repeat CT H s/p heparin gtt; monitor mental status   - Transitioned to Eliquis 5 BID   - Monitor H/H closely and for any signs/symptoms of bleeding     DVT/ PE  - Patient with acute below knee DVT and RUL PE & Recent cerebellar infiltrate  - Vascular cardio eval appreciated; f/u recs  - Awaiting MR brain results for possible AC vs IVC filter --> now on Eliquis   - F/u vascular recs  - Repeat DVT study no longer indicated as patient on full dose tx   - Monitor H/H closely and for any signs/symptoms of bleeding     Hypothyroid   - Imaging w/ Enlarged goiter   - TSH elevated, T4 low  - Was on synthroid 75 at home, increased to 88 on this hospital admission  - Repeat TFTs in 3-4 weeks outpatient   - F/u thyroid US -- as above  - Thyroid antibodies elevated ? Paulinahimoto's --> F/U endo - per discussion w/ endo cont to monitor TFTS for now   - Endo curbside recs noted- cont to monitor on current dose and repeat TFTs in 1 week     Leukocytosis  - WBC of 14 noted onAM labs  - Likely reactive to steroid taper started in 04/24  - Cont to monitor and trend  - Monitor CBC, temp curve, VS and patient closely  - If febrile, recommend to check pan cx    HyperNa  - Na 136 on AM labs  - Improved, cont to monitor     JESSICA  - Continue to monitor and trend on daily labs  - Avoid nephrotoxic agents  - Improved to 1.03 on AM labs     B/L Supraclavicular swelling  - Outpatient PCP follow up   - Cont to monitor closely     Atelectasis   - Duoneb and Chest PT  - Appreciate pulm recs     HTN  - TTE EF of 75%, normal LV systolic function   - Continue with Amlodipine and Lisinopril- ACEI increased to 40 per cardio, monitor Cr  - Monitor BP, VS and patient closely     Pre-DM  - A1C of 5.9  - Outpatient follow up     Prostate   - Hyperdensity noted on CT Scan   - Check PSA-noted   - Outpatient follow up     Covid exposure  - Monitor patient, VS and O2 saturation closely  - If febrile recommend to check [place] Cx  - Serial Covid tests   - Precautions are per protocol   - On Full dose AC currently  - 2 more days of quarantine with plan to D/C to [facility]      PPX  - Eliquis 5 BID  - PPI

## 2022-04-26 NOTE — PROGRESS NOTE ADULT - PROBLEM SELECTOR PLAN 3
CTA chest 4/16 with RLL atelectasis. CT chest 4/20 with increase in RLL atelectasis.  -Zosyn d/c'd  -Chest congestion at times. Now much improved.  -Continue Duoneb q6h, change to q6h PRN on discharge.   -Chest PT q6h  -Keep sats >90% with O2 PRN (currently RA).  -Pt previously with agitation at times, waxing/waning mental status. Monitor closely for fevers, increase in WBC. Mental status now much improved.

## 2022-04-27 DIAGNOSIS — N39.0 URINARY TRACT INFECTION, SITE NOT SPECIFIED: ICD-10-CM

## 2022-04-27 LAB
ALBUMIN SERPL ELPH-MCNC: 2.9 G/DL — LOW (ref 3.3–5)
ALP SERPL-CCNC: 69 U/L — SIGNIFICANT CHANGE UP (ref 40–120)
ALT FLD-CCNC: 17 U/L — SIGNIFICANT CHANGE UP (ref 10–45)
ANION GAP SERPL CALC-SCNC: 12 MMOL/L — SIGNIFICANT CHANGE UP (ref 5–17)
APPEARANCE UR: ABNORMAL
AST SERPL-CCNC: 23 U/L — SIGNIFICANT CHANGE UP (ref 10–40)
B2 GLYCOPROT1 IGM SER-ACNC: 14.5 SMU — SIGNIFICANT CHANGE UP
BACTERIA # UR AUTO: ABNORMAL
BASOPHILS # BLD AUTO: 0.03 K/UL — SIGNIFICANT CHANGE UP (ref 0–0.2)
BASOPHILS NFR BLD AUTO: 0.1 % — SIGNIFICANT CHANGE UP (ref 0–2)
BILIRUB SERPL-MCNC: 0.3 MG/DL — SIGNIFICANT CHANGE UP (ref 0.2–1.2)
BILIRUB UR-MCNC: NEGATIVE — SIGNIFICANT CHANGE UP
BUN SERPL-MCNC: 33 MG/DL — HIGH (ref 7–23)
CALCIUM SERPL-MCNC: 9.1 MG/DL — SIGNIFICANT CHANGE UP (ref 8.4–10.5)
CHLORIDE SERPL-SCNC: 97 MMOL/L — SIGNIFICANT CHANGE UP (ref 96–108)
CO2 SERPL-SCNC: 24 MMOL/L — SIGNIFICANT CHANGE UP (ref 22–31)
COLOR SPEC: YELLOW — SIGNIFICANT CHANGE UP
CREAT SERPL-MCNC: 1.24 MG/DL — SIGNIFICANT CHANGE UP (ref 0.5–1.3)
DIFF PNL FLD: ABNORMAL
EGFR: 62 ML/MIN/1.73M2 — SIGNIFICANT CHANGE UP
EOSINOPHIL # BLD AUTO: 0 K/UL — SIGNIFICANT CHANGE UP (ref 0–0.5)
EOSINOPHIL NFR BLD AUTO: 0 % — SIGNIFICANT CHANGE UP (ref 0–6)
EPI CELLS # UR: 1 /HPF — SIGNIFICANT CHANGE UP
GLUCOSE BLDC GLUCOMTR-MCNC: 381 MG/DL — HIGH (ref 70–99)
GLUCOSE BLDC GLUCOMTR-MCNC: 386 MG/DL — HIGH (ref 70–99)
GLUCOSE BLDC GLUCOMTR-MCNC: 464 MG/DL — CRITICAL HIGH (ref 70–99)
GLUCOSE SERPL-MCNC: 288 MG/DL — HIGH (ref 70–99)
GLUCOSE UR QL: ABNORMAL
HCT VFR BLD CALC: 36.6 % — LOW (ref 39–50)
HGB BLD-MCNC: 11.8 G/DL — LOW (ref 13–17)
HYALINE CASTS # UR AUTO: 3 /LPF — HIGH (ref 0–2)
IMM GRANULOCYTES NFR BLD AUTO: 1.4 % — SIGNIFICANT CHANGE UP (ref 0–1.5)
KETONES UR-MCNC: NEGATIVE — SIGNIFICANT CHANGE UP
LEUKOCYTE ESTERASE UR-ACNC: ABNORMAL
LYMPHOCYTES # BLD AUTO: 0.76 K/UL — LOW (ref 1–3.3)
LYMPHOCYTES # BLD AUTO: 3.6 % — LOW (ref 13–44)
MCHC RBC-ENTMCNC: 29.4 PG — SIGNIFICANT CHANGE UP (ref 27–34)
MCHC RBC-ENTMCNC: 32.2 GM/DL — SIGNIFICANT CHANGE UP (ref 32–36)
MCV RBC AUTO: 91.3 FL — SIGNIFICANT CHANGE UP (ref 80–100)
MONOCYTES # BLD AUTO: 1.69 K/UL — HIGH (ref 0–0.9)
MONOCYTES NFR BLD AUTO: 8.1 % — SIGNIFICANT CHANGE UP (ref 2–14)
NEUTROPHILS # BLD AUTO: 18.13 K/UL — HIGH (ref 1.8–7.4)
NEUTROPHILS NFR BLD AUTO: 86.8 % — HIGH (ref 43–77)
NITRITE UR-MCNC: POSITIVE
NRBC # BLD: 0 /100 WBCS — SIGNIFICANT CHANGE UP (ref 0–0)
PH UR: 6 — SIGNIFICANT CHANGE UP (ref 5–8)
PLATELET # BLD AUTO: 338 K/UL — SIGNIFICANT CHANGE UP (ref 150–400)
POTASSIUM SERPL-MCNC: 4.5 MMOL/L — SIGNIFICANT CHANGE UP (ref 3.5–5.3)
POTASSIUM SERPL-SCNC: 4.5 MMOL/L — SIGNIFICANT CHANGE UP (ref 3.5–5.3)
PROT SERPL-MCNC: 8 G/DL — SIGNIFICANT CHANGE UP (ref 6–8.3)
PROT UR-MCNC: 100 — SIGNIFICANT CHANGE UP
RBC # BLD: 4.01 M/UL — LOW (ref 4.2–5.8)
RBC # FLD: 14.2 % — SIGNIFICANT CHANGE UP (ref 10.3–14.5)
RBC CASTS # UR COMP ASSIST: 8 /HPF — HIGH (ref 0–4)
SARS-COV-2 RNA SPEC QL NAA+PROBE: SIGNIFICANT CHANGE UP
SODIUM SERPL-SCNC: 133 MMOL/L — LOW (ref 135–145)
SP GR SPEC: 1.02 — SIGNIFICANT CHANGE UP (ref 1.01–1.02)
UROBILINOGEN FLD QL: NEGATIVE — SIGNIFICANT CHANGE UP
WBC # BLD: 20.9 K/UL — HIGH (ref 3.8–10.5)
WBC # FLD AUTO: 20.9 K/UL — HIGH (ref 3.8–10.5)
WBC UR QL: 563 /HPF — HIGH (ref 0–5)

## 2022-04-27 PROCEDURE — 71250 CT THORAX DX C-: CPT | Mod: 26

## 2022-04-27 RX ORDER — SODIUM CHLORIDE 9 MG/ML
500 INJECTION INTRAMUSCULAR; INTRAVENOUS; SUBCUTANEOUS
Refills: 0 | Status: DISCONTINUED | OUTPATIENT
Start: 2022-04-27 | End: 2022-04-29

## 2022-04-27 RX ORDER — DEXTROSE 50 % IN WATER 50 %
25 SYRINGE (ML) INTRAVENOUS ONCE
Refills: 0 | Status: DISCONTINUED | OUTPATIENT
Start: 2022-04-27 | End: 2022-05-04

## 2022-04-27 RX ORDER — DEXTROSE 50 % IN WATER 50 %
15 SYRINGE (ML) INTRAVENOUS ONCE
Refills: 0 | Status: DISCONTINUED | OUTPATIENT
Start: 2022-04-27 | End: 2022-05-04

## 2022-04-27 RX ORDER — CEFTRIAXONE 500 MG/1
1000 INJECTION, POWDER, FOR SOLUTION INTRAMUSCULAR; INTRAVENOUS EVERY 24 HOURS
Refills: 0 | Status: DISCONTINUED | OUTPATIENT
Start: 2022-04-27 | End: 2022-04-28

## 2022-04-27 RX ORDER — INSULIN LISPRO 100/ML
VIAL (ML) SUBCUTANEOUS
Refills: 0 | Status: DISCONTINUED | OUTPATIENT
Start: 2022-04-27 | End: 2022-04-29

## 2022-04-27 RX ORDER — SODIUM CHLORIDE 9 MG/ML
1000 INJECTION, SOLUTION INTRAVENOUS
Refills: 0 | Status: DISCONTINUED | OUTPATIENT
Start: 2022-04-27 | End: 2022-05-04

## 2022-04-27 RX ORDER — GLUCAGON INJECTION, SOLUTION 0.5 MG/.1ML
1 INJECTION, SOLUTION SUBCUTANEOUS ONCE
Refills: 0 | Status: DISCONTINUED | OUTPATIENT
Start: 2022-04-27 | End: 2022-05-04

## 2022-04-27 RX ORDER — INSULIN LISPRO 100/ML
VIAL (ML) SUBCUTANEOUS AT BEDTIME
Refills: 0 | Status: DISCONTINUED | OUTPATIENT
Start: 2022-04-27 | End: 2022-04-29

## 2022-04-27 RX ORDER — DEXTROSE 50 % IN WATER 50 %
12.5 SYRINGE (ML) INTRAVENOUS ONCE
Refills: 0 | Status: DISCONTINUED | OUTPATIENT
Start: 2022-04-27 | End: 2022-05-04

## 2022-04-27 RX ADMIN — LACOSAMIDE 150 MILLIGRAM(S): 50 TABLET ORAL at 17:17

## 2022-04-27 RX ADMIN — PANTOPRAZOLE SODIUM 40 MILLIGRAM(S): 20 TABLET, DELAYED RELEASE ORAL at 08:29

## 2022-04-27 RX ADMIN — POLYETHYLENE GLYCOL 3350 17 GRAM(S): 17 POWDER, FOR SOLUTION ORAL at 05:52

## 2022-04-27 RX ADMIN — Medication 3 MILLILITER(S): at 05:51

## 2022-04-27 RX ADMIN — Medication 3: at 21:54

## 2022-04-27 RX ADMIN — AMLODIPINE BESYLATE 10 MILLIGRAM(S): 2.5 TABLET ORAL at 05:52

## 2022-04-27 RX ADMIN — SODIUM CHLORIDE 100 MILLILITER(S): 9 INJECTION INTRAMUSCULAR; INTRAVENOUS; SUBCUTANEOUS at 15:59

## 2022-04-27 RX ADMIN — Medication 3 MILLILITER(S): at 17:17

## 2022-04-27 RX ADMIN — QUETIAPINE FUMARATE 50 MILLIGRAM(S): 200 TABLET, FILM COATED ORAL at 21:04

## 2022-04-27 RX ADMIN — POLYETHYLENE GLYCOL 3350 17 GRAM(S): 17 POWDER, FOR SOLUTION ORAL at 17:17

## 2022-04-27 RX ADMIN — Medication 40 MILLIGRAM(S): at 08:29

## 2022-04-27 RX ADMIN — Medication 3 MILLILITER(S): at 11:39

## 2022-04-27 RX ADMIN — APIXABAN 5 MILLIGRAM(S): 2.5 TABLET, FILM COATED ORAL at 05:53

## 2022-04-27 RX ADMIN — CEFTRIAXONE 100 MILLIGRAM(S): 500 INJECTION, POWDER, FOR SOLUTION INTRAMUSCULAR; INTRAVENOUS at 12:24

## 2022-04-27 RX ADMIN — Medication 105 MILLIGRAM(S): at 12:23

## 2022-04-27 RX ADMIN — Medication 105 MILLIGRAM(S): at 21:03

## 2022-04-27 RX ADMIN — Medication 105 MILLIGRAM(S): at 05:51

## 2022-04-27 RX ADMIN — LACOSAMIDE 150 MILLIGRAM(S): 50 TABLET ORAL at 06:07

## 2022-04-27 RX ADMIN — APIXABAN 5 MILLIGRAM(S): 2.5 TABLET, FILM COATED ORAL at 17:17

## 2022-04-27 RX ADMIN — LISINOPRIL 40 MILLIGRAM(S): 2.5 TABLET ORAL at 05:54

## 2022-04-27 RX ADMIN — Medication 5: at 16:23

## 2022-04-27 RX ADMIN — RISPERIDONE 0.5 MILLIGRAM(S): 4 TABLET ORAL at 08:31

## 2022-04-27 RX ADMIN — Medication 88 MICROGRAM(S): at 05:53

## 2022-04-27 NOTE — PROGRESS NOTE ADULT - PROBLEM SELECTOR PLAN 3
required cardene gtt  SBP goal <150  c/w amlodipine 10mg PO daily  c/w lisinopril to 40mg PO daily  c/w labetalol 200mg PO TID  c/w hydralazine 10mg PO q4hrs PRN

## 2022-04-27 NOTE — PROGRESS NOTE ADULT - SUBJECTIVE AND OBJECTIVE BOX
Subjective: No overnight events. No complaints at this time      MEDICATIONS  (STANDING):  albuterol/ipratropium for Nebulization 3 milliLiter(s) Nebulizer every 6 hours  amLODIPine   Tablet 10 milliGRAM(s) Oral daily  apixaban 5 milliGRAM(s) Oral two times a day  cefTRIAXone   IVPB 1000 milliGRAM(s) IV Intermittent every 24 hours  lacosamide 150 milliGRAM(s) Oral two times a day  levothyroxine 88 MICROGram(s) Oral daily  lisinopril 40 milliGRAM(s) Oral daily  pantoprazole    Tablet 40 milliGRAM(s) Oral before breakfast  polyethylene glycol 3350 17 Gram(s) Oral two times a day  predniSONE   Tablet 70 milliGRAM(s) Oral once  predniSONE   Tablet 50 milliGRAM(s) Oral once  QUEtiapine 50 milliGRAM(s) Oral at bedtime  risperiDONE   Tablet 0.5 milliGRAM(s) Oral <User Schedule>  senna 2 Tablet(s) Oral at bedtime  sodium chloride 0.9%. 1000 milliLiter(s) (50 mL/Hr) IV Continuous <Continuous>  thiamine IVPB 500 milliGRAM(s) IV Intermittent every 8 hours    MEDICATIONS  (PRN):  hydrALAZINE 10 milliGRAM(s) Oral every 4 hours PRN Systolic blood pressure < 160  LORazepam   Injectable 1 milliGRAM(s) IV Push once PRN Seizure  valproate sodium IVPB 1000 milliGRAM(s) IV Intermittent once PRN Seizure    Vital Signs Last 24 Hrs  T(C): 37.1 (2022 10:28), Max: 37.3 (2022 01:40)  T(F): 98.8 (2022 10:28), Max: 99.1 (2022 01:40)  HR: 102 (2022 10:28) (85 - 102)  BP: 165/82 (2022 10:28) (108/69 - 170/81)  RR: 18 (2022 10:28) (18 - 19)  SpO2: 94% (2022 10:28) (94% - 99%)    GENERAL EXAM:  Constitutional: awake and alert. NAD  HEENT: PERRL, EOMI    NEUROLOGICAL EXAM:  MS: AAOX2 to person and place. Does not know his own age. Speech is fluent, Follows commands.   CN: VFF, EOMI, PERRL,  V1-3 intact, no facial asymmetry, t/p midline,  Motor: Strength: 5/5 in the UE/ LE  Tone: normal. Bulk: normal.    Plantar flex b/l.   Sensation: intact to LT/Temperature 4x.   Coordination: FTN intact   Gait:  Deferred    Labs:             11.8   20.90 )-----------( 338      ( 2022 10:08 )             36.6         133<L>  |  97  |  33<H>  ----------------------------<  288<H>  4.5   |  24  |  1.24    Ca    9.1      2022 10:08  Phos  3.5       Mg     1.9       TPro  8.0  /  Alb  2.9<L>  /  TBili  0.3  /  DBili  x   /  AST  23  /  ALT  17  /  AlkPhos  69      Urinalysis Basic - ( 2022 10:26 )  Color: Yellow / Appearance: Slightly Turbid / S.022 / pH: x  Gluc: x / Ketone: Negative  / Bili: Negative / Urobili: Negative   Blood: x / Protein: 100 / Nitrite: Positive   Leuk Esterase: Large / RBC: 8 /hpf /  /HPF   Sq Epi: x / Non Sq Epi: 1 /hpf / Bacteria: Many      Radiology:  CT Chest : report pending    EEG   EEG Summary:  Abnormal EEG in the awake, drowsy and asleep states.  -Mild generalized slowing  Impression/Clinical Correlate:  This is an abnormal EEG record. There is evidence of mild multifocal/diffuse nonspecific cerebral dysfunction, not specific for etiology. No epileptiform pattern or seizures were seen.

## 2022-04-27 NOTE — PROGRESS NOTE ADULT - ASSESSMENT
73 yo male with a PMHx of HTN, Hypothyroidism, and Diverticulosis (requiring blood transfusions x2 8 yrs ago) who presented to [facility] on 4/12 as a transfer from [place] for ICH. Patient LKN was at 2200pm on 4/11. Patient was noted on 4/12 @ 0200 to be obtunded with saliva coming out of his mouth. EMS was called, patient came back to baseline and did not want to go to hospital and EMS left. Patient then had a seizure-like event witnessed by wife with UE shaking, foaming at the mouth, and urinary/fecal incontinence. Neurology consulted for new onset seizures in the setting of ICH while patient was in NSCU. No seizures seen on EEG thus far during this admission. Transferred from NSCU to EMU on 4/15 after stable IPH noted. MRI Head w/wo shows R cerebellar IPH vs calcification.    Impression: New onset of seizures of unknown cause. R cerebellar density IPH vs calcification.      Plan:  [] Start IV Ceftriaxone and send Urine Culture for newly diagnosed UTI  [] Follow up CT Chest report  [] Awaiting [facility] placement- 2 more days of Quarantine  [x] Neuro checks, vitals Q4HR.  [x] Telemetry.  [x] Seizure/Fall/Aspiration precautions.  [x] SBP goal: <150.  [x] Off EEG.  [x] MRI Head w/wo: results as above.  [x] 4/18 s/p LP: CSF glucose 75 <H>, protein 69 <H>, TNC 1, culture NG.  [x] 4/15 VA Duplex lower extremities: acute L soleal vein DVT.  [x] s/p IVIG QD x5 days (last day 4/22).  [x] s/p Solumedrol 1G IV QD x5 days  [x] Steroid taper started on 4/24. Solumedrol 60mg today then decrease by 10mg daily.    c/w GI PPx while on steroids.   [x] Increase to Vimpat 150MG IV Q12HR.  [x] c/w Seroquel 100MG PO QHS.  [x] c/w Risperidone 0.5MG PO QD.  [x] Cardiology following (Dr. Fragoso), c/w Lisinopril 20MG PO QD, Hydralazine 10MG PO Q4HR.  [x] Pulmonology following, recommendations appreciated.  [x] Medicine following (Dr. Bernabe): f/u US of neck soft tissue.  [x] 4/16 CTA Chest PE Protocol: as above, patient with small R upper lobe segmental branch PE.  [x] Vascular Cardiology following (Dr. Perera): on Eliquis 5MG PO Q12HR for acute PE.  [x] DVT PPx: on full dose AC for PE.  [x] Diet: Regular + Ensure.  [x] Seizure Rescue: Ativan 1MG IVP x1 for convulsions/GTCs lasting > 3 minutes, VPA 1G IV x1 for convulsions/GTCs that are refractory to Ativan.    CORE MEASURES:        AED levels [] Sent [] Pending [] Resulted     LFTs [] normal [] elevated      Plan and education provided to [] patient []family at bedside [] awaiting for family     Seizure Semiology  [] Tonic clonic  [] Clonic  [] Tonic  [] Unresponsive  [] Focal with impaired awareness  [] Focal without impaired awareness    Obtain screening lower extremity venous ultrasound in patients who meet 1 or more of the following criteria as patient is high risk for DVT/PE on admission:   [] History of DVT/PE  []Hypercoagulable states (Factor V Leiden, Cancer, OCP, etc. )  []Prolonged immobility (hemiplegia/hemiparesis/post operative or any other extended immobilization)  [] Transferred from outside facility (Rehab or Long term care)    Case discussed w/ neuro attending Dr. Maldonado

## 2022-04-27 NOTE — PROGRESS NOTE ADULT - PROBLEM SELECTOR PLAN 3
CTA chest 4/16 with RLL atelectasis. CT chest 4/20 with increase in RLL atelectasis.  -Zosyn d/c'd  -Chest congestion at times. Now much improved.  -Continue Duoneb q6h, change to q6h PRN on discharge.   -Chest PT q6h  -Keep sats >90% with O2 PRN  -CT chest 4/27 likely with RLL atelectasis, f/u official report

## 2022-04-27 NOTE — PROGRESS NOTE ADULT - SUBJECTIVE AND OBJECTIVE BOX
Subjective: Patient seen and examined. No new events except as noted.   ST low 100s - continue to monitor.     REVIEW OF SYSTEMS:    CONSTITUTIONAL: + weakness, fevers or chills  EYES/ENT: No visual changes;  No vertigo or throat pain   NECK: No pain or stiffness  RESPIRATORY: No cough, wheezing, hemoptysis; No shortness of breath  CARDIOVASCULAR: No chest pain or palpitations  GASTROINTESTINAL: No abdominal or epigastric pain. No nausea, vomiting, or hematemesis; No diarrhea or constipation. No melena or hematochezia.  GENITOURINARY: No dysuria, frequency or hematuria  NEUROLOGICAL: No numbness or weakness  SKIN: No itching, burning, rashes, or lesions   All other review of systems is negative unless indicated above.    MEDICATIONS:  MEDICATIONS  (STANDING):  albuterol/ipratropium for Nebulization 3 milliLiter(s) Nebulizer every 6 hours  amLODIPine   Tablet 10 milliGRAM(s) Oral daily  apixaban 5 milliGRAM(s) Oral two times a day  lacosamide 150 milliGRAM(s) Oral two times a day  levothyroxine 88 MICROGram(s) Oral daily  lisinopril 40 milliGRAM(s) Oral daily  pantoprazole    Tablet 40 milliGRAM(s) Oral before breakfast  polyethylene glycol 3350 17 Gram(s) Oral two times a day  predniSONE   Tablet 70 milliGRAM(s) Oral once  predniSONE   Tablet 50 milliGRAM(s) Oral once  QUEtiapine 50 milliGRAM(s) Oral at bedtime  risperiDONE   Tablet 0.5 milliGRAM(s) Oral <User Schedule>  senna 2 Tablet(s) Oral at bedtime  sodium chloride 0.9%. 1000 milliLiter(s) (50 mL/Hr) IV Continuous <Continuous>  thiamine IVPB 500 milliGRAM(s) IV Intermittent every 8 hours    PHYSICAL EXAM:  T(C): 37.1 (04-27-22 @ 10:28), Max: 37.3 (04-27-22 @ 01:40)  HR: 102 (04-27-22 @ 10:28) (85 - 102)  BP: 165/82 (04-27-22 @ 10:28) (108/69 - 170/81)  RR: 18 (04-27-22 @ 10:28) (18 - 19)  SpO2: 94% (04-27-22 @ 10:28) (94% - 99%)  Wt(kg): --  I&O's Summary    26 Apr 2022 07:01  -  27 Apr 2022 07:00  --------------------------------------------------------  IN: 100 mL / OUT: 650 mL / NET: -550 mL    27 Apr 2022 07:01  -  27 Apr 2022 10:38  --------------------------------------------------------  IN: 280 mL / OUT: 0 mL / NET: 280 mL    Appearance: NAD  HEENT: Normal oral mucosa, PERRL, EOMI	  Lymphatic: No lymphadenopathy , no edema  Cardiovascular: Normal S1 S2, No JVD, No murmurs , Peripheral pulses palpable 2+ bilaterally  Respiratory: Lungs clear to auscultation, normal effort 	  Gastrointestinal:  Soft, Non-tender, + BS	  Skin: No rashes, No ecchymoses, No cyanosis, warm to touch  NEUROLOGICAL EXAM:  MS: AAOX2 to person and place. ( year 2023 and month August). Speech is fluent, Follows commands.   CN: VFF, EOMI, PERRL,  V1-3 intact, no facial asymmetry, t/p midline,  Motor: Strength: 5/5 in the UE/ LE  Tone: normal. Bulk: normal.   Plantar flex b/l.   Sensation: intact to LT/Temperature 4x.   Coordination: FTN intact   Gait:  Deferred    LABS:    CARDIAC MARKERS:                        11.8   20.90 )-----------( 338      ( 27 Apr 2022 10:08 )             36.6     04-26    136  |  98  |  32<H>  ----------------------------<  129<H>  4.6   |  25  |  1.03    Ca    9.2      26 Apr 2022 15:32  Phos  3.5     04-26  Mg     1.9     04-26    TPro  8.6<H>  /  Alb  3.2<L>  /  TBili  0.3  /  DBili  x   /  AST  29  /  ALT  20  /  AlkPhos  61  04-26    proBNP:   Lipid Profile:   HgA1c:   TSH:     TELEMETRY: SR/ST 100s	    ECG:  	  RADIOLOGY:   DIAGNOSTIC TESTING:  [ ] Echocardiogram:  [ ]  Catheterization:  [ ] Stress Test:    OTHER:

## 2022-04-27 NOTE — PROGRESS NOTE ADULT - ASSESSMENT
72M PMH htn, hypothyroidism, presented to [facility] ED via EMS after family called 911 following a witnessed seizure, by family, reportedly lasting ~5min.

## 2022-04-27 NOTE — PROGRESS NOTE ADULT - SUBJECTIVE AND OBJECTIVE BOX
Follow-up Pulm Progress Note    No new respiratory events overnight.  Denies SOB/CP.   Intermittent cough  Sats 98% 2LNC     Medications:  MEDICATIONS  (STANDING):  albuterol/ipratropium for Nebulization 3 milliLiter(s) Nebulizer every 6 hours  amLODIPine   Tablet 10 milliGRAM(s) Oral daily  apixaban 5 milliGRAM(s) Oral two times a day  cefTRIAXone   IVPB 1000 milliGRAM(s) IV Intermittent every 24 hours  lacosamide 150 milliGRAM(s) Oral two times a day  levothyroxine 88 MICROGram(s) Oral daily  lisinopril 40 milliGRAM(s) Oral daily  pantoprazole    Tablet 40 milliGRAM(s) Oral before breakfast  polyethylene glycol 3350 17 Gram(s) Oral two times a day  QUEtiapine 50 milliGRAM(s) Oral at bedtime  risperiDONE   Tablet 0.5 milliGRAM(s) Oral <User Schedule>  senna 2 Tablet(s) Oral at bedtime  sodium chloride 0.9%. 1000 milliLiter(s) (50 mL/Hr) IV Continuous <Continuous>  thiamine IVPB 500 milliGRAM(s) IV Intermittent every 8 hours    MEDICATIONS  (PRN):  hydrALAZINE 10 milliGRAM(s) Oral every 4 hours PRN Systolic blood pressure < 160  LORazepam   Injectable 1 milliGRAM(s) IV Push once PRN Seizure  valproate sodium IVPB 1000 milliGRAM(s) IV Intermittent once PRN Seizure          Vital Signs Last 24 Hrs  T(C): 37.1 (2022 10:28), Max: 37.3 (2022 01:40)  T(F): 98.8 (2022 10:28), Max: 99.1 (2022 01:40)  HR: 102 (2022 10:28) (85 - 102)  BP: 128/74 (2022 12:00) (108/69 - 170/81)  BP(mean): --  RR: 18 (2022 10:28) (18 - 19)  SpO2: 94% (2022 10:28) (94% - 99%)           @ 07:01  -   @ 07:00  --------------------------------------------------------  IN: 100 mL / OUT: 650 mL / NET: -550 mL          LABS:                        11.8   20.90 )-----------( 338      ( 2022 10:08 )             36.6     -    133<L>  |  97  |  33<H>  ----------------------------<  288<H>  4.5   |  24  |  1.24    Ca    9.1      2022 10:08  Phos  3.5       Mg     1.9         TPro  8.0  /  Alb  2.9<L>  /  TBili  0.3  /  DBili  x   /  AST  23  /  ALT  17  /  AlkPhos  69            CAPILLARY BLOOD GLUCOSE          Urinalysis Basic - ( 2022 10:26 )    Color: Yellow / Appearance: Slightly Turbid / S.022 / pH: x  Gluc: x / Ketone: Negative  / Bili: Negative / Urobili: Negative   Blood: x / Protein: 100 / Nitrite: Positive   Leuk Esterase: Large / RBC: 8 /hpf /  /HPF   Sq Epi: x / Non Sq Epi: 1 /hpf / Bacteria: Many          DAIANA 1:160  @ 10:05  Anti SS-1 0.6  Anti SS-2 <0.2  Anti RNP 0.8  RF --  @ 10:05    Atypical ANCA Negative  @ 10:05  c-ANCA titer --  @ 10:05  c-ANCA Negative  @ 10:05  p-ANCA Negative  @ 10:05  DAIANA --  @ 09:09  Anti SS-1 --  Anti SS-2 --  Anti RNP --  RF <10  @ 09:09    Atypical ANCA --  @ 09:09  c-ANCA titer --  @ 09:09  c-ANCA --  @ 09:09  p-ANCA --  @ 09:09          Physical Examination:  PULM: CTA b/l   CVS: S1, S2 heard    RADIOLOGY REVIEWED  CT chest : RLL atelectasis  f/u official report

## 2022-04-28 LAB
ANION GAP SERPL CALC-SCNC: 11 MMOL/L — SIGNIFICANT CHANGE UP (ref 5–17)
BUN SERPL-MCNC: 40 MG/DL — HIGH (ref 7–23)
CALCIUM SERPL-MCNC: 9.4 MG/DL — SIGNIFICANT CHANGE UP (ref 8.4–10.5)
CHLORIDE SERPL-SCNC: 99 MMOL/L — SIGNIFICANT CHANGE UP (ref 96–108)
CO2 SERPL-SCNC: 25 MMOL/L — SIGNIFICANT CHANGE UP (ref 22–31)
CREAT SERPL-MCNC: 1.25 MG/DL — SIGNIFICANT CHANGE UP (ref 0.5–1.3)
EGFR: 61 ML/MIN/1.73M2 — SIGNIFICANT CHANGE UP
GLUCOSE BLDC GLUCOMTR-MCNC: 217 MG/DL — HIGH (ref 70–99)
GLUCOSE BLDC GLUCOMTR-MCNC: 247 MG/DL — HIGH (ref 70–99)
GLUCOSE BLDC GLUCOMTR-MCNC: 263 MG/DL — HIGH (ref 70–99)
GLUCOSE BLDC GLUCOMTR-MCNC: 284 MG/DL — HIGH (ref 70–99)
GLUCOSE SERPL-MCNC: 256 MG/DL — HIGH (ref 70–99)
HCT VFR BLD CALC: 34.6 % — LOW (ref 39–50)
HGB BLD-MCNC: 11.2 G/DL — LOW (ref 13–17)
MCHC RBC-ENTMCNC: 29.5 PG — SIGNIFICANT CHANGE UP (ref 27–34)
MCHC RBC-ENTMCNC: 32.4 GM/DL — SIGNIFICANT CHANGE UP (ref 32–36)
MCV RBC AUTO: 91.1 FL — SIGNIFICANT CHANGE UP (ref 80–100)
NRBC # BLD: 0 /100 WBCS — SIGNIFICANT CHANGE UP (ref 0–0)
PLATELET # BLD AUTO: 305 K/UL — SIGNIFICANT CHANGE UP (ref 150–400)
POTASSIUM SERPL-MCNC: 5 MMOL/L — SIGNIFICANT CHANGE UP (ref 3.5–5.3)
POTASSIUM SERPL-SCNC: 5 MMOL/L — SIGNIFICANT CHANGE UP (ref 3.5–5.3)
RBC # BLD: 3.8 M/UL — LOW (ref 4.2–5.8)
RBC # FLD: 14.6 % — HIGH (ref 10.3–14.5)
SARS-COV-2 RNA SPEC QL NAA+PROBE: SIGNIFICANT CHANGE UP
SODIUM SERPL-SCNC: 135 MMOL/L — SIGNIFICANT CHANGE UP (ref 135–145)
WBC # BLD: 23.4 K/UL — HIGH (ref 3.8–10.5)
WBC # FLD AUTO: 23.4 K/UL — HIGH (ref 3.8–10.5)

## 2022-04-28 PROCEDURE — 99232 SBSQ HOSP IP/OBS MODERATE 35: CPT | Mod: GC

## 2022-04-28 PROCEDURE — 93010 ELECTROCARDIOGRAM REPORT: CPT

## 2022-04-28 RX ORDER — THIAMINE MONONITRATE (VIT B1) 100 MG
100 TABLET ORAL DAILY
Refills: 0 | Status: DISCONTINUED | OUTPATIENT
Start: 2022-04-29 | End: 2022-05-04

## 2022-04-28 RX ORDER — RISPERIDONE 4 MG/1
1 TABLET ORAL
Refills: 0 | Status: DISCONTINUED | OUTPATIENT
Start: 2022-04-29 | End: 2022-05-04

## 2022-04-28 RX ORDER — CEFTRIAXONE 500 MG/1
1000 INJECTION, POWDER, FOR SOLUTION INTRAMUSCULAR; INTRAVENOUS EVERY 24 HOURS
Refills: 0 | Status: COMPLETED | OUTPATIENT
Start: 2022-04-28 | End: 2022-04-29

## 2022-04-28 RX ORDER — HALOPERIDOL DECANOATE 100 MG/ML
1 INJECTION INTRAMUSCULAR ONCE
Refills: 0 | Status: COMPLETED | OUTPATIENT
Start: 2022-04-28 | End: 2022-04-28

## 2022-04-28 RX ADMIN — CEFTRIAXONE 100 MILLIGRAM(S): 500 INJECTION, POWDER, FOR SOLUTION INTRAMUSCULAR; INTRAVENOUS at 14:04

## 2022-04-28 RX ADMIN — AMLODIPINE BESYLATE 10 MILLIGRAM(S): 2.5 TABLET ORAL at 05:02

## 2022-04-28 RX ADMIN — Medication 105 MILLIGRAM(S): at 04:52

## 2022-04-28 RX ADMIN — APIXABAN 5 MILLIGRAM(S): 2.5 TABLET, FILM COATED ORAL at 05:02

## 2022-04-28 RX ADMIN — LACOSAMIDE 150 MILLIGRAM(S): 50 TABLET ORAL at 05:03

## 2022-04-28 RX ADMIN — Medication 2: at 14:06

## 2022-04-28 RX ADMIN — Medication 2: at 08:35

## 2022-04-28 RX ADMIN — Medication 1 MILLIGRAM(S): at 10:54

## 2022-04-28 RX ADMIN — Medication 1: at 21:28

## 2022-04-28 RX ADMIN — APIXABAN 5 MILLIGRAM(S): 2.5 TABLET, FILM COATED ORAL at 17:30

## 2022-04-28 RX ADMIN — Medication 3 MILLILITER(S): at 05:03

## 2022-04-28 RX ADMIN — LISINOPRIL 40 MILLIGRAM(S): 2.5 TABLET ORAL at 05:02

## 2022-04-28 RX ADMIN — POLYETHYLENE GLYCOL 3350 17 GRAM(S): 17 POWDER, FOR SOLUTION ORAL at 17:30

## 2022-04-28 RX ADMIN — HALOPERIDOL DECANOATE 1 MILLIGRAM(S): 100 INJECTION INTRAMUSCULAR at 17:34

## 2022-04-28 RX ADMIN — PANTOPRAZOLE SODIUM 40 MILLIGRAM(S): 20 TABLET, DELAYED RELEASE ORAL at 05:02

## 2022-04-28 RX ADMIN — Medication 88 MICROGRAM(S): at 05:02

## 2022-04-28 RX ADMIN — Medication 3 MILLILITER(S): at 00:03

## 2022-04-28 RX ADMIN — POLYETHYLENE GLYCOL 3350 17 GRAM(S): 17 POWDER, FOR SOLUTION ORAL at 05:03

## 2022-04-28 RX ADMIN — Medication 3 MILLILITER(S): at 13:07

## 2022-04-28 RX ADMIN — Medication 3: at 16:59

## 2022-04-28 RX ADMIN — RISPERIDONE 0.5 MILLIGRAM(S): 4 TABLET ORAL at 08:31

## 2022-04-28 RX ADMIN — Medication 105 MILLIGRAM(S): at 12:26

## 2022-04-28 RX ADMIN — QUETIAPINE FUMARATE 50 MILLIGRAM(S): 200 TABLET, FILM COATED ORAL at 21:24

## 2022-04-28 RX ADMIN — Medication 30 MILLIGRAM(S): at 05:02

## 2022-04-28 RX ADMIN — LACOSAMIDE 150 MILLIGRAM(S): 50 TABLET ORAL at 17:29

## 2022-04-28 NOTE — CHART NOTE - NSCHARTNOTEFT_GEN_A_CORE
Pt with prostate nodule on CT scan.   Per primary team wife is aware and pt without past  hx.  Rec: outpt follow up with Dr. Parth Logan with the [facility]   [address] [place] [address]   [phone]

## 2022-04-28 NOTE — PROGRESS NOTE ADULT - PROBLEM SELECTOR PLAN 3
CTA chest 4/16 with RLL atelectasis. CT chest 4/20 with increase in RLL atelectasis.  -Zosyn d/c'd  -Chest congestion at times. Now much improved.  -Continue Duoneb q6h, change to q6h PRN on discharge.   -Chest PT q6h  -Keep sats >90% with O2 PRN  -CT chest 4/27 likely with bibasilar atelectasis

## 2022-04-28 NOTE — PROGRESS NOTE ADULT - SUBJECTIVE AND OBJECTIVE BOX
Subjective: Patient seen and examined. No new events except as noted.     REVIEW OF SYSTEMS:    CONSTITUTIONAL: + weakness, fevers or chills  EYES/ENT: No visual changes;  No vertigo or throat pain   NECK: No pain or stiffness  RESPIRATORY: No cough, wheezing, hemoptysis; No shortness of breath  CARDIOVASCULAR: No chest pain or palpitations  GASTROINTESTINAL: No abdominal or epigastric pain. No nausea, vomiting, or hematemesis; No diarrhea or constipation. No melena or hematochezia.  GENITOURINARY: No dysuria, frequency or hematuria  NEUROLOGICAL: No numbness or weakness  SKIN: No itching, burning, rashes, or lesions   All other review of systems is negative unless indicated above.    MEDICATIONS:  MEDICATIONS  (STANDING):  albuterol/ipratropium for Nebulization 3 milliLiter(s) Nebulizer every 6 hours  amLODIPine   Tablet 10 milliGRAM(s) Oral daily  apixaban 5 milliGRAM(s) Oral two times a day  cefTRIAXone   IVPB 1000 milliGRAM(s) IV Intermittent every 24 hours  dextrose 5%. 1000 milliLiter(s) (50 mL/Hr) IV Continuous <Continuous>  dextrose 5%. 1000 milliLiter(s) (100 mL/Hr) IV Continuous <Continuous>  dextrose 50% Injectable 25 Gram(s) IV Push once  dextrose 50% Injectable 12.5 Gram(s) IV Push once  dextrose 50% Injectable 25 Gram(s) IV Push once  glucagon  Injectable 1 milliGRAM(s) IntraMuscular once  insulin lispro (ADMELOG) corrective regimen sliding scale   SubCutaneous three times a day before meals  insulin lispro (ADMELOG) corrective regimen sliding scale   SubCutaneous at bedtime  lacosamide 150 milliGRAM(s) Oral two times a day  levothyroxine 88 MICROGram(s) Oral daily  lisinopril 40 milliGRAM(s) Oral daily  pantoprazole    Tablet 40 milliGRAM(s) Oral before breakfast  polyethylene glycol 3350 17 Gram(s) Oral two times a day  QUEtiapine 50 milliGRAM(s) Oral at bedtime  senna 2 Tablet(s) Oral at bedtime  sodium chloride 0.9%. 500 milliLiter(s) (100 mL/Hr) IV Continuous <Continuous>  sodium chloride 0.9%. 1000 milliLiter(s) (50 mL/Hr) IV Continuous <Continuous>  thiamine IVPB 500 milliGRAM(s) IV Intermittent every 8 hours    PHYSICAL EXAM:  T(C): 36.9 (04-28-22 @ 12:54), Max: 37.3 (04-28-22 @ 05:00)  HR: 81 (04-28-22 @ 12:54) (81 - 111)  BP: 100/62 (04-28-22 @ 12:54) (98/62 - 167/94)  RR: 18 (04-28-22 @ 12:54) (18 - 19)  SpO2: 97% (04-28-22 @ 12:54) (95% - 98%)  Wt(kg): --  I&O's Summary    27 Apr 2022 07:01  -  28 Apr 2022 07:00  --------------------------------------------------------  IN: 820 mL / OUT: 25 mL / NET: 795 mL    Appearance: NAD  HEENT: dry oral mucosa, PERRL, EOMI	  Lymphatic: No lymphadenopathy , no edema  Cardiovascular: Normal S1 S2, No JVD, No murmurs , Peripheral pulses palpable 2+ bilaterally  Respiratory: Decreased BS, normal effort	  Gastrointestinal:  Soft, Non-tender, + BS	  Skin: No rashes, No ecchymoses, No cyanosis, warm to touch  NEUROLOGICAL EXAM:  MS: Does not know his own age. Speech is fluent, Follows commands.   CN: VFF, EOMI, PERRL,  V1-3 intact, no facial asymmetry, t/p midline,  Motor: Strength: 5/5 in the UE/ LE  Tone: normal. Bulk: normal.    Plantar flex b/l.   Sensation: intact to LT/Temperature 4x.   Coordination: FTN intact   Gait:  Deferred  Ext: No edema    LABS:    CARDIAC MARKERS:                        11.2   23.40 )-----------( 305      ( 28 Apr 2022 08:46 )             34.6     04-28    135  |  99  |  40<H>  ----------------------------<  256<H>  5.0   |  25  |  1.25    Ca    9.4      28 Apr 2022 08:46  Phos  3.5     04-26  Mg     1.9     04-26    TPro  8.0  /  Alb  2.9<L>  /  TBili  0.3  /  DBili  x   /  AST  23  /  ALT  17  /  AlkPhos  69  04-27    proBNP:   Lipid Profile:   HgA1c:   TSH:     TELEMETRY: 	    ECG:  	  RADIOLOGY:   DIAGNOSTIC TESTING:  [ ] Echocardiogram:  [ ]  Catheterization:  [ ] Stress Test:    OTHER:

## 2022-04-28 NOTE — CONSULT NOTE ADULT - SUBJECTIVE AND OBJECTIVE BOX
HPI:   Patient is a 72y male  HTN, Hypothyroidism, and Diverticulosis who initially presented to [facility] on  as a transfer from [place] for ICH. Pt was obtunded with saliva coming out of his mouth, he had a seizure-like event witnessed by wife with UE shaking, foaming at the mouth, and urinary/fecal incontinence. MRI Head w/wo shows R cerebellar IPH vs calcification. He was intubated for airway protection at OSH, extubated . CTA chest with atelectasis and small RUL segmental branch PE. He has been on IV CTX for abnormal UA and now with rising WBC despite abx, hence ID eval requested. Pt has been restlesss and now sedated, unable to offer any specific c/o    REVIEW OF SYSTEMS:  All other review of systems negative (Comprehensive ROS)    PAST MEDICAL & SURGICAL HISTORY:  HTN (hypertension)    Diverticulosis    Hypothyroid    No significant past surgical history        Allergies    fish (Hives; Angioedema)  No Known Drug Allergies    Intolerances        Antimicrobials Day #  2  cefTRIAXone   IVPB 1000 milliGRAM(s) IV Intermittent every 24 hours    Other Medications:  albuterol/ipratropium for Nebulization 3 milliLiter(s) Nebulizer every 6 hours  amLODIPine   Tablet 10 milliGRAM(s) Oral daily  apixaban 5 milliGRAM(s) Oral two times a day  dextrose 5%. 1000 milliLiter(s) IV Continuous <Continuous>  dextrose 5%. 1000 milliLiter(s) IV Continuous <Continuous>  dextrose 50% Injectable 25 Gram(s) IV Push once  dextrose 50% Injectable 12.5 Gram(s) IV Push once  dextrose 50% Injectable 25 Gram(s) IV Push once  dextrose Oral Gel 15 Gram(s) Oral once PRN  glucagon  Injectable 1 milliGRAM(s) IntraMuscular once  hydrALAZINE 10 milliGRAM(s) Oral every 4 hours PRN  insulin lispro (ADMELOG) corrective regimen sliding scale   SubCutaneous three times a day before meals  insulin lispro (ADMELOG) corrective regimen sliding scale   SubCutaneous at bedtime  lacosamide 150 milliGRAM(s) Oral two times a day  levothyroxine 88 MICROGram(s) Oral daily  lisinopril 40 milliGRAM(s) Oral daily  LORazepam   Injectable 1 milliGRAM(s) IV Push once PRN  pantoprazole    Tablet 40 milliGRAM(s) Oral before breakfast  polyethylene glycol 3350 17 Gram(s) Oral two times a day  QUEtiapine 50 milliGRAM(s) Oral at bedtime  senna 2 Tablet(s) Oral at bedtime  sodium chloride 0.9%. 500 milliLiter(s) IV Continuous <Continuous>  sodium chloride 0.9%. 1000 milliLiter(s) IV Continuous <Continuous>  thiamine IVPB 500 milliGRAM(s) IV Intermittent every 8 hours  valproate sodium IVPB 1000 milliGRAM(s) IV Intermittent once PRN      FAMILY HISTORY:      SOCIAL HISTORY:  Smoking:     ETOH:     Drug Use:     Single     T(F): 98.5 (22 @ 12:54), Max: 99.1 (22 @ 05:00)  HR: 81 (22 @ 12:54)  BP: 100/62 (22 @ 12:54)  RR: 18 (22 @ 12:54)  SpO2: 97% (22 @ 12:54)  Wt(kg): --    PHYSICAL EXAM:  General: sedated, resting in bed  Eyes:  anicteric, no conjunctival injection, no discharge  Oropharynx: no lesions or injection 	  Neck: supple, without adenopathy  Lungs: clear to auscultation  Heart: regular rate and rhythm; no murmur, rubs or gallops  Abdomen: soft, nondistended, nontender, without mass or organomegaly  Skin: no lesions  Extremities: no clubbing, cyanosis, or edema  Neurologic: sedated    LAB RESULTS:                        11.2   23.40 )-----------( 305      ( 2022 08:46 )             34.6         135  |  99  |  40<H>  ----------------------------<  256<H>  5.0   |  25  |  1.25    Ca    9.4      2022 08:46  Phos  3.5     -  Mg     1.9         TPro  8.0  /  Alb  2.9<L>  /  TBili  0.3  /  DBili  x   /  AST  23  /  ALT  17  /  AlkPhos  69  27    LIVER FUNCTIONS - ( 2022 10:08 )  Alb: 2.9 g/dL / Pro: 8.0 g/dL / ALK PHOS: 69 U/L / ALT: 17 U/L / AST: 23 U/L / GGT: x           Urinalysis Basic - ( 2022 10:26 )    Color: Yellow / Appearance: Slightly Turbid / S.022 / pH: x  Gluc: x / Ketone: Negative  / Bili: Negative / Urobili: Negative   Blood: x / Protein: 100 / Nitrite: Positive   Leuk Esterase: Large / RBC: 8 /hpf /  /HPF   Sq Epi: x / Non Sq Epi: 1 /hpf / Bacteria: Many        MICROBIOLOGY REVIEWED:  Culture - Blood (22 @ 12:21)   Specimen Source: .Blood Blood-Peripheral   Culture Results:   No growth to date.   RADIOLOGY REVIEWED:

## 2022-04-28 NOTE — PROGRESS NOTE ADULT - SUBJECTIVE AND OBJECTIVE BOX
Subjective: No overnight events. No complaints at this time      MEDICATIONS  (STANDING):  albuterol/ipratropium for Nebulization 3 milliLiter(s) Nebulizer every 6 hours  amLODIPine   Tablet 10 milliGRAM(s) Oral daily  apixaban 5 milliGRAM(s) Oral two times a day  cefTRIAXone   IVPB 1000 milliGRAM(s) IV Intermittent every 24 hours  lacosamide 150 milliGRAM(s) Oral two times a day  levothyroxine 88 MICROGram(s) Oral daily  lisinopril 40 milliGRAM(s) Oral daily  pantoprazole    Tablet 40 milliGRAM(s) Oral before breakfast  polyethylene glycol 3350 17 Gram(s) Oral two times a day  predniSONE   Tablet 70 milliGRAM(s) Oral once  predniSONE   Tablet 50 milliGRAM(s) Oral once  QUEtiapine 50 milliGRAM(s) Oral at bedtime  risperiDONE   Tablet 0.5 milliGRAM(s) Oral <User Schedule>  senna 2 Tablet(s) Oral at bedtime  sodium chloride 0.9%. 1000 milliLiter(s) (50 mL/Hr) IV Continuous <Continuous>  thiamine IVPB 500 milliGRAM(s) IV Intermittent every 8 hours    MEDICATIONS  (PRN):  hydrALAZINE 10 milliGRAM(s) Oral every 4 hours PRN Systolic blood pressure < 160  LORazepam   Injectable 1 milliGRAM(s) IV Push once PRN Seizure  valproate sodium IVPB 1000 milliGRAM(s) IV Intermittent once PRN Seizure    Vital Signs Last 24 Hrs  T(C): 36.7 (2022 09:53), Max: 37.3 (2022 05:00)  T(F): 98 (2022 09:53), Max: 99.1 (2022 05:00)  HR: 83 (2022 09:53) (83 - 111)  BP: 98/62 (2022 09:53) (98/62 - 167/94)  BP(mean): --  RR: 19 (2022 09:53) (18 - 19)  SpO2: 97% (2022 09:53) (94% - 98%)    GENERAL EXAM:  Constitutional: awake and alert. NAD  HEENT: PERRL, EOMI    NEUROLOGICAL EXAM:  MS: Does not know his own age. Speech is fluent, Follows commands.   CN: VFF, EOMI, PERRL,  V1-3 intact, no facial asymmetry, t/p midline,  Motor: Strength: 5/5 in the UE/ LE  Tone: normal. Bulk: normal.    Plantar flex b/l.   Sensation: intact to LT/Temperature 4x.   Coordination: FTN intact   Gait:  Deferred    Labs:                                   11.2   23.40 )-----------( 305      ( 2022 08:46 )             34.6           135  |  99  |  40<H>  ----------------------------<  256<H>  5.0   |  25  |  1.25    Ca    9.4      2022 08:46  Phos  3.5       Mg     1.9         TPro  8.0  /  Alb  2.9<L>  /  TBili  0.3  /  DBili  x   /  AST  23  /  ALT  17  /  AlkPhos  69        Urinalysis Basic - ( 2022 10:26 )  Color: Yellow / Appearance: Slightly Turbid / S.022 / pH: x  Gluc: x / Ketone: Negative  / Bili: Negative / Urobili: Negative   Blood: x / Protein: 100 / Nitrite: Positive   Leuk Esterase: Large / RBC: 8 /hpf /  /HPF   Sq Epi: x / Non Sq Epi: 1 /hpf / Bacteria: Many      Radiology:  CT Chest : Bibasilar atelectasis with posterior bowing of the fissures bilaterally.   This demonstrates slight interval progression since the previous exam,   the specimen the left side.  No supraclavicular lymphadenopathy is seen.  Trace pericardial effusion.      EEG   EEG Summary:  Abnormal EEG in the awake, drowsy and asleep states.  -Mild generalized slowing  Impression/Clinical Correlate:  This is an abnormal EEG record. There is evidence of mild multifocal/diffuse nonspecific cerebral dysfunction, not specific for etiology. No epileptiform pattern or seizures were seen.   Subjective: Was agitated overnight and given Ativan 1mg IV.       MEDICATIONS  (STANDING):  albuterol/ipratropium for Nebulization 3 milliLiter(s) Nebulizer every 6 hours  amLODIPine   Tablet 10 milliGRAM(s) Oral daily  apixaban 5 milliGRAM(s) Oral two times a day  cefTRIAXone   IVPB 1000 milliGRAM(s) IV Intermittent every 24 hours  lacosamide 150 milliGRAM(s) Oral two times a day  levothyroxine 88 MICROGram(s) Oral daily  lisinopril 40 milliGRAM(s) Oral daily  pantoprazole    Tablet 40 milliGRAM(s) Oral before breakfast  polyethylene glycol 3350 17 Gram(s) Oral two times a day  predniSONE   Tablet 70 milliGRAM(s) Oral once  predniSONE   Tablet 50 milliGRAM(s) Oral once  QUEtiapine 50 milliGRAM(s) Oral at bedtime  risperiDONE   Tablet 0.5 milliGRAM(s) Oral <User Schedule>  senna 2 Tablet(s) Oral at bedtime  sodium chloride 0.9%. 1000 milliLiter(s) (50 mL/Hr) IV Continuous <Continuous>  thiamine IVPB 500 milliGRAM(s) IV Intermittent every 8 hours    MEDICATIONS  (PRN):  hydrALAZINE 10 milliGRAM(s) Oral every 4 hours PRN Systolic blood pressure < 160  LORazepam   Injectable 1 milliGRAM(s) IV Push once PRN Seizure  valproate sodium IVPB 1000 milliGRAM(s) IV Intermittent once PRN Seizure    Vital Signs Last 24 Hrs  T(C): 36.7 (2022 09:53), Max: 37.3 (2022 05:00)  T(F): 98 (2022 09:53), Max: 99.1 (2022 05:00)  HR: 83 (2022 09:53) (83 - 111)  BP: 98/62 (2022 09:53) (98/62 - 167/94)  BP(mean): --  RR: 19 (2022 09:53) (18 - 19)  SpO2: 97% (2022 09:53) (94% - 98%)    GENERAL EXAM:  Constitutional: awake and alert. NAD  HEENT: PERRL, EOMI    NEUROLOGICAL EXAM:  MS: Does not know his own age. Speech is fluent, Follows commands.   CN: VFF, EOMI, PERRL,  V1-3 intact, no facial asymmetry, t/p midline,  Motor: Strength: 5/5 in the UE/ LE  Tone: normal. Bulk: normal.    Plantar flex b/l.   Sensation: intact to LT/Temperature 4x.   Coordination: FTN intact   Gait:  Deferred    Labs:                                   11.2   23.40 )-----------( 305      ( 2022 08:46 )             34.6           135  |  99  |  40<H>  ----------------------------<  256<H>  5.0   |  25  |  1.25    Ca    9.4      2022 08:46  Phos  3.5       Mg     1.9         TPro  8.0  /  Alb  2.9<L>  /  TBili  0.3  /  DBili  x   /  AST  23  /  ALT  17  /  AlkPhos  69        Urinalysis Basic - ( 2022 10:26 )  Color: Yellow / Appearance: Slightly Turbid / S.022 / pH: x  Gluc: x / Ketone: Negative  / Bili: Negative / Urobili: Negative   Blood: x / Protein: 100 / Nitrite: Positive   Leuk Esterase: Large / RBC: 8 /hpf /  /HPF   Sq Epi: x / Non Sq Epi: 1 /hpf / Bacteria: Many      Radiology:  CT Chest : Bibasilar atelectasis with posterior bowing of the fissures bilaterally.   This demonstrates slight interval progression since the previous exam,   the specimen the left side.  No supraclavicular lymphadenopathy is seen.  Trace pericardial effusion.      EEG   EEG Summary:  Abnormal EEG in the awake, drowsy and asleep states.  -Mild generalized slowing  Impression/Clinical Correlate:  This is an abnormal EEG record. There is evidence of mild multifocal/diffuse nonspecific cerebral dysfunction, not specific for etiology. No epileptiform pattern or seizures were seen.

## 2022-04-28 NOTE — CONSULT NOTE ADULT - ASSESSMENT
72y male  HTN, Hypothyroidism, and Diverticulosis who initially presented to [facility] on 4/12 as a transfer from [place] for ICH. Pt was obtunded with saliva coming out of his mouth, he had a seizure-like event witnessed by wife with UE shaking, foaming at the mouth, and urinary/fecal incontinence. MRI Head w/wo shows R cerebellar IPH vs calcification. He was intubated for airway protection at OSH, extubated 4/13. CTA chest with atelectasis and small RUL segmental branch PE. He has been on IV CTX for abnormal UA and now with rising WBC despite abx, hence ID eval requested. Pt has been restless, now sedated, unable to offer any specific c/o    PLAN:  etiology of leukocytosis not clear at present  he has significant pyuria, but there no signs of retention  Also he received steroids --? possible residual steroids effect  He is also at risk for C diff, given prior abx exposure at outside hospital  Will monitor WBC trend closely  f/u surveillance cutures  check stool for C diff if diarrhea

## 2022-04-28 NOTE — PROGRESS NOTE ADULT - SUBJECTIVE AND OBJECTIVE BOX
Follow-up Pulm Progress Note    pt reportedly very agitated - s/p ativan, now lethargic  pt nonlabored     Medications:  MEDICATIONS  (STANDING):  albuterol/ipratropium for Nebulization 3 milliLiter(s) Nebulizer every 6 hours  amLODIPine   Tablet 10 milliGRAM(s) Oral daily  apixaban 5 milliGRAM(s) Oral two times a day  cefTRIAXone   IVPB 1000 milliGRAM(s) IV Intermittent every 24 hours  dextrose 5%. 1000 milliLiter(s) (50 mL/Hr) IV Continuous <Continuous>  dextrose 5%. 1000 milliLiter(s) (100 mL/Hr) IV Continuous <Continuous>  dextrose 50% Injectable 25 Gram(s) IV Push once  dextrose 50% Injectable 12.5 Gram(s) IV Push once  dextrose 50% Injectable 25 Gram(s) IV Push once  glucagon  Injectable 1 milliGRAM(s) IntraMuscular once  insulin lispro (ADMELOG) corrective regimen sliding scale   SubCutaneous three times a day before meals  insulin lispro (ADMELOG) corrective regimen sliding scale   SubCutaneous at bedtime  lacosamide 150 milliGRAM(s) Oral two times a day  levothyroxine 88 MICROGram(s) Oral daily  lisinopril 40 milliGRAM(s) Oral daily  pantoprazole    Tablet 40 milliGRAM(s) Oral before breakfast  polyethylene glycol 3350 17 Gram(s) Oral two times a day  QUEtiapine 50 milliGRAM(s) Oral at bedtime  senna 2 Tablet(s) Oral at bedtime  sodium chloride 0.9%. 500 milliLiter(s) (100 mL/Hr) IV Continuous <Continuous>  sodium chloride 0.9%. 1000 milliLiter(s) (50 mL/Hr) IV Continuous <Continuous>  thiamine IVPB 500 milliGRAM(s) IV Intermittent every 8 hours    MEDICATIONS  (PRN):  dextrose Oral Gel 15 Gram(s) Oral once PRN Blood Glucose LESS THAN 70 milliGRAM(s)/deciliter  hydrALAZINE 10 milliGRAM(s) Oral every 4 hours PRN Systolic blood pressure < 160  LORazepam   Injectable 1 milliGRAM(s) IV Push once PRN Seizure  valproate sodium IVPB 1000 milliGRAM(s) IV Intermittent once PRN Seizure          Vital Signs Last 24 Hrs  T(C): 36.7 (2022 09:53), Max: 37.3 (2022 05:00)  T(F): 98 (2022 09:53), Max: 99.1 (2022 05:00)  HR: 83 (2022 09:53) (83 - 111)  BP: 98/62 (2022 09:53) (98/62 - 167/94)  BP(mean): --  RR: 19 (2022 09:53) (18 - 19)  SpO2: 97% (2022 09:53) (95% - 98%)           @ 07:01  -   @ 07:00  --------------------------------------------------------  IN: 820 mL / OUT: 25 mL / NET: 795 mL          LABS:                        11.2   23.40 )-----------( 305      ( 2022 08:46 )             34.6         135  |  99  |  40<H>  ----------------------------<  256<H>  5.0   |  25  |  1.25    Ca    9.4      2022 08:46  Phos  3.5       Mg     1.9         TPro  8.0  /  Alb  2.9<L>  /  TBili  0.3  /  DBili  x   /  AST  23  /  ALT  17  /  AlkPhos  69  27          CAPILLARY BLOOD GLUCOSE      POCT Blood Glucose.: 217 mg/dL (2022 07:42)      Urinalysis Basic - ( 2022 10:26 )    Color: Yellow / Appearance: Slightly Turbid / S.022 / pH: x  Gluc: x / Ketone: Negative  / Bili: Negative / Urobili: Negative   Blood: x / Protein: 100 / Nitrite: Positive   Leuk Esterase: Large / RBC: 8 /hpf /  /HPF   Sq Epi: x / Non Sq Epi: 1 /hpf / Bacteria: Many          DAIANA 1:160  @ 10:05  Anti SS-1 0.6  Anti SS-2 <0.2  Anti RNP 0.8  RF --  @ 10:05    Atypical ANCA Negative  @ 10:05  c-ANCA titer --  @ 10:05  c-ANCA Negative  @ 10:05  p-ANCA Negative  @ 10:05  DAIANA --  @ 09:09  Anti SS-1 --  Anti SS-2 --  Anti RNP --  RF <10 - @ 09:09    Atypical ANCA -- - @ 09:09  c-ANCA titer --  @ 09:09  c-ANCA --  @ 09:09  p-ANCA --  @ 09:09            Physical Examination:  PULM: Clear to auscultation bilaterally, no significant sputum production  CVS: S1, S2 heard    RADIOLOGY REVIEWED    CT chest:   < from: CT Chest No Cont (22 @ 10:43) >    Tubes/Lines: None.    Mediastinum/Vessels/Heart: Aorta and pulmonary arteries are normal in   size. There is trace pericardial effusion. No lymphadenopathy. Diffuse   enlargement of the thyroid gland with mildnarrowing of the tracheal down   to 11 mm. This is without change from previous exam.    Lungs/Pleura/Airways: Bibasilar atelectasis with posterior bowing of the   fissures bilaterally, increased since previous exam.    Visualized abdomen: Unremarkable noncontrast appearance of the upper   abdomen    Bones and soft tissues: No suspicious osseous lesions. Degenerative   changes noted throughout the spine.    IMPRESSION:    Bibasilar atelectasis with posterior bowing of the fissures bilaterally.   This demonstrates slight interval progression since the previous exam,   the specimen the left side.    No supraclavicular lymphadenopathy is seen.    Trace pericardial effusion.    < end of copied text >

## 2022-04-28 NOTE — PROGRESS NOTE ADULT - ASSESSMENT
73 yo male with a PMHx of HTN, Hypothyroidism, and Diverticulosis (requiring blood transfusions x2 8 yrs ago) who presented to [facility] on 4/12 as a transfer from [place] for ICH. Patient LKN was at 2200pm on 4/11. Patient was noted on 4/12 @ 0200 to be obtunded with saliva coming out of his mouth. EMS was called, patient came back to baseline and did not want to go to hospital and EMS left. Patient then had a seizure-like event witnessed by wife with UE shaking, foaming at the mouth, and urinary/fecal incontinence. Neurology consulted for new onset seizures in the setting of ICH while patient was in NSCU. No seizures seen on EEG thus far during this admission. Transferred from NSCU to EMU on 4/15 after stable IPH noted. MRI Head w/wo shows R cerebellar IPH vs calcification.    Impression: New onset of seizures of unknown cause. R cerebellar density IPH vs calcification.      Plan:  [] C/w IV Ceftriaxone and sen5t Urine Culture for newly diagnosed UTI  [x] Follow up CT Chest report-report above  [] Awaiting ROEL placement- last day of Quarantine  [x] Neuro checks, vitals Q4HR.  [x] Telemetry.  [x] Seizure/Fall/Aspiration precautions.  [x] SBP goal: <150.  [x] Off EEG.  [x] MRI Head w/wo: results as above.  [x] 4/18 s/p LP: CSF glucose 75 <H>, protein 69 <H>, TNC 1, culture NG.  [x] 4/15 VA Duplex lower extremities: acute L soleal vein DVT.  [x] s/p IVIG QD x5 days (last day 4/22).  [x] s/p Solumedrol 1G IV QD x5 days  [x] Steroid taper started on 4/24. Solumedrol 60mg today then decrease by 10mg daily.    c/w GI PPx while on steroids.   [x] Increase to Vimpat 150MG IV Q12HR.  [x] c/w Seroquel 100MG PO QHS.  [x] c/w Risperidone 0.5MG PO QD.  [x] Cardiology following (Dr. Fragoso), c/w Lisinopril 20MG PO QD, Hydralazine 10MG PO Q4HR.  [x] Pulmonology following, recommendations appreciated.  [x] Medicine following (Dr. Bernabe): f/u US of neck soft tissue.  [x] 4/16 CTA Chest PE Protocol: as above, patient with small R upper lobe segmental branch PE.  [x] Vascular Cardiology following (Dr. Perera): on Eliquis 5MG PO Q12HR for acute PE.  [x] DVT PPx: on full dose AC for PE.  [x] Diet: Regular + Ensure.  [x] Seizure Rescue: Ativan 1MG IVP x1 for convulsions/GTCs lasting > 3 minutes, VPA 1G IV x1 for convulsions/GTCs that are refractory to Ativan.    CORE MEASURES:        AED levels [] Sent [] Pending [] Resulted     LFTs [] normal [] elevated      Plan and education provided to [] patient []family at bedside [] awaiting for family     Seizure Semiology  [] Tonic clonic  [] Clonic  [] Tonic  [] Unresponsive  [] Focal with impaired awareness  [] Focal without impaired awareness    Obtain screening lower extremity venous ultrasound in patients who meet 1 or more of the following criteria as patient is high risk for DVT/PE on admission:   [] History of DVT/PE  []Hypercoagulable states (Factor V Leiden, Cancer, OCP, etc. )  []Prolonged immobility (hemiplegia/hemiparesis/post operative or any other extended immobilization)  [] Transferred from outside facility (Rehab or Long term care)    Case discussed w/ neuro attending Dr. Maldonado 71 yo male with a PMHx of HTN, Hypothyroidism, and Diverticulosis (requiring blood transfusions x2 8 yrs ago) who presented to [facility] on 4/12 as a transfer from [place] for ICH. Patient LKN was at 2200pm on 4/11. Patient was noted on 4/12 @ 0200 to be obtunded with saliva coming out of his mouth. EMS was called, patient came back to baseline and did not want to go to hospital and EMS left. Patient then had a seizure-like event witnessed by wife with UE shaking, foaming at the mouth, and urinary/fecal incontinence. Neurology consulted for new onset seizures in the setting of ICH while patient was in NSCU. No seizures seen on EEG thus far during this admission. Transferred from NSCU to EMU on 4/15 after stable IPH noted. MRI Head w/wo shows R cerebellar IPH vs calcification.    Impression: New onset of seizures of unknown cause. R cerebellar density IPH vs calcification.      Plan:  [] IC c/s-elevated WBC w/ UTI and steroid taper  [] C/w IV Ceftriaxone and sen5t Urine Culture for newly diagnosed UTI  [x] Follow up CT Chest report-report above  [] Awaiting ROEL placement- last day of Quarantine  [x] Neuro checks, vitals Q4HR.  [x] Telemetry.  [x] Seizure/Fall/Aspiration precautions.  [x] SBP goal: <150.  [x] Off EEG.  [x] MRI Head w/wo: results as above.  [x] 4/18 s/p LP: CSF glucose 75 <H>, protein 69 <H>, TNC 1, culture NG.  [x] 4/15 VA Duplex lower extremities: acute L soleal vein DVT.  [x] s/p IVIG QD x5 days (last day 4/22).  [x] s/p Solumedrol 1G IV QD x5 days  [x] Steroid taper dc'd  [x] Increase to Vimpat 150MG IV Q12HR.  [x] c/w Seroquel 100MG PO QHS.  [x] c/w Risperidone 0.5MG PO QD.  [x] Cardiology following (Dr. Fragoso), c/w Lisinopril 20MG PO QD, Hydralazine 10MG PO Q4HR.  [x] Pulmonology following, recommendations appreciated.  [x] Medicine following (Dr. Bernabe): f/u US of neck soft tissue.  [x] 4/16 CTA Chest PE Protocol: as above, patient with small R upper lobe segmental branch PE.  [x] Vascular Cardiology following (Dr. Perera): on Eliquis 5MG PO Q12HR for acute PE.  [x] Urology c/s->to comment on prostate lesion  [x] DVT PPx: on full dose AC for PE.  [x] Diet: Regular + Ensure.  [x] Seizure Rescue: Ativan 1MG IVP x1 for convulsions/GTCs lasting > 3 minutes, VPA 1G IV x1 for convulsions/GTCs that are refractory to Ativan.    CORE MEASURES:        AED levels [] Sent [] Pending [] Resulted     LFTs [] normal [] elevated      Plan and education provided to [] patient []family at bedside [] awaiting for family     Seizure Semiology  [] Tonic clonic  [] Clonic  [] Tonic  [] Unresponsive  [] Focal with impaired awareness  [] Focal without impaired awareness    Obtain screening lower extremity venous ultrasound in patients who meet 1 or more of the following criteria as patient is high risk for DVT/PE on admission:   [] History of DVT/PE  []Hypercoagulable states (Factor V Leiden, Cancer, OCP, etc. )  []Prolonged immobility (hemiplegia/hemiparesis/post operative or any other extended immobilization)  [] Transferred from outside facility (Rehab or Long term care)    Case discussed w/ neuro attending Dr. Maldonado

## 2022-04-28 NOTE — PROGRESS NOTE ADULT - ASSESSMENT
1. Have you been to the ER, urgent care clinic since your last visit? Hospitalized since your last visit? No    2. Have you seen or consulted any other health care providers outside of the [address] [place] [address] since your last visit? Include any pap smears or colon screening. No     3. Since your last visit, have you had any of the following symptoms? chest pains, palpitations, shortness of breath, dizziness and swelling in legs/arms. 4.  Have you had any blood work, X-rays or cardiac testing? No    5. Where do you normally have your labs drawn? [facility]    6. Do you need any refills today?    no 72M PMH htn, hypothyroidism, presented to [place] ED via EMS after family called 911 following a witnessed seizure, by family, reportedly lasting ~5min.

## 2022-04-28 NOTE — PROGRESS NOTE ADULT - PROBLEM SELECTOR PLAN 3
required cardene gtt  SBP goal <150  c/w amlodipine 10mg PO daily  lisinopril increased to 40mg PO daily  if needed, labetalol 200mg PO TID  c/w hydralazine 10mg PO q4hrs PRN

## 2022-04-29 DIAGNOSIS — E03.9 HYPOTHYROIDISM, UNSPECIFIED: ICD-10-CM

## 2022-04-29 DIAGNOSIS — R73.9 HYPERGLYCEMIA, UNSPECIFIED: ICD-10-CM

## 2022-04-29 LAB
AMPA-R AB CBA, CSF: NEGATIVE — SIGNIFICANT CHANGE UP
AMPHIPHYSIN AB TITR CSF: NEGATIVE TITER — SIGNIFICANT CHANGE UP
ANION GAP SERPL CALC-SCNC: 12 MMOL/L — SIGNIFICANT CHANGE UP (ref 5–17)
BUN SERPL-MCNC: 58 MG/DL — HIGH (ref 7–23)
CALCIUM SERPL-MCNC: 9.3 MG/DL — SIGNIFICANT CHANGE UP (ref 8.4–10.5)
CASPR2-IGG CBA, CSF: NEGATIVE — SIGNIFICANT CHANGE UP
CHLORIDE SERPL-SCNC: 98 MMOL/L — SIGNIFICANT CHANGE UP (ref 96–108)
CO2 SERPL-SCNC: 25 MMOL/L — SIGNIFICANT CHANGE UP (ref 22–31)
CREAT SERPL-MCNC: 1.61 MG/DL — HIGH (ref 0.5–1.3)
CV2 IGG TITR CSF: NEGATIVE TITER — SIGNIFICANT CHANGE UP
DPPX ANTIBODY IFA, CSF: NEGATIVE — SIGNIFICANT CHANGE UP
EGFR: 45 ML/MIN/1.73M2 — LOW
GABA-B-R AB CBA, CSF: NEGATIVE — SIGNIFICANT CHANGE UP
GAD65 AB CSF-SCNC: 0 NMOL/L — SIGNIFICANT CHANGE UP
GFAP IFA, CSF: NEGATIVE — SIGNIFICANT CHANGE UP
GLIAL NUC TYPE 1 AB TITR CSF: NEGATIVE TITER — SIGNIFICANT CHANGE UP
GLUCOSE BLDC GLUCOMTR-MCNC: 190 MG/DL — HIGH (ref 70–99)
GLUCOSE BLDC GLUCOMTR-MCNC: 223 MG/DL — HIGH (ref 70–99)
GLUCOSE BLDC GLUCOMTR-MCNC: 242 MG/DL — HIGH (ref 70–99)
GLUCOSE BLDC GLUCOMTR-MCNC: 326 MG/DL — HIGH (ref 70–99)
GLUCOSE SERPL-MCNC: 207 MG/DL — HIGH (ref 70–99)
HCT VFR BLD CALC: 34.1 % — LOW (ref 39–50)
HGB BLD-MCNC: 10.8 G/DL — LOW (ref 13–17)
HU1 AB TITR CSF IF: NEGATIVE TITER — SIGNIFICANT CHANGE UP
HU2 AB TITR CSF IF: NEGATIVE TITER — SIGNIFICANT CHANGE UP
HU3 AB TITR CSF: NEGATIVE TITER — SIGNIFICANT CHANGE UP
IGLON5 IFA, CSF: NEGATIVE — SIGNIFICANT CHANGE UP
IMMUNOLOGIST REVIEW: SIGNIFICANT CHANGE UP
LGI1-IGG CBA, CSF: NEGATIVE — SIGNIFICANT CHANGE UP
MCHC RBC-ENTMCNC: 29.6 PG — SIGNIFICANT CHANGE UP (ref 27–34)
MCHC RBC-ENTMCNC: 31.7 GM/DL — LOW (ref 32–36)
MCV RBC AUTO: 93.4 FL — SIGNIFICANT CHANGE UP (ref 80–100)
MGLUR1 AB IFA, CSF: NEGATIVE — SIGNIFICANT CHANGE UP
NIF IFA, CSF: NEGATIVE — SIGNIFICANT CHANGE UP
NMDA-R AB CBA, CSF: NEGATIVE — SIGNIFICANT CHANGE UP
NRBC # BLD: 0 /100 WBCS — SIGNIFICANT CHANGE UP (ref 0–0)
PCA-TR AB TITR CSF: NEGATIVE TITER — SIGNIFICANT CHANGE UP
PLATELET # BLD AUTO: 328 K/UL — SIGNIFICANT CHANGE UP (ref 150–400)
POTASSIUM SERPL-MCNC: 4.3 MMOL/L — SIGNIFICANT CHANGE UP (ref 3.5–5.3)
POTASSIUM SERPL-SCNC: 4.3 MMOL/L — SIGNIFICANT CHANGE UP (ref 3.5–5.3)
PURKINJE CELL CYTOPLASMIC AB TYPE 2: NEGATIVE TITER — SIGNIFICANT CHANGE UP
PURKINJE CELLS AB TITR CSF IF: NEGATIVE TITER — SIGNIFICANT CHANGE UP
RBC # BLD: 3.65 M/UL — LOW (ref 4.2–5.8)
RBC # FLD: 14.6 % — HIGH (ref 10.3–14.5)
REFLEX ADDED: SIGNIFICANT CHANGE UP
SODIUM SERPL-SCNC: 135 MMOL/L — SIGNIFICANT CHANGE UP (ref 135–145)
WBC # BLD: 17.7 K/UL — HIGH (ref 3.8–10.5)
WBC # FLD AUTO: 17.7 K/UL — HIGH (ref 3.8–10.5)

## 2022-04-29 PROCEDURE — 99222 1ST HOSP IP/OBS MODERATE 55: CPT

## 2022-04-29 PROCEDURE — 99232 SBSQ HOSP IP/OBS MODERATE 35: CPT | Mod: GC

## 2022-04-29 RX ORDER — ACETAMINOPHEN 500 MG
650 TABLET ORAL ONCE
Refills: 0 | Status: COMPLETED | OUTPATIENT
Start: 2022-04-29 | End: 2022-04-29

## 2022-04-29 RX ORDER — INSULIN LISPRO 100/ML
3 VIAL (ML) SUBCUTANEOUS
Refills: 0 | Status: DISCONTINUED | OUTPATIENT
Start: 2022-04-29 | End: 2022-05-01

## 2022-04-29 RX ORDER — INSULIN LISPRO 100/ML
VIAL (ML) SUBCUTANEOUS AT BEDTIME
Refills: 0 | Status: DISCONTINUED | OUTPATIENT
Start: 2022-04-29 | End: 2022-05-04

## 2022-04-29 RX ORDER — INSULIN GLARGINE 100 [IU]/ML
10 INJECTION, SOLUTION SUBCUTANEOUS AT BEDTIME
Refills: 0 | Status: DISCONTINUED | OUTPATIENT
Start: 2022-04-29 | End: 2022-05-04

## 2022-04-29 RX ORDER — SODIUM CHLORIDE 9 MG/ML
1000 INJECTION INTRAMUSCULAR; INTRAVENOUS; SUBCUTANEOUS
Refills: 0 | Status: DISCONTINUED | OUTPATIENT
Start: 2022-04-29 | End: 2022-05-04

## 2022-04-29 RX ORDER — INSULIN LISPRO 100/ML
VIAL (ML) SUBCUTANEOUS
Refills: 0 | Status: DISCONTINUED | OUTPATIENT
Start: 2022-04-29 | End: 2022-05-04

## 2022-04-29 RX ORDER — INSULIN LISPRO 100/ML
VIAL (ML) SUBCUTANEOUS
Refills: 0 | Status: DISCONTINUED | OUTPATIENT
Start: 2022-04-29 | End: 2022-04-29

## 2022-04-29 RX ADMIN — SODIUM CHLORIDE 50 MILLILITER(S): 9 INJECTION INTRAMUSCULAR; INTRAVENOUS; SUBCUTANEOUS at 16:51

## 2022-04-29 RX ADMIN — Medication 8: at 12:09

## 2022-04-29 RX ADMIN — Medication 3 MILLILITER(S): at 06:09

## 2022-04-29 RX ADMIN — Medication 1: at 17:03

## 2022-04-29 RX ADMIN — LACOSAMIDE 150 MILLIGRAM(S): 50 TABLET ORAL at 18:01

## 2022-04-29 RX ADMIN — Medication 10 MILLIGRAM(S): at 22:02

## 2022-04-29 RX ADMIN — Medication 3 UNIT(S): at 16:55

## 2022-04-29 RX ADMIN — Medication 3 MILLILITER(S): at 00:04

## 2022-04-29 RX ADMIN — CEFTRIAXONE 100 MILLIGRAM(S): 500 INJECTION, POWDER, FOR SOLUTION INTRAMUSCULAR; INTRAVENOUS at 17:14

## 2022-04-29 RX ADMIN — POLYETHYLENE GLYCOL 3350 17 GRAM(S): 17 POWDER, FOR SOLUTION ORAL at 06:11

## 2022-04-29 RX ADMIN — Medication 100 MILLIGRAM(S): at 18:01

## 2022-04-29 RX ADMIN — Medication 88 MICROGRAM(S): at 06:11

## 2022-04-29 RX ADMIN — Medication 650 MILLIGRAM(S): at 13:02

## 2022-04-29 RX ADMIN — RISPERIDONE 1 MILLIGRAM(S): 4 TABLET ORAL at 09:15

## 2022-04-29 RX ADMIN — AMLODIPINE BESYLATE 10 MILLIGRAM(S): 2.5 TABLET ORAL at 06:11

## 2022-04-29 RX ADMIN — SENNA PLUS 2 TABLET(S): 8.6 TABLET ORAL at 21:11

## 2022-04-29 RX ADMIN — PANTOPRAZOLE SODIUM 40 MILLIGRAM(S): 20 TABLET, DELAYED RELEASE ORAL at 06:10

## 2022-04-29 RX ADMIN — LACOSAMIDE 150 MILLIGRAM(S): 50 TABLET ORAL at 06:13

## 2022-04-29 RX ADMIN — Medication 650 MILLIGRAM(S): at 15:32

## 2022-04-29 RX ADMIN — APIXABAN 5 MILLIGRAM(S): 2.5 TABLET, FILM COATED ORAL at 06:10

## 2022-04-29 RX ADMIN — LISINOPRIL 40 MILLIGRAM(S): 2.5 TABLET ORAL at 06:11

## 2022-04-29 RX ADMIN — QUETIAPINE FUMARATE 50 MILLIGRAM(S): 200 TABLET, FILM COATED ORAL at 21:10

## 2022-04-29 RX ADMIN — APIXABAN 5 MILLIGRAM(S): 2.5 TABLET, FILM COATED ORAL at 18:01

## 2022-04-29 RX ADMIN — Medication 2: at 08:43

## 2022-04-29 RX ADMIN — Medication 3 MILLILITER(S): at 18:01

## 2022-04-29 RX ADMIN — INSULIN GLARGINE 10 UNIT(S): 100 INJECTION, SOLUTION SUBCUTANEOUS at 21:41

## 2022-04-29 NOTE — PROGRESS NOTE ADULT - SUBJECTIVE AND OBJECTIVE BOX
Follow-up Pulm Progress Note    more alert today, +confused   Sats 97% RA  denies SOB/CP     Medications:  MEDICATIONS  (STANDING):  acetaminophen     Tablet .. 650 milliGRAM(s) Oral once  albuterol/ipratropium for Nebulization 3 milliLiter(s) Nebulizer every 6 hours  amLODIPine   Tablet 10 milliGRAM(s) Oral daily  apixaban 5 milliGRAM(s) Oral two times a day  dextrose 5%. 1000 milliLiter(s) (50 mL/Hr) IV Continuous <Continuous>  dextrose 5%. 1000 milliLiter(s) (100 mL/Hr) IV Continuous <Continuous>  dextrose 50% Injectable 25 Gram(s) IV Push once  dextrose 50% Injectable 12.5 Gram(s) IV Push once  dextrose 50% Injectable 25 Gram(s) IV Push once  glucagon  Injectable 1 milliGRAM(s) IntraMuscular once  insulin lispro (ADMELOG) corrective regimen sliding scale   SubCutaneous at bedtime  insulin lispro (ADMELOG) corrective regimen sliding scale   SubCutaneous three times a day before meals  lacosamide 150 milliGRAM(s) Oral two times a day  levothyroxine 88 MICROGram(s) Oral daily  lisinopril 40 milliGRAM(s) Oral daily  pantoprazole    Tablet 40 milliGRAM(s) Oral before breakfast  polyethylene glycol 3350 17 Gram(s) Oral two times a day  QUEtiapine 50 milliGRAM(s) Oral at bedtime  risperiDONE   Tablet 1 milliGRAM(s) Oral <User Schedule>  senna 2 Tablet(s) Oral at bedtime  sodium chloride 0.9%. 1000 milliLiter(s) (50 mL/Hr) IV Continuous <Continuous>  sodium chloride 0.9%. 500 milliLiter(s) (100 mL/Hr) IV Continuous <Continuous>  thiamine 100 milliGRAM(s) Oral daily    MEDICATIONS  (PRN):  dextrose Oral Gel 15 Gram(s) Oral once PRN Blood Glucose LESS THAN 70 milliGRAM(s)/deciliter  hydrALAZINE 10 milliGRAM(s) Oral every 4 hours PRN Systolic blood pressure < 160  LORazepam   Injectable 1 milliGRAM(s) IV Push once PRN Seizure  valproate sodium IVPB 1000 milliGRAM(s) IV Intermittent once PRN Seizure          Vital Signs Last 24 Hrs  T(C): 36.9 (29 Apr 2022 10:23), Max: 36.9 (29 Apr 2022 10:23)  T(F): 98.5 (29 Apr 2022 10:23), Max: 98.5 (29 Apr 2022 10:23)  HR: 87 (29 Apr 2022 10:23) (77 - 92)  BP: 133/79 (29 Apr 2022 10:23) (120/60 - 138/77)  BP(mean): --  RR: 18 (29 Apr 2022 10:23) (18 - 19)  SpO2: 97% (29 Apr 2022 10:23) (97% - 99%)                LABS:                        10.8   17.70 )-----------( 328      ( 29 Apr 2022 07:08 )             34.1     04-29    135  |  98  |  58<H>  ----------------------------<  207<H>  4.3   |  25  |  1.61<H>    Ca    9.3      29 Apr 2022 07:11            CAPILLARY BLOOD GLUCOSE      POCT Blood Glucose.: 326 mg/dL (29 Apr 2022 11:31)            DAIANA 1:160 04-19 @ 10:05  Anti SS-1 0.6  Anti SS-2 <0.2  Anti RNP 0.8  RF -- 04-19 @ 10:05    Atypical ANCA Negative 04-19 @ 10:05  c-ANCA titer -- 04-19 @ 10:05  c-ANCA Negative 04-19 @ 10:05  p-ANCA Negative 04-19 @ 10:05  DAIANA -- 04-19 @ 09:09  Anti SS-1 --  Anti SS-2 --  Anti RNP --  RF <10 04-19 @ 09:09    Atypical ANCA -- 04-19 @ 09:09  c-ANCA titer -- 04-19 @ 09:09  c-ANCA -- 04-19 @ 09:09  p-ANCA -- 04-19 @ 09:09              CULTURES:     Culture - Blood (collected 04-27-22 @ 12:21)  Source: .Blood Blood-Peripheral  Preliminary Report (04-28-22 @ 13:02):    No growth to date.    Culture - Blood (collected 04-27-22 @ 12:21)  Source: .Blood Blood-Peripheral  Preliminary Report (04-28-22 @ 13:02):    No growth to date.        Culture - Urine (collected 04-27-22 @ 18:23)  Source: Clean Catch Clean Catch (Midstream)  Preliminary Report (04-29-22 @ 12:59):    >100,000 CFU/ml Escherichia coli    <10,000 CFU/ml Normal Urogenital dana present          Physical Examination:  PULM: Clear to auscultation bilaterally, no significant sputum production  CVS: S1, S2 heard    RADIOLOGY REVIEWED    CT chest:   < from: CT Chest No Cont (04.27.22 @ 10:43) >    Tubes/Lines: None.    Mediastinum/Vessels/Heart: Aorta and pulmonary arteries are normal in   size. There is trace pericardial effusion. No lymphadenopathy. Diffuse   enlargement of the thyroid gland with mildnarrowing of the tracheal down   to 11 mm. This is without change from previous exam.    Lungs/Pleura/Airways: Bibasilar atelectasis with posterior bowing of the   fissures bilaterally, increased since previous exam.    Visualized abdomen: Unremarkable noncontrast appearance of the upper   abdomen    Bones and soft tissues: No suspicious osseous lesions. Degenerative   changes noted throughout the spine.    IMPRESSION:    Bibasilar atelectasis with posterior bowing of the fissures bilaterally.   This demonstrates slight interval progression since the previous exam,   the specimen the left side.    No supraclavicular lymphadenopathy is seen.    Trace pericardial effusion.    < end of copied text >

## 2022-04-29 NOTE — DIETITIAN INITIAL EVALUATION ADULT - NSFNSGIASSESSMENTFT_GEN_A_CORE
- no noted nausea, vomiting, diarrhea, or constipation.   - Last BM:4/27;  currently ordered for bowel regimen

## 2022-04-29 NOTE — CONSULT NOTE ADULT - SUBJECTIVE AND OBJECTIVE BOX
HPI:  72M PMH htn, hypothyroidism, presented to [facility] ED via EMS after family called 911 following a witnessed seizure, by family, reportedly lasting ~5min.  Enroute to [place], it was reported patient had another 2 witnessed seizures by EMS, each lasting ~1 minute. Following each seizure the patient was given 5mg IV versed (received 10mg collectively and brought to [facility] ED). Pt arrived to the ED obtunded with blood from his mouth (tongue laceration), responsive only to pain/sternal rub. Patient was subsequently intubated for GCS 6 and CT/CTA performed showing a small cerebellar hemorrhage ON NO A/C/ THINNERS. He was transferred to [facility] for NSGY eval on Cardene and Propofol gtt.      Upon arrival, wife at bedside and confirms story, LKN 10PM,  sleeping 12am, 2am wife found him frothing at mouth, called EMS, patient subsequently awoke and refused transfer to hospital.  About 1 hour later, seized again, witnessed sounds like GTC.  EMS called again and transferred to hospital with another 2 witnessed seizures.  Hypertensive on arrival to [facility].  (12 Apr 2022 09:29)    Endocrine consulted for hyperglycemia after receiving prednisone the past few days. Pt. denies any hx of diabetes. Does not take diabetes medications at home. No polyuria, polydipsia, nausea or vomiting. No family hx of diabetes. Does not have a glucometer. No reported hypoglycemia.     PAST MEDICAL & SURGICAL HISTORY:  HTN (hypertension)    Diverticulosis    Hypothyroid    No significant past surgical history        FAMILY HISTORY: No hx of diabetes in 1st degree relatives      Social History: No tobacco. + former alcohol use.    Outpatient Medications:  Levothyroxine 50 mcg daily  Amlodipine 10mg daily    MEDICATIONS  (STANDING):  albuterol/ipratropium for Nebulization 3 milliLiter(s) Nebulizer every 6 hours  amLODIPine   Tablet 10 milliGRAM(s) Oral daily  apixaban 5 milliGRAM(s) Oral two times a day  dextrose 5%. 1000 milliLiter(s) (50 mL/Hr) IV Continuous <Continuous>  dextrose 5%. 1000 milliLiter(s) (100 mL/Hr) IV Continuous <Continuous>  dextrose 50% Injectable 25 Gram(s) IV Push once  dextrose 50% Injectable 12.5 Gram(s) IV Push once  dextrose 50% Injectable 25 Gram(s) IV Push once  glucagon  Injectable 1 milliGRAM(s) IntraMuscular once  insulin glargine Injectable (LANTUS) 10 Unit(s) SubCutaneous at bedtime  insulin lispro Injectable (ADMELOG) 3 Unit(s) SubCutaneous three times a day before meals  lacosamide 150 milliGRAM(s) Oral two times a day  levothyroxine 88 MICROGram(s) Oral daily  lisinopril 40 milliGRAM(s) Oral daily  pantoprazole    Tablet 40 milliGRAM(s) Oral before breakfast  polyethylene glycol 3350 17 Gram(s) Oral two times a day  QUEtiapine 50 milliGRAM(s) Oral at bedtime  risperiDONE   Tablet 1 milliGRAM(s) Oral <User Schedule>  senna 2 Tablet(s) Oral at bedtime  sodium chloride 0.9%. 1000 milliLiter(s) (50 mL/Hr) IV Continuous <Continuous>  thiamine 100 milliGRAM(s) Oral daily    MEDICATIONS  (PRN):  dextrose Oral Gel 15 Gram(s) Oral once PRN Blood Glucose LESS THAN 70 milliGRAM(s)/deciliter  hydrALAZINE 10 milliGRAM(s) Oral every 4 hours PRN Systolic blood pressure < 160  LORazepam   Injectable 1 milliGRAM(s) IV Push once PRN Seizure  valproate sodium IVPB 1000 milliGRAM(s) IV Intermittent once PRN Seizure      Allergies    fish (Hives; Angioedema)  No Known Drug Allergies    Intolerances      Review of Systems:  Constitutional: No fever, No change in weight  Eyes: No blurry vision  Neuro: No headache, No paresthesias  HEENT: No throat pain  Cardiovascular: No chest pain  Respiratory: No SOB  GI: No nausea or vomiting  : No polyuria  Skin: no rash  Psych: no depression  Endocrine: No polydipsia, No heat or cold intolerance, rest as noted in HPI  Hem/lymph: no swelling    All other review of systems negative      PHYSICAL EXAM:  VITALS: T(C): 36.5 (04-29-22 @ 13:42)  T(F): 97.7 (04-29-22 @ 13:42), Max: 98.5 (04-29-22 @ 10:23)  HR: 85 (04-29-22 @ 13:42) (77 - 92)  BP: 158/75 (04-29-22 @ 13:42) (125/72 - 158/75)  RR:  (18 - 19)  SpO2:  (96% - 99%)  Wt(kg): --  GENERAL: NAD at this time  EYES: No proptosis, EOMI  HEENT:  Atraumatic, Normocephalic,   THYROID: Normal size, no palpable nodules  RESPIRATORY: Clear to auscultation bilaterally, full excursion, non-labored  CARDIOVASCULAR: Regular rhythm; No murmurs; no peripheral edema  GI: Soft, nontender, non distended, normal bowel sounds  SKIN: Dry, intact, No rashes or lesions  MUSCULOSKELETAL: normal strength  NEURO: follows commands  PSYCH: Alert and oriented x 3, normal affect, normal mood  CUSHING'S SIGNS: no striae      POCT Blood Glucose.: 190 mg/dL (04-29-22 @ 16:37)  POCT Blood Glucose.: 326 mg/dL (04-29-22 @ 11:31)  POCT Blood Glucose.: 242 mg/dL (04-29-22 @ 07:38)  POCT Blood Glucose.: 284 mg/dL (04-28-22 @ 21:02)  POCT Blood Glucose.: 263 mg/dL (04-28-22 @ 16:14)  POCT Blood Glucose.: 247 mg/dL (04-28-22 @ 13:47)  POCT Blood Glucose.: 217 mg/dL (04-28-22 @ 07:42)  POCT Blood Glucose.: 381 mg/dL (04-27-22 @ 21:38)  POCT Blood Glucose.: 386 mg/dL (04-27-22 @ 16:18)  POCT Blood Glucose.: 464 mg/dL (04-27-22 @ 16:16)                              10.8   17.70 )-----------( 328      ( 29 Apr 2022 07:08 )             34.1       04-29    135  |  98  |  58<H>  ----------------------------<  207<H>  4.3   |  25  |  1.61<H>    EGFR if : x   EGFR if non : x     Ca    9.3      04-29    TPro  8.0  /  Alb  2.9<L>  /  TBili  0.3  /  DBili  x   /  AST  23  /  ALT  17  /  AlkPhos  69  04-27    Thyroid Function Tests:  04-21 @ 09:53 TSH 6.67 FreeT4 0.7 T3 45 Anti TPO -- Anti Thyroglobulin Ab -- TSI --  04-19 @ 10:05 TSH -- FreeT4 -- T3 -- Anti TPO 21091.0 Anti Thyroglobulin Ab 469.0 TSI --          04-13 Chol 128 Direct LDL -- LDL calculated 38 HDL 67 Trig 116  Radiology:

## 2022-04-29 NOTE — DIETITIAN INITIAL EVALUATION ADULT - PERTINENT MEDS FT
MEDICATIONS  (STANDING):  albuterol/ipratropium for Nebulization 3 milliLiter(s) Nebulizer every 6 hours  amLODIPine   Tablet 10 milliGRAM(s) Oral daily  apixaban 5 milliGRAM(s) Oral two times a day  dextrose 5%. 1000 milliLiter(s) (50 mL/Hr) IV Continuous <Continuous>  dextrose 5%. 1000 milliLiter(s) (100 mL/Hr) IV Continuous <Continuous>  dextrose 50% Injectable 25 Gram(s) IV Push once  dextrose 50% Injectable 12.5 Gram(s) IV Push once  dextrose 50% Injectable 25 Gram(s) IV Push once  glucagon  Injectable 1 milliGRAM(s) IntraMuscular once  insulin glargine Injectable (LANTUS) 10 Unit(s) SubCutaneous at bedtime  insulin lispro (ADMELOG) corrective regimen sliding scale   SubCutaneous three times a day before meals  insulin lispro (ADMELOG) corrective regimen sliding scale   SubCutaneous at bedtime  insulin lispro Injectable (ADMELOG) 3 Unit(s) SubCutaneous three times a day before meals  lacosamide 150 milliGRAM(s) Oral two times a day  levothyroxine 88 MICROGram(s) Oral daily  lisinopril 40 milliGRAM(s) Oral daily  pantoprazole    Tablet 40 milliGRAM(s) Oral before breakfast  polyethylene glycol 3350 17 Gram(s) Oral two times a day  QUEtiapine 50 milliGRAM(s) Oral at bedtime  risperiDONE   Tablet 1 milliGRAM(s) Oral <User Schedule>  senna 2 Tablet(s) Oral at bedtime  sodium chloride 0.9%. 1000 milliLiter(s) (50 mL/Hr) IV Continuous <Continuous>  thiamine 100 milliGRAM(s) Oral daily    MEDICATIONS  (PRN):  dextrose Oral Gel 15 Gram(s) Oral once PRN Blood Glucose LESS THAN 70 milliGRAM(s)/deciliter  hydrALAZINE 10 milliGRAM(s) Oral every 4 hours PRN Systolic blood pressure < 160  LORazepam   Injectable 1 milliGRAM(s) IV Push once PRN Seizure  valproate sodium IVPB 1000 milliGRAM(s) IV Intermittent once PRN Seizure

## 2022-04-29 NOTE — CONSULT NOTE ADULT - PROVIDER SPECIALTY LIST ADULT
Vascular Cardiology
Rehab Medicine
Neurology
Cardiology
Infectious Disease
Internal Medicine
Neurosurgery
Pulmonology
Endocrinology

## 2022-04-29 NOTE — PROGRESS NOTE ADULT - ASSESSMENT
72y male  HTN, Hypothyroidism, and Diverticulosis who initially presented to [facility] on 4/12 as a transfer from [place] for ICH. Pt was obtunded with saliva coming out of his mouth, he had a seizure-like event witnessed by wife with UE shaking, foaming at the mouth, and urinary/fecal incontinence. MRI Head w/wo shows R cerebellar IPH vs calcification. He was intubated for airway protection at OSH, extubated 4/13. CTA chest with atelectasis and small RUL segmental branch PE. He has been on IV CTX for abnormal UA and now with rising WBC despite abx, hence ID eval requested. Pt has been restless, now sedated, unable to offer any specific c/o    PLAN:  etiology of leukocytosis not clear at present  he has significant pyuria, but there no signs of retention  Day 3 of empiric CTX, leukocytosis seems moderating  Also he received steroids --? possible residual steroids effect  He is also at risk for C diff, given prior abx exposure at outside hospital  Will monitor WBC trend closely  f/u surveillance cutures  check stool for C diff if diarrhea

## 2022-04-29 NOTE — PROGRESS NOTE ADULT - ASSESSMENT
73 yo male with a PMHx of HTN, Hypothyroidism, and Diverticulosis (requiring blood transfusions x2 8 yrs ago) who presented to [facility] on 4/12 as a transfer from [place] for ICH. Patient LKN was at 2200pm on 4/11. Patient was noted on 4/12 @ 0200 to be obtunded with saliva coming out of his mouth. EMS was called, patient came back to baseline and did not want to go to hospital and EMS left. Patient then had a seizure-like event witnessed by wife with UE shaking, foaming at the mouth, and urinary/fecal incontinence. Neurology consulted for new onset seizures in the setting of ICH while patient was in NSCU. No seizures seen on EEG thus far during this admission. Transferred from NSCU to EMU on 4/15 after stable IPH noted. MRI Head w/wo shows R cerebellar IPH vs calcification.    Impression: New onset of seizures of unknown cause. R cerebellar density IPH vs calcification.      Plan:  [] IC c/s-elevated WBC w/ UTI and steroid taper. Unclear etiology of elevated WBC count, down-trending  [] C/w IV Ceftriaxone and sen5t Urine Culture for newly diagnosed UTI  [x] Follow up CT Chest report-report above  [] Awaiting [facility] placement- last day of Quarantine  [x] Neuro checks, vitals Q4HR.  [x] Telemetry.  [x] Seizure/Fall/Aspiration precautions.  [x] SBP goal: <150.  [x] Off EEG.  [x] MRI Head w/wo: results as above.  [x] 4/18 s/p LP: CSF glucose 75 <H>, protein 69 <H>, TNC 1, culture NG.  [x] 4/15 VA Duplex lower extremities: acute L soleal vein DVT.  [x] s/p IVIG QD x5 days (last day 4/22).  [x] s/p Solumedrol 1G IV QD x5 days  [x] Steroid taper dc'd  [x] Increase to Vimpat 150MG IV Q12HR.  [x] c/w Seroquel 100MG PO QHS.  [x] c/w Risperidone 0.5MG PO QD.  [x] Cardiology following (Dr. Fragoso), c/w Lisinopril 20MG PO QD, Hydralazine 10MG PO Q4HR.  [x] Pulmonology following, recommendations appreciated.  [x] Medicine following (Dr. Bernabe): f/u US of neck soft tissue.  [x] 4/16 CTA Chest PE Protocol: as above, patient with small R upper lobe segmental branch PE.  [x] Vascular Cardiology following (Dr. Perera): on Eliquis 5MG PO Q12HR for acute PE.  [x] Urology c/s->to comment on prostate lesion  [x] DVT PPx: on full dose AC for PE.  [x] Diet: Regular + Ensure.  [x] Seizure Rescue: Ativan 1MG IVP x1 for convulsions/GTCs lasting > 3 minutes, VPA 1G IV x1 for convulsions/GTCs that are refractory to Ativan.    CORE MEASURES:        AED levels [] Sent [] Pending [] Resulted     LFTs [] normal [] elevated      Plan and education provided to [] patient []family at bedside [] awaiting for family     Seizure Semiology  [] Tonic clonic  [] Clonic  [] Tonic  [] Unresponsive  [] Focal with impaired awareness  [] Focal without impaired awareness    Obtain screening lower extremity venous ultrasound in patients who meet 1 or more of the following criteria as patient is high risk for DVT/PE on admission:   [] History of DVT/PE  []Hypercoagulable states (Factor V Leiden, Cancer, OCP, etc. )  []Prolonged immobility (hemiplegia/hemiparesis/post operative or any other extended immobilization)  [] Transferred from outside facility (Rehab or Long term care)    Case discussed w/ neuro attending Dr. Maldonado

## 2022-04-29 NOTE — DIETITIAN INITIAL EVALUATION ADULT - NS FNS DIET ORDER
Diet, Regular:   Supplement Feeding Modality:  Oral  Ensure Enlive Cans or Servings Per Day:  3       Frequency:  Three Times a day (04-21-22)

## 2022-04-29 NOTE — DIETITIAN INITIAL EVALUATION ADULT - ADD RECOMMEND
Rash (Pediatric) - Monitor BM per fecal incontinence as per flowsheets  - Monitor glucose levels; Team to provide/adjust insulin as needed to help maintain BG WNL   - Will continue to monitor PO intake, weight, labs, skin, GI status, diet.   - RD remains available to review diet education and adjust diet recommendations as needed.

## 2022-04-29 NOTE — CONSULT NOTE ADULT - PROBLEM SELECTOR RECOMMENDATION 2
CTH/CTA small R cerebellar hemorrhage  reviewed TTE - EF 75%, normal  frequent neuro checks  aspiration precautions  orders per neuro team
Goal BP < 140/90 c/w amlodipine
-Management per neurology

## 2022-04-29 NOTE — PROGRESS NOTE ADULT - SUBJECTIVE AND OBJECTIVE BOX
Subjective: No acute events overnight      MEDICATIONS  (STANDING):  albuterol/ipratropium for Nebulization 3 milliLiter(s) Nebulizer every 6 hours  amLODIPine   Tablet 10 milliGRAM(s) Oral daily  apixaban 5 milliGRAM(s) Oral two times a day  cefTRIAXone   IVPB 1000 milliGRAM(s) IV Intermittent every 24 hours  lacosamide 150 milliGRAM(s) Oral two times a day  levothyroxine 88 MICROGram(s) Oral daily  lisinopril 40 milliGRAM(s) Oral daily  pantoprazole    Tablet 40 milliGRAM(s) Oral before breakfast  polyethylene glycol 3350 17 Gram(s) Oral two times a day  predniSONE   Tablet 70 milliGRAM(s) Oral once  predniSONE   Tablet 50 milliGRAM(s) Oral once  QUEtiapine 50 milliGRAM(s) Oral at bedtime  risperiDONE   Tablet 0.5 milliGRAM(s) Oral <User Schedule>  senna 2 Tablet(s) Oral at bedtime  sodium chloride 0.9%. 1000 milliLiter(s) (50 mL/Hr) IV Continuous <Continuous>  thiamine IVPB 500 milliGRAM(s) IV Intermittent every 8 hours    MEDICATIONS  (PRN):  hydrALAZINE 10 milliGRAM(s) Oral every 4 hours PRN Systolic blood pressure < 160  LORazepam   Injectable 1 milliGRAM(s) IV Push once PRN Seizure  valproate sodium IVPB 1000 milliGRAM(s) IV Intermittent once PRN Seizure    Vital Signs Last 24 Hrs  T(C): 36.8 (2022 05:59), Max: 36.9 (2022 12:54)  T(F): 98.2 (2022 05:59), Max: 98.5 (2022 12:54)  HR: 92 (2022 05:59) (77 - 92)  BP: 138/77 (2022 05:59) (98/62 - 138/77)  BP(mean): --  RR: 19 (2022 05:59) (18 - 19)  SpO2: 99% (2022 05:59) (97% - 99%)    GENERAL EXAM:  Constitutional: awake and alert. NAD  HEENT: PERRL, EOMI    NEUROLOGICAL EXAM:  MS: Does not know his own age. Speech is fluent, Follows commands.   CN: VFF, EOMI, PERRL,  V1-3 intact, no facial asymmetry, t/p midline,  Motor: Strength: 5/5 in the UE/ LE  Tone: normal. Bulk: normal.    Plantar flex b/l.   Sensation: intact to LT/Temperature 4x.   Coordination: FTN intact   Gait:  Deferred    Labs:                                                         10.8   17.70 )-----------( 328      ( 2022 07:08 )             34.1           135  |  98  |  58<H>  ----------------------------<  207<H>  4.3   |  25  |  1.61<H>    Ca    9.3      2022 07:11    TPro  8.0  /  Alb  2.9<L>  /  TBili  0.3  /  DBili  x   /  AST  23  /  ALT  17  /  AlkPhos  69          Urinalysis Basic - ( 2022 10:26 )  Color: Yellow / Appearance: Slightly Turbid / S.022 / pH: x  Gluc: x / Ketone: Negative  / Bili: Negative / Urobili: Negative   Blood: x / Protein: 100 / Nitrite: Positive   Leuk Esterase: Large / RBC: 8 /hpf /  /HPF   Sq Epi: x / Non Sq Epi: 1 /hpf / Bacteria: Many      Radiology:  CT Chest : Bibasilar atelectasis with posterior bowing of the fissures bilaterally.   This demonstrates slight interval progression since the previous exam,   the specimen the left side.  No supraclavicular lymphadenopathy is seen.  Trace pericardial effusion.      EEG   EEG Summary:  Abnormal EEG in the awake, drowsy and asleep states.  -Mild generalized slowing  Impression/Clinical Correlate:  This is an abnormal EEG record. There is evidence of mild multifocal/diffuse nonspecific cerebral dysfunction, not specific for etiology. No epileptiform pattern or seizures were seen.

## 2022-04-29 NOTE — CONSULT NOTE ADULT - CONSULT REASON
L below the knee DVT
Seizures in the setting of ICH
leukocytosis
Medical management
RT Cerebellar Heme
Rehabilitation consult
Steroid induced hyperglycemia
Hypertension
r/o PNA

## 2022-04-29 NOTE — DIETITIAN INITIAL EVALUATION ADULT - PERTINENT LABORATORY DATA
04-29    135  |  98  |  58<H>  ----------------------------<  207<H>  4.3   |  25  |  1.61<H>    Ca    9.3      29 Apr 2022 07:11    POCT Blood Glucose.: 190 mg/dL (04-29-22 @ 16:37)  POCT Blood Glucose.: 326 mg/dL (04-29-22 @ 11:31)  POCT Blood Glucose.: 242 mg/dL (04-29-22 @ 07:38)  POCT Blood Glucose.: 284 mg/dL (04-28-22 @ 21:02)    A1C with Estimated Average Glucose Result: 5.9 % (04-13-22 @ 09:20)    04-29 @ 07:11: Na 135, BUN 58<H>, Cr 1.61<H>, <H>, K+ 4.3, Phos --, Mg --, Alk Phos --, ALT/SGPT --, AST/SGOT --, HbA1c --

## 2022-04-29 NOTE — PROGRESS NOTE ADULT - SUBJECTIVE AND OBJECTIVE BOX
CC: f/u for    Patient reports    REVIEW OF SYSTEMS: no fevers, no chills, no nausea, no vomiting, no abd pain, no resp symptoms  All other review of systems negative (Comprehensive ROS)    Antimicrobials Day #  3  cefTRIAXone   IVPB 1000 milliGRAM(s) IV Intermittent every 24 hours    Other Medications Reviewed    T(F): 98.2 (22 @ 05:59), Max: 98.5 (22 @ 12:54)  HR: 92 (22 @ 05:59)  BP: 138/77 (22 @ 05:59)  RR: 19 (22 @ 05:59)  SpO2: 99% (22 @ 05:59)    PHYSICAL EXAM:  General: alert, no acute distress  Eyes:  anicteric, no conjunctival injection, no discharge  Oropharynx: no lesions or injection 	  Neck: supple, without adenopathy  Lungs: clear to auscultation  Heart: regular rate and rhythm; no murmur, rubs or gallops  Abdomen: soft, nondistended, nontender, without mass or organomegaly  Skin: no lesions  Extremities: no clubbing, cyanosis, or edema  Neurologic: alert, oriented, moves all extremities    LAB RESULTS:                        10.8   17.70 )-----------( 328      ( 2022 07:08 )             34.1         135  |  98  |  58<H>  ----------------------------<  207<H>  4.3   |  25  |  1.61<H>    Ca    9.3      2022 07:11    TPro  8.0  /  Alb  2.9<L>  /  TBili  0.3  /  DBili  x   /  AST  23  /  ALT  17  /  AlkPhos  69  -    LIVER FUNCTIONS - ( 2022 10:08 )  Alb: 2.9 g/dL / Pro: 8.0 g/dL / ALK PHOS: 69 U/L / ALT: 17 U/L / AST: 23 U/L / GGT: x           Urinalysis Basic - ( 2022 10:26 )    Color: Yellow / Appearance: Slightly Turbid / S.022 / pH: x  Gluc: x / Ketone: Negative  / Bili: Negative / Urobili: Negative   Blood: x / Protein: 100 / Nitrite: Positive   Leuk Esterase: Large / RBC: 8 /hpf /  /HPF   Sq Epi: x / Non Sq Epi: 1 /hpf / Bacteria: Many        MICROBIOLOGY REVIEWED:  Culture - Blood (22 @ 12:21)   Specimen Source: .Blood Blood-Peripheral   Culture Results:   No growth to date. < from: CT Chest No Cont (22 @ 10:43) >  IMPRESSION:    Bibasilar atelectasis with posterior bowing of the fissures bilaterally.   This demonstrates slight interval progression since the previous exam,   the specimen the left side.    No supraclavicular lymphadenopathy is seen.    Trace pericardial effusion.    < end of copied text >    RADIOLOGY REVIEWED:     CC: f/u for leukocytosis    Patient remains poorly interacting and lethargic    REVIEW OF SYSTEMS: no fevers, limited  All other review of systems negative (Comprehensive ROS)    Antimicrobials Day #  3  cefTRIAXone   IVPB 1000 milliGRAM(s) IV Intermittent every 24 hours    Other Medications Reviewed    T(F): 98.2 (22 @ 05:59), Max: 98.5 (22 @ 12:54)  HR: 92 (22 @ 05:59)  BP: 138/77 (22 @ 05:59)  RR: 19 (22 @ 05:59)  SpO2: 99% (22 @ 05:59)    PHYSICAL EXAM:  General: alert, no acute distress  Eyes:  anicteric, no conjunctival injection, no discharge  Oropharynx: no lesions or injection 	  Neck: supple, without adenopathy  Lungs: clear to auscultation  Heart: regular rate and rhythm; no murmur, rubs or gallops  Abdomen: soft, nondistended, nontender, without mass or organomegaly  Skin: no lesions  Extremities: no clubbing, cyanosis, or edema  Neurologic: lethargic    LAB RESULTS:                        10.8   17.70 )-----------( 328      ( 2022 07:08 )             34.1         135  |  98  |  58<H>  ----------------------------<  207<H>  4.3   |  25  |  1.61<H>    Ca    9.3      2022 07:11    TPro  8.0  /  Alb  2.9<L>  /  TBili  0.3  /  DBili  x   /  AST  23  /  ALT  17  /  AlkPhos  69  04-27    LIVER FUNCTIONS - ( 2022 10:08 )  Alb: 2.9 g/dL / Pro: 8.0 g/dL / ALK PHOS: 69 U/L / ALT: 17 U/L / AST: 23 U/L / GGT: x           Urinalysis Basic - ( 2022 10:26 )    Color: Yellow / Appearance: Slightly Turbid / S.022 / pH: x  Gluc: x / Ketone: Negative  / Bili: Negative / Urobili: Negative   Blood: x / Protein: 100 / Nitrite: Positive   Leuk Esterase: Large / RBC: 8 /hpf /  /HPF   Sq Epi: x / Non Sq Epi: 1 /hpf / Bacteria: Many        MICROBIOLOGY REVIEWED:  Culture - Blood (22 @ 12:21)   Specimen Source: .Blood Blood-Peripheral   Culture Results:   No growth to date.    RADIOLOGY REVIEWED:   < from: CT Chest No Cont (22 @ 10:43) >  IMPRESSION:    Bibasilar atelectasis with posterior bowing of the fissures bilaterally.   This demonstrates slight interval progression since the previous exam,   the specimen the left side.    No supraclavicular lymphadenopathy is seen.    Trace pericardial effusion.    < end of copied text >

## 2022-04-29 NOTE — CONSULT NOTE ADULT - PROBLEM SELECTOR RECOMMENDATION 9
Diabetes Education and Nutrition Eval  Start Lantus 10 units qhs  Start Admelog 3 units qac  Low correction scale qac + bedtime  Can likely d/c standing insulin or lower doses once steroid effects wear off.  Goal glucose 100-180  Outpt. optho, podiatry, nephrology  Plan to d/c off pharmacologic treatment.
-Management per neurology
witnessed seizures at home    - required intubation, extubated 4/13    vEEG  AEDs per neuro team  frequent neuro checks  orders per neuro team

## 2022-04-29 NOTE — DIETITIAN INITIAL EVALUATION ADULT - FEEDING ASSISTANCE
- Provide assistance with meals as needed to promote adequate PO intake   - Encourage adequate consumption of meals/supplements to optimize protein-energy intake

## 2022-04-29 NOTE — PROGRESS NOTE ADULT - SUBJECTIVE AND OBJECTIVE BOX
Subjective: Patient seen and examined. No new events except as noted.     REVIEW OF SYSTEMS:    CONSTITUTIONAL:+ weakness, fevers or chills  EYES/ENT: No visual changes;  No vertigo or throat pain   NECK: No pain or stiffness  RESPIRATORY: No cough, wheezing, hemoptysis; No shortness of breath  CARDIOVASCULAR: No chest pain or palpitations  GASTROINTESTINAL: No abdominal or epigastric pain. No nausea, vomiting, or hematemesis; No diarrhea or constipation. No melena or hematochezia.  GENITOURINARY: No dysuria, frequency or hematuria  NEUROLOGICAL: No numbness or weakness  SKIN: No itching, burning, rashes, or lesions   All other review of systems is negative unless indicated above.    MEDICATIONS:  MEDICATIONS  (STANDING):  albuterol/ipratropium for Nebulization 3 milliLiter(s) Nebulizer every 6 hours  amLODIPine   Tablet 10 milliGRAM(s) Oral daily  apixaban 5 milliGRAM(s) Oral two times a day  cefTRIAXone   IVPB 1000 milliGRAM(s) IV Intermittent every 24 hours  dextrose 5%. 1000 milliLiter(s) (50 mL/Hr) IV Continuous <Continuous>  dextrose 5%. 1000 milliLiter(s) (100 mL/Hr) IV Continuous <Continuous>  dextrose 50% Injectable 25 Gram(s) IV Push once  dextrose 50% Injectable 12.5 Gram(s) IV Push once  dextrose 50% Injectable 25 Gram(s) IV Push once  glucagon  Injectable 1 milliGRAM(s) IntraMuscular once  insulin lispro (ADMELOG) corrective regimen sliding scale   SubCutaneous three times a day before meals  insulin lispro (ADMELOG) corrective regimen sliding scale   SubCutaneous at bedtime  lacosamide 150 milliGRAM(s) Oral two times a day  levothyroxine 88 MICROGram(s) Oral daily  lisinopril 40 milliGRAM(s) Oral daily  pantoprazole    Tablet 40 milliGRAM(s) Oral before breakfast  polyethylene glycol 3350 17 Gram(s) Oral two times a day  QUEtiapine 50 milliGRAM(s) Oral at bedtime  risperiDONE   Tablet 1 milliGRAM(s) Oral <User Schedule>  senna 2 Tablet(s) Oral at bedtime  sodium chloride 0.9%. 1000 milliLiter(s) (50 mL/Hr) IV Continuous <Continuous>  sodium chloride 0.9%. 500 milliLiter(s) (100 mL/Hr) IV Continuous <Continuous>  thiamine 100 milliGRAM(s) Oral daily      PHYSICAL EXAM:  T(C): 36.8 (04-29-22 @ 05:59), Max: 36.9 (04-28-22 @ 12:54)  HR: 92 (04-29-22 @ 05:59) (77 - 92)  BP: 138/77 (04-29-22 @ 05:59) (100/62 - 138/77)  RR: 19 (04-29-22 @ 05:59) (18 - 19)  SpO2: 99% (04-29-22 @ 05:59) (97% - 99%)  Wt(kg): --  I&O's Summary      Appearance: NAD  HEENT: dry oral mucosa, PERRL, EOMI	  Lymphatic: No lymphadenopathy , no edema  Cardiovascular: Normal S1 S2, No JVD, No murmurs , Peripheral pulses palpable 2+ bilaterally  Respiratory: Decreased BS, normal effort	  Gastrointestinal:  Soft, Non-tender, + BS	  Skin: No rashes, No ecchymoses, No cyanosis, warm to touch  NEUROLOGICAL EXAM:  MS: Does not know his own age. Speech is fluent, Follows commands.   CN: VFF, EOMI, PERRL,  V1-3 intact, no facial asymmetry, t/p midline,  Motor: Strength: 5/5 in the UE/ LE  Tone: normal. Bulk: normal.    Plantar flex b/l.   Sensation: intact to LT/Temperature 4x.   Coordination: FTN intact   Gait:  Deferred  Ext: No edema    LABS:    CARDIAC MARKERS:                                10.8   17.70 )-----------( 328      ( 29 Apr 2022 07:08 )             34.1     04-29    135  |  98  |  58<H>  ----------------------------<  207<H>  4.3   |  25  |  1.61<H>    Ca    9.3      29 Apr 2022 07:11      proBNP:   Lipid Profile:   HgA1c:   TSH:     3          TELEMETRY: 	    ECG:  	  RADIOLOGY:   DIAGNOSTIC TESTING:  [ ] Echocardiogram:  [ ]  Catheterization:  [ ] Stress Test:    OTHER:

## 2022-04-29 NOTE — PROGRESS NOTE ADULT - ASSESSMENT
72M PMH htn, hypothyroidism, presented to [facility] ED via EMS after family called 911 following a witnessed seizure, by family, reportedly lasting ~5min.

## 2022-04-29 NOTE — DIETITIAN INITIAL EVALUATION ADULT - NSFNSPHYEXAMSKINFT_GEN_A_CORE
98% IBW ( pounds); no noted significant wt changes PTA per [facility] H[facility] review  Skin: no noted pressure injuries as per flowsheets   Unable to perform Nutrition Focused Physical Assessment in current setting; RD will reassess as appropriate.

## 2022-04-29 NOTE — DIETITIAN INITIAL EVALUATION ADULT - REASON INDICATOR FOR ASSESSMENT
Assessment warranted for length of stay.  Information obtained from PCA, EMR. Pt confused unable to interview

## 2022-04-29 NOTE — DIETITIAN INITIAL EVALUATION ADULT - ORAL NUTRITION SUPPLEMENTS
- Recommend to change current order Ensure Enlive x6 day -> Glucerna Shake 240mls 2x daily (440kcals, 20g protein)

## 2022-04-29 NOTE — CONSULT NOTE ADULT - CONSULT REQUESTED DATE/TIME
12-Apr-2022 08:17
18-Apr-2022 13:17
15-Apr-2022 15:11
15-Apr-2022 10:35
28-Apr-2022 13:05
12-Apr-2022 08:43
15-Apr-2022 13:01
29-Apr-2022 16:41
15-Apr-2022 09:54

## 2022-04-30 LAB
-  AMIKACIN: SIGNIFICANT CHANGE UP
-  AMOXICILLIN/CLAVULANIC ACID: SIGNIFICANT CHANGE UP
-  AMPICILLIN/SULBACTAM: SIGNIFICANT CHANGE UP
-  AMPICILLIN: SIGNIFICANT CHANGE UP
-  AZTREONAM: SIGNIFICANT CHANGE UP
-  CEFAZOLIN: SIGNIFICANT CHANGE UP
-  CEFEPIME: SIGNIFICANT CHANGE UP
-  CEFOXITIN: SIGNIFICANT CHANGE UP
-  CEFTRIAXONE: SIGNIFICANT CHANGE UP
-  CIPROFLOXACIN: SIGNIFICANT CHANGE UP
-  ERTAPENEM: SIGNIFICANT CHANGE UP
-  GENTAMICIN: SIGNIFICANT CHANGE UP
-  IMIPENEM: SIGNIFICANT CHANGE UP
-  LEVOFLOXACIN: SIGNIFICANT CHANGE UP
-  MEROPENEM: SIGNIFICANT CHANGE UP
-  NITROFURANTOIN: SIGNIFICANT CHANGE UP
-  PIPERACILLIN/TAZOBACTAM: SIGNIFICANT CHANGE UP
-  TIGECYCLINE: SIGNIFICANT CHANGE UP
-  TOBRAMYCIN: SIGNIFICANT CHANGE UP
-  TRIMETHOPRIM/SULFAMETHOXAZOLE: SIGNIFICANT CHANGE UP
ANION GAP SERPL CALC-SCNC: 11 MMOL/L — SIGNIFICANT CHANGE UP (ref 5–17)
APPEARANCE UR: CLEAR — SIGNIFICANT CHANGE UP
BACTERIA # UR AUTO: NEGATIVE — SIGNIFICANT CHANGE UP
BILIRUB UR-MCNC: NEGATIVE — SIGNIFICANT CHANGE UP
BUN SERPL-MCNC: 44 MG/DL — HIGH (ref 7–23)
CALCIUM SERPL-MCNC: 9.9 MG/DL — SIGNIFICANT CHANGE UP (ref 8.4–10.5)
CHLORIDE SERPL-SCNC: 99 MMOL/L — SIGNIFICANT CHANGE UP (ref 96–108)
CO2 SERPL-SCNC: 25 MMOL/L — SIGNIFICANT CHANGE UP (ref 22–31)
COLOR SPEC: SIGNIFICANT CHANGE UP
CREAT SERPL-MCNC: 1.4 MG/DL — HIGH (ref 0.5–1.3)
CULTURE RESULTS: SIGNIFICANT CHANGE UP
DIFF PNL FLD: ABNORMAL
EGFR: 53 ML/MIN/1.73M2 — LOW
EPI CELLS # UR: 10 /HPF — HIGH
GLUCOSE BLDC GLUCOMTR-MCNC: 131 MG/DL — HIGH (ref 70–99)
GLUCOSE BLDC GLUCOMTR-MCNC: 168 MG/DL — HIGH (ref 70–99)
GLUCOSE BLDC GLUCOMTR-MCNC: 203 MG/DL — HIGH (ref 70–99)
GLUCOSE BLDC GLUCOMTR-MCNC: 270 MG/DL — HIGH (ref 70–99)
GLUCOSE SERPL-MCNC: 280 MG/DL — HIGH (ref 70–99)
GLUCOSE UR QL: ABNORMAL
HCT VFR BLD CALC: 35.9 % — LOW (ref 39–50)
HGB BLD-MCNC: 11.6 G/DL — LOW (ref 13–17)
HYALINE CASTS # UR AUTO: 3 /LPF — HIGH (ref 0–2)
KETONES UR-MCNC: NEGATIVE — SIGNIFICANT CHANGE UP
LACTATE SERPL-SCNC: 2.8 MMOL/L — HIGH (ref 0.7–2)
LEUKOCYTE ESTERASE UR-ACNC: ABNORMAL
MCHC RBC-ENTMCNC: 30 PG — SIGNIFICANT CHANGE UP (ref 27–34)
MCHC RBC-ENTMCNC: 32.3 GM/DL — SIGNIFICANT CHANGE UP (ref 32–36)
MCV RBC AUTO: 92.8 FL — SIGNIFICANT CHANGE UP (ref 80–100)
METHOD TYPE: SIGNIFICANT CHANGE UP
NITRITE UR-MCNC: NEGATIVE — SIGNIFICANT CHANGE UP
NRBC # BLD: 0 /100 WBCS — SIGNIFICANT CHANGE UP (ref 0–0)
ORGANISM # SPEC MICROSCOPIC CNT: SIGNIFICANT CHANGE UP
ORGANISM # SPEC MICROSCOPIC CNT: SIGNIFICANT CHANGE UP
PH UR: 7.5 — SIGNIFICANT CHANGE UP (ref 5–8)
PLATELET # BLD AUTO: 380 K/UL — SIGNIFICANT CHANGE UP (ref 150–400)
POTASSIUM SERPL-MCNC: 4.8 MMOL/L — SIGNIFICANT CHANGE UP (ref 3.5–5.3)
POTASSIUM SERPL-SCNC: 4.8 MMOL/L — SIGNIFICANT CHANGE UP (ref 3.5–5.3)
PROT UR-MCNC: ABNORMAL
RAPID RVP RESULT: SIGNIFICANT CHANGE UP
RBC # BLD: 3.87 M/UL — LOW (ref 4.2–5.8)
RBC # FLD: 14.5 % — SIGNIFICANT CHANGE UP (ref 10.3–14.5)
RBC CASTS # UR COMP ASSIST: 6 /HPF — HIGH (ref 0–4)
SARS-COV-2 RNA SPEC QL NAA+PROBE: SIGNIFICANT CHANGE UP
SODIUM SERPL-SCNC: 135 MMOL/L — SIGNIFICANT CHANGE UP (ref 135–145)
SP GR SPEC: 1.02 — SIGNIFICANT CHANGE UP (ref 1.01–1.02)
SPECIMEN SOURCE: SIGNIFICANT CHANGE UP
UROBILINOGEN FLD QL: NEGATIVE — SIGNIFICANT CHANGE UP
WBC # BLD: 9.51 K/UL — SIGNIFICANT CHANGE UP (ref 3.8–10.5)
WBC # FLD AUTO: 9.51 K/UL — SIGNIFICANT CHANGE UP (ref 3.8–10.5)
WBC UR QL: 53 /HPF — HIGH (ref 0–5)

## 2022-04-30 PROCEDURE — 71045 X-RAY EXAM CHEST 1 VIEW: CPT | Mod: 26

## 2022-04-30 PROCEDURE — 99232 SBSQ HOSP IP/OBS MODERATE 35: CPT

## 2022-04-30 RX ORDER — HALOPERIDOL DECANOATE 100 MG/ML
2 INJECTION INTRAMUSCULAR ONCE
Refills: 0 | Status: COMPLETED | OUTPATIENT
Start: 2022-04-30 | End: 2022-04-30

## 2022-04-30 RX ORDER — ACETAMINOPHEN 500 MG
650 TABLET ORAL EVERY 6 HOURS
Refills: 0 | Status: DISCONTINUED | OUTPATIENT
Start: 2022-04-30 | End: 2022-05-04

## 2022-04-30 RX ADMIN — Medication 3 MILLILITER(S): at 12:25

## 2022-04-30 RX ADMIN — Medication 3 MILLILITER(S): at 19:17

## 2022-04-30 RX ADMIN — Medication 2: at 07:56

## 2022-04-30 RX ADMIN — Medication 3 MILLILITER(S): at 05:12

## 2022-04-30 RX ADMIN — Medication 650 MILLIGRAM(S): at 01:33

## 2022-04-30 RX ADMIN — Medication 1: at 22:07

## 2022-04-30 RX ADMIN — QUETIAPINE FUMARATE 50 MILLIGRAM(S): 200 TABLET, FILM COATED ORAL at 21:09

## 2022-04-30 RX ADMIN — LACOSAMIDE 150 MILLIGRAM(S): 50 TABLET ORAL at 19:15

## 2022-04-30 RX ADMIN — POLYETHYLENE GLYCOL 3350 17 GRAM(S): 17 POWDER, FOR SOLUTION ORAL at 19:23

## 2022-04-30 RX ADMIN — INSULIN GLARGINE 10 UNIT(S): 100 INJECTION, SOLUTION SUBCUTANEOUS at 22:07

## 2022-04-30 RX ADMIN — Medication 3 UNIT(S): at 19:16

## 2022-04-30 RX ADMIN — Medication 3 UNIT(S): at 12:26

## 2022-04-30 RX ADMIN — Medication 1: at 12:24

## 2022-04-30 RX ADMIN — POLYETHYLENE GLYCOL 3350 17 GRAM(S): 17 POWDER, FOR SOLUTION ORAL at 05:11

## 2022-04-30 RX ADMIN — APIXABAN 5 MILLIGRAM(S): 2.5 TABLET, FILM COATED ORAL at 19:24

## 2022-04-30 RX ADMIN — SENNA PLUS 2 TABLET(S): 8.6 TABLET ORAL at 21:09

## 2022-04-30 RX ADMIN — Medication 3 MILLILITER(S): at 00:40

## 2022-04-30 RX ADMIN — APIXABAN 5 MILLIGRAM(S): 2.5 TABLET, FILM COATED ORAL at 05:12

## 2022-04-30 RX ADMIN — RISPERIDONE 1 MILLIGRAM(S): 4 TABLET ORAL at 08:43

## 2022-04-30 RX ADMIN — Medication 88 MICROGRAM(S): at 05:12

## 2022-04-30 RX ADMIN — Medication 3 UNIT(S): at 08:06

## 2022-04-30 RX ADMIN — LACOSAMIDE 150 MILLIGRAM(S): 50 TABLET ORAL at 05:14

## 2022-04-30 RX ADMIN — HALOPERIDOL DECANOATE 2 MILLIGRAM(S): 100 INJECTION INTRAMUSCULAR at 13:45

## 2022-04-30 RX ADMIN — AMLODIPINE BESYLATE 10 MILLIGRAM(S): 2.5 TABLET ORAL at 05:12

## 2022-04-30 RX ADMIN — Medication 650 MILLIGRAM(S): at 00:42

## 2022-04-30 RX ADMIN — LISINOPRIL 40 MILLIGRAM(S): 2.5 TABLET ORAL at 05:12

## 2022-04-30 RX ADMIN — Medication 100 MILLIGRAM(S): at 08:50

## 2022-04-30 RX ADMIN — PANTOPRAZOLE SODIUM 40 MILLIGRAM(S): 20 TABLET, DELAYED RELEASE ORAL at 05:12

## 2022-04-30 NOTE — PROGRESS NOTE ADULT - ASSESSMENT
73 yo male with a PMHx of HTN, Hypothyroidism, and Diverticulosis (requiring blood transfusions x2 8 yrs ago) who presented to [facility] on 4/12 as a transfer from [place] for ICH. Patient LKN was at 2200pm on 4/11. Patient was noted on 4/12 @ 0200 to be obtunded with saliva coming out of his mouth. EMS was called, patient came back to baseline and did not want to go to hospital and EMS left. Patient then had a seizure-like event witnessed by wife with UE shaking, foaming at the mouth, and urinary/fecal incontinence. Neurology consulted for new onset seizures in the setting of ICH while patient was in NSCU. No seizures seen on EEG thus far during this admission. Transferred from NSCU to EMU on 4/15 after stable IPH noted. MRI Head w/wo shows R cerebellar IPH vs calcification.    Impression: New onset of seizures of unknown cause. R cerebellar density IPH vs calcification.      Plan:  [] IC c/s-elevated WBC w/ UTI and steroid taper. Unclear etiology of elevated WBC count, down-trending  [] C/w IV Ceftriaxone and sen5t Urine Culture for newly diagnosed UTI  [x] Follow up CT Chest report-report above  [] Awaiting [facility] placement- last day of Quarantine  [x] Neuro checks, vitals Q4HR.  [x] Telemetry.  [x] Seizure/Fall/Aspiration precautions.  [x] SBP goal: <150.  [x] Off EEG.  [x] MRI Head w/wo: results as above.  [x] 4/18 s/p LP: CSF glucose 75 <H>, protein 69 <H>, TNC 1, culture NG.  [x] 4/15 VA Duplex lower extremities: acute L soleal vein DVT.  [x] s/p IVIG QD x5 days (last day 4/22).  [x] s/p Solumedrol 1G IV QD x5 days  [x] Steroid taper dc'd  [x] Increase to Vimpat 150MG IV Q12HR.  [x] c/w Seroquel 100MG PO QHS.  [x] c/w Risperidone 0.5MG PO QD.  [x] Cardiology following (Dr. Fragoso), c/w Lisinopril 20MG PO QD, Hydralazine 10MG PO Q4HR.  [x] Pulmonology following, recommendations appreciated.  [x] Medicine following (Dr. Bernabe): f/u US of neck soft tissue.  [x] 4/16 CTA Chest PE Protocol: as above, patient with small R upper lobe segmental branch PE.  [x] Vascular Cardiology following (Dr. Perera): on Eliquis 5MG PO Q12HR for acute PE.  [x] Urology c/s->to comment on prostate lesion  [x] DVT PPx: on full dose AC for PE.  [x] Diet: Regular + Ensure.  [x] Seizure Rescue: Ativan 1MG IVP x1 for convulsions/GTCs lasting > 3 minutes, VPA 1G IV x1 for convulsions/GTCs that are refractory to Ativan.    CORE MEASURES:        AED levels [] Sent [] Pending [] Resulted     LFTs [] normal [] elevated      Plan and education provided to [] patient []family at bedside [] awaiting for family     Seizure Semiology  [] Tonic clonic  [] Clonic  [] Tonic  [] Unresponsive  [] Focal with impaired awareness  [] Focal without impaired awareness    Obtain screening lower extremity venous ultrasound in patients who meet 1 or more of the following criteria as patient is high risk for DVT/PE on admission:   [] History of DVT/PE  []Hypercoagulable states (Factor V Leiden, Cancer, OCP, etc. )  []Prolonged immobility (hemiplegia/hemiparesis/post operative or any other extended immobilization)  [] Transferred from outside facility (Rehab or Long term care)    Case discussed w/ neuro attending Dr. Maldonado
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
## 2022-04-30 NOTE — PROGRESS NOTE ADULT - ATTENDING SUPERVISION STATEMENT
Resident
Resident/Fellow
Fellow

## 2022-04-30 NOTE — PROGRESS NOTE ADULT - SUBJECTIVE AND OBJECTIVE BOX
CC: f/u for leukocytosis    Patient is now more awake, follows simple commands, agitated at times  he had fever 100.8    REVIEW OF SYSTEMS: + fevers, limited  All other review of systems negative (Comprehensive ROS)    Antimicrobials Day #  4  cefTRIAXone   IVPB 1000 milliGRAM(s) IV Intermittent every 24 hours    Other Medications Reviewed    Vital Signs Last 24 Hrs  T(C): 36.9 (2022 10:08), Max: 38.2 (2022 00:19)  T(F): 98.5 (2022 10:08), Max: 100.8 (2022 00:19)  HR: 88 (2022 10:08) (85 - 116)  BP: 111/67 (2022 10:08) (111/67 - 200/96)  BP(mean): --  RR: 20 (2022 10:08) (18 - 20)  SpO2: 97% (2022 10:08) (92% - 98%)    PHYSICAL EXAM:  General: alert, no acute distress  Eyes:  anicteric, no conjunctival injection, no discharge  Oropharynx: no lesions or injection 	  Neck: supple, without adenopathy  Lungs: clear to auscultation  Heart: regular rate and rhythm; no murmur, rubs or gallops  Abdomen: soft, nondistended, nontender, without mass or organomegaly  Skin: no lesions  Extremities: no clubbing, cyanosis, or edema  Neurologic: more awake and follows simple commands    LAB RESULTS:                                   11.6   9.51  )-----------( 380      ( 2022 01:54 )             35.9   04-30    135  |  99  |  44<H>  ----------------------------<  280<H>  4.8   |  25  |  1.40<H>    Ca    9.9      2022 01:54        Urinalysis Basic - ( 2022 10:26 )    Color: Yellow / Appearance: Slightly Turbid / S.022 / pH: x  Gluc: x / Ketone: Negative  / Bili: Negative / Urobili: Negative   Blood: x / Protein: 100 / Nitrite: Positive   Leuk Esterase: Large / RBC: 8 /hpf /  /HPF   Sq Epi: x / Non Sq Epi: 1 /hpf / Bacteria: Many        MICROBIOLOGY REVIEWED:    Culture - Urine (22 @ 18:23)   Specimen Source: Clean Catch Clean Catch (Midstream)   Culture Results:   >100,000 CFU/ml Escherichia coli   Culture - Blood (22 @ 12:21)   Specimen Source: .Blood Blood-Peripheral   Culture Results:   No growth to date.    RADIOLOGY REVIEWED:   < from: CT Chest No Cont (22 @ 10:43) >  IMPRESSION:    Bibasilar atelectasis with posterior bowing of the fissures bilaterally.   This demonstrates slight interval progression since the previous exam,   the specimen the left side.    No supraclavicular lymphadenopathy is seen.    Trace pericardial effusion.    < end of copied text >

## 2022-04-30 NOTE — CHART NOTE - NSCHARTNOTEFT_GEN_A_CORE
FS reviewed  Increase Lantus to 12 units qHS. Continue Admelog 3 units qAC    Benita Fenton MD  Endocrine Fellow  Can be reached via teams. For follow up questions, discharge recommendations, or new consults, please call answering service at [phone] (weekdays); [phone] (nights/weekends) FS reviewed. AM FS elevated, prandial FS at goal  Continue Lantus 10 units qHS and Admelog 3 units qAC. Steroids stopped on 4/28. Will f/u AM FS tomorrow and if still elevated, will increase Lantus     Benita Fenton MD  Endocrine Fellow  Can be reached via teams. For follow up questions, discharge recommendations, or new consults, please call answering service at [phone] (weekdays); [phone] (nights/weekends)

## 2022-04-30 NOTE — PROGRESS NOTE ADULT - SUBJECTIVE AND OBJECTIVE BOX
Subjective: No acute events overnight. continue to be encephalopathic likely poly factorial  improved agitation ( possibly due to steroids SE)        MEDICATIONS  (STANDING):  albuterol/ipratropium for Nebulization 3 milliLiter(s) Nebulizer every 6 hours  amLODIPine   Tablet 10 milliGRAM(s) Oral daily  apixaban 5 milliGRAM(s) Oral two times a day  cefTRIAXone   IVPB 1000 milliGRAM(s) IV Intermittent every 24 hours  lacosamide 150 milliGRAM(s) Oral two times a day  levothyroxine 88 MICROGram(s) Oral daily  lisinopril 40 milliGRAM(s) Oral daily  pantoprazole    Tablet 40 milliGRAM(s) Oral before breakfast  polyethylene glycol 3350 17 Gram(s) Oral two times a day  predniSONE   Tablet 70 milliGRAM(s) Oral once  predniSONE   Tablet 50 milliGRAM(s) Oral once  QUEtiapine 50 milliGRAM(s) Oral at bedtime  risperiDONE   Tablet 0.5 milliGRAM(s) Oral <User Schedule>  senna 2 Tablet(s) Oral at bedtime  sodium chloride 0.9%. 1000 milliLiter(s) (50 mL/Hr) IV Continuous <Continuous>  thiamine IVPB 500 milliGRAM(s) IV Intermittent every 8 hours    MEDICATIONS  (PRN):  hydrALAZINE 10 milliGRAM(s) Oral every 4 hours PRN Systolic blood pressure < 160  LORazepam   Injectable 1 milliGRAM(s) IV Push once PRN Seizure  valproate sodium IVPB 1000 milliGRAM(s) IV Intermittent once PRN Seizure    Vital Signs Last 24 Hrs  T(C): 36.8 (2022 05:59), Max: 36.9 (2022 12:54)  T(F): 98.2 (2022 05:59), Max: 98.5 (2022 12:54)  HR: 92 (2022 05:59) (77 - 92)  BP: 138/77 (2022 05:59) (98/62 - 138/77)  BP(mean): --  RR: 19 (2022 05:59) (18 - 19)  SpO2: 99% (2022 05:59) (97% - 99%)    GENERAL EXAM:  Constitutional: awake and alert. NAD  HEENT: PERRL, EOMI    NEUROLOGICAL EXAM:  MS: Does not know his own age. Speech is fluent, Follows commands.   CN: VFF, EOMI, PERRL,  V1-3 intact, no facial asymmetry, t/p midline,  Motor: Strength: 5/5 in the UE/ LE  Tone: normal. Bulk: normal.    Plantar flex b/l.   Sensation: intact to LT/Temperature 4x.   Coordination: FTN intact   Gait:  Deferred    Labs:                                                         10.8   17.70 )-----------( 328      ( 2022 07:08 )             34.1           135  |  98  |  58<H>  ----------------------------<  207<H>  4.3   |  25  |  1.61<H>    Ca    9.3      2022 07:11    TPro  8.0  /  Alb  2.9<L>  /  TBili  0.3  /  DBili  x   /  AST  23  /  ALT  17  /  AlkPhos  69          Urinalysis Basic - ( 2022 10:26 )  Color: Yellow / Appearance: Slightly Turbid / S.022 / pH: x  Gluc: x / Ketone: Negative  / Bili: Negative / Urobili: Negative   Blood: x / Protein: 100 / Nitrite: Positive   Leuk Esterase: Large / RBC: 8 /hpf /  /HPF   Sq Epi: x / Non Sq Epi: 1 /hpf / Bacteria: Many      Radiology:  CT Chest : Bibasilar atelectasis with posterior bowing of the fissures bilaterally.   This demonstrates slight interval progression since the previous exam,   the specimen the left side.  No supraclavicular lymphadenopathy is seen.  Trace pericardial effusion.      EEG   EEG Summary:  Abnormal EEG in the awake, drowsy and asleep states.  -Mild generalized slowing  Impression/Clinical Correlate:  This is an abnormal EEG record. There is evidence of mild multifocal/diffuse nonspecific cerebral dysfunction, not specific for etiology. No epileptiform pattern or seizures were seen.

## 2022-04-30 NOTE — PROGRESS NOTE ADULT - ASSESSMENT
72y male  HTN, Hypothyroidism, and Diverticulosis who initially presented to [facility] on 4/12 as a transfer from [place] for ICH. Pt was obtunded with saliva coming out of his mouth, he had a seizure-like event witnessed by wife with UE shaking, foaming at the mouth, and urinary/fecal incontinence. MRI Head w/wo shows R cerebellar IPH vs calcification. He was intubated for airway protection at OSH, extubated 4/13. CTA chest with atelectasis and small RUL segmental branch PE. He has been on IV CTX for abnormal UA and now with rising WBC despite abx, hence ID eval requested. Pt has been restless, was sedated, unable to offer any specific c/o  leukocytosis resolved on empiric CTX, but had episode of fevers  He is more alert, but agitated at times    PLAN:    Day 4 of empiric CTX, leukocytosis now resolved  Also he received steroids --? possible residual steroids effect  He is also at risk for C diff, given prior abx exposure at outside hospital  Will monitor fevers  F/u sens of GNR in a urine  f/u surveillance cultures if spikes  Aspiration precautions

## 2022-04-30 NOTE — PROGRESS NOTE ADULT - ASSESSMENT
72M PMH htn, hypothyroidism, presented to [place] ED via EMS after family called 911 following a witnessed seizure, by family, reportedly lasting ~5min.

## 2022-04-30 NOTE — PROGRESS NOTE ADULT - PROBLEM SELECTOR PLAN 3
required cardene gtt  SBP goal <150  c/w amlodipine 10mg PO daily  c/w lisinopril 40mg PO daily  if needed, labetalol 200mg PO TID  c/w hydralazine 10mg PO q4hrs PRN

## 2022-04-30 NOTE — PROGRESS NOTE ADULT - SUBJECTIVE AND OBJECTIVE BOX
Subjective: Patient seen and examined. No new events except as noted.     REVIEW OF SYSTEMS:    CONSTITUTIONAL: + weakness, fevers or chills  EYES/ENT: No visual changes;  No vertigo or throat pain   NECK: No pain or stiffness  RESPIRATORY: No cough, wheezing, hemoptysis; No shortness of breath  CARDIOVASCULAR: No chest pain or palpitations  GASTROINTESTINAL: No abdominal or epigastric pain. No nausea, vomiting, or hematemesis; No diarrhea or constipation. No melena or hematochezia.  GENITOURINARY: No dysuria, frequency or hematuria  NEUROLOGICAL: No numbness or weakness  SKIN: No itching, burning, rashes, or lesions   All other review of systems is negative unless indicated above.    MEDICATIONS:  MEDICATIONS  (STANDING):  albuterol/ipratropium for Nebulization 3 milliLiter(s) Nebulizer every 6 hours  amLODIPine   Tablet 10 milliGRAM(s) Oral daily  apixaban 5 milliGRAM(s) Oral two times a day  dextrose 5%. 1000 milliLiter(s) (50 mL/Hr) IV Continuous <Continuous>  dextrose 5%. 1000 milliLiter(s) (100 mL/Hr) IV Continuous <Continuous>  dextrose 50% Injectable 25 Gram(s) IV Push once  dextrose 50% Injectable 12.5 Gram(s) IV Push once  dextrose 50% Injectable 25 Gram(s) IV Push once  glucagon  Injectable 1 milliGRAM(s) IntraMuscular once  insulin glargine Injectable (LANTUS) 10 Unit(s) SubCutaneous at bedtime  insulin lispro (ADMELOG) corrective regimen sliding scale   SubCutaneous three times a day before meals  insulin lispro (ADMELOG) corrective regimen sliding scale   SubCutaneous at bedtime  insulin lispro Injectable (ADMELOG) 3 Unit(s) SubCutaneous three times a day before meals  lacosamide 150 milliGRAM(s) Oral two times a day  levothyroxine 88 MICROGram(s) Oral daily  lisinopril 40 milliGRAM(s) Oral daily  pantoprazole    Tablet 40 milliGRAM(s) Oral before breakfast  polyethylene glycol 3350 17 Gram(s) Oral two times a day  QUEtiapine 50 milliGRAM(s) Oral at bedtime  risperiDONE   Tablet 1 milliGRAM(s) Oral <User Schedule>  senna 2 Tablet(s) Oral at bedtime  sodium chloride 0.9%. 1000 milliLiter(s) (50 mL/Hr) IV Continuous <Continuous>  thiamine 100 milliGRAM(s) Oral daily    PHYSICAL EXAM:  T(C): 37.2 (04-30-22 @ 04:54), Max: 38.2 (04-30-22 @ 00:19)  HR: 90 (04-30-22 @ 04:54) (85 - 116)  BP: 158/82 (04-30-22 @ 04:54) (112/65 - 200/96)  RR: 20 (04-30-22 @ 04:54) (18 - 20)  SpO2: 94% (04-30-22 @ 04:54) (92% - 98%)  Wt(kg): --  I&O's Summary    29 Apr 2022 07:01  -  30 Apr 2022 07:00  --------------------------------------------------------  IN: 440 mL / OUT: 0 mL / NET: 440 mL    Appearance: NAD  HEENT: dry oral mucosa, PERRL, EOMI	  Lymphatic: No lymphadenopathy , no edema  Cardiovascular: Normal S1 S2, No JVD, No murmurs , Peripheral pulses palpable 2+ bilaterally  Respiratory: Decreased BS, normal effort	  Gastrointestinal:  Soft, Non-tender, + BS	  Skin: No rashes, No ecchymoses, No cyanosis, warm to touch  NEUROLOGICAL EXAM:  MS: Does not know his own age. Speech is fluent, Follows commands.   CN: VFF, EOMI, PERRL,  V1-3 intact, no facial asymmetry, t/p midline,  Motor: Strength: 5/5 in the UE/ LE  Tone: normal. Bulk: normal.    Plantar flex b/l.   Sensation: intact to LT/Temperature 4x.   Coordination: FTN intact   Gait:  Deferred  Ext: No edema    LABS:    CARDIAC MARKERS:                    11.6   9.51  )-----------( 380      ( 30 Apr 2022 01:54 )             35.9     04-30    135  |  99  |  44<H>  ----------------------------<  280<H>  4.8   |  25  |  1.40<H>    Ca    9.9      30 Apr 2022 01:54    proBNP:   Lipid Profile:   HgA1c:   TSH:     TELEMETRY: 	    ECG:  	  RADIOLOGY:   DIAGNOSTIC TESTING:  [ ] Echocardiogram:  [ ]  Catheterization:  [ ] Stress Test:    OTHER:

## 2022-05-01 ENCOUNTER — TRANSCRIPTION ENCOUNTER (OUTPATIENT)
Age: 72
End: 2022-05-01

## 2022-05-01 LAB
APPEARANCE UR: ABNORMAL
BACTERIA # UR AUTO: NEGATIVE — SIGNIFICANT CHANGE UP
BILIRUB UR-MCNC: NEGATIVE — SIGNIFICANT CHANGE UP
COLOR SPEC: SIGNIFICANT CHANGE UP
CULTURE RESULTS: NO GROWTH — SIGNIFICANT CHANGE UP
DIFF PNL FLD: ABNORMAL
EPI CELLS # UR: 3 /HPF — SIGNIFICANT CHANGE UP
GLUCOSE BLDC GLUCOMTR-MCNC: 111 MG/DL — HIGH (ref 70–99)
GLUCOSE BLDC GLUCOMTR-MCNC: 120 MG/DL — HIGH (ref 70–99)
GLUCOSE BLDC GLUCOMTR-MCNC: 176 MG/DL — HIGH (ref 70–99)
GLUCOSE BLDC GLUCOMTR-MCNC: 205 MG/DL — HIGH (ref 70–99)
GLUCOSE UR QL: ABNORMAL
HYALINE CASTS # UR AUTO: 7 /LPF — HIGH (ref 0–2)
KETONES UR-MCNC: NEGATIVE — SIGNIFICANT CHANGE UP
LEUKOCYTE ESTERASE UR-ACNC: ABNORMAL
NITRITE UR-MCNC: NEGATIVE — SIGNIFICANT CHANGE UP
PH UR: 6 — SIGNIFICANT CHANGE UP (ref 5–8)
PROT UR-MCNC: 100 — SIGNIFICANT CHANGE UP
RBC CASTS # UR COMP ASSIST: 55 /HPF — HIGH (ref 0–4)
SP GR SPEC: 1.02 — SIGNIFICANT CHANGE UP (ref 1.01–1.02)
SPECIMEN SOURCE: SIGNIFICANT CHANGE UP
UROBILINOGEN FLD QL: NEGATIVE — SIGNIFICANT CHANGE UP
WBC UR QL: 42 /HPF — HIGH (ref 0–5)

## 2022-05-01 RX ORDER — INSULIN LISPRO 100/ML
4 VIAL (ML) SUBCUTANEOUS
Refills: 0 | Status: DISCONTINUED | OUTPATIENT
Start: 2022-05-01 | End: 2022-05-04

## 2022-05-01 RX ORDER — MAGNESIUM HYDROXIDE 400 MG/1
30 TABLET, CHEWABLE ORAL ONCE
Refills: 0 | Status: DISCONTINUED | OUTPATIENT
Start: 2022-05-01 | End: 2022-05-04

## 2022-05-01 RX ORDER — LACTOBACILLUS ACIDOPHILUS 100MM CELL
1 CAPSULE ORAL DAILY
Refills: 0 | Status: DISCONTINUED | OUTPATIENT
Start: 2022-05-01 | End: 2022-05-04

## 2022-05-01 RX ADMIN — AMLODIPINE BESYLATE 10 MILLIGRAM(S): 2.5 TABLET ORAL at 06:28

## 2022-05-01 RX ADMIN — Medication 2: at 11:25

## 2022-05-01 RX ADMIN — Medication 3 UNIT(S): at 07:54

## 2022-05-01 RX ADMIN — Medication 3 MILLILITER(S): at 06:28

## 2022-05-01 RX ADMIN — INSULIN GLARGINE 10 UNIT(S): 100 INJECTION, SOLUTION SUBCUTANEOUS at 21:21

## 2022-05-01 RX ADMIN — Medication 88 MICROGRAM(S): at 06:28

## 2022-05-01 RX ADMIN — Medication 100 MILLIGRAM(S): at 11:25

## 2022-05-01 RX ADMIN — PANTOPRAZOLE SODIUM 40 MILLIGRAM(S): 20 TABLET, DELAYED RELEASE ORAL at 06:28

## 2022-05-01 RX ADMIN — APIXABAN 5 MILLIGRAM(S): 2.5 TABLET, FILM COATED ORAL at 06:29

## 2022-05-01 RX ADMIN — LISINOPRIL 40 MILLIGRAM(S): 2.5 TABLET ORAL at 06:28

## 2022-05-01 RX ADMIN — Medication 1: at 16:26

## 2022-05-01 RX ADMIN — RISPERIDONE 1 MILLIGRAM(S): 4 TABLET ORAL at 09:25

## 2022-05-01 RX ADMIN — LACOSAMIDE 150 MILLIGRAM(S): 50 TABLET ORAL at 16:27

## 2022-05-01 RX ADMIN — LACOSAMIDE 150 MILLIGRAM(S): 50 TABLET ORAL at 06:30

## 2022-05-01 RX ADMIN — Medication 3 MILLILITER(S): at 11:25

## 2022-05-01 RX ADMIN — Medication 1 TABLET(S): at 21:18

## 2022-05-01 RX ADMIN — POLYETHYLENE GLYCOL 3350 17 GRAM(S): 17 POWDER, FOR SOLUTION ORAL at 06:29

## 2022-05-01 RX ADMIN — SENNA PLUS 2 TABLET(S): 8.6 TABLET ORAL at 21:18

## 2022-05-01 RX ADMIN — QUETIAPINE FUMARATE 50 MILLIGRAM(S): 200 TABLET, FILM COATED ORAL at 21:18

## 2022-05-01 RX ADMIN — Medication 3 MILLILITER(S): at 00:24

## 2022-05-01 RX ADMIN — APIXABAN 5 MILLIGRAM(S): 2.5 TABLET, FILM COATED ORAL at 16:26

## 2022-05-01 RX ADMIN — POLYETHYLENE GLYCOL 3350 17 GRAM(S): 17 POWDER, FOR SOLUTION ORAL at 16:27

## 2022-05-01 RX ADMIN — Medication 4 UNIT(S): at 16:26

## 2022-05-01 RX ADMIN — Medication 3 UNIT(S): at 11:24

## 2022-05-01 NOTE — CHART NOTE - NSCHARTNOTEFT_GEN_A_CORE
FS reviewed.   AM FS at goal  Some prandial elevations noted. Will increase to Admelog 4 units (from 3 units).    Benita Fenton MD  Endocrine Fellow  Can be reached via teams. For follow up questions, discharge recommendations, or new consults, please call answering service at [phone] (weekdays); [phone] (nights/weekends)

## 2022-05-01 NOTE — DISCHARGE NOTE PROVIDER - CARE PROVIDER_API CALL
Juan Bernabe (DO)  Medicine  [address], [address]  [place] [address]  Phone: [phone]  Fax: [phone]  Follow Up Time:     Efren Fragoso (DO)  Cardiology; Internal Medicine  [address], [address]  [place] [address]  Phone: [phone]  Fax: [phone]  Follow Up Time:

## 2022-05-01 NOTE — PROVIDER CONTACT NOTE (OTHER) - ACTION/TREATMENT ORDERED:
Will give patient medication and reassess
no new orders
As per PA repeat BP if SBP greater than 160 give po hydralazine as prescribed . Cont to monitor repeat  BP s/p med dose .
Assessed. Propofol OK to titrate to CPOT.
Rupal said to reschedule the meds for 8am and give labs to the day shift.  Rupal okay with the blood pressure.
straight catheterize one time. get a UA; continue to monitor change in vital signs.
As per pa REPEAT bp IN 1 hr CONT TO MONITOR . f/u LABS FOR TEMP.
Continue restraint no medications to be administered at this time.

## 2022-05-01 NOTE — DISCHARGE NOTE PROVIDER - NSDCMRMEDTOKEN_GEN_ALL_CORE_FT
amLODIPine 10 mg oral tablet: 1 tab(s) orally once a day  apixaban 5 mg oral tablet: 1 tab(s) orally 2 times a day  ipratropium-albuterol 0.5 mg-2.5 mg/3 mL inhalation solution: 3 milliliter(s) inhaled every 6 hours  lacosamide 150 mg oral tablet: 1 tab(s) orally 2 times a day  levothyroxine 88 mcg (0.088 mg) oral tablet: 1 tab(s) orally once a day  lisinopril 40 mg oral tablet: 1 tab(s) orally once a day  pantoprazole 40 mg oral delayed release tablet: 1 tab(s) orally once a day (before a meal)  polyethylene glycol 3350 oral powder for reconstitution: 17 gram(s) orally 2 times a day  QUEtiapine 50 mg oral tablet: 1 tab(s) orally once a day (at bedtime)  risperiDONE 0.5 mg oral tablet: 1 tab(s) orally   senna oral tablet: 2 tab(s) orally once a day (at bedtime)   acetaminophen 325 mg oral tablet: 2 tab(s) orally every 6 hours, As needed, Temp greater or equal to 38C (100.4F), Mild Pain (1 - 3)  amLODIPine 10 mg oral tablet: 1 tab(s) orally once a day  apixaban 5 mg oral tablet: 1 tab(s) orally 2 times a day  cefuroxime 500 mg oral tablet: 1 tab(s) orally every 12 hours for 5 days  insulin glargine 100 units/mL subcutaneous solution: 10 unit(s) subcutaneous once a day (at bedtime)  insulin lispro 100 units/mL injectable solution: 4 unit(s) injectable 3 times a day (with meals)  ipratropium-albuterol 0.5 mg-2.5 mg/3 mL inhalation solution: 3 milliliter(s) inhaled every 6 hours  lacosamide 150 mg oral tablet: 1 tab(s) orally 2 times a day  levothyroxine 88 mcg (0.088 mg) oral tablet: 1 tab(s) orally once a day  lisinopril 40 mg oral tablet: 1 tab(s) orally once a day  pantoprazole 40 mg oral delayed release tablet: 1 tab(s) orally once a day (before a meal)  polyethylene glycol 3350 oral powder for reconstitution: 17 gram(s) orally 2 times a day  QUEtiapine 50 mg oral tablet: 1 tab(s) orally once a day (at bedtime)  risperiDONE 1 mg oral tablet: 1 tab(s) orally once a day  senna oral tablet: 2 tab(s) orally once a day (at bedtime)  thiamine 100 mg oral tablet: 1 tab(s) orally once a day

## 2022-05-01 NOTE — PROVIDER CONTACT NOTE (OTHER) - SITUATION
Pt reporting suprapubic pain, consistently after eating. reported it after lunch and dinner meal. reported trouble with frequency and urgency with urination; corroborated by PCA who was here yesterday
Pt with temp 100.8 /64  Pt restless agitated
Patient combative, fighting restraints, not following commands. Fighting ventilator.
Patient extubated at 3:30am. ICU team at bedside. Patient kicking and stating "I will kill you with a knife". Fighting restraints.
Patient Agitated and hypertensive - Refusing All Meds.  Haldol and Ativan given as ordered.  Patient refusing Labs as well
Patient agitated. Pulling off tele, nasal cannula, pulse ox. Patient trying to jump out of bed
Pt is lethargic/justo
pre dinner , repeat 386
Pt with  /91

## 2022-05-01 NOTE — PROGRESS NOTE ADULT - ASSESSMENT
72 year old Male with a PMHx of HTN, hypothyroidism, who presented to [facility] via transfer from OSH. Patient originally presented to to [place] ED via EMS after family called 911 following a witnessed seizure, by family, reportedly lasting ~5min.  Enroute to [place], patient was reported to have had another 2 witnessed seizures by EMS, each lasting ~1 minute. Following each seizure the patient was given 5mg IV versed (received 10mg collectively and brought to [place] ED). Pt arrived to the ED obtunded with blood from his mouth (tongue laceration), responsive only to pain/sternal rub. Patient was subsequently intubated for GCS 6 and CT/CTA performed showing a small cerebellar hemorrhage.    Seizures  - Previously intubated, now extubated  - Neuro checks per protocol  - S/P LP- CSF with no growth   - Course of IVIG completed 04/22, s/p pulse dose steroids   - Management as per neurology   - Fall, seizure, aspiration precautions  - S/P MR brain; Small foci of prior hemorrhage versus calcification involving the right cerebellar hemisphere.  - neuro follow up appreciated, s/p repeat CTH per neuro    Cerebellar hemorrhage  - Right sided hemorrhage   - MR brain as above; f/u neuro recs  - F/U repeat CT H s/p heparin gtt; monitor mental status   - Transitioned to Eliquis 5 BID   - Monitor H/H closely and for any signs/symptoms of bleeding     DVT/ PE  - Patient with acute below knee DVT and RUL PE & Recent cerebellar infiltrate  - Awaiting MR brain results for possible AC vs IVC filter --> now on Eliquis   - Vascular cardio eval appreciated   - Repeat DVT study no longer indicated as patient on full dose tx   - Monitor H/H closely and for any signs/symptoms of bleeding     Hypothyroid   - thyroid US w/ Enlarged goiter    - TSH elevated, T4 low  - Was on synthroid 75 at home, increased to 88 on this hospital admission  - Thyroid antibodies elevated ? Hoshimoto's --> F/U endo - per discussion w/ endo cont to monitor TFTS for now   - Repeat TFTs in 3-4 weeks outpatient     Leukocytosis  - noted on AM labs  - Likely reactive to steroid taper started in 04/24  - Cont to monitor and trend  - Monitor CBC, temp curve, VS and patient closely  - If febrile, recommend to check pan cx  - pyruia, completed IV Ceftriaxone per ID     HyperNa  - Improved, cont to monitor     JESSICA  - Continue to monitor and trend on daily labs  - Avoid nephrotoxic agents  - Cr relatively stable ~ 1.4     B/L Supraclavicular swelling  - Outpatient PCP follow up   - Cont to monitor closely     Atelectasis   - Duoneb and Chest PT  - Appreciate pulm recs     HTN  - TTE EF of 75%, normal LV systolic function   - Continue with Amlodipine and Lisinopril- ACEI increased to 40 per cardio, monitor Cr  - Monitor BP, VS and patient closely     Pre-DM  - A1C of 5.9  - Outpatient follow up     Prostate   - Hyperdensity noted on CT Scan   - Check PSA-noted    - Outpatient follow up with urology and repeat testing     Covid exposure  - Monitor patient, VS and O2 saturation closely  - If febrile recommend to check [place] Cx  - Serial Covid tests   - Precautions per protocol   - On Full dose AC currently    PPX  - Eliquis 5 BID  - PPI

## 2022-05-01 NOTE — PROVIDER CONTACT NOTE (OTHER) - REASON
Pt is lethargic/justo
Pt with temp 100.8 /64  Pt restless agitated
Hyperglycemia
Patient Agitated and hypertensive - Refusing All Meds and Labs
Patient agitated. Pulling off tele, nasal cannula, pulse ox. Patient trying to jump out of bed
pt reporting suprapubic pain; intermittently present over the day
Pt with  /91 
Patient aggressive and combative post extubation.
patient combative requiring increased sedation

## 2022-05-01 NOTE — DISCHARGE NOTE PROVIDER - HOSPITAL COURSE
73 y/o M with PMH HTN, hypothyroidism. Presents with seizures, found to have cerebellar hemorrhage. Intubated for airway protection at OSH, extubated 4/13. Called to consult for increased chest congestion. CTA chest with atelectasis and small RUL segmental branch PE.      Problem/Plan - 1:  ·  Problem: Pulmonary embolism.   ·  Plan: CTA chest 4/16 with small PE RUL segmental branch  -LE duplex with L soleal vein DVT   -MR head with small foci of hemorrhage. Pt started on heparin gtt per neuro and vascular cariology, now changed to Eliquis.   -No c/o SOB or CP.     Problem/Plan - 2:  ·  Problem: DVT (deep venous thrombosis).   ·  Plan: L soleal vein DVT  -LE duplex 4/15 without propagation  -AC with Eliquis.     Problem/Plan - 3:  ·  Problem: Atelectasis.   ·  Plan: CTA chest 4/16 with RLL atelectasis. CT chest 4/20 with increase in RLL atelectasis.  -Congested cough at times. Now much improved.  -Continue Duoneb q6h, change to q6h PRN on discharge.   -Chest PT q6h  -Keep sats >90% with O2 PRN (currently RA)  -CT chest 4/27 with bibasilar atelectasis.       Problem/Plan - 4:  ·  Problem: UTI (urinary tract infection).   ·  Plan: +UA  -+Leukocytosis, now improved   -BC NGTD   -UC +e.coli  -Abx as per ID.     Problem/Plan - 5:  ·  Problem: Cerebellar hemorrhage.   ·  Plan: -Per neuro.     Problem/Plan - 6:  ·  Problem: Seizures.   ·  Plan: -S/p IVIG and steroids per neuro.    D/C to acute rehab

## 2022-05-01 NOTE — PROGRESS NOTE ADULT - SUBJECTIVE AND OBJECTIVE BOX
SUBJECTIVE / OVERNIGHT EVENTS:  Transfer to medicine under Dr. Ireland.  awake alert  poor historian  denied pain  no cp, no sob.  no n/v/d.  no HA, no dizziness.   tolerating diet per RN  +BM        --------------------------------------------------------------------------------------------  LABS:                        11.6   9.51  )-----------( 380      ( 2022 01:54 )             35.9     04-30    135  |  99  |  44<H>  ----------------------------<  280<H>  4.8   |  25  |  1.40<H>    Ca    9.9      2022 01:54        CAPILLARY BLOOD GLUCOSE      POCT Blood Glucose.: 176 mg/dL (01 May 2022 15:56)  POCT Blood Glucose.: 205 mg/dL (01 May 2022 11:23)  POCT Blood Glucose.: 120 mg/dL (01 May 2022 07:19)  POCT Blood Glucose.: 270 mg/dL (2022 21:38)        Urinalysis Basic - ( 2022 02:38 )    Color: Light Yellow / Appearance: Clear / S.019 / pH: x  Gluc: x / Ketone: Negative  / Bili: Negative / Urobili: Negative   Blood: x / Protein: 30 mg/dL / Nitrite: Negative   Leuk Esterase: Large / RBC: 6 /hpf / WBC 53 /HPF   Sq Epi: x / Non Sq Epi: 10 /hpf / Bacteria: Negative        RADIOLOGY & ADDITIONAL TESTS:    Imaging Personally Reviewed:  [x] YES  [ ] NO    Consultant(s) Notes Reviewed:  [x] YES  [ ] NO    MEDICATIONS  (STANDING):  albuterol/ipratropium for Nebulization 3 milliLiter(s) Nebulizer every 6 hours  amLODIPine   Tablet 10 milliGRAM(s) Oral daily  apixaban 5 milliGRAM(s) Oral two times a day  dextrose 5%. 1000 milliLiter(s) (100 mL/Hr) IV Continuous <Continuous>  dextrose 5%. 1000 milliLiter(s) (50 mL/Hr) IV Continuous <Continuous>  dextrose 50% Injectable 25 Gram(s) IV Push once  dextrose 50% Injectable 12.5 Gram(s) IV Push once  dextrose 50% Injectable 25 Gram(s) IV Push once  glucagon  Injectable 1 milliGRAM(s) IntraMuscular once  insulin glargine Injectable (LANTUS) 10 Unit(s) SubCutaneous at bedtime  insulin lispro (ADMELOG) corrective regimen sliding scale   SubCutaneous three times a day before meals  insulin lispro (ADMELOG) corrective regimen sliding scale   SubCutaneous at bedtime  insulin lispro Injectable (ADMELOG) 4 Unit(s) SubCutaneous three times a day before meals  lacosamide 150 milliGRAM(s) Oral two times a day  levothyroxine 88 MICROGram(s) Oral daily  lisinopril 40 milliGRAM(s) Oral daily  pantoprazole    Tablet 40 milliGRAM(s) Oral before breakfast  polyethylene glycol 3350 17 Gram(s) Oral two times a day  QUEtiapine 50 milliGRAM(s) Oral at bedtime  risperiDONE   Tablet 1 milliGRAM(s) Oral <User Schedule>  senna 2 Tablet(s) Oral at bedtime  sodium chloride 0.9%. 1000 milliLiter(s) (50 mL/Hr) IV Continuous <Continuous>  thiamine 100 milliGRAM(s) Oral daily    MEDICATIONS  (PRN):  acetaminophen     Tablet .. 650 milliGRAM(s) Oral every 6 hours PRN Temp greater or equal to 38C (100.4F), Mild Pain (1 - 3)  dextrose Oral Gel 15 Gram(s) Oral once PRN Blood Glucose LESS THAN 70 milliGRAM(s)/deciliter  hydrALAZINE 10 milliGRAM(s) Oral every 4 hours PRN Systolic blood pressure < 160  valproate sodium IVPB 1000 milliGRAM(s) IV Intermittent once PRN Seizure      Care Discussed with Consultants/Other Providers [x] YES  [ ] NO    Vital Signs Last 24 Hrs  T(C): 36.8 (01 May 2022 14:18), Max: 37.5 (01 May 2022 05:45)  T(F): 98.3 (01 May 2022 14:18), Max: 99.5 (01 May 2022 05:45)  HR: 90 (01 May 2022 14:18) (86 - 100)  BP: 103/62 (01 May 2022 14:18) (103/62 - 146/76)  BP(mean): --  RR: 20 (01 May 2022 14:18) (18 - 20)  SpO2: 96% (01 May 2022 14:18) (93% - 98%)  I&O's Summary    2022 07:01  -  01 May 2022 07:00  --------------------------------------------------------  IN: 480 mL / OUT: 100 mL / NET: 380 mL    01 May 2022 07:01  -  01 May 2022 17:31  --------------------------------------------------------  IN: 320 mL / OUT: 0 mL / NET: 320 mL      PHYSICAL EXAM:  GENERAL: NAD, thin-elderly, comfortable on room air  HEAD:  Atraumatic, Normocephalic  EYES: EOMI, PERRLA, conjunctiva and sclera clear  NECK: Supple, No JVD  CHEST/LUNG: mild decrease breath sounds bilaterally; No wheeze   HEART: Regular rate and rhythm; No murmurs, rubs, or gallops  ABDOMEN: Soft, Nontender, Nondistended; Bowel sounds present  Neuro: awake alert, poor historian, doesn't want to answer all my questions. follow simple commands   EXTREMITIES:  + Peripheral Pulses, No clubbing, cyanosis, or edema  SKIN: No rashes or lesions

## 2022-05-01 NOTE — PROVIDER CONTACT NOTE (OTHER) - RECOMMENDATIONS
straight cath for urination. urology consult for change in ability to urinate.
Make Rupal aware.
Provider at bedside. Continue frequent monitoring. Relaxation techniques encouraged. Restraint use continued.
Assess at bedside. Titrate propofol as needed for vent synchrony and patient safety.
LAN Miller made aware. Pt assessed at bedside. Chest X-ray ordered. No further intervention required at this time.
eval/tx
medicate with insulin as per sliding scale
eval/tx
give 3mg haldol as per LAN Miller

## 2022-05-01 NOTE — PROVIDER CONTACT NOTE (OTHER) - DATE AND TIME:
22-Apr-2022 01:30
12-Apr-2022 22:00
21-Apr-2022 01:30
27-Apr-2022 16:20
25-Apr-2022 20:35
30-Apr-2022 00:34
01-May-2022 16:08
13-Apr-2022 04:00
22-Apr-2022 06:00

## 2022-05-01 NOTE — PROGRESS NOTE ADULT - ASSESSMENT
72y male  HTN, Hypothyroidism, and Diverticulosis who initially presented to [facility] on 4/12 as a transfer from [place] for ICH. Pt was obtunded with saliva coming out of his mouth, he had a seizure-like event witnessed by wife with UE shaking, foaming at the mouth, and urinary/fecal incontinence. MRI Head w/wo shows R cerebellar IPH vs calcification. He was intubated for airway protection at OSH, extubated 4/13. CTA chest with atelectasis and small RUL segmental branch PE. He has been on IV CTX for abnormal UA and now with rising WBC despite abx, hence ID eval requested. Pt has been restless, was sedated, unable to offer any specific c/o  leukocytosis resolved on empiric CTX, but had episode of fevers  He is more alert, but agitated at times  Ucx with E coli sens reviewed    PLAN:    Day 5 of empiric CTX, leukocytosis now resolved--would limit to 7 days total  Also he received steroids --? possible residual steroids effect  He is also at risk for C diff, given prior abx exposure at outside hospital  Will monitor fevers  f/u surveillance cultures if spikes  Aspiration precautions

## 2022-05-01 NOTE — PROVIDER CONTACT NOTE (OTHER) - NAME OF MD/NP/PA/DO NOTIFIED:
LAN Miller
LAN Nicole
LAN Jimenez
MD Jasmin Brownlee.
Rupal Miller
Rupal Miller
LAN Cooley
LAN Stanley
Ibeth MONTENEGRO

## 2022-05-01 NOTE — DISCHARGE NOTE PROVIDER - NSDCCPCAREPLAN_GEN_ALL_CORE_FT
PRINCIPAL DISCHARGE DIAGNOSIS  Diagnosis: Cerebellar hemorrhage  Assessment and Plan of Treatment: Continue current medications      SECONDARY DISCHARGE DIAGNOSES  Diagnosis: Seizure  Assessment and Plan of Treatment: You are being discharged to acute rehab    Diagnosis: AMS (altered mental status)  Assessment and Plan of Treatment:     Diagnosis: Pulmonary embolism  Assessment and Plan of Treatment: Take your anticoagulation as directed.  Follow up with your health care provider within one week. Call for appointment.  If you develop shortness of breath or if your shortness of breath worsens call your Health Care Provider or go to the Emergency Department.     PRINCIPAL DISCHARGE DIAGNOSIS  Diagnosis: Cerebellar hemorrhage  Assessment and Plan of Treatment: Continue current medications      SECONDARY DISCHARGE DIAGNOSES  Diagnosis: Seizure  Assessment and Plan of Treatment: You are being discharged to acute rehab    Diagnosis: AMS (altered mental status)  Assessment and Plan of Treatment:     Diagnosis: Pulmonary embolism  Assessment and Plan of Treatment: Take your anticoagulation as directed.  Follow up with your health care provider within one week. Call for appointment.  If you develop shortness of breath or if your shortness of breath worsens call your Health Care Provider or go to the Emergency Department.    Diagnosis: Acute UTI  Assessment and Plan of Treatment: HOME CARE INSTRUCTIONS  If you were prescribed antibiotics, take them exactly as your caregiver instructs you. Finish the medication even if you feel better after you have only taken some of the medication.  Drink enough water and fluids to keep your urine clear or pale yellow.  Avoid caffeine, tea, and carbonated beverages. They tend to irritate your bladder.  Empty your bladder often. Avoid holding urine for long periods of time.  Empty your bladder before and after sexual intercourse.  After a bowel movement, women should cleanse from front to back. Use each tissue only once.  SEEK MEDICAL CARE IF:  You have back pain.  You develop a fever.  Your symptoms do not begin to resolve within 3 days.  SEEK IMMEDIATE MEDICAL CARE IF:  You have severe back pain or lower abdominal pain.  You develop chills.  You have nausea or vomiting.  You have continued burning or discomfort with urination.

## 2022-05-01 NOTE — PROVIDER CONTACT NOTE (OTHER) - BACKGROUND
Patient admitted for nontraumatic intercerebral hemorrhage of the cerebellum
Pt with temp 100.8 /64  Pt restless agitated
seizures  this RN found orders for FS ac/hs and no sliding scale order but was not told in report and do not see Fs being done - requested PA clarify if pt should have FS. Sliding scale ordered
Pt with  /91 
Cerebral hemorrhage.
Cerebral hemorrhage c/b seizures.
Pt admitted for IPH and seizures
p/w seizures; transferred to medicine for medical management.
r/o AIE

## 2022-05-01 NOTE — PROGRESS NOTE ADULT - SUBJECTIVE AND OBJECTIVE BOX
CC: f/u for leukocytosis    Patient is now more awake, follows simple commands, agitated at times  no fevers in the last 24 hrs    REVIEW OF SYSTEMS: limited  All other review of systems negative (Comprehensive ROS)    Antimicrobials Day #  5  cefTRIAXone   IVPB 1000 milliGRAM(s) IV Intermittent every 24 hours    Other Medications Reviewed    Vital Signs Last 24 Hrs  T(C): 37.5 (01 May 2022 05:45), Max: 37.5 (01 May 2022 05:45)  T(F): 99.5 (01 May 2022 05:45), Max: 99.5 (01 May 2022 05:45)  HR: 88 (01 May 2022 05:45) (86 - 94)  BP: 142/69 (01 May 2022 05:45) (111/67 - 160/79)  BP(mean): --  RR: 20 (01 May 2022 05:45) (18 - 20)  SpO2: 98% (01 May 2022 05:45) (94% - 98%)    PHYSICAL EXAM:  General: alert, no acute distress  Eyes:  anicteric, no conjunctival injection, no discharge  Oropharynx: no lesions or injection 	  Neck: supple, without adenopathy  Lungs: clear to auscultation  Heart: regular rate and rhythm; no murmur, rubs or gallops  Abdomen: soft, nondistended, nontender, without mass or organomegaly  Skin: no lesions  Extremities: no clubbing, cyanosis, or edema  Neurologic: more awake and follows simple commands    LAB RESULTS:                                              11.6   9.51  )-----------( 380      ( 2022 01:54 )             35.9   04-30    135  |  99  |  44<H>  ----------------------------<  280<H>  4.8   |  25  |  1.40<H>    Ca    9.9      2022 01:54            Urinalysis Basic - ( 2022 10:26 )    Color: Yellow / Appearance: Slightly Turbid / S.022 / pH: x  Gluc: x / Ketone: Negative  / Bili: Negative / Urobili: Negative   Blood: x / Protein: 100 / Nitrite: Positive   Leuk Esterase: Large / RBC: 8 /hpf /  /HPF   Sq Epi: x / Non Sq Epi: 1 /hpf / Bacteria: Many        MICROBIOLOGY REVIEWED:  Culture - Blood (22 @ 06:06)   Specimen Source: .Blood Blood-Venous   Culture Results:   No growth to date.   Culture - Urine (22 @ 18:23)   Specimen Source: Clean Catch Clean Catch (Midstream)   Culture Results:   >100,000 CFU/ml Escherichia coli   Culture - Blood (22 @ 12:21)   Specimen Source: .Blood Blood-Peripheral   Culture Results:   No growth to date.    RADIOLOGY REVIEWED:   < from: CT Chest No Cont (22 @ 10:43) >  IMPRESSION:    Bibasilar atelectasis with posterior bowing of the fissures bilaterally.   This demonstrates slight interval progression since the previous exam,   the specimen the left side.    No supraclavicular lymphadenopathy is seen.    Trace pericardial effusion.    < end of copied text >

## 2022-05-01 NOTE — CHART NOTE - NSCHARTNOTESELECT_GEN_ALL_CORE
EEG/Epilepsy
Endocrine Fellow/Event Note
Endocrine Fellow/Event Note
Event Note
Neurology/Transfer Note
Patient Signout/Event Note
VTE Risk Assessment/Event Note

## 2022-05-01 NOTE — PROVIDER CONTACT NOTE (OTHER) - ASSESSMENT
Patient alert, not answering orientation questions. Will not participate in exam but all extremities move spontaneously. Afebrile. Hemodynamically stable.
Patient on full ventilator support. Fighting restraints. Not opening eyes or following commands. All extremities move spontaneously. Afebrile. Hemodynamically stable. Breathing over ventilator.
Pt is lethargic. Requires vigorous stimulation to open eyes. Not following commands or uncooperative. V/S /68 HR 48 Temp 97.4  SPO2 97. Pt appears to have a gargling sounds from airway.
Pt with  /91  Pt calm cooperative denies pain discomfort
Patient agitated. Pulling off tele, nasal cannula, pulse ox. Patient trying to jump out of bed. Patient vital signs stable at this time. RN assessed patient at bedside. PCA at bedside with patient. PA made aware
Pt A&Ox1,  hypertensive - all other VSS.  Patient agitated. NO s/s of CP and patient dyspneic at baseline.
denies pain elsewhere at this time. orientation the same. confused at times. tender to light palpation. bladder scan attempt but pt was continuing to fidget even with wife trying to calm him. bladder feels distended.
Pt with temp 100.8 /64  Pt restless agitated
asymptomatic with elevated glucose

## 2022-05-01 NOTE — PROGRESS NOTE ADULT - ASSESSMENT
72M PMH htn, hypothyroidism, presented to [place] ED via EMS after family called 911 following a witnessed seizure, by family, reportedly lasting ~5min.

## 2022-05-01 NOTE — PROGRESS NOTE ADULT - SUBJECTIVE AND OBJECTIVE BOX
Subjective: Patient seen and examined. No new events except as noted.     REVIEW OF SYSTEMS:    CONSTITUTIONAL: No weakness, fevers or chills  EYES/ENT: No visual changes;  No vertigo or throat pain   NECK: No pain or stiffness  RESPIRATORY: No cough, wheezing, hemoptysis; No shortness of breath  CARDIOVASCULAR: No chest pain or palpitations  GASTROINTESTINAL: No abdominal or epigastric pain. No nausea, vomiting, or hematemesis; No diarrhea or constipation. No melena or hematochezia.  GENITOURINARY: No dysuria, frequency or hematuria  NEUROLOGICAL: No numbness or weakness  SKIN: No itching, burning, rashes, or lesions   All other review of systems is negative unless indicated above.    MEDICATIONS:  MEDICATIONS  (STANDING):  albuterol/ipratropium for Nebulization 3 milliLiter(s) Nebulizer every 6 hours  amLODIPine   Tablet 10 milliGRAM(s) Oral daily  apixaban 5 milliGRAM(s) Oral two times a day  dextrose 5%. 1000 milliLiter(s) (50 mL/Hr) IV Continuous <Continuous>  dextrose 5%. 1000 milliLiter(s) (100 mL/Hr) IV Continuous <Continuous>  dextrose 50% Injectable 25 Gram(s) IV Push once  dextrose 50% Injectable 12.5 Gram(s) IV Push once  dextrose 50% Injectable 25 Gram(s) IV Push once  glucagon  Injectable 1 milliGRAM(s) IntraMuscular once  insulin glargine Injectable (LANTUS) 10 Unit(s) SubCutaneous at bedtime  insulin lispro (ADMELOG) corrective regimen sliding scale   SubCutaneous three times a day before meals  insulin lispro (ADMELOG) corrective regimen sliding scale   SubCutaneous at bedtime  insulin lispro Injectable (ADMELOG) 3 Unit(s) SubCutaneous three times a day before meals  lacosamide 150 milliGRAM(s) Oral two times a day  levothyroxine 88 MICROGram(s) Oral daily  lisinopril 40 milliGRAM(s) Oral daily  pantoprazole    Tablet 40 milliGRAM(s) Oral before breakfast  polyethylene glycol 3350 17 Gram(s) Oral two times a day  QUEtiapine 50 milliGRAM(s) Oral at bedtime  risperiDONE   Tablet 1 milliGRAM(s) Oral <User Schedule>  senna 2 Tablet(s) Oral at bedtime  sodium chloride 0.9%. 1000 milliLiter(s) (50 mL/Hr) IV Continuous <Continuous>  thiamine 100 milliGRAM(s) Oral daily      PHYSICAL EXAM:  T(C): 37.5 (05-01-22 @ 05:45), Max: 37.5 (05-01-22 @ 05:45)  HR: 88 (05-01-22 @ 05:45) (86 - 94)  BP: 142/69 (05-01-22 @ 05:45) (111/67 - 160/79)  RR: 20 (05-01-22 @ 05:45) (18 - 20)  SpO2: 98% (05-01-22 @ 05:45) (94% - 98%)  Wt(kg): --  I&O's Summary    30 Apr 2022 07:01  -  01 May 2022 07:00  --------------------------------------------------------  IN: 480 mL / OUT: 100 mL / NET: 380 mL          Appearance: NAD  HEENT: dry oral mucosa, PERRL, EOMI	  Lymphatic: No lymphadenopathy , no edema  Cardiovascular: Normal S1 S2, No JVD, No murmurs , Peripheral pulses palpable 2+ bilaterally  Respiratory: Decreased BS, normal effort	  Gastrointestinal:  Soft, Non-tender, + BS	  Skin: No rashes, No ecchymoses, No cyanosis, warm to touch  NEUROLOGICAL EXAM:  MS: Does not know his own age. Speech is fluent, Follows commands.   CN: VFF, EOMI, PERRL,  V1-3 intact, no facial asymmetry, t/p midline,  Motor: Strength: 5/5 in the UE/ LE  Tone: normal. Bulk: normal.    Plantar flex b/l.   Sensation: intact to LT/Temperature 4x.   Coordination: FTN intact   Gait:  Deferred  Ext: No edema        LABS:    CARDIAC MARKERS:                                11.6   9.51  )-----------( 380      ( 30 Apr 2022 01:54 )             35.9     04-30    135  |  99  |  44<H>  ----------------------------<  280<H>  4.8   |  25  |  1.40<H>    Ca    9.9      30 Apr 2022 01:54      proBNP:   Lipid Profile:   HgA1c:   TSH:     3          TELEMETRY: 	    ECG:  	  RADIOLOGY:   DIAGNOSTIC TESTING:  [ ] Echocardiogram:  [ ]  Catheterization:  [ ] Stress Test:    OTHER:

## 2022-05-01 NOTE — CHART NOTE - NSCHARTNOTEFT_GEN_A_CORE
73 yo male with a PMHx of HTN, Hypothyroidism, and Diverticulosis (requiring blood transfusions x2 8 yrs ago) who presented to [facility] on 4/12 as a transfer from [place] for ICH. Neurology consulted for new onset seizures in the setting of ICH while patient was in NSCU. No seizures seen on EEG thus far during this admission. Transferred from NSCU to EMU on 4/15 after stable IPH noted. MRI Head w/wo shows R cerebellar IPH vs calcification.  S/P steroids and IVIG 5 days. Incidental PE found, for which being treated with Eliquis 5mg BID. Incidentally acquired UTI, for which 5 days Ceftriaxone was given. Currently on Vimpat 150mg BID PO for seizure management.     Impression: New onset of seizures of unknown cause. R cerebellar density IPH vs calcification.     Transfer of care initiated today to medicine service under attending, Dr. Juan Bernabe. Signout provided to [facility] covering [address] [place] [id number].

## 2022-05-01 NOTE — PROGRESS NOTE ADULT - NSPROGADDITIONALINFOA_GEN_ALL_CORE
d/w pt's RN at bedside.  d/w NP Ibeth.   Physical therapy. Out of bed to chair with assistance.     --- Coverage for Dr. Bernabe ---  He will be back on 5/2/22.     - Dr. PEBBLES Leyva ([facility])  - [phone]
72 y M with h/o hypertension, hypothyroid, experience abrupt onset seizure flurry, intubated for airway protection after multiple doses lorazepam, extubated following day. CT showed punctate hyperintensity in deep cerebellar nuclei - interpreted at hemorrhage. Now on divalproex. Course overnight s/f agitated delirium, patient removed EEG leads. Exam s/f Alert, "2002" lists months but unable to give month. o/w non-focal.  Plan   1. Seroquel 50 mg qHS  2. constant observation  3. w/u for new onset seizure flurry - MRI brain under sedation on Monday. If possible, LP during same sedation to send CSF for cell count, prot, glu, [place] autoimmune epilepsy panel. keep extra tube for additional studies selected after CSF WBC determined.   4. avoid benzos - use additional seroquel po / Haldol or Zyprexa IM (if not taking PO) for agitation.  5. keep patient awake/active during day as much as possible to increase sleep pressure at night.

## 2022-05-02 LAB
ALBUMIN SERPL ELPH-MCNC: 2.8 G/DL — LOW (ref 3.3–5)
ALP SERPL-CCNC: 74 U/L — SIGNIFICANT CHANGE UP (ref 40–120)
ALT FLD-CCNC: 17 U/L — SIGNIFICANT CHANGE UP (ref 10–45)
ANION GAP SERPL CALC-SCNC: 14 MMOL/L — SIGNIFICANT CHANGE UP (ref 5–17)
AST SERPL-CCNC: 28 U/L — SIGNIFICANT CHANGE UP (ref 10–40)
BILIRUB SERPL-MCNC: 0.2 MG/DL — SIGNIFICANT CHANGE UP (ref 0.2–1.2)
BUN SERPL-MCNC: 30 MG/DL — HIGH (ref 7–23)
CALCIUM SERPL-MCNC: 9.1 MG/DL — SIGNIFICANT CHANGE UP (ref 8.4–10.5)
CHLORIDE SERPL-SCNC: 99 MMOL/L — SIGNIFICANT CHANGE UP (ref 96–108)
CO2 SERPL-SCNC: 23 MMOL/L — SIGNIFICANT CHANGE UP (ref 22–31)
CREAT SERPL-MCNC: 1.26 MG/DL — SIGNIFICANT CHANGE UP (ref 0.5–1.3)
CULTURE RESULTS: SIGNIFICANT CHANGE UP
CULTURE RESULTS: SIGNIFICANT CHANGE UP
EGFR: 61 ML/MIN/1.73M2 — SIGNIFICANT CHANGE UP
GLUCOSE BLDC GLUCOMTR-MCNC: 129 MG/DL — HIGH (ref 70–99)
GLUCOSE BLDC GLUCOMTR-MCNC: 227 MG/DL — HIGH (ref 70–99)
GLUCOSE BLDC GLUCOMTR-MCNC: 243 MG/DL — HIGH (ref 70–99)
GLUCOSE BLDC GLUCOMTR-MCNC: 81 MG/DL — SIGNIFICANT CHANGE UP (ref 70–99)
GLUCOSE SERPL-MCNC: 103 MG/DL — HIGH (ref 70–99)
HCT VFR BLD CALC: 32.3 % — LOW (ref 39–50)
HGB BLD-MCNC: 10.1 G/DL — LOW (ref 13–17)
MAGNESIUM SERPL-MCNC: 2 MG/DL — SIGNIFICANT CHANGE UP (ref 1.6–2.6)
MCHC RBC-ENTMCNC: 28.9 PG — SIGNIFICANT CHANGE UP (ref 27–34)
MCHC RBC-ENTMCNC: 31.3 GM/DL — LOW (ref 32–36)
MCV RBC AUTO: 92.6 FL — SIGNIFICANT CHANGE UP (ref 80–100)
NRBC # BLD: 0 /100 WBCS — SIGNIFICANT CHANGE UP (ref 0–0)
PHOSPHATE SERPL-MCNC: 3.5 MG/DL — SIGNIFICANT CHANGE UP (ref 2.5–4.5)
PLATELET # BLD AUTO: 369 K/UL — SIGNIFICANT CHANGE UP (ref 150–400)
POTASSIUM SERPL-MCNC: 4.8 MMOL/L — SIGNIFICANT CHANGE UP (ref 3.5–5.3)
POTASSIUM SERPL-SCNC: 4.8 MMOL/L — SIGNIFICANT CHANGE UP (ref 3.5–5.3)
PROT SERPL-MCNC: 8.1 G/DL — SIGNIFICANT CHANGE UP (ref 6–8.3)
RBC # BLD: 3.49 M/UL — LOW (ref 4.2–5.8)
RBC # FLD: 14.7 % — HIGH (ref 10.3–14.5)
SODIUM SERPL-SCNC: 136 MMOL/L — SIGNIFICANT CHANGE UP (ref 135–145)
SPECIMEN SOURCE: SIGNIFICANT CHANGE UP
SPECIMEN SOURCE: SIGNIFICANT CHANGE UP
WBC # BLD: 6.36 K/UL — SIGNIFICANT CHANGE UP (ref 3.8–10.5)
WBC # FLD AUTO: 6.36 K/UL — SIGNIFICANT CHANGE UP (ref 3.8–10.5)

## 2022-05-02 PROCEDURE — 99232 SBSQ HOSP IP/OBS MODERATE 35: CPT

## 2022-05-02 RX ORDER — CEFUROXIME AXETIL 250 MG
500 TABLET ORAL EVERY 12 HOURS
Refills: 0 | Status: DISCONTINUED | OUTPATIENT
Start: 2022-05-02 | End: 2022-05-04

## 2022-05-02 RX ADMIN — INSULIN GLARGINE 10 UNIT(S): 100 INJECTION, SOLUTION SUBCUTANEOUS at 21:21

## 2022-05-02 RX ADMIN — Medication 4 UNIT(S): at 07:57

## 2022-05-02 RX ADMIN — PANTOPRAZOLE SODIUM 40 MILLIGRAM(S): 20 TABLET, DELAYED RELEASE ORAL at 05:54

## 2022-05-02 RX ADMIN — Medication 100 MILLIGRAM(S): at 13:45

## 2022-05-02 RX ADMIN — LACOSAMIDE 150 MILLIGRAM(S): 50 TABLET ORAL at 17:15

## 2022-05-02 RX ADMIN — APIXABAN 5 MILLIGRAM(S): 2.5 TABLET, FILM COATED ORAL at 05:53

## 2022-05-02 RX ADMIN — Medication 1 TABLET(S): at 13:45

## 2022-05-02 RX ADMIN — LACOSAMIDE 150 MILLIGRAM(S): 50 TABLET ORAL at 05:53

## 2022-05-02 RX ADMIN — POLYETHYLENE GLYCOL 3350 17 GRAM(S): 17 POWDER, FOR SOLUTION ORAL at 05:54

## 2022-05-02 RX ADMIN — APIXABAN 5 MILLIGRAM(S): 2.5 TABLET, FILM COATED ORAL at 17:15

## 2022-05-02 RX ADMIN — Medication 2: at 11:46

## 2022-05-02 RX ADMIN — Medication 3 MILLILITER(S): at 05:54

## 2022-05-02 RX ADMIN — RISPERIDONE 1 MILLIGRAM(S): 4 TABLET ORAL at 10:09

## 2022-05-02 RX ADMIN — Medication 500 MILLIGRAM(S): at 17:15

## 2022-05-02 RX ADMIN — Medication 88 MICROGRAM(S): at 05:54

## 2022-05-02 RX ADMIN — Medication 3 MILLILITER(S): at 17:16

## 2022-05-02 RX ADMIN — QUETIAPINE FUMARATE 50 MILLIGRAM(S): 200 TABLET, FILM COATED ORAL at 21:21

## 2022-05-02 RX ADMIN — SENNA PLUS 2 TABLET(S): 8.6 TABLET ORAL at 21:21

## 2022-05-02 RX ADMIN — Medication 4 UNIT(S): at 11:46

## 2022-05-02 RX ADMIN — Medication 3 MILLILITER(S): at 13:45

## 2022-05-02 NOTE — PROGRESS NOTE ADULT - PROBLEM SELECTOR PLAN 3
CTA chest 4/16 with RLL atelectasis. CT chest 4/20 with increase in RLL atelectasis.  -Congested cough at times. Now much improved.  -Continue Duoneb q6h, change to q6h PRN on discharge.   -Chest PT q6h  -Keep sats >90% with O2 PRN  -CT chest 4/27 likely with bibasilar atelectasis

## 2022-05-02 NOTE — PROGRESS NOTE ADULT - SUBJECTIVE AND OBJECTIVE BOX
no new complaints     REVIEW OF SYSTEMS  Constitutional - No fever,  No fatigue  HEENT - No vertigo, No neck pain  Neurological - No headaches, No memory loss, No loss of strength, No numbness, No tremors  Skin - No rashes, No lesions   Musculoskeletal - No joint pain, No joint swelling, No muscle pain  Psychiatric - No depression, No anxiety    FUNCTIONAL PROGRESS   PT  bed mobility min assist  transfers min to mod assist with RW  gait mod assist with RW x 4 steps   follows simple commands     OT  bed mobility mod assist  transfers min assist  OT cognitive, follows 75% simple directions, perseverating, delayed processing     VITALS  T(C): 36.9 (22 @ 10:09), Max: 37.5 (22 @ 18:00)  HR: 88 (22 @ 10:09) (85 - 102)  BP: 118/67 (22 @ 10:09) (101/61 - 165/74)  RR: 20 (22 @ 10:09) (18 - 20)  SpO2: 95% (22 @ 10:09) (95% - 98%)  Wt(kg): --    MEDICATIONS   acetaminophen     Tablet .. 650 milliGRAM(s) every 6 hours PRN  albuterol/ipratropium for Nebulization 3 milliLiter(s) every 6 hours  amLODIPine   Tablet 10 milliGRAM(s) daily  apixaban 5 milliGRAM(s) two times a day  dextrose 5%. 1000 milliLiter(s) <Continuous>  dextrose 5%. 1000 milliLiter(s) <Continuous>  dextrose 50% Injectable 25 Gram(s) once  dextrose 50% Injectable 12.5 Gram(s) once  dextrose 50% Injectable 25 Gram(s) once  dextrose Oral Gel 15 Gram(s) once PRN  glucagon  Injectable 1 milliGRAM(s) once  hydrALAZINE 10 milliGRAM(s) every 4 hours PRN  insulin glargine Injectable (LANTUS) 10 Unit(s) at bedtime  insulin lispro (ADMELOG) corrective regimen sliding scale   three times a day before meals  insulin lispro (ADMELOG) corrective regimen sliding scale   at bedtime  insulin lispro Injectable (ADMELOG) 4 Unit(s) three times a day before meals  lacosamide 150 milliGRAM(s) two times a day  lactobacillus acidophilus 1 Tablet(s) daily  levothyroxine 88 MICROGram(s) daily  lisinopril 40 milliGRAM(s) daily  magnesium hydroxide Suspension 30 milliLiter(s) once  pantoprazole    Tablet 40 milliGRAM(s) before breakfast  polyethylene glycol 3350 17 Gram(s) two times a day  QUEtiapine 50 milliGRAM(s) at bedtime  risperiDONE   Tablet 1 milliGRAM(s) <User Schedule>  senna 2 Tablet(s) at bedtime  sodium chloride 0.9%. 1000 milliLiter(s) <Continuous>  thiamine 100 milliGRAM(s) daily  valproate sodium IVPB 1000 milliGRAM(s) once PRN      RECENT LABS - Reviewed                        10.1   6.36  )-----------( 369      ( 02 May 2022 07:00 )             32.3     05-02    136  |  99  |  30<H>  ----------------------------<  103<H>  4.8   |  23  |  1.26    Ca    9.1      02 May 2022 07:00  Phos  3.5     05-02  Mg     2.0     05-02    TPro  8.1  /  Alb  2.8<L>  /  TBili  0.2  /  DBili  x   /  AST  28  /  ALT  17  /  AlkPhos  74  05-02      Urinalysis Basic - ( 01 May 2022 17:47 )    Color: Light Yellow / Appearance: Slightly Turbid / S.022 / pH: x  Gluc: x / Ketone: Negative  / Bili: Negative / Urobili: Negative   Blood: x / Protein: 100 / Nitrite: Negative   Leuk Esterase: Moderate / RBC: 55 /hpf / WBC 42 /HPF   Sq Epi: x / Non Sq Epi: 3 /hpf / Bacteria: Negative      -------------------------------------------------------------------  PHYSICAL EXAM  Constitutional - NAD, Comfortable, in chair   Chest - Breathing comfortably  Cardiovascular - S1S2   Abdomen - Soft   Extremities - No C/C/E, No calf tenderness   Neurologic Exam -               follows commands, slow processing     Cognitive - Awake, Alert, AAO to self, place: hospital, not month/year     Communication - Fluent, No dysarthria     Motor - moves all ext     Sensory - Intact to LT    Psychiatric - Mood stable, Affect WNL  ----------------------------------------------------------------------------------------  ASSESSMENT/PLAN  72yMale h/o HTN, hypothyroid with functional deficits after cerebellar hemorrhage  VPA, vimpat for seizures, s/p IVIG, steroid taper   Pulmonary embolism, L soleal vein DVT, on eliquis   agitation stable on seroquel, risperidone   off quarantine for covid exposure   Pain - Tylenol  DVT PPX - SCDs, lovenox   Rehab - Will continue to follow for ongoing rehab needs and recommendations.   continue bedside therapy    Recommend ACUTE inpatient rehabilitation for the functional deficits consisting of 3 hours of therapy/day & 24 hour RN/daily PMR physician for comorbid medical management. Patient will be able to tolerate 3 hours a day.

## 2022-05-02 NOTE — PROGRESS NOTE ADULT - SUBJECTIVE AND OBJECTIVE BOX
Name of Patient : PEPITO BALL  MRN: [id number]  Date of visit: 22 @ 12:39      Subjective: Patient seen and examined. No new events except as noted.   Patient seen earlier this AM. OOB TC. Denies CP, palpitations, SOB or dyspnea.  Denies dysuria, frequency, urgency. Denies suprapubic pain.   Denies abdominal pain, N/V/D/C.  Per PCA, patient had a large BM this AM.     REVIEW OF SYSTEMS:    CONSTITUTIONAL: No weakness, fevers or chills  EYES/ENT: No visual changes;  No vertigo or throat pain   NECK: No pain or stiffness  RESPIRATORY: No cough, wheezing, hemoptysis; No shortness of breath  CARDIOVASCULAR: No chest pain or palpitations  GASTROINTESTINAL: No abdominal or epigastric pain. No nausea, vomiting, or hematemesis; No diarrhea or constipation. No melena or hematochezia.  GENITOURINARY: No dysuria, frequency or hematuria  NEUROLOGICAL: No numbness or weakness  SKIN: No itching, burning, rashes, or lesions   All other review of systems is negative unless indicated above.    MEDICATIONS:  MEDICATIONS  (STANDING):  albuterol/ipratropium for Nebulization 3 milliLiter(s) Nebulizer every 6 hours  amLODIPine   Tablet 10 milliGRAM(s) Oral daily  apixaban 5 milliGRAM(s) Oral two times a day  dextrose 5%. 1000 milliLiter(s) (50 mL/Hr) IV Continuous <Continuous>  dextrose 5%. 1000 milliLiter(s) (100 mL/Hr) IV Continuous <Continuous>  dextrose 50% Injectable 25 Gram(s) IV Push once  dextrose 50% Injectable 12.5 Gram(s) IV Push once  dextrose 50% Injectable 25 Gram(s) IV Push once  glucagon  Injectable 1 milliGRAM(s) IntraMuscular once  insulin glargine Injectable (LANTUS) 10 Unit(s) SubCutaneous at bedtime  insulin lispro (ADMELOG) corrective regimen sliding scale   SubCutaneous three times a day before meals  insulin lispro (ADMELOG) corrective regimen sliding scale   SubCutaneous at bedtime  insulin lispro Injectable (ADMELOG) 4 Unit(s) SubCutaneous three times a day before meals  lacosamide 150 milliGRAM(s) Oral two times a day  lactobacillus acidophilus 1 Tablet(s) Oral daily  levothyroxine 88 MICROGram(s) Oral daily  lisinopril 40 milliGRAM(s) Oral daily  magnesium hydroxide Suspension 30 milliLiter(s) Oral once  pantoprazole    Tablet 40 milliGRAM(s) Oral before breakfast  polyethylene glycol 3350 17 Gram(s) Oral two times a day  QUEtiapine 50 milliGRAM(s) Oral at bedtime  risperiDONE   Tablet 1 milliGRAM(s) Oral <User Schedule>  senna 2 Tablet(s) Oral at bedtime  sodium chloride 0.9%. 1000 milliLiter(s) (50 mL/Hr) IV Continuous <Continuous>  thiamine 100 milliGRAM(s) Oral daily      PHYSICAL EXAM:  T(C): 36.9 (22 @ 10:09), Max: 37.5 (22 @ 18:00)  HR: 88 (22 @ 10:09) (85 - 102)  BP: 118/67 (22 @ 10:09) (101/61 - 165/74)  RR: 20 (22 @ 10:09) (18 - 20)  SpO2: 95% (22 @ 10:09) (95% - 98%)  Wt(kg): --  I&O's Summary    01 May 2022 07:  -  02 May 2022 07:00  --------------------------------------------------------  IN: 320 mL / OUT: 0 mL / NET: 320 mL    02 May 2022 07:01  -  02 May 2022 12:39  --------------------------------------------------------  IN: 600 mL / OUT: 0 mL / NET: 600 mL          Appearance: Normal	  HEENT:  PERRLA   Lymphatic: No lymphadenopathy   Cardiovascular: Normal S1 S2, no JVD  Respiratory: Decreased breath sounds at bases   Gastrointestinal:  Soft, Non-tender  Skin: No rashes,  warm to touch  Psychiatry:  Mood & affect appropriate  Musculoskeletal: No edema          22 @ 07:01  -  22 @ 07:00  --------------------------------------------------------  IN: 320 mL / OUT: 0 mL / NET: 320 mL    22 @ 07:01  -  22 @ 12:39  --------------------------------------------------------  IN: 600 mL / OUT: 0 mL / NET: 600 mL                                  10.1   6.36  )-----------( 369      ( 02 May 2022 07:00 )             32.3               05-02    136  |  99  |  30<H>  ----------------------------<  103<H>  4.8   |  23  |  1.26    Ca    9.1      02 May 2022 07:00  Phos  3.5     -  Mg     2.0         TPro  8.1  /  Alb  2.8<L>  /  TBili  0.2  /  DBili  x   /  AST  28  /  ALT  17  /  AlkPhos  74  05-02                       Urinalysis Basic - ( 01 May 2022 17:47 )    Color: Light Yellow / Appearance: Slightly Turbid / S.022 / pH: x  Gluc: x / Ketone: Negative  / Bili: Negative / Urobili: Negative   Blood: x / Protein: 100 / Nitrite: Negative   Leuk Esterase: Moderate / RBC: 55 /hpf / WBC 42 /HPF   Sq Epi: x / Non Sq Epi: 3 /hpf / Bacteria: Negative        Culture - Blood (22 @ 06:06)   Specimen Source: .Blood Blood-Venous   Culture Results: No growth to date.       Culture - Blood (22 @ 06:06)   Specimen Source: .Blood Blood-Venous   Culture Results: No growth to date.       Culture - Urine (22 @ 05:54)   Specimen Source: Clean Catch Clean Catch (Midstream)   Culture Results: No growth       Culture - Urine (22 @ 18:23)   - Amikacin: S <=16   - Amoxicillin/Clavulanic Acid: S <=8/4   - Ampicillin: R >16 These ampicillin results predict results for amoxicillin   - Ampicillin/Sulbactam: I 16/8 Enterobacter, Klebsiella aerogenes, Citrobacter, and Serratia may develop resistance during prolonged therapy (3-4 days)   - Aztreonam: S <=4   - Cefazolin: S 4 (MIC_CL_COM_ENTERIC_CEFAZU) For uncomplicated UTI with K. pneumoniae, E. coli, or P. mirablis: GASPER <=16 is sensitive and GASPER >=32 is resistant. This also predicts results for oral agents cefaclor, cefdinir, cefpodoxime, cefprozil, cefuroxime axetil, cephalexin and locarbef for uncomplicated UTI. Note that some isolates may be susceptible to these agents while testing resistant to cefazolin.   - Cefepime: S 8   - Cefoxitin: S <=8   - Ceftriaxone: S <=1 Enterobacter, Klebsiella aerogenes, Citrobacter, and Serratia may develop resistance during prolonged therapy   - Ciprofloxacin: S <=0.25   - Ertapenem: S <=0.5   - Gentamicin: S <=2   - Imipenem: S <=1   - Levofloxacin: S <=0.5   - Meropenem: S <=1   - Nitrofurantoin: S <=32 Should not be used to treat pyelonephritis   - Piperacillin/Tazobactam: S <=8   - Tigecycline: S <=2   - Tobramycin: S <=2   - Trimethoprim/Sulfamethoxazole: R >   Specimen Source: Clean Catch Clean Catch (Midstream)   Culture Results:   >100,000 CFU/ml Escherichia coli   <10,000 CFU/ml Normal Urogenital dana present   Organism Identification: Escherichia coli   Organism: Escherichia coli   Method Type: GASPER

## 2022-05-02 NOTE — PROGRESS NOTE ADULT - ASSESSMENT
72M PMH htn, hypothyroidism, presented to [place] ED via EMS after family called 911 following a witnessed seizure, by family, reportedly lasting ~5min.

## 2022-05-02 NOTE — PROGRESS NOTE ADULT - ASSESSMENT
73 y/o M w/ steroid induced hyperglycemia, HTN, Hypothyroidism admitted with seizures    1)  Steroid-induced hyperglycemia.   Pt with reasonable fasting glucose control, continue Lantus 10 units qhs  Pt now off steroids, with postprandial hyperglycemia after breakfast.  Recommend changing to carbohydrate consistent diet.  If postprandial hyperglycemia continues, will increase pre-breakfast admelog dose  Low correction scale qac + bedtime  Goal glucose 100-180  Outpt. optho, podiatry, nephrology  Given continued hyperglycemia off of steroids, consider discharge on metformin 500 mg daily, (if hyperglycemia persists post diet change)    2) Hypertension.    Goal BP < 140/90    continue amlodipine.    3)Hypothyroidism.   Continue levothyroxine. Repeat TFTs as outpatient.    Debby Call MD  pager  on 5/2/22  Other times:  Diabetes team: [phone] business hours  [phone] night/weekend 73 y/o M w/ steroid induced hyperglycemia, HTN, Hypothyroidism admitted with seizures    1)  Steroid-induced hyperglycemia.   Pt with reasonable fasting glucose control, continue Lantus 10 units qhs  Pt now off steroids, with postprandial hyperglycemia after breakfast.  Recommend changing to carbohydrate consistent diet.  If postprandial hyperglycemia continues, will increase pre-breakfast admelog dose  Low correction scale qac + bedtime  Goal glucose 100-180  Outpt. optho, podiatry, nephrology  Given continued hyperglycemia off of steroids, consider discharge on metformin 500 mg daily, (if hyperglycemia persists post diet change)    2) Hypertension.    Goal BP < 140/90    continue amlodipine.    3)Hypothyroidism.   Continue levothyroxine. Repeat TFTs as outpatient.    Discussed with Medicine ACP    Debby Call MD  pager  on 5/2/22  Other times:  Diabetes team: [phone] business hours  [phone] night/weekend

## 2022-05-02 NOTE — PROGRESS NOTE ADULT - SUBJECTIVE AND OBJECTIVE BOX
Subjective: Patient seen and examined. No new events except as noted.     REVIEW OF SYSTEMS:    CONSTITUTIONAL: + weakness, fevers or chills  EYES/ENT: No visual changes;  No vertigo or throat pain   NECK: No pain or stiffness  RESPIRATORY: No cough, wheezing, hemoptysis; No shortness of breath  CARDIOVASCULAR: No chest pain or palpitations  GASTROINTESTINAL: No abdominal or epigastric pain. No nausea, vomiting, or hematemesis; No diarrhea or constipation. No melena or hematochezia.  GENITOURINARY: No dysuria, frequency or hematuria  NEUROLOGICAL: No numbness or weakness  SKIN: No itching, burning, rashes, or lesions   All other review of systems is negative unless indicated above.    MEDICATIONS:  MEDICATIONS  (STANDING):  albuterol/ipratropium for Nebulization 3 milliLiter(s) Nebulizer every 6 hours  amLODIPine   Tablet 10 milliGRAM(s) Oral daily  apixaban 5 milliGRAM(s) Oral two times a day  dextrose 5%. 1000 milliLiter(s) (50 mL/Hr) IV Continuous <Continuous>  dextrose 5%. 1000 milliLiter(s) (100 mL/Hr) IV Continuous <Continuous>  dextrose 50% Injectable 25 Gram(s) IV Push once  dextrose 50% Injectable 12.5 Gram(s) IV Push once  dextrose 50% Injectable 25 Gram(s) IV Push once  glucagon  Injectable 1 milliGRAM(s) IntraMuscular once  insulin glargine Injectable (LANTUS) 10 Unit(s) SubCutaneous at bedtime  insulin lispro (ADMELOG) corrective regimen sliding scale   SubCutaneous three times a day before meals  insulin lispro (ADMELOG) corrective regimen sliding scale   SubCutaneous at bedtime  insulin lispro Injectable (ADMELOG) 4 Unit(s) SubCutaneous three times a day before meals  lacosamide 150 milliGRAM(s) Oral two times a day  lactobacillus acidophilus 1 Tablet(s) Oral daily  levothyroxine 88 MICROGram(s) Oral daily  lisinopril 40 milliGRAM(s) Oral daily  magnesium hydroxide Suspension 30 milliLiter(s) Oral once  pantoprazole    Tablet 40 milliGRAM(s) Oral before breakfast  polyethylene glycol 3350 17 Gram(s) Oral two times a day  QUEtiapine 50 milliGRAM(s) Oral at bedtime  risperiDONE   Tablet 1 milliGRAM(s) Oral <User Schedule>  senna 2 Tablet(s) Oral at bedtime  sodium chloride 0.9%. 1000 milliLiter(s) (50 mL/Hr) IV Continuous <Continuous>  thiamine 100 milliGRAM(s) Oral daily    PHYSICAL EXAM:  T(C): 36.9 (05-02-22 @ 10:09), Max: 37.5 (05-01-22 @ 18:00)  HR: 88 (05-02-22 @ 10:09) (85 - 102)  BP: 118/67 (05-02-22 @ 10:09) (101/61 - 165/74)  RR: 20 (05-02-22 @ 10:09) (18 - 20)  SpO2: 95% (05-02-22 @ 10:09) (95% - 98%)  Wt(kg): --  I&O's Summary    01 May 2022 07:01  -  02 May 2022 07:00  --------------------------------------------------------  IN: 320 mL / OUT: 0 mL / NET: 320 mL    02 May 2022 07:01  -  02 May 2022 10:36  --------------------------------------------------------  IN: 600 mL / OUT: 0 mL / NET: 600 mL    Appearance: NAD  HEENT: dry oral mucosa, PERRL, EOMI	  Lymphatic: No lymphadenopathy , no edema  Cardiovascular: Normal S1 S2, No JVD, No murmurs , Peripheral pulses palpable 2+ bilaterally  Respiratory: Decreased BS, normal effort	  Gastrointestinal:  Soft, Non-tender, + BS	  Skin: No rashes, No ecchymoses, No cyanosis, warm to touch  NEUROLOGICAL EXAM:  MS: Does not know his own age. Speech is fluent, Follows commands.   CN: VFF, EOMI, PERRL,  V1-3 intact, no facial asymmetry, t/p midline,  Motor: Strength: 5/5 in the UE/ LE  Tone: normal. Bulk: normal.    Plantar flex b/l.   Sensation: intact to LT/Temperature 4x.   Coordination: FTN intact   Gait:  Deferred  Ext: No edema    LABS:    CARDIAC MARKERS:                       10.1   6.36  )-----------( 369      ( 02 May 2022 07:00 )             32.3     05-02    136  |  99  |  30<H>  ----------------------------<  103<H>  4.8   |  23  |  1.26    Ca    9.1      02 May 2022 07:00  Phos  3.5     05-02  Mg     2.0     05-02    TPro  8.1  /  Alb  2.8<L>  /  TBili  0.2  /  DBili  x   /  AST  28  /  ALT  17  /  AlkPhos  74  05-02    proBNP:   Lipid Profile:   HgA1c:   TSH:     TELEMETRY: 	    ECG:  	  RADIOLOGY:   DIAGNOSTIC TESTING:  [ ] Echocardiogram:  [ ]  Catheterization:  [ ] Stress Test:    OTHER:

## 2022-05-02 NOTE — PROGRESS NOTE ADULT - SUBJECTIVE AND OBJECTIVE BOX
Follow-up Pulm Progress Note    OOB to chair   No new respiratory events overnight.  Denies SOB/CP.   Sats 93% RA    Medications:  MEDICATIONS  (STANDING):  albuterol/ipratropium for Nebulization 3 milliLiter(s) Nebulizer every 6 hours  amLODIPine   Tablet 10 milliGRAM(s) Oral daily  apixaban 5 milliGRAM(s) Oral two times a day  dextrose 5%. 1000 milliLiter(s) (50 mL/Hr) IV Continuous <Continuous>  dextrose 5%. 1000 milliLiter(s) (100 mL/Hr) IV Continuous <Continuous>  dextrose 50% Injectable 25 Gram(s) IV Push once  dextrose 50% Injectable 12.5 Gram(s) IV Push once  dextrose 50% Injectable 25 Gram(s) IV Push once  glucagon  Injectable 1 milliGRAM(s) IntraMuscular once  insulin glargine Injectable (LANTUS) 10 Unit(s) SubCutaneous at bedtime  insulin lispro (ADMELOG) corrective regimen sliding scale   SubCutaneous three times a day before meals  insulin lispro (ADMELOG) corrective regimen sliding scale   SubCutaneous at bedtime  insulin lispro Injectable (ADMELOG) 4 Unit(s) SubCutaneous three times a day before meals  lacosamide 150 milliGRAM(s) Oral two times a day  lactobacillus acidophilus 1 Tablet(s) Oral daily  levothyroxine 88 MICROGram(s) Oral daily  lisinopril 40 milliGRAM(s) Oral daily  magnesium hydroxide Suspension 30 milliLiter(s) Oral once  pantoprazole    Tablet 40 milliGRAM(s) Oral before breakfast  polyethylene glycol 3350 17 Gram(s) Oral two times a day  QUEtiapine 50 milliGRAM(s) Oral at bedtime  risperiDONE   Tablet 1 milliGRAM(s) Oral <User Schedule>  senna 2 Tablet(s) Oral at bedtime  sodium chloride 0.9%. 1000 milliLiter(s) (50 mL/Hr) IV Continuous <Continuous>  thiamine 100 milliGRAM(s) Oral daily    MEDICATIONS  (PRN):  acetaminophen     Tablet .. 650 milliGRAM(s) Oral every 6 hours PRN Temp greater or equal to 38C (100.4F), Mild Pain (1 - 3)  dextrose Oral Gel 15 Gram(s) Oral once PRN Blood Glucose LESS THAN 70 milliGRAM(s)/deciliter  hydrALAZINE 10 milliGRAM(s) Oral every 4 hours PRN Systolic blood pressure < 160  valproate sodium IVPB 1000 milliGRAM(s) IV Intermittent once PRN Seizure          Vital Signs Last 24 Hrs  T(C): 36.9 (02 May 2022 10:09), Max: 37.5 (01 May 2022 18:00)  T(F): 98.5 (02 May 2022 10:09), Max: 99.5 (01 May 2022 18:00)  HR: 88 (02 May 2022 10:09) (85 - 102)  BP: 118/67 (02 May 2022 10:09) (101/61 - 165/74)  BP(mean): --  RR: 20 (02 May 2022 10:09) (18 - 20)  SpO2: 95% (02 May 2022 10:09) (95% - 98%)           @ 07:01  -   @ 07:00  --------------------------------------------------------  IN: 320 mL / OUT: 0 mL / NET: 320 mL          LABS:                        10.1   6.36  )-----------( 369      ( 02 May 2022 07:00 )             32.3     05-02    136  |  99  |  30<H>  ----------------------------<  103<H>  4.8   |  23  |  1.26    Ca    9.1      02 May 2022 07:00  Phos  3.5     05-02  Mg     2.0     05-02    TPro  8.1  /  Alb  2.8<L>  /  TBili  0.2  /  DBili  x   /  AST  28  /  ALT  17  /  AlkPhos  74  05-02          CAPILLARY BLOOD GLUCOSE      POCT Blood Glucose.: 129 mg/dL (02 May 2022 07:33)      Urinalysis Basic - ( 01 May 2022 17:47 )    Color: Light Yellow / Appearance: Slightly Turbid / S.022 / pH: x  Gluc: x / Ketone: Negative  / Bili: Negative / Urobili: Negative   Blood: x / Protein: 100 / Nitrite: Negative   Leuk Esterase: Moderate / RBC: 55 /hpf / WBC 42 /HPF   Sq Epi: x / Non Sq Epi: 3 /hpf / Bacteria: Negative          DAIANA 1:160  @ 10:05  Anti SS-1 0.6  Anti SS-2 <0.2  Anti RNP 0.8  RF --  @ 10:05    Atypical ANCA Negative  @ 10:05  c-ANCA titer --  @ 10:05  c-ANCA Negative  @ 10:05  p-ANCA Negative  @ 10:05  DAIANA --  @ 09:09  Anti SS-1 --  Anti SS-2 --  Anti RNP --  RF <10 -19 @ 09:09    Atypical ANCA -- - @ 09:09  c-ANCA titer --  @ 09:09  c-ANCA --  @ 09:09  p-ANCA --  @ 09:09              CULTURES:    Culture - Blood (collected 22 @ 06:06)  Source: .Blood Blood-Venous  Preliminary Report (22 @ 07:01):    No growth to date.    Culture - Blood (collected 22 @ 06:06)  Source: .Blood Blood-Venous  Preliminary Report (22 @ 07:01):    No growth to date.    Culture - Blood (collected 22 @ 12:21)  Source: .Blood Blood-Peripheral  Preliminary Report (22 @ 13:02):    No growth to date.    Culture - Blood (collected 22 @ 12:21)  Source: .Blood Blood-Peripheral  Preliminary Report (22 @ 13:02):    No growth to date.        Culture - Urine (collected 22 @ 05:54)  Source: Clean Catch Clean Catch (Midstream)  Final Report (22 @ 10:18):    No growth    Culture - Urine (collected 22 @ 18:23)  Source: Clean Catch Clean Catch (Midstream)  Final Report (22 @ 15:50):    >100,000 CFU/ml Escherichia coli    <10,000 CFU/ml Normal Urogenital dana present  Organism: Escherichia coli (22 @ 15:50)  Organism: Escherichia coli (22 @ 15:50)      -  Amikacin: S <=16      -  Amoxicillin/Clavulanic Acid: S <=8/4      -  Ampicillin: R >16 These ampicillin results predict results for amoxicillin      -  Ampicillin/Sulbactam: I 16/8 Enterobacter, Klebsiella aerogenes, Citrobacter, and Serratia may develop resistance during prolonged therapy (3-4 days)      -  Aztreonam: S <=4      -  Cefazolin: S 4 ([id number]) For uncomplicated UTI with K. pneumoniae, E. coli, or P. mirablis: GASPER <=16 is sensitive and GASPER >=32 is resistant. This also predicts results for oral agents cefaclor, cefdinir, cefpodoxime, cefprozil, cefuroxime axetil, cephalexin and locarbef for uncomplicated UTI. Note that some isolates may be susceptible to these agents while testing resistant to cefazolin.      -  Cefepime: S 8      -  Cefoxitin: S <=8      -  Ceftriaxone: S <=1 Enterobacter, Klebsiella aerogenes, Citrobacter, and Serratia may develop resistance during prolonged therapy      -  Ciprofloxacin: S <=0.25      -  Ertapenem: S <=0.5      -  Gentamicin: S <=2      -  Imipenem: S <=1      -  Levofloxacin: S <=0.5      -  Meropenem: S <=1      -  Nitrofurantoin: S <=32 Should not be used to treat pyelonephritis      -  Piperacillin/Tazobactam: S <=8      -  Tigecycline: S <=2      -  Tobramycin: S <=2      -  Trimethoprim/Sulfamethoxazole: R >2/38      Method Type: GASPER          Physical Examination:  PULM: decreased BS at bases   CVS: S1, S2 heard    RADIOLOGY REVIEWED    CT chest:   < from: CT Chest No Cont (22 @ 10:43) >    Tubes/Lines: None.    Mediastinum/Vessels/Heart: Aorta and pulmonary arteries are normal in   size. There is trace pericardial effusion. No lymphadenopathy. Diffuse   enlargement of the thyroid gland with mildnarrowing of the tracheal down   to 11 mm. This is without change from previous exam.    Lungs/Pleura/Airways: Bibasilar atelectasis with posterior bowing of the   fissures bilaterally, increased since previous exam.    Visualized abdomen: Unremarkable noncontrast appearance of the upper   abdomen    Bones and soft tissues: No suspicious osseous lesions. Degenerative   changes noted throughout the spine.    IMPRESSION:    Bibasilar atelectasis with posterior bowing of the fissures bilaterally.   This demonstrates slight interval progression since the previous exam,   the specimen the left side.    No supraclavicular lymphadenopathy is seen.    Trace pericardial effusion.    < end of copied text >

## 2022-05-02 NOTE — PROGRESS NOTE ADULT - PROBLEM SELECTOR PLAN 3
required cardene gtt  SBP goal <150  c/w amlodipine 10mg PO daily  c/w lisinopril 40mg PO daily  c/w hydralazine 10mg PO q4hrs PRN

## 2022-05-02 NOTE — PROGRESS NOTE ADULT - SUBJECTIVE AND OBJECTIVE BOX
Diabetes  Follow up note    Interval History/ROS:  Pt reports no nausea, vomiting, eating meals.    MEDICATIONS  (STANDING):  albuterol/ipratropium for Nebulization 3 milliLiter(s) Nebulizer every 6 hours  amLODIPine   Tablet 10 milliGRAM(s) Oral daily  apixaban 5 milliGRAM(s) Oral two times a day  cefuroxime   Tablet 500 milliGRAM(s) Oral every 12 hours  dextrose 5%. 1000 milliLiter(s) (50 mL/Hr) IV Continuous <Continuous>  dextrose 5%. 1000 milliLiter(s) (100 mL/Hr) IV Continuous <Continuous>  dextrose 50% Injectable 25 Gram(s) IV Push once  dextrose 50% Injectable 12.5 Gram(s) IV Push once  dextrose 50% Injectable 25 Gram(s) IV Push once  glucagon  Injectable 1 milliGRAM(s) IntraMuscular once  insulin glargine Injectable (LANTUS) 10 Unit(s) SubCutaneous at bedtime  insulin lispro (ADMELOG) corrective regimen sliding scale   SubCutaneous three times a day before meals  insulin lispro (ADMELOG) corrective regimen sliding scale   SubCutaneous at bedtime  insulin lispro Injectable (ADMELOG) 4 Unit(s) SubCutaneous three times a day before meals  lacosamide 150 milliGRAM(s) Oral two times a day  lactobacillus acidophilus 1 Tablet(s) Oral daily  levothyroxine 88 MICROGram(s) Oral daily  lisinopril 40 milliGRAM(s) Oral daily  magnesium hydroxide Suspension 30 milliLiter(s) Oral once  pantoprazole    Tablet 40 milliGRAM(s) Oral before breakfast  polyethylene glycol 3350 17 Gram(s) Oral two times a day  QUEtiapine 50 milliGRAM(s) Oral at bedtime  risperiDONE   Tablet 1 milliGRAM(s) Oral <User Schedule>  senna 2 Tablet(s) Oral at bedtime  sodium chloride 0.9%. 1000 milliLiter(s) (50 mL/Hr) IV Continuous <Continuous>  thiamine 100 milliGRAM(s) Oral daily    MEDICATIONS  (PRN):  acetaminophen     Tablet .. 650 milliGRAM(s) Oral every 6 hours PRN Temp greater or equal to 38C (100.4F), Mild Pain (1 - 3)  dextrose Oral Gel 15 Gram(s) Oral once PRN Blood Glucose LESS THAN 70 milliGRAM(s)/deciliter  hydrALAZINE 10 milliGRAM(s) Oral every 4 hours PRN Systolic blood pressure < 160  valproate sodium IVPB 1000 milliGRAM(s) IV Intermittent once PRN Seizure      Allergies    fish (Hives; Angioedema)  No Known Drug Allergies    Intolerances          PHYSICAL EXAM:  VITALS: T(C): 37.1 (05-02-22 @ 14:40)  T(F): 98.8 (05-02-22 @ 14:40), Max: 99.5 (05-01-22 @ 18:00)  HR: 93 (05-02-22 @ 14:40) (85 - 102)  BP: 161/66 (05-02-22 @ 14:40) (101/61 - 165/74)  RR:  (18 - 20)  SpO2:  (95% - 98%)  Wt(kg): 76  GENERAL: Sitting up in chair in NAD,   EYES: No proptosis,  HEENT:  Atraumatic, Normocephalic,   RESPIRATORY: Clear to auscultation bilaterally  CARDIOVASCULAR: Regular rhythm; no peripheral edema  GI: Soft, nontender, non distended, normal bowel sounds        POCT Blood Glucose.: 243 mg/dL (05-02-22 @ 11:37)  POCT Blood Glucose.: 129 mg/dL (05-02-22 @ 07:33)  POCT Blood Glucose.: 111 mg/dL (05-01-22 @ 21:07)  POCT Blood Glucose.: 176 mg/dL (05-01-22 @ 15:56)  POCT Blood Glucose.: 205 mg/dL (05-01-22 @ 11:23)  POCT Blood Glucose.: 120 mg/dL (05-01-22 @ 07:19)  POCT Blood Glucose.: 270 mg/dL (04-30-22 @ 21:38)  POCT Blood Glucose.: 131 mg/dL (04-30-22 @ 16:10)  POCT Blood Glucose.: 168 mg/dL (04-30-22 @ 12:11)  POCT Blood Glucose.: 203 mg/dL (04-30-22 @ 07:37)  POCT Blood Glucose.: 223 mg/dL (04-29-22 @ 21:36)  POCT Blood Glucose.: 190 mg/dL (04-29-22 @ 16:37)        05-02    136  |  99  |  30<H>  ----------------------------<  103<H>  4.8   |  23  |  1.26    EGFR if : x   EGFR if non : x     Ca    9.1      05-02  Mg     2.0     05-02  Phos  3.5     05-02    TPro  8.1  /  Alb  2.8<L>  /  TBili  0.2  /  DBili  x   /  AST  28  /  ALT  17  /  AlkPhos  74  05-02        A1C with Estimated Average Glucose Result: 5.9 % (04-13-22 @ 09:20)

## 2022-05-02 NOTE — PROGRESS NOTE ADULT - ASSESSMENT
72 year old Male with a PMHx of HTN, hypothyroidism, who presented to [facility] via transfer from OSH. Patient originally presented to to [place] ED via EMS after family called 911 following a witnessed seizure, by family, reportedly lasting ~5min.  Enroute to [place], patient was reported to have had another 2 witnessed seizures by EMS, each lasting ~1 minute. Following each seizure the patient was given 5mg IV versed (received 10mg collectively and brought to [place] ED). Pt arrived to the ED obtunded with blood from his mouth (tongue laceration), responsive only to pain/sternal rub. Patient was subsequently intubated for GCS 6 and CT/CTA performed showing a small cerebellar hemorrhage.    Seizures  - Previously intubated, now extubated  - Neuro checks per protocol  - S/P LP- CSF with no growth   - Course of IVIG completed 04/22, s/p pulse dose steroids   - Management as per neurology   - Fall, seizure, aspiration precautions  - S/P MR brain; Small foci of prior hemorrhage versus calcification involving the right cerebellar hemisphere.  - neuro follow up appreciated, s/p repeat CTH per neuro    Cerebellar hemorrhage  - Right sided hemorrhage   - MR brain as above; f/u neuro recs  - F/U repeat CT H s/p heparin gtt; monitor mental status   - Transitioned to Eliquis 5 BID   - Monitor H/H closely and for any signs/symptoms of bleeding     DVT/ PE  - Patient with acute below knee DVT and RUL PE & Recent cerebellar infiltrate  - Awaiting MR brain results for possible AC vs IVC filter --> now on Eliquis   - Vascular cardio eval appreciated   - Repeat DVT study no longer indicated as patient on full dose tx   - Monitor H/H closely and for any signs/symptoms of bleeding     Hypothyroid   - thyroid US w/ Enlarged goiter    - TSH elevated, T4 low  - Was on synthroid 75 at home, increased to 88 on this hospital admission  - Thyroid antibodies elevated ? Hoshimoto's --> F/U endo - per discussion w/ endo cont to monitor TFTS for now   - Repeat TFTs in 3-4 weeks outpatient     Leukocytosis  - noted on AM labs  - Likely reactive to steroid taper started in 04/24  - Cont to monitor and trend  - If febrile, recommend to check pan cx  - + UTI and Pyuria; 04/27 UCx with E. Coli; S/P Course of ceftriaxone-- Abx per ID   - Monitor CBC, temp curve, VS and patient closely    HyperNa  - Improved, cont to monitor     JESSICA  - Continue to monitor and trend on daily labs  - Avoid nephrotoxic agents  - Cr relatively stable ~ 1.4     B/L Supraclavicular swelling  - Outpatient PCP follow up   - Cont to monitor closely     Atelectasis   - Duoneb and Chest PT  - Appreciate pulm recs     HTN  - TTE EF of 75%, normal LV systolic function   - Continue with Amlodipine and Lisinopril- ACEI increased to 40 per cardio, monitor Cr  - Monitor BP, VS and patient closely     Pre-DM  - A1C of 5.9  - Outpatient follow up     Prostate   - Hyperdensity noted on CT Scan   - Check PSA-noted    - Outpatient follow up with urology and repeat testing     Covid exposure  - Monitor patient, VS and O2 saturation closely  - If febrile recommend to check [place] Cx  - Serial Covid tests   - Precautions per protocol   - On Full dose AC currently    PPX  - Eliquis 5 BID  - PPI  72 year old Male with a PMHx of HTN, hypothyroidism, who presented to [facility] via transfer from OSH. Patient originally presented to to [place] ED via EMS after family called 911 following a witnessed seizure, by family, reportedly lasting ~5min.  Enroute to [place], patient was reported to have had another 2 witnessed seizures by EMS, each lasting ~1 minute. Following each seizure the patient was given 5mg IV versed (received 10mg collectively and brought to [place] ED). Pt arrived to the ED obtunded with blood from his mouth (tongue laceration), responsive only to pain/sternal rub. Patient was subsequently intubated for GCS 6 and CT/CTA performed showing a small cerebellar hemorrhage.    Seizures  - Previously intubated, now extubated  - Neuro checks per protocol  - S/P LP- CSF with no growth   - Course of IVIG completed 04/22, s/p pulse dose steroids   - Management as per neurology   - Fall, seizure, aspiration precautions  - S/P MR brain; Small foci of prior hemorrhage versus calcification involving the right cerebellar hemisphere.  - neuro follow up appreciated, s/p repeat CTH per neuro  - S/P Steroid taper and IVIG, per neuro no plan for retux    Cerebellar hemorrhage  - Right sided hemorrhage   - MR brain as above; f/u neuro recs  - F/U repeat CT H s/p heparin gtt; monitor mental status   - Transitioned to Eliquis 5 BID   - Monitor H/H closely and for any signs/symptoms of bleeding     DVT/ PE  - Patient with acute below knee DVT and RUL PE & Recent cerebellar infiltrate  - Awaiting MR brain results for possible AC vs IVC filter --> now on Eliquis   - Vascular cardio eval appreciated   - Repeat DVT study no longer indicated as patient on full dose tx   - Monitor H/H closely and for any signs/symptoms of bleeding     Hypothyroid   - thyroid US w/ Enlarged goiter    - TSH elevated, T4 low  - Was on synthroid 75 at home, increased to 88 on this hospital admission  - Thyroid antibodies elevated ? Hoshimoto's --> F/U endo - per discussion w/ endo cont to monitor TFTS for now   - Repeat TFTs in 3-4 weeks outpatient     Leukocytosis  - S/P Steroid taper   - Cont to monitor and trend  - If febrile, recommend to check pan cx  - + UTI and Pyuria; 04/27 UCx with E. Coli; S/P Course of ceftriaxone-- Abx per ID   - Now on Ceftin X 7 days, plan to complete course on 05/09 per ID  - Monitor CBC, temp curve, VS and patient closely    Steroid induced hyperglycemia  - Endo eval 04/29 appreciated; f/u recs  - Per Endo, plan to d/c off of Rx, with outpatient follow up     HyperNa  - Improved, cont to monitor     JESSICA  - Continue to monitor and trend on daily labs  - Avoid nephrotoxic agents  - Cr relatively stable ~ 1.26 on AM labs    B/L Supraclavicular swelling  - Outpatient PCP follow up   - Cont to monitor closely     Atelectasis   - Duoneb and Chest PT  - Appreciate pulm recs     HTN  - TTE EF of 75%, normal LV systolic function   - Continue with Amlodipine and Lisinopril- ACEI increased to 40 per cardio, monitor Cr  - Monitor BP, VS and patient closely     Pre-DM  - A1C of 5.9  - Outpatient follow up     Prostate   - Hyperdensity noted on CT Scan   - Check PSA-noted    - Outpatient follow up with urology and repeat testing     Covid exposure  - Monitor patient, VS and O2 saturation closely  - If febrile recommend to check [place]es Cx  - Serial Covid tests   - Precautions per protocol   - On Full dose AC currently      PPX  - Eliquis 5 BID  - PPI

## 2022-05-02 NOTE — PROGRESS NOTE ADULT - SUBJECTIVE AND OBJECTIVE BOX
CC: f/u for  uti  Patient reports  he is fine, no dysuria  REVIEW OF SYSTEMS:  All other review of systems negative (Comprehensive ROS)    Antimicrobials Day #  :    Other Medications Reviewed    T(F): 98.5 (22 @ 10:09), Max: 99.5 (22 @ 18:00)  HR: 88 (22 @ 10:09)  BP: 118/67 (22 @ 10:09)  RR: 20 (22 @ 10:09)  SpO2: 95% (22 @ 10:09)  Wt(kg): --    PHYSICAL EXAM:  General: alert, no acute distress  Eyes:  anicteric, no conjunctival injection, no discharge  Oropharynx: no lesions or injection 	  Neck: supple, without adenopathy  Lungs: clear to auscultation  Heart: regular rate and rhythm; no murmur, rubs or gallops  Abdomen: soft, nondistended, nontender, without mass or organomegaly  Skin: no lesions  Extremities: no clubbing, cyanosis, or edema  Neurologic: alert,  moves all extremities  scrotum no swelling or tenderness  LAB RESULTS:                        10.1   6.36  )-----------( 369      ( 02 May 2022 07:00 )             32.3     05-    136  |  99  |  30<H>  ----------------------------<  103<H>  4.8   |  23  |  1.26    Ca    9.1      02 May 2022 07:00  Phos  3.5     05-  Mg     2.0     05-    TPro  8.1  /  Alb  2.8<L>  /  TBili  0.2  /  DBili  x   /  AST  28  /  ALT  17  /  AlkPhos  74  05-02    LIVER FUNCTIONS - ( 02 May 2022 07:00 )  Alb: 2.8 g/dL / Pro: 8.1 g/dL / ALK PHOS: 74 U/L / ALT: 17 U/L / AST: 28 U/L / GGT: x           Urinalysis Basic - ( 01 May 2022 17:47 )    Color: Light Yellow / Appearance: Slightly Turbid / S.022 / pH: x  Gluc: x / Ketone: Negative  / Bili: Negative / Urobili: Negative   Blood: x / Protein: 100 / Nitrite: Negative   Leuk Esterase: Moderate / RBC: 55 /hpf / WBC 42 /HPF   Sq Epi: x / Non Sq Epi: 3 /hpf / Bacteria: Negative      MICROBIOLOGY:  RECENT CULTURES:   @ 06:06 .Blood Blood-Venous     No growth to date.       @ 05:54 Clean Catch Clean Catch (Midstream)     No growth       @ 18:23 Clean Catch Clean Catch (Midstream) Escherichia coli    >100,000 CFU/ml Escherichia coli  <10,000 CFU/ml Normal Urogenital dana present          RADIOLOGY REVIEWED:      < from: CT Chest No Cont (22 @ 10:43) >  ACC: [id number] EXAM:  CT CHEST                          PROCEDURE DATE:  2022          INTERPRETATION:  Reason for Exam:  Dyspnea    CT of the chest was performed from the thoracic inlet to the level of the   adrenal glands without contrast injection.    Comparison: 2022    Tubes/Lines: None.    Mediastinum/Vessels/Heart: Aorta and pulmonary arteries are normal in   size. There is trace pericardial effusion. No lymphadenopathy. Diffuse   enlargement of the thyroid gland with mildnarrowing of the tracheal down   to 11 mm. This is without change from previous exam.    Lungs/Pleura/Airways: Bibasilar atelectasis with posterior bowing of the   fissures bilaterally, increased since previous exam.    Visualized abdomen: Unremarkable noncontrast appearance of the upper   abdomen    Bones and soft tissues: No suspicious osseous lesions. Degenerative   changes noted throughout the spine.    IMPRESSION:    Bibasilar atelectasis with posterior bowing of the fissures bilaterally.   This demonstrates slight interval progression since the previous exam,   the specimen the left side.    No supraclavicular lymphadenopathy is seen.    Trace pericardial effusion.    --- End of Report ---      < end of copied text >          < from: CT Chest w/ IV Cont (22 @ 17:16) >    ACC: [id number] EXAM:  CT ABDOMEN AND PELVIS OC IC                        ACC: [id number] EXAM:  CT CHEST IC                          PROCEDURE DATE:  2022          INTERPRETATION:  CLINICAL INFORMATION: New seizures, evaluate for   malignancy.    COMPARISON: CT chest 2022. CT sonography 2014    CONTRAST/COMPLICATIONS:  IV Contrast: Omnipaque 350  90 cc administered   10 cc discarded  Oral Contrast: Omnipaque 300  Complications: None reported at time of study completion    PROCEDURE:  CT of the Chest, Abdomen and Pelvis was performed.  Sagittal and coronal reformats were performed.    FINDINGS:  CHEST:  LUNGS AND LARGE AIRWAYS: Tracheal secretions. Near complete atelectasis   of the right lower lobe. Atelectatic changes, though to a lesser degree,   involving the middle and left lower lobes.  PLEURA: No pleural effusion.  VESSELS: The known pulmonary embolus in the right upper lobe identified   on the prior CTA of 2022 is not well seen on the current exam, which   is not tailored for the assessment of the pulmonary arterial system.  HEART: Heart size is normal. Aortic valve calcification. Trace   pericardial effusion, similar to the prior exam.  MEDIASTINUM AND SIMONE: No lymphadenopathy.  CHEST WALL AND LOWER NECK: Enlarged thyroid with mild tracheal narrowing,   similar to the prior exam.    ABDOMEN AND PELVIS:  Mild motion degradation in the mid to lower abdomen.    LIVER: Within normal limits.  BILE DUCTS: Normal caliber.  GALLBLADDER: Mild layering hyperdensity within the gallbladder, which may   reflect vicarious excretion of contrast or sludge.  SPLEEN: Within normal limits.  PANCREAS: Within normal limits.  ADRENALS: Within normal limits.  KIDNEYS/URETERS: A few subcentimeter hypodense foci within the right   kidney that are too small to characterize. No hydronephrosis.    BLADDER: Underdistended. Mild perivesicular fat stranding.  REPRODUCTIVE ORGANS: Mildly enlarged prostate. There is a 7 mm hyperdense   nodular focus within the region of the lateral mid right peripheral zone   (series 3 image 280).    BOWEL: No bowel obstruction. Colonic diverticulosis. Appendix is normal.  PERITONEUM: No ascites.  VESSELS: Atherosclerotic changes.  RETROPERITONEUM/LYMPH NODES: No lymphadenopathy.  ABDOMINALWALL: Small umbilical hernia containing a portion of transverse   colon.  BONES: Degenerative changes.    IMPRESSION:  Subcentimeter hyperdense nodular focus in the right aspect of the   prostate gland, indeterminate but potentially an exophytic BPH nodule.   Neoplasm is not excluded. Suggest correlation with PSA, and consider   further evaluation with prostate MRI.    Mild perivesicular fat stranding. Question cystitis. Correlate with   urinalysis.    Right greater than left lung base atelectasis, with near complete   atelectasis of the right lower lobe.      Findings were discussed with LAN Romero 2022 9:10 AM by Dr. Flores with read back confirmation.    --- End of Report ---  < from: CT Head No Cont (22 @ 18:26) >    ACC: [id number] EXAM:  CT BRAIN                          PROCEDURE DATE:  2022          INTERPRETATION:  Noncontrast CT of the brain.    CLINICAL INDICATION:  Follow-up right cerebellar hemorrhage    TECHNIQUE : Axial CT scanning of the brain was obtained from the skull   base to the vertex without the administration of intravenous contrast.   Sagittal and coronal reformats were provided.    COMPARISON: CT brain 2022    FINDINGS:    Previously seen focus of increased density in the right cerebellar   hemisphere is decreased in attenuation. The focus is slightly increased   in size, currently measuring 7 mm, previously measuring 5 mm.    No hydrocephalus, midline shift, or effacement of basal cisterns.    Moderate white matter microvascular ischemic disease.    No appreciable brain edema.    IMPRESSION:    Previously seen focus of increased density in the right cerebellar   hemisphere is decreased in attenuation. The focus is slightly increased   in size, currently measuring 7 mm, previously measuring 5 mm.    --- End of Report ---        < end of copied text >          < end of copied text >  Assessment:  Elderly man admitted  with new onset seizure, maybe iph vs calcification in cerebellum, LP not indicative of cns infection, placed on pulse steroids now taper. Had high wbc now moderating ,had some low grade temps and ecoli in urine so had 3 d of ctx will now finish with a week of ceftin. Mental status appears a lot better than that previously described  Plan:  ceftin for a week for uti  monitor temps and wbc

## 2022-05-03 LAB
ANION GAP SERPL CALC-SCNC: 12 MMOL/L — SIGNIFICANT CHANGE UP (ref 5–17)
BASOPHILS # BLD AUTO: 0.08 K/UL — SIGNIFICANT CHANGE UP (ref 0–0.2)
BASOPHILS NFR BLD AUTO: 1.8 % — SIGNIFICANT CHANGE UP (ref 0–2)
BUN SERPL-MCNC: 29 MG/DL — HIGH (ref 7–23)
CALCIUM SERPL-MCNC: 9.1 MG/DL — SIGNIFICANT CHANGE UP (ref 8.4–10.5)
CHLORIDE SERPL-SCNC: 101 MMOL/L — SIGNIFICANT CHANGE UP (ref 96–108)
CO2 SERPL-SCNC: 24 MMOL/L — SIGNIFICANT CHANGE UP (ref 22–31)
CREAT SERPL-MCNC: 1.17 MG/DL — SIGNIFICANT CHANGE UP (ref 0.5–1.3)
CULTURE RESULTS: NO GROWTH — SIGNIFICANT CHANGE UP
EGFR: 66 ML/MIN/1.73M2 — SIGNIFICANT CHANGE UP
EJ: NEGATIVE — SIGNIFICANT CHANGE UP
ENA JO1 AB SER IA-ACNC: <20 UNITS — SIGNIFICANT CHANGE UP
ENA PM/SCL AB SER-ACNC: <20 UNITS — SIGNIFICANT CHANGE UP
ENA SM+RNP AB SER IA-ACNC: 42 UNITS — HIGH
ENA SS-A IGG SER QL: <20 UNITS — SIGNIFICANT CHANGE UP
EOSINOPHIL # BLD AUTO: 0.12 K/UL — SIGNIFICANT CHANGE UP (ref 0–0.5)
EOSINOPHIL NFR BLD AUTO: 2.6 % — SIGNIFICANT CHANGE UP (ref 0–6)
FIBRILLARIN AB SER QL: NEGATIVE — SIGNIFICANT CHANGE UP
GLUCOSE BLDC GLUCOMTR-MCNC: 111 MG/DL — HIGH (ref 70–99)
GLUCOSE BLDC GLUCOMTR-MCNC: 135 MG/DL — HIGH (ref 70–99)
GLUCOSE BLDC GLUCOMTR-MCNC: 139 MG/DL — HIGH (ref 70–99)
GLUCOSE BLDC GLUCOMTR-MCNC: 75 MG/DL — SIGNIFICANT CHANGE UP (ref 70–99)
GLUCOSE SERPL-MCNC: 106 MG/DL — HIGH (ref 70–99)
HBV CORE AB SER-ACNC: REACTIVE
HBV SURFACE AB SER-ACNC: REACTIVE
HCT VFR BLD CALC: 31.7 % — LOW (ref 39–50)
HGB BLD-MCNC: 10.1 G/DL — LOW (ref 13–17)
KU AB SER QL: NEGATIVE — SIGNIFICANT CHANGE UP
LYMPHOCYTES # BLD AUTO: 1.15 K/UL — SIGNIFICANT CHANGE UP (ref 1–3.3)
LYMPHOCYTES # BLD AUTO: 24.8 % — SIGNIFICANT CHANGE UP (ref 13–44)
MANUAL SMEAR VERIFICATION: SIGNIFICANT CHANGE UP
MCHC RBC-ENTMCNC: 29.5 PG — SIGNIFICANT CHANGE UP (ref 27–34)
MCHC RBC-ENTMCNC: 31.9 GM/DL — LOW (ref 32–36)
MCV RBC AUTO: 92.7 FL — SIGNIFICANT CHANGE UP (ref 80–100)
MDA-5 (P140)(CADM-140): <20 UNITS — SIGNIFICANT CHANGE UP
MI2 AB SER QL: NEGATIVE — SIGNIFICANT CHANGE UP
MONOCYTES # BLD AUTO: 0.25 K/UL — SIGNIFICANT CHANGE UP (ref 0–0.9)
MONOCYTES NFR BLD AUTO: 5.3 % — SIGNIFICANT CHANGE UP (ref 2–14)
NEUTROPHILS # BLD AUTO: 3.03 K/UL — SIGNIFICANT CHANGE UP (ref 1.8–7.4)
NEUTROPHILS NFR BLD AUTO: 64.6 % — SIGNIFICANT CHANGE UP (ref 43–77)
NEUTS BAND # BLD: 0.9 % — SIGNIFICANT CHANGE UP (ref 0–8)
NXP-2 (P140): <20 UNITS — SIGNIFICANT CHANGE UP
OJ AB SER QL: NEGATIVE — SIGNIFICANT CHANGE UP
PL12 AB SER QL: NEGATIVE — SIGNIFICANT CHANGE UP
PL7 AB SER QL: NEGATIVE — SIGNIFICANT CHANGE UP
PLAT MORPH BLD: NORMAL — SIGNIFICANT CHANGE UP
PLATELET # BLD AUTO: 367 K/UL — SIGNIFICANT CHANGE UP (ref 150–400)
POTASSIUM SERPL-MCNC: 4.2 MMOL/L — SIGNIFICANT CHANGE UP (ref 3.5–5.3)
POTASSIUM SERPL-SCNC: 4.2 MMOL/L — SIGNIFICANT CHANGE UP (ref 3.5–5.3)
RBC # BLD: 3.42 M/UL — LOW (ref 4.2–5.8)
RBC # FLD: 14.5 % — SIGNIFICANT CHANGE UP (ref 10.3–14.5)
RBC BLD AUTO: SIGNIFICANT CHANGE UP
SODIUM SERPL-SCNC: 137 MMOL/L — SIGNIFICANT CHANGE UP (ref 135–145)
SPECIMEN SOURCE: SIGNIFICANT CHANGE UP
SRP AB SERPL QL: NEGATIVE — SIGNIFICANT CHANGE UP
TIF GAMMA (P155/140): <20 UNITS — SIGNIFICANT CHANGE UP
U2 SNRNP AB SER QL: NEGATIVE — SIGNIFICANT CHANGE UP
WBC # BLD: 4.63 K/UL — SIGNIFICANT CHANGE UP (ref 3.8–10.5)
WBC # FLD AUTO: 4.63 K/UL — SIGNIFICANT CHANGE UP (ref 3.8–10.5)

## 2022-05-03 PROCEDURE — 99232 SBSQ HOSP IP/OBS MODERATE 35: CPT

## 2022-05-03 RX ADMIN — LACOSAMIDE 150 MILLIGRAM(S): 50 TABLET ORAL at 17:00

## 2022-05-03 RX ADMIN — Medication 3 MILLILITER(S): at 05:06

## 2022-05-03 RX ADMIN — PANTOPRAZOLE SODIUM 40 MILLIGRAM(S): 20 TABLET, DELAYED RELEASE ORAL at 05:09

## 2022-05-03 RX ADMIN — Medication 3 MILLILITER(S): at 17:01

## 2022-05-03 RX ADMIN — Medication 500 MILLIGRAM(S): at 17:00

## 2022-05-03 RX ADMIN — SENNA PLUS 2 TABLET(S): 8.6 TABLET ORAL at 21:31

## 2022-05-03 RX ADMIN — Medication 88 MICROGRAM(S): at 05:10

## 2022-05-03 RX ADMIN — APIXABAN 5 MILLIGRAM(S): 2.5 TABLET, FILM COATED ORAL at 17:01

## 2022-05-03 RX ADMIN — Medication 4 UNIT(S): at 16:59

## 2022-05-03 RX ADMIN — LISINOPRIL 40 MILLIGRAM(S): 2.5 TABLET ORAL at 05:10

## 2022-05-03 RX ADMIN — Medication 100 MILLIGRAM(S): at 11:08

## 2022-05-03 RX ADMIN — Medication 500 MILLIGRAM(S): at 05:10

## 2022-05-03 RX ADMIN — QUETIAPINE FUMARATE 50 MILLIGRAM(S): 200 TABLET, FILM COATED ORAL at 21:29

## 2022-05-03 RX ADMIN — INSULIN GLARGINE 10 UNIT(S): 100 INJECTION, SOLUTION SUBCUTANEOUS at 21:29

## 2022-05-03 RX ADMIN — Medication 4 UNIT(S): at 11:21

## 2022-05-03 RX ADMIN — LACOSAMIDE 150 MILLIGRAM(S): 50 TABLET ORAL at 05:13

## 2022-05-03 RX ADMIN — APIXABAN 5 MILLIGRAM(S): 2.5 TABLET, FILM COATED ORAL at 05:11

## 2022-05-03 RX ADMIN — Medication 4 UNIT(S): at 08:20

## 2022-05-03 RX ADMIN — AMLODIPINE BESYLATE 10 MILLIGRAM(S): 2.5 TABLET ORAL at 05:06

## 2022-05-03 RX ADMIN — Medication 1 TABLET(S): at 11:07

## 2022-05-03 NOTE — PROGRESS NOTE ADULT - SUBJECTIVE AND OBJECTIVE BOX
Diabetes  Follow up note    Interval History/ROS:  Pt reports no nausea or vomiting, eating meals    MEDICATIONS  (STANDING):  albuterol/ipratropium for Nebulization 3 milliLiter(s) Nebulizer every 6 hours  amLODIPine   Tablet 10 milliGRAM(s) Oral daily  apixaban 5 milliGRAM(s) Oral two times a day  cefuroxime   Tablet 500 milliGRAM(s) Oral every 12 hours  dextrose 5%. 1000 milliLiter(s) (50 mL/Hr) IV Continuous <Continuous>  dextrose 5%. 1000 milliLiter(s) (100 mL/Hr) IV Continuous <Continuous>  dextrose 50% Injectable 25 Gram(s) IV Push once  dextrose 50% Injectable 25 Gram(s) IV Push once  dextrose 50% Injectable 12.5 Gram(s) IV Push once  glucagon  Injectable 1 milliGRAM(s) IntraMuscular once  insulin glargine Injectable (LANTUS) 10 Unit(s) SubCutaneous at bedtime  insulin lispro (ADMELOG) corrective regimen sliding scale   SubCutaneous three times a day before meals  insulin lispro (ADMELOG) corrective regimen sliding scale   SubCutaneous at bedtime  insulin lispro Injectable (ADMELOG) 4 Unit(s) SubCutaneous three times a day before meals  lacosamide 150 milliGRAM(s) Oral two times a day  lactobacillus acidophilus 1 Tablet(s) Oral daily  levothyroxine 88 MICROGram(s) Oral daily  lisinopril 40 milliGRAM(s) Oral daily  magnesium hydroxide Suspension 30 milliLiter(s) Oral once  pantoprazole    Tablet 40 milliGRAM(s) Oral before breakfast  polyethylene glycol 3350 17 Gram(s) Oral two times a day  QUEtiapine 50 milliGRAM(s) Oral at bedtime  risperiDONE   Tablet 1 milliGRAM(s) Oral <User Schedule>  senna 2 Tablet(s) Oral at bedtime  sodium chloride 0.9%. 1000 milliLiter(s) (50 mL/Hr) IV Continuous <Continuous>  thiamine 100 milliGRAM(s) Oral daily    MEDICATIONS  (PRN):  acetaminophen     Tablet .. 650 milliGRAM(s) Oral every 6 hours PRN Temp greater or equal to 38C (100.4F), Mild Pain (1 - 3)  dextrose Oral Gel 15 Gram(s) Oral once PRN Blood Glucose LESS THAN 70 milliGRAM(s)/deciliter  hydrALAZINE 10 milliGRAM(s) Oral every 4 hours PRN Systolic blood pressure < 160  valproate sodium IVPB 1000 milliGRAM(s) IV Intermittent once PRN Seizure      Allergies    fish (Hives; Angioedema)  No Known Drug Allergies    Intolerances          PHYSICAL EXAM:  VITALS: T(C): 37 (05-03-22 @ 11:43)  T(F): 98.6 (05-03-22 @ 11:43), Max: 99.6 (05-02-22 @ 22:00)  HR: 71 (05-03-22 @ 11:43) (71 - 89)  BP: 109/69 (05-03-22 @ 11:43) (109/69 - 169/75)  RR:  (18 - 18)  SpO2:  (92% - 98%)  GENERAL: sitting up in chair in NAD,   EYES: No proptosis,  HEENT:  Atraumatic, Normocephalic,   RESPIRATORY: Clear to auscultation bilaterally  CARDIOVASCULAR: Regular rhythm;  no peripheral edema  GI: Soft, nontender, non distended, normal bowel sounds        POCT Blood Glucose.: 139 mg/dL (05-03-22 @ 11:11)  POCT Blood Glucose.: 111 mg/dL (05-03-22 @ 07:27)  POCT Blood Glucose.: 227 mg/dL (05-02-22 @ 21:14)  POCT Blood Glucose.: 81 mg/dL (05-02-22 @ 16:08)  POCT Blood Glucose.: 243 mg/dL (05-02-22 @ 11:37)  POCT Blood Glucose.: 129 mg/dL (05-02-22 @ 07:33)  POCT Blood Glucose.: 111 mg/dL (05-01-22 @ 21:07)  POCT Blood Glucose.: 176 mg/dL (05-01-22 @ 15:56)  POCT Blood Glucose.: 205 mg/dL (05-01-22 @ 11:23)  POCT Blood Glucose.: 120 mg/dL (05-01-22 @ 07:19)  POCT Blood Glucose.: 270 mg/dL (04-30-22 @ 21:38)  POCT Blood Glucose.: 131 mg/dL (04-30-22 @ 16:10)        05-03    137  |  101  |  29<H>  ----------------------------<  106<H>  4.2   |  24  |  1.17    eGFR: 66    Ca    9.1      05-03  Mg     2.0     05-02  Phos  3.5     05-02    TPro  8.1  /  Alb  2.8<L>  /  TBili  0.2  /  DBili  x   /  AST  28  /  ALT  17  /  AlkPhos  74  05-02        A1C with Estimated Average Glucose Result: 5.9 % (04-13-22 @ 09:20)

## 2022-05-03 NOTE — PROGRESS NOTE ADULT - NS ATTEND OPT1 GEN_ALL_CORE
I attest my time as attending is greater than 50% of the total combined time spent on qualifying patient care activities by the PA/NP and attending.
I independently performed the documented:

## 2022-05-03 NOTE — PROGRESS NOTE ADULT - NS ATTEND BILL GEN_ALL_CORE
Attending to bill

## 2022-05-03 NOTE — PROGRESS NOTE ADULT - NS_MD_PANP_GEN_ALL_CORE

## 2022-05-03 NOTE — PROGRESS NOTE ADULT - PROBLEM SELECTOR PLAN 3
SBP goal <150  c/w amlodipine 10mg PO daily  c/w lisinopril 40mg PO daily  c/w hydralazine 10mg PO q4hrs PRN

## 2022-05-03 NOTE — PROGRESS NOTE ADULT - SUBJECTIVE AND OBJECTIVE BOX
CC: f/u for  uti  Patient reports he is doing fine today  REVIEW OF SYSTEMS:  All other review of systems negative (Comprehensive ROS)    Antimicrobials Day #  :  cefuroxime   Tablet 500 milliGRAM(s) Oral every 12 hours    Other Medications Reviewed    T(F): 98.6 (22 @ 11:43), Max: 99.6 (22 @ 22:00)  HR: 71 (22 @ 11:43)  BP: 109/69 (22 @ 11:43)  RR: 18 (22 @ 11:43)  SpO2: 98% (22 @ 11:43)  Wt(kg): --    PHYSICAL EXAM:  General: alert, no acute distress  Eyes:  anicteric, no conjunctival injection, no discharge  Oropharynx: no lesions or injection 	  Neck: supple, without adenopathy  Lungs: clear to auscultation  Heart: regular rate and rhythm; no murmur, rubs or gallops  Abdomen: soft, nondistended, nontender, without mass or organomegaly  Skin: no lesions  Extremities: no clubbing, cyanosis, or edema  Neurologic: alert, , moves all extremities    LAB RESULTS:                        10.1   4.63  )-----------( 367      ( 03 May 2022 06:22 )             31.7     05-    137  |  101  |  29<H>  ----------------------------<  106<H>  4.2   |  24  |  1.17    Ca    9.1      03 May 2022 06:20  Phos  3.5     05-  Mg     2.0     05-    TPro  8.1  /  Alb  2.8<L>  /  TBili  0.2  /  DBili  x   /  AST  28  /  ALT  17  /  AlkPhos  74  05-02    LIVER FUNCTIONS - ( 02 May 2022 07:00 )  Alb: 2.8 g/dL / Pro: 8.1 g/dL / ALK PHOS: 74 U/L / ALT: 17 U/L / AST: 28 U/L / GGT: x           Urinalysis Basic - ( 01 May 2022 17:47 )    Color: Light Yellow / Appearance: Slightly Turbid / S.022 / pH: x  Gluc: x / Ketone: Negative  / Bili: Negative / Urobili: Negative   Blood: x / Protein: 100 / Nitrite: Negative   Leuk Esterase: Moderate / RBC: 55 /hpf / WBC 42 /HPF   Sq Epi: x / Non Sq Epi: 3 /hpf / Bacteria: Negative      MICROBIOLOGY:  RECENT CULTURES:   @ 04:41 Clean Catch Clean Catch (Midstream)     No growth       @ 06:06 .Blood Blood-Venous     No growth to date.       @ 05:54 Clean Catch Clean Catch (Midstream)     No growth          RADIOLOGY REVIEWED:    < from: Xray Chest 1 View- PORTABLE-Routine (Xray Chest 1 View- PORTABLE-Routine .) (22 @ 09:37) >  ACC: [id number] EXAM:  XR CHEST PORTABLE ROUTINE 1V                          PROCEDURE DATE:  2022          INTERPRETATION:  Clinical History: 72-year-old male, fever.    Portable/expiratory view of the chest is correlated with the chest CT of   2022.    Cardiac silhouette at the upper limit of normal with normal pulmonary   vasculature and no pneumothorax or acute osseous finding.    Small bilateral pleural effusions/adjacent consolidation/atelectasis,   unchanged.    Gastric dilatation, new.    IMPRESSION:  Expiratory/kyphotic view, small bilateral pleural effusions/adjacent   atelectasis/consolidation, unchanged.    Gastric dilatation, new    --- End of Report ---  < from: CT Chest No Cont (22 @ 10:43) >    ACC: [id number] EXAM:  CT CHEST                          PROCEDURE DATE:  2022          INTERPRETATION:  Reason for Exam:  Dyspnea    CT of the chest was performed from the thoracic inlet to the level of the   adrenal glands without contrast injection.    Comparison: 2022    Tubes/Lines: None.    Mediastinum/Vessels/Heart: Aorta and pulmonary arteries are normal in   size. There is trace pericardial effusion. No lymphadenopathy. Diffuse   enlargement of the thyroid gland with mildnarrowing of the tracheal down   to 11 mm. This is without change from previous exam.    Lungs/Pleura/Airways: Bibasilar atelectasis with posterior bowing of the   fissures bilaterally, increased since previous exam.    Visualized abdomen: Unremarkable noncontrast appearance of the upper   abdomen    Bones and soft tissues: No suspicious osseous lesions. Degenerative   changes noted throughout the spine.    IMPRESSION:    Bibasilar atelectasis with posterior bowing of the fissures bilaterally.   This demonstrates slight interval progression since the previous exam,   the specimen the left side.    No supraclavicular lymphadenopathy is seen.    Trace pericardial effusion.    --- End of Report ---    < end of copied text >      < from: MR Head w/wo IV Cont (22 @ 09:55) >    ACC: [id number] EXAM:  MR BRAIN WAW IC                          PROCEDURE DATE:  2022          INTERPRETATION:  INDICATIONS:  New onset seizures, known right cerebellar   hemorrhage.    TECHNIQUE:  Multiplanar imaging was performed using T1 weighted, T2   weighted and FLAIR sequences.  Diffusion weighted and susceptibility   sensitive images were also obtained.  Following intravenous gadolinium,   multiplanar T1 weighted images were performed. 7.5 cc Gadavist were   administered. 0 cc were discarded.    COMPARISON EXAMINATION:  Head CT dated 2022.    FINDINGS:  VENTRICLES AND SULCI:  No hydrocephalus.  INTRA-AXIAL:  Small foci of susceptibility artifact are present within   the right cerebellar hemisphere and the left frontal, occipital, and   parietal lobes. There is patchy increased FLAIR signal along the lateral   ventricles bilaterally and in the bilateral centrum semiovaleThe   bilateral thalamic and right basal ganglia chronic infarcts are present   with hemosiderin deposition. Bilateral temporal lobes are symmetric   without focal lesion. No abnormal intracranial enhancement is seen.  EXTRA-AXIAL:  No mass or collection.  VISUALIZED SINUSES:  Mild mucosal thickening of the left sphenoid sinus,   bilateral maxillary sinuses, and ethmoid air cells.  VISUALIZED MASTOIDS:  There is a right mastoid effusion, new from prior.  CALVARIUM:  Intact.  CAROTID FLOW VOIDS:  Present.    IMPRESSION:  Confluent FLAIR hyperintensity along the ventricles is nonspecific but   most commonly seen in the setting of chronic white matter microvascular   change.    Small foci of prior hemorrhage versus calcification involving the right   cerebellar hemisphere.    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    --- End of Report ---      < end of copied text >          < end of copied text >            Assessment:  Elderly man with new onset seizure, treated empirically by neuro for possible AIE< no cns infection apparent on mri or LP. Found to have ecoli uti finishing a course of po ceftin. Leukocytosis is moderated, mental status is much better.   Plan:  5 more days of ceftin

## 2022-05-03 NOTE — PROGRESS NOTE ADULT - ASSESSMENT
73 y/o M w/ steroid induced hyperglycemia, HTN, Hypothyroidism admitted with seizures    1)  Steroid-induced hyperglycemia.   Pt with reasonable fasting glucose control, continue Lantus 10 units qhs  Pt now off steroids, with intermittent postprandial hyperglycemia.  COUnseled pt on diet.  Diet order was changed to consistent carbohydrate after breakfast.   If he has further postprandial hyperglycemia, will increase admelog dose  Low correction scale qac + bedtime  Goal glucose 100-180  Outpt. optho, podiatry, nephrology  If hyperglycemia continues off of steroids, consider discharge on metformin 500 mg daily    2) Hypertension.    Goal BP < 140/90    continue amlodipine.    3)Hypothyroidism.   Continue levothyroxine. Repeat TFTs as outpatient.    Discussed with Medicine ACP    Debby Call MD  pager  on 5/3/22  Other times:  Diabetes team: [phone] business hours  [phone] night/weekend

## 2022-05-03 NOTE — PROGRESS NOTE ADULT - SUBJECTIVE AND OBJECTIVE BOX
Name of Patient : PEPITO BALL  MRN: [id number]  Date of visit: 22 @ 14:38      Subjective: Patient seen and examined. No new events except as noted.   Patient seen earlier this AM. OOB TC.   Denies CP, palpitations, SOB or dyspnea, abdominal pain or discomfort.   On PO Ceftin for UTI until  per ID.     REVIEW OF SYSTEMS:    CONSTITUTIONAL: No weakness, fevers or chills  EYES/ENT: No visual changes;  No vertigo or throat pain   NECK: No pain or stiffness  RESPIRATORY: No cough, wheezing, hemoptysis; No shortness of breath  CARDIOVASCULAR: No chest pain or palpitations  GASTROINTESTINAL: No abdominal or epigastric pain. No nausea, vomiting, or hematemesis; No diarrhea or constipation. No melena or hematochezia.  GENITOURINARY: No dysuria, frequency or hematuria  NEUROLOGICAL: No numbness or weakness  SKIN: No itching, burning, rashes, or lesions   All other review of systems is negative unless indicated above.    MEDICATIONS:  MEDICATIONS  (STANDING):  albuterol/ipratropium for Nebulization 3 milliLiter(s) Nebulizer every 6 hours  amLODIPine   Tablet 10 milliGRAM(s) Oral daily  apixaban 5 milliGRAM(s) Oral two times a day  cefuroxime   Tablet 500 milliGRAM(s) Oral every 12 hours  dextrose 5%. 1000 milliLiter(s) (100 mL/Hr) IV Continuous <Continuous>  dextrose 5%. 1000 milliLiter(s) (50 mL/Hr) IV Continuous <Continuous>  dextrose 50% Injectable 25 Gram(s) IV Push once  dextrose 50% Injectable 12.5 Gram(s) IV Push once  dextrose 50% Injectable 25 Gram(s) IV Push once  glucagon  Injectable 1 milliGRAM(s) IntraMuscular once  insulin glargine Injectable (LANTUS) 10 Unit(s) SubCutaneous at bedtime  insulin lispro (ADMELOG) corrective regimen sliding scale   SubCutaneous three times a day before meals  insulin lispro (ADMELOG) corrective regimen sliding scale   SubCutaneous at bedtime  insulin lispro Injectable (ADMELOG) 4 Unit(s) SubCutaneous three times a day before meals  lacosamide 150 milliGRAM(s) Oral two times a day  lactobacillus acidophilus 1 Tablet(s) Oral daily  levothyroxine 88 MICROGram(s) Oral daily  lisinopril 40 milliGRAM(s) Oral daily  magnesium hydroxide Suspension 30 milliLiter(s) Oral once  pantoprazole    Tablet 40 milliGRAM(s) Oral before breakfast  polyethylene glycol 3350 17 Gram(s) Oral two times a day  QUEtiapine 50 milliGRAM(s) Oral at bedtime  risperiDONE   Tablet 1 milliGRAM(s) Oral <User Schedule>  senna 2 Tablet(s) Oral at bedtime  sodium chloride 0.9%. 1000 milliLiter(s) (50 mL/Hr) IV Continuous <Continuous>  thiamine 100 milliGRAM(s) Oral daily      PHYSICAL EXAM:  T(C): 37 (22 @ 11:43), Max: 37.6 (22 @ 22:00)  HR: 71 (22 @ 11:43) (71 - 93)  BP: 109/69 (22 @ 11:43) (109/69 - 169/75)  RR: 18 (22 @ 11:43) (18 - 20)  SpO2: 98% (22 11:43) (92% - 98%)  Wt(kg): --  I&O's Summary    02 May 2022 07:  -  03 May 2022 07:00  --------------------------------------------------------  IN: 720 mL / OUT: 0 mL / NET: 720 mL    03 May 2022 07:  -  03 May 2022 14:38  --------------------------------------------------------  IN: 480 mL / OUT: 1 mL / NET: 479 mL          Appearance: Normal, OOB TC  HEENT:  PERRLA   Lymphatic: No lymphadenopathy   Cardiovascular: Normal S1 S2, no JVD  Respiratory: normal effort , clear  Gastrointestinal:  Soft, Non-tender  Skin: No rashes,  warm to touch  Psychiatry:  Mood & affect appropriate  Musculoskeletal: No edema        22 @ 07:01  -  22 @ 07:00  --------------------------------------------------------  IN: 720 mL / OUT: 0 mL / NET: 720 mL    22 @ 07:01  -  22 @ 14:38  --------------------------------------------------------  IN: 480 mL / OUT: 1 mL / NET: 479 mL                              10.1   4.63  )-----------( 367      ( 03 May 2022 06:22 )             31.7               05-03    137  |  101  |  29<H>  ----------------------------<  106<H>  4.2   |  24  |  1.17    Ca    9.1      03 May 2022 06:20  Phos  3.5     05-02  Mg     2.0     05-02    TPro  8.1  /  Alb  2.8<L>  /  TBili  0.2  /  DBili  x   /  AST  28  /  ALT  17  /  AlkPhos  74  05-02                       Urinalysis Basic - ( 01 May 2022 17:47 )    Color: Light Yellow / Appearance: Slightly Turbid / S.022 / pH: x  Gluc: x / Ketone: Negative  / Bili: Negative / Urobili: Negative   Blood: x / Protein: 100 / Nitrite: Negative   Leuk Esterase: Moderate / RBC: 55 /hpf / WBC 42 /HPF   Sq Epi: x / Non Sq Epi: 3 /hpf / Bacteria: Negative

## 2022-05-03 NOTE — PROGRESS NOTE ADULT - PROBLEM SELECTOR PLAN 3
CTA chest 4/16 with RLL atelectasis. CT chest 4/20 with increase in RLL atelectasis.  -Congested cough at times. Now much improved.  -Continue Duoneb q6h, change to q6h PRN on discharge.   -Chest PT q6h  -Keep sats >90% with O2 PRN (currently RA)  -CT chest 4/27 likely with bibasilar atelectasis

## 2022-05-03 NOTE — PROGRESS NOTE ADULT - SUBJECTIVE AND OBJECTIVE BOX
Follow-up Pulm Progress Note    OOB to chair   No new respiratory events overnight.  Denies SOB/CP.   Sats 96% RA    Medications:  MEDICATIONS  (STANDING):  albuterol/ipratropium for Nebulization 3 milliLiter(s) Nebulizer every 6 hours  amLODIPine   Tablet 10 milliGRAM(s) Oral daily  apixaban 5 milliGRAM(s) Oral two times a day  cefuroxime   Tablet 500 milliGRAM(s) Oral every 12 hours  dextrose 5%. 1000 milliLiter(s) (100 mL/Hr) IV Continuous <Continuous>  dextrose 5%. 1000 milliLiter(s) (50 mL/Hr) IV Continuous <Continuous>  dextrose 50% Injectable 25 Gram(s) IV Push once  dextrose 50% Injectable 12.5 Gram(s) IV Push once  dextrose 50% Injectable 25 Gram(s) IV Push once  glucagon  Injectable 1 milliGRAM(s) IntraMuscular once  insulin glargine Injectable (LANTUS) 10 Unit(s) SubCutaneous at bedtime  insulin lispro (ADMELOG) corrective regimen sliding scale   SubCutaneous three times a day before meals  insulin lispro (ADMELOG) corrective regimen sliding scale   SubCutaneous at bedtime  insulin lispro Injectable (ADMELOG) 4 Unit(s) SubCutaneous three times a day before meals  lacosamide 150 milliGRAM(s) Oral two times a day  lactobacillus acidophilus 1 Tablet(s) Oral daily  levothyroxine 88 MICROGram(s) Oral daily  lisinopril 40 milliGRAM(s) Oral daily  magnesium hydroxide Suspension 30 milliLiter(s) Oral once  pantoprazole    Tablet 40 milliGRAM(s) Oral before breakfast  polyethylene glycol 3350 17 Gram(s) Oral two times a day  QUEtiapine 50 milliGRAM(s) Oral at bedtime  risperiDONE   Tablet 1 milliGRAM(s) Oral <User Schedule>  senna 2 Tablet(s) Oral at bedtime  sodium chloride 0.9%. 1000 milliLiter(s) (50 mL/Hr) IV Continuous <Continuous>  thiamine 100 milliGRAM(s) Oral daily    MEDICATIONS  (PRN):  acetaminophen     Tablet .. 650 milliGRAM(s) Oral every 6 hours PRN Temp greater or equal to 38C (100.4F), Mild Pain (1 - 3)  dextrose Oral Gel 15 Gram(s) Oral once PRN Blood Glucose LESS THAN 70 milliGRAM(s)/deciliter  hydrALAZINE 10 milliGRAM(s) Oral every 4 hours PRN Systolic blood pressure < 160  valproate sodium IVPB 1000 milliGRAM(s) IV Intermittent once PRN Seizure          Vital Signs Last 24 Hrs  T(C): 36.1 (03 May 2022 05:45), Max: 37.6 (02 May 2022 22:00)  T(F): 97 (03 May 2022 05:45), Max: 99.6 (02 May 2022 22:00)  HR: 80 (03 May 2022 05:45) (80 - 93)  BP: 169/75 (03 May 2022 05:45) (130/69 - 169/75)  BP(mean): --  RR: 18 (03 May 2022 05:45) (18 - 20)  SpO2: 96% (03 May 2022 05:45) (92% - 98%)           @ 07:01  -  05-03 @ 07:00  --------------------------------------------------------  IN: 720 mL / OUT: 0 mL / NET: 720 mL          LABS:                        10.1   4.63  )-----------( 367      ( 03 May 2022 06:22 )             31.7     05-03    137  |  101  |  29<H>  ----------------------------<  106<H>  4.2   |  24  |  1.17    Ca    9.1      03 May 2022 06:20  Phos  3.5     05-02  Mg     2.0     05-02    TPro  8.1  /  Alb  2.8<L>  /  TBili  0.2  /  DBili  x   /  AST  28  /  ALT  17  /  AlkPhos  74  05-02          CAPILLARY BLOOD GLUCOSE      POCT Blood Glucose.: 139 mg/dL (03 May 2022 11:11)      Urinalysis Basic - ( 01 May 2022 17:47 )    Color: Light Yellow / Appearance: Slightly Turbid / S.022 / pH: x  Gluc: x / Ketone: Negative  / Bili: Negative / Urobili: Negative   Blood: x / Protein: 100 / Nitrite: Negative   Leuk Esterase: Moderate / RBC: 55 /hpf / WBC 42 /HPF   Sq Epi: x / Non Sq Epi: 3 /hpf / Bacteria: Negative          DAIANA 1:160  @ 10:05  Anti SS-1 0.6  Anti SS-2 <0.2  Anti RNP 0.8  RF --  @ 10:05    Atypical ANCA Negative  @ 10:05  c-ANCA titer --  @ 10:05  c-ANCA Negative  @ 10:05  p-ANCA Negative  @ 10:05  DAIANA --  @ 09:09  Anti SS-1 --  Anti SS-2 --  Anti RNP --  RF <10 - @ 09:09    Atypical ANCA --  @ 09:09  c-ANCA titer --  @ 09:09  c-ANCA --  @ 09:09  p-ANCA --  @ 09:09              CULTURES    Culture - Blood (collected 22 @ 06:06)  Source: .Blood Blood-Venous  Preliminary Report (22 @ 07:01):    No growth to date.    Culture - Blood (collected 22 @ 06:06)  Source: .Blood Blood-Venous  Preliminary Report (22 @ 07:01):    No growth to date.    Culture - Blood (collected 22 @ 12:21)  Source: .Blood Blood-Peripheral  Final Report (22 @ 13:01):    No Growth Final    Culture - Blood (collected 22 @ 12:21)  Source: .Blood Blood-Peripheral  Final Report (22 @ 13:01):    No Growth Final        Culture - Urine (collected 22 @ 04:41)  Source: Clean Catch Clean Catch (Midstream)  Final Report (22 @ 06:54):    No growth    Culture - Urine (collected 22 @ 05:54)  Source: Clean Catch Clean Catch (Midstream)  Final Report (22 @ 10:18):    No growth    Culture - Urine (collected 22 @ 18:23)  Source: Clean Catch Clean Catch (Midstream)  Final Report (22 @ 15:50):    >100,000 CFU/ml Escherichia coli    <10,000 CFU/ml Normal Urogenital dana present  Organism: Escherichia coli (22 @ 15:50)  Organism: Escherichia coli (22 @ 15:50)      -  Amikacin: S <=16      -  Amoxicillin/Clavulanic Acid: S <=8/4      -  Ampicillin: R >16 These ampicillin results predict results for amoxicillin      -  Ampicillin/Sulbactam: I 16/8 Enterobacter, Klebsiella aerogenes, Citrobacter, and Serratia may develop resistance during prolonged therapy (3-4 days)      -  Aztreonam: S <=4      -  Cefazolin: S 4 (MIC_CL_[id number]) For uncomplicated UTI with K. pneumoniae, E. coli, or P. mirablis: GASPER <=16 is sensitive and GASPER >=32 is resistant. This also predicts results for oral agents cefaclor, cefdinir, cefpodoxime, cefprozil, cefuroxime axetil, cephalexin and locarbef for uncomplicated UTI. Note that some isolates may be susceptible to these agents while testing resistant to cefazolin.      -  Cefepime: S 8      -  Cefoxitin: S <=8      -  Ceftriaxone: S <=1 Enterobacter, Klebsiella aerogenes, Citrobacter, and Serratia may develop resistance during prolonged therapy      -  Ciprofloxacin: S <=0.25      -  Ertapenem: S <=0.5      -  Gentamicin: S <=2      -  Imipenem: S <=1      -  Levofloxacin: S <=0.5      -  Meropenem: S <=1      -  Nitrofurantoin: S <=32 Should not be used to treat pyelonephritis      -  Piperacillin/Tazobactam: S <=8      -  Tigecycline: S <=2      -  Tobramycin: S <=2      -  Trimethoprim/Sulfamethoxazole: R >2/38      Method Type: GASPER              Physical Examination:  PULM: decreased BS at bases   CVS: S1, S2 heard    RADIOLOGY REVIEWED    CT chest:   < from: CT Chest No Cont (22 @ 10:43) >    Tubes/Lines: None.    Mediastinum/Vessels/Heart: Aorta and pulmonary arteries are normal in   size. There is trace pericardial effusion. No lymphadenopathy. Diffuse   enlargement of the thyroid gland with mildnarrowing of the tracheal down   to 11 mm. This is without change from previous exam.    Lungs/Pleura/Airways: Bibasilar atelectasis with posterior bowing of the   fissures bilaterally, increased since previous exam.    Visualized abdomen: Unremarkable noncontrast appearance of the upper   abdomen    Bones and soft tissues: No suspicious osseous lesions. Degenerative   changes noted throughout the spine.    IMPRESSION:    Bibasilar atelectasis with posterior bowing of the fissures bilaterally.   This demonstrates slight interval progression since the previous exam,   the specimen the left side.    No supraclavicular lymphadenopathy is seen.    Trace pericardial effusion.    < end of copied text >

## 2022-05-03 NOTE — PROGRESS NOTE ADULT - NS ATTEND AMEND GEN_ALL_CORE FT
Pt care and plan discussed and reviewed with PA. Plan as outlined above edited by me to reflect our discussion.
Pt care and plan discussed and reviewed with PA. Plan as outlined above edited by me to reflect our discussion. Thirty five minutes spent on encounter, of which more than fifty percent of the encounter was spent on counseling and/or coordinating care by the attending physician. OMT on six regions for acute somatic dysfunctions done at the bedside. Advanced care planning/advanced directives discussed with patient/family. DNR status including forceful chest compressions to attempt to restart the heart, ventilator support/artificial breathing, electric shock, artificial nutrition, health care proxy, Molst form all discussed with pt. Pt wishes to consider.
Pt care and plan discussed and reviewed with PA. Plan as outlined above edited by me to reflect our discussion.
Pt care and plan discussed and reviewed with PA. Plan as outlined above edited by me to reflect our discussion.
Pt care and plan discussed and reviewed with PA. Plan as outlined above edited by me to reflect our discussion. Thirty five minutes spent on encounter, of which more than fifty percent of the encounter was spent on counseling and/or coordinating care by the attending physician. OMT on six regions for acute somatic dysfunctions done at the bedside. Advanced care planning/advanced directives discussed with patient/family. DNR status including forceful chest compressions to attempt to restart the heart, ventilator support/artificial breathing, electric shock, artificial nutrition, health care proxy, Molst form all discussed with pt. Pt wishes to consider.
Pt care and plan discussed and reviewed with PA. Plan as outlined above edited by me to reflect our discussion.
Pt care and plan discussed and reviewed with PA. Plan as outlined above edited by me to reflect our discussion.
Pt care and plan discussed and reviewed with PA. Plan as outlined above edited by me to reflect our discussion. Thirty five minutes spent on encounter, of which more than fifty percent of the encounter was spent on counseling and/or coordinating care by the attending physician. OMT on six regions for acute somatic dysfunctions done at the bedside.
no active chest infection at this point  keep sat>90% w o2 as needed  dw daughter and rn
unless cleared by neurosurgery will plan for ivc filter  alice moran and pt
dc planning

## 2022-05-03 NOTE — PROGRESS NOTE ADULT - SUBJECTIVE AND OBJECTIVE BOX
Subjective: Patient seen and examined. No new events except as noted.     REVIEW OF SYSTEMS:    CONSTITUTIONAL: + weakness, fevers or chills  EYES/ENT: No visual changes;  No vertigo or throat pain   NECK: No pain or stiffness  RESPIRATORY: No cough, wheezing, hemoptysis; No shortness of breath  CARDIOVASCULAR: No chest pain or palpitations  GASTROINTESTINAL: No abdominal or epigastric pain. No nausea, vomiting, or hematemesis; No diarrhea or constipation. No melena or hematochezia.  GENITOURINARY: No dysuria, frequency or hematuria  NEUROLOGICAL: No numbness or weakness  SKIN: No itching, burning, rashes, or lesions   All other review of systems is negative unless indicated above.    MEDICATIONS:  MEDICATIONS  (STANDING):  albuterol/ipratropium for Nebulization 3 milliLiter(s) Nebulizer every 6 hours  amLODIPine   Tablet 10 milliGRAM(s) Oral daily  apixaban 5 milliGRAM(s) Oral two times a day  cefuroxime   Tablet 500 milliGRAM(s) Oral every 12 hours  dextrose 5%. 1000 milliLiter(s) (100 mL/Hr) IV Continuous <Continuous>  dextrose 5%. 1000 milliLiter(s) (50 mL/Hr) IV Continuous <Continuous>  dextrose 50% Injectable 25 Gram(s) IV Push once  dextrose 50% Injectable 12.5 Gram(s) IV Push once  dextrose 50% Injectable 25 Gram(s) IV Push once  glucagon  Injectable 1 milliGRAM(s) IntraMuscular once  insulin glargine Injectable (LANTUS) 10 Unit(s) SubCutaneous at bedtime  insulin lispro (ADMELOG) corrective regimen sliding scale   SubCutaneous three times a day before meals  insulin lispro (ADMELOG) corrective regimen sliding scale   SubCutaneous at bedtime  insulin lispro Injectable (ADMELOG) 4 Unit(s) SubCutaneous three times a day before meals  lacosamide 150 milliGRAM(s) Oral two times a day  lactobacillus acidophilus 1 Tablet(s) Oral daily  levothyroxine 88 MICROGram(s) Oral daily  lisinopril 40 milliGRAM(s) Oral daily  magnesium hydroxide Suspension 30 milliLiter(s) Oral once  pantoprazole    Tablet 40 milliGRAM(s) Oral before breakfast  polyethylene glycol 3350 17 Gram(s) Oral two times a day  QUEtiapine 50 milliGRAM(s) Oral at bedtime  risperiDONE   Tablet 1 milliGRAM(s) Oral <User Schedule>  senna 2 Tablet(s) Oral at bedtime  sodium chloride 0.9%. 1000 milliLiter(s) (50 mL/Hr) IV Continuous <Continuous>  thiamine 100 milliGRAM(s) Oral daily    PHYSICAL EXAM:  T(C): 37 (05-03-22 @ 11:43), Max: 37.6 (05-02-22 @ 22:00)  HR: 71 (05-03-22 @ 11:43) (71 - 89)  BP: 109/69 (05-03-22 @ 11:43) (109/69 - 169/75)  RR: 18 (05-03-22 @ 11:43) (18 - 18)  SpO2: 98% (05-03-22 @ 11:43) (92% - 98%)  Wt(kg): --  I&O's Summary    02 May 2022 07:01  -  03 May 2022 07:00  --------------------------------------------------------  IN: 720 mL / OUT: 0 mL / NET: 720 mL    03 May 2022 07:01  -  03 May 2022 15:43  --------------------------------------------------------  IN: 480 mL / OUT: 1 mL / NET: 479 mL    Appearance: NAD  HEENT: dry oral mucosa, PERRL, EOMI	  Lymphatic: No lymphadenopathy , no edema  Cardiovascular: Normal S1 S2, No JVD, No murmurs , Peripheral pulses palpable 2+ bilaterally  Respiratory: Decreased BS, normal effort	  Gastrointestinal:  Soft, Non-tender, + BS	  Skin: No rashes, No ecchymoses, No cyanosis, warm to touch  NEUROLOGICAL EXAM:  MS: Does not know his own age. Speech is fluent, Follows commands.   CN: VFF, EOMI, PERRL,  V1-3 intact, no facial asymmetry, t/p midline,  Motor: Strength: 5/5 in the UE/ LE  Tone: normal. Bulk: normal.    Plantar flex b/l.   Sensation: intact to LT/Temperature 4x.   Coordination: FTN intact   Gait:  Deferred  Ext: No edema    LABS:    CARDIAC MARKERS:                        10.1   4.63  )-----------( 367      ( 03 May 2022 06:22 )             31.7     05-03    137  |  101  |  29<H>  ----------------------------<  106<H>  4.2   |  24  |  1.17    Ca    9.1      03 May 2022 06:20  Phos  3.5     05-02  Mg     2.0     05-02    TPro  8.1  /  Alb  2.8<L>  /  TBili  0.2  /  DBili  x   /  AST  28  /  ALT  17  /  AlkPhos  74  05-02    proBNP:   Lipid Profile:   HgA1c:   TSH:     TELEMETRY: 	    ECG:  	  RADIOLOGY:   DIAGNOSTIC TESTING:  [ ] Echocardiogram:  [ ]  Catheterization:  [ ] Stress Test:    OTHER:

## 2022-05-03 NOTE — PROGRESS NOTE ADULT - ASSESSMENT
72M PMH htn, hypothyroidism, presented to [facility] ED via EMS after family called 911 following a witnessed seizure, by family, reportedly lasting ~5min.

## 2022-05-03 NOTE — PROGRESS NOTE ADULT - ASSESSMENT
72 year old Male with a PMHx of HTN, hypothyroidism, who presented to [facility] via transfer from OSH. Patient originally presented to to [place] ED via EMS after family called 911 following a witnessed seizure, by family, reportedly lasting ~5min.  Enroute to [place], patient was reported to have had another 2 witnessed seizures by EMS, each lasting ~1 minute. Following each seizure the patient was given 5mg IV versed (received 10mg collectively and brought to [place] ED). Pt arrived to the ED obtunded with blood from his mouth (tongue laceration), responsive only to pain/sternal rub. Patient was subsequently intubated for GCS 6 and CT/CTA performed showing a small cerebellar hemorrhage.    Seizures  - Previously intubated, now extubated  - Neuro checks per protocol  - S/P LP- CSF with no growth   - Course of IVIG completed 04/22, s/p pulse dose steroids   - Management as per neurology   - Fall, seizure, aspiration precautions  - S/P MR brain; Small foci of prior hemorrhage versus calcification involving the right cerebellar hemisphere.  - neuro follow up appreciated, s/p repeat CTH per neuro  - S/P Steroid taper and IVIG, per neuro no plan for retux    Cerebellar hemorrhage  - Right sided hemorrhage   - MR brain as above; f/u neuro recs  - F/U repeat CT H s/p heparin gtt; monitor mental status   - Transitioned to Eliquis 5 BID   - Monitor H/H closely and for any signs/symptoms of bleeding     DVT/ PE  - Patient with acute below knee DVT and RUL PE & Recent cerebellar infiltrate  - Awaiting MR brain results for possible AC vs IVC filter --> now on Eliquis   - Vascular cardio eval appreciated   - Repeat DVT study no longer indicated as patient on full dose tx with Eliquis   - Monitor H/H closely and for any signs/symptoms of bleeding     Hypothyroid   - thyroid US w/ Enlarged goiter    - TSH elevated, T4 low  - Was on synthroid 75 at home, increased to 88 on this hospital admission  - Thyroid antibodies elevated ? Paulinahierin's --> F/U endo - per discussion w/ endo cont to monitor TFTS for now   - Repeat TFTs in 3-4 weeks outpatient   - F/U endo    Leukocytosis  - S/P Steroid taper   - Cont to monitor and trend  - If febrile, recommend to check pan cx  - + UTI and Pyuria; 04/27 UCx with E. Coli; S/P Course of ceftriaxone-- Abx per ID   - Now on PO Ceftin, plan to complete course on 05/09 per ID  - Monitor CBC, temp curve, VS and patient closely    Steroid induced hyperglycemia  - Endo eval 04/29 appreciated; f/u recs  - Per Endo, plan to d/c off of Rx, with outpatient follow up     HyperNa  - Improved, cont to monitor     JESSICA  - Continue to monitor and trend on daily labs  - Avoid nephrotoxic agents  - Cr relatively stable ~ 1.17 on AM labs    B/L Supraclavicular swelling  - Outpatient PCP follow up   - Cont to monitor closely     Atelectasis   - Duoneb and Chest PT  - Appreciate pulm recs     HTN  - TTE EF of 75%, normal LV systolic function   - Continue with Amlodipine and Lisinopril- ACEI increased to 40 per cardio, monitor Cr  - Monitor BP, VS and patient closely     Pre-DM  - A1C of 5.9  - Outpatient follow up     Prostate   - Hyperdensity noted on CT Scan   - Check PSA-noted    - Outpatient follow up with urology and repeat testing     Covid exposure  - Monitor patient, VS and O2 saturation closely  - If febrile recommend to check [place] Cx  - Serial Covid tests   - Precautions per protocol   - On Full dose AC currently      PPX  - Eliquis 5 BID  - PPI

## 2022-05-04 ENCOUNTER — INPATIENT (INPATIENT)
Facility: HOSPITAL | Age: 72
LOS: 19 days | Discharge: NURSING, REHAB OR LONG-TERM CARE | DRG: 949 | End: 2022-05-24
Attending: PSYCHIATRY & NEUROLOGY | Admitting: PSYCHIATRY & NEUROLOGY
Payer: COMMERCIAL

## 2022-05-04 VITALS
HEART RATE: 89 BPM | OXYGEN SATURATION: 93 % | SYSTOLIC BLOOD PRESSURE: 146 MMHG | DIASTOLIC BLOOD PRESSURE: 89 MMHG | RESPIRATION RATE: 16 BRPM | HEIGHT: 67 IN | WEIGHT: 167.55 LBS | TEMPERATURE: 98 F

## 2022-05-04 VITALS
OXYGEN SATURATION: 95 % | TEMPERATURE: 98 F | SYSTOLIC BLOOD PRESSURE: 136 MMHG | DIASTOLIC BLOOD PRESSURE: 76 MMHG | HEART RATE: 84 BPM | RESPIRATION RATE: 20 BRPM

## 2022-05-04 DIAGNOSIS — I26.99 OTHER PULMONARY EMBOLISM WITHOUT ACUTE COR PULMONALE: ICD-10-CM

## 2022-05-04 DIAGNOSIS — I63.9 CEREBRAL INFARCTION, UNSPECIFIED: ICD-10-CM

## 2022-05-04 LAB
ANION GAP SERPL CALC-SCNC: 10 MMOL/L — SIGNIFICANT CHANGE UP (ref 5–17)
BUN SERPL-MCNC: 34 MG/DL — HIGH (ref 7–23)
CALCIUM SERPL-MCNC: 8.9 MG/DL — SIGNIFICANT CHANGE UP (ref 8.4–10.5)
CHLORIDE SERPL-SCNC: 102 MMOL/L — SIGNIFICANT CHANGE UP (ref 96–108)
CO2 SERPL-SCNC: 24 MMOL/L — SIGNIFICANT CHANGE UP (ref 22–31)
CREAT SERPL-MCNC: 1.38 MG/DL — HIGH (ref 0.5–1.3)
EGFR: 54 ML/MIN/1.73M2 — LOW
GLUCOSE BLDC GLUCOMTR-MCNC: 131 MG/DL — HIGH (ref 70–99)
GLUCOSE BLDC GLUCOMTR-MCNC: 136 MG/DL — HIGH (ref 70–99)
GLUCOSE BLDC GLUCOMTR-MCNC: 96 MG/DL — SIGNIFICANT CHANGE UP (ref 70–99)
GLUCOSE SERPL-MCNC: 118 MG/DL — HIGH (ref 70–99)
HCT VFR BLD CALC: 30.8 % — LOW (ref 39–50)
HGB BLD-MCNC: 9.9 G/DL — LOW (ref 13–17)
MCHC RBC-ENTMCNC: 29.6 PG — SIGNIFICANT CHANGE UP (ref 27–34)
MCHC RBC-ENTMCNC: 32.1 GM/DL — SIGNIFICANT CHANGE UP (ref 32–36)
MCV RBC AUTO: 91.9 FL — SIGNIFICANT CHANGE UP (ref 80–100)
NRBC # BLD: 0 /100 WBCS — SIGNIFICANT CHANGE UP (ref 0–0)
PLATELET # BLD AUTO: 373 K/UL — SIGNIFICANT CHANGE UP (ref 150–400)
POTASSIUM SERPL-MCNC: 4.3 MMOL/L — SIGNIFICANT CHANGE UP (ref 3.5–5.3)
POTASSIUM SERPL-SCNC: 4.3 MMOL/L — SIGNIFICANT CHANGE UP (ref 3.5–5.3)
RBC # BLD: 3.35 M/UL — LOW (ref 4.2–5.8)
RBC # FLD: 14.3 % — SIGNIFICANT CHANGE UP (ref 10.3–14.5)
SARS-COV-2 RNA SPEC QL NAA+PROBE: SIGNIFICANT CHANGE UP
SODIUM SERPL-SCNC: 136 MMOL/L — SIGNIFICANT CHANGE UP (ref 135–145)
WBC # BLD: 5.4 K/UL — SIGNIFICANT CHANGE UP (ref 3.8–10.5)
WBC # FLD AUTO: 5.4 K/UL — SIGNIFICANT CHANGE UP (ref 3.8–10.5)

## 2022-05-04 PROCEDURE — 86704 HEP B CORE ANTIBODY TOTAL: CPT

## 2022-05-04 PROCEDURE — 76536 US EXAM OF HEAD AND NECK: CPT

## 2022-05-04 PROCEDURE — 86431 RHEUMATOID FACTOR QUANT: CPT

## 2022-05-04 PROCEDURE — 97129 THER IVNTJ 1ST 15 MIN: CPT

## 2022-05-04 PROCEDURE — 86481 TB AG RESPONSE T-CELL SUSP: CPT

## 2022-05-04 PROCEDURE — 71045 X-RAY EXAM CHEST 1 VIEW: CPT

## 2022-05-04 PROCEDURE — 93970 EXTREMITY STUDY: CPT

## 2022-05-04 PROCEDURE — 86341 ISLET CELL ANTIBODY: CPT

## 2022-05-04 PROCEDURE — 84157 ASSAY OF PROTEIN OTHER: CPT

## 2022-05-04 PROCEDURE — 80053 COMPREHEN METABOLIC PANEL: CPT

## 2022-05-04 PROCEDURE — 89051 BODY FLUID CELL COUNT: CPT

## 2022-05-04 PROCEDURE — 86803 HEPATITIS C AB TEST: CPT

## 2022-05-04 PROCEDURE — 82962 GLUCOSE BLOOD TEST: CPT

## 2022-05-04 PROCEDURE — 86850 RBC ANTIBODY SCREEN: CPT

## 2022-05-04 PROCEDURE — C9254: CPT

## 2022-05-04 PROCEDURE — 0225U NFCT DS DNA&RNA 21 SARSCOV2: CPT

## 2022-05-04 PROCEDURE — 93306 TTE W/DOPPLER COMPLETE: CPT

## 2022-05-04 PROCEDURE — A9585: CPT

## 2022-05-04 PROCEDURE — 84100 ASSAY OF PHOSPHORUS: CPT

## 2022-05-04 PROCEDURE — 86160 COMPLEMENT ANTIGEN: CPT

## 2022-05-04 PROCEDURE — 86036 ANCA SCREEN EACH ANTIBODY: CPT

## 2022-05-04 PROCEDURE — 82085 ASSAY OF ALDOLASE: CPT

## 2022-05-04 PROCEDURE — 83036 HEMOGLOBIN GLYCOSYLATED A1C: CPT

## 2022-05-04 PROCEDURE — 94640 AIRWAY INHALATION TREATMENT: CPT

## 2022-05-04 PROCEDURE — 83735 ASSAY OF MAGNESIUM: CPT

## 2022-05-04 PROCEDURE — 87529 HSV DNA AMP PROBE: CPT

## 2022-05-04 PROCEDURE — 95700 EEG CONT REC W/VID EEG TECH: CPT

## 2022-05-04 PROCEDURE — 74177 CT ABD & PELVIS W/CONTRAST: CPT

## 2022-05-04 PROCEDURE — 96375 TX/PRO/DX INJ NEW DRUG ADDON: CPT

## 2022-05-04 PROCEDURE — 71275 CT ANGIOGRAPHY CHEST: CPT

## 2022-05-04 PROCEDURE — 85025 COMPLETE CBC W/AUTO DIFF WBC: CPT

## 2022-05-04 PROCEDURE — 87799 DETECT AGENT NOS DNA QUANT: CPT

## 2022-05-04 PROCEDURE — 97110 THERAPEUTIC EXERCISES: CPT

## 2022-05-04 PROCEDURE — 86147 CARDIOLIPIN ANTIBODY EA IG: CPT

## 2022-05-04 PROCEDURE — 83873 ASSAY OF CSF PROTEIN: CPT

## 2022-05-04 PROCEDURE — 86225 DNA ANTIBODY NATIVE: CPT

## 2022-05-04 PROCEDURE — 80074 ACUTE HEPATITIS PANEL: CPT

## 2022-05-04 PROCEDURE — 80164 ASSAY DIPROPYLACETIC ACD TOT: CPT

## 2022-05-04 PROCEDURE — 95712 VEEG 2-12 HR INTMT MNTR: CPT

## 2022-05-04 PROCEDURE — 92610 EVALUATE SWALLOWING FUNCTION: CPT

## 2022-05-04 PROCEDURE — 86255 FLUORESCENT ANTIBODY SCREEN: CPT

## 2022-05-04 PROCEDURE — 71250 CT THORAX DX C-: CPT

## 2022-05-04 PROCEDURE — 95715 VEEG EA 12-26HR INTMT MNTR: CPT

## 2022-05-04 PROCEDURE — 83880 ASSAY OF NATRIURETIC PEPTIDE: CPT

## 2022-05-04 PROCEDURE — U0005: CPT

## 2022-05-04 PROCEDURE — 86706 HEP B SURFACE ANTIBODY: CPT

## 2022-05-04 PROCEDURE — 83615 LACTATE (LD) (LDH) ENZYME: CPT

## 2022-05-04 PROCEDURE — 86901 BLOOD TYPING SEROLOGIC RH(D): CPT

## 2022-05-04 PROCEDURE — 93005 ELECTROCARDIOGRAM TRACING: CPT

## 2022-05-04 PROCEDURE — 83874 ASSAY OF MYOGLOBIN: CPT

## 2022-05-04 PROCEDURE — 83520 IMMUNOASSAY QUANT NOS NONAB: CPT

## 2022-05-04 PROCEDURE — 80307 DRUG TEST PRSMV CHEM ANLYZR: CPT

## 2022-05-04 PROCEDURE — 87086 URINE CULTURE/COLONY COUNT: CPT

## 2022-05-04 PROCEDURE — 99223 1ST HOSP IP/OBS HIGH 75: CPT

## 2022-05-04 PROCEDURE — 81001 URINALYSIS AUTO W/SCOPE: CPT

## 2022-05-04 PROCEDURE — 70450 CT HEAD/BRAIN W/O DYE: CPT

## 2022-05-04 PROCEDURE — 85730 THROMBOPLASTIN TIME PARTIAL: CPT

## 2022-05-04 PROCEDURE — 97166 OT EVAL MOD COMPLEX 45 MIN: CPT

## 2022-05-04 PROCEDURE — 87476 LYME DIS DNA AMP PROBE: CPT

## 2022-05-04 PROCEDURE — 86376 MICROSOMAL ANTIBODY EACH: CPT

## 2022-05-04 PROCEDURE — U0003: CPT

## 2022-05-04 PROCEDURE — 71260 CT THORAX DX C+: CPT

## 2022-05-04 PROCEDURE — 84153 ASSAY OF PSA TOTAL: CPT

## 2022-05-04 PROCEDURE — 84154 ASSAY OF PSA FREE: CPT

## 2022-05-04 PROCEDURE — 80048 BASIC METABOLIC PNL TOTAL CA: CPT

## 2022-05-04 PROCEDURE — 62328 DX LMBR SPI PNXR W/FLUOR/CT: CPT

## 2022-05-04 PROCEDURE — 82435 ASSAY OF BLOOD CHLORIDE: CPT

## 2022-05-04 PROCEDURE — 84132 ASSAY OF SERUM POTASSIUM: CPT

## 2022-05-04 PROCEDURE — 70553 MRI BRAIN STEM W/O & W/DYE: CPT

## 2022-05-04 PROCEDURE — 97116 GAIT TRAINING THERAPY: CPT

## 2022-05-04 PROCEDURE — 85027 COMPLETE CBC AUTOMATED: CPT

## 2022-05-04 PROCEDURE — 86592 SYPHILIS TEST NON-TREP QUAL: CPT

## 2022-05-04 PROCEDURE — 86235 NUCLEAR ANTIGEN ANTIBODY: CPT

## 2022-05-04 PROCEDURE — 82330 ASSAY OF CALCIUM: CPT

## 2022-05-04 PROCEDURE — 87205 SMEAR GRAM STAIN: CPT

## 2022-05-04 PROCEDURE — 99285 EMERGENCY DEPT VISIT HI MDM: CPT

## 2022-05-04 PROCEDURE — 82947 ASSAY GLUCOSE BLOOD QUANT: CPT

## 2022-05-04 PROCEDURE — 84145 PROCALCITONIN (PCT): CPT

## 2022-05-04 PROCEDURE — 85018 HEMOGLOBIN: CPT

## 2022-05-04 PROCEDURE — 96374 THER/PROPH/DIAG INJ IV PUSH: CPT | Mod: XU

## 2022-05-04 PROCEDURE — 82945 GLUCOSE OTHER FLUID: CPT

## 2022-05-04 PROCEDURE — 84484 ASSAY OF TROPONIN QUANT: CPT

## 2022-05-04 PROCEDURE — 95714 VEEG EA 12-26 HR UNMNTR: CPT

## 2022-05-04 PROCEDURE — 82164 ANGIOTENSIN I ENZYME TEST: CPT

## 2022-05-04 PROCEDURE — 82565 ASSAY OF CREATININE: CPT

## 2022-05-04 PROCEDURE — 84166 PROTEIN E-PHORESIS/URINE/CSF: CPT

## 2022-05-04 PROCEDURE — 84295 ASSAY OF SERUM SODIUM: CPT

## 2022-05-04 PROCEDURE — 85014 HEMATOCRIT: CPT

## 2022-05-04 PROCEDURE — 82550 ASSAY OF CK (CPK): CPT

## 2022-05-04 PROCEDURE — 87040 BLOOD CULTURE FOR BACTERIA: CPT

## 2022-05-04 PROCEDURE — 82803 BLOOD GASES ANY COMBINATION: CPT

## 2022-05-04 PROCEDURE — 83516 IMMUNOASSAY NONANTIBODY: CPT

## 2022-05-04 PROCEDURE — 86788 WEST NILE VIRUS AB IGM: CPT

## 2022-05-04 PROCEDURE — 80061 LIPID PANEL: CPT

## 2022-05-04 PROCEDURE — 87102 FUNGUS ISOLATION CULTURE: CPT

## 2022-05-04 PROCEDURE — 84439 ASSAY OF FREE THYROXINE: CPT

## 2022-05-04 PROCEDURE — 94002 VENT MGMT INPAT INIT DAY: CPT

## 2022-05-04 PROCEDURE — G0103: CPT

## 2022-05-04 PROCEDURE — 87186 SC STD MICRODIL/AGAR DIL: CPT

## 2022-05-04 PROCEDURE — 36415 COLL VENOUS BLD VENIPUNCTURE: CPT

## 2022-05-04 PROCEDURE — 87483 CNS DNA AMP PROBE TYPE 12-25: CPT

## 2022-05-04 PROCEDURE — 95711 VEEG 2-12 HR UNMONITORED: CPT

## 2022-05-04 PROCEDURE — 86769 SARS-COV-2 COVID-19 ANTIBODY: CPT

## 2022-05-04 PROCEDURE — 84480 ASSAY TRIIODOTHYRONINE (T3): CPT

## 2022-05-04 PROCEDURE — 86038 ANTINUCLEAR ANTIBODIES: CPT

## 2022-05-04 PROCEDURE — 97530 THERAPEUTIC ACTIVITIES: CPT

## 2022-05-04 PROCEDURE — 86900 BLOOD TYPING SEROLOGIC ABO: CPT

## 2022-05-04 PROCEDURE — 84443 ASSAY THYROID STIM HORMONE: CPT

## 2022-05-04 PROCEDURE — 86789 WEST NILE VIRUS ANTIBODY: CPT

## 2022-05-04 PROCEDURE — 85610 PROTHROMBIN TIME: CPT

## 2022-05-04 PROCEDURE — 83605 ASSAY OF LACTIC ACID: CPT

## 2022-05-04 PROCEDURE — 86403 PARTICLE AGGLUT ANTBDY SCRN: CPT

## 2022-05-04 PROCEDURE — 84436 ASSAY OF TOTAL THYROXINE: CPT

## 2022-05-04 PROCEDURE — 97162 PT EVAL MOD COMPLEX 30 MIN: CPT

## 2022-05-04 PROCEDURE — 86146 BETA-2 GLYCOPROTEIN ANTIBODY: CPT

## 2022-05-04 PROCEDURE — 87070 CULTURE OTHR SPECIMN AEROBIC: CPT

## 2022-05-04 RX ORDER — RISPERIDONE 4 MG/1
1 TABLET ORAL
Refills: 0 | Status: DISCONTINUED | OUTPATIENT
Start: 2022-05-05 | End: 2022-05-10

## 2022-05-04 RX ORDER — RISPERIDONE 4 MG/1
1 TABLET ORAL
Qty: 0 | Refills: 0 | DISCHARGE
Start: 2022-05-04

## 2022-05-04 RX ORDER — LACTOBACILLUS ACIDOPHILUS 100MM CELL
1 CAPSULE ORAL DAILY
Refills: 0 | Status: DISCONTINUED | OUTPATIENT
Start: 2022-05-05 | End: 2022-05-24

## 2022-05-04 RX ORDER — SODIUM CHLORIDE 9 MG/ML
1000 INJECTION, SOLUTION INTRAVENOUS
Refills: 0 | Status: DISCONTINUED | OUTPATIENT
Start: 2022-05-04 | End: 2022-05-24

## 2022-05-04 RX ORDER — POLYETHYLENE GLYCOL 3350 17 G/17G
17 POWDER, FOR SOLUTION ORAL DAILY
Refills: 0 | Status: DISCONTINUED | OUTPATIENT
Start: 2022-05-04 | End: 2022-05-04

## 2022-05-04 RX ORDER — INSULIN GLARGINE 100 [IU]/ML
10 INJECTION, SOLUTION SUBCUTANEOUS AT BEDTIME
Refills: 0 | Status: DISCONTINUED | OUTPATIENT
Start: 2022-05-04 | End: 2022-05-04

## 2022-05-04 RX ORDER — PANTOPRAZOLE SODIUM 20 MG/1
40 TABLET, DELAYED RELEASE ORAL
Refills: 0 | Status: DISCONTINUED | OUTPATIENT
Start: 2022-05-05 | End: 2022-05-24

## 2022-05-04 RX ORDER — QUETIAPINE FUMARATE 200 MG/1
50 TABLET, FILM COATED ORAL AT BEDTIME
Refills: 0 | Status: DISCONTINUED | OUTPATIENT
Start: 2022-05-04 | End: 2022-05-09

## 2022-05-04 RX ORDER — INSULIN LISPRO 100/ML
4 VIAL (ML) SUBCUTANEOUS
Qty: 0 | Refills: 0 | DISCHARGE
Start: 2022-05-04

## 2022-05-04 RX ORDER — ACETAMINOPHEN 500 MG
2 TABLET ORAL
Qty: 0 | Refills: 0 | DISCHARGE
Start: 2022-05-04

## 2022-05-04 RX ORDER — LEVOTHYROXINE SODIUM 125 MCG
88 TABLET ORAL DAILY
Refills: 0 | Status: DISCONTINUED | OUTPATIENT
Start: 2022-05-05 | End: 2022-05-24

## 2022-05-04 RX ORDER — INSULIN LISPRO 100/ML
4 VIAL (ML) SUBCUTANEOUS
Refills: 0 | Status: DISCONTINUED | OUTPATIENT
Start: 2022-05-04 | End: 2022-05-04

## 2022-05-04 RX ORDER — AMLODIPINE BESYLATE 2.5 MG/1
10 TABLET ORAL DAILY
Refills: 0 | Status: DISCONTINUED | OUTPATIENT
Start: 2022-05-05 | End: 2022-05-24

## 2022-05-04 RX ORDER — THIAMINE MONONITRATE (VIT B1) 100 MG
100 TABLET ORAL DAILY
Refills: 0 | Status: DISCONTINUED | OUTPATIENT
Start: 2022-05-05 | End: 2022-05-24

## 2022-05-04 RX ORDER — LISINOPRIL 2.5 MG/1
40 TABLET ORAL DAILY
Refills: 0 | Status: DISCONTINUED | OUTPATIENT
Start: 2022-05-05 | End: 2022-05-10

## 2022-05-04 RX ORDER — POLYETHYLENE GLYCOL 3350 17 G/17G
17 POWDER, FOR SOLUTION ORAL
Refills: 0 | Status: DISCONTINUED | OUTPATIENT
Start: 2022-05-04 | End: 2022-05-24

## 2022-05-04 RX ORDER — CEFUROXIME AXETIL 250 MG
1 TABLET ORAL
Qty: 0 | Refills: 0 | DISCHARGE
Start: 2022-05-04

## 2022-05-04 RX ORDER — HYDRALAZINE HCL 50 MG
10 TABLET ORAL EVERY 4 HOURS
Refills: 0 | Status: DISCONTINUED | OUTPATIENT
Start: 2022-05-04 | End: 2022-05-04

## 2022-05-04 RX ORDER — APIXABAN 2.5 MG/1
5 TABLET, FILM COATED ORAL
Refills: 0 | Status: DISCONTINUED | OUTPATIENT
Start: 2022-05-04 | End: 2022-05-24

## 2022-05-04 RX ORDER — SODIUM CHLORIDE 9 MG/ML
1000 INJECTION INTRAMUSCULAR; INTRAVENOUS; SUBCUTANEOUS
Refills: 0 | Status: DISCONTINUED | OUTPATIENT
Start: 2022-05-04 | End: 2022-05-04

## 2022-05-04 RX ORDER — INSULIN GLARGINE 100 [IU]/ML
10 INJECTION, SOLUTION SUBCUTANEOUS
Qty: 0 | Refills: 0 | DISCHARGE
Start: 2022-05-04

## 2022-05-04 RX ORDER — SENNA PLUS 8.6 MG/1
2 TABLET ORAL AT BEDTIME
Refills: 0 | Status: DISCONTINUED | OUTPATIENT
Start: 2022-05-04 | End: 2022-05-24

## 2022-05-04 RX ORDER — DEXTROSE 50 % IN WATER 50 %
15 SYRINGE (ML) INTRAVENOUS ONCE
Refills: 0 | Status: DISCONTINUED | OUTPATIENT
Start: 2022-05-04 | End: 2022-05-04

## 2022-05-04 RX ORDER — INSULIN LISPRO 100/ML
VIAL (ML) SUBCUTANEOUS
Refills: 0 | Status: DISCONTINUED | OUTPATIENT
Start: 2022-05-04 | End: 2022-05-04

## 2022-05-04 RX ORDER — LACOSAMIDE 50 MG/1
150 TABLET ORAL
Refills: 0 | Status: DISCONTINUED | OUTPATIENT
Start: 2022-05-04 | End: 2022-05-13

## 2022-05-04 RX ORDER — GLUCAGON INJECTION, SOLUTION 0.5 MG/.1ML
1 INJECTION, SOLUTION SUBCUTANEOUS ONCE
Refills: 0 | Status: DISCONTINUED | OUTPATIENT
Start: 2022-05-04 | End: 2022-05-24

## 2022-05-04 RX ORDER — DEXTROSE 50 % IN WATER 50 %
25 SYRINGE (ML) INTRAVENOUS ONCE
Refills: 0 | Status: DISCONTINUED | OUTPATIENT
Start: 2022-05-04 | End: 2022-05-04

## 2022-05-04 RX ORDER — THIAMINE MONONITRATE (VIT B1) 100 MG
1 TABLET ORAL
Qty: 0 | Refills: 0 | DISCHARGE
Start: 2022-05-04

## 2022-05-04 RX ORDER — DEXTROSE 50 % IN WATER 50 %
12.5 SYRINGE (ML) INTRAVENOUS ONCE
Refills: 0 | Status: DISCONTINUED | OUTPATIENT
Start: 2022-05-04 | End: 2022-05-04

## 2022-05-04 RX ORDER — CEFUROXIME AXETIL 250 MG
500 TABLET ORAL EVERY 12 HOURS
Refills: 0 | Status: COMPLETED | OUTPATIENT
Start: 2022-05-04 | End: 2022-05-09

## 2022-05-04 RX ADMIN — Medication 1 TABLET(S): at 12:22

## 2022-05-04 RX ADMIN — AMLODIPINE BESYLATE 10 MILLIGRAM(S): 2.5 TABLET ORAL at 06:23

## 2022-05-04 RX ADMIN — Medication 100 MILLIGRAM(S): at 12:28

## 2022-05-04 RX ADMIN — Medication 500 MILLIGRAM(S): at 18:02

## 2022-05-04 RX ADMIN — RISPERIDONE 1 MILLIGRAM(S): 4 TABLET ORAL at 08:12

## 2022-05-04 RX ADMIN — LISINOPRIL 40 MILLIGRAM(S): 2.5 TABLET ORAL at 06:23

## 2022-05-04 RX ADMIN — Medication 4 UNIT(S): at 12:23

## 2022-05-04 RX ADMIN — Medication 3 MILLILITER(S): at 12:24

## 2022-05-04 RX ADMIN — APIXABAN 5 MILLIGRAM(S): 2.5 TABLET, FILM COATED ORAL at 18:02

## 2022-05-04 RX ADMIN — POLYETHYLENE GLYCOL 3350 17 GRAM(S): 17 POWDER, FOR SOLUTION ORAL at 06:22

## 2022-05-04 RX ADMIN — Medication 3 MILLILITER(S): at 00:57

## 2022-05-04 RX ADMIN — LACOSAMIDE 150 MILLIGRAM(S): 50 TABLET ORAL at 06:25

## 2022-05-04 RX ADMIN — APIXABAN 5 MILLIGRAM(S): 2.5 TABLET, FILM COATED ORAL at 06:23

## 2022-05-04 RX ADMIN — Medication 3 MILLILITER(S): at 06:22

## 2022-05-04 RX ADMIN — LACOSAMIDE 150 MILLIGRAM(S): 50 TABLET ORAL at 18:03

## 2022-05-04 RX ADMIN — Medication 500 MILLIGRAM(S): at 06:23

## 2022-05-04 RX ADMIN — Medication 4 UNIT(S): at 08:12

## 2022-05-04 RX ADMIN — QUETIAPINE FUMARATE 50 MILLIGRAM(S): 200 TABLET, FILM COATED ORAL at 21:36

## 2022-05-04 RX ADMIN — SENNA PLUS 2 TABLET(S): 8.6 TABLET ORAL at 21:35

## 2022-05-04 RX ADMIN — PANTOPRAZOLE SODIUM 40 MILLIGRAM(S): 20 TABLET, DELAYED RELEASE ORAL at 06:23

## 2022-05-04 RX ADMIN — Medication 88 MICROGRAM(S): at 06:22

## 2022-05-04 NOTE — H&P ADULT - ASSESSMENT
ASSESSMENT/PLAN  This is a 71 y/o male with PMH of  HTN, hypothyroidism who presented to  [facility] on 4/12  with seizures, found to have cerebellar hemorrhage. Intubated for airway protection at OSH, extubated 4/13. Called to consult for increased chest congestion. CTA chest with atelectasis and small RUL segmental branch PE, LE duplex with L soleal vein DVT. MR head with small foci of hemorrhage. Pt started on heparin gtt per neuro and vascular cardiology, now changed to Eliquis. Hospital course significant for UTI (e-coli) now s/p ABT per ID. He was treated with IVIG and steroids for seizures.   Patient now with gait Instability, ADL impairments and Functional impairments.    # CVA  - MR head with small foci of hemorrhage  - Start Comprehensive Rehab Program: PT/OT/ST, 3hours daily and 5 days weekly  - PT: Focused on improving strength, endurance, coordination, balance, functional mobility, and transfers  - OT: Focused on improving strength, fine motor skills, coordination, posture and ADLs.    - ST: to diagnose and treat deficits in swallowing, cognition and communication.     #Seizure  - Vimpat 150mg BID    #HTN  - Amlodipine 10mg daily  - Lisinopril 40mg daily  - Hydralazine 10mg q 4hrs prn    #Hypothyroidism  - Synthroid 88mcg daily    #DM II  - ISS and FS  - Admelog and Lantus  - A1c 5.9 on 4/13    #DVT  - L soleal vein  - Eliquis 5mg BID    #UTI  - Ceftin 500mg  BID x 7 days    #Pain management  - Tylenol PRN    #DVT ppx  - On Eliquis 5mg BID  - SCD, TEDs    #GI ppx  - Protonix 40mg    #Bowel Regimen  - Senna, miralax PRN    #Bladder management  - BS on admission, and q 8 hours (SC if > 400)  - Monitor UO    #FEN   - Diet: Consistent Carbohydrate Diabetic    #Skin:  - No active issues at this time  - Skin on admission: ***  - Pressure injury/Skin: Turn Q2hrs while in bed, OOB to Chair, PT/OT     #Dysphagia    - SLP: evaluation and treatment    #Mood/Cognition:  - Seroquel 50mg nightly  - Neuropsychology consult PRN    #Sleep:   - Maintain quiet hours and low stim environment.  - Monitor sleep logs    #Precaution  - Fall, Aspiration, cardiac, Seizure     Outpatient Follow-up (Specialty/Name of physician):  Juan Bernabe (DO)  Medicine  [address] [facility], [address]  [place] [address]  Phone: [phone]  Fax: [phone]  Follow Up Time:     Efren Fragoso (DO)  Cardiology; Internal Medicine  [address] [facility], [address]  [place] [address]  Phone: [phone]  Fax: [phone]    MEDICAL PROGNOSIS: GOOD            REHAB POTENTIAL: GOOD             ESTIMATED DISPOSITION: HOME WITH HOME CARE            ELOS: 10-14 Days   EXPECTED THERAPY:     P.T. 1hr/day       O.T. 1hr/day      S.L.P. 1hr/day     P&O Unnecessary     EXP FREQUENCY: 5 days per 7 day period     PRESCREEN COMPARISON:   I have reviewed the prescreen information and I have found no relevant changes between the preadmission screening and my post admission evaluation     RATIONALE FOR INPATIENT ADMISSION - Patient demonstrates the following: (check all that apply)  [X] Medically appropriate for rehabilitation admission  [X] Has attainable rehab goals with an appropriate initial discharge plan  [X] Has rehabilitation potential (expected to make a significant improvement within a reasonable period of time)   [X] Requires close medical management by a rehab physician, rehab nursing care, Hospitalist and comprehensive interdisciplinary team (including PT, OT, & or SLP, Prosthetics and Orthotics)   ASSESSMENT/PLAN  This is a 73 y/o male with PMH of  HTN, hypothyroidism who presented to  [facility] on 4/12  with seizures, found to have cerebellar hemorrhage. Intubated for airway protection at OSH, extubated 4/13. Called to consult for increased chest congestion. CTA chest with atelectasis and small RUL segmental branch PE, LE duplex with L soleal vein DVT. MR head with small foci of hemorrhage. Pt started on heparin gtt per neuro and vascular cardiology, now changed to Eliquis. Hospital course significant for UTI (e-coli) now s/p ABT per ID. He was treated with IVIG and steroids for seizures.   Patient now with gait Instability, ADL impairments and Functional impairments.    # CVA  - MR head with small foci of hemorrhage  - Start Comprehensive Rehab Program: PT/OT/ST, 3hours daily and 5 days weekly  - PT: Focused on improving strength, endurance, coordination, balance, functional mobility, and transfers  - OT: Focused on improving strength, fine motor skills, coordination, posture and ADLs.    - ST: to diagnose and treat deficits in swallowing, cognition and communication.     #Seizure  - Vimpat 150mg BID    #HTN  - Amlodipine 10mg daily  - Lisinopril 40mg daily  - Hydralazine 10mg q 4hrs prn    #Hypothyroidism  - Synthroid 88mcg daily    #DM II  - ISS and FS  - Admelog and Lantus  - A1c 5.9 on 4/13    #DVT/PE  - L soleal vein + RUL segmental branch PE  - Eliquis 5mg BID    #UTI  - Ceftin 500mg  BID x 7 days (last dose 5/9)    #Pain management  - Tylenol PRN    #GI ppx  - Protonix 40mg    #Bowel Regimen  - Senna, Miralax PRN    #Bladder management  - BS on admission, and q 8 hours (SC if > 400)  - Monitor UO    #FEN   - Diet: Consistent Carbohydrate Diabetic    #Skin:  - No active issues at this time  - Pressure injury/Skin: Turn Q2hrs while in bed, OOB to Chair, PT/OT     #Dysphagia    - SLP: evaluation and treatment    #Mood/Cognition:  - Seroquel 50mg nightly  - Neuropsychology consult PRN    #Sleep:   - Maintain quiet hours and low stim environment.  - Monitor sleep logs    #Precaution  - Fall, Aspiration, cardiac, Seizure     Outpatient Follow-up (Specialty/Name of physician):  Juan Bernabe (DO)  Medicine  [address] [facility], [address]  [place] [address]  Phone: [phone]  Fax: [phone]  Follow Up Time:     Efren Fragoso (DO)  Cardiology; Internal Medicine  [address] [facility], [address]  [place] [address]  Phone: [phone]  Fax: [phone]    MEDICAL PROGNOSIS: GOOD            REHAB POTENTIAL: GOOD             ESTIMATED DISPOSITION: HOME WITH HOME CARE            ELOS: 10-14 Days   EXPECTED THERAPY:     P.T. 1hr/day       O.T. 1hr/day      S.L.P. 1hr/day     P&O Unnecessary     EXP FREQUENCY: 5 days per 7 day period     PRESCREEN COMPARISON:   I have reviewed the prescreen information and I have found no relevant changes between the preadmission screening and my post admission evaluation     RATIONALE FOR INPATIENT ADMISSION - Patient demonstrates the following: (check all that apply)  [X] Medically appropriate for rehabilitation admission  [X] Has attainable rehab goals with an appropriate initial discharge plan  [X] Has rehabilitation potential (expected to make a significant improvement within a reasonable period of time)   [X] Requires close medical management by a rehab physician, rehab nursing care, Hospitalist and comprehensive interdisciplinary team (including PT, OT, & or SLP, Prosthetics and Orthotics)   ASSESSMENT/PLAN  This is a 71 y/o male with PMH of  HTN, hypothyroidism who presented to  [facility] on 4/12  with seizures, found to have cerebellar hemorrhage. Intubated for airway protection at OSH, extubated 4/13. Called to consult for increased chest congestion. CTA chest with atelectasis and small RUL segmental branch PE, LE duplex with L soleal vein DVT. MR head with small foci of hemorrhage. Pt started on heparin gtt per neuro and vascular cardiology, now changed to Eliquis. Hospital course significant for UTI (e-coli) now s/p ABT per ID. He was treated with IVIG and steroids for seizures.   Patient now with gait Instability, ADL impairments and Functional impairments.    # CVA  - MR head with small foci of hemorrhage  - Start Comprehensive Rehab Program: PT/OT/ST, 3hours daily and 5 days weekly  - PT: Focused on improving strength, endurance, coordination, balance, functional mobility, and transfers  - OT: Focused on improving strength, fine motor skills, coordination, posture and ADLs.    - ST: to diagnose and treat deficits in swallowing, cognition and communication.     #Seizure  - Vimpat 150mg BID    #HTN  - Amlodipine 10mg daily  - Lisinopril 40mg daily    #Hypothyroidism  - Synthroid 88mcg daily    #Steroid induced hyperglycemia  - Was on Lantus 10 and premeal Admelog 4 - DC  - FS on admission 131 - trend since completion of steroid <140  - A1c 5.9 on 4/13  - Monitor via labs    #DVT/PE  - L soleal vein + RUL segmental branch PE  - Eliquis 5mg BID    #UTI  - Ceftin 500mg  BID x 7 days (last dose 5/9)    #Pain management  - Tylenol PRN    #GI ppx  - Protonix 40mg    #Bowel Regimen  - Senna, Miralax BID    #Bladder management  - BS on admission, and q 8 hours (SC if > 400)  - Monitor UO    #FEN   - Diet: Regular [Consistent carb - no snacks]  - SLP: evaluation and treatment    #Skin:  - No active issues at this time  - Pressure injury/Skin: Turn Q2hrs while in bed, OOB to Chair, PT/OT     #Mood/Cognition:  - Seroquel 50mg nightly  - Neuropsychology decline  - Recreation therapy    #Sleep:   - Maintain quiet hours and low stim environment.  - Monitor sleep logs    #Precaution  - Fall, Aspiration, Seizure     Outpatient Follow-up (Specialty/Name of physician):  Juan Bernabe (DO)  Medicine  [address] [facility], [address]  [place] [address]  Phone: [phone]  Fax: [phone]  Follow Up Time:     Efren Fragoso (DO)  Cardiology; Internal Medicine  [address] [facility], [address]  [place] [address]  Phone: [phone]  Fax: [phone]    MEDICAL PROGNOSIS: GOOD            REHAB POTENTIAL: GOOD             ESTIMATED DISPOSITION: HOME WITH HOME CARE            ELOS: 10-14 Days   EXPECTED THERAPY:     P.T. 1hr/day       O.T. 1hr/day      S.L.P. 1hr/day     P&O Unnecessary     EXP FREQUENCY: 5 days per 7 day period     PRESCREEN COMPARISON:   I have reviewed the prescreen information and I have found no relevant changes between the preadmission screening and my post admission evaluation     RATIONALE FOR INPATIENT ADMISSION - Patient demonstrates the following: (check all that apply)  [X] Medically appropriate for rehabilitation admission  [X] Has attainable rehab goals with an appropriate initial discharge plan  [X] Has rehabilitation potential (expected to make a significant improvement within a reasonable period of time)   [X] Requires close medical management by a rehab physician, rehab nursing care, Hospitalist and comprehensive interdisciplinary team (including PT, OT, & or SLP, Prosthetics and Orthotics)   ASSESSMENT/PLAN  This is a 73 y/o male with PMH of  HTN, hypothyroidism who presented to  [facility] on 4/12  with seizures, found to have cerebellar hemorrhage. Intubated for airway protection at OSH, extubated 4/13. Called to consult for increased chest congestion. CTA chest with atelectasis and small RUL segmental branch PE, LE duplex with L soleal vein DVT. MR head with small foci of hemorrhage. Pt started on heparin gtt per neuro and vascular cardiology, now changed to Eliquis. Hospital course significant for UTI (e-coli) now s/p ABT per ID. He was treated with IVIG and steroids for seizures.   Patient now with gait Instability, ADL impairments and Functional impairments.    # CVA  - MR head with small Right cerebellar hemorrhage  - Start Comprehensive Rehab Program: PT/OT/ST, 3hours daily and 5 days weekly  - PT: Focused on improving strength, endurance, coordination, balance, functional mobility, and transfers  - OT: Focused on improving strength, fine motor skills, coordination, posture and ADLs.    - ST: to diagnose and treat deficits in swallowing, cognition and communication.     #Seizure  - Vimpat 150mg BID    #HTN  - Amlodipine 10mg daily  - Lisinopril 40mg daily    #Hypothyroidism  - Synthroid 88mcg daily    #Steroid induced hyperglycemia  - Was on Lantus 10 and premeal Admelog 4 - DC  - FS on admission 131 - trend since completion of steroid <140  - A1c 5.9 on 4/13  - Monitor via labs    #DVT/PE  - L soleal vein + RUL segmental branch PE  - Eliquis 5mg BID    #UTI  - Ceftin 500mg  BID x 7 days (last dose 5/9)    #Pain management  - Tylenol PRN    #GI ppx  - Protonix 40mg    #Bowel Regimen  - Senna, Miralax BID    #Bladder management  - BS on admission, and q 8 hours (SC if > 400)  - Monitor UO    #FEN   - Diet: Regular [Consistent carb - no snacks]  - SLP: evaluation and treatment    #Skin:  - No active issues at this time  - Pressure injury/Skin: Turn Q2hrs while in bed, OOB to Chair, PT/OT     #Mood/Cognition:  - Seroquel 50mg nightly  - Neuropsychology decline  - Recreation therapy    #Sleep:   - Maintain quiet hours and low stim environment.  - Monitor sleep logs    #Precaution  - Fall, Aspiration, Seizure     Outpatient Follow-up (Specialty/Name of physician):  Juan Bernabe (DO)  Medicine  [address] [facility], [address]  [place] [address]  Phone: [phone]  Fax: [phone]  Follow Up Time:     Efren Fragoso (DO)  Cardiology; Internal Medicine  [address] [facility], [address]  [place] [address]  Phone: [phone]  Fax: [phone]    MEDICAL PROGNOSIS: GOOD            REHAB POTENTIAL: GOOD             ESTIMATED DISPOSITION: HOME WITH HOME CARE            ELOS: 10-14 Days   EXPECTED THERAPY:     P.T. 1hr/day       O.T. 1hr/day      S.L.P. 1hr/day     P&O Unnecessary     EXP FREQUENCY: 5 days per 7 day period     PRESCREEN COMPARISON:   I have reviewed the prescreen information and I have found no relevant changes between the preadmission screening and my post admission evaluation     RATIONALE FOR INPATIENT ADMISSION - Patient demonstrates the following: (check all that apply)  [X] Medically appropriate for rehabilitation admission  [X] Has attainable rehab goals with an appropriate initial discharge plan  [X] Has rehabilitation potential (expected to make a significant improvement within a reasonable period of time)   [X] Requires close medical management by a rehab physician, rehab nursing care, Hospitalist and comprehensive interdisciplinary team (including PT, OT, & or SLP, Prosthetics and Orthotics)

## 2022-05-04 NOTE — PROGRESS NOTE ADULT - PROBLEM SELECTOR PLAN 3
SBP goal <150  c/w amlodipine 10mg PO daily  c/w lisinopril 40mg PO daily  c/w hydralazine 10mg PO q4hrs PRN  continue to monitor

## 2022-05-04 NOTE — PROGRESS NOTE ADULT - PROBLEM SELECTOR PLAN 1
CTA chest 4/16 with small PE RUL segmental branch  -LE duplex with L soleal vein DVT   -MR head with small foci of hemorrhage. Pt started on heparin gtt per neuro and vascular cariology, now changed to Eliquis.   -No c/o SOB or CP
CTA chest with small PE RUL segmental branch  -LE duplex with L soleal vein DVT   -MR head with small foci of hemorrhage. Pt started on heparin gtt per neuro and vascular cariology. Monitor PTT closely and for any changes in mental status.   -No c/o SOB or CP
CTA chest 4/16 with small PE RUL segmental branch  -LE duplex with L soleal vein DVT   -MR head with small foci of hemorrhage. Pt started on heparin gtt per neuro and vascular cariology, now changed to Eliquis.   -No c/o SOB or CP
CTA chest with small PE RUL segmental branch  -LE duplex with L soleal vein DVT   -MR head with small foci of hemorrhage. Pt started on heparin gtt per neuro and vascular cariology, now changed to Eliquis.   -No c/o SOB or CP
CTA chest with small PE RUL segmental branch  -LE duplex with L soleal vein DVT   -MR head with small foci of hemorrhage. Pt started on heparin gtt per neuro and vascular cariology, now changed to Eliquis.   -No c/o SOB or CP
CTA chest with small PE RUL segmental branch  -LE duplex with L soleal vein DVT   -Plan for MR head Monday. Pending results will plan for AC vs. IVC filter placement with vascular cariology.   -Check troponin and proBNP  -Normoxic, no c/o SOB or CP
witnessed seizures at home    - required intubation, extubated 4/13  vEEG - no seizures, d/c'd 4/15  AEDs per neuro team  frequent neuro checks  orders per neuro team
CTA chest with small PE RUL segmental branch  -LE duplex with L soleal vein DVT   -MR head with small foci of hemorrhage. Pt started on heparin gtt per neuro and vascular cariology, now changed to Eliquis.   -No c/o SOB or CP
witnessed seizures at home    - required intubation, extubated 4/13  vEEG - no seizures, d/c'd 4/15  AEDs per neuro team  frequent neuro checks  orders per neuro team
CTA chest 4/16 with small PE RUL segmental branch  -LE duplex with L soleal vein DVT   -MR head with small foci of hemorrhage. Pt started on heparin gtt per neuro and vascular cariology, now changed to Eliquis.   -No c/o SOB or CP
witnessed seizures at home    - required intubation, extubated 4/13  vEEG - no seizures, d/c'd 4/15  AEDs per neuro team  frequent neuro checks  orders per neuro team
CTA chest 4/16 with small PE RUL segmental branch  -LE duplex with L soleal vein DVT   -MR head with small foci of hemorrhage. Pt started on heparin gtt per neuro and vascular cariology, now changed to Eliquis.   -No c/o SOB or CP
CTA chest 4/16 with small PE RUL segmental branch  -LE duplex with L soleal vein DVT   -MR head with small foci of hemorrhage. Pt started on heparin gtt per neuro and vascular cariology, now changed to Eliquis.   -No c/o SOB or CP
CTA chest with small PE RUL segmental branch  -LE duplex with L soleal vein DVT   -F/u MR head. Pending results will plan for AC vs. possible IVC filter placement with vascular cardiology.   -Normoxic, no c/o SOB or CP
CTA chest with small PE RUL segmental branch  -LE duplex with L soleal vein DVT   -MR head with small foci of hemorrhage. Pt started on heparin gtt per neuro and vascular cariology. Monitor PTT closely and for any changes in mental status.   -Normoxic, no c/o SOB or CP
witnessed seizures at home    - required intubation, extubated 4/13  vEEG - no seizures, d/c'd 4/15  AEDs per neuro team  frequent neuro checks  orders per neuro team
witnessed seizures at home    - required intubation, extubated 4/13  vEEG - no seizures, d/c'd 4/15  AEDs per neuro team  frequent neuro checks  orders per neuro team
CTA chest with small PE RUL segmental branch  -LE duplex with L soleal vein DVT   -MR head with small foci of hemorrhage. Pt started on heparin gtt per neuro and vascular cariology, now changed to Eliquis.   -No c/o SOB or CP
witnessed seizures at home    - required intubation, extubated 4/13  vEEG - no seizures, d/c'd 4/15  AEDs per neuro team  frequent neuro checks  orders per neuro team
witnessed seizures at home    - required intubation, extubated 4/13  vEEG - no seizures, d/c'd 4/15  AEDs per neuro team  frequent neuro checks  orders per neuro team
CTA chest 4/16 with small PE RUL segmental branch  -LE duplex with L soleal vein DVT   -MR head with small foci of hemorrhage. Pt started on heparin gtt per neuro and vascular cariology, now changed to Eliquis.   -No c/o SOB or CP

## 2022-05-04 NOTE — PROGRESS NOTE ADULT - PROBLEM SELECTOR PROBLEM 5
Cerebellar hemorrhage
Seizures
Cerebellar hemorrhage
Seizures
Cerebellar hemorrhage
Seizures
Cerebellar hemorrhage
Seizures
Cerebellar hemorrhage
Seizures
Cerebellar hemorrhage

## 2022-05-04 NOTE — PROGRESS NOTE ADULT - PROBLEM SELECTOR PLAN 2
L soleal vein DVT  -LE duplex 4/15 without propagation  -AC with Eliquis
L soleal vein DVT  -LE duplex 4/15 without propagation  -AC with Eliquis
L soleal vein DVT  -AC vs. IVC filter placement  -F/u LE duplex
L soleal vein DVT  -AC vs. IVC filter placement  -LE duplex 4/15 without propagation
CTH/CTA small R cerebellar hemorrhage  reviewed TTE - EF 75%, normal  frequent neuro checks  aspiration precautions  orders per neuro team
L soleal vein DVT  -AC vs. IVC filter placement
L soleal vein DVT  -LE duplex 4/15 without propagation  -AC with Eliquis
CTH/CTA small R cerebellar hemorrhage  reviewed TTE - EF 75%, normal  frequent neuro checks  aspiration precautions  orders per neuro team
L soleal vein DVT  -AC vs. IVC filter placement  -LE duplex 4/15 without propagation
CTH/CTA small R cerebellar hemorrhage  reviewed TTE - EF 75%, normal  frequent neuro checks  aspiration precautions  orders per neuro team
L soleal vein DVT  -LE duplex 4/15 without propagation  -AC with Eliquis
L soleal vein DVT  -LE duplex 4/15 without propagation  -AC with Eliquis
CTH/CTA small R cerebellar hemorrhage  reviewed TTE - EF 75%, normal  frequent neuro checks  aspiration precautions  orders per neuro team
L soleal vein DVT  -LE duplex 4/15 without propagation  -AC with Eliquis
CTH/CTA small R cerebellar hemorrhage  reviewed TTE - EF 75%, normal  frequent neuro checks  aspiration precautions  orders per neuro team
L soleal vein DVT  -LE duplex 4/15 without propagation  -AC with Eliquis

## 2022-05-04 NOTE — PROGRESS NOTE ADULT - TIME BILLING
Advanced care planning was discussed with patient and family.  Advanced care planning forms were reviewed and discussed as appropriate.  Differential diagnosis and plan of care discussed with patient after the evaluation.   Pain assessed and judicious use of narcotics when appropriate was discussed.  Importance of Fall prevention discussed.  Counseling on Smoking and Alcohol cessation was offered when appropriate.  Counseling on Diet, exercise, and medication compliance was done.
Assessing presenting problems of acute illness, as well as medical decision making including initiating plan of care, obtaining history, examining the patient, reviewing data, reviewing radiologic exams, coordinating care with multidisciplinary team and writing this note.
Assessing presenting problems of acute illness, as well as medical decision making including initiating plan of care, obtaining history, examining the patient, reviewing data, reviewing radiologic exams, coordinating care with multidisciplinary team and writing this note.
Advanced care planning was discussed with patient and family.  Advanced care planning forms were reviewed and discussed as appropriate.  Differential diagnosis and plan of care discussed with patient after the evaluation.   Pain assessed and judicious use of narcotics when appropriate was discussed.  Importance of Fall prevention discussed.  Counseling on Smoking and Alcohol cessation was offered when appropriate.  Counseling on Diet, exercise, and medication compliance was done.
Advanced care planning was discussed with patient and family.  Advanced care planning forms were reviewed and discussed as appropriate.  Differential diagnosis and plan of care discussed with patient after the evaluation.   Pain assessed and judicious use of narcotics when appropriate was discussed.  Importance of Fall prevention discussed.  Counseling on Smoking and Alcohol cessation was offered when appropriate.  Counseling on Diet, exercise, and medication compliance was done.
inpatient care as above
Advanced care planning was discussed with patient and family.  Advanced care planning forms were reviewed and discussed as appropriate.  Differential diagnosis and plan of care discussed with patient after the evaluation.   Pain assessed and judicious use of narcotics when appropriate was discussed.  Importance of Fall prevention discussed.  Counseling on Smoking and Alcohol cessation was offered when appropriate.  Counseling on Diet, exercise, and medication compliance was done.

## 2022-05-04 NOTE — PROGRESS NOTE ADULT - REASON FOR ADMISSION
seizures

## 2022-05-04 NOTE — PATIENT PROFILE ADULT - FUNCTIONAL SCREEN CURRENT LEVEL: SWALLOWING (IF SCORE 2 OR MORE FOR ANY ITEM, CONSULT REHAB SERVICES), MLM)
CHEST ONE VIEW PORTABLE



CLINICAL HISTORY: Sepsis dyspnea



COMPARISON STUDY:  No previous studies for comparison.



FINDINGS: Mild cardia megaly. Slight hilar fullness bilaterally. Mild

emphysematous change. No focal infiltrate. 



IMPRESSION:  Slight hilar fullness bilaterally. Mild emphysematous change. CT

chest should be considered when the patient is able to exclude hilar adenopathy.













The above report was generated using voice recognition software.  It may contain

grammatical, syntax or spelling errors.









Electronically signed by:  Edgar Rudd M.D.

3/20/2018 6:42 AM



Dictated Date/Time:  3/20/2018 6:41 AM 0 = swallows foods/liquids without difficulty

## 2022-05-04 NOTE — PROGRESS NOTE ADULT - PROBLEM SELECTOR PLAN 4
+UA  -+Leukocytosis, now improved   -BC NGTD   -UC +e.coli  -Abx as per ID
+UA  -+Leukocytosis   -F/u UC  -Abx as per primary team
-Per neuro
L soleal vein DVT    CT[facility] - negative for PE, [facility]elect[facility]  [facility]
L soleal vein DVT    [facility] for PE, [facility]
L soleal vein DVT    [facility] PE, [facility]
L soleal vein DVT    CTA - prelim suspicious for PE in RUL - f/u final report  possible IVC filter vs AC  on Lovenox  Vascular Cardiology following
-Per neuro
-Per neuro
L soleal vein DVT    [facility] - [facility] for PE, [facility]elect[facility]
-Per neuro
L soleal vein DVT    [facility] PE[facility]
L soleal vein DVT    CTA - negative for PE, RLL atelectasis  possible IV[facility] filter [facility] AC  [facility]
+UA  -+Leukocytosis, now improved   -BC NGTD   -UC +e.coli  -S/p Abx as per ID
L soleal vein DVT    CTA - negative for PE, RLL atelectasis  [facility]
L soleal vein DVT    CTA - negative for PE, RLL atelectasis  possible IVC filter vs AC  on Lovenox  Vascular Cardiology following
L soleal vein DVT    CTA - negative for PE, RLL atelectasis  [facility]
L soleal vein DVT    CTA - negative for PE, RLL atelectasis  [facility]
+UA  -+Leukocytosis, now improved   -BC NGTD   -UC +e.coli  -S/p Abx as per ID
+UA  -+Leukocytosis   -F/u UC  -Abx as per primary team
L soleal vein DVT    CTA - negative for PE, RLL atelectasis  [facility]
-Per neuro
L soleal vein DVT    - CTA - negative for PE, RLL atelectasis  [facility]
+UA  -+Leukocytosis   -BC NGTD   -UC +e.coli  -Abx as per ID
-Per neuro

## 2022-05-04 NOTE — PROGRESS NOTE ADULT - PROVIDER SPECIALTY LIST ADULT
Cardiology
Infectious Disease
Infectious Disease
Internal Medicine
Neurology
Pulmonology
Cardiology
Cardiology
Infectious Disease
Infectious Disease
Internal Medicine
Neurology
Neurology
Neuroradiology
Rehab Medicine
Vascular Cardiology
Cardiology
Infectious Disease
Infectious Disease
Internal Medicine
Internal Medicine
NSICU
Neurology
Rehab Medicine
Vascular Cardiology
Cardiology
Endocrinology
Internal Medicine
NSICU
NSICU
Neurology
NSICU
Pulmonology
NSICU
Cardiology
Pulmonology
Endocrinology
Pulmonology
Cardiology
Pulmonology
Pulmonology
Cardiology
Pulmonology
Cardiology
Pulmonology
Cardiology
Pulmonology
Pulmonology

## 2022-05-04 NOTE — PROGRESS NOTE ADULT - PROBLEM SELECTOR PROBLEM 2
DVT (deep venous thrombosis)
Cerebellar hemorrhage
Hypertension
Cerebellar hemorrhage
DVT (deep venous thrombosis)
DVT (deep venous thrombosis)
Hypertension
Cerebellar hemorrhage
Cerebellar hemorrhage
DVT (deep venous thrombosis)
Cerebellar hemorrhage
Cerebellar hemorrhage
DVT (deep venous thrombosis)
Cerebellar hemorrhage
Cerebellar hemorrhage
DVT (deep venous thrombosis)
Cerebellar hemorrhage
Cerebellar hemorrhage
DVT (deep venous thrombosis)
Cerebellar hemorrhage
DVT (deep venous thrombosis)
DVT (deep venous thrombosis)

## 2022-05-04 NOTE — PROGRESS NOTE ADULT - PROBLEM SELECTOR PROBLEM 4
DVT (deep venous thrombosis)
UTI (urinary tract infection)
UTI (urinary tract infection)
Cerebellar hemorrhage
UTI (urinary tract infection)
DVT (deep venous thrombosis)
Cerebellar hemorrhage
DVT (deep venous thrombosis)
Cerebellar hemorrhage
DVT (deep venous thrombosis)
DVT (deep venous thrombosis)
Cerebellar hemorrhage
DVT (deep venous thrombosis)
Cerebellar hemorrhage
UTI (urinary tract infection)
DVT (deep venous thrombosis)
UTI (urinary tract infection)
UTI (urinary tract infection)

## 2022-05-04 NOTE — PROGRESS NOTE ADULT - ASSESSMENT
72 year old Male with a PMHx of HTN, hypothyroidism, who presented to [facility] via transfer from OSH. Patient originally presented to to [place] ED via EMS after family called 911 following a witnessed seizure, by family, reportedly lasting ~5min.  Enroute to Street, patient was reported to have had another 2 witnessed seizures by EMS, each lasting ~1 minute. Following each seizure the patient was given 5mg IV versed (received 10mg collectively and brought to [place] ED). Pt arrived to the ED obtunded with blood from his mouth (tongue laceration), responsive only to pain/sternal rub. Patient was subsequently intubated for GCS 6 and CT/CTA performed showing a small cerebellar hemorrhage.    Seizures  - Previously intubated, now extubated  - Neuro checks per protocol  - S/P LP- CSF with no growth   - Course of IVIG completed 04/22, s/p pulse dose steroids   - Management as per neurology   - Fall, seizure, aspiration precautions  - S/P MR brain; Small foci of prior hemorrhage versus calcification involving the right cerebellar hemisphere.  - neuro follow up appreciated, s/p repeat CTH per neuro  - S/P Steroid taper and IVIG, per neuro no plan for retux    Cerebellar hemorrhage  - Right sided hemorrhage   - MR brain as above; f/u neuro recs  - F/U repeat CT H s/p heparin gtt; monitor mental status   - Transitioned to Eliquis 5 BID   - Monitor H/H closely and for any signs/symptoms of bleeding     DVT/ PE  - Patient with acute below knee DVT and RUL PE & Recent cerebellar infiltrate  - Awaiting MR brain results for possible AC vs IVC filter --> now on Eliquis   - Vascular cardio eval appreciated   - Repeat DVT study no longer indicated as patient on full dose tx with Eliquis   - Monitor H/H closely and for any signs/symptoms of bleeding     Hypothyroid   - thyroid US w/ Enlarged goiter    - TSH elevated, T4 low  - Was on synthroid 75 at home, increased to 88 on this hospital admission  - Thyroid antibodies elevated ? Paulinahierin's --> F/U endo - per discussion w/ endo cont to monitor TFTS for now   - Repeat TFTs in 3-4 weeks outpatient   - F/U endo    Leukocytosis  - S/P Steroid taper   - Cont to monitor and trend  - If febrile, recommend to check pan cx  - + UTI and Pyuria; 04/27 UCx with E. Coli; S/P Course of ceftriaxone-- Abx per ID   - Now on PO Ceftin, plan to complete course on 05/09 per ID  - Monitor CBC, temp curve, VS and patient closely    Steroid induced hyperglycemia  - Endo eval 04/29 appreciated; f/u recs  - Per Endo, plan to d/c off of Rx, with outpatient follow up     HyperNa  - Improved, cont to monitor     JESSICA  - Continue to monitor and trend on daily labs  - Avoid nephrotoxic agents  - Cr relatively stable    B/L Supraclavicular swelling  - Outpatient PCP follow up   - Cont to monitor closely     Atelectasis   - Duoneb and Chest PT  - Appreciate pulm recs     HTN  - TTE EF of 75%, normal LV systolic function   - Continue with Amlodipine and Lisinopril- ACEI increased to 40 per cardio, monitor Cr  - Monitor BP, VS and patient closely     Pre-DM  - A1C of 5.9  - Outpatient follow up     Prostate   - Hyperdensity noted on CT Scan   - Check PSA-noted    - Outpatient follow up with urology and repeat testing     Covid exposure  - Monitor patient, VS and O2 saturation closely  - If febrile recommend to check [place] Cx  - Serial Covid tests   - Precautions per protocol   - On Full dose AC currently      PPX  - Eliquis 5 BID  - PPI       D/C planing

## 2022-05-04 NOTE — H&P ADULT - NSHPSOCIALHISTORY_GEN_ALL_CORE
Smoking - Denied  EtOH - Denied   Drugs - Denied     Patient lives   PTA: Independent in ADLs and ambulation     CURRENT FUNCTIONAL STATUS  Bed Mobility:   Transfers:   Gait: Smoking - Denied  EtOH - Denied   Drugs - Denied     Lives with wife and 1 son in [place]; 4 ZABRINA without HR; ~14 steps up for bedroom and bathroom.  Can arrange for main level stay, but no bathroom access.  +tub.  No DME  PTA: Independent in ADLs, ambulation without AD, and drove.  Was still working prior to hospitalization    CURRENT FUNCTIONAL STATUS  Bed Mobility: SV  Transfers: min A  Gait: 15' x 3 RW min A  ADLs: LBD max A

## 2022-05-04 NOTE — PROGRESS NOTE ADULT - SUBJECTIVE AND OBJECTIVE BOX
CC: f/u for uti    Patient reports he is doing ok, needs to urinate    REVIEW OF SYSTEMS:  All other review of systems negative (Comprehensive ROS)    Antimicrobials Day #  :3/7  cefuroxime   Tablet 500 milliGRAM(s) Oral every 12 hours    Other Medications Reviewed    T(F): 98.5 (05-04-22 @ 14:10), Max: 98.7 (05-04-22 @ 05:02)  HR: 84 (05-04-22 @ 14:10)  BP: 136/76 (05-04-22 @ 14:10)  RR: 20 (05-04-22 @ 14:10)  SpO2: 95% (05-04-22 @ 14:10)  Wt(kg): --    PHYSICAL EXAM:  General: alert, no acute distress  Eyes:  anicteric, no conjunctival injection, no discharge  Oropharynx: no lesions or injection 	  Neck: supple, without adenopathy  Lungs: clear to auscultation  Heart: regular rate and rhythm; no murmur, rubs or gallops  Abdomen: soft, nondistended, nontender, without mass or organomegaly  Skin: no lesions  Extremities: no clubbing, cyanosis, or edema  Neurologic: alert, oriented, moves all extremities    LAB RESULTS:                        9.9    5.40  )-----------( 373      ( 04 May 2022 06:27 )             30.8     05-04    136  |  102  |  34<H>  ----------------------------<  118<H>  4.3   |  24  |  1.38<H>    Ca    8.9      04 May 2022 06:28          MICROBIOLOGY:  RECENT CULTURES:  05-02 @ 04:41 Clean Catch Clean Catch (Midstream)     No growth      04-30 @ 06:06 .Blood Blood-Venous     No growth to date.      04-30 @ 05:54 Clean Catch Clean Catch (Midstream)     No growth          RADIOLOGY REVIEWED:    < from: Xray Chest 1 View- PORTABLE-Routine (Xray Chest 1 View- PORTABLE-Routine .) (04.30.22 @ 09:37) >    ACC: [id number] EXAM:  XR CHEST PORTABLE ROUTINE 1V                          PROCEDURE DATE:  04/30/2022          INTERPRETATION:  Clinical History: 72-year-old male, fever.    Portable/expiratory view of the chest is correlated with the chest CT of   4/27/2022.    Cardiac silhouette at the upper limit of normal with normal pulmonary   vasculature and no pneumothorax or acute osseous finding.    Small bilateral pleural effusions/adjacent consolidation/atelectasis,   unchanged.    Gastric dilatation, new.    IMPRESSION:  Expiratory/kyphotic view, small bilateral pleural effusions/adjacent   atelectasis/consolidation, unchanged.    Gastric dilatation, new    --- End of Report ---          < end of copied text >  < from: CT Abdomen and Pelvis w/ Oral Cont and w/ IV Cont (04.20.22 @ 17:16) >  ACC: [id number] EXAM:  CT ABDOMEN AND PELVIS OC IC                        ACC: [id number] EXAM:  CT CHEST IC                          PROCEDURE DATE:  04/20/2022          INTERPRETATION:  CLINICAL INFORMATION: New seizures, evaluate for   malignancy.    COMPARISON: CT chest 4/16/2022. CT sonography 2/12/2014    CONTRAST/COMPLICATIONS:  IV Contrast: Omnipaque 350  90 cc administered   10 cc discarded  Oral Contrast: Omnipaque 300  Complications: None reported at time of study completion    PROCEDURE:  CT of the Chest, Abdomen and Pelvis was performed.  Sagittal and coronal reformats were performed.    FINDINGS:  CHEST:  LUNGS AND LARGE AIRWAYS: Tracheal secretions. Near complete atelectasis   of the right lower lobe. Atelectatic changes, though to a lesser degree,   involving the middle and left lower lobes.  PLEURA: No pleural effusion.  VESSELS: The known pulmonary embolus in the right upper lobe identified   on the prior CTA of 4/16/2022 is not well seen on the current exam, which   is not tailored for the assessment of the pulmonary arterial system.  HEART: Heart size is normal. Aortic valve calcification. Trace   pericardial effusion, similar to the prior exam.  MEDIASTINUM AND SIMONE: No lymphadenopathy.  CHEST WALL AND LOWER NECK: Enlarged thyroid with mild tracheal narrowing,   similar to the prior exam.    ABDOMEN AND PELVIS:  Mild motion degradation in the mid to lower abdomen.    LIVER: Within normal limits.  BILE DUCTS: Normal caliber.  GALLBLADDER: Mild layering hyperdensity within the gallbladder, which may   reflect vicarious excretion of contrast or sludge.  SPLEEN: Within normal limits.  PANCREAS: Within normal limits.  ADRENALS: Within normal limits.  KIDNEYS/URETERS: A few subcentimeter hypodense foci within the right   kidney that are too small to characterize. No hydronephrosis.    BLADDER: Underdistended. Mild perivesicular fat stranding.  REPRODUCTIVE ORGANS: Mildly enlarged prostate. There is a 7 mm hyperdense   nodular focus within the region of the lateral mid right peripheral zone   (series 3 image 280).    BOWEL: No bowel obstruction. Colonic diverticulosis. Appendix is normal.  PERITONEUM: No ascites.  VESSELS: Atherosclerotic changes.  RETROPERITONEUM/LYMPH NODES: No lymphadenopathy.  ABDOMINALWALL: Small umbilical hernia containing a portion of transverse   colon.  BONES: Degenerative changes.    IMPRESSION:  Subcentimeter hyperdense nodular focus in the right aspect of the   prostate gland, indeterminate but potentially an exophytic BPH nodule.   Neoplasm is not excluded. Suggest correlation with PSA, and consider   further evaluation with prostate MRI.    Mild perivesicular fat stranding. Question cystitis. Correlate with   urinalysis.    Right greater than left lung base atelectasis, with near complete   atelectasis of the right lower lobe.      Findings were discussed with LAN Romero 4/21/2022 9:10 AM by Dr. Flores with read back confirmation.    --- End of Report ---    < end of copied text >  < from: MR Head w/wo IV Cont (04.18.22 @ 09:55) >    ACC: [id number] EXAM:  MR BRAIN WAW IC                          PROCEDURE DATE:  04/18/2022          INTERPRETATION:  INDICATIONS:  New onset seizures, known right cerebellar   hemorrhage.    TECHNIQUE:  Multiplanar imaging was performed using T1 weighted, T2   weighted and FLAIR sequences.  Diffusion weighted and susceptibility   sensitive images were also obtained.  Following intravenous gadolinium,   multiplanar T1 weighted images were performed. 7.5 cc Gadavist were   administered. 0 cc were discarded.    COMPARISON EXAMINATION:  Head CT dated 4/12/2022.    FINDINGS:  VENTRICLES AND SULCI:  No hydrocephalus.  INTRA-AXIAL:  Small foci of susceptibility artifact are present within   the right cerebellar hemisphere and the left frontal, occipital, and   parietal lobes. There is patchy increased FLAIR signal along the lateral   ventricles bilaterally and in the bilateral centrum semiovaleThe   bilateral thalamic and right basal ganglia chronic infarcts are present   with hemosiderin deposition. Bilateral temporal lobes are symmetric   without focal lesion. No abnormal intracranial enhancement is seen.  EXTRA-AXIAL:  No mass or collection.  VISUALIZED SINUSES:  Mild mucosal thickening of the left sphenoid sinus,   bilateral maxillary sinuses, and ethmoid air cells.  VISUALIZED MASTOIDS:  There is a right mastoid effusion, new from prior.  CALVARIUM:  Intact.  CAROTID FLOW VOIDS:  Present.    IMPRESSION:  Confluent FLAIR hyperintensity along the ventricles is nonspecific but   most commonly seen in the setting of chronic white matter microvascular   change.    Small foci of prior hemorrhage versus calcification involving the right   cerebellar hemisphere.    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    --- End of Report ---    < end of copied text >            Assessment:  Elderly man admitted for new onset sz, LP and mri unrevealing for source, had iv ig and steroids for concern of AIE. He is now finishing a course of ceftin for a uti. He had fever and leukocytosis now moderated  Plan:  4 more days of ceftin  No ID contraindication to d/c  steroid taper

## 2022-05-04 NOTE — PROGRESS NOTE ADULT - PROBLEM SELECTOR PROBLEM 1
Pulmonary embolism
Seizures
Pulmonary embolism
Seizures
Steroid-induced hyperglycemia
Steroid-induced hyperglycemia
Pulmonary embolism
Seizures
Pulmonary embolism
Pulmonary embolism
Seizures
Pulmonary embolism
Pulmonary embolism
Seizures
Pulmonary embolism
Seizures
Seizures
Pulmonary embolism
Seizures
Seizures
Pulmonary embolism

## 2022-05-04 NOTE — H&P ADULT - HISTORY OF PRESENT ILLNESS
This is a 73 y/o male with PMH of  HTN, hypothyroidism who presented to  [facility] on 4/12  with seizures, found to have cerebellar hemorrhage. Intubated for airway protection at OSH, extubated 4/13. Called to consult for increased chest congestion. CTA chest with atelectasis and small RUL segmental branch PE, LE duplex with L soleal vein DVT. MR head with small foci of hemorrhage. Pt started on heparin gtt per neuro and vascular cardiology, now changed to Eliquis. Hospital course significant for UTI _e-coli) now s/p ABT per ID. He was treated with IVIG and steroids for seizures. Patient was evaluated by PM&R and therapy for functional deficits, gait/ADL impairments and acute rehabilitation was recommended. Patient was medically optimized for discharge to [facility] IRU on 5/4/22.

## 2022-05-04 NOTE — H&P ADULT - NSICDXNOPASTSURGICALHX_GEN_ALL_CORE
----- Message from Kendall Sommer NP sent at 9/19/2019  9:20 AM CDT -----  Please call patient and inform him that the strep is negative. Viral as we discussed that the visit. Follow-up as needed.  
Called patient and given test results.  Patient verbalizes understanding and has no other questions at this time.  
<-- Click to add NO significant Past Surgical History

## 2022-05-04 NOTE — PROGRESS NOTE ADULT - PROBLEM SELECTOR PLAN 5
-Per neuro
-S/p IVIG. Steroids per neuro.
-Per neuro
-IVIG, solumedrol per neuro
-Per neuro
-IVIG, steroids per neuro
-Per neuro

## 2022-05-04 NOTE — H&P ADULT - NSHPREVIEWOFSYSTEMS_GEN_ALL_CORE
REVIEW OF SYSTEMS  Constitutional: No fever, No Chills, No fatigue  HEENT: No eye pain, No visual disturbances, No difficulty hearing  Pulm: No cough, No shortness of breath  Cardio: No chest pain, No palpitations  GI:  No abdominal pain, No nausea, No vomiting, No diarrhea, No constipation; LBM 5/3  : No dysuria, No frequency, No hematuria  Neuro: No headaches, No memory loss, No loss of strength, No numbness, No tremors  Skin: No itching, No rashes, No lesions   Endo: No temperature intolerance  MSK: No joint pain, No joint swelling, No muscle pain, No Neck or back pain  Psych:  No depression, No anxiety

## 2022-05-04 NOTE — PROGRESS NOTE ADULT - PROBLEM SELECTOR PLAN 6
-S/p IVIG and steroids per neuro.
-S/p IVIG and steroids per neuro.
-S/p IVIG. Steroids per neuro.
-S/p IVIG and steroids per neuro.
-S/p IVIG. Steroids per neuro.
-S/p IVIG. Steroids per neuro.

## 2022-05-04 NOTE — PROGRESS NOTE ADULT - ASSESSMENT
72M PMH htn, hypothyroidism, presented to [place] ED via EMS after family called 911 following a witnessed seizure, by family, reportedly lasting ~5min.

## 2022-05-04 NOTE — PROGRESS NOTE ADULT - PROBLEM SELECTOR PROBLEM 3
Hypertension
Hypertension
Atelectasis
Atelectasis
Hypertension
Hypertension
Hypothyroidism
Atelectasis
Hypertension
Atelectasis
Hypothyroidism
Atelectasis
Hypertension
Atelectasis
Atelectasis
Hypertension
Hypertension
Atelectasis
Hypertension
Atelectasis
Hypertension
Hypertension
Atelectasis

## 2022-05-04 NOTE — PROGRESS NOTE ADULT - SUBJECTIVE AND OBJECTIVE BOX
Follow-up Pulm Progress Note    No new respiratory events overnight.  Denies SOB/CP.   Sats 94% RA    Medications:  MEDICATIONS  (STANDING):  albuterol/ipratropium for Nebulization 3 milliLiter(s) Nebulizer every 6 hours  amLODIPine   Tablet 10 milliGRAM(s) Oral daily  apixaban 5 milliGRAM(s) Oral two times a day  cefuroxime   Tablet 500 milliGRAM(s) Oral every 12 hours  dextrose 5%. 1000 milliLiter(s) (100 mL/Hr) IV Continuous <Continuous>  dextrose 5%. 1000 milliLiter(s) (50 mL/Hr) IV Continuous <Continuous>  dextrose 50% Injectable 25 Gram(s) IV Push once  dextrose 50% Injectable 12.5 Gram(s) IV Push once  dextrose 50% Injectable 25 Gram(s) IV Push once  glucagon  Injectable 1 milliGRAM(s) IntraMuscular once  insulin glargine Injectable (LANTUS) 10 Unit(s) SubCutaneous at bedtime  insulin lispro (ADMELOG) corrective regimen sliding scale   SubCutaneous three times a day before meals  insulin lispro (ADMELOG) corrective regimen sliding scale   SubCutaneous at bedtime  insulin lispro Injectable (ADMELOG) 4 Unit(s) SubCutaneous three times a day before meals  lacosamide 150 milliGRAM(s) Oral two times a day  lactobacillus acidophilus 1 Tablet(s) Oral daily  levothyroxine 88 MICROGram(s) Oral daily  lisinopril 40 milliGRAM(s) Oral daily  magnesium hydroxide Suspension 30 milliLiter(s) Oral once  pantoprazole    Tablet 40 milliGRAM(s) Oral before breakfast  polyethylene glycol 3350 17 Gram(s) Oral two times a day  QUEtiapine 50 milliGRAM(s) Oral at bedtime  risperiDONE   Tablet 1 milliGRAM(s) Oral <User Schedule>  senna 2 Tablet(s) Oral at bedtime  sodium chloride 0.9%. 1000 milliLiter(s) (50 mL/Hr) IV Continuous <Continuous>  sodium chloride 0.9%. 1000 milliLiter(s) (50 mL/Hr) IV Continuous <Continuous>  thiamine 100 milliGRAM(s) Oral daily    MEDICATIONS  (PRN):  acetaminophen     Tablet .. 650 milliGRAM(s) Oral every 6 hours PRN Temp greater or equal to 38C (100.4F), Mild Pain (1 - 3)  dextrose Oral Gel 15 Gram(s) Oral once PRN Blood Glucose LESS THAN 70 milliGRAM(s)/deciliter  hydrALAZINE 10 milliGRAM(s) Oral every 4 hours PRN Systolic blood pressure < 160  valproate sodium IVPB 1000 milliGRAM(s) IV Intermittent once PRN Seizure          Vital Signs Last 24 Hrs  T(C): 37 (04 May 2022 09:54), Max: 37.1 (04 May 2022 05:02)  T(F): 98.6 (04 May 2022 09:54), Max: 98.7 (04 May 2022 05:02)  HR: 78 (04 May 2022 09:54) (76 - 89)  BP: 145/78 (04 May 2022 09:54) (137/77 - 168/78)  BP(mean): --  RR: 18 (04 May 2022 09:54) (18 - 18)  SpO2: 94% (04 May 2022 09:54) (94% - 97%)          05-03 @ 07:01  -  05-04 @ 07:00  --------------------------------------------------------  IN: 480 mL / OUT: 101 mL / NET: 379 mL          LABS:                        9.9    5.40  )-----------( 373      ( 04 May 2022 06:27 )             30.8     05-04    136  |  102  |  34<H>  ----------------------------<  118<H>  4.3   |  24  |  1.38<H>    Ca    8.9      04 May 2022 06:28            CAPILLARY BLOOD GLUCOSE      POCT Blood Glucose.: 96 mg/dL (04 May 2022 11:27)            DAIANA 1:160 04-19 @ 10:05  Anti SS-1 0.6  Anti SS-2 <0.2  Anti RNP 0.8  RF -- 04-19 @ 10:05    Atypical ANCA Negative 04-19 @ 10:05  c-ANCA titer -- 04-19 @ 10:05  c-ANCA Negative 04-19 @ 10:05  p-ANCA Negative 04-19 @ 10:05  DAIANA -- 04-19 @ 09:09  Anti SS-1 --  Anti SS-2 --  Anti RNP --  RF <10 04-19 @ 09:09    Atypical ANCA -- 04-19 @ 09:09  c-ANCA titer -- 04-19 @ 09:09  c-ANCA -- 04-19 @ 09:09  p-ANCA -- 04-19 @ 09:09              CULTURES:     Culture - Blood (collected 04-30-22 @ 06:06)  Source: .Blood Blood-Venous  Preliminary Report (05-01-22 @ 07:01):    No growth to date.    Culture - Blood (collected 04-30-22 @ 06:06)  Source: .Blood Blood-Venous  Preliminary Report (05-01-22 @ 07:01):    No growth to date.    Culture - Blood (collected 04-27-22 @ 12:21)  Source: .Blood Blood-Peripheral  Final Report (05-02-22 @ 13:01):    No Growth Final    Culture - Blood (collected 04-27-22 @ 12:21)  Source: .Blood Blood-Peripheral  Final Report (05-02-22 @ 13:01):    No Growth Final        Culture - Urine (collected 05-02-22 @ 04:41)  Source: Clean Catch Clean Catch (Midstream)  Final Report (05-03-22 @ 06:54):    No growth    Culture - Urine (collected 04-30-22 @ 05:54)  Source: Clean Catch Clean Catch (Midstream)  Final Report (05-01-22 @ 10:18):    No growth    Culture - Urine (collected 04-27-22 @ 18:23)  Source: Clean Catch Clean Catch (Midstream)  Final Report (04-30-22 @ 15:50):    >100,000 CFU/ml Escherichia coli    <10,000 CFU/ml Normal Urogenital dana present  Organism: Escherichia coli (04-30-22 @ 15:50)  Organism: Escherichia coli (04-30-22 @ 15:50)      -  Amikacin: S <=16      -  Amoxicillin/Clavulanic Acid: S <=8/4      -  Ampicillin: R >16 These ampicillin results predict results for amoxicillin      -  Ampicillin/Sulbactam: I 16/8 Enterobacter, Klebsiella aerogenes, Citrobacter, and Serratia may develop resistance during prolonged therapy (3-4 days)      -  Aztreonam: S <=4      -  Cefazolin: S 4 (MIC_CL_COM_[id number]) For uncomplicated UTI with K. pneumoniae, E. coli, or P. mirablis: GASPER <=16 is sensitive and GASPER >=32 is resistant. This also predicts results for oral agents cefaclor, cefdinir, cefpodoxime, cefprozil, cefuroxime axetil, cephalexin and locarbef for uncomplicated UTI. Note that some isolates may be susceptible to these agents while testing resistant to cefazolin.      -  Cefepime: S 8      -  Cefoxitin: S <=8      -  Ceftriaxone: S <=1 Enterobacter, Klebsiella aerogenes, Citrobacter, and Serratia may develop resistance during prolonged therapy      -  Ciprofloxacin: S <=0.25      -  Ertapenem: S <=0.5      -  Gentamicin: S <=2      -  Imipenem: S <=1      -  Levofloxacin: S <=0.5      -  Meropenem: S <=1      -  Nitrofurantoin: S <=32 Should not be used to treat pyelonephritis      -  Piperacillin/Tazobactam: S <=8      -  Tigecycline: S <=2      -  Tobramycin: S <=2      -  Trimethoprim/Sulfamethoxazole: R >2/38      Method Type: GASPER            Physical Examination:  PULM: decreased BS at bases   CVS: S1, S2 heard        RADIOLOGY REVIEWED    CT chest:   < from: CT Chest No Cont (04.27.22 @ 10:43) >    Tubes/Lines: None.    Mediastinum/Vessels/Heart: Aorta and pulmonary arteries are normal in   size. There is trace pericardial effusion. No lymphadenopathy. Diffuse   enlargement of the thyroid gland with mildnarrowing of the tracheal down   to 11 mm. This is without change from previous exam.    Lungs/Pleura/Airways: Bibasilar atelectasis with posterior bowing of the   fissures bilaterally, increased since previous exam.    Visualized abdomen: Unremarkable noncontrast appearance of the upper   abdomen    Bones and soft tissues: No suspicious osseous lesions. Degenerative   changes noted throughout the spine.    IMPRESSION:    Bibasilar atelectasis with posterior bowing of the fissures bilaterally.   This demonstrates slight interval progression since the previous exam,   the specimen the left side.    No supraclavicular lymphadenopathy is seen.    Trace pericardial effusion.    < end of copied text >

## 2022-05-04 NOTE — PROGRESS NOTE ADULT - SUBJECTIVE AND OBJECTIVE BOX
Subjective: Patient seen and examined. No new events except as noted.     REVIEW OF SYSTEMS:    CONSTITUTIONAL: + weakness, fevers or chills  EYES/ENT: No visual changes;  No vertigo or throat pain   NECK: No pain or stiffness  RESPIRATORY: No cough, wheezing, hemoptysis; No shortness of breath  CARDIOVASCULAR: No chest pain or palpitations  GASTROINTESTINAL: No abdominal or epigastric pain. No nausea, vomiting, or hematemesis; No diarrhea or constipation. No melena or hematochezia.  GENITOURINARY: No dysuria, frequency or hematuria  NEUROLOGICAL: No numbness or weakness  SKIN: No itching, burning, rashes, or lesions   All other review of systems is negative unless indicated above.    MEDICATIONS:  MEDICATIONS  (STANDING):  albuterol/ipratropium for Nebulization 3 milliLiter(s) Nebulizer every 6 hours  amLODIPine   Tablet 10 milliGRAM(s) Oral daily  apixaban 5 milliGRAM(s) Oral two times a day  cefuroxime   Tablet 500 milliGRAM(s) Oral every 12 hours  dextrose 5%. 1000 milliLiter(s) (100 mL/Hr) IV Continuous <Continuous>  dextrose 5%. 1000 milliLiter(s) (50 mL/Hr) IV Continuous <Continuous>  dextrose 50% Injectable 25 Gram(s) IV Push once  dextrose 50% Injectable 12.5 Gram(s) IV Push once  dextrose 50% Injectable 25 Gram(s) IV Push once  glucagon  Injectable 1 milliGRAM(s) IntraMuscular once  insulin glargine Injectable (LANTUS) 10 Unit(s) SubCutaneous at bedtime  insulin lispro (ADMELOG) corrective regimen sliding scale   SubCutaneous three times a day before meals  insulin lispro (ADMELOG) corrective regimen sliding scale   SubCutaneous at bedtime  insulin lispro Injectable (ADMELOG) 4 Unit(s) SubCutaneous three times a day before meals  lacosamide 150 milliGRAM(s) Oral two times a day  lactobacillus acidophilus 1 Tablet(s) Oral daily  levothyroxine 88 MICROGram(s) Oral daily  lisinopril 40 milliGRAM(s) Oral daily  magnesium hydroxide Suspension 30 milliLiter(s) Oral once  pantoprazole    Tablet 40 milliGRAM(s) Oral before breakfast  polyethylene glycol 3350 17 Gram(s) Oral two times a day  QUEtiapine 50 milliGRAM(s) Oral at bedtime  risperiDONE   Tablet 1 milliGRAM(s) Oral <User Schedule>  senna 2 Tablet(s) Oral at bedtime  sodium chloride 0.9%. 1000 milliLiter(s) (50 mL/Hr) IV Continuous <Continuous>  sodium chloride 0.9%. 1000 milliLiter(s) (50 mL/Hr) IV Continuous <Continuous>  thiamine 100 milliGRAM(s) Oral daily    PHYSICAL EXAM:  T(C): 37 (05-04-22 @ 09:54), Max: 37.1 (05-04-22 @ 05:02)  HR: 78 (05-04-22 @ 09:54) (71 - 89)  BP: 145/78 (05-04-22 @ 09:54) (109/69 - 168/78)  RR: 18 (05-04-22 @ 09:54) (18 - 18)  SpO2: 94% (05-04-22 @ 09:54) (94% - 98%)  Wt(kg): --  I&O's Summary    03 May 2022 07:01  -  04 May 2022 07:00  --------------------------------------------------------  IN: 480 mL / OUT: 101 mL / NET: 379 mL    04 May 2022 07:01  -  04 May 2022 11:12  --------------------------------------------------------  IN: 0 mL / OUT: 100 mL / NET: -100 mL    Appearance: NAD  HEENT: dry oral mucosa, PERRL, EOMI	  Lymphatic: No lymphadenopathy , no edema  Cardiovascular: Normal S1 S2, No JVD, No murmurs , Peripheral pulses palpable 2+ bilaterally  Respiratory: Decreased BS, normal effort	  Gastrointestinal:  Soft, Non-tender, + BS	  Skin: No rashes, No ecchymoses, No cyanosis, warm to touch  NEUROLOGICAL EXAM:  MS: Does not know his own age. Speech is fluent, Follows commands.   CN: VFF, EOMI, PERRL,  V1-3 intact, no facial asymmetry, t/p midline,  Motor: Strength: 5/5 in the UE/ LE  Tone: normal. Bulk: normal.    Plantar flex b/l.   Sensation: intact to LT/Temperature 4x.   Coordination: FTN intact   Gait:  Deferred  Ext: No edema    LABS:    CARDIAC MARKERS:                     9.9    5.40  )-----------( 373      ( 04 May 2022 06:27 )             30.8     05-04    136  |  102  |  34<H>  ----------------------------<  118<H>  4.3   |  24  |  1.38<H>    Ca    8.9      04 May 2022 06:28    proBNP:   Lipid Profile:   HgA1c:   TSH:     TELEMETRY: 	    ECG:  	  RADIOLOGY:   DIAGNOSTIC TESTING:  [ ] Echocardiogram:  [ ]  Catheterization:  [ ] Stress Test:    OTHER:

## 2022-05-04 NOTE — PATIENT PROFILE ADULT - FALL HARM RISK - HARM RISK INTERVENTIONS

## 2022-05-04 NOTE — H&P ADULT - NSHPLABSRESULTS_GEN_ALL_CORE
9.9    5.40  )-----------( 373      ( 04 May 2022 06:27 )             30.8   05-04    136  |  102  |  34<H>  ----------------------------<  118<H>  4.3   |  24  |  1.38<H>    Ca    8.9      04 May 2022 06:28    Transthoracic Echocardiogram (04.12.22 @ 15:11)     Observations:  Mitral Valve: Normal mitral valve.  Aortic Valve/Aorta: Calcified aortic valve with normal  opening.  Normal aortic root size.  Left Atrium: Normal left atrium.  Left Ventricle: Normal left ventricular internal dimensions  and wall thicknesses.  Normal left ventricular systolic function. No segmental  wall motion abnormalities.  Normal diastolic function.  Right Heart: Normal right atrium. Normal right ventricular  size and function.  Normal tricuspid valve. Mild tricuspid regurgitation.  Normal pulmonic valve.  Pericardium/Pleura: Small pericardial effusion.  Left pleural effusion.  Hemodynamic: No evidence of pulmonary hypertension.    CT Chest No Cont (04.27.22 @ 10:43) >    IMPRESSION:  Bibasilar atelectasis with posterior bowing of the fissures bilaterally.   This demonstrates slight interval progression since the previous exam,   the specimen the left side. No supraclavicular lymphadenopathy is seen. Trace pericardial effusion.      CT Chest w/ IV Cont (04.20.22 @ 17:16)     IMPRESSION:  Subcentimeter hyperdense nodular focus in the right aspect of the   prostate gland, indeterminate but potentially an exophytic BPH nodule.   Neoplasm is not excluded. Suggest correlation with PSA, and consider   further evaluation with prostate MRI.  Mild perivesicular fat stranding. Question cystitis. Correlate with   urinalysis. Right greater than left lung base atelectasis, with near complete   atelectasis of the right lower lobe.    US Thyroid (04.20.22 @ 12:10)   IMPRESSION:  Enlarged heterogeneous thyroid gland with moderate increased vascularity.   No focal nodule or mass.  Compared with previous study dated 3/31/2017, the thyroid gland is   slightly larger in size and the vascularity has definitively increased.  Recommend TSH level and thyroid hormone levels to exclude   hyperthyroidism. Graves' disease or Hashimoto's thyroiditis are both   considerations. Please correlate.  Elevated thyroglobulin levels can be seen in both Graves' disease and   Hashimoto's thyroiditis.    CT Head No Cont (04.19.22 @ 18:26) >    IMPRESSION:  Previously seen focus of increased density in the right cerebellar   hemisphere is decreased in attenuation. The focus is slightly increased   in size, currently measuring 7 mm, previously measuring 5 mm.    MR Head w/wo IV Cont (04.18.22 @ 09:55)     IMPRESSION:  Confluent FLAIR hyperintensity along the ventricles is nonspecific but   most commonly seen in the setting of chronic white matter microvascular   change.  Small foci of prior hemorrhage versus calcification involving the right   cerebellar hemisphere.  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    VA Duplex Lower Ext Vein Scan, Bilat (04.15.22 @ 14:29)     IMPRESSION:  Acute below the knee DVT confined to the left soleal vein. 9.9    5.40  )-----------( 373      ( 04 May 2022 06:27 )             30.8   05-04    136  |  102  |  34<H>  ----------------------------<  118<H>  4.3   |  24  |  1.38<H>    Ca    8.9      04 May 2022 06:28    Transthoracic Echocardiogram (04.12.22 @ 15:11)     Observations:  Mitral Valve: Normal mitral valve.  Aortic Valve/Aorta: Calcified aortic valve with normal  opening.  Normal aortic root size.  Left Atrium: Normal left atrium.  Left Ventricle: Normal left ventricular internal dimensions  and wall thicknesses.  Normal left ventricular systolic function. No segmental  wall motion abnormalities.  Normal diastolic function.  Right Heart: Normal right atrium. Normal right ventricular  size and function.  Normal tricuspid valve. Mild tricuspid regurgitation.  Normal pulmonic valve.  Pericardium/Pleura: Small pericardial effusion.  Left pleural effusion.  Hemodynamic: No evidence of pulmonary hypertension.    CT Chest No Cont (04.27.22 @ 10:43) >  Bibasilar atelectasis with posterior bowing of the fissures bilaterally.   This demonstrates slight interval progression since the previous exam,   the specimen the left side. No supraclavicular lymphadenopathy is seen. Trace pericardial effusion.      CT Chest w/ IV Cont (04.20.22 @ 17:16)   Subcentimeter hyperdense nodular focus in the right aspect of the   prostate gland, indeterminate but potentially an exophytic BPH nodule.   Neoplasm is not excluded. Suggest correlation with PSA, and consider   further evaluation with prostate MRI.  Mild perivesicular fat stranding. Question cystitis. Correlate with   urinalysis. Right greater than left lung base atelectasis, with near complete   atelectasis of the right lower lobe.    US Thyroid (04.20.22 @ 12:10)   Enlarged heterogeneous thyroid gland with moderate increased vascularity.   No focal nodule or mass.  Compared with previous study dated 3/31/2017, the thyroid gland is   slightly larger in size and the vascularity has definitively increased.  Recommend TSH level and thyroid hormone levels to exclude   hyperthyroidism. Graves' disease or Hashimoto's thyroiditis are both   considerations. Please correlate.  Elevated thyroglobulin levels can be seen in both Graves' disease and   Hashimoto's thyroiditis.    CT Head No Cont (04.19.22 @ 18:26) >  Previously seen focus of increased density in the right cerebellar   hemisphere is decreased in attenuation. The focus is slightly increased   in size, currently measuring 7 mm, previously measuring 5 mm.    MR Head w/wo IV Cont (04.18.22 @ 09:55)   Confluent FLAIR hyperintensity along the ventricles is nonspecific but   most commonly seen in the setting of chronic white matter microvascular   change.  Small foci of prior hemorrhage versus calcification involving the right   cerebellar hemisphere.  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    VA Duplex Lower Ext Vein Scan, Bilat (04.15.22 @ 14:29)   Acute below the knee DVT confined to the left soleal vein.

## 2022-05-04 NOTE — H&P ADULT - NSHPPHYSICALEXAM_GEN_ALL_CORE
PHYSICAL EXAMINATION   VItals: T(C): 36.7 (05-04-22 @ 16:44), Max: 37.1 (05-04-22 @ 05:02)  HR: 89 (05-04-22 @ 16:44) (76 - 89)  BP: 146/89 (05-04-22 @ 16:44) (136/76 - 154/75)  RR: 16 (05-04-22 @ 16:44) (16 - 20)  SpO2: 93% (05-04-22 @ 16:44) (93% - 97%)      General: NAD, Resting Comfortable                                HEENT: NC/AT, EOM I, PERRLA, Normal Conjunctivae  Cardio: RRR, Normal S1-S2, No M/G/R                              Pulm: No Respiratory Distress,  Lungs CTA - diminished to base bilaterally                         Abdomen: ND/NT, Soft, BS+                                                MSK: No joint swelling, Full ROM                                         Ext: No C/C/E, Pulses 2+ throughout, No calf tenderness    Skin:  all skin intact                                                                   Neurological Examination    Cognitive: AAO x 3                                                                         Attention: fair - able to do days of week backwards from sunday - wed, then struggles with the rest, needed cues  Judgment: Good evidence of judgement                               Mood/Affect: flat and withdrawn at times                                                              Communication:  Fluent,  No dysarthria   CN: hearing good, EOMI, symmetric facial movement, tongue midline, equal shoulder shrugs  Coordination: FTN intact                                                                              Sensory: Intact to light touch, PP and Vibration                                                                                             Tone: normal Throughout     Motor    LEFT    UE: SF [5/5], EF [5/5], EE [5/5], WE [5/5],  [5/5]  RIGHT UE: SF [5/5], EF [5/5], EE [5/5], WE [5/5],  [5/5]  LEFT    LE:  HF [5/5], KE [5/5], DF [5/5], PF [5/5]  RIGHT LE:  HF [5/5], KE [5/5], DF [5/5], PF [5/5]      Reflex: Rodriguez - negative PHYSICAL EXAMINATION   VItals: T(C): 36.7 (05-04-22 @ 16:44), Max: 37.1 (05-04-22 @ 05:02)  HR: 89 (05-04-22 @ 16:44) (76 - 89)  BP: 146/89 (05-04-22 @ 16:44) (136/76 - 154/75)  RR: 16 (05-04-22 @ 16:44) (16 - 20)  SpO2: 93% (05-04-22 @ 16:44) (93% - 97%)      General: NAD, Resting Comfortable                                HEENT: NC/AT, EOM I, PERRLA, Normal Conjunctivae  Cardio: RRR, Normal S1-S2, No M/G/R                              Pulm: No Respiratory Distress,  Lungs CTA - diminished to base bilaterally                         Abdomen: ND/NT, Soft, BS+                                                MSK: No joint swelling, Full ROM                                         Ext: No C/C/E, Pulses 2+ throughout, No calf tenderness    Skin:  all skin intact                                                                   Neurological Examination    Cognitive: AAO x 3                                                                         Attention: fair - able to do days of week backwards from Sunday - wed, then struggles with the rest, needed cues  Judgment: Good evidence of judgement                               Mood/Affect: flat and withdrawn at times                                                              Communication:  Fluent,  No dysarthria   CN: VFF, +LR,  EOMI, symmetric facial movement, tongue midline, equal shoulder shrugs  Coordination: FTN intact                                                                              Sensory: Intact to light touch, PP and Vibration                                                                                             Tone: normal Throughout     Motor    LEFT    UE: SF [5/5], EF [5/5], EE [5/5], WE [5/5],  [5/5]  RIGHT UE: SF [5/5], EF [5/5], EE [5/5], WE [5/5],  [5/5]  LEFT    LE:  HF [5/5], KE [5/5], DF [5/5], PF [5/5]  RIGHT LE:  HF [5/5], KE [5/5], DF [5/5], PF [5/5]      Reflex: Rodriguez - negative

## 2022-05-04 NOTE — PROGRESS NOTE ADULT - SUBJECTIVE AND OBJECTIVE BOX
Name of Patient : PEPITO BALL  MRN: [id number]  Date of visit: 05-04-22       Subjective: Patient seen and examined. No new events except as noted.     REVIEW OF SYSTEMS:    CONSTITUTIONAL: No weakness, fevers or chills  EYES/ENT: No visual changes;  No vertigo or throat pain   NECK: No pain or stiffness  RESPIRATORY: No cough, wheezing, hemoptysis; No shortness of breath  CARDIOVASCULAR: No chest pain or palpitations  GASTROINTESTINAL: No abdominal or epigastric pain. No nausea, vomiting, or hematemesis; No diarrhea or constipation. No melena or hematochezia.  GENITOURINARY: No dysuria, frequency or hematuria  NEUROLOGICAL: No numbness or weakness  SKIN: No itching, burning, rashes, or lesions   All other review of systems is negative unless indicated above.    MEDICATIONS:  MEDICATIONS  (STANDING):  albuterol/ipratropium for Nebulization 3 milliLiter(s) Nebulizer every 6 hours  amLODIPine   Tablet 10 milliGRAM(s) Oral daily  apixaban 5 milliGRAM(s) Oral two times a day  cefuroxime   Tablet 500 milliGRAM(s) Oral every 12 hours  dextrose 5%. 1000 milliLiter(s) (100 mL/Hr) IV Continuous <Continuous>  dextrose 5%. 1000 milliLiter(s) (50 mL/Hr) IV Continuous <Continuous>  dextrose 50% Injectable 25 Gram(s) IV Push once  dextrose 50% Injectable 12.5 Gram(s) IV Push once  dextrose 50% Injectable 25 Gram(s) IV Push once  glucagon  Injectable 1 milliGRAM(s) IntraMuscular once  insulin glargine Injectable (LANTUS) 10 Unit(s) SubCutaneous at bedtime  insulin lispro (ADMELOG) corrective regimen sliding scale   SubCutaneous three times a day before meals  insulin lispro (ADMELOG) corrective regimen sliding scale   SubCutaneous at bedtime  insulin lispro Injectable (ADMELOG) 4 Unit(s) SubCutaneous three times a day before meals  lacosamide 150 milliGRAM(s) Oral two times a day  lactobacillus acidophilus 1 Tablet(s) Oral daily  levothyroxine 88 MICROGram(s) Oral daily  lisinopril 40 milliGRAM(s) Oral daily  magnesium hydroxide Suspension 30 milliLiter(s) Oral once  pantoprazole    Tablet 40 milliGRAM(s) Oral before breakfast  polyethylene glycol 3350 17 Gram(s) Oral two times a day  QUEtiapine 50 milliGRAM(s) Oral at bedtime  risperiDONE   Tablet 1 milliGRAM(s) Oral <User Schedule>  senna 2 Tablet(s) Oral at bedtime  sodium chloride 0.9%. 1000 milliLiter(s) (50 mL/Hr) IV Continuous <Continuous>  sodium chloride 0.9%. 1000 milliLiter(s) (50 mL/Hr) IV Continuous <Continuous>  thiamine 100 milliGRAM(s) Oral daily      PHYSICAL EXAM:  T(C): 36.3 (05-04-22 @ 20:06), Max: 37.1 (05-04-22 @ 05:02)  HR: 88 (05-04-22 @ 20:06) (76 - 89)  BP: 143/77 (05-04-22 @ 20:06) (136/76 - 152/79)  RR: 16 (05-04-22 @ 20:06) (16 - 20)  SpO2: 96% (05-04-22 @ 20:06) (93% - 97%)  Wt(kg): --  I&O's Summary    03 May 2022 07:01  -  04 May 2022 07:00  --------------------------------------------------------  IN: 480 mL / OUT: 101 mL / NET: 379 mL    04 May 2022 07:01  -  04 May 2022 23:01  --------------------------------------------------------  IN: 320 mL / OUT: 100 mL / NET: 220 mL      Height (cm): 170.2 (05-04 @ 16:44)  Weight (kg): 66.1 (05-04 @ 16:44)  BMI (kg/m2): 22.8 (05-04 @ 16:44)  BSA (m2): 1.77 (05-04 @ 16:44)    Appearance: Normal	  HEENT:  PERRLA   Lymphatic: No lymphadenopathy   Cardiovascular: Normal S1 S2, no JVD  Respiratory: normal effort , clear  Gastrointestinal:  Soft, Non-tender  Skin: No rashes,  warm to touch  Psychiatry:  Mood & affect appropriate  Musculuskeletal: No edema      All labs, Imaging and EKGs personally reviewed     05-03-22 @ 07:01  -  05-04-22 @ 07:00  --------------------------------------------------------  IN: 480 mL / OUT: 101 mL / NET: 379 mL    05-04-22 @ 07:01  -  05-04-22 @ 23:01  --------------------------------------------------------  IN: 320 mL / OUT: 100 mL / NET: 220 mL                          9.9    5.40  )-----------( 373      ( 04 May 2022 06:27 )             30.8               05-04    136  |  102  |  34<H>  ----------------------------<  118<H>  4.3   |  24  |  1.38<H>    Ca    8.9      04 May 2022 06:28

## 2022-05-04 NOTE — PROGRESS NOTE ADULT - PROBLEM SELECTOR PLAN 3
CTA chest 4/16 with RLL atelectasis. CT chest 4/20 with increase in RLL atelectasis.  -Congested cough at times. Now much improved.  -Continue Duoneb q6h, change to q6h PRN on discharge.   -Chest PT q6h  -Keep sats >90% with O2 PRN (currently RA)  -CT chest 4/27 likely with bibasilar atelectasis CTA chest 4/16 with RLL atelectasis. CT chest 4/20 with increase in RLL atelectasis.  -Congested cough at times. Now much improved.  -Continue Duoneb q6h, change to q6h PRN on discharge.   -Chest PT q6h  -Keep sats >90% with O2 PRN (currently RA)  -CT chest 4/27 with bibasilar atelectasis

## 2022-05-05 LAB
ALBUMIN SERPL ELPH-MCNC: 2.4 G/DL — LOW (ref 3.3–5)
ALP SERPL-CCNC: 75 U/L — SIGNIFICANT CHANGE UP (ref 40–120)
ALT FLD-CCNC: 35 U/L — SIGNIFICANT CHANGE UP (ref 10–45)
ANION GAP SERPL CALC-SCNC: 10 MMOL/L — SIGNIFICANT CHANGE UP (ref 5–17)
AST SERPL-CCNC: 42 U/L — HIGH (ref 10–40)
BASOPHILS # BLD AUTO: 0 K/UL — SIGNIFICANT CHANGE UP (ref 0–0.2)
BASOPHILS NFR BLD AUTO: 0 % — SIGNIFICANT CHANGE UP (ref 0–2)
BILIRUB SERPL-MCNC: 0.2 MG/DL — SIGNIFICANT CHANGE UP (ref 0.2–1.2)
BUN SERPL-MCNC: 35 MG/DL — HIGH (ref 7–23)
CALCIUM SERPL-MCNC: 9.2 MG/DL — SIGNIFICANT CHANGE UP (ref 8.4–10.5)
CHLORIDE SERPL-SCNC: 104 MMOL/L — SIGNIFICANT CHANGE UP (ref 96–108)
CO2 SERPL-SCNC: 24 MMOL/L — SIGNIFICANT CHANGE UP (ref 22–31)
CREAT SERPL-MCNC: 1.2 MG/DL — SIGNIFICANT CHANGE UP (ref 0.5–1.3)
CULTURE RESULTS: SIGNIFICANT CHANGE UP
CULTURE RESULTS: SIGNIFICANT CHANGE UP
EGFR: 64 ML/MIN/1.73M2 — SIGNIFICANT CHANGE UP
EOSINOPHIL # BLD AUTO: 0.05 K/UL — SIGNIFICANT CHANGE UP (ref 0–0.5)
EOSINOPHIL NFR BLD AUTO: 1 % — SIGNIFICANT CHANGE UP (ref 0–6)
GLUCOSE BLDC GLUCOMTR-MCNC: 123 MG/DL — HIGH (ref 70–99)
GLUCOSE SERPL-MCNC: 122 MG/DL — HIGH (ref 70–99)
HCT VFR BLD CALC: 31.6 % — LOW (ref 39–50)
HGB BLD-MCNC: 10 G/DL — LOW (ref 13–17)
LYMPHOCYTES # BLD AUTO: 1.8 K/UL — SIGNIFICANT CHANGE UP (ref 1–3.3)
LYMPHOCYTES # BLD AUTO: 38 % — SIGNIFICANT CHANGE UP (ref 13–44)
MANUAL SMEAR VERIFICATION: SIGNIFICANT CHANGE UP
MCHC RBC-ENTMCNC: 29.1 PG — SIGNIFICANT CHANGE UP (ref 27–34)
MCHC RBC-ENTMCNC: 31.6 GM/DL — LOW (ref 32–36)
MCV RBC AUTO: 91.9 FL — SIGNIFICANT CHANGE UP (ref 80–100)
MONOCYTES # BLD AUTO: 0.38 K/UL — SIGNIFICANT CHANGE UP (ref 0–0.9)
MONOCYTES NFR BLD AUTO: 8 % — SIGNIFICANT CHANGE UP (ref 2–14)
NEUTROPHILS # BLD AUTO: 2.32 K/UL — SIGNIFICANT CHANGE UP (ref 1.8–7.4)
NEUTROPHILS NFR BLD AUTO: 49 % — SIGNIFICANT CHANGE UP (ref 43–77)
NRBC # BLD: 0 /100 — SIGNIFICANT CHANGE UP (ref 0–0)
PLAT MORPH BLD: NORMAL — SIGNIFICANT CHANGE UP
PLATELET # BLD AUTO: 337 K/UL — SIGNIFICANT CHANGE UP (ref 150–400)
PLATELET COUNT - ESTIMATE: NORMAL — SIGNIFICANT CHANGE UP
POTASSIUM SERPL-MCNC: 5 MMOL/L — SIGNIFICANT CHANGE UP (ref 3.5–5.3)
POTASSIUM SERPL-SCNC: 5 MMOL/L — SIGNIFICANT CHANGE UP (ref 3.5–5.3)
PROT SERPL-MCNC: 8.4 G/DL — HIGH (ref 6–8.3)
RBC # BLD: 3.44 M/UL — LOW (ref 4.2–5.8)
RBC # FLD: 14.3 % — SIGNIFICANT CHANGE UP (ref 10.3–14.5)
RBC BLD AUTO: NORMAL — SIGNIFICANT CHANGE UP
SARS-COV-2 RNA SPEC QL NAA+PROBE: SIGNIFICANT CHANGE UP
SODIUM SERPL-SCNC: 138 MMOL/L — SIGNIFICANT CHANGE UP (ref 135–145)
SPECIMEN SOURCE: SIGNIFICANT CHANGE UP
SPECIMEN SOURCE: SIGNIFICANT CHANGE UP
VARIANT LYMPHS # BLD: 4 % — SIGNIFICANT CHANGE UP (ref 0–6)
WBC # BLD: 4.73 K/UL — SIGNIFICANT CHANGE UP (ref 3.8–10.5)
WBC # FLD AUTO: 4.73 K/UL — SIGNIFICANT CHANGE UP (ref 3.8–10.5)

## 2022-05-05 PROCEDURE — 99232 SBSQ HOSP IP/OBS MODERATE 35: CPT

## 2022-05-05 RX ORDER — QUETIAPINE FUMARATE 200 MG/1
12.5 TABLET, FILM COATED ORAL ONCE
Refills: 0 | Status: DISCONTINUED | OUTPATIENT
Start: 2022-05-05 | End: 2022-05-09

## 2022-05-05 RX ADMIN — SENNA PLUS 2 TABLET(S): 8.6 TABLET ORAL at 21:42

## 2022-05-05 RX ADMIN — Medication 88 MICROGRAM(S): at 06:01

## 2022-05-05 RX ADMIN — LISINOPRIL 40 MILLIGRAM(S): 2.5 TABLET ORAL at 06:01

## 2022-05-05 RX ADMIN — AMLODIPINE BESYLATE 10 MILLIGRAM(S): 2.5 TABLET ORAL at 06:01

## 2022-05-05 RX ADMIN — Medication 500 MILLIGRAM(S): at 17:53

## 2022-05-05 RX ADMIN — Medication 100 MILLIGRAM(S): at 11:19

## 2022-05-05 RX ADMIN — PANTOPRAZOLE SODIUM 40 MILLIGRAM(S): 20 TABLET, DELAYED RELEASE ORAL at 06:01

## 2022-05-05 RX ADMIN — Medication 500 MILLIGRAM(S): at 06:01

## 2022-05-05 RX ADMIN — LACOSAMIDE 150 MILLIGRAM(S): 50 TABLET ORAL at 17:53

## 2022-05-05 RX ADMIN — RISPERIDONE 1 MILLIGRAM(S): 4 TABLET ORAL at 07:48

## 2022-05-05 RX ADMIN — QUETIAPINE FUMARATE 50 MILLIGRAM(S): 200 TABLET, FILM COATED ORAL at 21:43

## 2022-05-05 RX ADMIN — APIXABAN 5 MILLIGRAM(S): 2.5 TABLET, FILM COATED ORAL at 17:53

## 2022-05-05 RX ADMIN — APIXABAN 5 MILLIGRAM(S): 2.5 TABLET, FILM COATED ORAL at 06:01

## 2022-05-05 RX ADMIN — LACOSAMIDE 150 MILLIGRAM(S): 50 TABLET ORAL at 06:01

## 2022-05-05 RX ADMIN — Medication 1 TABLET(S): at 11:19

## 2022-05-05 NOTE — DIETITIAN INITIAL EVALUATION ADULT - PERTINENT LABORATORY DATA
05-05    138  |  104  |  35<H>  ----------------------------<  122<H>  5.0   |  24  |  1.20    Ca    9.2      05 May 2022 07:05    TPro  8.4<H>  /  Alb  2.4<L>  /  TBili  0.2  /  DBili  x   /  AST  42<H>  /  ALT  35  /  AlkPhos  75  05-05  POCT Blood Glucose.: 123 mg/dL (05-05-22 @ 07:44)  A1C with Estimated Average Glucose Result: 5.9 % (04-13-22 @ 09:20)

## 2022-05-05 NOTE — DIETITIAN INITIAL EVALUATION ADULT - PERTINENT MEDS FT
MEDICATIONS  (STANDING):  amLODIPine   Tablet 10 milliGRAM(s) Oral daily  apixaban 5 milliGRAM(s) Oral two times a day  cefuroxime   Tablet 500 milliGRAM(s) Oral every 12 hours  dextrose 5%. 1000 milliLiter(s) (50 mL/Hr) IV Continuous <Continuous>  dextrose 5%. 1000 milliLiter(s) (100 mL/Hr) IV Continuous <Continuous>  glucagon  Injectable 1 milliGRAM(s) IntraMuscular once  lacosamide 150 milliGRAM(s) Oral two times a day  lactobacillus acidophilus 1 Tablet(s) Oral daily  levothyroxine 88 MICROGram(s) Oral daily  lisinopril 40 milliGRAM(s) Oral daily  pantoprazole    Tablet 40 milliGRAM(s) Oral before breakfast  polyethylene glycol 3350 17 Gram(s) Oral two times a day  QUEtiapine 50 milliGRAM(s) Oral at bedtime  QUEtiapine 12.5 milliGRAM(s) Oral once  risperiDONE   Tablet 1 milliGRAM(s) Oral <User Schedule>  senna 2 Tablet(s) Oral at bedtime  thiamine 100 milliGRAM(s) Oral daily    MEDICATIONS  (PRN):

## 2022-05-05 NOTE — DIETITIAN INITIAL EVALUATION ADULT - OTHER INFO
73 y/o male with PMH of  HTN, hypothyroidism who presented to  [facility] on 4/12  with seizures, found to have cerebellar hemorrhage. Intubated for airway protection at OSH, extubated 4/13. Called to consult for increased chest congestion. CTA chest with atelectasis and small RUL segmental branch PE, LE duplex with L soleal vein DVT. MR head with small foci of hemorrhage. Pt started on heparin gtt per neuro and vascular cardiology, now changed to Eliquis. Hospital course significant for UTI (e-coli) now s/p ABT per ID. He was treated with IVIG and steroids for seizures.   Patient now with gait Instability, ADL impairments and Functional impairments.

## 2022-05-05 NOTE — CONSULT NOTE ADULT - SUBJECTIVE AND OBJECTIVE BOX
HPI:  HPI:  This is a 71 y/o male with PMH of  HTN, hypothyroidism who presented to  [facility] on 4/12  with seizures, found to have cerebellar hemorrhage. Intubated for airway protection at OSH, extubated 4/13. Called to consult for increased chest congestion. CTA chest with atelectasis and small RUL segmental branch PE, LE duplex with L soleal vein DVT. MR head with small foci of hemorrhage. Pt started on heparin gtt per neuro and vascular cardiology, now changed to Eliquis. Hospital course significant for UTI _e-coli) now s/p ABT per ID. He was treated with IVIG and steroids for seizures. Patient was evaluated by PM&R and therapy for functional deficits, gait/ADL impairments and acute rehabilitation was recommended. Patient was medically optimized for discharge to [facility] IRU on 5/4/22. (04 May 2022 13:56)    Pt reports feeling well, no complaints and no pain anywhere. no fever, chills, sob, cp, abd pain, n/v/d.    PAST MEDICAL & SURGICAL HISTORY:  HTN (hypertension)    Diverticulosis    Hypothyroid    No significant past surgical history    Family history: pt denies any DM, HTN, CVA or CAD in 1st degree family.      Social history: reports quit smoking more than 10 years ago. used to smoke 2 packs a day for "many years". denies ETOH or illicit drug use.      Allergies    fish (Hives; Angioedema)  No Known Drug Allergies      MEDICATIONS  (STANDING):  amLODIPine   Tablet 10 milliGRAM(s) Oral daily  apixaban 5 milliGRAM(s) Oral two times a day  cefuroxime   Tablet 500 milliGRAM(s) Oral every 12 hours  dextrose 5%. 1000 milliLiter(s) (50 mL/Hr) IV Continuous <Continuous>  dextrose 5%. 1000 milliLiter(s) (100 mL/Hr) IV Continuous <Continuous>  glucagon  Injectable 1 milliGRAM(s) IntraMuscular once  lacosamide 150 milliGRAM(s) Oral two times a day  lactobacillus acidophilus 1 Tablet(s) Oral daily  levothyroxine 88 MICROGram(s) Oral daily  lisinopril 40 milliGRAM(s) Oral daily  pantoprazole    Tablet 40 milliGRAM(s) Oral before breakfast  polyethylene glycol 3350 17 Gram(s) Oral two times a day  QUEtiapine 50 milliGRAM(s) Oral at bedtime  QUEtiapine 12.5 milliGRAM(s) Oral once  risperiDONE   Tablet 1 milliGRAM(s) Oral <User Schedule>  senna 2 Tablet(s) Oral at bedtime  thiamine 100 milliGRAM(s) Oral daily    MEDICATIONS  (PRN):        T(C): 36.7 (05-05-22 @ 08:27), Max: 36.7 (05-04-22 @ 16:44)  HR: 60 (05-05-22 @ 08:27) (60 - 89)  BP: 135/88 (05-05-22 @ 08:27) (133/71 - 146/89)  RR: 16 (05-05-22 @ 08:27) (16 - 16)  SpO2: 97% (05-05-22 @ 08:27) (93% - 97%)  Wt(kg): --Vital Signs Last 24 Hrs  T(C): 36.7 (05 May 2022 08:27), Max: 36.7 (04 May 2022 16:44)  T(F): 98 (05 May 2022 08:27), Max: 98.1 (04 May 2022 16:44)  HR: 60 (05 May 2022 08:27) (60 - 89)  BP: 135/88 (05 May 2022 08:27) (133/71 - 146/89)  BP(mean): --  RR: 16 (05 May 2022 08:27) (16 - 16)  SpO2: 97% (05 May 2022 08:27) (93% - 97%)    PHYSICAL EXAM:  GENERAL: NAD, well-groomed, well-developed  HEAD:  Atraumatic, Normocephalic  EYES: EOMI, PERRLA, conjunctiva and sclera clear  ENMT: No tonsillar erythema, exudates, or enlargement; Moist mucous membranes, Good dentition, No lesions  NECK: Supple, No JVD, Normal thyroid  NERVOUS SYSTEM:  aaox 3. cn II to XII grossly intact. muscle strength 5/5 throughout  HEART: Regular rate and rhythm; No murmurs, rubs, or gallops  ABDOMEN: Soft, Nontender, Nondistended; Bowel sounds present  EXTREMITIES: No clubbing, cyanosis, or edema  SKIN: No rashes or lesions    LABS:                        10.0   4.73  )-----------( 337      ( 05 May 2022 07:05 )             31.6     05-05    138  |  104  |  35<H>  ----------------------------<  122<H>  5.0   |  24  |  1.20    Ca    9.2      05 May 2022 07:05    TPro  8.4<H>  /  Alb  2.4<L>  /  TBili  0.2  /  DBili  x   /  AST  42<H>  /  ALT  35  /  AlkPhos  75  05-05         CAPILLARY BLOOD GLUCOSE      POCT Blood Glucose.: 123 mg/dL (05 May 2022 07:44)  POCT Blood Glucose.: 131 mg/dL (04 May 2022 17:57)        RADIOLOGY & ADDITIONAL TESTS:    < from: MR Head w/wo IV Cont (04.18.22 @ 09:55) >    IMPRESSION:  Confluent FLAIR hyperintensity along the ventricles is nonspecific but   most commonly seen in the setting of chronic white matter microvascular   change.    Small foci of prior hemorrhage versus calcification involving the right   cerebellar hemisphere.    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    --- End of Report ---    < end of copied text >    Consultant(s) Notes Reviewed:  [x ] YES  [ ] NO  Care Discussed with Consultants/Other Providers [ x] YES  [ ] NO d/w rehab team during IDR round  Imaging Personally Reviewed:  [ ] YES  [ ] NO

## 2022-05-05 NOTE — DIETITIAN NUTRITION RISK NOTIFICATION - PHYSICAL ASSESSMENT CALF
Denial of pantoprazole sent to Dr Bledsoe.   
PA has been denied by insurance.  Letter is in media   
PA submitted through [facility] for Pantoprazole Sodium 20MG dr tablets.  Pending   
moderate

## 2022-05-05 NOTE — DIETITIAN INITIAL EVALUATION ADULT - NS FNS DIET ORDER
Diet, Consistent Carbohydrate/No Snacks:   Supplement Feeding Modality:  Oral  Glucerna Shake Cans or Servings Per Day:  3       Frequency:  Daily (05-04-22 @ 14:18)

## 2022-05-05 NOTE — CONSULT NOTE ADULT - ASSESSMENT
72 year old Male with a PMHx of HTN, hypothyroidism, who presented to [facility] via transfer from OSH. Patient originally presented to to [place] ED via EMS after family called 911 following a witnessed seizure lasting for 5 mins. Pt also had tongue laceration. Patient was subsequently intubated for GCS 6 and CT/CTA performed showing a small cerebellar hemorrhage. Hospital course complicated by acute PE and L soleal vein DVT. course further complicated by UTI and completed course of abx. Pt was treated with steroid and IVIG for seizure. Now medically optimized and transferred to [facility] for rehab.    New onset seizure of unclear etiology. s/p IVIG and steroid   - S/P LP- CSF with no growth   - Fall, seizure, aspiration precautions  - S/P MR brain; Small foci of prior hemorrhage versus calcification involving the right cerebellar hemisphere.  - vimpat 150mg BID    CVA  - MRI showed chronic small foci of hemorrhage in right cerebellar hemisphere and chronic bilateral thalamic infarct and right BG infarct.  - cont Eliquis   - BP goal < 140/90   - LDL 38.   - Monitor H/H closely and for any signs/symptoms of bleeding       Acute DVT and PE  - cont with Eliquis      Hypothyroid   - thyroid US w/ Enlarged goiter    - cont synthyroid 88mcg  - Thyroid antibodies elevated, per endo at [facility], cont to monitor TFTS for now   - Repeat TFTs in 4 weeks outpatient       HTN  - TTE EF of 75%, normal LV systolic function   - Continue with Amlodipine 10mg and Lisinopril 40mg   - Monitor BP closely    Pre-DM  - A1C of 5.9  - Outpatient follow up       PPX  - Eliquis 5 BID  - PPI     IMPROVE VTE Individual Risk Assessment    RISK                                                                Points    [  ] Previous VTE                                                  3    [  ] Thrombophilia                                               2    [  ] Lower limb paralysis                                      2        (unable to hold up >15 seconds)      [  ] Current Cancer                                              2         (within 6 months)    [  x] Immobilization > 24 hrs                                1    [  ] ICU/CCU stay > 24 hours                              1    [  x] Age > 60                                                      1    IMPROVE VTE Score ___2______    IMPROVE Score 0-1: Low Risk, No VTE prophylaxis required for most patients, encourage ambulation.   IMPROVE Score 2-3: At risk, pharmacologic VTE prophylaxis is indicated for most patients (in the absence of a contraindication)  IMPROVE Score > or = 4: High Risk, pharmacologic VTE prophylaxis is indicated for most patients (in the absence of a contraindication)

## 2022-05-05 NOTE — DIETITIAN INITIAL EVALUATION ADULT - ORAL INTAKE PTA/DIET HISTORY
pt confused but able to answer some questions. Pt reports fair appetite PTA. Eats 3 meals/day, could not recall usual diet recall/specifics of diet PTA. Pt uncertain of UBW.     Pt noted with steroid-induced hyperglycemia at previous hospitalization. HbA1c of 5.9% indicates pre-diabetes. Pt admitted on consistent carbohydrate diet.

## 2022-05-05 NOTE — DIETITIAN NUTRITION RISK NOTIFICATION - TREATMENT: THE FOLLOWING DIET HAS BEEN RECOMMENDED
Diet, Consistent Carbohydrate/No Snacks:   Supplement Feeding Modality:  Oral  Glucerna Shake Cans or Servings Per Day:  3       Frequency:  Daily (05-04-22 @ 14:18) [Active]

## 2022-05-05 NOTE — PROGRESS NOTE ADULT - SUBJECTIVE AND OBJECTIVE BOX
CHIEF COMPLAINT: restless and confused past night, required enhanced supervision       HISTORY OF PRESENT ILLNESS    This is a 73 y/o male with PMH of  HTN, hypothyroidism who presented to  [facility] on 4/12  with seizures, found to have cerebellar hemorrhage. Intubated for airway protection at OSH, extubated 4/13. Called to consult for increased chest congestion. CTA chest with atelectasis and small RUL segmental branch PE, LE duplex with L soleal vein DVT. MR head with small foci of hemorrhage. Pt started on heparin gtt per neuro and vascular cardiology, now changed to Eliquis. Hospital course significant for UTI _e-coli) now s/p ABT per ID. He was treated with IVIG and steroids for seizures. Patient was evaluated by PM&R and therapy for functional deficits, gait/ADL impairments and acute rehabilitation was recommended. Patient was medically optimized for discharge to [facility] IRU on 5/4/22. (04 May 2022 13:56)        PAST MEDICAL & SURGICAL HISTORY:  HTN (hypertension)    Diverticulosis    Hypothyroid    No significant past surgical history    VITALS  Vital Signs Last 24 Hrs  T(C): 36.7 (05 May 2022 08:27), Max: 36.7 (05 May 2022 08:27)  T(F): 98 (05 May 2022 08:27), Max: 98 (05 May 2022 08:27)  HR: 60 (05 May 2022 08:27) (60 - 88)  BP: 135/88 (05 May 2022 08:27) (133/71 - 143/77)  BP(mean): --  RR: 16 (05 May 2022 08:27) (16 - 16)  SpO2: 97% (05 May 2022 08:27) (96% - 97%)      PHYSICAL EXAM  Constitutional - NAD, Comfortable  HEENT - NCAT, EOMI  Neck - Supple, No limited ROM  Chest - CTA bilaterally,   Cardiovascular - RRR,   Abdomen - , Soft, NTND  Neurologic Exam - no new focal deficits                          RECENT LABS                        10.0   4.73  )-----------( 337      ( 05 May 2022 07:05 )             31.6     05-05    138  |  104  |  35<H>  ----------------------------<  122<H>  5.0   |  24  |  1.20    Ca    9.2      05 May 2022 07:05    TPro  8.4<H>  /  Alb  2.4<L>  /  TBili  0.2  /  DBili  x   /  AST  42<H>  /  ALT  35  /  AlkPhos  75  05-05      LIVER FUNCTIONS - ( 05 May 2022 07:05 )  Alb: 2.4 g/dL / Pro: 8.4 g/dL / ALK PHOS: 75 U/L / ALT: 35 U/L / AST: 42 U/L / GGT: x                 Valproic Acid Level, Serum: 82 ug/mL (04-13-22 @ 14:12)                RADIOLOGY/OTHER RESULTS      CURRENT MEDICATIONS  MEDICATIONS  (STANDING):  amLODIPine   Tablet 10 milliGRAM(s) Oral daily  apixaban 5 milliGRAM(s) Oral two times a day  cefuroxime   Tablet 500 milliGRAM(s) Oral every 12 hours  dextrose 5%. 1000 milliLiter(s) (50 mL/Hr) IV Continuous <Continuous>  dextrose 5%. 1000 milliLiter(s) (100 mL/Hr) IV Continuous <Continuous>  glucagon  Injectable 1 milliGRAM(s) IntraMuscular once  lacosamide 150 milliGRAM(s) Oral two times a day  lactobacillus acidophilus 1 Tablet(s) Oral daily  levothyroxine 88 MICROGram(s) Oral daily  lisinopril 40 milliGRAM(s) Oral daily  pantoprazole    Tablet 40 milliGRAM(s) Oral before breakfast  polyethylene glycol 3350 17 Gram(s) Oral two times a day  QUEtiapine 50 milliGRAM(s) Oral at bedtime  QUEtiapine 12.5 milliGRAM(s) Oral once  risperiDONE   Tablet 1 milliGRAM(s) Oral <User Schedule>  senna 2 Tablet(s) Oral at bedtime  thiamine 100 milliGRAM(s) Oral daily    MEDICATIONS  (PRN):      ASSESSMENT & PLAN          GI/Bowel Management - Dulcolax PRN, Fleet PRN   Management - Toilet Q2  Skin - Turn Q2  Pain - Tylenol PRN  DVT PPX - TEDs, SCDs  Diet -     Continue comprehensive acute rehab program consisting of 3hrs/day of OT/PT and SLP.

## 2022-05-05 NOTE — PROGRESS NOTE ADULT - ASSESSMENT
ASSESSMENT/PLAN  This is a 71 y/o male with PMH of  HTN, hypothyroidism who presented to  [facility] on 4/12  with seizures, found to have cerebellar hemorrhage. Intubated for airway protection at OSH, extubated 4/13. Called to consult for increased chest congestion. CTA chest with atelectasis and small RUL segmental branch PE, LE duplex with L soleal vein DVT. MR head with small foci of hemorrhage. Pt started on heparin gtt per neuro and vascular cardiology, now changed to Eliquis. Hospital course significant for UTI (e-coli) now s/p ABT per ID. He was treated with IVIG and steroids for seizures.   Patient now with gait Instability, ADL impairments and Functional impairments.    # CVA  - MR head with small Right cerebellar hemorrhage  - Start Comprehensive Rehab Program: PT/OT/ST, 3hours daily and 5 days weekly  - PT: Focused on improving strength, endurance, coordination, balance, functional mobility, and transfers  - OT: Focused on improving strength, fine motor skills, coordination, posture and ADLs.    - ST: to diagnose and treat deficits in swallowing, cognition and communication.     #Seizure  - Vimpat 150mg BID  - Seizure precautions    #HTN  - Amlodipine 10mg daily  - Lisinopril 40mg daily    #Hypothyroidism  - Synthroid 88mcg daily    #Steroid induced hyperglycemia  - Was on Lantus 10 and premeal Admelog 4 - DC  - FS on admission 131 - trend since completion of steroid <140  - A1c 5.9 on 4/13  - Monitor via labs    #DVT/PE  - L soleal vein + RUL segmental branch PE  - Eliquis 5mg BID with GI ppx    #UTI  - Ceftin 500mg  BID x 7 days (last dose 5/9) as per ID    #Pain management  - Tylenol PRN    #GI ppx  - Protonix 40mg    #Bowel Regimen  - Senna, Miralax BID    #Bladder management  - BS on admission, and q 8 hours (SC if > 400)  - Monitor UO    #FEN   - Diet: Regular [Consistent carb - no snacks]  - SLP: evaluation and treatment    #Skin:  - No active issues at this time  - Pressure injury/Skin: Turn Q2hrs while in bed, OOB to Chair, PT/OT     #Mood/Cognition:  - Seroquel 50mg nightly  - Neuropsychology decline  - Recreation therapy  - Enhanced 3:1 supervision     #Sleep:   - Maintain quiet hours and low stim environment.  - Monitor sleep logs    #Precaution  - Fall, Aspiration, Seizure     Outpatient Follow-up (Specialty/Name of physician):  Juan Bernabe (DO)  Medicine  [address] [facility], [facility] [[email or web address] [address]  [place] [address]  Phone: [phone]  Fax: [phone]  Follow Up Time:     Efren Fragoso (DO)  Cardiology; Internal Medicine  [address], [address]  [place] [address]  Phone: [phone]  Fax: [phone]

## 2022-05-06 PROCEDURE — 70450 CT HEAD/BRAIN W/O DYE: CPT | Mod: 26

## 2022-05-06 PROCEDURE — 99232 SBSQ HOSP IP/OBS MODERATE 35: CPT

## 2022-05-06 RX ORDER — ACETAMINOPHEN 500 MG
650 TABLET ORAL EVERY 6 HOURS
Refills: 0 | Status: DISCONTINUED | OUTPATIENT
Start: 2022-05-06 | End: 2022-05-24

## 2022-05-06 RX ORDER — TRAMADOL HYDROCHLORIDE 50 MG/1
50 TABLET ORAL ONCE
Refills: 0 | Status: DISCONTINUED | OUTPATIENT
Start: 2022-05-06 | End: 2022-05-06

## 2022-05-06 RX ADMIN — SENNA PLUS 2 TABLET(S): 8.6 TABLET ORAL at 21:21

## 2022-05-06 RX ADMIN — APIXABAN 5 MILLIGRAM(S): 2.5 TABLET, FILM COATED ORAL at 17:19

## 2022-05-06 RX ADMIN — RISPERIDONE 1 MILLIGRAM(S): 4 TABLET ORAL at 07:28

## 2022-05-06 RX ADMIN — LISINOPRIL 40 MILLIGRAM(S): 2.5 TABLET ORAL at 05:37

## 2022-05-06 RX ADMIN — LACOSAMIDE 150 MILLIGRAM(S): 50 TABLET ORAL at 05:37

## 2022-05-06 RX ADMIN — PANTOPRAZOLE SODIUM 40 MILLIGRAM(S): 20 TABLET, DELAYED RELEASE ORAL at 05:37

## 2022-05-06 RX ADMIN — LACOSAMIDE 150 MILLIGRAM(S): 50 TABLET ORAL at 17:19

## 2022-05-06 RX ADMIN — Medication 650 MILLIGRAM(S): at 15:38

## 2022-05-06 RX ADMIN — Medication 500 MILLIGRAM(S): at 17:19

## 2022-05-06 RX ADMIN — Medication 500 MILLIGRAM(S): at 05:37

## 2022-05-06 RX ADMIN — Medication 650 MILLIGRAM(S): at 14:12

## 2022-05-06 RX ADMIN — Medication 100 MILLIGRAM(S): at 12:11

## 2022-05-06 RX ADMIN — TRAMADOL HYDROCHLORIDE 50 MILLIGRAM(S): 50 TABLET ORAL at 18:51

## 2022-05-06 RX ADMIN — AMLODIPINE BESYLATE 10 MILLIGRAM(S): 2.5 TABLET ORAL at 05:37

## 2022-05-06 RX ADMIN — Medication 1 TABLET(S): at 12:11

## 2022-05-06 RX ADMIN — QUETIAPINE FUMARATE 50 MILLIGRAM(S): 200 TABLET, FILM COATED ORAL at 21:21

## 2022-05-06 RX ADMIN — TRAMADOL HYDROCHLORIDE 50 MILLIGRAM(S): 50 TABLET ORAL at 15:46

## 2022-05-06 RX ADMIN — POLYETHYLENE GLYCOL 3350 17 GRAM(S): 17 POWDER, FOR SOLUTION ORAL at 05:38

## 2022-05-06 RX ADMIN — Medication 88 MICROGRAM(S): at 05:37

## 2022-05-06 RX ADMIN — APIXABAN 5 MILLIGRAM(S): 2.5 TABLET, FILM COATED ORAL at 05:38

## 2022-05-06 NOTE — PROGRESS NOTE ADULT - SUBJECTIVE AND OBJECTIVE BOX
Patient is a 72y old  Male who presents with a chief complaint of CVA (06 May 2022 12:11)      Subjective and overnight events:  Patient seen and examined at bedside. pt impulsive at night with some agitation. no fever, chills, sob, cp, abd pain    ALLERGIES:  fish (Hives; Angioedema)  No Known Drug Allergies    MEDICATIONS  (STANDING):  amLODIPine   Tablet 10 milliGRAM(s) Oral daily  apixaban 5 milliGRAM(s) Oral two times a day  cefuroxime   Tablet 500 milliGRAM(s) Oral every 12 hours  dextrose 5%. 1000 milliLiter(s) (50 mL/Hr) IV Continuous <Continuous>  dextrose 5%. 1000 milliLiter(s) (100 mL/Hr) IV Continuous <Continuous>  glucagon  Injectable 1 milliGRAM(s) IntraMuscular once  lacosamide 150 milliGRAM(s) Oral two times a day  lactobacillus acidophilus 1 Tablet(s) Oral daily  levothyroxine 88 MICROGram(s) Oral daily  lisinopril 40 milliGRAM(s) Oral daily  pantoprazole    Tablet 40 milliGRAM(s) Oral before breakfast  polyethylene glycol 3350 17 Gram(s) Oral two times a day  QUEtiapine 50 milliGRAM(s) Oral at bedtime  QUEtiapine 12.5 milliGRAM(s) Oral once  risperiDONE   Tablet 1 milliGRAM(s) Oral <User Schedule>  senna 2 Tablet(s) Oral at bedtime  thiamine 100 milliGRAM(s) Oral daily    MEDICATIONS  (PRN):  acetaminophen     Tablet .. 650 milliGRAM(s) Oral every 6 hours PRN Moderate Pain (4 - 6), Severe Pain (7 - 10)    Vital Signs Last 24 Hrs  T(F): 98.5 (06 May 2022 07:40), Max: 98.5 (06 May 2022 07:40)  HR: 77 (06 May 2022 15:52) (72 - 92)  BP: 129/63 (06 May 2022 15:52) (116/62 - 155/79)  RR: 16 (06 May 2022 15:52) (14 - 16)  SpO2: 95% (06 May 2022 15:52) (93% - 95%)  I&O's Summary    05 May 2022 07:01  -  06 May 2022 07:00  --------------------------------------------------------  IN: 0 mL / OUT: 520 mL / NET: -520 mL      PHYSICAL EXAM:  General: NAD, Awake alert answering questions appropriately  ENT: MMM  Neck: Supple, No JVD  Lungs: Clear to auscultation bilaterally  Cardio: RRR, S1/S2, No murmurs  Abdomen: Soft, Nontender, Nondistended; Bowel sounds present  Extremities: No calf tenderness, No pitting edema    LABS:                        10.0   4.73  )-----------( 337      ( 05 May 2022 07:05 )             31.6     05-05    138  |  104  |  35  ----------------------------<  122  5.0   |  24  |  1.20    Ca    9.2      05 May 2022 07:05    TPro  8.4  /  Alb  2.4  /  TBili  0.2  /  DBili  x   /  AST  42  /  ALT  35  /  AlkPhos  75  05-05              04-13 Chol 128 mg/dL LDL -- HDL 67 mg/dL Trig 116 mg/dL          Culture - Urine (collected 02 May 2022 04:41)  Source: Clean Catch Clean Catch (Midstream)  Final Report (03 May 2022 06:54):    No growth    Culture - Blood (collected 30 Apr 2022 06:06)  Source: .Blood Blood-Venous  Final Report (05 May 2022 07:00):    No Growth Final    Culture - Blood (collected 30 Apr 2022 06:06)  Source: .Blood Blood-Venous  Final Report (05 May 2022 07:00):    No Growth Final    Culture - Urine (collected 30 Apr 2022 05:54)  Source: Clean Catch Clean Catch (Midstream)  Final Report (01 May 2022 10:18):    No growth      COVID-19 PCR: NotDetec (05-04-22 @ 18:40)  COVID-19 PCR: NotDetec (05-04-22 @ 06:30)  COVID-19 PCR: NotDetec (04-28-22 @ 06:17)  COVID-19 PCR: NotDetec (04-27-22 @ 09:03)  COVID-19 PCR: NotDetec (04-25-22 @ 10:11)      RADIOLOGY & ADDITIONAL TESTS:    Care Discussed with Consultants/Other Providers: d/w primary team during IDR round

## 2022-05-06 NOTE — PROGRESS NOTE ADULT - SUBJECTIVE AND OBJECTIVE BOX
SUBJECTIVE/ROS: Restless and multiple attempts with getting out of bed overnight.  Patient does not always recall.  Better with family present.  Wants to dangle out of bed to eat breakfast, educated on safety and potential fall risk and encourage to sit in WC.  Tolerating therapy - 5/5 feels very capable.  Denies CP, SOB, fever, chills, HA, dizziness, nausea, abd pain, or dysuria.      Patient is a 72y old  Male who presents with a chief complaint of CVA (05 May 2022 18:45)    HPI:  This is a 73 y/o male with PMH of  HTN, hypothyroidism who presented to  [facility] on 4/12  with seizures, found to have cerebellar hemorrhage. Intubated for airway protection at OSH, extubated 4/13. Called to consult for increased chest congestion. CTA chest with atelectasis and small RUL segmental branch PE, LE duplex with L soleal vein DVT. MR head with small foci of hemorrhage. Pt started on heparin gtt per neuro and vascular cardiology, now changed to Eliquis. Hospital course significant for UTI _e-coli) now s/p ABT per ID. He was treated with IVIG and steroids for seizures. Patient was evaluated by PM&R and therapy for functional deficits, gait/ADL impairments and acute rehabilitation was recommended. Patient was medically optimized for discharge to [facility] IRU on 5/4/22. (04 May 2022 13:56)      PAST MEDICAL & SURGICAL HISTORY:  HTN (hypertension)  Diverticulosis  Hypothyroid    No significant past surgical history    Allergies  fish (Hives; Angioedema)  No Known Drug Allergies      PHYSICAL EXAM  72y  Vital Signs Last 24 Hrs  T(C): 36.9 (06 May 2022 07:40), Max: 36.9 (06 May 2022 07:40)  T(F): 98.5 (06 May 2022 07:40), Max: 98.5 (06 May 2022 07:40)  HR: 90 (06 May 2022 07:40) (72 - 92)  BP: 155/79 (06 May 2022 07:40) (116/62 - 155/79)  RR: 16 (06 May 2022 07:40) (14 - 16)  SpO2: 93% (06 May 2022 07:40) (93% - 94%)      RECENT LABS:                          10.0   4.73  )-----------( 337      ( 05 May 2022 07:05 )             31.6     05-05    138  |  104  |  35<H>  ----------------------------<  122<H>  5.0   |  24  |  1.20    Ca    9.2      05 May 2022 07:05    TPro  8.4<H>  /  Alb  2.4<L>  /  TBili  0.2  /  DBili  x   /  AST  42<H>  /  ALT  35  /  AlkPhos  75  05-05    LIVER FUNCTIONS - ( 05 May 2022 07:05 )  Alb: 2.4 g/dL / Pro: 8.4 g/dL / ALK PHOS: 75 U/L / ALT: 35 U/L / AST: 42 U/L / GGT: x             Culture - Urine (collected 05-02-22 @ 04:41)  Source: Clean Catch Clean Catch (Midstream)  Final Report (05-03-22 @ 06:54):    No growth      MEDICATIONS  (STANDING):  amLODIPine   Tablet 10 milliGRAM(s) Oral daily  apixaban 5 milliGRAM(s) Oral two times a day  cefuroxime   Tablet 500 milliGRAM(s) Oral every 12 hours  dextrose 5%. 1000 milliLiter(s) (50 mL/Hr) IV Continuous <Continuous>  dextrose 5%. 1000 milliLiter(s) (100 mL/Hr) IV Continuous <Continuous>  glucagon  Injectable 1 milliGRAM(s) IntraMuscular once  lacosamide 150 milliGRAM(s) Oral two times a day  lactobacillus acidophilus 1 Tablet(s) Oral daily  levothyroxine 88 MICROGram(s) Oral daily  lisinopril 40 milliGRAM(s) Oral daily  pantoprazole    Tablet 40 milliGRAM(s) Oral before breakfast  polyethylene glycol 3350 17 Gram(s) Oral two times a day  QUEtiapine 50 milliGRAM(s) Oral at bedtime  QUEtiapine 12.5 milliGRAM(s) Oral once  risperiDONE   Tablet 1 milliGRAM(s) Oral <User Schedule>  senna 2 Tablet(s) Oral at bedtime  thiamine 100 milliGRAM(s) Oral daily    MEDICATIONS  (PRN):

## 2022-05-06 NOTE — PROGRESS NOTE ADULT - ASSESSMENT
72 year old Male with a PMHx of HTN, hypothyroidism, who presented to [facility] via transfer from OSH. Patient originally presented to to [place] ED via EMS after family called 911 following a witnessed seizure lasting for 5 mins. Pt also had tongue laceration. Patient was subsequently intubated for GCS 6 and CT/CTA performed showing a small cerebellar hemorrhage. Hospital course complicated by acute PE and L soleal vein DVT. course further complicated by UTI and completed course of abx. Pt was treated with steroid and IVIG for seizure. Now medically optimized and transferred to [facility] for rehab.    New onset seizure of unclear etiology. s/p IVIG and steroid   - S/P LP- CSF with no growth   - Fall, seizure, aspiration precautions  - S/P MR brain; Small foci of prior hemorrhage versus calcification involving the right cerebellar hemisphere.  - vimpat 150mg BID    CVA  - MRI showed chronic small foci of hemorrhage in right cerebellar hemisphere and chronic bilateral thalamic infarct and right BG infarct.  - cont Eliquis   - BP goal < 140/90   - LDL 38. no indication for statin   - Monitor H/H closely and for any signs/symptoms of bleeding     Agitation and impulsive behaviors  - Seroquel 50mg qHS   - risperidone      Acute DVT and PE  - cont with Eliquis      Hypothyroid   - thyroid US w/ Enlarged goiter    - cont synthyroid 88mcg  - Thyroid antibodies elevated, per endo at [facility], cont to monitor TFTS for now   - Repeat TFTs in 4 weeks outpatient     HTN  - TTE EF of 75%, normal LV systolic function   - Continue with Amlodipine 10mg and Lisinopril 40mg   - Monitor BP closely    Pre-DM  - A1C of 5.9  - Outpatient follow up       PPX  - Eliquis 5 BID  - PPI

## 2022-05-06 NOTE — PROGRESS NOTE ADULT - ASSESSMENT
ASSESSMENT/PLAN  This is a 71 y/o male with PMH of  HTN, hypothyroidism who presented to  [facility] on 4/12  with seizures, found to have cerebellar hemorrhage. Intubated for airway protection at OSH, extubated 4/13. Called to consult for increased chest congestion. CTA chest with atelectasis and small RUL segmental branch PE, LE duplex with L soleal vein DVT. MR head with small foci of hemorrhage. Pt started on heparin gtt per neuro and vascular cardiology, now changed to Eliquis. Hospital course significant for UTI (e-coli) now s/p ABT per ID. He was treated with IVIG and steroids for seizures.   Patient now with gait Instability, ADL impairments and Functional impairments.    # CVA  - MR head with small Right cerebellar hemorrhage  - Start Comprehensive Rehab Program: PT/OT/ST, 3hours daily and 5 days weekly  - PT: Focused on improving strength, endurance, coordination, balance, functional mobility, and transfers  - OT: Focused on improving strength, fine motor skills, coordination, posture and ADLs.    - ST: to diagnose and treat deficits in swallowing, cognition and communication.     #Seizure  - Vimpat 150mg BID    #HTN  - Amlodipine 10mg daily  - Lisinopril 40mg daily    #Hypothyroidism  - Synthroid 88mcg daily    #Steroid induced hyperglycemia  - Was on Lantus 10 and premeal Admelog 4 - DC  - FS on admission 131 - trend since completion of steroid <140  - A1c 5.9 on 4/13  - Monitor via labs    #DVT/PE  - L soleal vein + RUL segmental branch PE  - Eliquis 5mg BID    #UTI  - Ceftin 500mg  BID x 7 days (last dose 5/9)    #Pain management  - Tylenol PRN    #GI ppx  - Protonix 40mg    #Bowel Regimen  - Senna, Miralax BID    #Bladder management  - BS on admission, and q 8 hours (SC if > 400)  - Monitor UO    #FEN   - Diet: Regular [Consistent carb - no snacks]  - SLP: evaluation and treatment    #Skin:  - No active issues at this time  - Pressure injury/Skin: Turn Q2hrs while in bed, OOB to Chair, PT/OT     #Mood/Cognition:  - Seroquel 50mg nightly  - Neuropsychology decline  - Recreation therapy    #Sleep:   - Maintain quiet hours and low stim environment.  - Monitor sleep logs    #Precaution  - Fall, Aspiration, Seizure     Outpatient Follow-up (Specialty/Name of physician):  Juan Bernabe (DO)  Medicine  [address], [facility] [address]  [place] [address]  Phone: [phone]  Fax: [phone]  Follow Up Time:     Efren Fragoso (DO)  Cardiology; Internal Medicine  [address], [address]  [place] [address]  Phone: [phone]  Fax: [phone]

## 2022-05-07 PROCEDURE — 99232 SBSQ HOSP IP/OBS MODERATE 35: CPT

## 2022-05-07 RX ADMIN — POLYETHYLENE GLYCOL 3350 17 GRAM(S): 17 POWDER, FOR SOLUTION ORAL at 17:20

## 2022-05-07 RX ADMIN — AMLODIPINE BESYLATE 10 MILLIGRAM(S): 2.5 TABLET ORAL at 05:35

## 2022-05-07 RX ADMIN — LISINOPRIL 40 MILLIGRAM(S): 2.5 TABLET ORAL at 05:35

## 2022-05-07 RX ADMIN — SENNA PLUS 2 TABLET(S): 8.6 TABLET ORAL at 21:47

## 2022-05-07 RX ADMIN — Medication 100 MILLIGRAM(S): at 12:39

## 2022-05-07 RX ADMIN — POLYETHYLENE GLYCOL 3350 17 GRAM(S): 17 POWDER, FOR SOLUTION ORAL at 05:31

## 2022-05-07 RX ADMIN — APIXABAN 5 MILLIGRAM(S): 2.5 TABLET, FILM COATED ORAL at 05:30

## 2022-05-07 RX ADMIN — Medication 500 MILLIGRAM(S): at 17:20

## 2022-05-07 RX ADMIN — Medication 1 TABLET(S): at 12:39

## 2022-05-07 RX ADMIN — LACOSAMIDE 150 MILLIGRAM(S): 50 TABLET ORAL at 05:30

## 2022-05-07 RX ADMIN — PANTOPRAZOLE SODIUM 40 MILLIGRAM(S): 20 TABLET, DELAYED RELEASE ORAL at 05:30

## 2022-05-07 RX ADMIN — QUETIAPINE FUMARATE 50 MILLIGRAM(S): 200 TABLET, FILM COATED ORAL at 21:47

## 2022-05-07 RX ADMIN — APIXABAN 5 MILLIGRAM(S): 2.5 TABLET, FILM COATED ORAL at 17:21

## 2022-05-07 RX ADMIN — LACOSAMIDE 150 MILLIGRAM(S): 50 TABLET ORAL at 17:21

## 2022-05-07 RX ADMIN — Medication 88 MICROGRAM(S): at 05:30

## 2022-05-07 RX ADMIN — Medication 500 MILLIGRAM(S): at 05:30

## 2022-05-07 NOTE — PROGRESS NOTE ADULT - SUBJECTIVE AND OBJECTIVE BOX
Chief complaint: cooperative, comfortable, no new complaints     Patient is a 72y old  Male who presents with a chief complaint of CVA (06 May 2022 16:35)        HEALTH ISSUES - PROBLEM Dx:  Pulmonary thromboembolism                PAST MEDICAL & SURGICAL HISTORY:  HTN (hypertension)    Diverticulosis    Hypothyroid    No significant past surgical history             VITALS  Vital Signs Last 24 Hrs  T(C): 36.6 (07 May 2022 08:32), Max: 36.6 (07 May 2022 08:32)  T(F): 97.8 (07 May 2022 08:32), Max: 97.8 (07 May 2022 08:32)  HR: 85 (07 May 2022 08:32) (77 - 90)  BP: 160/74 (07 May 2022 08:32) (129/63 - 160/74)  BP(mean): --  RR: 15 (07 May 2022 08:32) (15 - 16)  SpO2: 94% (07 May 2022 08:32) (94% - 95%)      PHYSICAL EXAM  Constitutional - NAD, Comfortable  HEENT - NCAT, EOMI  Neck - Supple, No limited ROM  Chest - CTA bilaterally  Cardiovascular - RRR,  Abdomen -  Soft, NTND  Extremities -  No calf tenderness   Neurologic Exam -                    Cognitive - Awake, Alert     No new focal deficits                  CURRENT MEDICATIONS    MEDICATIONS  (STANDING):  amLODIPine   Tablet 10 milliGRAM(s) Oral daily  apixaban 5 milliGRAM(s) Oral two times a day  cefuroxime   Tablet 500 milliGRAM(s) Oral every 12 hours  dextrose 5%. 1000 milliLiter(s) (50 mL/Hr) IV Continuous <Continuous>  dextrose 5%. 1000 milliLiter(s) (100 mL/Hr) IV Continuous <Continuous>  glucagon  Injectable 1 milliGRAM(s) IntraMuscular once  lacosamide 150 milliGRAM(s) Oral two times a day  lactobacillus acidophilus 1 Tablet(s) Oral daily  levothyroxine 88 MICROGram(s) Oral daily  lisinopril 40 milliGRAM(s) Oral daily  pantoprazole    Tablet 40 milliGRAM(s) Oral before breakfast  polyethylene glycol 3350 17 Gram(s) Oral two times a day  QUEtiapine 50 milliGRAM(s) Oral at bedtime  QUEtiapine 12.5 milliGRAM(s) Oral once  risperiDONE   Tablet 1 milliGRAM(s) Oral <User Schedule>  senna 2 Tablet(s) Oral at bedtime  thiamine 100 milliGRAM(s) Oral daily    MEDICATIONS  (PRN):  acetaminophen     Tablet .. 650 milliGRAM(s) Oral every 6 hours PRN Moderate Pain (4 - 6), Severe Pain (7 - 10)    ASSESSMENT & PLAN          GI/Bowel Management - Dulcolax PRN, Fleet PRN   Management - Toilet Q2  Skin - Turn Q2  Pain - Tylenol PRN  DVT PPX - Apixaban       Continue comprehensive acute rehab program consisting of 3hrs/day of OT/PT and SLP. Chief complaint: cooperative, comfortable, no new complaints     Patient is a 72y old  Male who presents with a chief complaint of CVA (06 May 2022 16:35)        HEALTH ISSUES - PROBLEM Dx:  Pulmonary thromboembolism                PAST MEDICAL & SURGICAL HISTORY:  HTN (hypertension)    Diverticulosis    Hypothyroid    No significant past surgical history             VITALS  Vital Signs Last 24 Hrs  T(C): 36.6 (07 May 2022 08:32), Max: 36.6 (07 May 2022 08:32)  T(F): 97.8 (07 May 2022 08:32), Max: 97.8 (07 May 2022 08:32)  HR: 85 (07 May 2022 08:32) (77 - 90)  BP: 160/74 (07 May 2022 08:32) (129/63 - 160/74)  BP(mean): --  RR: 15 (07 May 2022 08:32) (15 - 16)  SpO2: 94% (07 May 2022 08:32) (94% - 95%)      PHYSICAL EXAM  Constitutional - NAD, Comfortable  HEENT - NCAT, EOMI  Neck - Supple, No limited ROM  Chest - CTA bilaterally  Cardiovascular - RRR,  Abdomen -  Soft, NTND  Extremities -  No calf tenderness   Neurologic Exam -                    Cognitive - Awake, Alert     No new focal deficits                  CURRENT MEDICATIONS    MEDICATIONS  (STANDING):  amLODIPine   Tablet 10 milliGRAM(s) Oral daily  apixaban 5 milliGRAM(s) Oral two times a day  cefuroxime   Tablet 500 milliGRAM(s) Oral every 12 hours  dextrose 5%. 1000 milliLiter(s) (50 mL/Hr) IV Continuous <Continuous>  dextrose 5%. 1000 milliLiter(s) (100 mL/Hr) IV Continuous <Continuous>  glucagon  Injectable 1 milliGRAM(s) IntraMuscular once  lacosamide 150 milliGRAM(s) Oral two times a day  lactobacillus acidophilus 1 Tablet(s) Oral daily  levothyroxine 88 MICROGram(s) Oral daily  lisinopril 40 milliGRAM(s) Oral daily  pantoprazole    Tablet 40 milliGRAM(s) Oral before breakfast  polyethylene glycol 3350 17 Gram(s) Oral two times a day  QUEtiapine 50 milliGRAM(s) Oral at bedtime  QUEtiapine 12.5 milliGRAM(s) Oral once  risperiDONE   Tablet 1 milliGRAM(s) Oral <User Schedule>  senna 2 Tablet(s) Oral at bedtime  thiamine 100 milliGRAM(s) Oral daily    MEDICATIONS  (PRN):  acetaminophen     Tablet .. 650 milliGRAM(s) Oral every 6 hours PRN Moderate Pain (4 - 6), Severe Pain (7 - 10)    ASSESSMENT & PLAN          GI/Bowel Management - Dulcolax PRN, Fleet PRN   Management - Toilet Q2  Skin - Turn Q2  Pain - Tylenol PRN  DVT PPX - Apixaban   Will ask Psychiatry to evaluate - patient is on 2 antipsychotics now - ? future management       Continue comprehensive acute rehab program consisting of 3hrs/day of OT/PT and SLP.

## 2022-05-08 PROCEDURE — 99232 SBSQ HOSP IP/OBS MODERATE 35: CPT

## 2022-05-08 RX ADMIN — APIXABAN 5 MILLIGRAM(S): 2.5 TABLET, FILM COATED ORAL at 06:18

## 2022-05-08 RX ADMIN — Medication 500 MILLIGRAM(S): at 06:18

## 2022-05-08 RX ADMIN — AMLODIPINE BESYLATE 10 MILLIGRAM(S): 2.5 TABLET ORAL at 06:18

## 2022-05-08 RX ADMIN — LACOSAMIDE 150 MILLIGRAM(S): 50 TABLET ORAL at 17:37

## 2022-05-08 RX ADMIN — QUETIAPINE FUMARATE 50 MILLIGRAM(S): 200 TABLET, FILM COATED ORAL at 21:53

## 2022-05-08 RX ADMIN — APIXABAN 5 MILLIGRAM(S): 2.5 TABLET, FILM COATED ORAL at 17:37

## 2022-05-08 RX ADMIN — Medication 500 MILLIGRAM(S): at 17:37

## 2022-05-08 RX ADMIN — PANTOPRAZOLE SODIUM 40 MILLIGRAM(S): 20 TABLET, DELAYED RELEASE ORAL at 06:18

## 2022-05-08 RX ADMIN — Medication 88 MICROGRAM(S): at 06:18

## 2022-05-08 RX ADMIN — POLYETHYLENE GLYCOL 3350 17 GRAM(S): 17 POWDER, FOR SOLUTION ORAL at 17:41

## 2022-05-08 RX ADMIN — RISPERIDONE 1 MILLIGRAM(S): 4 TABLET ORAL at 08:02

## 2022-05-08 RX ADMIN — SENNA PLUS 2 TABLET(S): 8.6 TABLET ORAL at 21:53

## 2022-05-08 RX ADMIN — LISINOPRIL 40 MILLIGRAM(S): 2.5 TABLET ORAL at 06:18

## 2022-05-08 RX ADMIN — Medication 1 TABLET(S): at 12:35

## 2022-05-08 RX ADMIN — LACOSAMIDE 150 MILLIGRAM(S): 50 TABLET ORAL at 06:21

## 2022-05-08 RX ADMIN — POLYETHYLENE GLYCOL 3350 17 GRAM(S): 17 POWDER, FOR SOLUTION ORAL at 06:19

## 2022-05-08 RX ADMIN — Medication 100 MILLIGRAM(S): at 12:35

## 2022-05-08 NOTE — PROGRESS NOTE ADULT - SUBJECTIVE AND OBJECTIVE BOX
Patient seen and examined at bedside. Patient is w/o complaints this morning. Enjoying his breakfast.     ALLERGIES:  fish (Hives; Angioedema)  No Known Drug Allergies    MEDICATIONS  (STANDING):  amLODIPine   Tablet 10 milliGRAM(s) Oral daily  apixaban 5 milliGRAM(s) Oral two times a day  cefuroxime   Tablet 500 milliGRAM(s) Oral every 12 hours  dextrose 5%. 1000 milliLiter(s) (100 mL/Hr) IV Continuous <Continuous>  dextrose 5%. 1000 milliLiter(s) (50 mL/Hr) IV Continuous <Continuous>  glucagon  Injectable 1 milliGRAM(s) IntraMuscular once  lacosamide 150 milliGRAM(s) Oral two times a day  lactobacillus acidophilus 1 Tablet(s) Oral daily  levothyroxine 88 MICROGram(s) Oral daily  lisinopril 40 milliGRAM(s) Oral daily  pantoprazole    Tablet 40 milliGRAM(s) Oral before breakfast  polyethylene glycol 3350 17 Gram(s) Oral two times a day  QUEtiapine 12.5 milliGRAM(s) Oral once  QUEtiapine 50 milliGRAM(s) Oral at bedtime  risperiDONE   Tablet 1 milliGRAM(s) Oral <User Schedule>  senna 2 Tablet(s) Oral at bedtime  thiamine 100 milliGRAM(s) Oral daily    MEDICATIONS  (PRN):  acetaminophen     Tablet .. 650 milliGRAM(s) Oral every 6 hours PRN Moderate Pain (4 - 6), Severe Pain (7 - 10)    Vital Signs Last 24 Hrs  T(F): 97.7 (08 May 2022 08:17), Max: 97.7 (08 May 2022 08:17)  HR: 67 (08 May 2022 08:17) (67 - 82)  BP: 133/83 (08 May 2022 08:17) (133/83 - 158/67)  RR: 15 (08 May 2022 08:17) (15 - 16)  SpO2: 96% (08 May 2022 08:17) (93% - 96%)  I&O's Summary    BMI (kg/m2): 22.8 (05-04-22 @ 16:44)  PHYSICAL EXAM:  Gen: nad, resting in bed  Neuro: aaox3, no focal deficits  Heent: eomi b/l, no jvd, no oral exudates  Pulm: cta b/l, no w/r/r  CV: +s1s2, no m/r/g  Ab: soft, nt/nd, normoactive bs x 4  Extrem: no edema, pulses intact and equal      LABS:                        04-13 Chol 128 mg/dL LDL -- HDL 67 mg/dL Trig 116 mg/dL          Culture - Urine (collected 02 May 2022 04:41)  Source: Clean Catch Clean Catch (Midstream)  Final Report (03 May 2022 06:54):    No growth      COVID-19 PCR: NotDetec (05-04-22 @ 18:40)  COVID-19 PCR: NotDetec (05-04-22 @ 06:30)  COVID-19 PCR: NotDetec (04-28-22 @ 06:17)  COVID-19 PCR: NotDetec (04-27-22 @ 09:03)  COVID-19 PCR: NotDetec (04-25-22 @ 10:11)

## 2022-05-08 NOTE — PROGRESS NOTE ADULT - SUBJECTIVE AND OBJECTIVE BOX
Chief complaint: no new complaints, no acute events overnight     Patient is a 72y old  Male who presents with a chief complaint of CVA (08 May 2022 10:07)    HEALTH ISSUES - PROBLEM Dx:  Pulmonary thromboembolism      PAST MEDICAL & SURGICAL HISTORY:  HTN (hypertension)    Diverticulosis    Hypothyroid    No significant past surgical history    Vital Signs Last 24 Hrs  T(C): 36.5 (08 May 2022 08:17), Max: 36.5 (08 May 2022 08:17)  T(F): 97.7 (08 May 2022 08:17), Max: 97.7 (08 May 2022 08:17)  HR: 67 (08 May 2022 08:17) (67 - 82)  BP: 133/83 (08 May 2022 08:17) (133/83 - 158/67)  BP(mean): --  RR: 15 (08 May 2022 08:17) (15 - 16)  SpO2: 96% (08 May 2022 08:17) (93% - 96%)      PHYSICAL EXAM  Constitutional - NAD, Comfortable  HEENT - NCAT, EOMI  Neck - Supple, No limited ROM  Chest - CTA bilaterally  Cardiovascular - RRR  Abdomen - Soft, NTND  Extremities -  No calf tenderness   Neurologic Exam -                    Cognitive - Awake, Alert     No new focal deficits                      CURRENT MEDICATIONS    MEDICATIONS  (STANDING):  amLODIPine   Tablet 10 milliGRAM(s) Oral daily  apixaban 5 milliGRAM(s) Oral two times a day  cefuroxime   Tablet 500 milliGRAM(s) Oral every 12 hours  dextrose 5%. 1000 milliLiter(s) (100 mL/Hr) IV Continuous <Continuous>  dextrose 5%. 1000 milliLiter(s) (50 mL/Hr) IV Continuous <Continuous>  glucagon  Injectable 1 milliGRAM(s) IntraMuscular once  lacosamide 150 milliGRAM(s) Oral two times a day  lactobacillus acidophilus 1 Tablet(s) Oral daily  levothyroxine 88 MICROGram(s) Oral daily  lisinopril 40 milliGRAM(s) Oral daily  pantoprazole    Tablet 40 milliGRAM(s) Oral before breakfast  polyethylene glycol 3350 17 Gram(s) Oral two times a day  QUEtiapine 12.5 milliGRAM(s) Oral once  QUEtiapine 50 milliGRAM(s) Oral at bedtime  risperiDONE   Tablet 1 milliGRAM(s) Oral <User Schedule>  senna 2 Tablet(s) Oral at bedtime  thiamine 100 milliGRAM(s) Oral daily    MEDICATIONS  (PRN):  acetaminophen     Tablet .. 650 milliGRAM(s) Oral every 6 hours PRN Moderate Pain (4 - 6), Severe Pain (7 - 10)    ASSESSMENT & PLAN          GI/Bowel Management - Dulcolax PRN, Fleet PRN   Management - Toilet Q2  Skin - Turn Q2  Pain - Tylenol PRN  DVT PPX - Apixaban      Continue comprehensive acute rehab program consisting of 3hrs/day of OT/PT and SLP.

## 2022-05-08 NOTE — PROGRESS NOTE ADULT - ASSESSMENT
72 year old Male with a PMHx of HTN, hypothyroidism, who presented to [facility] via transfer from OSH. Patient originally presented to to [place] ED via EMS after family called 911 following a witnessed seizure lasting for 5 mins. Pt also had tongue laceration. Patient was subsequently intubated for GCS 6 and CT/CTA performed showing a small cerebellar hemorrhage. Hospital course complicated by acute PE and L soleal vein DVT. course further complicated by UTI and completed course of abx. Pt was treated with steroid and IVIG for seizure. Now medically optimized and transferred to [facility] for rehab.    Neurology  New onset seizure of unclear etiology. s/p IVIG and steroid   - S/P LP- CSF with no growth   - Fall, seizure, aspiration precautions  - S/P MR brain; Small foci of prior hemorrhage versus calcification involving the right cerebellar hemisphere.  - vimpat 150mg BID  CVA  - MRI showed chronic small foci of hemorrhage in right cerebellar hemisphere and chronic bilateral thalamic infarct and right BG infarct.  - cont Eliquis   - BP goal < 140/90   - LDL 38. no indication for statin   - Monitor H/H closely and for any signs/symptoms of bleeding     Psych  Agitation and impulsive behaviors  - Seroquel 50mg qHS   - risperidone    - psych follow-up    Pulmonary  Acute DVT and PE  - cont with Eliquis      Cardiovascular  Hypertension  - TTE EF of 75%, normal LV systolic function   - Continue with Amlodipine 10mg and Lisinopril 40mg   - Monitor BP closely      Endocrine  Hypothyroid   - thyroid US w/ Enlarged goiter    - cont synthyroid 88mcg  - Thyroid antibodies elevated, per endo at [facility], cont to monitor TFTS for now   - Repeat TFTs in 4 weeks outpatient   Pre-DM  - A1C of 5.9  - Outpatient follow up       PPX: Eliquis 5 BID  Diet: per primary  Pain control: per primary

## 2022-05-09 DIAGNOSIS — I61.4 NONTRAUMATIC INTRACEREBRAL HEMORRHAGE IN CEREBELLUM: ICD-10-CM

## 2022-05-09 DIAGNOSIS — R56.9 UNSPECIFIED CONVULSIONS: ICD-10-CM

## 2022-05-09 LAB
ALBUMIN SERPL ELPH-MCNC: 2 G/DL — LOW (ref 3.3–5)
ALBUMIN SERPL ELPH-MCNC: 2.7 G/DL — LOW (ref 3.3–5)
ALP SERPL-CCNC: 80 U/L — SIGNIFICANT CHANGE UP (ref 40–120)
ALP SERPL-CCNC: 85 U/L — SIGNIFICANT CHANGE UP (ref 40–120)
ALT FLD-CCNC: 53 U/L — HIGH (ref 10–45)
ALT FLD-CCNC: 54 U/L — HIGH (ref 10–45)
ANION GAP SERPL CALC-SCNC: 8 MMOL/L — SIGNIFICANT CHANGE UP (ref 5–17)
ANION GAP SERPL CALC-SCNC: 8 MMOL/L — SIGNIFICANT CHANGE UP (ref 5–17)
APPEARANCE UR: CLEAR — SIGNIFICANT CHANGE UP
AST SERPL-CCNC: 44 U/L — HIGH (ref 10–40)
AST SERPL-CCNC: 55 U/L — HIGH (ref 10–40)
BASOPHILS # BLD AUTO: 0.03 K/UL — SIGNIFICANT CHANGE UP (ref 0–0.2)
BASOPHILS NFR BLD AUTO: 0.7 % — SIGNIFICANT CHANGE UP (ref 0–2)
BILIRUB SERPL-MCNC: 0.2 MG/DL — SIGNIFICANT CHANGE UP (ref 0.2–1.2)
BILIRUB SERPL-MCNC: 0.2 MG/DL — SIGNIFICANT CHANGE UP (ref 0.2–1.2)
BILIRUB UR-MCNC: NEGATIVE — SIGNIFICANT CHANGE UP
BUN SERPL-MCNC: 32 MG/DL — HIGH (ref 7–23)
BUN SERPL-MCNC: SIGNIFICANT CHANGE UP MG/DL (ref 7–23)
CALCIUM SERPL-MCNC: 8.8 MG/DL — SIGNIFICANT CHANGE UP (ref 8.4–10.5)
CALCIUM SERPL-MCNC: SIGNIFICANT CHANGE UP MG/DL (ref 8.4–10.5)
CHLORIDE SERPL-SCNC: 104 MMOL/L — SIGNIFICANT CHANGE UP (ref 96–108)
CHLORIDE SERPL-SCNC: 105 MMOL/L — SIGNIFICANT CHANGE UP (ref 96–108)
CO2 SERPL-SCNC: 21 MMOL/L — LOW (ref 22–31)
CO2 SERPL-SCNC: 28 MMOL/L — SIGNIFICANT CHANGE UP (ref 22–31)
COLOR SPEC: YELLOW — SIGNIFICANT CHANGE UP
CREAT SERPL-MCNC: 1.4 MG/DL — HIGH (ref 0.5–1.3)
CREAT SERPL-MCNC: SIGNIFICANT CHANGE UP MG/DL (ref 0.5–1.3)
DIFF PNL FLD: NEGATIVE — SIGNIFICANT CHANGE UP
EGFR: 53 ML/MIN/1.73M2 — LOW
EOSINOPHIL # BLD AUTO: 0.08 K/UL — SIGNIFICANT CHANGE UP (ref 0–0.5)
EOSINOPHIL NFR BLD AUTO: 1.9 % — SIGNIFICANT CHANGE UP (ref 0–6)
EPI CELLS # UR: SIGNIFICANT CHANGE UP
GLUCOSE SERPL-MCNC: 126 MG/DL — HIGH (ref 70–99)
GLUCOSE SERPL-MCNC: SIGNIFICANT CHANGE UP MG/DL (ref 70–99)
GLUCOSE UR QL: NEGATIVE — SIGNIFICANT CHANGE UP
HCT VFR BLD CALC: 35.7 % — LOW (ref 39–50)
HGB BLD-MCNC: 11.3 G/DL — LOW (ref 13–17)
IMM GRANULOCYTES NFR BLD AUTO: 0.7 % — SIGNIFICANT CHANGE UP (ref 0–1.5)
KETONES UR-MCNC: NEGATIVE — SIGNIFICANT CHANGE UP
LEUKOCYTE ESTERASE UR-ACNC: ABNORMAL
LYMPHOCYTES # BLD AUTO: 1.01 K/UL — SIGNIFICANT CHANGE UP (ref 1–3.3)
LYMPHOCYTES # BLD AUTO: 23.7 % — SIGNIFICANT CHANGE UP (ref 13–44)
MCHC RBC-ENTMCNC: 29.7 PG — SIGNIFICANT CHANGE UP (ref 27–34)
MCHC RBC-ENTMCNC: 31.7 GM/DL — LOW (ref 32–36)
MCV RBC AUTO: 93.7 FL — SIGNIFICANT CHANGE UP (ref 80–100)
MONOCYTES # BLD AUTO: 0.36 K/UL — SIGNIFICANT CHANGE UP (ref 0–0.9)
MONOCYTES NFR BLD AUTO: 8.5 % — SIGNIFICANT CHANGE UP (ref 2–14)
NEUTROPHILS # BLD AUTO: 2.75 K/UL — SIGNIFICANT CHANGE UP (ref 1.8–7.4)
NEUTROPHILS NFR BLD AUTO: 64.5 % — SIGNIFICANT CHANGE UP (ref 43–77)
NITRITE UR-MCNC: NEGATIVE — SIGNIFICANT CHANGE UP
NRBC # BLD: 0 /100 WBCS — SIGNIFICANT CHANGE UP (ref 0–0)
PH UR: 7 — SIGNIFICANT CHANGE UP (ref 5–8)
PLATELET # BLD AUTO: 169 K/UL — SIGNIFICANT CHANGE UP (ref 150–400)
POTASSIUM SERPL-MCNC: 5.2 MMOL/L — SIGNIFICANT CHANGE UP (ref 3.5–5.3)
POTASSIUM SERPL-MCNC: 5.7 MMOL/L — HIGH (ref 3.5–5.3)
POTASSIUM SERPL-SCNC: 5.2 MMOL/L — SIGNIFICANT CHANGE UP (ref 3.5–5.3)
POTASSIUM SERPL-SCNC: 5.7 MMOL/L — HIGH (ref 3.5–5.3)
PROT SERPL-MCNC: 8 G/DL — SIGNIFICANT CHANGE UP (ref 6–8.3)
PROT SERPL-MCNC: 8.2 G/DL — SIGNIFICANT CHANGE UP (ref 6–8.3)
PROT UR-MCNC: 30 MG/DL
RBC # BLD: 3.81 M/UL — LOW (ref 4.2–5.8)
RBC # FLD: 14.7 % — HIGH (ref 10.3–14.5)
RBC CASTS # UR COMP ASSIST: NEGATIVE /HPF — SIGNIFICANT CHANGE UP (ref 0–4)
SODIUM SERPL-SCNC: 134 MMOL/L — LOW (ref 135–145)
SODIUM SERPL-SCNC: 140 MMOL/L — SIGNIFICANT CHANGE UP (ref 135–145)
SP GR SPEC: 1.01 — SIGNIFICANT CHANGE UP (ref 1.01–1.02)
UROBILINOGEN FLD QL: NEGATIVE — SIGNIFICANT CHANGE UP
WBC # BLD: 4.26 K/UL — SIGNIFICANT CHANGE UP (ref 3.8–10.5)
WBC # FLD AUTO: 4.26 K/UL — SIGNIFICANT CHANGE UP (ref 3.8–10.5)
WBC UR QL: NEGATIVE /HPF — SIGNIFICANT CHANGE UP (ref 0–5)

## 2022-05-09 PROCEDURE — 90792 PSYCH DIAG EVAL W/MED SRVCS: CPT

## 2022-05-09 PROCEDURE — 99232 SBSQ HOSP IP/OBS MODERATE 35: CPT

## 2022-05-09 PROCEDURE — 99233 SBSQ HOSP IP/OBS HIGH 50: CPT

## 2022-05-09 PROCEDURE — 70450 CT HEAD/BRAIN W/O DYE: CPT | Mod: 26

## 2022-05-09 RX ORDER — DIVALPROEX SODIUM 500 MG/1
250 TABLET, DELAYED RELEASE ORAL ONCE
Refills: 0 | Status: COMPLETED | OUTPATIENT
Start: 2022-05-09 | End: 2022-05-09

## 2022-05-09 RX ORDER — QUETIAPINE FUMARATE 200 MG/1
100 TABLET, FILM COATED ORAL AT BEDTIME
Refills: 0 | Status: DISCONTINUED | OUTPATIENT
Start: 2022-05-09 | End: 2022-05-10

## 2022-05-09 RX ORDER — DIVALPROEX SODIUM 500 MG/1
500 TABLET, DELAYED RELEASE ORAL
Refills: 0 | Status: DISCONTINUED | OUTPATIENT
Start: 2022-05-13 | End: 2022-05-24

## 2022-05-09 RX ORDER — DIVALPROEX SODIUM 500 MG/1
250 TABLET, DELAYED RELEASE ORAL THREE TIMES A DAY
Refills: 0 | Status: COMPLETED | OUTPATIENT
Start: 2022-05-10 | End: 2022-05-12

## 2022-05-09 RX ADMIN — SENNA PLUS 2 TABLET(S): 8.6 TABLET ORAL at 21:20

## 2022-05-09 RX ADMIN — APIXABAN 5 MILLIGRAM(S): 2.5 TABLET, FILM COATED ORAL at 05:39

## 2022-05-09 RX ADMIN — LACOSAMIDE 150 MILLIGRAM(S): 50 TABLET ORAL at 05:38

## 2022-05-09 RX ADMIN — RISPERIDONE 1 MILLIGRAM(S): 4 TABLET ORAL at 07:22

## 2022-05-09 RX ADMIN — LISINOPRIL 40 MILLIGRAM(S): 2.5 TABLET ORAL at 05:39

## 2022-05-09 RX ADMIN — DIVALPROEX SODIUM 250 MILLIGRAM(S): 500 TABLET, DELAYED RELEASE ORAL at 14:31

## 2022-05-09 RX ADMIN — PANTOPRAZOLE SODIUM 40 MILLIGRAM(S): 20 TABLET, DELAYED RELEASE ORAL at 05:39

## 2022-05-09 RX ADMIN — APIXABAN 5 MILLIGRAM(S): 2.5 TABLET, FILM COATED ORAL at 17:21

## 2022-05-09 RX ADMIN — POLYETHYLENE GLYCOL 3350 17 GRAM(S): 17 POWDER, FOR SOLUTION ORAL at 17:23

## 2022-05-09 RX ADMIN — LACOSAMIDE 150 MILLIGRAM(S): 50 TABLET ORAL at 17:20

## 2022-05-09 RX ADMIN — Medication 500 MILLIGRAM(S): at 05:38

## 2022-05-09 RX ADMIN — AMLODIPINE BESYLATE 10 MILLIGRAM(S): 2.5 TABLET ORAL at 05:39

## 2022-05-09 RX ADMIN — DIVALPROEX SODIUM 250 MILLIGRAM(S): 500 TABLET, DELAYED RELEASE ORAL at 21:20

## 2022-05-09 RX ADMIN — QUETIAPINE FUMARATE 100 MILLIGRAM(S): 200 TABLET, FILM COATED ORAL at 21:20

## 2022-05-09 RX ADMIN — POLYETHYLENE GLYCOL 3350 17 GRAM(S): 17 POWDER, FOR SOLUTION ORAL at 05:38

## 2022-05-09 RX ADMIN — Medication 88 MICROGRAM(S): at 05:39

## 2022-05-09 NOTE — BH CONSULTATION LIAISON ASSESSMENT NOTE - SUMMARY
This is a 71 y/o male with PMH of  HTN, hypothyroidism who presented to  [facility] on 4/12  with seizures, found to have cerebellar hemorrhage. Intubated for airway protection at OSH, extubated 4/13. Called to consult for increased chest congestion. CTA chest with atelectasis and small RUL segmental branch PE, LE duplex with L soleal vein DVT. MR head with small foci of hemorrhage. Pt started on heparin gtt per neuro and vascular cardiology, now changed to Eliquis. Hospital course significant for UTI _e-coli) now s/p ABT per ID. He was treated with IVIG and steroids for seizures. Patient was evaluated by PM&R and therapy for functional deficits, gait/ADL impairments and acute rehabilitation was recommended. Patient was medically optimized for discharge to [facility] IRU on 5/4/22.    Psychiatry consulted to evaluate for psychosis and agitation, see HPI for details. See recommendations below.

## 2022-05-09 NOTE — BH CONSULTATION LIAISON ASSESSMENT NOTE - CURRENT MEDICATION
MEDICATIONS  (STANDING):  amLODIPine   Tablet 10 milliGRAM(s) Oral daily  apixaban 5 milliGRAM(s) Oral two times a day  dextrose 5%. 1000 milliLiter(s) (50 mL/Hr) IV Continuous <Continuous>  dextrose 5%. 1000 milliLiter(s) (100 mL/Hr) IV Continuous <Continuous>  diVALproex  milliGRAM(s) Oral once  glucagon  Injectable 1 milliGRAM(s) IntraMuscular once  lacosamide 150 milliGRAM(s) Oral two times a day  lactobacillus acidophilus 1 Tablet(s) Oral daily  levothyroxine 88 MICROGram(s) Oral daily  lisinopril 40 milliGRAM(s) Oral daily  pantoprazole    Tablet 40 milliGRAM(s) Oral before breakfast  polyethylene glycol 3350 17 Gram(s) Oral two times a day  QUEtiapine 50 milliGRAM(s) Oral at bedtime  QUEtiapine 12.5 milliGRAM(s) Oral once  risperiDONE   Tablet 1 milliGRAM(s) Oral <User Schedule>  senna 2 Tablet(s) Oral at bedtime  thiamine 100 milliGRAM(s) Oral daily    MEDICATIONS  (PRN):  acetaminophen     Tablet .. 650 milliGRAM(s) Oral every 6 hours PRN Moderate Pain (4 - 6), Severe Pain (7 - 10)

## 2022-05-09 NOTE — BH CONSULTATION LIAISON ASSESSMENT NOTE - RISK ASSESSMENT
Pt seen as a low acute suicide risk. Pt with no previous SA or SIB. No previous inpatient stays  Protective factors: domiciled with wife, has 5 children, employed  Risk factors: unable to assess.

## 2022-05-09 NOTE — BH CONSULTATION LIAISON ASSESSMENT NOTE - NSBHCHARTREVIEWVS_PSY_A_CORE FT
Vital Signs Last 24 Hrs  T(C): 36.8 (09 May 2022 12:26), Max: 36.8 (09 May 2022 12:26)  T(F): 98.2 (09 May 2022 12:26), Max: 98.2 (09 May 2022 12:26)  HR: 81 (09 May 2022 12:26) (72 - 87)  BP: 113/67 (09 May 2022 12:26) (113/67 - 129/64)  BP(mean): --  RR: 15 (09 May 2022 12:26) (15 - 16)  SpO2: 96% (09 May 2022 12:26) (94% - 96%)

## 2022-05-09 NOTE — BH CONSULTATION LIAISON ASSESSMENT NOTE - NSBHCONSULTSUBST_PSY_A_CORE
[facility] EM[facility]PARTMENT ENCOUNTER    Basic Information  Patient: Pantera Allison Age: 29 year old Sex: male  MRN: [id number] Encounter Date: 6:11 AM      The patient was endorsed to me by Dr. Tank Lopez at 0600, pending crisis/placement.     ED Course  Visit Vitals  /76   Pulse 91   Temp 96.3 °F (35.7 °C) (Axillary)   Resp 15   Ht 5' 6\" (1.676 m)   Wt 65.1 kg (143 lb 8.3 oz)   SpO2 99%   BMI 23.16 kg/m²       No orders to display     On my evaluation, patient is sleeping comfortably without any distress    Patient evaluated by crisis  who recommends telehealth evaluation.  Consult placed.    Pt remained stable under my care. Pt is in no distress. Pt care transitioned to Dr. Lizeth Ho pending telehub evaluation       Impression and Plan    Diagnosis:  1. Alcoholic intoxication without complication (CMS/HCC)    2. Suicidal ideation          Condition:  STABLE                     Jessica Ho MD  12/20/21 5050     no

## 2022-05-09 NOTE — BH CONSULTATION LIAISON ASSESSMENT NOTE - NSBHCONSULTRECOMMENDOTHER_PSY_A_CORE FT
Agree with plan to switch antiseizure med to Depakote for management of seizure as well as potential to improve agitation.     Increase night time Seroquel to 100mg PO Q HS     Continue Risperdal 1mg PO Q HS  Agree with plan to switch antiseizure med to Depakote for management of seizure as well as potential to improve agitation.     Increase night time Seroquel to 100mg PO Q HS   Hold for increase sedation  Hold for BP <90/60  Hold for HR < 60  Hold for RR <14      Continue Risperdal 1mg PO Q HS  Agree with plan to switch antiseizure med to Depakote for management of seizure as well as potential to improve agitation.     Increase night time Seroquel to 100mg PO Q HS   Hold for increase sedation  Hold for BP <90/60  Hold for HR < 60  Hold for RR <14      DC Risperdal

## 2022-05-09 NOTE — PROGRESS NOTE ADULT - SUBJECTIVE AND OBJECTIVE BOX
SUBJECTIVE/ROS: Patient seen and evaluated at bedside - patient restlessly wedged himself between bedrails to dangle at bedside.  Impulsively grabs food with hand to eat and gets out of bed, ambulates to bathroom without assistive device (touching wall and sink). Does not fully follow commands or answer all questions.  AO x 2 (self and "hospital" - doesn't answer name of hospital), unable to recall why he was hospitalized. Refusing therapy.  Denies CP, SOB, Dysuria.  Ignored other questions - but does not appear to be in distress.  **Seen later again d/t nausea, vomiting, and dizziness - /58 supine and 134/86 sitting.  Will order CTH.  Ate and tolerated lunch before going for CTH.        Patient is a 72y old  Male who presents with a chief complaint of CVA (05 May 2022 18:45)    HPI:  This is a 71 y/o male with PMH of  HTN, hypothyroidism who presented to  [facility] on   with seizures, found to have cerebellar hemorrhage. Intubated for airway protection at OSH, extubated . Called to consult for increased chest congestion. CTA chest with atelectasis and small RUL segmental branch PE, LE duplex with L soleal vein DVT. MR head with small foci of hemorrhage. Pt started on heparin gtt per neuro and vascular cardiology, now changed to Eliquis. Hospital course significant for UTI _e-coli) now s/p ABT per ID. He was treated with IVIG and steroids for seizures. Patient was evaluated by PM&R and therapy for functional deficits, gait/ADL impairments and acute rehabilitation was recommended. Patient was medically optimized for discharge to [facility] IRU on 22. (04 May 2022 13:56)      PAST MEDICAL & SURGICAL HISTORY:  HTN (hypertension)  Diverticulosis  Hypothyroid    No significant past surgical history    Allergies  fish (Hives; Angioedema)  No Known Drug Allergies      PHYSICAL EXAM  72y  Vital Signs Last 24 Hrs  T(C): 36.8 (09 May 2022 12:26), Max: 36.8 (09 May 2022 12:26)  T(F): 98.2 (09 May 2022 12:26), Max: 98.2 (09 May 2022 12:26)  HR: 81 (09 May 2022 12:) (72 - 87)  BP: 113/67 (09 May 2022 12:26) (113/67 - 129/64)  RR: 15 (09 May 2022 12:26) (15 - 16)  SpO2: 96% (09 May 2022 12:26) (94% - 96%)      RECENT LABS:  LAB                        11.3   4.26  )-----------( 169      ( 09 May 2022 08:18 )             35.7     05-09 (K 5.7, repeat 5.2)    140  |  104  |  32<H>  ----------------------------<  126<H>  5.2   |  28  |  1.40<H>    Ca    8.8      09 May 2022 09:40    TPro  8.2  /  Alb  2.7<L>  /  TBili  0.2  /  DBili  x   /  AST  44<H>  /  ALT  53<H>  /  AlkPhos  85  05-09    LIVER FUNCTIONS - ( 09 May 2022 09:40 )  Alb: 2.7 g/dL / Pro: 8.2 g/dL / ALK PHOS: 85 U/L / ALT: 53 U/L / AST: 44 U/L / GGT: x           Urinalysis Basic - ( 09 May 2022 12:05 )  Color: Yellow / Appearance: Clear / S.010 / pH: x  Gluc: x / Ketone: Negative  / Bili: Negative / Urobili: Negative   Blood: x / Protein: 30 mg/dL / Nitrite: Negative   Leuk Esterase: Trace / RBC: Negative /HPF / WBC Negative /HPF   Sq Epi: x / Non Sq Epi: Neg.-Few / Bacteria: x    Culture - Urine (collected 22 @ 04:41)  Source: Clean Catch Clean Catch (Midstream)  Final Report (22 @ 06:54):    No growth    CT Head No Cont (22 @ 12:53) >  No hydrocephalus, acute intracranial hemorrhage, mass effect, or brain   edema.  Moderate white matter microvascular ischemic disease.  No significant interval change from 2022      MEDICATIONS  (STANDING):  amLODIPine   Tablet 10 milliGRAM(s) Oral daily  apixaban 5 milliGRAM(s) Oral two times a day  dextrose 5%. 1000 milliLiter(s) (50 mL/Hr) IV Continuous <Continuous>  dextrose 5%. 1000 milliLiter(s) (100 mL/Hr) IV Continuous <Continuous>  glucagon  Injectable 1 milliGRAM(s) IntraMuscular once  lacosamide 150 milliGRAM(s) Oral two times a day  lactobacillus acidophilus 1 Tablet(s) Oral daily  levothyroxine 88 MICROGram(s) Oral daily  lisinopril 40 milliGRAM(s) Oral daily  pantoprazole    Tablet 40 milliGRAM(s) Oral before breakfast  polyethylene glycol 3350 17 Gram(s) Oral two times a day  QUEtiapine 50 milliGRAM(s) Oral at bedtime  QUEtiapine 12.5 milliGRAM(s) Oral once  risperiDONE   Tablet 1 milliGRAM(s) Oral <User Schedule>  senna 2 Tablet(s) Oral at bedtime  thiamine 100 milliGRAM(s) Oral daily    MEDICATIONS  (PRN):  acetaminophen     Tablet .. 650 milliGRAM(s) Oral every 6 hours PRN Moderate Pain (4 - 6), Severe Pain (7 - 10)

## 2022-05-09 NOTE — PROGRESS NOTE ADULT - ASSESSMENT
72 year old Male with a PMHx of HTN, hypothyroidism, who presented to [facility] via transfer from OSH. Patient originally presented to to [place] ED via EMS after family called 911 following a witnessed seizure lasting for 5 mins. Pt also had tongue laceration. Patient was subsequently intubated for GCS 6 and CT/CTA performed showing a small cerebellar hemorrhage. Hospital course complicated by acute PE and L soleal vein DVT. course further complicated by UTI and completed course of abx. Pt was treated with steroid and IVIG for seizure. Now medically optimized and transferred to [facility] for rehab.      *Acute DVT and PE  - cont with Eliquis  5mg BID    *Hypertension  - TTE EF of 75%, normal LV systolic function   - Continue with Amlodipine 10mg and Lisinopril 40mg   - Monitor BP closely    *Hypothyroid   - thyroid US w/ Enlarged goiter    - cont synthyroid 88mcg daily  - Thyroid antibodies elevated, per endo at [facility], cont to monitor TFTS for now   - Repeat TFTs in 4 weeks outpatient     *Pre-DM  - A1C of 5.9  - Outpatient follow up     *New onset seizure of unclear etiology  - s/p IVIG and steroid   - S/P LP- CSF with no growth   - Fall, seizure, aspiration precautions  - S/P MR brain; Small foci of prior hemorrhage versus calcification involving the right cerebellar hemisphere.  - vimpat 150mg BID    *CVA  - MRI showed chronic small foci of hemorrhage in right cerebellar hemisphere and chronic bilateral thalamic infarct and right BG infarct.  - cont Eliquis   - BP goal < 140/90   - LDL 38. no indication for statin   - Monitor H/H closely and for any signs/symptoms of bleeding     *Agitation and impulsive behaviors  - Seroquel 50mg qHS   - risperidone    - psych follow-up      PPX: Eliquis 5 BID   72 year old Male with a PMHx of HTN, hypothyroidism, who presented to [facility] via transfer from OSH. Patient originally presented to to [place] ED via EMS after family called 911 following a witnessed seizure lasting for 5 mins. Pt also had tongue laceration. Patient was subsequently intubated for GCS 6 and CT/CTA performed showing a small cerebellar hemorrhage. Hospital course complicated by acute PE and L soleal vein DVT. course further complicated by UTI and completed course of abx. Pt was treated with steroid and IVIG for seizure. Now medically optimized and transferred to [facility] for rehab.    *CKD possibly  -Noted creatinine at 1.4 today; has been as high as 1.61 on 4/29/2022  -Will check in AM as well  -Will c/w lisinopril for now  -Encourage oral intake for now     *Acute DVT and PE  - cont with Eliquis  5mg BID    *Hypertension  - TTE EF of 75%, normal LV systolic function   - Continue with Amlodipine 10mg and Lisinopril 40mg   - Monitor BP closely    *Hypothyroid   - thyroid US w/ Enlarged goiter    - cont synthyroid 88mcg daily  - Thyroid antibodies elevated, per endo at [facility], cont to monitor TFTS for now   - Repeat TFTs in 4 weeks outpatient     *Pre-DM  - A1C of 5.9  - Outpatient follow up     *New onset seizure of unclear etiology  - s/p IVIG and steroid   - S/P LP- CSF with no growth   - Fall, seizure, aspiration precautions  - S/P MR brain; Small foci of prior hemorrhage versus calcification involving the right cerebellar hemisphere.  - vimpat 150mg BID    *CVA  - MRI showed chronic small foci of hemorrhage in right cerebellar hemisphere and chronic bilateral thalamic infarct and right BG infarct.  - cont Eliquis   - BP goal < 140/90   - LDL 38. no indication for statin   - Monitor H/H closely and for any signs/symptoms of bleeding     *Agitation and impulsive behaviors  - Seroquel 50mg qHS   - risperidone    - psych follow-up      PPX: Eliquis 5 BID  AM labs

## 2022-05-09 NOTE — BH CONSULTATION LIAISON ASSESSMENT NOTE - NSBHCHARTREVIEWLAB_PSY_A_CORE FT
11.3   4.26  )-----------( 169      ( 09 May 2022 08:18 )             35.7   05-09    140  |  104  |  32<H>  ----------------------------<  126<H>  5.2   |  28  |  1.40<H>    Ca    8.8      09 May 2022 09:40    TPro  8.2  /  Alb  2.7<L>  /  TBili  0.2  /  DBili  x   /  AST  44<H>  /  ALT  53<H>  /  AlkPhos  85  05-09

## 2022-05-09 NOTE — PROGRESS NOTE ADULT - ASSESSMENT
ASSESSMENT/PLAN  This is a 73 y/o male with PMH of  HTN, hypothyroidism who presented to  [facility] on 4/12  with seizures, found to have cerebellar hemorrhage. Intubated for airway protection at OSH, extubated 4/13. Called to consult for increased chest congestion. CTA chest with atelectasis and small RUL segmental branch PE, LE duplex with L soleal vein DVT. MR head with small foci of hemorrhage. Pt started on heparin gtt per neuro and vascular cardiology, now changed to Eliquis. Hospital course significant for UTI (e-coli) now s/p ABT per ID. He was treated with IVIG and steroids for seizures.   Patient now with gait Instability, ADL impairments and Functional impairments.    # CVA  - MR head with small Right cerebellar hemorrhage  - Start Comprehensive Rehab Program: PT/OT/ST, 3hours daily and 5 days weekly  - PT: Focused on improving strength, endurance, coordination, balance, functional mobility, and transfers  - OT: Focused on improving strength, fine motor skills, coordination, posture and ADLs.    - ST: to diagnose and treat deficits in swallowing, cognition and communication.   - agitation/confusion/nausea/dizziness - CTH (5/9) stable; UA negative    #Seizure  - Vimpat 150mg BID  - Depakote 250 stat + HS (5/9), 250 TID starting 5/10 x 3 days then 500 BID  - Check VPA level after starting 500 BID - when therapeutic, will wean vimpat    #HTN  - Amlodipine 10mg daily  - Lisinopril 40mg daily    #Hypothyroidism  - Synthroid 88mcg daily    #Steroid induced hyperglycemia  - Was on Lantus 10 and premeal Admelog 4 - DC  - FS on admission 131 - trend since completion of steroid <140  - A1c 5.9 on 4/13  - Monitor via labs    #DVT/PE  - L soleal vein + RUL segmental branch PE  - Eliquis 5mg BID    #UTI  - Ceftin 500mg  BID x 7 days (last dose 5/9)    #Mood/Cognition/Behavioral:  - Seroquel 50mg nightly  - Risperidone AM  - Neuropsychology decline  - Recreation therapy  - Psychiatry consult for medication regimen  - Switching seizure ppx to depakote to also help with agitation (titrating)    #Pain management  - Tylenol PRN    #GI ppx  - Protonix 40mg    #Bowel Regimen  - Senna, Miralax BID    #Bladder management  - BS on admission, and q 8 hours (SC if > 400)  - Monitor UO    #FEN   - Diet: Regular [Consistent carb - no snacks]  - SLP: evaluation and treatment    #Skin:  - No active issues at this time  - Pressure injury/Skin: Turn Q2hrs while in bed, OOB to Chair, PT/OT     #Precaution  - Fall, Aspiration, Seizure     TEAM MEETING 5/9  SW: lives with wife and 1 adult son; wife on FMLA. PH 4 ZABRINA (no HR), can set up for main level but no Bathroom on 1st FL.  13-14 steps up to bathroom.    OT: SV for eating and grooming, CG for dressing, bathing, and bathroom transfers.  Goal: SV x 2 weeks  PT: stand/pivot min A, amb 50' RW min A, 4 6inch steps min A  SLP: mild receptive language def, nonfluent aphasia, severe cog, dec working memory  barriers: dec cog, poor insight and safety awareness, dec sequencing, behavioral  EDOD: 5/18 home    Outpatient Follow-up (Specialty/Name of physician):  Juan Bernabe (DO)  Medicine  [address], [address]  [place] [address]  Phone: [phone]  Fax: [phone]  Follow Up Time:     Efren Fragoso (DO)  Cardiology; Internal Medicine  [address], [address]  [place] [address]  Phone: [phone]  Fax: [phone]

## 2022-05-09 NOTE — PROGRESS NOTE ADULT - SUBJECTIVE AND OBJECTIVE BOX
Patient is a 72y old  Male who presents with a chief complaint of CVA (08 May 2022 10:19)    No events overnight  Denies chest pain, SOB    Patient seen and examined at bedside.    ALLERGIES:  fish (Hives; Angioedema)  No Known Drug Allergies    MEDICATIONS  (STANDING):  amLODIPine   Tablet 10 milliGRAM(s) Oral daily  apixaban 5 milliGRAM(s) Oral two times a day  dextrose 5%. 1000 milliLiter(s) (50 mL/Hr) IV Continuous <Continuous>  dextrose 5%. 1000 milliLiter(s) (100 mL/Hr) IV Continuous <Continuous>  glucagon  Injectable 1 milliGRAM(s) IntraMuscular once  lacosamide 150 milliGRAM(s) Oral two times a day  lactobacillus acidophilus 1 Tablet(s) Oral daily  levothyroxine 88 MICROGram(s) Oral daily  lisinopril 40 milliGRAM(s) Oral daily  pantoprazole    Tablet 40 milliGRAM(s) Oral before breakfast  polyethylene glycol 3350 17 Gram(s) Oral two times a day  QUEtiapine 50 milliGRAM(s) Oral at bedtime  QUEtiapine 12.5 milliGRAM(s) Oral once  risperiDONE   Tablet 1 milliGRAM(s) Oral <User Schedule>  senna 2 Tablet(s) Oral at bedtime  thiamine 100 milliGRAM(s) Oral daily    MEDICATIONS  (PRN):  acetaminophen     Tablet .. 650 milliGRAM(s) Oral every 6 hours PRN Moderate Pain (4 - 6), Severe Pain (7 - 10)    Vital Signs Last 24 Hrs  T(F): 98.2 (09 May 2022 12:26), Max: 98.2 (09 May 2022 12:26)  HR: 81 (09 May 2022 12:26) (72 - 87)  BP: 113/67 (09 May 2022 12:26) (113/67 - 129/64)  RR: 15 (09 May 2022 12:26) (15 - 16)  SpO2: 96% (09 May 2022 12:26) (94% - 96%)  I&O's Summary    BMI (kg/m2): 22.8 (22 @ 16:44)  PHYSICAL EXAM:  Gen: nad, resting in bed  Neuro: aaox3, no focal deficits  Heent: eomi b/l, no jvd, no oral exudates  Pulm: cta b/l, no w/r/r  CV: +s1s2, no m/r/g  Ab: soft, nt/nd, normoactive bs x 4  Extrem: no edema, pulses intact and equal    LABS:                        11.3   4.26  )-----------( 169      ( 09 May 2022 08:18 )             35.7           140  |  104  |  32  ----------------------------<  126  5.2   |  28  |  1.40    Ca    8.8      09 May 2022 09:40    TPro  8.2  /  Alb  2.7  /  TBili  0.2  /  DBili  x   /  AST  44  /  ALT  53  /  AlkPhos  85       04-13 Chol 128 mg/dL LDL -- HDL 67 mg/dL Trig 116 mg/dL    Urinalysis Basic - ( 09 May 2022 12:05 )    Color: Yellow / Appearance: Clear / S.010 / pH: x  Gluc: x / Ketone: Negative  / Bili: Negative / Urobili: Negative   Blood: x / Protein: 30 mg/dL / Nitrite: Negative   Leuk Esterase: Trace / RBC: Negative /HPF / WBC Negative /HPF   Sq Epi: x / Non Sq Epi: Neg.-Few / Bacteria: x    COVID-19 PCR: NotDetec (22 @ 18:40)  COVID-19 PCR: NotDetec (22 @ 06:30)  COVID-19 PCR: NotDetec (22 @ 06:17)  COVID-19 PCR: NotDetec (22 @ 09:03)  COVID-19 PCR: NotDetec (22 @ 10:11)  COVID-19 PCR: NotDetec (22 @ 18:40)  COVID-19 PCR: NotDetec (22 @ 06:30)  COVID-19 PCR: NotDetec (22 @ 06:17)  COVID-19 PCR: NotDetec (22 @ 09:03)  COVID-19 PCR: NotDetec (22 @ 10:11)  COVID-19 PCR: NotDetec (22 @ 18:31)

## 2022-05-09 NOTE — BH CONSULTATION LIAISON ASSESSMENT NOTE - HPI (INCLUDE ILLNESS QUALITY, SEVERITY, DURATION, TIMING, CONTEXT, MODIFYING FACTORS, ASSOCIATED SIGNS AND SYMPTOMS)
This is a 71 y/o male with PMH of  HTN, hypothyroidism who presented to  [facility] on 4/12  with seizures, found to have cerebellar hemorrhage. Intubated for airway protection at OSH, extubated 4/13. Called to consult for increased chest congestion. CTA chest with atelectasis and small RUL segmental branch PE, LE duplex with L soleal vein DVT. MR head with small foci of hemorrhage. Pt started on heparin gtt per neuro and vascular cardiology, now changed to Eliquis. Hospital course significant for UTI _e-coli) now s/p ABT per ID. He was treated with IVIG and steroids for seizures. Patient was evaluated by PM&R and therapy for functional deficits, gait/ADL impairments and acute rehabilitation was recommended. Patient was medically optimized for discharge to [facility] IRU on 5/4/22.    Psychiatry consulted to evaluate for psychosis and agitation. Per the treatment team, pt is restless, not sleeping well, increasingly confused and agitated. Pt had been on Keppra, was switched to Vimpat, now switched to Depakote by rehab/neuro team. Pt also on Risperdal 1mg PO Q AM and Seroquel 50mg PO Q HS, unknown when started.     Pt seen at bedside, he is asleep, unable to participate in an interview. Writer obtained hx from his son Vale VIA telephone @ [phone].  Pt was born in the country [place]; is currently domiciled with wife and son in [facility]; employed as an ; has college degree; has 5 children; [employer]; has no previous psychiatric hx per son; no previous SA or SIB; no arrest or legal hx.    Psychiatric ROS unable to be assessed, psychiatry will continue to follow.

## 2022-05-09 NOTE — BH CONSULTATION LIAISON ASSESSMENT NOTE - DESCRIPTION
Pt was born in the country [place]; is currently domiciled with wife and son in [facility]; employed as an ; has college degree; has 5 children; [employer];

## 2022-05-09 NOTE — BH CONSULTATION LIAISON ASSESSMENT NOTE - NSBHCHARTREVIEWINVESTIGATE_PSY_A_CORE FT
< from: 12 Lead ECG (04.28.22 @ 11:44) >    Ventricular Rate 86 BPM    Atrial Rate 86 BPM    P-R Interval 200 ms    QRS Duration 100 ms    Q-T Interval 374 ms    QTC Calculation(Bazett) 447 ms    P Axis 32 degrees    R Axis -43 degrees    T Axis 79 degrees    Diagnosis Line NORMAL SINUS RHYTHM  LEFT AXIS DEVIATION  MINIMAL VOLTAGE CRITERIA FOR LVH, MAY BE NORMAL VARIANT  ANTEROSEPTAL INFARCT (CITED ON OR BEFORE 12-APR-2022)  T WAVE ABNORMALITY, CONSIDER LATERAL ISCHEMIA  ABNORMAL ECG  WHEN COMPARED WITH ECG OF 19-APR-2022 09:13,  no  SIGNIFICANT CHANGES HAVE OCCURRED  Confirmed by EDITH LUGO MD ([id number]) on 4/29/2022 8:26:47 PM    < end of copied text >

## 2022-05-10 LAB
ANION GAP SERPL CALC-SCNC: 6 MMOL/L — SIGNIFICANT CHANGE UP (ref 5–17)
BUN SERPL-MCNC: 33 MG/DL — HIGH (ref 7–23)
CALCIUM SERPL-MCNC: 8.8 MG/DL — SIGNIFICANT CHANGE UP (ref 8.4–10.5)
CHLORIDE SERPL-SCNC: 106 MMOL/L — SIGNIFICANT CHANGE UP (ref 96–108)
CO2 SERPL-SCNC: 26 MMOL/L — SIGNIFICANT CHANGE UP (ref 22–31)
CREAT SERPL-MCNC: 1.62 MG/DL — HIGH (ref 0.5–1.3)
EGFR: 45 ML/MIN/1.73M2 — LOW
GLUCOSE SERPL-MCNC: 127 MG/DL — HIGH (ref 70–99)
POTASSIUM SERPL-MCNC: 5.2 MMOL/L — SIGNIFICANT CHANGE UP (ref 3.5–5.3)
POTASSIUM SERPL-SCNC: 5.2 MMOL/L — SIGNIFICANT CHANGE UP (ref 3.5–5.3)
SODIUM SERPL-SCNC: 138 MMOL/L — SIGNIFICANT CHANGE UP (ref 135–145)

## 2022-05-10 PROCEDURE — 99233 SBSQ HOSP IP/OBS HIGH 50: CPT

## 2022-05-10 PROCEDURE — 99232 SBSQ HOSP IP/OBS MODERATE 35: CPT

## 2022-05-10 RX ORDER — QUETIAPINE FUMARATE 200 MG/1
75 TABLET, FILM COATED ORAL AT BEDTIME
Refills: 0 | Status: DISCONTINUED | OUTPATIENT
Start: 2022-05-10 | End: 2022-05-24

## 2022-05-10 RX ORDER — SODIUM CHLORIDE 9 MG/ML
1000 INJECTION INTRAMUSCULAR; INTRAVENOUS; SUBCUTANEOUS
Refills: 0 | Status: DISCONTINUED | OUTPATIENT
Start: 2022-05-10 | End: 2022-05-10

## 2022-05-10 RX ADMIN — LISINOPRIL 40 MILLIGRAM(S): 2.5 TABLET ORAL at 06:40

## 2022-05-10 RX ADMIN — DIVALPROEX SODIUM 250 MILLIGRAM(S): 500 TABLET, DELAYED RELEASE ORAL at 22:15

## 2022-05-10 RX ADMIN — SODIUM CHLORIDE 83 MILLILITER(S): 9 INJECTION INTRAMUSCULAR; INTRAVENOUS; SUBCUTANEOUS at 11:38

## 2022-05-10 RX ADMIN — AMLODIPINE BESYLATE 10 MILLIGRAM(S): 2.5 TABLET ORAL at 06:40

## 2022-05-10 RX ADMIN — LACOSAMIDE 150 MILLIGRAM(S): 50 TABLET ORAL at 06:40

## 2022-05-10 RX ADMIN — APIXABAN 5 MILLIGRAM(S): 2.5 TABLET, FILM COATED ORAL at 17:10

## 2022-05-10 RX ADMIN — Medication 1 TABLET(S): at 11:38

## 2022-05-10 RX ADMIN — Medication 650 MILLIGRAM(S): at 22:00

## 2022-05-10 RX ADMIN — Medication 650 MILLIGRAM(S): at 14:36

## 2022-05-10 RX ADMIN — DIVALPROEX SODIUM 250 MILLIGRAM(S): 500 TABLET, DELAYED RELEASE ORAL at 13:05

## 2022-05-10 RX ADMIN — APIXABAN 5 MILLIGRAM(S): 2.5 TABLET, FILM COATED ORAL at 06:40

## 2022-05-10 RX ADMIN — RISPERIDONE 1 MILLIGRAM(S): 4 TABLET ORAL at 08:08

## 2022-05-10 RX ADMIN — QUETIAPINE FUMARATE 75 MILLIGRAM(S): 200 TABLET, FILM COATED ORAL at 22:15

## 2022-05-10 RX ADMIN — Medication 88 MICROGRAM(S): at 06:40

## 2022-05-10 RX ADMIN — Medication 650 MILLIGRAM(S): at 16:45

## 2022-05-10 RX ADMIN — PANTOPRAZOLE SODIUM 40 MILLIGRAM(S): 20 TABLET, DELAYED RELEASE ORAL at 06:40

## 2022-05-10 RX ADMIN — SENNA PLUS 2 TABLET(S): 8.6 TABLET ORAL at 22:14

## 2022-05-10 RX ADMIN — Medication 650 MILLIGRAM(S): at 22:15

## 2022-05-10 RX ADMIN — DIVALPROEX SODIUM 250 MILLIGRAM(S): 500 TABLET, DELAYED RELEASE ORAL at 06:40

## 2022-05-10 RX ADMIN — Medication 100 MILLIGRAM(S): at 11:38

## 2022-05-10 RX ADMIN — POLYETHYLENE GLYCOL 3350 17 GRAM(S): 17 POWDER, FOR SOLUTION ORAL at 17:13

## 2022-05-10 RX ADMIN — LACOSAMIDE 150 MILLIGRAM(S): 50 TABLET ORAL at 17:10

## 2022-05-10 NOTE — PROGRESS NOTE ADULT - ASSESSMENT
72 year old Male with a PMHx of HTN, hypothyroidism, who presented to [facility] via transfer from OSH. Patient originally presented to to [place] ED via EMS after family called 911 following a witnessed seizure lasting for 5 mins. Pt also had tongue laceration. Patient was subsequently intubated for GCS 6 and CT/CTA performed showing a small cerebellar hemorrhage. Hospital course complicated by acute PE and L soleal vein DVT. course further complicated by UTI and completed course of abx. Pt was treated with steroid and IVIG for seizure. Now medically optimized and transferred to [facility] for rehab.    *JESSICA on CKD III possibly  -Noted creatinine at 1.62 today; has been as high as 1.61 on 4/29/2022  -Will hold lisinopril 40mg daily for now given worsening renal function and BP slightly on lower side  -UA fairly normal on 5/9  -He has been sleepy/lethargic; will order IVF 1/2 NS @ 83ml/hr for the next 24hrs  -BMP in AM  -If worsens, will recommend renal consult and renal ULT    *Acute DVT and PE  - cont with Eliquis 5mg BID    *Hypertension  - TTE EF of 75%, normal LV systolic function   - Continue with Amlodipine 10mg daily; will hold lisinopril 40mg daily given JESSICA  - Monitor BP closely    *Hypothyroid   - thyroid US w/ Enlarged goiter    - cont synthyroid 88mcg daily  - Thyroid antibodies elevated, per endo at [facility], cont to monitor TFTS for now   - Repeat TFTs in 4 weeks outpatient     *Pre-DM  - A1C of 5.9  - Outpatient follow up     *New onset seizure of unclear etiology  - s/p IVIG and steroid   - S/P LP- CSF with no growth   - Fall, seizure, aspiration precautions  - S/P MR brain; Small foci of prior hemorrhage versus calcification involving the right cerebellar hemisphere.  - vimpat 150mg BID    *CVA  - MRI showed chronic small foci of hemorrhage in right cerebellar hemisphere and chronic bilateral thalamic infarct and right BG infarct.  - cont Eliquis   - BP goal < 140/90   - LDL 38. no indication for statin   - Monitor H/H closely and for any signs/symptoms of bleeding     *Agitation and impulsive behaviors  - Seroquel 50mg qHS   - risperidone    - psych follow-up      PPX: Eliquis 5 BID  AM labs

## 2022-05-10 NOTE — PROGRESS NOTE ADULT - ASSESSMENT
ASSESSMENT/PLAN  This is a 73 y/o male with PMH of  HTN, hypothyroidism who presented to  [facility] on 4/12  with seizures, found to have cerebellar hemorrhage. Intubated for airway protection at OSH, extubated 4/13. Called to consult for increased chest congestion. CTA chest with atelectasis and small RUL segmental branch PE, LE duplex with L soleal vein DVT. MR head with small foci of hemorrhage. Pt started on heparin gtt per neuro and vascular cardiology, now changed to Eliquis. Hospital course significant for UTI (e-coli) now s/p ABT per ID. He was treated with IVIG and steroids for seizures.   Patient now with gait Instability, ADL impairments and Functional impairments.    # CVA  - MR head with small Right cerebellar hemorrhage  - Start Comprehensive Rehab Program: PT/OT/ST, 3hours daily and 5 days weekly  - PT: Focused on improving strength, endurance, coordination, balance, functional mobility, and transfers  - OT: Focused on improving strength, fine motor skills, coordination, posture and ADLs.    - ST: to diagnose and treat deficits in swallowing, cognition and communication.   - agitation/confusion/nausea/dizziness - CTH (5/9) stable; UA negative    #Seizure  - Vimpat 150mg BID  - Depakote 250 stat + HS (5/9), 250 TID starting 5/10 x 3 days then 500 BID  - Check VPA level after starting 500 BID - will wean vimpat once optimal dose reached     #HTN  - Amlodipine 10mg daily  - Lisinopril 40mg daily    #Hypothyroidism  - Synthroid 88mcg daily    #Steroid induced hyperglycemia  - Was on Lantus 10 and premeal Admelog 4 - now dc'd   - FS on admission 131 - trend since completion of steroid <140  - A1c 5.9 on 4/13  - Monitor via labs    #DVT/PE  - L soleal vein + RUL segmental branch PE  - Eliquis 5mg BID    #UTI  - Ceftin 500mg  BID x 7 days (last dose 5/9)    #Mood/Cognition/Behavioral:  - Seroquel was 100mg at bedtime, reduced down to 75mg (5/10)  - Risperidone discontinued 5/10   - Neuropsychology decline  - Recreation therapy  - Psychiatry consult for medication regimen appreciated   - Switching seizure ppx to depakote to also help with agitation (titrating)    #Pain management  - Tylenol PRN    #GI ppx  - Protonix 40mg    #Bowel Regimen  - Senna, Miralax BID    #Bladder management  - BS on admission, and q 8 hours (SC if > 400)  - Monitor UO    #FEN   - Diet: Regular [Consistent carb - no snacks]  - SLP: evaluation and treatment    #Skin:  - No active issues at this time  - Pressure injury/Skin: Turn Q2hrs while in bed, OOB to Chair, PT/OT     #Precaution  - Fall, Aspiration, Seizure     TEAM MEETING 5/9  SW: lives with wife and 1 adult son; wife on FMLA. PH 4 ZABRINA (no HR), can set up for main level but no Bathroom on 1st FL.  13-14 steps up to bathroom.    OT: SV for eating and grooming, CG for dressing, bathing, and bathroom transfers.  Goal: SV x 2 weeks  PT: stand/pivot min A, amb 50' RW min A, 4 6inch steps min A  SLP: mild receptive language def, nonfluent aphasia, severe cog, dec working memory  barriers: dec cog, poor insight and safety awareness, dec sequencing, behavioral  EDOD: 5/18 home    Outpatient Follow-up (Specialty/Name of physician):  Juan Bernabe (DO)  Medicine  [address], [address]  [place] [address]  Phone: [phone]  Fax: [phone]  Follow Up Time:     Efren Fragoso (DO)  Cardiology; Internal Medicine  [address], [address]  [place] [address]  Phone: [phone]  Fax: [phone]

## 2022-05-10 NOTE — PROGRESS NOTE ADULT - SUBJECTIVE AND OBJECTIVE BOX
Patient is a 72y old  Male who presents with a chief complaint of CVA (09 May 2022 13:19)      Patient seen and examined at bedside.    ALLERGIES:  fish (Hives; Angioedema)  No Known Drug Allergies    MEDICATIONS  (STANDING):  amLODIPine   Tablet 10 milliGRAM(s) Oral daily  apixaban 5 milliGRAM(s) Oral two times a day  dextrose 5%. 1000 milliLiter(s) (100 mL/Hr) IV Continuous <Continuous>  dextrose 5%. 1000 milliLiter(s) (50 mL/Hr) IV Continuous <Continuous>  diVALproex  milliGRAM(s) Oral three times a day  glucagon  Injectable 1 milliGRAM(s) IntraMuscular once  lacosamide 150 milliGRAM(s) Oral two times a day  lactobacillus acidophilus 1 Tablet(s) Oral daily  levothyroxine 88 MICROGram(s) Oral daily  lisinopril 40 milliGRAM(s) Oral daily  pantoprazole    Tablet 40 milliGRAM(s) Oral before breakfast  polyethylene glycol 3350 17 Gram(s) Oral two times a day  QUEtiapine 100 milliGRAM(s) Oral at bedtime  risperiDONE   Tablet 1 milliGRAM(s) Oral <User Schedule>  senna 2 Tablet(s) Oral at bedtime  thiamine 100 milliGRAM(s) Oral daily    MEDICATIONS  (PRN):  acetaminophen     Tablet .. 650 milliGRAM(s) Oral every 6 hours PRN Moderate Pain (4 - 6), Severe Pain (7 - 10)    Vital Signs Last 24 Hrs  T(F): 98.2 (10 May 2022 08:10), Max: 98.6 (09 May 2022 20:15)  HR: 79 (10 May 2022 08:10) (79 - 83)  BP: 105/63 (10 May 2022 08:10) (105/63 - 126/71)  RR: 16 (10 May 2022 08:10) (15 - 16)  SpO2: 96% (10 May 2022 08:10) (94% - 96%)  I&O's Summary    PHYSICAL EXAM:  Gen: nad, resting in bed  Neuro: aaox3, no focal deficits  Heent: eomi b/l, no jvd, no oral exudates  Pulm: cta b/l, no w/r/r  CV: +s1s2, no m/r/g  Ab: soft, nt/nd, normoactive bs x 4  Extrem: no edema, pulses intact and equal      LABS:                        11.3   4.26  )-----------( 169      ( 09 May 2022 08:18 )             35.7       05-10    138  |  106  |  33  ----------------------------<  127  5.2   |  26  |  1.62    Ca    8.8      10 May 2022 07:30    TPro  8.2  /  Alb  2.7  /  TBili  0.2  /  DBili  x   /  AST  44  /  ALT  53  /  AlkPhos  85      04-13 Chol 128 mg/dL LDL -- HDL 67 mg/dL Trig 116 mg/dL    Urinalysis Basic - ( 09 May 2022 12:05 )    Color: Yellow / Appearance: Clear / S.010 / pH: x  Gluc: x / Ketone: Negative  / Bili: Negative / Urobili: Negative   Blood: x / Protein: 30 mg/dL / Nitrite: Negative   Leuk Esterase: Trace / RBC: Negative /HPF / WBC Negative /HPF   Sq Epi: x / Non Sq Epi: Neg.-Few / Bacteria: x    COVID-19 PCR: NotDetec (22 @ 18:40)  COVID-19 PCR: NotDetec (22 @ 06:30)  COVID-19 PCR: NotDetec (22 @ 06:17)  COVID-19 PCR: NotDetec (22 @ 09:03)  COVID-19 PCR: NotDetec (22 @ 10:11)  COVID-19 PCR: NotDetec (22 @ 18:40)  COVID-19 PCR: NotDetec (22 @ 06:30)  COVID-19 PCR: NotDetec (22 @ 06:17)  COVID-19 PCR: NotDetec (22 @ 09:03)  COVID-19 PCR: NotDetec (22 @ 10:11)  COVID-19 PCR: NotDetec (22 @ 18:31)

## 2022-05-10 NOTE — PROGRESS NOTE ADULT - SUBJECTIVE AND OBJECTIVE BOX
SUBJECTIVE/ROS: After yesterday's adjustment of medications, he was noted to be more sleepy today and unable to participate in therapies. Discussed with psych for further adjustments of medications.     Patient is a 72y old  Male who presents with a chief complaint of CVA (05 May 2022 18:45)    HPI:  This is a 73 y/o male with PMH of  HTN, hypothyroidism who presented to  [facility] on   with seizures, found to have cerebellar hemorrhage. Intubated for airway protection at OSH, extubated . Called to consult for increased chest congestion. CTA chest with atelectasis and small RUL segmental branch PE, LE duplex with L soleal vein DVT. MR head with small foci of hemorrhage. Pt started on heparin gtt per neuro and vascular cardiology, now changed to Eliquis. Hospital course significant for UTI _e-coli) now s/p ABT per ID. He was treated with IVIG and steroids for seizures. Patient was evaluated by PM&R and therapy for functional deficits, gait/ADL impairments and acute rehabilitation was recommended. Patient was medically optimized for discharge to [facility] IRU on 22. (04 May 2022 13:56)      PAST MEDICAL & SURGICAL HISTORY:  HTN (hypertension)  Diverticulosis  Hypothyroid    No significant past surgical history    Allergies  fish (Hives; Angioedema)  No Known Drug Allergies      Vital Signs Last 24 Hrs  T(C): 36.8 (10 May 2022 08:10), Max: 37 (09 May 2022 20:15)  T(F): 98.2 (10 May 2022 08:10), Max: 98.6 (09 May 2022 20:15)  HR: 79 (10 May 2022 08:10) (79 - 83)  BP: 105/63 (10 May 2022 08:10) (105/63 - 126/71)  BP(mean): --  RR: 16 (10 May 2022 08:10) (16 - 16)  SpO2: 96% (10 May 2022 08:10) (94% - 96%)        RECENT LABS:  LAB                        11.3   4.26  )-----------( 169      ( 09 May 2022 08:18 )             35.7     05-09 (K 5.7, repeat 5.2)    140  |  104  |  32<H>  ----------------------------<  126<H>  5.2   |  28  |  1.40<H>    Ca    8.8      09 May 2022 09:40    TPro  8.2  /  Alb  2.7<L>  /  TBili  0.2  /  DBili  x   /  AST  44<H>  /  ALT  53<H>  /  AlkPhos  85  05-09    LIVER FUNCTIONS - ( 09 May 2022 09:40 )  Alb: 2.7 g/dL / Pro: 8.2 g/dL / ALK PHOS: 85 U/L / ALT: 53 U/L / AST: 44 U/L / GGT: x           Urinalysis Basic - ( 09 May 2022 12:05 )  Color: Yellow / Appearance: Clear / S.010 / pH: x  Gluc: x / Ketone: Negative  / Bili: Negative / Urobili: Negative   Blood: x / Protein: 30 mg/dL / Nitrite: Negative   Leuk Esterase: Trace / RBC: Negative /HPF / WBC Negative /HPF   Sq Epi: x / Non Sq Epi: Neg.-Few / Bacteria: x    Culture - Urine (collected 22 @ 04:41)  Source: Clean Catch Clean Catch (Midstream)  Final Report (22 @ 06:54):    No growth    CT Head No Cont (22 @ 12:53) >  No hydrocephalus, acute intracranial hemorrhage, mass effect, or brain   edema.  Moderate white matter microvascular ischemic disease.  No significant interval change from 2022          MEDICATIONS  (STANDING):  amLODIPine   Tablet 10 milliGRAM(s) Oral daily  apixaban 5 milliGRAM(s) Oral two times a day  dextrose 5%. 1000 milliLiter(s) (50 mL/Hr) IV Continuous <Continuous>  dextrose 5%. 1000 milliLiter(s) (100 mL/Hr) IV Continuous <Continuous>  diVALproex  milliGRAM(s) Oral three times a day  glucagon  Injectable 1 milliGRAM(s) IntraMuscular once  lacosamide 150 milliGRAM(s) Oral two times a day  lactobacillus acidophilus 1 Tablet(s) Oral daily  levothyroxine 88 MICROGram(s) Oral daily  pantoprazole    Tablet 40 milliGRAM(s) Oral before breakfast  polyethylene glycol 3350 17 Gram(s) Oral two times a day  QUEtiapine 75 milliGRAM(s) Oral at bedtime  senna 2 Tablet(s) Oral at bedtime  sodium chloride 0.9%. 1000 milliLiter(s) (83 mL/Hr) IV Continuous <Continuous>  thiamine 100 milliGRAM(s) Oral daily    MEDICATIONS  (PRN):  acetaminophen     Tablet .. 650 milliGRAM(s) Oral every 6 hours PRN Moderate Pain (4 - 6), Severe Pain (7 - 10)

## 2022-05-11 LAB
ANION GAP SERPL CALC-SCNC: 5 MMOL/L — SIGNIFICANT CHANGE UP (ref 5–17)
BUN SERPL-MCNC: 44 MG/DL — HIGH (ref 7–23)
CALCIUM SERPL-MCNC: 8.6 MG/DL — SIGNIFICANT CHANGE UP (ref 8.4–10.5)
CHLORIDE SERPL-SCNC: 105 MMOL/L — SIGNIFICANT CHANGE UP (ref 96–108)
CO2 SERPL-SCNC: 23 MMOL/L — SIGNIFICANT CHANGE UP (ref 22–31)
CREAT SERPL-MCNC: 1.61 MG/DL — HIGH (ref 0.5–1.3)
EGFR: 45 ML/MIN/1.73M2 — LOW
GLUCOSE SERPL-MCNC: 106 MG/DL — HIGH (ref 70–99)
POTASSIUM SERPL-MCNC: 4.9 MMOL/L — SIGNIFICANT CHANGE UP (ref 3.5–5.3)
POTASSIUM SERPL-SCNC: 4.9 MMOL/L — SIGNIFICANT CHANGE UP (ref 3.5–5.3)
SARS-COV-2 RNA SPEC QL NAA+PROBE: SIGNIFICANT CHANGE UP
SODIUM SERPL-SCNC: 133 MMOL/L — LOW (ref 135–145)

## 2022-05-11 PROCEDURE — 99233 SBSQ HOSP IP/OBS HIGH 50: CPT

## 2022-05-11 PROCEDURE — 99232 SBSQ HOSP IP/OBS MODERATE 35: CPT

## 2022-05-11 RX ADMIN — Medication 88 MICROGRAM(S): at 05:08

## 2022-05-11 RX ADMIN — DIVALPROEX SODIUM 250 MILLIGRAM(S): 500 TABLET, DELAYED RELEASE ORAL at 15:17

## 2022-05-11 RX ADMIN — APIXABAN 5 MILLIGRAM(S): 2.5 TABLET, FILM COATED ORAL at 17:11

## 2022-05-11 RX ADMIN — Medication 100 MILLIGRAM(S): at 11:28

## 2022-05-11 RX ADMIN — Medication 650 MILLIGRAM(S): at 16:18

## 2022-05-11 RX ADMIN — Medication 650 MILLIGRAM(S): at 15:18

## 2022-05-11 RX ADMIN — LACOSAMIDE 150 MILLIGRAM(S): 50 TABLET ORAL at 05:07

## 2022-05-11 RX ADMIN — LACOSAMIDE 150 MILLIGRAM(S): 50 TABLET ORAL at 17:10

## 2022-05-11 RX ADMIN — QUETIAPINE FUMARATE 75 MILLIGRAM(S): 200 TABLET, FILM COATED ORAL at 21:21

## 2022-05-11 RX ADMIN — APIXABAN 5 MILLIGRAM(S): 2.5 TABLET, FILM COATED ORAL at 05:08

## 2022-05-11 RX ADMIN — PANTOPRAZOLE SODIUM 40 MILLIGRAM(S): 20 TABLET, DELAYED RELEASE ORAL at 05:08

## 2022-05-11 RX ADMIN — SENNA PLUS 2 TABLET(S): 8.6 TABLET ORAL at 21:21

## 2022-05-11 RX ADMIN — Medication 1 TABLET(S): at 11:28

## 2022-05-11 RX ADMIN — AMLODIPINE BESYLATE 10 MILLIGRAM(S): 2.5 TABLET ORAL at 05:08

## 2022-05-11 RX ADMIN — DIVALPROEX SODIUM 250 MILLIGRAM(S): 500 TABLET, DELAYED RELEASE ORAL at 21:21

## 2022-05-11 RX ADMIN — POLYETHYLENE GLYCOL 3350 17 GRAM(S): 17 POWDER, FOR SOLUTION ORAL at 05:08

## 2022-05-11 RX ADMIN — DIVALPROEX SODIUM 250 MILLIGRAM(S): 500 TABLET, DELAYED RELEASE ORAL at 05:08

## 2022-05-11 NOTE — PROGRESS NOTE ADULT - ASSESSMENT
72 year old Male with a PMHx of HTN, hypothyroidism, who presented to [facility] via transfer from OSH. Patient originally presented to to [facility] via EMS after family called 911 following a witnessed seizure lasting for 5 mins. Pt also had tongue laceration. Patient was subsequently intubated for GCS 6 and CT/CTA performed showing a small cerebellar hemorrhage. Hospital course complicated by acute PE and L soleal vein DVT. course further complicated by UTI and completed course of abx. Pt was treated with steroid and IVIG for seizure. Now medically optimized and transferred to [facility] for rehab.    *CKD III   avoid nephrotoxics. monitor intake and output.   -consider renal input    *Acute DVT and PE  - cont with Eliquis 5mg BID    *Hypertension  - TTE EF of 75%, normal LV systolic function   - Continue with Amlodipine 10mg daily; will hold lisinopril 40mg daily given possible JESSICA  - Monitor BP closely    *Hypothyroid   - thyroid US w/ Enlarged goiter    - cont synthyroid 88mcg daily  - Thyroid antibodies elevated, per endo at [facility], cont to monitor TFTS for now   - Repeat TFTs in 4 weeks outpatient     *Pre-DM  - A1C of 5.9  - Outpatient follow up     *New onset seizure of unclear etiology  - s/p IVIG and steroid   - S/P LP- CSF with no growth   - Fall, seizure, aspiration precautions  - S/P MR brain; Small foci of prior hemorrhage versus calcification involving the right cerebellar hemisphere.  - vimpat 150mg BID    *CVA  - MRI showed chronic small foci of hemorrhage in right cerebellar hemisphere and chronic bilateral thalamic infarct and right BG infarct.  - cont Eliquis   - BP goal < 140/90   - LDL 38. no indication for statin. Monitor CMP  - Monitor H/H closely and for any signs/symptoms of bleeding     *Agitation and impulsive behaviors  - Seroquel 50mg qHS   - risperidone    - psych follow-up      PPX: Eliquis 5 BID      d/w rehab team

## 2022-05-11 NOTE — CHART NOTE - NSCHARTNOTEFT_GEN_A_CORE
Nutrition Follow Up Note  Source: Medical Record [X] Patient [X] Family [ ]         Diet: Consistent carbohydrate / no snacks, Glucerna TID  Pt tolerating diet with good PO intake per pt. Observed drinking Glucerna with excellent acceptance. Pt receptive to nutrition education- provided with both verbal and written nutrition on consistent carbohydrate diet/carbohydrate counting. Denies nausea, vomiting, diarrhea, constipation. Pt denies chewing/swallowing difficulties.     Enteral/Parenteral Nutrition: N/A    Current Weight: 145.7 lbs (5/4)  Recommend obtaining new weight    Pertinent Medications: MEDICATIONS  (STANDING):  amLODIPine   Tablet 10 milliGRAM(s) Oral daily  apixaban 5 milliGRAM(s) Oral two times a day  dextrose 5%. 1000 milliLiter(s) (100 mL/Hr) IV Continuous <Continuous>  dextrose 5%. 1000 milliLiter(s) (50 mL/Hr) IV Continuous <Continuous>  diVALproex  milliGRAM(s) Oral three times a day  glucagon  Injectable 1 milliGRAM(s) IntraMuscular once  lacosamide 150 milliGRAM(s) Oral two times a day  lactobacillus acidophilus 1 Tablet(s) Oral daily  levothyroxine 88 MICROGram(s) Oral daily  pantoprazole    Tablet 40 milliGRAM(s) Oral before breakfast  polyethylene glycol 3350 17 Gram(s) Oral two times a day  QUEtiapine 75 milliGRAM(s) Oral at bedtime  senna 2 Tablet(s) Oral at bedtime  thiamine 100 milliGRAM(s) Oral daily    MEDICATIONS  (PRN):  acetaminophen     Tablet .. 650 milliGRAM(s) Oral every 6 hours PRN Moderate Pain (4 - 6), Severe Pain (7 - 10)      Pertinent Labs:  05-11 Na133 mmol/L<L> Glu 106 mg/dL<H> K+ 4.9 mmol/L Cr  1.61 mg/dL<H> BUN 44 mg/dL<H> 05-09 Alb 2.7 g/dL<L> 04-13 Chol 128 mg/dL LDL --    HDL 67 mg/dL Trig 116 mg/dL        Skin: Skin intact per nursing flow sheets     Edema: DVT LLE Per nursing flowsheets     Last BM: on 5/9 Per nursing flowsheets     Estimated Needs:   [X] No Change since Previous Assessment  [ ] Recalculated:     Previous Nutrition Diagnosis:   Moderate malnutrition, acute    Nutrition Diagnosis is [X] Ongoing  - addressed with Glucerna TID       New Nutrition Diagnosis: [X] Not Applicable  [ ] Inadequate Protein Energy Intake   [ ] Inadequate Oral Intake   [ ] Excessive Energy Intake   [ ] Increased Nutrient Needs   [ ] Obesity   [ ] Altered GI Function   [ ] Unintended Weight Loss   [ ] Food & Nutrition Related Knowledge Deficit  [ ] Limited Adherence to nutrition related recommendations   [ ] Malnutrition      Interventions:   1. Recommend continuing with current plan of care  2. Pt provided with nutrition education on current diet.     Monitoring & Evaluation:   [X] Weights   [X] PO Intake   [X] Follow Up (Per Protocol)  [X] Tolerance to Diet Prescription   [X] Other: Labs    RD Remains Available.  Tracee Tovar RD

## 2022-05-11 NOTE — PROGRESS NOTE ADULT - ASSESSMENT
ASSESSMENT/PLAN  This is a 73 y/o male with PMH of  HTN, hypothyroidism who presented to  [facility] on 4/12  with seizures, found to have cerebellar hemorrhage. Intubated for airway protection at OSH, extubated 4/13. Called to consult for increased chest congestion. CTA chest with atelectasis and small RUL segmental branch PE, LE duplex with L soleal vein DVT. MR head with small foci of hemorrhage. Pt started on heparin gtt per neuro and vascular cardiology, now changed to Eliquis. Hospital course significant for UTI (e-coli) now s/p ABT per ID. He was treated with IVIG and steroids for seizures.   Patient now with gait Instability, ADL impairments and Functional impairments.    # CVA  - MR head with small Right cerebellar hemorrhage  - Continue Comprehensive Rehab Program: PT/OT/ST, 3hours daily and 5 days weekly  - PT: Focused on improving strength, endurance, coordination, balance, functional mobility, and transfers  - OT: Focused on improving strength, fine motor skills, coordination, posture and ADLs.    - ST: to diagnose and treat deficits in swallowing, cognition and communication.   - agitation/confusion/nausea/dizziness - CTH (5/9) stable; UA negative    #Seizure  - Vimpat 150mg BID  - Depakote 250 TID 5/10 x 3 days then 500 BID  - Check VPA level after starting 500 BID - will wean vimpat once optimal dose reached     #HTN  - Amlodipine 10mg daily  - Lisinopril 40mg daily    #Hypothyroidism  - Synthroid 88mcg daily    #Steroid induced hyperglycemia  - Was on Lantus 10 and premeal Admelog 4 - now dc'd   - FS on admission 131 - trend since completion of steroid <140  - A1c 5.9 on 4/13  - Monitor via labs    #DVT/PE  - L soleal vein + RUL segmental branch PE  - Eliquis 5mg BID    #UTI  - Ceftin 500mg  BID x 7 days (last dose 5/9)    #Mood/Cognition/Behavioral:  - Seroquel 75mg (5/10)  - Risperidone discontinued 5/10   - Neuropsychology decline  - Recreation therapy  - Psychiatry consult for medication regimen appreciated   - Switching seizure ppx to depakote to also help with agitation (titrating)    #Pain management  - Tylenol PRN    #GI ppx  - Protonix 40mg    #Bowel Regimen  - Senna, Miralax BID    #Bladder management  - BS on admission, and q 8 hours (SC if > 400)  - Monitor UO    #FEN   - Diet: Regular [Consistent carb - no snacks]  - SLP: evaluation and treatment    #Skin:  - No active issues at this time  - Pressure injury/Skin: Turn Q2hrs while in bed, OOB to Chair, PT/OT     #Precaution  - Fall, Aspiration, Seizure     TEAM MEETING 5/9  SW: lives with wife and 1 adult son; wife on FMLA. PH 4 ZABRINA (no HR), can set up for main level but no Bathroom on 1st FL.  13-14 steps up to bathroom.    OT: SV for eating and grooming, CG for dressing, bathing, and bathroom transfers.  Goal: SV x 2 weeks  PT: stand/pivot min A, amb 50' RW min A, 4 6inch steps min A  SLP: mild receptive language def, nonfluent aphasia, severe cog, dec working memory  barriers: dec cog, poor insight and safety awareness, dec sequencing, behavioral  EDOD: 5/18 home    Outpatient Follow-up (Specialty/Name of physician):  Juan Bernabe (DO)  Medicine  [address], [address]  [place] [address]  Phone: [phone]  Fax: [phone]  Follow Up Time:     Efren Fragoso (DO)  Cardiology; Internal Medicine  [address], [address]  [place] [address]  Phone: [phone]  Fax: [phone]

## 2022-05-11 NOTE — PROGRESS NOTE ADULT - SUBJECTIVE AND OBJECTIVE BOX
Patient is a 72y old  Male who presents with a chief complaint of CVA (11 May 2022 13:12)      INTERVAL History of Present Illness/OVERNIGHT EVENTS: gait mildly unsteady    MEDICATIONS  (STANDING):  amLODIPine   Tablet 10 milliGRAM(s) Oral daily  apixaban 5 milliGRAM(s) Oral two times a day  dextrose 5%. 1000 milliLiter(s) (100 mL/Hr) IV Continuous <Continuous>  dextrose 5%. 1000 milliLiter(s) (50 mL/Hr) IV Continuous <Continuous>  diVALproex  milliGRAM(s) Oral three times a day  glucagon  Injectable 1 milliGRAM(s) IntraMuscular once  lacosamide 150 milliGRAM(s) Oral two times a day  lactobacillus acidophilus 1 Tablet(s) Oral daily  levothyroxine 88 MICROGram(s) Oral daily  pantoprazole    Tablet 40 milliGRAM(s) Oral before breakfast  polyethylene glycol 3350 17 Gram(s) Oral two times a day  QUEtiapine 75 milliGRAM(s) Oral at bedtime  senna 2 Tablet(s) Oral at bedtime  thiamine 100 milliGRAM(s) Oral daily    MEDICATIONS  (PRN):  acetaminophen     Tablet .. 650 milliGRAM(s) Oral every 6 hours PRN Moderate Pain (4 - 6), Severe Pain (7 - 10)      Allergies    fish (Hives; Angioedema)  No Known Drug Allergies    Intolerances        REVIEW OF SYSTEMS:  Negative unless otherwise specified above.    Vital Signs Last 24 Hrs  T(C): 36.7 (11 May 2022 04:54), Max: 36.7 (11 May 2022 04:54)  T(F): 98 (11 May 2022 04:54), Max: 98 (11 May 2022 04:54)  HR: 84 (11 May 2022 04:54) (84 - 85)  BP: 114/66 (11 May 2022 04:54) (114/66 - 134/80)  BP(mean): --  RR: 17 (11 May 2022 04:54) (16 - 17)  SpO2: 98% (11 May 2022 04:54) (97% - 98%)        PHYSICAL EXAM:  GENERAL: No apparent distress, appears stated age  HEAD:  Atraumatic, Normocephalic  EYES: Conjunctiva and sclera clear, no discharge  ENMT: Moist mucous membranes, no nasal discharge  NECK: Supple, no JVD  CHEST/LUNG: Clear to auscultation bilaterally, no wheeze or rales  HEART: Regular rhythm, no rubs or gallops  ABDOMEN: Soft, Nontender, Nondistended; Bowel sounds present  EXTREMITIES:  No clubbing, cyanosis or edema  SKIN: No rash, no new discoloration        LABS:    11 May 2022 05:40    133    |  105    |  44     ----------------------------<  106    4.9     |  23     |  1.61     Ca    8.6        11 May 2022 05:40          CAPILLARY BLOOD GLUCOSE          RADIOLOGY & ADDITIONAL TESTS:      Images reviewed personally    Consultant Notes Reviewed and Care Discussed with relevant Consultants.

## 2022-05-11 NOTE — CHART NOTE - NSCHARTNOTEFT_GEN_A_CORE
REHABILITATION DIAGNOSIS/IMPAIRMENT GROUP CODE:    Stroke    COMORBIDITIES/COMPLICATING CONDITIONS IMPACTING REHABILITATION:  HEALTH ISSUES - PROBLEM Dx:  Pulmonary thromboembolism    Cerebellar hemorrhage    Seizure          PAST MEDICAL & SURGICAL HISTORY:  HTN (hypertension)      Diverticulosis      Hypothyroid      No significant past surgical history          Based upon consideration of the patient's impairments, functional status, complicating conditions and any other contributing factors and after information garnered from the assessments of all therapy disciplines involved in treating the patient and other pertinent clinicians:    INTERDISCIPLINARY REHABILITATION INTERVENTIONS:    [ X  ] Transfer Training  [X   ] Bed Mobility  [ X  ] Therapeutic Exercise  [ X  ] Balance/Coordination Exercises  [ X  ] Locomotion retraining  [ X  ] Stairs  [ X  ] Functional Transfer Training  [ X  ] Bowel/Bladder program  [  X ] Pain Management  [ X  ] Skin/Wound Care  [X   ] Visual/Perceptual Training  [X   ] Therapeutic Recreation Activities  [  X ] Neuromuscular Re-education  [  X ] Activities of Daily Living  [ X  ] Speech Exercise  [  X ] Swallowing Exercises  [   ] Vital Stim  [   ] Dietary Supplements  [ X  ] Calorie Count  [X   ] Cognitive Exercises  [  X ] Cognitive-linguistic Treatment  [X   ] Behavior Program  [  X ] Neuropsych Therapy  [ X  ] Patient/Family Counseling  [ X  ] Family Training  [ X  ] Community Re-entry  [   ] Orthotic Evaluation  [   ] Prosthetic Eval/Training    MEDICAL PROGNOSIS:  Good     REHAB POTENTIAL:  Good     EXPECTED DAILY THERAPY:         PT: 1hr/day       OT: 1hr/day       ST: 1hr/day        EXPECTED INTENSITY OF PROGRAM:  3 hrs/day    EXPECTED FREQUENCY OF PROGRAM:  5 days/week    ESTIMATED LOS:  10-14 days    ESTIMATED DISPOSITION:  home with assistance vs. ROEL    INTERDISCIPLINARY FUNCTIONAL OUTCOMES/GOALS:           Gait/Mobility: Supervision       Transfers: Supervision       ADLs: Supervision       Functional Transfers: Min Assist       Medication Management: Supervision       Communication: Supervision       Cognitive: Min Assist       Dysphagia: Supervision       Bladder: Supervision       Bowel: Supervision

## 2022-05-11 NOTE — PROGRESS NOTE ADULT - SUBJECTIVE AND OBJECTIVE BOX
SUBJECTIVE/ROS: His level of alertness is improved but he remains with intermittent agitation but easily redirected by staff. Denies chest pain, fever, chills, nausea, vomiting, SOB, cough, or dysuria.     Patient is a 72y old  Male who presents with a chief complaint of CVA (05 May 2022 18:45)    HPI:  This is a 73 y/o male with PMH of  HTN, hypothyroidism who presented to  [facility] on   with seizures, found to have cerebellar hemorrhage. Intubated for airway protection at OSH, extubated . Called to consult for increased chest congestion. CTA chest with atelectasis and small RUL segmental branch PE, LE duplex with L soleal vein DVT. MR head with small foci of hemorrhage. Pt started on heparin gtt per neuro and vascular cardiology, now changed to Eliquis. Hospital course significant for UTI _e-coli) now s/p ABT per ID. He was treated with IVIG and steroids for seizures. Patient was evaluated by PM&R and therapy for functional deficits, gait/ADL impairments and acute rehabilitation was recommended. Patient was medically optimized for discharge to [facility] IRU on 22. (04 May 2022 13:56)      PAST MEDICAL & SURGICAL HISTORY:  HTN (hypertension)  Diverticulosis  Hypothyroid    No significant past surgical history    Allergies  fish (Hives; Angioedema)  No Known Drug Allergies      Vital Signs Last 24 Hrs  T(C): 36.7 (11 May 2022 04:54), Max: 36.7 (11 May 2022 04:54)  T(F): 98 (11 May 2022 04:54), Max: 98 (11 May 2022 04:54)  HR: 84 (11 May 2022 04:54) (84 - 85)  BP: 114/66 (11 May 2022 04:54) (114/66 - 134/80)  BP(mean): --  RR: 17 (11 May 2022 04:54) (16 - 17)  SpO2: 98% (11 May 2022 04:54) (97% - 98%)      RECENT LABS:  LAB                        11.3   4.26  )-----------( 169      ( 09 May 2022 08:18 )             35.7     05-09 (K 5.7, repeat 5.2)    140  |  104  |  32<H>  ----------------------------<  126<H>  5.2   |  28  |  1.40<H>    Ca    8.8      09 May 2022 09:40    TPro  8.2  /  Alb  2.7<L>  /  TBili  0.2  /  DBili  x   /  AST  44<H>  /  ALT  53<H>  /  AlkPhos  85  05-09    LIVER FUNCTIONS - ( 09 May 2022 09:40 )  Alb: 2.7 g/dL / Pro: 8.2 g/dL / ALK PHOS: 85 U/L / ALT: 53 U/L / AST: 44 U/L / GGT: x           Urinalysis Basic - ( 09 May 2022 12:05 )  Color: Yellow / Appearance: Clear / S.010 / pH: x  Gluc: x / Ketone: Negative  / Bili: Negative / Urobili: Negative   Blood: x / Protein: 30 mg/dL / Nitrite: Negative   Leuk Esterase: Trace / RBC: Negative /HPF / WBC Negative /HPF   Sq Epi: x / Non Sq Epi: Neg.-Few / Bacteria: x    Culture - Urine (collected 22 @ 04:41)  Source: Clean Catch Clean Catch (Midstream)  Final Report (22 @ 06:54):    No growth    CT Head No Cont (22 @ 12:53) >  No hydrocephalus, acute intracranial hemorrhage, mass effect, or brain   edema.  Moderate white matter microvascular ischemic disease.  No significant interval change from 2022      MEDICATIONS  (STANDING):  amLODIPine   Tablet 10 milliGRAM(s) Oral daily  apixaban 5 milliGRAM(s) Oral two times a day  dextrose 5%. 1000 milliLiter(s) (100 mL/Hr) IV Continuous <Continuous>  dextrose 5%. 1000 milliLiter(s) (50 mL/Hr) IV Continuous <Continuous>  diVALproex  milliGRAM(s) Oral three times a day  glucagon  Injectable 1 milliGRAM(s) IntraMuscular once  lacosamide 150 milliGRAM(s) Oral two times a day  lactobacillus acidophilus 1 Tablet(s) Oral daily  levothyroxine 88 MICROGram(s) Oral daily  pantoprazole    Tablet 40 milliGRAM(s) Oral before breakfast  polyethylene glycol 3350 17 Gram(s) Oral two times a day  QUEtiapine 75 milliGRAM(s) Oral at bedtime  senna 2 Tablet(s) Oral at bedtime  thiamine 100 milliGRAM(s) Oral daily    MEDICATIONS  (PRN):  acetaminophen     Tablet .. 650 milliGRAM(s) Oral every 6 hours PRN Moderate Pain (4 - 6), Severe Pain (7 - 10)

## 2022-05-12 LAB
ALBUMIN SERPL ELPH-MCNC: 2.7 G/DL — LOW (ref 3.3–5)
ALP SERPL-CCNC: 86 U/L — SIGNIFICANT CHANGE UP (ref 40–120)
ALT FLD-CCNC: 41 U/L — SIGNIFICANT CHANGE UP (ref 10–45)
ANION GAP SERPL CALC-SCNC: 7 MMOL/L — SIGNIFICANT CHANGE UP (ref 5–17)
AST SERPL-CCNC: 34 U/L — SIGNIFICANT CHANGE UP (ref 10–40)
BASOPHILS # BLD AUTO: 0.02 K/UL — SIGNIFICANT CHANGE UP (ref 0–0.2)
BASOPHILS NFR BLD AUTO: 0.4 % — SIGNIFICANT CHANGE UP (ref 0–2)
BILIRUB SERPL-MCNC: 0.3 MG/DL — SIGNIFICANT CHANGE UP (ref 0.2–1.2)
BUN SERPL-MCNC: 32 MG/DL — HIGH (ref 7–23)
CALCIUM SERPL-MCNC: 8.9 MG/DL — SIGNIFICANT CHANGE UP (ref 8.4–10.5)
CHLORIDE SERPL-SCNC: 105 MMOL/L — SIGNIFICANT CHANGE UP (ref 96–108)
CO2 SERPL-SCNC: 26 MMOL/L — SIGNIFICANT CHANGE UP (ref 22–31)
CREAT SERPL-MCNC: 1.27 MG/DL — SIGNIFICANT CHANGE UP (ref 0.5–1.3)
EGFR: 60 ML/MIN/1.73M2 — SIGNIFICANT CHANGE UP
EOSINOPHIL # BLD AUTO: 0.23 K/UL — SIGNIFICANT CHANGE UP (ref 0–0.5)
EOSINOPHIL NFR BLD AUTO: 5.1 % — SIGNIFICANT CHANGE UP (ref 0–6)
GLUCOSE SERPL-MCNC: 103 MG/DL — HIGH (ref 70–99)
HCT VFR BLD CALC: 32.3 % — LOW (ref 39–50)
HGB BLD-MCNC: 10.4 G/DL — LOW (ref 13–17)
IMM GRANULOCYTES NFR BLD AUTO: 0.4 % — SIGNIFICANT CHANGE UP (ref 0–1.5)
LYMPHOCYTES # BLD AUTO: 1.33 K/UL — SIGNIFICANT CHANGE UP (ref 1–3.3)
LYMPHOCYTES # BLD AUTO: 29.8 % — SIGNIFICANT CHANGE UP (ref 13–44)
MCHC RBC-ENTMCNC: 30 PG — SIGNIFICANT CHANGE UP (ref 27–34)
MCHC RBC-ENTMCNC: 32.2 GM/DL — SIGNIFICANT CHANGE UP (ref 32–36)
MCV RBC AUTO: 93.1 FL — SIGNIFICANT CHANGE UP (ref 80–100)
MONOCYTES # BLD AUTO: 0.51 K/UL — SIGNIFICANT CHANGE UP (ref 0–0.9)
MONOCYTES NFR BLD AUTO: 11.4 % — SIGNIFICANT CHANGE UP (ref 2–14)
NEUTROPHILS # BLD AUTO: 2.36 K/UL — SIGNIFICANT CHANGE UP (ref 1.8–7.4)
NEUTROPHILS NFR BLD AUTO: 52.9 % — SIGNIFICANT CHANGE UP (ref 43–77)
NRBC # BLD: 0 /100 WBCS — SIGNIFICANT CHANGE UP (ref 0–0)
PLATELET # BLD AUTO: 258 K/UL — SIGNIFICANT CHANGE UP (ref 150–400)
POTASSIUM SERPL-MCNC: 4.7 MMOL/L — SIGNIFICANT CHANGE UP (ref 3.5–5.3)
POTASSIUM SERPL-SCNC: 4.7 MMOL/L — SIGNIFICANT CHANGE UP (ref 3.5–5.3)
PROT SERPL-MCNC: 8.2 G/DL — SIGNIFICANT CHANGE UP (ref 6–8.3)
RBC # BLD: 3.47 M/UL — LOW (ref 4.2–5.8)
RBC # FLD: 14.5 % — SIGNIFICANT CHANGE UP (ref 10.3–14.5)
SODIUM SERPL-SCNC: 138 MMOL/L — SIGNIFICANT CHANGE UP (ref 135–145)
WBC # BLD: 4.47 K/UL — SIGNIFICANT CHANGE UP (ref 3.8–10.5)
WBC # FLD AUTO: 4.47 K/UL — SIGNIFICANT CHANGE UP (ref 3.8–10.5)

## 2022-05-12 PROCEDURE — 99232 SBSQ HOSP IP/OBS MODERATE 35: CPT

## 2022-05-12 RX ADMIN — DIVALPROEX SODIUM 250 MILLIGRAM(S): 500 TABLET, DELAYED RELEASE ORAL at 22:03

## 2022-05-12 RX ADMIN — LACOSAMIDE 150 MILLIGRAM(S): 50 TABLET ORAL at 05:35

## 2022-05-12 RX ADMIN — PANTOPRAZOLE SODIUM 40 MILLIGRAM(S): 20 TABLET, DELAYED RELEASE ORAL at 05:35

## 2022-05-12 RX ADMIN — Medication 100 MILLIGRAM(S): at 11:40

## 2022-05-12 RX ADMIN — APIXABAN 5 MILLIGRAM(S): 2.5 TABLET, FILM COATED ORAL at 05:35

## 2022-05-12 RX ADMIN — Medication 1 TABLET(S): at 11:40

## 2022-05-12 RX ADMIN — DIVALPROEX SODIUM 250 MILLIGRAM(S): 500 TABLET, DELAYED RELEASE ORAL at 05:35

## 2022-05-12 RX ADMIN — APIXABAN 5 MILLIGRAM(S): 2.5 TABLET, FILM COATED ORAL at 17:18

## 2022-05-12 RX ADMIN — Medication 88 MICROGRAM(S): at 05:35

## 2022-05-12 RX ADMIN — QUETIAPINE FUMARATE 75 MILLIGRAM(S): 200 TABLET, FILM COATED ORAL at 22:03

## 2022-05-12 RX ADMIN — LACOSAMIDE 150 MILLIGRAM(S): 50 TABLET ORAL at 17:19

## 2022-05-12 RX ADMIN — POLYETHYLENE GLYCOL 3350 17 GRAM(S): 17 POWDER, FOR SOLUTION ORAL at 05:35

## 2022-05-12 RX ADMIN — AMLODIPINE BESYLATE 10 MILLIGRAM(S): 2.5 TABLET ORAL at 05:35

## 2022-05-12 NOTE — PROGRESS NOTE ADULT - SUBJECTIVE AND OBJECTIVE BOX
Patient is a 72y old  Male who presents with a chief complaint of CVA (12 May 2022 13:46)    Patient seen and examined at bedside. No acute overnight events. On observation. Appears confused.     ALLERGIES:  fish (Hives; Angioedema)  No Known Drug Allergies    MEDICATIONS  (STANDING):  amLODIPine   Tablet 10 milliGRAM(s) Oral daily  apixaban 5 milliGRAM(s) Oral two times a day  dextrose 5%. 1000 milliLiter(s) (100 mL/Hr) IV Continuous <Continuous>  dextrose 5%. 1000 milliLiter(s) (50 mL/Hr) IV Continuous <Continuous>  diVALproex  milliGRAM(s) Oral three times a day  glucagon  Injectable 1 milliGRAM(s) IntraMuscular once  lacosamide 150 milliGRAM(s) Oral two times a day  lactobacillus acidophilus 1 Tablet(s) Oral daily  levothyroxine 88 MICROGram(s) Oral daily  pantoprazole    Tablet 40 milliGRAM(s) Oral before breakfast  polyethylene glycol 3350 17 Gram(s) Oral two times a day  QUEtiapine 75 milliGRAM(s) Oral at bedtime  senna 2 Tablet(s) Oral at bedtime  thiamine 100 milliGRAM(s) Oral daily    MEDICATIONS  (PRN):  acetaminophen     Tablet .. 650 milliGRAM(s) Oral every 6 hours PRN Moderate Pain (4 - 6), Severe Pain (7 - 10)    Vital Signs Last 24 Hrs  T(F): 97.7 (12 May 2022 07:38), Max: 98.3 (11 May 2022 21:18)  HR: 81 (12 May 2022 07:38) (81 - 89)  BP: 146/69 (12 May 2022 07:38) (114/64 - 146/69)  RR: 15 (12 May 2022 07:38) (15 - 16)  SpO2: 94% (12 May 2022 07:38) (94% - 95%)  I&O's Summary    PHYSICAL EXAM:  General: NAD, awake, alert elderly M  ENT: MMM, no tonsilar exudate  Neck: Supple, No JVD  Lungs: Clear to auscultation bilaterally, no wheezes. Good air entry bilaterally   Cardio: RRR, S1/S2, No murmurs  Abdomen: Soft, Nontender, Nondistended; Bowel sounds present  Extremities: No calf tenderness, No pitting edema    LABS:                        10.4   4.47  )-----------( 258      ( 12 May 2022 05:50 )             32.3       05-12    138  |  105  |  32  ----------------------------<  103  4.7   |  26  |  1.27    Ca    8.9      12 May 2022 05:50    TPro  8.2  /  Alb  2.7  /  TBili  0.3  /  DBili  x   /  AST  34  /  ALT  41  /  AlkPhos  86  05-12     04-13 Chol 128 mg/dL LDL -- HDL 67 mg/dL Trig 116 mg/dL    COVID-19 PCR: NotDetec (05-11-22 @ 05:00)  COVID-19 PCR: NotDetec (05-04-22 @ 18:40)  COVID-19 PCR: NotDetec (05-04-22 @ 06:30)  COVID-19 PCR: NotDetec (04-28-22 @ 06:17)  COVID-19 PCR: NotDetec (04-27-22 @ 09:03)    RADIOLOGY & ADDITIONAL TESTS:     Care Discussed with Consultants/Other Providers:

## 2022-05-12 NOTE — PROGRESS NOTE ADULT - SUBJECTIVE AND OBJECTIVE BOX
SUBJECTIVE/ROS: He was agitated but redirectable this morning. His agitation most likely related to needing to use the restroom but unable to describe due to his aphasia. Denies chest pain, fever, chills, nausea, vomiting, abdominal pain, headache, or BLE pain.     Patient is a 72y old  Male who presents with a chief complaint of CVA (11 May 2022 14:49)    HPI:  This is a 71 y/o male with PMH of  HTN, hypothyroidism who presented to  [facility] on 4/12  with seizures, found to have cerebellar hemorrhage. Intubated for airway protection at OSH, extubated 4/13. Called to consult for increased chest congestion. CTA chest with atelectasis and small RUL segmental branch PE, LE duplex with L soleal vein DVT. MR head with small foci of hemorrhage. Pt started on heparin gtt per neuro and vascular cardiology, now changed to Eliquis. Hospital course significant for UTI _e-coli) now s/p ABT per ID. He was treated with IVIG and steroids for seizures. Patient was evaluated by PM&R and therapy for functional deficits, gait/ADL impairments and acute rehabilitation was recommended. Patient was medically optimized for discharge to [facility] IRU on 5/4/22. (04 May 2022 13:56)      PAST MEDICAL & SURGICAL HISTORY:  HTN (hypertension)      Diverticulosis      Hypothyroid      No significant past surgical history          Allergies    fish (Hives; Angioedema)  No Known Drug Allergies    Intolerances        Vital Signs Last 24 Hrs  T(C): 36.5 (12 May 2022 07:38), Max: 36.8 (11 May 2022 21:18)  T(F): 97.7 (12 May 2022 07:38), Max: 98.3 (11 May 2022 21:18)  HR: 81 (12 May 2022 07:38) (81 - 89)  BP: 146/69 (12 May 2022 07:38) (114/64 - 146/69)  BP(mean): --  RR: 15 (12 May 2022 07:38) (15 - 16)  SpO2: 94% (12 May 2022 07:38) (94% - 95%)     Daily       PHYSICAL EXAM  Constitutional - NAD, Comfortable  HEENT - NCAT, EOMI  Neck - Supple, No limited ROM  Chest - CTA bilaterally  Cardiovascular - RRR, S1S2  Abdomen -BS+, Soft, NTND  Extremities - No C/C/E, No calf tenderness   Neurologic Exam - no new focal deficits    RECENT LABS:                          10.4   4.47  )-----------( 258      ( 12 May 2022 05:50 )             32.3     05-12    138  |  105  |  32<H>  ----------------------------<  103<H>  4.7   |  26  |  1.27    Ca    8.9      12 May 2022 05:50    TPro  8.2  /  Alb  2.7<L>  /  TBili  0.3  /  DBili  x   /  AST  34  /  ALT  41  /  AlkPhos  86  05-12    LIVER FUNCTIONS - ( 12 May 2022 05:50 )  Alb: 2.7 g/dL / Pro: 8.2 g/dL / ALK PHOS: 86 U/L / ALT: 41 U/L / AST: 34 U/L / GGT: x                 MEDICATIONS  (STANDING):  amLODIPine   Tablet 10 milliGRAM(s) Oral daily  apixaban 5 milliGRAM(s) Oral two times a day  dextrose 5%. 1000 milliLiter(s) (100 mL/Hr) IV Continuous <Continuous>  dextrose 5%. 1000 milliLiter(s) (50 mL/Hr) IV Continuous <Continuous>  diVALproex  milliGRAM(s) Oral three times a day  glucagon  Injectable 1 milliGRAM(s) IntraMuscular once  lacosamide 150 milliGRAM(s) Oral two times a day  lactobacillus acidophilus 1 Tablet(s) Oral daily  levothyroxine 88 MICROGram(s) Oral daily  pantoprazole    Tablet 40 milliGRAM(s) Oral before breakfast  polyethylene glycol 3350 17 Gram(s) Oral two times a day  QUEtiapine 75 milliGRAM(s) Oral at bedtime  senna 2 Tablet(s) Oral at bedtime  thiamine 100 milliGRAM(s) Oral daily    MEDICATIONS  (PRN):  acetaminophen     Tablet .. 650 milliGRAM(s) Oral every 6 hours PRN Moderate Pain (4 - 6), Severe Pain (7 - 10)

## 2022-05-12 NOTE — PROGRESS NOTE ADULT - ASSESSMENT
72 year old Male with a PMHx of HTN, hypothyroidism, who presented to [facility] via transfer from OSH. Patient originally presented to to [place] ED via EMS after family called 911 following a witnessed seizure lasting for 5 mins. Pt also had tongue laceration. Patient was subsequently intubated for GCS 6 and CT/CTA performed showing a small cerebellar hemorrhage. Hospital course complicated by acute PE and L soleal vein DVT. course further complicated by UTI and completed course of abx. Pt was treated with steroid and IVIG for seizure. Now medically optimized and transferred to [facility] for rehab.    *CKD III   avoid nephrotoxics. monitor intake and output.   -consider renal input    *Acute DVT and PE  - cont with Eliquis 5mg BID    *Hypertension  - TTE EF of 75%, normal LV systolic function   - Continue with Amlodipine 10mg daily; will hold lisinopril 40mg daily given possible JESSICA  - Monitor BP closely    *Hypothyroid   - thyroid US w/ Enlarged goiter    - cont synthyroid 88mcg daily  - Thyroid antibodies elevated, per endo at [facility], cont to monitor TFTS for now   - Repeat TFTs in 4 weeks outpatient     *Pre-DM  - A1C of 5.9  - Outpatient follow up     *New onset seizure of unclear etiology  - s/p IVIG and steroid   - S/P LP- CSF with no growth   - Fall, seizure, aspiration precautions  - S/P MR brain; Small foci of prior hemorrhage versus calcification involving the right cerebellar hemisphere.  - vimpat 150mg BID    *CVA  - MRI showed chronic small foci of hemorrhage in right cerebellar hemisphere and chronic bilateral thalamic infarct and right BG infarct.  - cont Eliquis   - BP goal < 140/90   - LDL 38. no indication for statin. Monitor CMP  - Monitor H/H closely and for any signs/symptoms of bleeding   - PT/OT/ST per PMR    *Agitation and impulsive behaviors  - Seroquel 50mg qHS   - risperidone    - psych follow-up    DVT PPX: Eliquis 5 BID

## 2022-05-12 NOTE — PROGRESS NOTE ADULT - ASSESSMENT
ASSESSMENT/PLAN  This is a 71 y/o male with PMH of  HTN, hypothyroidism who presented to  [facility] on 4/12  with seizures, found to have cerebellar hemorrhage. Intubated for airway protection at OSH, extubated 4/13. Called to consult for increased chest congestion. CTA chest with atelectasis and small RUL segmental branch PE, LE duplex with L soleal vein DVT. MR head with small foci of hemorrhage. Pt started on heparin gtt per neuro and vascular cardiology, now changed to Eliquis. Hospital course significant for UTI (e-coli) now s/p ABT per ID. He was treated with IVIG and steroids for seizures.   Patient now with gait Instability, ADL impairments and Functional impairments.    # CVA  - MR head with small Right cerebellar hemorrhage  - Continue Comprehensive Rehab Program: PT/OT/ST, 3hours daily and 5 days weekly  - PT: Focused on improving strength, endurance, coordination, balance, functional mobility, and transfers  - OT: Focused on improving strength, fine motor skills, coordination, posture and ADLs.    - ST: to diagnose and treat deficits in swallowing, cognition and communication.   - agitation/confusion/nausea/dizziness - CTH (5/9) stable; UA negative    #Seizure  - Vimpat 150mg BID  - Depakote 250 TID 5/10 x 3 days then 500 BID  - Check VPA level after starting 500 BID - will wean vimpat once optimal dose reached     #HTN  - Amlodipine 10mg daily  - Lisinopril 40mg daily  -SBP stable     #Hypothyroidism  - Synthroid 88mcg daily    #Steroid induced hyperglycemia  - Was on Lantus 10 and premeal Admelog 4 - now dc'd   - FS on admission 131 - trend since completion of steroid <140  - A1c 5.9 on 4/13  - Monitor via labs    #DVT/PE  - L soleal vein + RUL segmental branch PE  - Eliquis 5mg BID    #UTI -resolved   - Ceftin 500mg  BID x 7 days (last dose 5/9)    #Mood/Cognition/Behavioral:  - Seroquel 75mg (5/10)  - Risperidone discontinued 5/10   - Neuropsychology decline  - Recreation therapy  - Psychiatry consult for medication regimen appreciated   - Switching seizure ppx to depakote to also help with agitation (titrating)    #Pain management  - Tylenol PRN    #GI ppx  - Protonix 40mg    #Bowel Regimen  - Senna, Miralax BID    #Bladder management  - BS on admission, and q 8 hours (SC if > 400)  - Monitor UO    #FEN   - Diet: Regular [Consistent carb - no snacks]  - SLP: evaluation and treatment    #Skin:  - No active issues at this time  - Pressure injury/Skin: Turn Q2hrs while in bed, OOB to Chair, PT/OT     #Precaution  - Fall, Aspiration, Seizure     TEAM MEETING 5/9  SW: lives with wife and 1 adult son; wife on FMLA. PH 4 ZABRINA (no HR), can set up for main level but no Bathroom on 1st FL.  13-14 steps up to bathroom.    OT: SV for eating and grooming, CG for dressing, bathing, and bathroom transfers.  Goal: SV x 2 weeks  PT: stand/pivot min A, amb 50' RW min A, 4 6inch steps min A  SLP: mild receptive language def, nonfluent aphasia, severe cog, dec working memory  barriers: dec cog, poor insight and safety awareness, dec sequencing, behavioral  EDOD: 5/18 home    Outpatient Follow-up (Specialty/Name of physician):  Juan Bernabe (DO)  Medicine  [address], [address]  [place] [address]  Phone: [phone]  Fax: [phone]  Follow Up Time:     Efren Fragoso (DO)  Cardiology; Internal Medicine  [address], [address]  [place] [address]  Phone: [phone]  Fax: [phone]

## 2022-05-13 PROCEDURE — 99232 SBSQ HOSP IP/OBS MODERATE 35: CPT

## 2022-05-13 RX ADMIN — Medication 100 MILLIGRAM(S): at 12:02

## 2022-05-13 RX ADMIN — AMLODIPINE BESYLATE 10 MILLIGRAM(S): 2.5 TABLET ORAL at 05:26

## 2022-05-13 RX ADMIN — PANTOPRAZOLE SODIUM 40 MILLIGRAM(S): 20 TABLET, DELAYED RELEASE ORAL at 05:26

## 2022-05-13 RX ADMIN — LACOSAMIDE 150 MILLIGRAM(S): 50 TABLET ORAL at 05:26

## 2022-05-13 RX ADMIN — Medication 1 TABLET(S): at 12:02

## 2022-05-13 RX ADMIN — DIVALPROEX SODIUM 500 MILLIGRAM(S): 500 TABLET, DELAYED RELEASE ORAL at 05:26

## 2022-05-13 RX ADMIN — SENNA PLUS 2 TABLET(S): 8.6 TABLET ORAL at 21:43

## 2022-05-13 RX ADMIN — DIVALPROEX SODIUM 500 MILLIGRAM(S): 500 TABLET, DELAYED RELEASE ORAL at 17:30

## 2022-05-13 RX ADMIN — APIXABAN 5 MILLIGRAM(S): 2.5 TABLET, FILM COATED ORAL at 17:30

## 2022-05-13 RX ADMIN — Medication 88 MICROGRAM(S): at 05:26

## 2022-05-13 RX ADMIN — APIXABAN 5 MILLIGRAM(S): 2.5 TABLET, FILM COATED ORAL at 05:26

## 2022-05-13 RX ADMIN — QUETIAPINE FUMARATE 75 MILLIGRAM(S): 200 TABLET, FILM COATED ORAL at 21:43

## 2022-05-13 NOTE — PROGRESS NOTE ADULT - SUBJECTIVE AND OBJECTIVE BOX
SUBJECTIVE/ROS: He is more calm today and has no new complaints. Denies chest pain, fever, chills, nausea, vomiting, abdominal pain, headache, or BLE pain.     Patient is a 72y old  Male who presents with a chief complaint of CVA (11 May 2022 14:49)    HPI:  This is a 71 y/o male with PMH of  HTN, hypothyroidism who presented to  [facility] on 4/12  with seizures, found to have cerebellar hemorrhage. Intubated for airway protection at OSH, extubated 4/13. Called to consult for increased chest congestion. CTA chest with atelectasis and small RUL segmental branch PE, LE duplex with L soleal vein DVT. MR head with small foci of hemorrhage. Pt started on heparin gtt per neuro and vascular cardiology, now changed to Eliquis. Hospital course significant for UTI _e-coli) now s/p ABT per ID. He was treated with IVIG and steroids for seizures. Patient was evaluated by PM&R and therapy for functional deficits, gait/ADL impairments and acute rehabilitation was recommended. Patient was medically optimized for discharge to [facility] IRU on 5/4/22. (04 May 2022 13:56)      PAST MEDICAL & SURGICAL HISTORY:  HTN (hypertension)      Diverticulosis      Hypothyroid      No significant past surgical history          Allergies    fish (Hives; Angioedema)  No Known Drug Allergies    Intolerances        Vital Signs Last 24 Hrs  T(C): 36.7 (13 May 2022 09:11), Max: 36.8 (12 May 2022 21:22)  T(F): 98 (13 May 2022 09:11), Max: 98.3 (12 May 2022 21:22)  HR: 99 (13 May 2022 09:11) (84 - 99)  BP: 128/56 (13 May 2022 09:11) (128/56 - 146/76)  BP(mean): --  RR: 16 (13 May 2022 09:11) (16 - 16)  SpO2: 96% (13 May 2022 09:11) (95% - 96%)        PHYSICAL EXAM  Constitutional - NAD, Comfortable  HEENT - NCAT, EOMI  Neck - Supple, No limited ROM  Chest - CTA bilaterally  Cardiovascular - RRR, S1S2  Abdomen -BS+, Soft, NTND  Extremities - No C/C/E, No calf tenderness   Neurologic Exam - no new focal deficits    RECENT LABS:                          10.4   4.47  )-----------( 258      ( 12 May 2022 05:50 )             32.3     05-12    138  |  105  |  32<H>  ----------------------------<  103<H>  4.7   |  26  |  1.27    Ca    8.9      12 May 2022 05:50    TPro  8.2  /  Alb  2.7<L>  /  TBili  0.3  /  DBili  x   /  AST  34  /  ALT  41  /  AlkPhos  86  05-12    LIVER FUNCTIONS - ( 12 May 2022 05:50 )  Alb: 2.7 g/dL / Pro: 8.2 g/dL / ALK PHOS: 86 U/L / ALT: 41 U/L / AST: 34 U/L / GGT: x             MEDICATIONS  (STANDING):  amLODIPine   Tablet 10 milliGRAM(s) Oral daily  apixaban 5 milliGRAM(s) Oral two times a day  dextrose 5%. 1000 milliLiter(s) (50 mL/Hr) IV Continuous <Continuous>  dextrose 5%. 1000 milliLiter(s) (100 mL/Hr) IV Continuous <Continuous>  diVALproex  milliGRAM(s) Oral two times a day  glucagon  Injectable 1 milliGRAM(s) IntraMuscular once  lactobacillus acidophilus 1 Tablet(s) Oral daily  levothyroxine 88 MICROGram(s) Oral daily  pantoprazole    Tablet 40 milliGRAM(s) Oral before breakfast  polyethylene glycol 3350 17 Gram(s) Oral two times a day  QUEtiapine 75 milliGRAM(s) Oral at bedtime  senna 2 Tablet(s) Oral at bedtime  thiamine 100 milliGRAM(s) Oral daily    MEDICATIONS  (PRN):  acetaminophen     Tablet .. 650 milliGRAM(s) Oral every 6 hours PRN Moderate Pain (4 - 6), Severe Pain (7 - 10)

## 2022-05-13 NOTE — PROGRESS NOTE ADULT - ASSESSMENT
72 year old Male with a PMHx of HTN, hypothyroidism, who presented to [facility] via transfer from OSH. Patient originally presented to to [place] ED via EMS after family called 911 following a witnessed seizure lasting for 5 mins. Pt also had tongue laceration. Patient was subsequently intubated for GCS 6 and CT/CTA performed showing a small cerebellar hemorrhage. Hospital course complicated by acute PE and L soleal vein DVT. course further complicated by UTI and completed course of abx. Pt was treated with steroid and IVIG for seizure. Now medically optimized and transferred to [facility] for rehab.    #CKD III   -avoid nephrotoxics. monitor intake and output  -consider renal input    #Acute DVT   #PE  -Eliquis 5mg BID    #HTN  -ECHO w/ EF 75%, normal LV systolic function   -Continue Amlodipine 10mg daily  -Well controlled BP. Will not start Lisinopril as of yet  -Monitor BP closely    #Hypothyroid   -Synthroid  -Repeat TFTs in 4 weeks outpatient     #Pre-DM  -HA1C of 5.9  -Outpatient follow up     #New onset seizure of unclear etiology  -s/p IVIG and steroid   -S/p LP- CSF with no growth   - S/P MR brain; Small foci of prior hemorrhage versus calcification involving the right cerebellar hemisphere.  -Vimpat 150mg BID    #CVA  -MRI showed chronic small foci of hemorrhage in right cerebellar hemisphere and chronic bilateral thalamic infarct and right BG infarct.  -cont Eliquis     #Agitation and impulsive behaviors  -Seroquel 50mg qHS + Risperidone   -psych follow-up

## 2022-05-13 NOTE — PROGRESS NOTE ADULT - ASSESSMENT
ASSESSMENT/PLAN  This is a 71 y/o male with PMH of  HTN, hypothyroidism who presented to  [facility] on 4/12  with seizures, found to have cerebellar hemorrhage. Intubated for airway protection at OSH, extubated 4/13. Called to consult for increased chest congestion. CTA chest with atelectasis and small RUL segmental branch PE, LE duplex with L soleal vein DVT. MR head with small foci of hemorrhage. Pt started on heparin gtt per neuro and vascular cardiology, now changed to Eliquis. Hospital course significant for UTI (e-coli) now s/p ABT per ID. He was treated with IVIG and steroids for seizures.   Patient now with gait Instability, ADL impairments and Functional impairments.    # CVA  - MR head with small Right cerebellar hemorrhage  - Continue Comprehensive Rehab Program: PT/OT/ST, 3hours daily and 5 days weekly  - PT: Focused on improving strength, endurance, coordination, balance, functional mobility, and transfers  - OT: Focused on improving strength, fine motor skills, coordination, posture and ADLs.    - ST: to diagnose and treat deficits in swallowing, cognition and communication.   - agitation/confusion/nausea/dizziness - CTH (5/9) stable; UA negative    #Seizure  - Vimpat 150mg BID -discontinued 5/13  - Depakote now 500 BID (5/13)  - Check VPA level on 5/16     #HTN  - Amlodipine 10mg daily  - Lisinopril 40mg daily  -SBP stable     #Hypothyroidism  - Synthroid 88mcg daily    #Steroid induced hyperglycemia - resolved   - Was on Lantus 10 and premeal Admelog 4 - now dc'd   - FS on admission 131 - trend since completion of steroid <140  - A1c 5.9 on 4/13  - Monitor via labs    #DVT/PE  - L soleal vein + RUL segmental branch PE  - Eliquis 5mg BID    #UTI -resolved   - Ceftin 500mg  BID x 7 days (last dose 5/9)    #Mood/Cognition/Behavioral:  - Seroquel 75mg (5/10)  - Risperidone discontinued 5/10   - Neuropsychology decline  - Recreation therapy  - Psychiatry consult for medication regimen appreciated   - Switching seizure ppx to depakote to also help with agitation (titrating)    #Pain management  - Tylenol PRN    #GI ppx  - Protonix 40mg    #Bowel Regimen  - Senna, Miralax BID    #Bladder management  - BS on admission, and q 8 hours (SC if > 400)  - Monitor UO    #FEN   - Diet: Regular [Consistent carb - no snacks]  - SLP: evaluation and treatment    #Skin:  - No active issues at this time  - Pressure injury/Skin: Turn Q2hrs while in bed, OOB to Chair, PT/OT     #Precaution  - Fall, Aspiration, Seizure     TEAM MEETING 5/9  SW: lives with wife and 1 adult son; wife on FMLA. PH 4 ZABRINA (no HR), can set up for main level but no Bathroom on 1st FL.  13-14 steps up to bathroom.    OT: SV for eating and grooming, CG for dressing, bathing, and bathroom transfers.  Goal: SV x 2 weeks  PT: stand/pivot min A, amb 50' RW min A, 4 6inch steps min A  SLP: mild receptive language def, nonfluent aphasia, severe cog, dec working memory  barriers: dec cog, poor insight and safety awareness, dec sequencing, behavioral  EDOD: 5/18 home    Outpatient Follow-up (Specialty/Name of physician):  Juan Bernabe (DO)  Medicine  [address], [address]  [place] [address]  Phone: [phone]  Fax: [phone]  Follow Up Time:     Efren Fragoso (DO)  Cardiology; Internal Medicine  [address], [address]  [place] [address]  Phone: [phone]  Fax: [phone]

## 2022-05-13 NOTE — PROGRESS NOTE ADULT - SUBJECTIVE AND OBJECTIVE BOX
Patient is a 72y old  Male who presents with a chief complaint of CVA (12 May 2022 14:10)      SUBJECTIVE / OVERNIGHT EVENTS:  Pt seen and examined at bedside. No acute events overnight.    Allergies    fish (Hives; Angioedema)  No Known Drug Allergies    Intolerances        MEDICATIONS  (STANDING):  amLODIPine   Tablet 10 milliGRAM(s) Oral daily  apixaban 5 milliGRAM(s) Oral two times a day  dextrose 5%. 1000 milliLiter(s) (50 mL/Hr) IV Continuous <Continuous>  dextrose 5%. 1000 milliLiter(s) (100 mL/Hr) IV Continuous <Continuous>  diVALproex  milliGRAM(s) Oral two times a day  glucagon  Injectable 1 milliGRAM(s) IntraMuscular once  lacosamide 150 milliGRAM(s) Oral two times a day  lactobacillus acidophilus 1 Tablet(s) Oral daily  levothyroxine 88 MICROGram(s) Oral daily  pantoprazole    Tablet 40 milliGRAM(s) Oral before breakfast  polyethylene glycol 3350 17 Gram(s) Oral two times a day  QUEtiapine 75 milliGRAM(s) Oral at bedtime  senna 2 Tablet(s) Oral at bedtime  thiamine 100 milliGRAM(s) Oral daily    MEDICATIONS  (PRN):  acetaminophen     Tablet .. 650 milliGRAM(s) Oral every 6 hours PRN Moderate Pain (4 - 6), Severe Pain (7 - 10)      Vital Signs Last 24 Hrs  T(C): 36.8 (12 May 2022 21:22), Max: 36.8 (12 May 2022 21:22)  T(F): 98.3 (12 May 2022 21:22), Max: 98.3 (12 May 2022 21:22)  HR: 84 (13 May 2022 05:23) (84 - 96)  BP: 146/76 (13 May 2022 05:23) (136/78 - 146/76)  BP(mean): --  RR: 16 (12 May 2022 21:22) (16 - 16)  SpO2: 95% (12 May 2022 21:22) (95% - 95%)  CAPILLARY BLOOD GLUCOSE        I&O's Summary      PHYSICAL EXAM:  GENERAL: NAD, elderly male   HEAD:  Atraumatic, Normocephalic  NECK: Supple, No JVD  CHEST/LUNG: Clear to auscultation bilaterally; No wheeze, nonlabored breathing  HEART: Regular rate and rhythm; No murmurs, rubs, or gallops  ABDOMEN: Soft, Nontender, Nondistended; Bowel sounds present  EXTREMITIES: No clubbing, cyanosis, or edema  PSYCH: calm, appropriate mood    LABS:                        10.4   4.47  )-----------( 258      ( 12 May 2022 05:50 )             32.3     05-12    138  |  105  |  32<H>  ----------------------------<  103<H>  4.7   |  26  |  1.27    Ca    8.9      12 May 2022 05:50    TPro  8.2  /  Alb  2.7<L>  /  TBili  0.3  /  DBili  x   /  AST  34  /  ALT  41  /  AlkPhos  86  05-12              RADIOLOGY & ADDITIONAL TESTS:  Results Reviewed:   Imaging Personally Reviewed:  Electrocardiogram Personally Reviewed:    COORDINATION OF CARE:  Care Discussed with Consultants/Other Providers [Y/N]:  Prior or Outpatient Records Reviewed [Y/N]:

## 2022-05-14 PROCEDURE — 99232 SBSQ HOSP IP/OBS MODERATE 35: CPT

## 2022-05-14 RX ADMIN — POLYETHYLENE GLYCOL 3350 17 GRAM(S): 17 POWDER, FOR SOLUTION ORAL at 18:17

## 2022-05-14 RX ADMIN — DIVALPROEX SODIUM 500 MILLIGRAM(S): 500 TABLET, DELAYED RELEASE ORAL at 18:17

## 2022-05-14 RX ADMIN — DIVALPROEX SODIUM 500 MILLIGRAM(S): 500 TABLET, DELAYED RELEASE ORAL at 05:31

## 2022-05-14 RX ADMIN — POLYETHYLENE GLYCOL 3350 17 GRAM(S): 17 POWDER, FOR SOLUTION ORAL at 05:30

## 2022-05-14 RX ADMIN — SENNA PLUS 2 TABLET(S): 8.6 TABLET ORAL at 23:01

## 2022-05-14 RX ADMIN — APIXABAN 5 MILLIGRAM(S): 2.5 TABLET, FILM COATED ORAL at 18:17

## 2022-05-14 RX ADMIN — Medication 100 MILLIGRAM(S): at 12:34

## 2022-05-14 RX ADMIN — Medication 88 MICROGRAM(S): at 05:30

## 2022-05-14 RX ADMIN — AMLODIPINE BESYLATE 10 MILLIGRAM(S): 2.5 TABLET ORAL at 05:30

## 2022-05-14 RX ADMIN — PANTOPRAZOLE SODIUM 40 MILLIGRAM(S): 20 TABLET, DELAYED RELEASE ORAL at 05:30

## 2022-05-14 RX ADMIN — APIXABAN 5 MILLIGRAM(S): 2.5 TABLET, FILM COATED ORAL at 05:30

## 2022-05-14 RX ADMIN — Medication 1 TABLET(S): at 12:33

## 2022-05-14 RX ADMIN — QUETIAPINE FUMARATE 75 MILLIGRAM(S): 200 TABLET, FILM COATED ORAL at 23:01

## 2022-05-14 NOTE — PROGRESS NOTE ADULT - SUBJECTIVE AND OBJECTIVE BOX
Patient is a 72y old  Male who presents with a chief complaint of CVA (13 May 2022 12:06)      SUBJECTIVE / OVERNIGHT EVENTS:  Pt seen and examined at bedside. No acute events overnight.  Pt denies cp, palpitations, sob, abd pain, N/V, fever, chills.    ROS:  All other review of systems negative    Allergies    fish (Hives; Angioedema)  No Known Drug Allergies    Intolerances        MEDICATIONS  (STANDING):  amLODIPine   Tablet 10 milliGRAM(s) Oral daily  apixaban 5 milliGRAM(s) Oral two times a day  dextrose 5%. 1000 milliLiter(s) (100 mL/Hr) IV Continuous <Continuous>  dextrose 5%. 1000 milliLiter(s) (50 mL/Hr) IV Continuous <Continuous>  diVALproex  milliGRAM(s) Oral two times a day  glucagon  Injectable 1 milliGRAM(s) IntraMuscular once  lactobacillus acidophilus 1 Tablet(s) Oral daily  levothyroxine 88 MICROGram(s) Oral daily  pantoprazole    Tablet 40 milliGRAM(s) Oral before breakfast  polyethylene glycol 3350 17 Gram(s) Oral two times a day  QUEtiapine 75 milliGRAM(s) Oral at bedtime  senna 2 Tablet(s) Oral at bedtime  thiamine 100 milliGRAM(s) Oral daily    MEDICATIONS  (PRN):  acetaminophen     Tablet .. 650 milliGRAM(s) Oral every 6 hours PRN Moderate Pain (4 - 6), Severe Pain (7 - 10)      Vital Signs Last 24 Hrs  T(C): 36.7 (14 May 2022 08:32), Max: 36.8 (13 May 2022 21:41)  T(F): 98.1 (14 May 2022 08:32), Max: 98.2 (13 May 2022 21:41)  HR: 92 (14 May 2022 08:32) (72 - 92)  BP: 117/65 (14 May 2022 08:32) (117/65 - 146/69)  BP(mean): --  RR: 15 (14 May 2022 08:32) (15 - 15)  SpO2: 94% (14 May 2022 08:32) (94% - 94%)  CAPILLARY BLOOD GLUCOSE        I&O's Summary      PHYSICAL EXAM:  GENERAL: NAD, elderly male   HEAD:  Atraumatic, Normocephalic  NECK: Supple, No JVD  CHEST/LUNG: Clear to auscultation bilaterally; No wheeze, nonlabored breathing  HEART: Regular rate and rhythm; No murmurs, rubs, or gallops  ABDOMEN: Soft, Nontender, Nondistended; Bowel sounds present  EXTREMITIES: No clubbing, cyanosis, or edema  PSYCH: calm, appropriate mood    LABS:                    RADIOLOGY & ADDITIONAL TESTS:  Results Reviewed:   Imaging Personally Reviewed:  Electrocardiogram Personally Reviewed:    COORDINATION OF CARE:  Care Discussed with Consultants/Other Providers [Y/N]:  Prior or Outpatient Records Reviewed [Y/N]:

## 2022-05-14 NOTE — PROGRESS NOTE ADULT - SUBJECTIVE AND OBJECTIVE BOX
Cc: Gait dysfunction    HPI: Patient seen and examined at bedside. No acute events overnight.   Pain controlled, no chest pain, no N/V, no Fevers/Chills. No other new ROS  Has been tolerating rehabilitation program.    acetaminophen     Tablet .. 650 milliGRAM(s) Oral every 6 hours PRN  amLODIPine   Tablet 10 milliGRAM(s) Oral daily  apixaban 5 milliGRAM(s) Oral two times a day  dextrose 5%. 1000 milliLiter(s) IV Continuous <Continuous>  dextrose 5%. 1000 milliLiter(s) IV Continuous <Continuous>  diVALproex  milliGRAM(s) Oral two times a day  glucagon  Injectable 1 milliGRAM(s) IntraMuscular once  lactobacillus acidophilus 1 Tablet(s) Oral daily  levothyroxine 88 MICROGram(s) Oral daily  pantoprazole    Tablet 40 milliGRAM(s) Oral before breakfast  polyethylene glycol 3350 17 Gram(s) Oral two times a day  QUEtiapine 75 milliGRAM(s) Oral at bedtime  senna 2 Tablet(s) Oral at bedtime  thiamine 100 milliGRAM(s) Oral daily      T(C): 36.7 (05-14-22 @ 08:32), Max: 36.8 (05-13-22 @ 21:41)  HR: 92 (05-14-22 @ 08:32) (72 - 92)  BP: 117/65 (05-14-22 @ 08:32) (117/65 - 146/69)  RR: 15 (05-14-22 @ 08:32) (15 - 15)  SpO2: 94% (05-14-22 @ 08:32) (94% - 94%)    In NAD, elderly male  HEENT- EOMI  Heart- S1S2  Lungs- CTA bl.  Abd- + BS, NT  Ext- No calf pain  Neuro- Exam unchanged  Psych - calm, normal affect          Imp: Patient with diagnosis of seizure and CVA, complicated by DVT and UTI admitted for comprehensive acute rehabilitation.    Plan:  - Continue PT/OT/SLP as indicated  - DVT prophylaxis  - Skin- Turn q2h, check skin daily  - Continue current medications  -Active issues-  Seizures - s/p IVIG and steroids, LP negative, continue Vimpat  Agitation - Seroquel and Risperidone appreciate psych input  DVT - continue Eliquis  HTN - continue norvasc, monitor BP  - Patient is stable to continue current rehabilitation program.

## 2022-05-14 NOTE — PROGRESS NOTE ADULT - ASSESSMENT
72 year old Male with a PMHx of HTN, hypothyroidism, who presented to [facility] via transfer from OSH. Patient originally presented to to [facility] ED via EMS after family called 911 following a witnessed seizure lasting for 5 mins. Pt also had tongue laceration. Patient was subsequently intubated for GCS 6 and CT/CTA performed showing a small cerebellar hemorrhage. Hospital course complicated by acute PE and L soleal vein DVT. course further complicated by UTI and completed course of abx. Pt was treated with steroid and IVIG for seizure. Now medically optimized and transferred to [facility] for rehab.    #CKD III   -avoid nephrotoxics. monitor intake and output  -consider renal input    #Acute DVT   #PE  -Eliquis 5mg BID    #HTN  -ECHO w/ EF 75%, normal LV systolic function   -Continue Amlodipine 10mg daily  -Well controlled BP. Will not start Lisinopril as of yet  -Monitor BP closely    #Hypothyroid   -Synthroid  -Repeat TFTs in 4 weeks outpatient     #Pre-DM  -HA1C of 5.9  -Outpatient follow up     #New onset seizure of unclear etiology  -s/p IVIG and steroid   -S/p LP- CSF with no growth   -S/P MR brain; Small foci of prior hemorrhage versus calcification involving the right cerebellar hemisphere.  -Vimpat 150mg BID    #CVA  -MRI showed chronic small foci of hemorrhage in right cerebellar hemisphere and chronic bilateral thalamic infarct and right BG infarct.  -cont Eliquis     #Agitation and impulsive behaviors  -Seroquel 50mg qHS + Risperidone   -psych follow-up

## 2022-05-15 DIAGNOSIS — I10 ESSENTIAL (PRIMARY) HYPERTENSION: ICD-10-CM

## 2022-05-15 DIAGNOSIS — N18.30 CHRONIC KIDNEY DISEASE, STAGE 3 UNSPECIFIED: ICD-10-CM

## 2022-05-15 DIAGNOSIS — E78.2 MIXED HYPERLIPIDEMIA: ICD-10-CM

## 2022-05-15 PROCEDURE — 99232 SBSQ HOSP IP/OBS MODERATE 35: CPT

## 2022-05-15 RX ADMIN — Medication 88 MICROGRAM(S): at 05:08

## 2022-05-15 RX ADMIN — POLYETHYLENE GLYCOL 3350 17 GRAM(S): 17 POWDER, FOR SOLUTION ORAL at 17:49

## 2022-05-15 RX ADMIN — AMLODIPINE BESYLATE 10 MILLIGRAM(S): 2.5 TABLET ORAL at 05:09

## 2022-05-15 RX ADMIN — PANTOPRAZOLE SODIUM 40 MILLIGRAM(S): 20 TABLET, DELAYED RELEASE ORAL at 05:13

## 2022-05-15 RX ADMIN — Medication 1 TABLET(S): at 12:03

## 2022-05-15 RX ADMIN — DIVALPROEX SODIUM 500 MILLIGRAM(S): 500 TABLET, DELAYED RELEASE ORAL at 17:50

## 2022-05-15 RX ADMIN — Medication 100 MILLIGRAM(S): at 12:03

## 2022-05-15 RX ADMIN — DIVALPROEX SODIUM 500 MILLIGRAM(S): 500 TABLET, DELAYED RELEASE ORAL at 05:09

## 2022-05-15 RX ADMIN — QUETIAPINE FUMARATE 75 MILLIGRAM(S): 200 TABLET, FILM COATED ORAL at 22:40

## 2022-05-15 RX ADMIN — APIXABAN 5 MILLIGRAM(S): 2.5 TABLET, FILM COATED ORAL at 17:49

## 2022-05-15 RX ADMIN — APIXABAN 5 MILLIGRAM(S): 2.5 TABLET, FILM COATED ORAL at 05:09

## 2022-05-15 NOTE — PROGRESS NOTE ADULT - NSPROGADDITIONALINFOA_GEN_ALL_CORE
I have personally interviewed and examined this patient, reviewed pertinent clinical information, and performed the evaluation and management services provided at today's visit for inpatient medical follow up    I am available to discuss any issues related to the medical care of this patient on the unit, or by phone at [phone]

## 2022-05-15 NOTE — PROGRESS NOTE ADULT - SUBJECTIVE AND OBJECTIVE BOX
Patient is a 72y old  Male who presents with a chief complaint of CVA (14 May 2022 16:50)      History, interim events and clinically pertinent issues reviewed; patient interviewed and examined. He has no new complaints, slept well. REVIEW OF SYMPTOMS: patient denies HA's, CP, palpitations, shortness of breath or upper respiratory symptoms, nausea, vomiting, diarrhea, constipation, dysuria, bruising/bleeding and all other systems were reviewed as negative      ALLERGIES:  fish (Hives; Angioedema)  No Known Drug Allergies    MEDICATIONS  (STANDING):  amLODIPine   Tablet 10 milliGRAM(s) Oral daily  apixaban 5 milliGRAM(s) Oral two times a day  dextrose 5%. 1000 milliLiter(s) (100 mL/Hr) IV Continuous <Continuous>  dextrose 5%. 1000 milliLiter(s) (50 mL/Hr) IV Continuous <Continuous>  diVALproex  milliGRAM(s) Oral two times a day  glucagon  Injectable 1 milliGRAM(s) IntraMuscular once  lactobacillus acidophilus 1 Tablet(s) Oral daily  levothyroxine 88 MICROGram(s) Oral daily  pantoprazole    Tablet 40 milliGRAM(s) Oral before breakfast  polyethylene glycol 3350 17 Gram(s) Oral two times a day  QUEtiapine 75 milliGRAM(s) Oral at bedtime  senna 2 Tablet(s) Oral at bedtime  thiamine 100 milliGRAM(s) Oral daily    MEDICATIONS  (PRN):  acetaminophen     Tablet .. 650 milliGRAM(s) Oral every 6 hours PRN Moderate Pain (4 - 6), Severe Pain (7 - 10)    Vital Signs Last 24 Hrs  T(F): 98 (15 May 2022 07:13), Max: 98.4 (14 May 2022 22:50)  HR: 79 (15 May 2022 07:13) (79 - 88)  BP: 134/69 (15 May 2022 07:13) (123/63 - 137/78)  RR: 16 (15 May 2022 07:13) (16 - 16)  SpO2: 96% (15 May 2022 07:13) (95% - 96%)  I&O's Summary    14 May 2022 07:01  -  15 May 2022 07:00  --------------------------------------------------------  IN: 0 mL / OUT: 275 mL / NET: -275 mL                PHYSICAL EXAM:  General: NAD, A/O x 3  ENT: MMM  Neck: Supple, No JVD  Lungs: Clear to auscultation bilaterally  Cardio: RRR, S1/S2, No murmurs  Abdomen: Soft, Nontender, Nondistended; Bowel sounds present  Extremities: No calf tenderness, No pitting edema  Neuro: no new deficits    LABS: There are no new laboratory or radiologic studies resulted at the time this progress note was authored                          04-13 Chol 128 mg/dL LDL -- HDL 67 mg/dL Trig 116 mg/dL                COVID-19 PCR: NotDetec (05-11-22 @ 05:00)  COVID-19 PCR: NotDetec (05-04-22 @ 18:40)  COVID-19 PCR: NotDetec (05-04-22 @ 06:30)  COVID-19 PCR: NotDetec (04-28-22 @ 06:17)  COVID-19 PCR: NotDetec (04-27-22 @ 09:03)

## 2022-05-15 NOTE — PROGRESS NOTE ADULT - ASSESSMENT
72 year old Male with a PMHx of HTN, hypothyroidism, who presented to [facility] via transfer from OSH. Patient originally presented to to [facility] ED via EMS after family called 911 following a witnessed seizure lasting for 5 mins. Pt also had tongue laceration. Patient was subsequently intubated for GCS 6 and CT/CTA performed showing a small cerebellar hemorrhage. Hospital course complicated by acute PE and L soleal vein DVT. course further complicated by UTI and completed course of abx. Pt was treated with steroid and IVIG for seizure. Now medically optimized and transferred to [facility] for rehab.    #CKD III   -avoid nephrotoxics. monitor intake and output  -consider renal input    #Acute DVT   #PE  -Eliquis 5mg BID    #HTN  -ECHO w/ EF 75%, normal LV systolic function   -Continue Amlodipine 10mg daily  -Well controlled BP. Will not start Lisinopril as of yet  -Monitor BP closely    #Hypothyroid   -Synthroid  -Repeat TFTs in 4 weeks outpatient     #Pre-DM  -HA1C of 5.9  -Outpatient follow up     #New onset seizure of unclear etiology  -s/p IVIG and steroid   -S/p LP- CSF with no growth   -S/P MR brain; Small foci of prior hemorrhage versus calcification involving the right cerebellar hemisphere.  -Vimpat 150mg BID    #CVA  -MRI showed chronic small foci of hemorrhage in right cerebellar hemisphere and chronic bilateral thalamic infarct and right BG infarct.  -cont Eliquis   -cont comprehensive acute rehab program    #Agitation and impulsive behaviors  -Seroquel 50mg qHS + Risperidone   -psych follow-up

## 2022-05-15 NOTE — PROGRESS NOTE ADULT - SUBJECTIVE AND OBJECTIVE BOX
Cc: Gait dysfunction    HPI: Patient seen and examined at bedside. No acute events overnight.   Pain controlled, no chest pain, no N/V, no Fevers/Chills. No other new ROS  Has been tolerating rehabilitation program.    acetaminophen     Tablet .. 650 milliGRAM(s) Oral every 6 hours PRN  amLODIPine   Tablet 10 milliGRAM(s) Oral daily  apixaban 5 milliGRAM(s) Oral two times a day  dextrose 5%. 1000 milliLiter(s) IV Continuous <Continuous>  dextrose 5%. 1000 milliLiter(s) IV Continuous <Continuous>  diVALproex  milliGRAM(s) Oral two times a day  glucagon  Injectable 1 milliGRAM(s) IntraMuscular once  lactobacillus acidophilus 1 Tablet(s) Oral daily  levothyroxine 88 MICROGram(s) Oral daily  pantoprazole    Tablet 40 milliGRAM(s) Oral before breakfast  polyethylene glycol 3350 17 Gram(s) Oral two times a day  QUEtiapine 75 milliGRAM(s) Oral at bedtime  senna 2 Tablet(s) Oral at bedtime  thiamine 100 milliGRAM(s) Oral daily      T(C): 36.7 (05-15-22 @ 07:13), Max: 36.9 (05-14-22 @ 22:50)  HR: 79 (05-15-22 @ 07:13) (79 - 88)  BP: 134/69 (05-15-22 @ 07:13) (123/63 - 137/78)  RR: 16 (05-15-22 @ 07:13) (16 - 16)  SpO2: 96% (05-15-22 @ 07:13) (95% - 96%)    In NAD, elderly male  HEENT- EOMI  Heart- S1S2  Lungs- CTA bl.  Abd- + BS, NT  Ext- No calf pain  Neuro- Exam unchanged  Psych - calm, normal affect          Imp: Patient with diagnosis of seizure and CVA, complicated by DVT and UTI admitted for comprehensive acute rehabilitation.    Plan:  - Continue PT/OT/SLP as indicated  - DVT prophylaxis  - Skin- Turn q2h, check skin daily  - Continue current medications  -Active issues-  Seizures - s/p IVIG and steroids, LP negative, continue Vimpat  Agitation - Seroquel and Risperidone appreciate psych input, has not had recent episodes  DVT - continue Eliquis  HTN - continue norvasc, monitor BP  - Patient is stable to continue current rehabilitation program.

## 2022-05-16 LAB
ALBUMIN SERPL ELPH-MCNC: 3 G/DL — LOW (ref 3.3–5)
ALP SERPL-CCNC: 96 U/L — SIGNIFICANT CHANGE UP (ref 40–120)
ALT FLD-CCNC: 37 U/L — SIGNIFICANT CHANGE UP (ref 10–45)
ANION GAP SERPL CALC-SCNC: 9 MMOL/L — SIGNIFICANT CHANGE UP (ref 5–17)
AST SERPL-CCNC: 41 U/L — HIGH (ref 10–40)
BASOPHILS # BLD AUTO: 0.03 K/UL — SIGNIFICANT CHANGE UP (ref 0–0.2)
BASOPHILS NFR BLD AUTO: 0.8 % — SIGNIFICANT CHANGE UP (ref 0–2)
BILIRUB SERPL-MCNC: 0.2 MG/DL — SIGNIFICANT CHANGE UP (ref 0.2–1.2)
BUN SERPL-MCNC: 24 MG/DL — HIGH (ref 7–23)
CALCIUM SERPL-MCNC: 9.3 MG/DL — SIGNIFICANT CHANGE UP (ref 8.4–10.5)
CHLORIDE SERPL-SCNC: 102 MMOL/L — SIGNIFICANT CHANGE UP (ref 96–108)
CO2 SERPL-SCNC: 28 MMOL/L — SIGNIFICANT CHANGE UP (ref 22–31)
CREAT SERPL-MCNC: 1.41 MG/DL — HIGH (ref 0.5–1.3)
EGFR: 53 ML/MIN/1.73M2 — LOW
EOSINOPHIL # BLD AUTO: 0.14 K/UL — SIGNIFICANT CHANGE UP (ref 0–0.5)
EOSINOPHIL NFR BLD AUTO: 3.8 % — SIGNIFICANT CHANGE UP (ref 0–6)
GLUCOSE SERPL-MCNC: 94 MG/DL — SIGNIFICANT CHANGE UP (ref 70–99)
HCT VFR BLD CALC: 34.7 % — LOW (ref 39–50)
HGB BLD-MCNC: 10.8 G/DL — LOW (ref 13–17)
IMM GRANULOCYTES NFR BLD AUTO: 0.5 % — SIGNIFICANT CHANGE UP (ref 0–1.5)
LYMPHOCYTES # BLD AUTO: 1.55 K/UL — SIGNIFICANT CHANGE UP (ref 1–3.3)
LYMPHOCYTES # BLD AUTO: 42.1 % — SIGNIFICANT CHANGE UP (ref 13–44)
MCHC RBC-ENTMCNC: 29.4 PG — SIGNIFICANT CHANGE UP (ref 27–34)
MCHC RBC-ENTMCNC: 31.1 GM/DL — LOW (ref 32–36)
MCV RBC AUTO: 94.6 FL — SIGNIFICANT CHANGE UP (ref 80–100)
MONOCYTES # BLD AUTO: 0.41 K/UL — SIGNIFICANT CHANGE UP (ref 0–0.9)
MONOCYTES NFR BLD AUTO: 11.1 % — SIGNIFICANT CHANGE UP (ref 2–14)
NEUTROPHILS # BLD AUTO: 1.53 K/UL — LOW (ref 1.8–7.4)
NEUTROPHILS NFR BLD AUTO: 41.7 % — LOW (ref 43–77)
NRBC # BLD: 0 /100 WBCS — SIGNIFICANT CHANGE UP (ref 0–0)
PLATELET # BLD AUTO: 162 K/UL — SIGNIFICANT CHANGE UP (ref 150–400)
POTASSIUM SERPL-MCNC: 4.6 MMOL/L — SIGNIFICANT CHANGE UP (ref 3.5–5.3)
POTASSIUM SERPL-SCNC: 4.6 MMOL/L — SIGNIFICANT CHANGE UP (ref 3.5–5.3)
PROT SERPL-MCNC: 8.7 G/DL — HIGH (ref 6–8.3)
RBC # BLD: 3.67 M/UL — LOW (ref 4.2–5.8)
RBC # FLD: 14.2 % — SIGNIFICANT CHANGE UP (ref 10.3–14.5)
SODIUM SERPL-SCNC: 139 MMOL/L — SIGNIFICANT CHANGE UP (ref 135–145)
VALPROATE SERPL-MCNC: 87 UG/ML — SIGNIFICANT CHANGE UP (ref 50–100)
WBC # BLD: 3.68 K/UL — LOW (ref 3.8–10.5)
WBC # FLD AUTO: 3.68 K/UL — LOW (ref 3.8–10.5)

## 2022-05-16 PROCEDURE — 99232 SBSQ HOSP IP/OBS MODERATE 35: CPT

## 2022-05-16 RX ADMIN — POLYETHYLENE GLYCOL 3350 17 GRAM(S): 17 POWDER, FOR SOLUTION ORAL at 17:39

## 2022-05-16 RX ADMIN — Medication 88 MICROGRAM(S): at 05:01

## 2022-05-16 RX ADMIN — AMLODIPINE BESYLATE 10 MILLIGRAM(S): 2.5 TABLET ORAL at 05:01

## 2022-05-16 RX ADMIN — Medication 100 MILLIGRAM(S): at 11:25

## 2022-05-16 RX ADMIN — POLYETHYLENE GLYCOL 3350 17 GRAM(S): 17 POWDER, FOR SOLUTION ORAL at 05:00

## 2022-05-16 RX ADMIN — DIVALPROEX SODIUM 500 MILLIGRAM(S): 500 TABLET, DELAYED RELEASE ORAL at 17:37

## 2022-05-16 RX ADMIN — APIXABAN 5 MILLIGRAM(S): 2.5 TABLET, FILM COATED ORAL at 05:01

## 2022-05-16 RX ADMIN — QUETIAPINE FUMARATE 75 MILLIGRAM(S): 200 TABLET, FILM COATED ORAL at 20:05

## 2022-05-16 RX ADMIN — DIVALPROEX SODIUM 500 MILLIGRAM(S): 500 TABLET, DELAYED RELEASE ORAL at 05:00

## 2022-05-16 RX ADMIN — PANTOPRAZOLE SODIUM 40 MILLIGRAM(S): 20 TABLET, DELAYED RELEASE ORAL at 08:00

## 2022-05-16 RX ADMIN — SENNA PLUS 2 TABLET(S): 8.6 TABLET ORAL at 20:05

## 2022-05-16 RX ADMIN — Medication 1 TABLET(S): at 11:25

## 2022-05-16 RX ADMIN — APIXABAN 5 MILLIGRAM(S): 2.5 TABLET, FILM COATED ORAL at 17:38

## 2022-05-16 NOTE — PROGRESS NOTE ADULT - SUBJECTIVE AND OBJECTIVE BOX
Patient is a 72y old  Male who presents with a chief complaint of CVA (15 May 2022 14:16)    Patient seen and examined at bedside. No acute overnight events. Denies pain. COVID-19 exposure. Denies fever, cough, chills, fatigue.     ALLERGIES:  fish (Hives; Angioedema)  No Known Drug Allergies    MEDICATIONS  (STANDING):  amLODIPine   Tablet 10 milliGRAM(s) Oral daily  apixaban 5 milliGRAM(s) Oral two times a day  dextrose 5%. 1000 milliLiter(s) (100 mL/Hr) IV Continuous <Continuous>  dextrose 5%. 1000 milliLiter(s) (50 mL/Hr) IV Continuous <Continuous>  diVALproex  milliGRAM(s) Oral two times a day  glucagon  Injectable 1 milliGRAM(s) IntraMuscular once  lactobacillus acidophilus 1 Tablet(s) Oral daily  levothyroxine 88 MICROGram(s) Oral daily  pantoprazole    Tablet 40 milliGRAM(s) Oral before breakfast  polyethylene glycol 3350 17 Gram(s) Oral two times a day  QUEtiapine 75 milliGRAM(s) Oral at bedtime  senna 2 Tablet(s) Oral at bedtime  thiamine 100 milliGRAM(s) Oral daily    MEDICATIONS  (PRN):  acetaminophen     Tablet .. 650 milliGRAM(s) Oral every 6 hours PRN Moderate Pain (4 - 6), Severe Pain (7 - 10)    Vital Signs Last 24 Hrs  T(F): 98.4 (15 May 2022 21:04), Max: 98.4 (15 May 2022 21:04)  HR: 74 (16 May 2022 04:59) (63 - 74)  BP: 144/74 (16 May 2022 04:59) (127/75 - 144/74)  RR: 16 (16 May 2022 04:59) (16 - 16)  SpO2: 93% (15 May 2022 21:04) (93% - 93%)  I&O's Summary    PHYSICAL EXAM:  General: NAD, Awake, alert M   ENT: MMM, no tonsilar exudate  Neck: Supple, No JVD  Lungs: Clear to auscultation bilaterally, no wheezes. Good air entry bilaterally   Cardio: RRR, S1/S2, No murmurs  Abdomen: Soft, Nontender, Nondistended; Bowel sounds present  Extremities: No calf tenderness, No pitting edema    LABS:                        10.8   3.68  )-----------( 162      ( 16 May 2022 07:00 )             34.7       05-16    139  |  102  |  24  ----------------------------<  94  4.6   |  28  |  1.41    Ca    9.3      16 May 2022 07:00    TPro  8.7  /  Alb  3.0  /  TBili  0.2  /  DBili  x   /  AST  41  /  ALT  37  /  AlkPhos  96  05-16     04-13 Chol 128 mg/dL LDL -- HDL 67 mg/dL Trig 116 mg/dL    COVID-19 PCR: NotDetec (05-11-22 @ 05:00)  COVID-19 PCR: NotDetec (05-04-22 @ 18:40)  COVID-19 PCR: NotDetec (05-04-22 @ 06:30)  COVID-19 PCR: NotDetec (04-28-22 @ 06:17)  COVID-19 PCR: NotDetec (04-27-22 @ 09:03)    RADIOLOGY & ADDITIONAL TESTS:     Care Discussed with Consultants/Other Providers:

## 2022-05-16 NOTE — PROGRESS NOTE ADULT - SUBJECTIVE AND OBJECTIVE BOX
SUBJECTIVE/ROS: He had a covid exposure but is asymptomatic. He is participating well in therapy otherwise. Denies chest pain, fever, chills, nausea, vomiting, abdominal pain, headache, or BLE pain.     Patient is a 72y old  Male who presents with a chief complaint of CVA (11 May 2022 14:49)    HPI:  This is a 73 y/o male with PMH of  HTN, hypothyroidism who presented to  [facility] on 4/12  with seizures, found to have cerebellar hemorrhage. Intubated for airway protection at OSH, extubated 4/13. Called to consult for increased chest congestion. CTA chest with atelectasis and small RUL segmental branch PE, LE duplex with L soleal vein DVT. MR head with small foci of hemorrhage. Pt started on heparin gtt per neuro and vascular cardiology, now changed to Eliquis. Hospital course significant for UTI _e-coli) now s/p ABT per ID. He was treated with IVIG and steroids for seizures. Patient was evaluated by PM&R and therapy for functional deficits, gait/ADL impairments and acute rehabilitation was recommended. Patient was medically optimized for discharge to [facility] IRU on 5/4/22. (04 May 2022 13:56)      PAST MEDICAL & SURGICAL HISTORY:  HTN (hypertension)      Diverticulosis      Hypothyroid      No significant past surgical history          Allergies    Vital Signs Last 24 Hrs  T(C): 36.9 (15 May 2022 21:04), Max: 36.9 (15 May 2022 21:04)  T(F): 98.4 (15 May 2022 21:04), Max: 98.4 (15 May 2022 21:04)  HR: 74 (16 May 2022 04:59) (63 - 74)  BP: 144/74 (16 May 2022 04:59) (127/75 - 144/74)  BP(mean): --  RR: 16 (16 May 2022 04:59) (16 - 16)  SpO2: 93% (15 May 2022 21:04) (93% - 93%)fish (Hives; Angioedema)  No Known Drug Allergies    Intolerances            PHYSICAL EXAM  Constitutional - NAD, Comfortable  HEENT - NCAT, EOMI  Neck - Supple, No limited ROM  Chest - CTA bilaterally  Cardiovascular - RRR, S1S2  Abdomen -BS+, Soft, NTND  Extremities - No C/C/E, No calf tenderness   Neurologic Exam - no new focal deficits        LABS:                          10.8   3.68  )-----------( 162      ( 16 May 2022 07:00 )             34.7     05-16    139  |  102  |  24<H>  ----------------------------<  94  4.6   |  28  |  1.41<H>    Ca    9.3      16 May 2022 07:00    TPro  8.7<H>  /  Alb  3.0<L>  /  TBili  0.2  /  DBili  x   /  AST  41<H>  /  ALT  37  /  AlkPhos  96  05-16    LIVER FUNCTIONS - ( 16 May 2022 07:00 )  Alb: 3.0 g/dL / Pro: 8.7 g/dL / ALK PHOS: 96 U/L / ALT: 37 U/L / AST: 41 U/L / GGT: x                   MEDICATIONS  (STANDING):  amLODIPine   Tablet 10 milliGRAM(s) Oral daily  apixaban 5 milliGRAM(s) Oral two times a day  dextrose 5%. 1000 milliLiter(s) (50 mL/Hr) IV Continuous <Continuous>  dextrose 5%. 1000 milliLiter(s) (100 mL/Hr) IV Continuous <Continuous>  diVALproex  milliGRAM(s) Oral two times a day  glucagon  Injectable 1 milliGRAM(s) IntraMuscular once  lactobacillus acidophilus 1 Tablet(s) Oral daily  levothyroxine 88 MICROGram(s) Oral daily  pantoprazole    Tablet 40 milliGRAM(s) Oral before breakfast  polyethylene glycol 3350 17 Gram(s) Oral two times a day  QUEtiapine 75 milliGRAM(s) Oral at bedtime  senna 2 Tablet(s) Oral at bedtime  thiamine 100 milliGRAM(s) Oral daily    MEDICATIONS  (PRN):  acetaminophen     Tablet .. 650 milliGRAM(s) Oral every 6 hours PRN Moderate Pain (4 - 6), Severe Pain (7 - 10)

## 2022-05-16 NOTE — PROGRESS NOTE ADULT - ASSESSMENT
ASSESSMENT/PLAN  This is a 73 y/o male with PMH of  HTN, hypothyroidism who presented to  [facility] on 4/12  with seizures, found to have cerebellar hemorrhage. Intubated for airway protection at OSH, extubated 4/13. Called to consult for increased chest congestion. CTA chest with atelectasis and small RUL segmental branch PE, LE duplex with L soleal vein DVT. MR head with small foci of hemorrhage. Pt started on heparin gtt per neuro and vascular cardiology, now changed to Eliquis. Hospital course significant for UTI (e-coli) now s/p ABT per ID. He was treated with IVIG and steroids for seizures.   Patient now with gait Instability, ADL impairments and Functional impairments.    # CVA  - MR head with small Right cerebellar hemorrhage  - Continue Comprehensive Rehab Program: PT/OT/ST, 3hours daily and 5 days weekly  - PT: Focused on improving strength, endurance, coordination, balance, functional mobility, and transfers  - OT: Focused on improving strength, fine motor skills, coordination, posture and ADLs.    - ST: to diagnose and treat deficits in swallowing, cognition and communication.   - agitation/confusion/nausea/dizziness - CTH (5/9) stable; UA negative    #Seizure  - Vimpat 150mg BID -discontinued 5/13  - Depakote now 500 BID (5/13)  - VPA level on 5/16 is WNL 84 (5/16)    #HTN  - Amlodipine 10mg daily  - Lisinopril 40mg daily  -SBP stable     #Hypothyroidism  - Synthroid 88mcg daily    #Steroid induced hyperglycemia - resolved   - Was on Lantus 10 and premeal Admelog 4 - now dc'd   - FS on admission 131 - trend since completion of steroid <140  - A1c 5.9 on 4/13  - Monitor via labs    #COVID exposure  Asymptomatic  -Continue to monitor for symptoms   -on droplet isolation from 5/16-5/26    #DVT/PE  - L soleal vein + RUL segmental branch PE  - Eliquis 5mg BID    #UTI -resolved   - Ceftin 500mg  BID x 7 days (last dose 5/9)    #Mood/Cognition/Behavioral:  - Seroquel 75mg (5/10)  - Risperidone discontinued 5/10   - Neuropsychology decline  - Recreation therapy  - Psychiatry consult for medication regimen appreciated   - Switching seizure ppx to depakote to also help with agitation (titrating)    #Pain management  - Tylenol PRN    #GI ppx  - Protonix 40mg    #Bowel Regimen  - Senna, Miralax BID    #Bladder management  - BS on admission, and q 8 hours (SC if > 400)  - Monitor UO    #FEN   - Diet: Regular [Consistent carb - no snacks]  - SLP: evaluation and treatment    #Skin:  - No active issues at this time  - Pressure injury/Skin: Turn Q2hrs while in bed, OOB to Chair, PT/OT     #Precaution  - Fall, Aspiration, Seizure       Outpatient Follow-up (Specialty/Name of physician):  Juan Bernabe (DO)  Medicine  [address] [facility], [address]  [place] [address]  Phone: [phone]  Fax: [phone]  Follow Up Time:     Efren Fragoso (DO)  Cardiology; Internal Medicine  [address] [facility], [address]  [place] [address]  Phone: [phone]  Fax: [phone]    TEAM MEETING 5/16/22  SW: lives with wife and 1 adult son; wife on FMLA. PH 4 ZABRINA (no HR), can set up for main level but no Bathroom on 1st FL.  13-14 steps up to bathroom.    OT: SV for eating and grooming, CG for dressing, bathing, and bathroom transfers.  Goal: SV x 2 weeks  PT: min for transfers, amb 100' RW min A, 4 steps min A  SLP: mild receptive language def, nonfluent aphasia, severe cog, dec working memory  barriers: dec cog, poor insight and safety awareness, dec sequencing, behavioral  EDOD: 5/17 ROEL

## 2022-05-16 NOTE — PROGRESS NOTE ADULT - ASSESSMENT
72 year old Male with a PMHx of HTN, hypothyroidism, who presented to [facility] via transfer from OSH. Patient originally presented to to [place] ED via EMS after family called 911 following a witnessed seizure lasting for 5 mins. Pt also had tongue laceration. Patient was subsequently intubated for GCS 6 and CT/CTA performed showing a small cerebellar hemorrhage. Hospital course complicated by acute PE and L soleal vein DVT. course further complicated by UTI and completed course of abx. Pt was treated with steroid and IVIG for seizure. Now medically optimized and transferred to [facility] for rehab.    #CKD III   -fluctuating Cr, appears to be close to baseline  -avoid nephrotoxics. monitor intake and output  -consider renal input    #COVID-19 exposure  -Droplet precautions     #Acute DVT   #PE  -Eliquis 5mg BID    #HTN  -ECHO w/ EF 75%, normal LV systolic function   -Continue Amlodipine 10mg daily  -Well controlled BP. Will not start Lisinopril as of yet  -Monitor BP closely    #Hypothyroid   -Synthroid  -Repeat TFTs in 4 weeks outpatient     #Pre-DM  -HA1C of 5.9  -Outpatient follow up     #New onset seizure of unclear etiology  -s/p IVIG and steroid   -S/p LP- CSF with no growth   -S/P MR brain; Small foci of prior hemorrhage versus calcification involving the right cerebellar hemisphere.  -Vimpat 150mg BID    #CVA  -MRI showed chronic small foci of hemorrhage in right cerebellar hemisphere and chronic bilateral thalamic infarct and right BG infarct.  -cont Eliquis   -cont comprehensive acute rehab program    #Agitation and impulsive behaviors  -Seroquel 50mg qHS + Risperidone   -psych follow-up

## 2022-05-17 LAB — SARS-COV-2 RNA SPEC QL NAA+PROBE: SIGNIFICANT CHANGE UP

## 2022-05-17 PROCEDURE — 99232 SBSQ HOSP IP/OBS MODERATE 35: CPT

## 2022-05-17 RX ADMIN — Medication 88 MICROGRAM(S): at 05:42

## 2022-05-17 RX ADMIN — APIXABAN 5 MILLIGRAM(S): 2.5 TABLET, FILM COATED ORAL at 05:42

## 2022-05-17 RX ADMIN — PANTOPRAZOLE SODIUM 40 MILLIGRAM(S): 20 TABLET, DELAYED RELEASE ORAL at 05:42

## 2022-05-17 RX ADMIN — Medication 100 MILLIGRAM(S): at 16:51

## 2022-05-17 RX ADMIN — DIVALPROEX SODIUM 500 MILLIGRAM(S): 500 TABLET, DELAYED RELEASE ORAL at 05:42

## 2022-05-17 RX ADMIN — SENNA PLUS 2 TABLET(S): 8.6 TABLET ORAL at 19:54

## 2022-05-17 RX ADMIN — QUETIAPINE FUMARATE 75 MILLIGRAM(S): 200 TABLET, FILM COATED ORAL at 19:54

## 2022-05-17 RX ADMIN — AMLODIPINE BESYLATE 10 MILLIGRAM(S): 2.5 TABLET ORAL at 05:41

## 2022-05-17 RX ADMIN — APIXABAN 5 MILLIGRAM(S): 2.5 TABLET, FILM COATED ORAL at 16:51

## 2022-05-17 RX ADMIN — DIVALPROEX SODIUM 500 MILLIGRAM(S): 500 TABLET, DELAYED RELEASE ORAL at 16:51

## 2022-05-17 NOTE — PROGRESS NOTE ADULT - SUBJECTIVE AND OBJECTIVE BOX
Patient is a 72y old  Male who presents with a chief complaint of CVA (16 May 2022 14:32)    Patient seen and examined at bedside. No acute overnight events. Denies pain.    ALLERGIES:  fish (Hives; Angioedema)  No Known Drug Allergies    MEDICATIONS  (STANDING):  amLODIPine   Tablet 10 milliGRAM(s) Oral daily  apixaban 5 milliGRAM(s) Oral two times a day  dextrose 5%. 1000 milliLiter(s) (50 mL/Hr) IV Continuous <Continuous>  dextrose 5%. 1000 milliLiter(s) (100 mL/Hr) IV Continuous <Continuous>  diVALproex  milliGRAM(s) Oral two times a day  glucagon  Injectable 1 milliGRAM(s) IntraMuscular once  lactobacillus acidophilus 1 Tablet(s) Oral daily  levothyroxine 88 MICROGram(s) Oral daily  pantoprazole    Tablet 40 milliGRAM(s) Oral before breakfast  polyethylene glycol 3350 17 Gram(s) Oral two times a day  QUEtiapine 75 milliGRAM(s) Oral at bedtime  senna 2 Tablet(s) Oral at bedtime  thiamine 100 milliGRAM(s) Oral daily    MEDICATIONS  (PRN):  acetaminophen     Tablet .. 650 milliGRAM(s) Oral every 6 hours PRN Moderate Pain (4 - 6), Severe Pain (7 - 10)    Vital Signs Last 24 Hrs  T(F): 97.4 (17 May 2022 08:23), Max: 98.2 (16 May 2022 19:50)  HR: 88 (17 May 2022 08:23) (80 - 89)  BP: 117/70 (17 May 2022 08:23) (117/70 - 132/73)  RR: 15 (17 May 2022 08:23) (15 - 15)  SpO2: 94% (17 May 2022 08:23) (94% - 95%)  I&O's Summary    PHYSICAL EXAM:  General: NAD, Awake, alert elderly M. calm  ENT: MMM, no tonsilar exudate  Neck: Supple, No JVD  Lungs: Clear to auscultation bilaterally, no wheezes. Good air entry bilaterally   Cardio: RRR, S1/S2, No murmurs  Abdomen: Soft, Nontender, Nondistended; Bowel sounds present  Extremities: No calf tenderness, No pitting edema    LABS:                        10.8   3.68  )-----------( 162      ( 16 May 2022 07:00 )             34.7       05-16    139  |  102  |  24  ----------------------------<  94  4.6   |  28  |  1.41    Ca    9.3      16 May 2022 07:00    TPro  8.7  /  Alb  3.0  /  TBili  0.2  /  DBili  x   /  AST  41  /  ALT  37  /  AlkPhos  96  05-16     04-13 Chol 128 mg/dL LDL -- HDL 67 mg/dL Trig 116 mg/dL    COVID-19 PCR: NotDetec (05-11-22 @ 05:00)  COVID-19 PCR: NotDetec (05-04-22 @ 18:40)  COVID-19 PCR: NotDetec (05-04-22 @ 06:30)  COVID-19 PCR: NotDetec (04-28-22 @ 06:17)  COVID-19 PCR: NotDetec (04-27-22 @ 09:03)    RADIOLOGY & ADDITIONAL TESTS:     Care Discussed with Consultants/Other Providers:

## 2022-05-17 NOTE — PROGRESS NOTE ADULT - SUBJECTIVE AND OBJECTIVE BOX
SUBJECTIVE/ROS: No acute events overnight. He states he is feeling well. Denies chest pain, fever, chills, nausea, vomiting, abdominal pain, headache, or BLE pain.     Patient is a 72y old  Male who presents with a chief complaint of CVA (11 May 2022 14:49)    HPI:  This is a 71 y/o male with PMH of  HTN, hypothyroidism who presented to  [facility] on 4/12  with seizures, found to have cerebellar hemorrhage. Intubated for airway protection at OSH, extubated 4/13. Called to consult for increased chest congestion. CTA chest with atelectasis and small RUL segmental branch PE, LE duplex with L soleal vein DVT. MR head with small foci of hemorrhage. Pt started on heparin gtt per neuro and vascular cardiology, now changed to Eliquis. Hospital course significant for UTI _e-coli) now s/p ABT per ID. He was treated with IVIG and steroids for seizures. Patient was evaluated by PM&R and therapy for functional deficits, gait/ADL impairments and acute rehabilitation was recommended. Patient was medically optimized for discharge to [facility] IRU on 5/4/22. (04 May 2022 13:56)      PAST MEDICAL & SURGICAL HISTORY:  HTN (hypertension)      Diverticulosis      Hypothyroid      No significant past surgical history          Vital Signs Last 24 Hrs  T(C): 36.3 (17 May 2022 08:23), Max: 36.8 (16 May 2022 19:50)  T(F): 97.4 (17 May 2022 08:23), Max: 98.2 (16 May 2022 19:50)  HR: 88 (17 May 2022 08:23) (80 - 89)  BP: 117/70 (17 May 2022 08:23) (117/70 - 132/73)  BP(mean): --  RR: 15 (17 May 2022 08:23) (15 - 15)  SpO2: 94% (17 May 2022 08:23) (94% - 95%)            PHYSICAL EXAM  Constitutional - NAD, Comfortable  HEENT - NCAT, EOMI  Neck - Supple, No limited ROM  Chest - CTA bilaterally  Cardiovascular - RRR, S1S2  Abdomen -BS+, Soft, NTND  Extremities - No C/C/E, No calf tenderness   Neurologic Exam - no new focal deficits        LABS:                          10.8   3.68  )-----------( 162      ( 16 May 2022 07:00 )             34.7     05-16    139  |  102  |  24<H>  ----------------------------<  94  4.6   |  28  |  1.41<H>    Ca    9.3      16 May 2022 07:00    TPro  8.7<H>  /  Alb  3.0<L>  /  TBili  0.2  /  DBili  x   /  AST  41<H>  /  ALT  37  /  AlkPhos  96  05-16    LIVER FUNCTIONS - ( 16 May 2022 07:00 )  Alb: 3.0 g/dL / Pro: 8.7 g/dL / ALK PHOS: 96 U/L / ALT: 37 U/L / AST: 41 U/L / GGT: x                   MEDICATIONS  (STANDING):  amLODIPine   Tablet 10 milliGRAM(s) Oral daily  apixaban 5 milliGRAM(s) Oral two times a day  dextrose 5%. 1000 milliLiter(s) (50 mL/Hr) IV Continuous <Continuous>  dextrose 5%. 1000 milliLiter(s) (100 mL/Hr) IV Continuous <Continuous>  diVALproex  milliGRAM(s) Oral two times a day  glucagon  Injectable 1 milliGRAM(s) IntraMuscular once  lactobacillus acidophilus 1 Tablet(s) Oral daily  levothyroxine 88 MICROGram(s) Oral daily  pantoprazole    Tablet 40 milliGRAM(s) Oral before breakfast  polyethylene glycol 3350 17 Gram(s) Oral two times a day  QUEtiapine 75 milliGRAM(s) Oral at bedtime  senna 2 Tablet(s) Oral at bedtime  thiamine 100 milliGRAM(s) Oral daily    MEDICATIONS  (PRN):  acetaminophen     Tablet .. 650 milliGRAM(s) Oral every 6 hours PRN Moderate Pain (4 - 6), Severe Pain (7 - 10)

## 2022-05-17 NOTE — PROGRESS NOTE ADULT - ASSESSMENT
72 year old Male with a PMHx of HTN, hypothyroidism, who presented to [facility] via transfer from OSH. Patient originally presented to to [place] ED via EMS after family called 911 following a witnessed seizure lasting for 5 mins. Pt also had tongue laceration. Patient was subsequently intubated for GCS 6 and CT/CTA performed showing a small cerebellar hemorrhage. Hospital course complicated by acute PE and L soleal vein DVT. course further complicated by UTI and completed course of abx. Pt was treated with steroid and IVIG for seizure. Now medically optimized and transferred to [facility] for rehab.    #CKD III   -fluctuating Cr, appears to be close to baseline  -avoid nephrotoxics. monitor intake and output  -consider renal input    #COVID-19 exposure  -Droplet precautions     #Acute DVT   #PE  -Eliquis 5mg BID    #HTN  -ECHO w/ EF 75%, normal LV systolic function   -Continue Amlodipine 10mg daily  -Well controlled BP. Will not start Lisinopril as of yet  -Monitor BP closely    #Hypothyroid   -Synthroid  -Repeat TFTs in 4 weeks outpatient     #Pre-DM  -HA1C of 5.9  -Outpatient follow up     #New onset seizure of unclear etiology  -s/p IVIG and steroid   -S/p LP- CSF with no growth   -S/P MR brain; Small foci of prior hemorrhage versus calcification involving the right cerebellar hemisphere.  -Vimpat 150mg BID    #CVA  -MRI showed chronic small foci of hemorrhage in right cerebellar hemisphere and chronic bilateral thalamic infarct and right BG infarct.  -cont Eliquis   -cont comprehensive acute rehab program    #Agitation and impulsive behaviors  -Seroquel 50mg qHS + Risperidone   -psych follow-up

## 2022-05-17 NOTE — PROGRESS NOTE ADULT - ASSESSMENT
ASSESSMENT/PLAN  This is a 73 y/o male with PMH of  HTN, hypothyroidism who presented to  [facility] on 4/12  with seizures, found to have cerebellar hemorrhage. Intubated for airway protection at OSH, extubated 4/13. Called to consult for increased chest congestion. CTA chest with atelectasis and small RUL segmental branch PE, LE duplex with L soleal vein DVT. MR head with small foci of hemorrhage. Pt started on heparin gtt per neuro and vascular cardiology, now changed to Eliquis. Hospital course significant for UTI (e-coli) now s/p ABT per ID. He was treated with IVIG and steroids for seizures.   Patient now with gait Instability, ADL impairments and Functional impairments.    # CVA  - MR head with small Right cerebellar hemorrhage  - Continue Comprehensive Rehab Program: PT/OT/ST, 3hours daily and 5 days weekly  - PT: Focused on improving strength, endurance, coordination, balance, functional mobility, and transfers  - OT: Focused on improving strength, fine motor skills, coordination, posture and ADLs.    - ST: to diagnose and treat deficits in swallowing, cognition and communication.   - agitation/confusion/nausea/dizziness - CTH (5/9) stable; UA negative -no further episodes as of 5/17    #Seizures - stable  - Vimpat 150mg BID -discontinued 5/13  - Depakote now 500 BID (5/13)  - VPA level on 5/16 is WNL 84 (5/16)    #HTN  - Amlodipine 10mg daily  - Lisinopril 40mg daily - discontinued   -SBP stable     #Hypothyroidism  - Synthroid 88mcg daily    #Steroid induced hyperglycemia - resolved   - Was on Lantus 10 and premeal Admelog 4 - now dc'd   - FS on admission 131 - trend since completion of steroid <140  - A1c 5.9 on 4/13  - Monitor via labs    #COVID exposure  -Asymptomatic  -Continue to monitor for symptoms   -on droplet isolation from 5/16-5/26    #DVT/PE  - L soleal vein + RUL segmental branch PE  - Eliquis 5mg BID    #UTI -resolved   - Ceftin 500mg  BID x 7 days (last dose 5/9)    #Mood/Cognition/Behavioral:  - Seroquel 75mg (5/10)  - Risperidone discontinued 5/10   - Neuropsychology decline  - Recreation therapy  - Psychiatry consult for medication regimen appreciated   - Switching seizure ppx to depakote to also help with agitation (titrating) - improved     #Pain management  - Tylenol PRN    #GI ppx  - Protonix 40mg    #Bowel Regimen  - Senna, Miralax BID    #Bladder management  - BS on admission, and q 8 hours (SC if > 400)  - Monitor UO    #FEN   - Diet: Regular [Consistent carb - no snacks]  - SLP: evaluation and treatment    #Skin:  - No active issues at this time  - Pressure injury/Skin: Turn Q2hrs while in bed, OOB to Chair, PT/OT     #Precaution  - Fall, Aspiration, Seizure       Outpatient Follow-up (Specialty/Name of physician):  Juan Bernabe (DO)  Medicine  [address] [facility], [address]  [place] [address]  Phone: [phone]  Fax: [phone]  Follow Up Time:     Efren Fragoso (DO)  Cardiology; Internal Medicine  [address] [facility], [address]  [place] [address]  Phone: [phone]  Fax: [phone]    TEAM MEETING 5/16/22  SW: lives with wife and 1 adult son; wife on FMLA. PH 4 ZABRINA (no HR), can set up for main level but no Bathroom on 1st FL.  13-14 steps up to bathroom.    OT: SV for eating and grooming, CG for dressing, bathing, and bathroom transfers.  Goal: SV x 2 weeks  PT: min for transfers, amb 100' RW min A, 4 steps min A  SLP: mild receptive language def, nonfluent aphasia, severe cog, dec working memory  barriers: dec cog, poor insight and safety awareness, dec sequencing, behavioral  EDOD: 5/17 ROEL

## 2022-05-18 ENCOUNTER — TRANSCRIPTION ENCOUNTER (OUTPATIENT)
Age: 72
End: 2022-05-18

## 2022-05-18 LAB
CULTURE RESULTS: SIGNIFICANT CHANGE UP
SPECIMEN SOURCE: SIGNIFICANT CHANGE UP

## 2022-05-18 PROCEDURE — 99232 SBSQ HOSP IP/OBS MODERATE 35: CPT

## 2022-05-18 RX ORDER — APIXABAN 2.5 MG/1
1 TABLET, FILM COATED ORAL
Qty: 60 | Refills: 0
Start: 2022-05-18 | End: 2022-06-16

## 2022-05-18 RX ORDER — ACETAMINOPHEN 500 MG
2 TABLET ORAL
Qty: 0 | Refills: 0 | DISCHARGE
Start: 2022-05-18

## 2022-05-18 RX ORDER — DIVALPROEX SODIUM 500 MG/1
1 TABLET, DELAYED RELEASE ORAL
Qty: 60 | Refills: 0
Start: 2022-05-18 | End: 2022-06-16

## 2022-05-18 RX ORDER — QUETIAPINE FUMARATE 200 MG/1
1.5 TABLET, FILM COATED ORAL
Qty: 45 | Refills: 0
Start: 2022-05-18 | End: 2022-06-16

## 2022-05-18 RX ORDER — SENNA PLUS 8.6 MG/1
2 TABLET ORAL
Qty: 0 | Refills: 0 | DISCHARGE
Start: 2022-05-18

## 2022-05-18 RX ORDER — LACTOBACILLUS ACIDOPHILUS 100MM CELL
1 CAPSULE ORAL
Qty: 0 | Refills: 0 | DISCHARGE
Start: 2022-05-18

## 2022-05-18 RX ORDER — LEVOTHYROXINE SODIUM 125 MCG
1 TABLET ORAL
Qty: 30 | Refills: 0
Start: 2022-05-18 | End: 2022-06-16

## 2022-05-18 RX ORDER — THIAMINE MONONITRATE (VIT B1) 100 MG
1 TABLET ORAL
Qty: 30 | Refills: 0
Start: 2022-05-18 | End: 2022-06-16

## 2022-05-18 RX ORDER — POLYETHYLENE GLYCOL 3350 17 G/17G
17 POWDER, FOR SOLUTION ORAL
Qty: 0 | Refills: 0 | DISCHARGE
Start: 2022-05-18

## 2022-05-18 RX ORDER — AMLODIPINE BESYLATE 2.5 MG/1
1 TABLET ORAL
Qty: 30 | Refills: 0
Start: 2022-05-18 | End: 2022-06-16

## 2022-05-18 RX ORDER — PANTOPRAZOLE SODIUM 20 MG/1
1 TABLET, DELAYED RELEASE ORAL
Qty: 30 | Refills: 0
Start: 2022-05-18 | End: 2022-06-16

## 2022-05-18 RX ADMIN — Medication 100 MILLIGRAM(S): at 11:07

## 2022-05-18 RX ADMIN — APIXABAN 5 MILLIGRAM(S): 2.5 TABLET, FILM COATED ORAL at 05:20

## 2022-05-18 RX ADMIN — APIXABAN 5 MILLIGRAM(S): 2.5 TABLET, FILM COATED ORAL at 17:40

## 2022-05-18 RX ADMIN — DIVALPROEX SODIUM 500 MILLIGRAM(S): 500 TABLET, DELAYED RELEASE ORAL at 17:40

## 2022-05-18 RX ADMIN — Medication 1 TABLET(S): at 11:07

## 2022-05-18 RX ADMIN — DIVALPROEX SODIUM 500 MILLIGRAM(S): 500 TABLET, DELAYED RELEASE ORAL at 05:20

## 2022-05-18 RX ADMIN — AMLODIPINE BESYLATE 10 MILLIGRAM(S): 2.5 TABLET ORAL at 05:20

## 2022-05-18 RX ADMIN — Medication 88 MICROGRAM(S): at 05:20

## 2022-05-18 RX ADMIN — PANTOPRAZOLE SODIUM 40 MILLIGRAM(S): 20 TABLET, DELAYED RELEASE ORAL at 05:20

## 2022-05-18 RX ADMIN — QUETIAPINE FUMARATE 75 MILLIGRAM(S): 200 TABLET, FILM COATED ORAL at 22:11

## 2022-05-18 NOTE — PROGRESS NOTE ADULT - ASSESSMENT
ASSESSMENT/PLAN  This is a 71 y/o male with PMH of  HTN, hypothyroidism who presented to  [facility] on 4/12  with seizures, found to have cerebellar hemorrhage. Intubated for airway protection at OSH, extubated 4/13. Called to consult for increased chest congestion. CTA chest with atelectasis and small RUL segmental branch PE, LE duplex with L soleal vein DVT. MR head with small foci of hemorrhage. Pt started on heparin gtt per neuro and vascular cardiology, now changed to Eliquis. Hospital course significant for UTI (e-coli) now s/p ABT per ID. He was treated with IVIG and steroids for seizures.   Patient now with gait Instability, ADL impairments and Functional impairments.    # CVA  - MR head with small Right cerebellar hemorrhage  - Continue Comprehensive Rehab Program: PT/OT/ST, 3hours daily and 5 days weekly  - PT: Focused on improving strength, endurance, coordination, balance, functional mobility, and transfers  - OT: Focused on improving strength, fine motor skills, coordination, posture and ADLs.    - ST: to diagnose and treat deficits in swallowing, cognition and communication.   - agitation/confusion/nausea/dizziness - CTH (5/9) stable; no further episodes as of 5/17 on current medication regimen     #Seizures - stable  - Vimpat 150mg BID -discontinued 5/13  - Depakote now 500 BID (5/13)  - VPA level on 5/16 is WNL 84 (5/16)    #HTN  - Amlodipine 10mg daily  - Lisinopril 40mg daily - discontinued   -SBP stable     #Hypothyroidism  - Synthroid 88mcg daily    #Steroid induced hyperglycemia - resolved   - Was on Lantus 10 and premeal Admelog 4 - now dc'd   - FS on admission 131 - trend since completion of steroid <140  - A1c 5.9 on 4/13  - Monitor via labs    #COVID exposure  -Asymptomatic  -Continue to monitor for symptoms   -on droplet isolation from 5/16-5/26    #DVT/PE  - L soleal vein + RUL segmental branch PE  - Eliquis 5mg BID    #UTI -resolved   - Ceftin 500mg  BID x 7 days (last dose 5/9)    #Mood/Cognition/Behavioral:  - Seroquel 75mg (5/10)  - Risperidone discontinued 5/10   - Neuropsychology decline  - Recreation therapy  - Psychiatry consult for medication regimen appreciated   - Switching seizure ppx to depakote to also help with agitation (titrating) - improved     #Pain management  - Tylenol PRN    #GI ppx  - Protonix 40mg    #Bowel Regimen  - Senna, Miralax BID    #Bladder management  - BS on admission, and q 8 hours (SC if > 400)  - Monitor UO    #FEN   - Diet: Regular [Consistent carb - no snacks]  - SLP: evaluation and treatment    #Skin:  - No active issues at this time  - Pressure injury/Skin: Turn Q2hrs while in bed, OOB to Chair, PT/OT     #Precaution  - Fall, Aspiration, Seizure       Outpatient Follow-up (Specialty/Name of physician):  Juan Bernabe (DO)  Medicine  [address] [facility] [address], [address]  [place] [address]  Phone: [phone]  Fax: [phone]  Follow Up Time:     Efren Fragoso (DO)  Cardiology; Internal Medicine  [address] [facility], [address]  [place] [address]  Phone: [phone]  Fax: [phone]    TEAM MEETING 5/16/22  SW: lives with wife and 1 adult son; wife on FMLA. PH 4 ZABRINA (no HR), can set up for main level but no Bathroom on 1st FL.  13-14 steps up to bathroom.    OT: SV for eating and grooming, CG for dressing, bathing, and bathroom transfers.  Goal: SV x 2 weeks  PT: min for transfers, amb 100' RW min A, 4 steps min A  SLP: mild receptive language def, nonfluent aphasia, severe cog, dec working memory  barriers: dec cog, poor insight and safety awareness, dec sequencing, behavioral  EDOD: 5/19/22 Home care?

## 2022-05-18 NOTE — DISCHARGE NOTE PROVIDER - [id number]_NEU_ALL_CORE
Diabetes/High blood pressure/High cholesterol/History of a stroke or TIA High blood pressure/High cholesterol/History of a stroke or TIA

## 2022-05-18 NOTE — DISCHARGE NOTE PROVIDER - CARE PROVIDER_API CALL
Darshana Wolf (DO)  Family Medicine  [address] [place] [address]  Phone: [phone]  Fax: [phone]  Follow Up Time: 1-3 days    LAMBERTO JOHN  Internal Medicine  Phone: [phone]  Fax: [phone])-  Follow Up Time: 2 weeks    Efren Fragoso (DO)  Cardiology; Internal Medicine  [address] [place] [address], [address]  [place] [address]  Phone: [phone]  Fax: [phone]  Follow Up Time: 2 weeks

## 2022-05-18 NOTE — DISCHARGE NOTE PROVIDER - HOSPITAL COURSE
HPI:  This is a 71 y/o male with PMH of  HTN, hypothyroidism who presented to  [facility] on 4/12  with seizures, found to have cerebellar hemorrhage. Intubated for airway protection at OSH, extubated 4/13. Called to consult for increased chest congestion. CTA chest with atelectasis and small RUL segmental branch PE, LE duplex with L soleal vein DVT. MR head with small foci of hemorrhage. Pt started on heparin gtt per neuro and vascular cardiology, now changed to Eliquis. Hospital course significant for UTI _e-coli) now s/p ABT per ID. He was treated with IVIG and steroids for seizures. Patient was evaluated by PM&R and therapy for functional deficits, gait/ADL impairments and acute rehabilitation was recommended. Patient was medically optimized for discharge to [facility] IRU on 5/4/22. (04 May 2022 13:56)      Rehab course significant for adjustments to psych medication to assist with improvement in confusion and delirium which have since resolved.     All other medical co-morbidites were stable.    Pt tolerated course of inpatient pt/ot/slp rehab with significant functional improvements and met rehab goals prior to discharge.     Pt was medically cleared on 5/19/22 for discharge to home with family.        HPI:  This is a 71 y/o male with PMH of  HTN, hypothyroidism who presented to  [facility] on 4/12  with seizures, found to have cerebellar hemorrhage. Intubated for airway protection at OSH, extubated 4/13. Called to consult for increased chest congestion. CTA chest with atelectasis and small RUL segmental branch PE, LE duplex with L soleal vein DVT. MR head with small foci of hemorrhage. Pt started on heparin gtt per neuro and vascular cardiology, now changed to Eliquis. Hospital course significant for UTI _e-coli) now s/p ABT per ID. He was treated with IVIG and steroids for seizures. Patient was evaluated by PM&R and therapy for functional deficits, gait/ADL impairments and acute rehabilitation was recommended. Patient was medically optimized for discharge to [facility] IRU on 5/4/22. (04 May 2022 13:56)      Rehab course significant for adjustments to psych medication to assist with improvement in confusion and delirium which have since resolved.     All other medical co-morbidites were stable.    Pt tolerated course of inpatient pt/ot/slp rehab with significant functional improvements and met rehab goals prior to discharge.     Pt was medically cleared on 5/23/22 for discharge to home with family.        HPI:  This is a 71 y/o male with PMH of  HTN, hypothyroidism who presented to  [facility] on 4/12  with seizures, found to have cerebellar hemorrhage. Intubated for airway protection at OSH, extubated 4/13. Called to consult for increased chest congestion. CTA chest with atelectasis and small RUL segmental branch PE, LE duplex with L soleal vein DVT. MR head with small foci of hemorrhage. Pt started on heparin gtt per neuro and vascular cardiology, now changed to Eliquis. Hospital course significant for UTI _e-coli) now s/p ABT per ID. He was treated with IVIG and steroids for seizures. Patient was evaluated by PM&R and therapy for functional deficits, gait/ADL impairments and acute rehabilitation was recommended. Patient was medically optimized for discharge to [facility] IRU on 5/4/22. (04 May 2022 13:56)      Rehab course significant for adjustments to psych medication to assist with improvement in confusion and delirium which have since resolved.     All other medical co-morbidites were stable.    Pt tolerated course of inpatient pt/ot/slp rehab with significant functional improvements and met rehab goals prior to discharge.     Pt was medically cleared on 5/24/22 for discharge to home with family.

## 2022-05-18 NOTE — PROGRESS NOTE ADULT - ASSESSMENT
72 year old Male with a PMHx of HTN, hypothyroidism, who presented to [facility] via transfer from OSH. Patient originally presented to to [place] ED via EMS after family called 911 following a witnessed seizure lasting for 5 mins. Pt also had tongue laceration. Patient was subsequently intubated for GCS 6 and CT/CTA performed showing a small cerebellar hemorrhage. Hospital course complicated by acute PE and L soleal vein DVT. course further complicated by UTI and completed course of abx. Pt was treated with steroid and IVIG for seizure. Now medically optimized and transferred to [facility] for rehab.    #CKD III   -fluctuating Cr, appears to be close to baseline  -avoid nephrotoxics. monitor intake and output  -consider renal input if not improving  -encourage oral hydratoin     #COVID-19 exposure  -Droplet precautions per infection control    #Acute DVT   #PE  -Eliquis 5mg BID    #HTN  -ECHO w/ EF 75%, normal LV systolic function   -Continue Amlodipine 10mg daily  -Well controlled BP. Will not start Lisinopril as of yet  -Monitor BP closely    #Hypothyroid   -Synthroid  -Repeat TFTs in 4 weeks outpatient     #Pre-DM  -HA1C of 5.9  -Outpatient follow up     #New onset seizure of unclear etiology  -s/p IVIG and steroid   -S/p LP- CSF with no growth   -S/P MR brain; Small foci of prior hemorrhage versus calcification involving the right cerebellar hemisphere.  -Vimpat 150mg BID    #CVA  -MRI showed chronic small foci of hemorrhage in right cerebellar hemisphere and chronic bilateral thalamic infarct and right BG infarct.  -cont Eliquis   -cont comprehensive acute rehab program    #Agitation and impulsive behaviors  -Seroquel 50mg qHS + Risperidone   -psych follow-up

## 2022-05-18 NOTE — DISCHARGE NOTE PROVIDER - NSDCMRMEDTOKEN_GEN_ALL_CORE_FT
acetaminophen 325 mg oral tablet: 2 tab(s) orally every 6 hours, As needed, Moderate Pain (4 - 6), Severe Pain (7 - 10)  amLODIPine 10 mg oral tablet: 1 tab(s) orally once a day  apixaban 5 mg oral tablet: 1 tab(s) orally 2 times a day   divalproex sodium 500 mg oral delayed release tablet: 1 tab(s) orally 2 times a day  lactobacillus acidophilus oral capsule: 1 tab(s) orally once a day (at bedtime)  levothyroxine 88 mcg (0.088 mg) oral tablet: 1 tab(s) orally once a day  pantoprazole 40 mg oral delayed release tablet: 1 tab(s) orally once a day (before a meal)  polyethylene glycol 3350 oral powder for reconstitution: 17 gram(s) orally 2 times a day  QUEtiapine 50 mg oral tablet: 1.5 tab(s) orally once a day (at bedtime)   senna oral tablet: 2 tab(s) orally once a day (at bedtime)  thiamine 100 mg oral tablet: 1 tab(s) orally once a day

## 2022-05-18 NOTE — PROGRESS NOTE ADULT - SUBJECTIVE AND OBJECTIVE BOX
CHIEF COMPLAINT: no cute events overnight no new complaints       HISTORY OF PRESENT ILLNESS    This is a 73 y/o male with PMH of  HTN, hypothyroidism who presented to  [facility] on 4/12  with seizures, found to have cerebellar hemorrhage. Intubated for airway protection at OSH, extubated 4/13. Called to consult for increased chest congestion. CTA chest with atelectasis and small RUL segmental branch PE, LE duplex with L soleal vein DVT. MR head with small foci of hemorrhage. Pt started on heparin gtt per neuro and vascular cardiology, now changed to Eliquis. Hospital course significant for UTI _e-coli) now s/p ABT per ID. He was treated with IVIG and steroids for seizures. Patient was evaluated by PM&R and therapy for functional deficits, gait/ADL impairments and acute rehabilitation was recommended. Patient was medically optimized for discharge to [facility] IRU on 5/4/22. (04 May 2022 13:56)        PAST MEDICAL & SURGICAL HISTORY:  HTN (hypertension)      Diverticulosis      Hypothyroid      No significant past surgical history      VITALS  Vital Signs Last 24 Hrs  T(C): 36.8 (18 May 2022 08:22), Max: 36.9 (17 May 2022 19:49)  T(F): 98.2 (18 May 2022 08:22), Max: 98.5 (17 May 2022 19:49)  HR: 88 (18 May 2022 09:38) (79 - 88)  BP: 151/79 (18 May 2022 09:38) (97/60 - 151/79)  BP(mean): --  RR: 16 (18 May 2022 09:38) (15 - 16)  SpO2: 96% (18 May 2022 09:38) (94% - 96%)      PHYSICAL EXAM  Constitutional - NAD, Comfortable  HEENT - NCAT, EOMI  Neck - Supple, No limited ROM  Chest - CTA bilaterally  Cardiovascular - RRR, S1S2, No murmurs  Abdomen - Soft, NTND  Extremities -  No calf tenderness   Neurologic Exam -  no new  focal deficits                          Valproic Acid Level, Serum: 87 ug/mL (05-16-22 @ 07:00)  Valproic Acid Level, Serum: 82 ug/mL (04-13-22 @ 14:12)        CURRENT MEDICATIONS  MEDICATIONS  (STANDING):  amLODIPine   Tablet 10 milliGRAM(s) Oral daily  apixaban 5 milliGRAM(s) Oral two times a day  dextrose 5%. 1000 milliLiter(s) (100 mL/Hr) IV Continuous <Continuous>  dextrose 5%. 1000 milliLiter(s) (50 mL/Hr) IV Continuous <Continuous>  diVALproex  milliGRAM(s) Oral two times a day  glucagon  Injectable 1 milliGRAM(s) IntraMuscular once  lactobacillus acidophilus 1 Tablet(s) Oral daily  levothyroxine 88 MICROGram(s) Oral daily  pantoprazole    Tablet 40 milliGRAM(s) Oral before breakfast  polyethylene glycol 3350 17 Gram(s) Oral two times a day  QUEtiapine 75 milliGRAM(s) Oral at bedtime  senna 2 Tablet(s) Oral at bedtime  thiamine 100 milliGRAM(s) Oral daily    MEDICATIONS  (PRN):  acetaminophen     Tablet .. 650 milliGRAM(s) Oral every 6 hours PRN Moderate Pain (4 - 6), Severe Pain (7 - 10)

## 2022-05-18 NOTE — DISCHARGE NOTE PROVIDER - PROVIDER TOKENS
PROVIDER:[TOKEN:[[id number]],FOLLOWUP:[1-3 days]],PROVIDER:[TOKEN:[[id number]],FOLLOWUP:[2 weeks]],PROVIDER:[TOKEN:[[id number]],FOLLOWUP:[2 weeks]]

## 2022-05-18 NOTE — CHART NOTE - NSCHARTNOTEFT_GEN_A_CORE
Nutrition Follow Up Note   Source: Medical Record [X] Patient [X] Family [ ]         Diet: Consistent carbohydrate (no snacks), Glucerna TID  Pt tolerating diet with fair PO intake, eating 50-75% of meals per nursing flow sheets/nursing. Pt receiving outside food from visitors. Previously provided with written and verbal education on consistent carbohydrate nutrition therapy.    Enteral/Parenteral Nutrition: N/A    Current Weight: 145.7 lbs (5/4)  Recommend obtaining new weight    Pertinent Medications: MEDICATIONS  (STANDING):  amLODIPine   Tablet 10 milliGRAM(s) Oral daily  apixaban 5 milliGRAM(s) Oral two times a day  dextrose 5%. 1000 milliLiter(s) (50 mL/Hr) IV Continuous <Continuous>  dextrose 5%. 1000 milliLiter(s) (100 mL/Hr) IV Continuous <Continuous>  diVALproex  milliGRAM(s) Oral two times a day  glucagon  Injectable 1 milliGRAM(s) IntraMuscular once  lactobacillus acidophilus 1 Tablet(s) Oral daily  levothyroxine 88 MICROGram(s) Oral daily  pantoprazole    Tablet 40 milliGRAM(s) Oral before breakfast  polyethylene glycol 3350 17 Gram(s) Oral two times a day  QUEtiapine 75 milliGRAM(s) Oral at bedtime  senna 2 Tablet(s) Oral at bedtime  thiamine 100 milliGRAM(s) Oral daily    MEDICATIONS  (PRN):  acetaminophen     Tablet .. 650 milliGRAM(s) Oral every 6 hours PRN Moderate Pain (4 - 6), Severe Pain (7 - 10)      Pertinent Labs:  05-16 Na139 mmol/L Glu 94 mg/dL K+ 4.6 mmol/L Cr  1.41 mg/dL<H> BUN 24 mg/dL<H> 05-16 Alb 3.0 g/dL<L>        Skin: Skin intact per nursing flow sheets     Edema: No edema per nursing flow sheets     Last BM: on 5/17 Per nursing flowsheets     Estimated Needs:   [X] No Change since Previous Assessment  [ ] Recalculated:     Previous Nutrition Diagnosis:   Moderate malnutrition, acute      Nutrition Diagnosis is [X] Ongoing  - addressed with Glucerna TID (provides up to 660 kcal, 30 g protein if 100% consumed)       New Nutrition Diagnosis: [X] Not Applicable  [ ] Inadequate Protein Energy Intake   [ ] Inadequate Oral Intake   [ ] Excessive Energy Intake   [ ] Increased Nutrient Needs   [ ] Obesity   [ ] Altered GI Function   [ ] Unintended Weight Loss   [ ] Food & Nutrition Related Knowledge Deficit  [ ] Limited Adherence to nutrition related recommendations   [ ] Malnutrition      Interventions:   1. Recommend continuing with current plan of care  2. Encourage PO intake    Monitoring & Evaluation:   [X] Weights   [X] PO Intake   [X] Follow Up (Per Protocol)  [X] Tolerance to Diet Prescription   [X] Other: Labs     RD Remains Available.  Tracee Tovar RD

## 2022-05-18 NOTE — DISCHARGE NOTE PROVIDER - NSDCCPCAREPLAN_GEN_ALL_CORE_FT
PRINCIPAL DISCHARGE DIAGNOSIS  Diagnosis: Cerebellar hemorrhage  Assessment and Plan of Treatment: - MR head with small Right cerebellar hemorrhage  - agitation/confusion/nausea/dizziness - CTH (5/9) stable; UA negative -no further episodes as of 5/10      SECONDARY DISCHARGE DIAGNOSES  Diagnosis: Seizure  Assessment and Plan of Treatment: - Vimpat 150mg BID -discontinued 5/13  - Depakote now 500 BID (5/13)  - VPA level on 5/16 is WNL 84 (5/16)    Diagnosis: Mixed hyperlipidemia  Assessment and Plan of Treatment: monitor levels    Diagnosis: Essential hypertension  Assessment and Plan of Treatment: - Amlodipine 10mg daily  - Lisinopril 40mg daily - discontinued due to JESSICA now resolved   -SBP stable    Diagnosis: Hypothyroid  Assessment and Plan of Treatment: Continue synthroid 88mcg    Diagnosis: DVT, lower extremity  Assessment and Plan of Treatment: - L soleal vein + RUL segmental branch PE  - Eliquis 5mg BID    Diagnosis: Delirium  Assessment and Plan of Treatment: - Seroquel 75mg (5/10)  - Risperidone discontinued 5/10   - Follow up with psych for further management   - Switchied seizure ppx to depakote to also help with agitation        PRINCIPAL DISCHARGE DIAGNOSIS  Diagnosis: Cerebellar hemorrhage  Assessment and Plan of Treatment: - MR head with small Right cerebellar hemorrhage  - agitation/confusion/nausea/dizziness - CTH (5/9) stable; UA negative -no further episodes as of 5/10      SECONDARY DISCHARGE DIAGNOSES  Diagnosis: Seizure  Assessment and Plan of Treatment: - Depakote 500 BID   - VPA level on 5/16 is WNL 84    Diagnosis: Mixed hyperlipidemia  Assessment and Plan of Treatment: monitor levels    Diagnosis: Essential hypertension  Assessment and Plan of Treatment: - Amlodipine 10mg daily  - Lisinopril 40mg daily - discontinued due to JESSICA now resolved   -SBP stable    Diagnosis: Hypothyroid  Assessment and Plan of Treatment: Continue synthroid 88mcg    Diagnosis: DVT, lower extremity  Assessment and Plan of Treatment: - L soleal vein + RUL segmental branch PE  - Eliquis 5mg BID    Diagnosis: Delirium  Assessment and Plan of Treatment: - Seroquel 75mg (5/10)  - Follow up with psych for further management   - Switchied seizure ppx to depakote to also help with agitation

## 2022-05-18 NOTE — DISCHARGE NOTE PROVIDER - DETAILS OF MALNUTRITION DIAGNOSIS/DIAGNOSES
This patient has been assessed with a concern for Malnutrition and was treated during this hospitalization for the following Nutrition diagnosis/diagnoses:     -  05/05/2022: Moderate protein-calorie malnutrition

## 2022-05-18 NOTE — PROGRESS NOTE ADULT - SUBJECTIVE AND OBJECTIVE BOX
Patient is a 72y old  Male who presents with a chief complaint of CVA (17 May 2022 14:52)    Patient seen and examined at bedside. No acute overnight events. Denies pain/discomfort.     ALLERGIES:  fish (Hives; Angioedema)  No Known Drug Allergies    MEDICATIONS  (STANDING):  amLODIPine   Tablet 10 milliGRAM(s) Oral daily  apixaban 5 milliGRAM(s) Oral two times a day  dextrose 5%. 1000 milliLiter(s) (50 mL/Hr) IV Continuous <Continuous>  dextrose 5%. 1000 milliLiter(s) (100 mL/Hr) IV Continuous <Continuous>  diVALproex  milliGRAM(s) Oral two times a day  glucagon  Injectable 1 milliGRAM(s) IntraMuscular once  lactobacillus acidophilus 1 Tablet(s) Oral daily  levothyroxine 88 MICROGram(s) Oral daily  pantoprazole    Tablet 40 milliGRAM(s) Oral before breakfast  polyethylene glycol 3350 17 Gram(s) Oral two times a day  QUEtiapine 75 milliGRAM(s) Oral at bedtime  senna 2 Tablet(s) Oral at bedtime  thiamine 100 milliGRAM(s) Oral daily    MEDICATIONS  (PRN):  acetaminophen     Tablet .. 650 milliGRAM(s) Oral every 6 hours PRN Moderate Pain (4 - 6), Severe Pain (7 - 10)    Vital Signs Last 24 Hrs  T(F): 98.2 (18 May 2022 08:22), Max: 98.5 (17 May 2022 19:49)  HR: 88 (18 May 2022 09:38) (79 - 88)  BP: 151/79 (18 May 2022 09:38) (97/60 - 151/79)  RR: 16 (18 May 2022 09:38) (15 - 16)  SpO2: 96% (18 May 2022 09:38) (94% - 96%)  I&O's Summary    17 May 2022 07:01  -  18 May 2022 07:00  --------------------------------------------------------  IN: 600 mL / OUT: 0 mL / NET: 600 mL    PHYSICAL EXAM:  General: NAD, Awake, alert pleasant elderly M sitting in chair  ENT: MMM, no tonsilar exudate  Neck: Supple, No JVD  Lungs: Clear to auscultation bilaterally, no wheezes. Good air entry bilaterally   Cardio: RRR, S1/S2, No murmurs  Abdomen: Soft, Nontender, Nondistended; Bowel sounds present  Extremities: No calf tenderness, No pitting edema    LABS:                        10.8   3.68  )-----------( 162      ( 16 May 2022 07:00 )             34.7       05-16    139  |  102  |  24  ----------------------------<  94  4.6   |  28  |  1.41    Ca    9.3      16 May 2022 07:00    TPro  8.7  /  Alb  3.0  /  TBili  0.2  /  DBili  x   /  AST  41  /  ALT  37  /  AlkPhos  96  05-16     04-13 Chol 128 mg/dL LDL -- HDL 67 mg/dL Trig 116 mg/dL    COVID-19 PCR: NotDetec (05-17-22 @ 05:45)  COVID-19 PCR: NotDetec (05-11-22 @ 05:00)  COVID-19 PCR: NotDetec (05-04-22 @ 18:40)  COVID-19 PCR: NotDetec (05-04-22 @ 06:30)  COVID-19 PCR: NotDetec (04-28-22 @ 06:17)    RADIOLOGY & ADDITIONAL TESTS:     Care Discussed with Consultants/Other Providers:

## 2022-05-19 LAB
ALBUMIN SERPL ELPH-MCNC: 2.7 G/DL — LOW (ref 3.3–5)
ALP SERPL-CCNC: 85 U/L — SIGNIFICANT CHANGE UP (ref 40–120)
ALT FLD-CCNC: 40 U/L — SIGNIFICANT CHANGE UP (ref 10–45)
ANION GAP SERPL CALC-SCNC: 5 MMOL/L — SIGNIFICANT CHANGE UP (ref 5–17)
AST SERPL-CCNC: 35 U/L — SIGNIFICANT CHANGE UP (ref 10–40)
BASOPHILS # BLD AUTO: 0.02 K/UL — SIGNIFICANT CHANGE UP (ref 0–0.2)
BASOPHILS NFR BLD AUTO: 0.5 % — SIGNIFICANT CHANGE UP (ref 0–2)
BILIRUB SERPL-MCNC: 0.2 MG/DL — SIGNIFICANT CHANGE UP (ref 0.2–1.2)
BUN SERPL-MCNC: 20 MG/DL — SIGNIFICANT CHANGE UP (ref 7–23)
CALCIUM SERPL-MCNC: 8.6 MG/DL — SIGNIFICANT CHANGE UP (ref 8.4–10.5)
CHLORIDE SERPL-SCNC: 102 MMOL/L — SIGNIFICANT CHANGE UP (ref 96–108)
CO2 SERPL-SCNC: 30 MMOL/L — SIGNIFICANT CHANGE UP (ref 22–31)
CREAT SERPL-MCNC: 1.18 MG/DL — SIGNIFICANT CHANGE UP (ref 0.5–1.3)
EGFR: 65 ML/MIN/1.73M2 — SIGNIFICANT CHANGE UP
EOSINOPHIL # BLD AUTO: 0.12 K/UL — SIGNIFICANT CHANGE UP (ref 0–0.5)
EOSINOPHIL NFR BLD AUTO: 3 % — SIGNIFICANT CHANGE UP (ref 0–6)
GLUCOSE SERPL-MCNC: 103 MG/DL — HIGH (ref 70–99)
HCT VFR BLD CALC: 32 % — LOW (ref 39–50)
HGB BLD-MCNC: 10.4 G/DL — LOW (ref 13–17)
IMM GRANULOCYTES NFR BLD AUTO: 0.5 % — SIGNIFICANT CHANGE UP (ref 0–1.5)
LYMPHOCYTES # BLD AUTO: 1.54 K/UL — SIGNIFICANT CHANGE UP (ref 1–3.3)
LYMPHOCYTES # BLD AUTO: 38 % — SIGNIFICANT CHANGE UP (ref 13–44)
MCHC RBC-ENTMCNC: 29.5 PG — SIGNIFICANT CHANGE UP (ref 27–34)
MCHC RBC-ENTMCNC: 32.5 GM/DL — SIGNIFICANT CHANGE UP (ref 32–36)
MCV RBC AUTO: 90.7 FL — SIGNIFICANT CHANGE UP (ref 80–100)
MONOCYTES # BLD AUTO: 0.63 K/UL — SIGNIFICANT CHANGE UP (ref 0–0.9)
MONOCYTES NFR BLD AUTO: 15.6 % — HIGH (ref 2–14)
NEUTROPHILS # BLD AUTO: 1.72 K/UL — LOW (ref 1.8–7.4)
NEUTROPHILS NFR BLD AUTO: 42.4 % — LOW (ref 43–77)
NRBC # BLD: 0 /100 WBCS — SIGNIFICANT CHANGE UP (ref 0–0)
PLATELET # BLD AUTO: 222 K/UL — SIGNIFICANT CHANGE UP (ref 150–400)
POTASSIUM SERPL-MCNC: 4.3 MMOL/L — SIGNIFICANT CHANGE UP (ref 3.5–5.3)
POTASSIUM SERPL-SCNC: 4.3 MMOL/L — SIGNIFICANT CHANGE UP (ref 3.5–5.3)
PROT SERPL-MCNC: 7.7 G/DL — SIGNIFICANT CHANGE UP (ref 6–8.3)
RBC # BLD: 3.53 M/UL — LOW (ref 4.2–5.8)
RBC # FLD: 13.8 % — SIGNIFICANT CHANGE UP (ref 10.3–14.5)
SODIUM SERPL-SCNC: 137 MMOL/L — SIGNIFICANT CHANGE UP (ref 135–145)
VALPROATE FREE SERPL-MCNC: 20.3 MG/L — HIGH (ref 4.8–17.3)
WBC # BLD: 4.05 K/UL — SIGNIFICANT CHANGE UP (ref 3.8–10.5)
WBC # FLD AUTO: 4.05 K/UL — SIGNIFICANT CHANGE UP (ref 3.8–10.5)

## 2022-05-19 PROCEDURE — 99232 SBSQ HOSP IP/OBS MODERATE 35: CPT

## 2022-05-19 PROCEDURE — 99233 SBSQ HOSP IP/OBS HIGH 50: CPT

## 2022-05-19 RX ADMIN — Medication 100 MILLIGRAM(S): at 11:28

## 2022-05-19 RX ADMIN — AMLODIPINE BESYLATE 10 MILLIGRAM(S): 2.5 TABLET ORAL at 06:03

## 2022-05-19 RX ADMIN — APIXABAN 5 MILLIGRAM(S): 2.5 TABLET, FILM COATED ORAL at 18:04

## 2022-05-19 RX ADMIN — DIVALPROEX SODIUM 500 MILLIGRAM(S): 500 TABLET, DELAYED RELEASE ORAL at 06:04

## 2022-05-19 RX ADMIN — Medication 88 MICROGRAM(S): at 06:03

## 2022-05-19 RX ADMIN — PANTOPRAZOLE SODIUM 40 MILLIGRAM(S): 20 TABLET, DELAYED RELEASE ORAL at 06:03

## 2022-05-19 RX ADMIN — DIVALPROEX SODIUM 500 MILLIGRAM(S): 500 TABLET, DELAYED RELEASE ORAL at 18:04

## 2022-05-19 RX ADMIN — Medication 1 TABLET(S): at 11:28

## 2022-05-19 RX ADMIN — APIXABAN 5 MILLIGRAM(S): 2.5 TABLET, FILM COATED ORAL at 06:04

## 2022-05-19 RX ADMIN — QUETIAPINE FUMARATE 75 MILLIGRAM(S): 200 TABLET, FILM COATED ORAL at 21:59

## 2022-05-19 NOTE — BH CONSULTATION LIAISON PROGRESS NOTE - NSBHCONSFOLLOWNEEDS_PSY_ALL_CORE
No psychiatric contraindications to discharge
Needs further psych safety assessment prior to discharge
No psychiatric contraindications to discharge

## 2022-05-19 NOTE — BH CONSULTATION LIAISON PROGRESS NOTE - NSBHCONSULTFOLLOWAFTERCARE_PSY_A_CORE FT
Would recommend [facility]. Pt can be followed by psychiatry at accepting facility. 
Would recommend [facility]. Pt can be followed by psychiatry at accepting facility.

## 2022-05-19 NOTE — BH CONSULTATION LIAISON PROGRESS NOTE - CURRENT MEDICATION
MEDICATIONS  (STANDING):  amLODIPine   Tablet 10 milliGRAM(s) Oral daily  apixaban 5 milliGRAM(s) Oral two times a day  dextrose 5%. 1000 milliLiter(s) (50 mL/Hr) IV Continuous <Continuous>  dextrose 5%. 1000 milliLiter(s) (100 mL/Hr) IV Continuous <Continuous>  diVALproex  milliGRAM(s) Oral three times a day  glucagon  Injectable 1 milliGRAM(s) IntraMuscular once  lacosamide 150 milliGRAM(s) Oral two times a day  lactobacillus acidophilus 1 Tablet(s) Oral daily  levothyroxine 88 MICROGram(s) Oral daily  pantoprazole    Tablet 40 milliGRAM(s) Oral before breakfast  polyethylene glycol 3350 17 Gram(s) Oral two times a day  QUEtiapine 75 milliGRAM(s) Oral at bedtime  senna 2 Tablet(s) Oral at bedtime  sodium chloride 0.9%. 1000 milliLiter(s) (83 mL/Hr) IV Continuous <Continuous>  thiamine 100 milliGRAM(s) Oral daily    MEDICATIONS  (PRN):  acetaminophen     Tablet .. 650 milliGRAM(s) Oral every 6 hours PRN Moderate Pain (4 - 6), Severe Pain (7 - 10)  
MEDICATIONS  (STANDING):  amLODIPine   Tablet 10 milliGRAM(s) Oral daily  apixaban 5 milliGRAM(s) Oral two times a day  dextrose 5%. 1000 milliLiter(s) (50 mL/Hr) IV Continuous <Continuous>  dextrose 5%. 1000 milliLiter(s) (100 mL/Hr) IV Continuous <Continuous>  diVALproex  milliGRAM(s) Oral two times a day  glucagon  Injectable 1 milliGRAM(s) IntraMuscular once  lactobacillus acidophilus 1 Tablet(s) Oral daily  levothyroxine 88 MICROGram(s) Oral daily  pantoprazole    Tablet 40 milliGRAM(s) Oral before breakfast  polyethylene glycol 3350 17 Gram(s) Oral two times a day  QUEtiapine 75 milliGRAM(s) Oral at bedtime  senna 2 Tablet(s) Oral at bedtime  thiamine 100 milliGRAM(s) Oral daily    MEDICATIONS  (PRN):  acetaminophen     Tablet .. 650 milliGRAM(s) Oral every 6 hours PRN Moderate Pain (4 - 6), Severe Pain (7 - 10)  
MEDICATIONS  (STANDING):  amLODIPine   Tablet 10 milliGRAM(s) Oral daily  apixaban 5 milliGRAM(s) Oral two times a day  dextrose 5%. 1000 milliLiter(s) (100 mL/Hr) IV Continuous <Continuous>  dextrose 5%. 1000 milliLiter(s) (50 mL/Hr) IV Continuous <Continuous>  diVALproex  milliGRAM(s) Oral three times a day  glucagon  Injectable 1 milliGRAM(s) IntraMuscular once  lacosamide 150 milliGRAM(s) Oral two times a day  lactobacillus acidophilus 1 Tablet(s) Oral daily  levothyroxine 88 MICROGram(s) Oral daily  pantoprazole    Tablet 40 milliGRAM(s) Oral before breakfast  polyethylene glycol 3350 17 Gram(s) Oral two times a day  QUEtiapine 75 milliGRAM(s) Oral at bedtime  senna 2 Tablet(s) Oral at bedtime  thiamine 100 milliGRAM(s) Oral daily    MEDICATIONS  (PRN):  acetaminophen     Tablet .. 650 milliGRAM(s) Oral every 6 hours PRN Moderate Pain (4 - 6), Severe Pain (7 - 10)

## 2022-05-19 NOTE — BH CONSULTATION LIAISON PROGRESS NOTE - NSICDXBHSECONDARYDX_PSY_ALL_CORE
Cerebellar hemorrhage   I61.4  Seizure   R56.9  

## 2022-05-19 NOTE — BH CONSULTATION LIAISON PROGRESS NOTE - NSBHFUPREASONCONS_PSY_A_CORE
delirium/agitation/med management

## 2022-05-19 NOTE — BH CONSULTATION LIAISON PROGRESS NOTE - NSBHCHARTREVIEWVS_PSY_A_CORE FT
Vital Signs Last 24 Hrs  T(C): 36.8 (10 May 2022 08:10), Max: 37 (09 May 2022 20:15)  T(F): 98.2 (10 May 2022 08:10), Max: 98.6 (09 May 2022 20:15)  HR: 79 (10 May 2022 08:10) (79 - 83)  BP: 105/63 (10 May 2022 08:10) (105/63 - 126/71)  BP(mean): --  RR: 16 (10 May 2022 08:10) (16 - 16)  SpO2: 96% (10 May 2022 08:10) (94% - 96%)
Vital Signs Last 24 Hrs  T(C): 36.9 (19 May 2022 09:32), Max: 36.9 (18 May 2022 20:04)  T(F): 98.4 (19 May 2022 09:32), Max: 98.5 (18 May 2022 20:04)  HR: 84 (19 May 2022 09:32) (79 - 92)  BP: 135/57 (19 May 2022 09:32) (124/75 - 152/73)  BP(mean): --  RR: 17 (19 May 2022 09:32) (16 - 17)  SpO2: 98% (19 May 2022 09:32) (94% - 98%)
Vital Signs Last 24 Hrs  T(C): 36.7 (11 May 2022 04:54), Max: 36.7 (11 May 2022 04:54)  T(F): 98 (11 May 2022 04:54), Max: 98 (11 May 2022 04:54)  HR: 84 (11 May 2022 04:54) (84 - 85)  BP: 114/66 (11 May 2022 04:54) (114/66 - 134/80)  BP(mean): --  RR: 17 (11 May 2022 04:54) (16 - 17)  SpO2: 98% (11 May 2022 04:54) (97% - 98%)

## 2022-05-19 NOTE — BH CONSULTATION LIAISON PROGRESS NOTE - NSBHASSESSMENTFT_PSY_ALL_CORE
This is a 71 y/o male with PMH of  HTN, hypothyroidism who presented to  [facility] on 4/12  with seizures, found to have cerebellar hemorrhage. Intubated for airway protection at OSH, extubated 4/13. Called to consult for increased chest congestion. CTA chest with atelectasis and small RUL segmental branch PE, LE duplex with L soleal vein DVT. MR head with small foci of hemorrhage. Pt started on heparin gtt per neuro and vascular cardiology, now changed to Eliquis. Hospital course significant for UTI _e-coli) now s/p ABT per ID. He was treated with IVIG and steroids for seizures. Patient was evaluated by PM&R and therapy for functional deficits, gait/ADL impairments and acute rehabilitation was recommended. Patient was medically optimized for discharge to [facility] IRU on 5/4/22.    Psychiatry consulted to evaluate for psychosis and agitation, see HPI for details. See recommendations below.  
This is a 71 y/o male with PMH of  HTN, hypothyroidism who presented to  [facility] on 4/12  with seizures, found to have cerebellar hemorrhage. Intubated for airway protection at OSH, extubated 4/13. Called to consult for increased chest congestion. CTA chest with atelectasis and small RUL segmental branch PE, LE duplex with L soleal vein DVT. MR head with small foci of hemorrhage. Pt started on heparin gtt per neuro and vascular cardiology, now changed to Eliquis. Hospital course significant for UTI _e-coli) now s/p ABT per ID. He was treated with IVIG and steroids for seizures. Patient was evaluated by PM&R and therapy for functional deficits, gait/ADL impairments and acute rehabilitation was recommended. Patient was medically optimized for discharge to [facility] IRU on 5/4/22.    Psychiatry consulted to evaluate for psychosis and agitation, see HPI for details. See recommendations below.  
This is a 71 y/o male with PMH of  HTN, hypothyroidism who presented to  [facility] on 4/12  with seizures, found to have cerebellar hemorrhage. Intubated for airway protection at OSH, extubated 4/13. Called to consult for increased chest congestion. CTA chest with atelectasis and small RUL segmental branch PE, LE duplex with L soleal vein DVT. MR head with small foci of hemorrhage. Pt started on heparin gtt per neuro and vascular cardiology, now changed to Eliquis. Hospital course significant for UTI _e-coli) now s/p ABT per ID. He was treated with IVIG and steroids for seizures. Patient was evaluated by PM&R and therapy for functional deficits, gait/ADL impairments and acute rehabilitation was recommended. Patient was medically optimized for discharge to [facility] IRU on 5/4/22.    Psychiatry consulted to evaluate for psychosis and agitation, see HPI for details. See recommendations below.

## 2022-05-19 NOTE — PROGRESS NOTE ADULT - ASSESSMENT
ASSESSMENT/PLAN  This is a 73 y/o male with PMH of  HTN, hypothyroidism who presented to  [facility] on 4/12  with seizures, found to have cerebellar hemorrhage. Intubated for airway protection at OSH, extubated 4/13. Called to consult for increased chest congestion. CTA chest with atelectasis and small RUL segmental branch PE, LE duplex with L soleal vein DVT. MR head with small foci of hemorrhage. Pt started on heparin gtt per neuro and vascular cardiology, now changed to Eliquis. Hospital course significant for UTI (e-coli) now s/p ABT per ID. He was treated with IVIG and steroids for seizures.   Patient now with gait Instability, ADL impairments and Functional impairments.    # CVA  - MR head with small Right cerebellar hemorrhage  - Continue Comprehensive Rehab Program: PT/OT/ST, 3hours daily and 5 days weekly  - agitation/confusion/nausea/dizziness - CTH (5/9) stable; no further episodes as of 5/17 on current medication regimen     #Seizures - stable  - Vimpat 150mg BID -discontinued 5/13  - Depakote now 500 BID (5/13)  - VPA level on 5/16 is WNL 84 (5/16)    #HTN  - Amlodipine 10mg daily  - Lisinopril 40mg daily - discontinued   -SBP stable     #Hypothyroidism  - Synthroid 88mcg daily    #Steroid induced hyperglycemia - resolved   - Was on Lantus 10 and premeal Admelog 4 - now dc'd   - FS on admission 131 - trend since completion of steroid <140  - A1c 5.9 on 4/13  - Monitor via labs    #COVID exposure  -Asymptomatic  -Continue to monitor for symptoms   -on droplet isolation from 5/16-5/26    #DVT/PE  - L soleal vein + RUL segmental branch PE  - Eliquis 5mg BID    #UTI -resolved   - Ceftin 500mg  BID x 7 days (last dose 5/9)    #Mood/Cognition/Behavioral:  - Seroquel 75mg (5/10)  - Risperidone discontinued 5/10   - Neuropsychology decline  - Recreation therapy  - Psychiatry consult for medication regimen appreciated   - Switching seizure ppx to depakote to also help with agitation (titrating) - improved     #Pain management  - Tylenol PRN    #GI ppx  - Protonix 40mg    #Bowel Regimen  - Senna, Miralax BID    #Bladder management  - BS on admission, and q 8 hours (SC if > 400)  - Monitor UO    #FEN   - Diet: Regular [Consistent carb - no snacks]  - SLP: evaluation and treatment    #Skin:  - No active issues at this time  - Pressure injury/Skin: Turn Q2hrs while in bed, OOB to Chair, PT/OT     #Precaution  - Fall, Aspiration, Seizure       Outpatient Follow-up (Specialty/Name of physician):  Juan Bernabe (DO)  Medicine  [address], [address]  [place] [address]  Phone: [phone]  Fax: [phone]  Follow Up Time:     Efren Fragoso (DO)  Cardiology; Internal Medicine  [address], [address]  [place] [address]  Phone: [phone]  Fax: [phone]    TEAM MEETING 5/16/22  SW: lives with wife and 1 adult son; wife on FMLA. PH 4 ZABRINA (no HR), can set up for main level but no Bathroom on 1st FL.  13-14 steps up to bathroom.    OT: SV for eating and grooming, CG for dressing, bathing, and bathroom transfers.  Goal: SV x 2 weeks  PT: min for transfers, amb 100' RW min A, 4 steps min A  SLP: mild receptive language def, nonfluent aphasia, severe cog, dec working memory  barriers: dec cog, poor insight and safety awareness, dec sequencing, behavioral  EDOD: 5/19/22 Home care?

## 2022-05-19 NOTE — BH CONSULTATION LIAISON PROGRESS NOTE - NSBHPSYCHOLCOGABN_PSY_A_CORE
This patient has a future lab only appointment on 02/22/2018 and needs orders. Please sign the pended labs taken from the overdue  and make any needed changes. Thanks leena  
disoriented to time/disoriented to place
disoriented to time/disoriented to place

## 2022-05-19 NOTE — BH CONSULTATION LIAISON PROGRESS NOTE - NSBHFUPINTERVALHXFT_PSY_A_CORE
This is a 73 y/o male with PMH of  HTN, hypothyroidism who presented to  [facility] on 4/12  with seizures, found to have cerebellar hemorrhage. Intubated for airway protection at OSH, extubated 4/13. Called to consult for increased chest congestion. CTA chest with atelectasis and small RUL segmental branch PE, LE duplex with L soleal vein DVT. MR head with small foci of hemorrhage. Pt started on heparin gtt per neuro and vascular cardiology, now changed to Eliquis. Hospital course significant for UTI _e-coli) now s/p ABT per ID. He was treated with IVIG and steroids for seizures. Patient was evaluated by PM&R and therapy for functional deficits, gait/ADL impairments and acute rehabilitation was recommended. Patient was medically optimized for discharge to [facility] IRU on 5/4/22.    Psychiatry consulted to evaluate for psychosis and agitation. Per the treatment team, pt is restless, not sleeping well, increasingly confused and agitated. Pt had been on Keppra, was switched to Vimpat, now switched to Depakote by rehab/neuro team. Pt also on Risperdal 1mg PO Q AM and Seroquel 50mg PO Q HS, unknown when started.     Pt seen at bedside, he is asleep, unable to participate in an interview. Writer obtained hx from his son Vale VIA telephone @ [phone].  Pt was born in the country [place]; is currently domiciled with wife and son in [facility]; employed as an ; has college degree; has 5 children; [employer]; has no previous psychiatric hx per son; no previous SA or SIB; no arrest or legal hx.    Psychiatric ROS unable to be assessed, psychiatry will continue to follow.    (09 May 2022 16:59)    Psychiatry interval 5/10: Pt seen at bedside, was asleep. Per caregiver, pt slept better last night however still remains confused and impulsive, this morning had to end therapy early due to impulsive behavior. His appetite is fine. Risperdal DC'D. Pt appears more tired this morning to the treatment team, will recommend to reduce Seroquel to 75mg PO Q HS.     Psychiatry interval 5/11: Pt seen at bedside, is awake, eating his food. Pt is confused but pleasant. Reports he fell, had a brain injury and now has cognitive deficits. Pt today is oriented to self, unaware of his surroundings. Pt discussed he was born and raised in [place], had many jobs including , . He states his memory is impaired. Mood- "Im better today, not depressed". Pt offers no further complaints. Per primary RN, slept well last night. 
HPI:  This is a 73 y/o male with PMH of  HTN, hypothyroidism who presented to  [facility] on 4/12  with seizures, found to have cerebellar hemorrhage. Intubated for airway protection at OSH, extubated 4/13. Called to consult for increased chest congestion. CTA chest with atelectasis and small RUL segmental branch PE, LE duplex with L soleal vein DVT. MR head with small foci of hemorrhage. Pt started on heparin gtt per neuro and vascular cardiology, now changed to Eliquis. Hospital course significant for UTI _e-coli) now s/p ABT per ID. He was treated with IVIG and steroids for seizures. Patient was evaluated by PM&R and therapy for functional deficits, gait/ADL impairments and acute rehabilitation was recommended. Patient was medically optimized for discharge to [facility] IRU on 5/4/22.    Psychiatry consulted to evaluate for psychosis and agitation. Per the treatment team, pt is restless, not sleeping well, increasingly confused and agitated. Pt had been on Keppra, was switched to Vimpat, now switched to Depakote by rehab/neuro team. Pt also on Risperdal 1mg PO Q AM and Seroquel 50mg PO Q HS, unknown when started.     Pt seen at bedside, he is asleep, unable to participate in an interview. Writer obtained hx from his son Vale VIA telephone @ [phone].  Pt was born in the country [place]; is currently domiciled with wife and son in [facility]; employed as an ; has college degree; has 5 children; [employer]; has no previous psychiatric hx per son; no previous SA or SIB; no arrest or legal hx.    Psychiatric ROS unable to be assessed, psychiatry will continue to follow.    (09 May 2022 16:59)    Psychiatry interval 5/10: Pt seen at bedside, was asleep. Per caregiver, pt slept better last night however still remains confused and impulsive, this morning had to end therapy early due to impulsive behavior. His appetite is fine. Risperdal DC'D. Pt appears more tired this morning to the treatment team, will recommend to reduce Seroquel to 75mg PO Q HS.   
This is a 73 y/o male with PMH of  HTN, hypothyroidism who presented to  [facility] on 4/12  with seizures, found to have cerebellar hemorrhage. Intubated for airway protection at OSH, extubated 4/13. Called to consult for increased chest congestion. CTA chest with atelectasis and small RUL segmental branch PE, LE duplex with L soleal vein DVT. MR head with small foci of hemorrhage. Pt started on heparin gtt per neuro and vascular cardiology, now changed to Eliquis. Hospital course significant for UTI _e-coli) now s/p ABT per ID. He was treated with IVIG and steroids for seizures. Patient was evaluated by PM&R and therapy for functional deficits, gait/ADL impairments and acute rehabilitation was recommended. Patient was medically optimized for discharge to [facility] IRU on 5/4/22.    Psychiatry consulted to evaluate for psychosis and agitation. Per the treatment team, pt is restless, not sleeping well, increasingly confused and agitated. Pt had been on Keppra, was switched to Vimpat, now switched to Depakote by rehab/neuro team. Pt also on Risperdal 1mg PO Q AM and Seroquel 50mg PO Q HS, unknown when started.     Pt seen at bedside, he is asleep, unable to participate in an interview. Writer obtained hx from his son Vale VIA telephone @ [phone].  Pt was born in the country [place]; is currently domiciled with wife and son in [facility]; employed as an ; has college degree; has 5 children; Hinduism [employer]; has no previous psychiatric hx per son; no previous SA or SIB; no arrest or legal hx.    Psychiatric ROS unable to be assessed, psychiatry will continue to follow.    (09 May 2022 16:59)    Psychiatry interval 5/10: Pt seen at bedside, was asleep. Per caregiver, pt slept better last night however still remains confused and impulsive, this morning had to end therapy early due to impulsive behavior. His appetite is fine. Risperdal DC'D. Pt appears more tired this morning to the treatment team, will recommend to reduce Seroquel to 75mg PO Q HS.     Psychiatry interval 5/11: Pt seen at bedside, is awake, eating his food. Pt is confused but pleasant. Reports he fell, had a brain injury and now has cognitive deficits. Pt today is oriented to self, unaware of his surroundings. Pt discussed he was born and raised in [place], had many jobs including , . He states his memory is impaired. Mood- "Im better today, not depressed". Pt offers no further complaints. Per primary RN, slept well last night.     Psychiatry interval 5/19: Pt seen at bedside, is pleasant and calm with writer. States he has been sleeping and eating okay. He prefers his home cooked food (Pt is [facility]). Per sitter, remains confused and intermittently impulsive but mostly redirect able. No further complaints reported.

## 2022-05-19 NOTE — BH CONSULTATION LIAISON PROGRESS NOTE - NSBHCHARTREVIEWLAB_PSY_A_CORE FT
11.3   4.26  )-----------( 169      ( 09 May 2022 08:18 )             35.7   05-10    138  |  106  |  33<H>  ----------------------------<  127<H>  5.2   |  26  |  1.62<H>    Ca    8.8      10 May 2022 07:30    TPro  8.2  /  Alb  2.7<L>  /  TBili  0.2  /  DBili  x   /  AST  44<H>  /  ALT  53<H>  /  AlkPhos  85  05-09  
                      11.3   4.26  )-----------( 169      ( 09 May 2022 08:18 )             35.7   05-10    138  |  106  |  33<H>  ----------------------------<  127<H>  5.2   |  26  |  1.62<H>    Ca    8.8      10 May 2022 07:30    TPro  8.2  /  Alb  2.7<L>  /  TBili  0.2  /  DBili  x   /  AST  44<H>  /  ALT  53<H>  /  AlkPhos  85  05-09  
Complete Blood Count + Automated Diff (05.19.22 @ 06:00)    WBC Count: 4.05 K/uL    RBC Count: 3.53 M/uL    Hemoglobin: 10.4 g/dL    Hematocrit: 32.0 %    Mean Cell Volume: 90.7 fl    Mean Cell Hemoglobin: 29.5 pg    Mean Cell Hemoglobin Conc: 32.5 gm/dL    Red Cell Distrib Width: 13.8 %    Platelet Count - Automated: 222 K/uL    Auto Neutrophil #: 1.72 K/uL    Auto Lymphocyte #: 1.54 K/uL    Auto Monocyte #: 0.63 K/uL    Auto Eosinophil #: 0.12 K/uL    Auto Basophil #: 0.02 K/uL    Auto Neutrophil %: 42.4: Differential percentages must be correlated with absolute numbers for  clinical significance. %    Auto Lymphocyte %: 38.0 %    Auto Monocyte %: 15.6 %    Auto Eosinophil %: 3.0 %    Auto Basophil %: 0.5 %    Auto Immature Granulocyte %: 0.5: (Includes meta, myelo and promyelocytes) %    Nucleated RBC: 0 /100 WBCs  Comprehensive Metabolic Panel (05.19.22 @ 06:00)    Sodium, Serum: 137 mmol/L    Potassium, Serum: 4.3 mmol/L    Chloride, Serum: 102 mmol/L    Carbon Dioxide, Serum: 30 mmol/L    Anion Gap, Serum: 5 mmol/L    Blood Urea Nitrogen, Serum: 20 mg/dL    Creatinine, Serum: 1.18 mg/dL    Glucose, Serum: 103 mg/dL    Calcium, Total Serum: 8.6 mg/dL    Protein Total, Serum: 7.7 g/dL    Albumin, Serum: 2.7 g/dL    Bilirubin Total, Serum: 0.2 mg/dL    Alkaline Phosphatase, Serum: 85 U/L    Aspartate Aminotransferase (AST/SGOT): 35 U/L    Alanine Aminotransferase (ALT/SGPT): 40 U/L    eGFR: 65: The estimated glomerular filtration rate (eGFR) is calculated using the  2021 CKD-EPI creatinine equation, which does not have a coefficient for  race and is validated in individuals 18 years of age and older (N Engl J  Med 2021; 385:6104-3419). Creatinine-based eGFR may be inaccurate in  various situations including but not limited to extremes of muscle mass,  altered dietary protein intake, or medications that affect renal tubular  creatinine secretion. mL/min/1.73m2

## 2022-05-19 NOTE — BH CONSULTATION LIAISON PROGRESS NOTE - NSICDXBHPRIMARYDX_PSY_ALL_CORE
Delirium due to medical condition with behavioral disturbance   F05  

## 2022-05-19 NOTE — BH CONSULTATION LIAISON PROGRESS NOTE - NSBHCHARTREVIEWINVESTIGATE_PSY_A_CORE FT
< from: 12 Lead ECG (04.28.22 @ 11:44) >    Ventricular Rate 86 BPM    Atrial Rate 86 BPM    P-R Interval 200 ms    QRS Duration 100 ms    Q-T Interval 374 ms    QTC Calculation(Bazett) 447 ms    P Axis 32 degrees    R Axis -43 degrees    T Axis 79 degrees    Diagnosis Line NORMAL SINUS RHYTHM  LEFT AXIS DEVIATION  MINIMAL VOLTAGE CRITERIA FOR LVH, MAY BE NORMAL VARIANT  ANTEROSEPTAL INFARCT (CITED ON OR BEFORE 12-APR-2022)  T WAVE ABNORMALITY, CONSIDER LATERAL ISCHEMIA  ABNORMAL ECG  WHEN COMPARED WITH ECG OF 19-APR-2022 09:13,  no  SIGNIFICANT CHANGES HAVE OCCURRED  Confirmed by EDITH LUGO MD ([id number]) on 4/29/2022 8:26:47 PM    < end of copied text >    
< from: 12 Lead ECG (04.28.22 @ 11:44) >    Ventricular Rate 86 BPM    Atrial Rate 86 BPM    P-R Interval 200 ms    QRS Duration 100 ms    Q-T Interval 374 ms    QTC Calculation(Bazett) 447 ms    P Axis 32 degrees    R Axis -43 degrees    T Axis 79 degrees    Diagnosis Line NORMAL SINUS RHYTHM  LEFT AXIS DEVIATION  MINIMAL VOLTAGE CRITERIA FOR LVH, MAY BE NORMAL VARIANT  ANTEROSEPTAL INFARCT (CITED ON OR BEFORE 12-APR-2022)  T WAVE ABNORMALITY, CONSIDER LATERAL ISCHEMIA  ABNORMAL ECG  WHEN COMPARED WITH ECG OF 19-APR-2022 09:13,  no  SIGNIFICANT CHANGES HAVE OCCURRED  Confirmed by EDITH LUGO MD ([id number]) on 4/29/2022 8:26:47 PM    < end of copied text >    
< from: 12 Lead ECG (04.28.22 @ 11:44) >    Ventricular Rate 86 BPM    Atrial Rate 86 BPM    P-R Interval 200 ms    QRS Duration 100 ms    Q-T Interval 374 ms    QTC Calculation(Bazett) 447 ms    P Axis 32 degrees    R Axis -43 degrees    T Axis 79 degrees    Diagnosis Line NORMAL SINUS RHYTHM  LEFT AXIS DEVIATION  MINIMAL VOLTAGE CRITERIA FOR LVH, MAY BE NORMAL VARIANT  ANTEROSEPTAL INFARCT (CITED ON OR BEFORE 12-APR-2022)  T WAVE ABNORMALITY, CONSIDER LATERAL ISCHEMIA  ABNORMAL ECG  WHEN COMPARED WITH ECG OF 19-APR-2022 09:13,  no  SIGNIFICANT CHANGES HAVE OCCURRED  Confirmed by EDITH LUGO MD ([id number]) on 4/29/2022 8:26:47 PM    < end of copied text >

## 2022-05-19 NOTE — PROGRESS NOTE ADULT - SUBJECTIVE AND OBJECTIVE BOX
SUBJECTIVE/ROS: He states he is feeling well today. Family training scheduled today. Denies chest pain, fever, chills, nausea, vomiting, abdominal pain, headache, or BLE pain.     Patient is a 72y old  Male who presents with a chief complaint of CVA (19 May 2022 10:53)    HPI:  This is a 71 y/o male with PMH of  HTN, hypothyroidism who presented to  [facility] on 4/12  with seizures, found to have cerebellar hemorrhage. Intubated for airway protection at OSH, extubated 4/13. Called to consult for increased chest congestion. CTA chest with atelectasis and small RUL segmental branch PE, LE duplex with L soleal vein DVT. MR head with small foci of hemorrhage. Pt started on heparin gtt per neuro and vascular cardiology, now changed to Eliquis. Hospital course significant for UTI _e-coli) now s/p ABT per ID. He was treated with IVIG and steroids for seizures. Patient was evaluated by PM&R and therapy for functional deficits, gait/ADL impairments and acute rehabilitation was recommended. Patient was medically optimized for discharge to [facility] IRU on 5/4/22. (04 May 2022 13:56)      PAST MEDICAL & SURGICAL HISTORY:  HTN (hypertension)      Diverticulosis      Hypothyroid      No significant past surgical history          Allergies    fish (Hives; Angioedema)  No Known Drug Allergies    Intolerances          PHYSICAL EXAM    Vital Signs Last 24 Hrs  T(C): 36.9 (19 May 2022 09:32), Max: 36.9 (18 May 2022 20:04)  T(F): 98.4 (19 May 2022 09:32), Max: 98.5 (18 May 2022 20:04)  HR: 84 (19 May 2022 09:32) (79 - 92)  BP: 135/57 (19 May 2022 09:32) (124/75 - 152/73)  BP(mean): --  RR: 17 (19 May 2022 09:32) (16 - 17)  SpO2: 98% (19 May 2022 09:32) (94% - 98%)  Daily     Daily       PHYSICAL EXAM  Constitutional - NAD, Comfortable  HEENT - NCAT, EOMI  Neck - Supple, No limited ROM  Chest - CTA bilaterally  Cardiovascular - RRR, S1S2  Abdomen -BS+, Soft, NTND  Extremities - No C/C/E, No calf tenderness   Neurologic Exam - no new focal deficits    RECENT LABS:                          10.4   4.05  )-----------( 222      ( 19 May 2022 06:00 )             32.0     05-19    137  |  102  |  20  ----------------------------<  103<H>  4.3   |  30  |  1.18    Ca    8.6      19 May 2022 06:00    TPro  7.7  /  Alb  2.7<L>  /  TBili  0.2  /  DBili  x   /  AST  35  /  ALT  40  /  AlkPhos  85  05-19    LIVER FUNCTIONS - ( 19 May 2022 06:00 )  Alb: 2.7 g/dL / Pro: 7.7 g/dL / ALK PHOS: 85 U/L / ALT: 40 U/L / AST: 35 U/L / GGT: x                   MEDICATIONS  (STANDING):  amLODIPine   Tablet 10 milliGRAM(s) Oral daily  apixaban 5 milliGRAM(s) Oral two times a day  dextrose 5%. 1000 milliLiter(s) (50 mL/Hr) IV Continuous <Continuous>  dextrose 5%. 1000 milliLiter(s) (100 mL/Hr) IV Continuous <Continuous>  diVALproex  milliGRAM(s) Oral two times a day  glucagon  Injectable 1 milliGRAM(s) IntraMuscular once  lactobacillus acidophilus 1 Tablet(s) Oral daily  levothyroxine 88 MICROGram(s) Oral daily  pantoprazole    Tablet 40 milliGRAM(s) Oral before breakfast  polyethylene glycol 3350 17 Gram(s) Oral two times a day  QUEtiapine 75 milliGRAM(s) Oral at bedtime  senna 2 Tablet(s) Oral at bedtime  thiamine 100 milliGRAM(s) Oral daily    MEDICATIONS  (PRN):  acetaminophen     Tablet .. 650 milliGRAM(s) Oral every 6 hours PRN Moderate Pain (4 - 6), Severe Pain (7 - 10)

## 2022-05-19 NOTE — BH CONSULTATION LIAISON PROGRESS NOTE - NSBHCONSULTRECOMMENDOTHER_PSY_A_CORE FT
Agree with plan to switch antiseizure med to Depakote for management of seizure as well as potential to improve agitation.     Decrease night time Seroquel to 75mg PO Q HS   Hold for increase sedation  Hold for BP <90/60  Hold for HR < 60  Hold for RR <14      DC Risperdal
Agree with plan to switch antiseizure med to Depakote for management of seizure as well as potential to improve agitation.     Decrease night time Seroquel to 75mg PO Q HS   Hold for increase sedation  Hold for BP <90/60  Hold for HR < 60  Hold for RR <14      
Continue current management

## 2022-05-19 NOTE — PROGRESS NOTE ADULT - ASSESSMENT
72 year old Male with a PMHx of HTN, hypothyroidism, who presented to [facility] via transfer from OSH. Patient originally presented to to [place] ED via EMS after family called 911 following a witnessed seizure lasting for 5 mins. Pt also had tongue laceration. Patient was subsequently intubated for GCS 6 and CT/CTA performed showing a small cerebellar hemorrhage. Hospital course complicated by acute PE and L soleal vein DVT. course further complicated by UTI and completed course of abx. Pt was treated with steroid and IVIG for seizure. Now medically optimized and transferred to [facility] for rehab.    #CKD III   -stable  -avoid nephrotoxics. monitor intake and output  -consider renal input if not improving  -encourage oral hydratoin     #COVID-19 exposure  -Droplet precautions per infection control    #Acute DVT   #PE  -Eliquis 5mg BID    #HTN  -ECHO w/ EF 75%, normal LV systolic function   -Continue Amlodipine 10mg daily  -Well controlled BP. Will not start Lisinopril as of yet  -Monitor BP closely    #Hypothyroid   -Synthroid  -Repeat TFTs in 4 weeks outpatient     #Pre-DM  -HA1C of 5.9  -Outpatient follow up     #New onset seizure of unclear etiology  -s/p IVIG and steroid   -S/p LP- CSF with no growth   -S/P MR brain; Small foci of prior hemorrhage versus calcification involving the right cerebellar hemisphere.  -Vimpat 150mg BID    #CVA  -MRI showed chronic small foci of hemorrhage in right cerebellar hemisphere and chronic bilateral thalamic infarct and right BG infarct.  -cont Eliquis   -cont comprehensive acute rehab program    #Agitation and impulsive behaviors  -Seroquel 50mg qHS + Risperidone   -psych follow-up

## 2022-05-19 NOTE — PROGRESS NOTE ADULT - SUBJECTIVE AND OBJECTIVE BOX
Patient is a 72y old  Male who presents with a chief complaint of CVA (18 May 2022 15:58)    Patient seen and examined at bedside. No acute overnight events. Denies pain    ALLERGIES:  fish (Hives; Angioedema)  No Known Drug Allergies    MEDICATIONS  (STANDING):  amLODIPine   Tablet 10 milliGRAM(s) Oral daily  apixaban 5 milliGRAM(s) Oral two times a day  dextrose 5%. 1000 milliLiter(s) (100 mL/Hr) IV Continuous <Continuous>  dextrose 5%. 1000 milliLiter(s) (50 mL/Hr) IV Continuous <Continuous>  diVALproex  milliGRAM(s) Oral two times a day  glucagon  Injectable 1 milliGRAM(s) IntraMuscular once  lactobacillus acidophilus 1 Tablet(s) Oral daily  levothyroxine 88 MICROGram(s) Oral daily  pantoprazole    Tablet 40 milliGRAM(s) Oral before breakfast  polyethylene glycol 3350 17 Gram(s) Oral two times a day  QUEtiapine 75 milliGRAM(s) Oral at bedtime  senna 2 Tablet(s) Oral at bedtime  thiamine 100 milliGRAM(s) Oral daily    MEDICATIONS  (PRN):  acetaminophen     Tablet .. 650 milliGRAM(s) Oral every 6 hours PRN Moderate Pain (4 - 6), Severe Pain (7 - 10)    Vital Signs Last 24 Hrs  T(F): 98.4 (19 May 2022 09:32), Max: 98.5 (18 May 2022 20:04)  HR: 84 (19 May 2022 09:32) (79 - 92)  BP: 135/57 (19 May 2022 09:32) (124/75 - 152/73)  RR: 17 (19 May 2022 09:32) (16 - 17)  SpO2: 98% (19 May 2022 09:32) (94% - 98%)  I&O's Summary    18 May 2022 07:01  -  19 May 2022 07:00  --------------------------------------------------------  IN: 240 mL / OUT: 0 mL / NET: 240 mL    PHYSICAL EXAM:  General: NAD, Awake, alert. calm  ENT: MMM, no tonsilar exudate  Neck: Supple, No JVD  Lungs: Clear to auscultation bilaterally, no wheezes. Good air entry bilaterally   Cardio: RRR, S1/S2, No murmurs  Abdomen: Soft, Nontender, Nondistended; Bowel sounds present  Extremities: No calf tenderness, No pitting edema    LABS:                        10.4   4.05  )-----------( 222      ( 19 May 2022 06:00 )             32.0       05-19    137  |  102  |  20  ----------------------------<  103  4.3   |  30  |  1.18    Ca    8.6      19 May 2022 06:00    TPro  7.7  /  Alb  2.7  /  TBili  0.2  /  DBili  x   /  AST  35  /  ALT  40  /  AlkPhos  85  05-19     04-13 Chol 128 mg/dL LDL -- HDL 67 mg/dL Trig 116 mg/dL    COVID-19 PCR: NotDetec (05-17-22 @ 05:45)  COVID-19 PCR: NotDetec (05-11-22 @ 05:00)  COVID-19 PCR: NotDetec (05-04-22 @ 18:40)  COVID-19 PCR: NotDetec (05-04-22 @ 06:30)  COVID-19 PCR: NotDetec (04-28-22 @ 06:17)    RADIOLOGY & ADDITIONAL TESTS:     Care Discussed with Consultants/Other Providers:

## 2022-05-20 PROCEDURE — 99232 SBSQ HOSP IP/OBS MODERATE 35: CPT

## 2022-05-20 RX ADMIN — SENNA PLUS 2 TABLET(S): 8.6 TABLET ORAL at 21:12

## 2022-05-20 RX ADMIN — DIVALPROEX SODIUM 500 MILLIGRAM(S): 500 TABLET, DELAYED RELEASE ORAL at 17:23

## 2022-05-20 RX ADMIN — APIXABAN 5 MILLIGRAM(S): 2.5 TABLET, FILM COATED ORAL at 06:47

## 2022-05-20 RX ADMIN — PANTOPRAZOLE SODIUM 40 MILLIGRAM(S): 20 TABLET, DELAYED RELEASE ORAL at 06:47

## 2022-05-20 RX ADMIN — AMLODIPINE BESYLATE 10 MILLIGRAM(S): 2.5 TABLET ORAL at 06:47

## 2022-05-20 RX ADMIN — Medication 1 TABLET(S): at 12:29

## 2022-05-20 RX ADMIN — DIVALPROEX SODIUM 500 MILLIGRAM(S): 500 TABLET, DELAYED RELEASE ORAL at 06:47

## 2022-05-20 RX ADMIN — Medication 88 MICROGRAM(S): at 06:48

## 2022-05-20 RX ADMIN — QUETIAPINE FUMARATE 75 MILLIGRAM(S): 200 TABLET, FILM COATED ORAL at 21:12

## 2022-05-20 RX ADMIN — Medication 100 MILLIGRAM(S): at 12:29

## 2022-05-20 RX ADMIN — APIXABAN 5 MILLIGRAM(S): 2.5 TABLET, FILM COATED ORAL at 17:23

## 2022-05-20 NOTE — PROGRESS NOTE ADULT - ASSESSMENT
72 year old Male with a PMHx of HTN, hypothyroidism, who presented to [facility] via transfer from OSH. Patient originally presented to to [place] ED via EMS after family called 911 following a witnessed seizure lasting for 5 mins. Pt also had tongue laceration. Patient was subsequently intubated for GCS 6 and CT/CTA performed showing a small cerebellar hemorrhage. Hospital course complicated by acute PE and L soleal vein DVT. course further complicated by UTI and completed course of abx. Pt was treated with steroid and IVIG for seizure. Now medically optimized and transferred to [facility] for rehab.    #CKD III   -stable  -avoid nephrotoxics. monitor intake and output  -consider renal input if not improving  -encourage oral hydratoin     #COVID-19 exposure  -Droplet precautions per infection control    #Acute DVT   #PE  -Eliquis 5mg BID    #HTN  -ECHO w/ EF 75%, normal LV systolic function   -Continue Amlodipine 10mg daily  -Well controlled BP. Will not start Lisinopril as of yet  -Monitor BP closely    #Hypothyroid   -Synthroid  -Repeat TFTs in 4 weeks outpatient     #Pre-DM  -HA1C of 5.9  -Outpatient follow up     #New onset seizure of unclear etiology  -s/p IVIG and steroid   -S/p LP- CSF with no growth   -S/P MR brain; Small foci of prior hemorrhage versus calcification involving the right cerebellar hemisphere.  -Vimpat 150mg BID    #CVA  -MRI showed chronic small foci of hemorrhage in right cerebellar hemisphere and chronic bilateral thalamic infarct and right BG infarct.  -cont Eliquis   -cont comprehensive acute rehab program    #Agitation and impulsive behaviors  -Seroquel 50mg qHS + Risperidone   -psych follow-up

## 2022-05-20 NOTE — PROGRESS NOTE ADULT - SUBJECTIVE AND OBJECTIVE BOX
Patient is a 72y old  Male who presents with a chief complaint of CVA (19 May 2022 14:09)    Patient seen and examined at bedside. No acute overnight events.     ALLERGIES:  fish (Hives; Angioedema)  No Known Drug Allergies    MEDICATIONS  (STANDING):  amLODIPine   Tablet 10 milliGRAM(s) Oral daily  apixaban 5 milliGRAM(s) Oral two times a day  dextrose 5%. 1000 milliLiter(s) (100 mL/Hr) IV Continuous <Continuous>  dextrose 5%. 1000 milliLiter(s) (50 mL/Hr) IV Continuous <Continuous>  diVALproex  milliGRAM(s) Oral two times a day  glucagon  Injectable 1 milliGRAM(s) IntraMuscular once  lactobacillus acidophilus 1 Tablet(s) Oral daily  levothyroxine 88 MICROGram(s) Oral daily  pantoprazole    Tablet 40 milliGRAM(s) Oral before breakfast  polyethylene glycol 3350 17 Gram(s) Oral two times a day  QUEtiapine 75 milliGRAM(s) Oral at bedtime  senna 2 Tablet(s) Oral at bedtime  thiamine 100 milliGRAM(s) Oral daily    MEDICATIONS  (PRN):  acetaminophen     Tablet .. 650 milliGRAM(s) Oral every 6 hours PRN Moderate Pain (4 - 6), Severe Pain (7 - 10)    Vital Signs Last 24 Hrs  T(F): 98.5 (20 May 2022 07:44), Max: 98.5 (20 May 2022 07:44)  HR: 82 (20 May 2022 07:44) (82 - 89)  BP: 119/64 (20 May 2022 07:44) (119/64 - 136/67)  RR: 16 (20 May 2022 07:44) (16 - 16)  SpO2: 95% (20 May 2022 07:44) (95% - 95%)  I&O's Summary    19 May 2022 07:01  -  20 May 2022 07:00  --------------------------------------------------------  IN: 657 mL / OUT: 0 mL / NET: 657 mL    PHYSICAL EXAM:  General: NAD, awake, alert   ENT: MMM, no tonsilar exudate  Neck: Supple, No JVD  Lungs: Clear to auscultation bilaterally, no wheezes. Good air entry bilaterally   Cardio: RRR, S1/S2, No murmurs  Abdomen: Soft, Nontender, Nondistended; Bowel sounds present  Extremities: No calf tenderness, No pitting edema    LABS:                        10.4   4.05  )-----------( 222      ( 19 May 2022 06:00 )             32.0       05-19    137  |  102  |  20  ----------------------------<  103  4.3   |  30  |  1.18    Ca    8.6      19 May 2022 06:00    TPro  7.7  /  Alb  2.7  /  TBili  0.2  /  DBili  x   /  AST  35  /  ALT  40  /  AlkPhos  85  05-19     04-13 Chol 128 mg/dL LDL -- HDL 67 mg/dL Trig 116 mg/dL    COVID-19 PCR: NotDetec (05-17-22 @ 05:45)  COVID-19 PCR: NotDetec (05-11-22 @ 05:00)  COVID-19 PCR: NotDetec (05-04-22 @ 18:40)  COVID-19 PCR: NotDetec (05-04-22 @ 06:30)  COVID-19 PCR: NotDetec (04-28-22 @ 06:17)    RADIOLOGY & ADDITIONAL TESTS:     Care Discussed with Consultants/Other Providers:

## 2022-05-20 NOTE — PROGRESS NOTE ADULT - ASSESSMENT
ASSESSMENT/PLAN  This is a 73 y/o male with PMH of  HTN, hypothyroidism who presented to  [facility] on 4/12  with seizures, found to have cerebellar hemorrhage. Intubated for airway protection at OSH, extubated 4/13. Called to consult for increased chest congestion. CTA chest with atelectasis and small RUL segmental branch PE, LE duplex with L soleal vein DVT. MR head with small foci of hemorrhage. Pt started on heparin gtt per neuro and vascular cardiology, now changed to Eliquis. Hospital course significant for UTI (e-coli) now s/p ABT per ID. He was treated with IVIG and steroids for seizures.   Patient now with gait Instability, ADL impairments and Functional impairments.    # CVA  - MR head with small Right cerebellar hemorrhage  - Continue Comprehensive Rehab Program: PT/OT/ST, 3hours daily and 5 days weekly  - agitation/confusion/nausea/dizziness - CTH (5/9) stable; no further episodes as of 5/17 on current medication regimen -resolved     #Seizures - stable  - Vimpat 150mg BID -discontinued 5/13  - Depakote now 500 BID (5/13)  - VPA level on 5/16 is WNL 84 (5/16)    #HTN  - Amlodipine 10mg daily  - Lisinopril 40mg daily - discontinued   -SBP stable     #Hypothyroidism  - Synthroid 88mcg daily    #Steroid induced hyperglycemia - resolved   - Was on Lantus 10 and premeal Admelog 4 - now dc'd   - FS on admission 131 - trend since completion of steroid <140  - A1c 5.9 on 4/13  - Monitor via labs    #COVID exposure  -Asymptomatic  -Determined to not be true exposure and droplet precautions removed    #DVT/PE  - L soleal vein + RUL segmental branch PE  - Eliquis 5mg BID    #UTI -resolved   - Ceftin 500mg  BID x 7 days (last dose 5/9)    #Mood/Cognition/Behavioral:  - Seroquel 75mg (5/10)  - Risperidone discontinued 5/10   - Neuropsychology decline  - Recreation therapy  - Psychiatry consult for medication regimen appreciated   - Switching seizure ppx to depakote to also help with agitation (titrating) - improved     #Pain management  - Tylenol PRN    #GI ppx  - Protonix 40mg    #Bowel Regimen  - Senna, Miralax BID    #Bladder management  - BS on admission, and q 8 hours (SC if > 400)  - Monitor UO    #FEN   - Diet: Regular [Consistent carb - no snacks]  - SLP: evaluation and treatment    #Skin:  - No active issues at this time  - Pressure injury/Skin: Turn Q2hrs while in bed, OOB to Chair, PT/OT     #Precaution  - Fall, Aspiration, Seizure       Outpatient Follow-up (Specialty/Name of physician):  Juan Bernabe (DO)  Medicine  [address], [address]  [place] [address]  Phone: [phone]  Fax: [phone]  Follow Up Time:     Efren Fragoso (DO)  Cardiology; Internal Medicine  [address], [address]  [place] [address]  Phone: [phone]  Fax: [phone]    TEAM MEETING 5/16/22  SW: lives with wife and 1 adult son; wife on FMLA. PH 4 ZABRINA (no HR), can set up for main level but no Bathroom on 1st FL.  13-14 steps up to bathroom.    OT: SV for eating and grooming, CG for dressing, bathing, and bathroom transfers.  Goal: SV x 2 weeks  PT: min for transfers, amb 100' RW min A, 4 steps min A  SLP: mild receptive language def, nonfluent aphasia, severe cog, dec working memory  barriers: dec cog, poor insight and safety awareness, dec sequencing, behavioral  EDOD: 5/19/22 ROEL

## 2022-05-20 NOTE — PROGRESS NOTE ADULT - SUBJECTIVE AND OBJECTIVE BOX
SUBJECTIVE/ROS: No acute events overnight. He has remained asymptomatic from covid exposure. Denies chest pain, fever, chills, nausea, vomiting, abdominal pain, headache, or BLE pain.     Patient is a 72y old  Male who presents with a chief complaint of CVA (19 May 2022 10:53)    HPI:  This is a 73 y/o male with PMH of  HTN, hypothyroidism who presented to  [facility] on 4/12  with seizures, found to have cerebellar hemorrhage. Intubated for airway protection at OSH, extubated 4/13. Called to consult for increased chest congestion. CTA chest with atelectasis and small RUL segmental branch PE, LE duplex with L soleal vein DVT. MR head with small foci of hemorrhage. Pt started on heparin gtt per neuro and vascular cardiology, now changed to Eliquis. Hospital course significant for UTI _e-coli) now s/p ABT per ID. He was treated with IVIG and steroids for seizures. Patient was evaluated by PM&R and therapy for functional deficits, gait/ADL impairments and acute rehabilitation was recommended. Patient was medically optimized for discharge to [facility]U on 5/4/22. (04 May 2022 13:56)      PAST MEDICAL & SURGICAL HISTORY:  HTN (hypertension)      Diverticulosis      Hypothyroid      No significant past surgical history          Allergies    fish (Hives; Angioedema)  No Known Drug Allergies    Intolerances          Vital Signs Last 24 Hrs  T(C): 36.9 (20 May 2022 07:44), Max: 36.9 (20 May 2022 07:44)  T(F): 98.5 (20 May 2022 07:44), Max: 98.5 (20 May 2022 07:44)  HR: 82 (20 May 2022 07:44) (82 - 89)  BP: 119/64 (20 May 2022 07:44) (119/64 - 136/67)  BP(mean): --  RR: 16 (20 May 2022 07:44) (16 - 16)  SpO2: 95% (20 May 2022 07:44) (95% - 95%)      PHYSICAL EXAM  Constitutional - NAD, Comfortable  HEENT - NCAT, EOMI  Neck - Supple, No limited ROM  Chest - CTA bilaterally  Cardiovascular - RRR, S1S2  Abdomen -BS+, Soft, NTND  Extremities - No C/C/E, No calf tenderness   Neurologic Exam - no new focal deficits    RECENT LABS:                          10.4   4.05  )-----------( 222      ( 19 May 2022 06:00 )             32.0     05-19    137  |  102  |  20  ----------------------------<  103<H>  4.3   |  30  |  1.18    Ca    8.6      19 May 2022 06:00    TPro  7.7  /  Alb  2.7<L>  /  TBili  0.2  /  DBili  x   /  AST  35  /  ALT  40  /  AlkPhos  85  05-19    LIVER FUNCTIONS - ( 19 May 2022 06:00 )  Alb: 2.7 g/dL / Pro: 7.7 g/dL / ALK PHOS: 85 U/L / ALT: 40 U/L / AST: 35 U/L / GGT: x                   MEDICATIONS  (STANDING):  amLODIPine   Tablet 10 milliGRAM(s) Oral daily  apixaban 5 milliGRAM(s) Oral two times a day  dextrose 5%. 1000 milliLiter(s) (50 mL/Hr) IV Continuous <Continuous>  dextrose 5%. 1000 milliLiter(s) (100 mL/Hr) IV Continuous <Continuous>  diVALproex  milliGRAM(s) Oral two times a day  glucagon  Injectable 1 milliGRAM(s) IntraMuscular once  lactobacillus acidophilus 1 Tablet(s) Oral daily  levothyroxine 88 MICROGram(s) Oral daily  pantoprazole    Tablet 40 milliGRAM(s) Oral before breakfast  polyethylene glycol 3350 17 Gram(s) Oral two times a day  QUEtiapine 75 milliGRAM(s) Oral at bedtime  senna 2 Tablet(s) Oral at bedtime  thiamine 100 milliGRAM(s) Oral daily    MEDICATIONS  (PRN):  acetaminophen     Tablet .. 650 milliGRAM(s) Oral every 6 hours PRN Moderate Pain (4 - 6), Severe Pain (7 - 10)

## 2022-05-21 PROCEDURE — 99232 SBSQ HOSP IP/OBS MODERATE 35: CPT

## 2022-05-21 RX ADMIN — DIVALPROEX SODIUM 500 MILLIGRAM(S): 500 TABLET, DELAYED RELEASE ORAL at 17:47

## 2022-05-21 RX ADMIN — APIXABAN 5 MILLIGRAM(S): 2.5 TABLET, FILM COATED ORAL at 17:47

## 2022-05-21 RX ADMIN — SENNA PLUS 2 TABLET(S): 8.6 TABLET ORAL at 21:34

## 2022-05-21 RX ADMIN — APIXABAN 5 MILLIGRAM(S): 2.5 TABLET, FILM COATED ORAL at 06:00

## 2022-05-21 RX ADMIN — AMLODIPINE BESYLATE 10 MILLIGRAM(S): 2.5 TABLET ORAL at 06:01

## 2022-05-21 RX ADMIN — PANTOPRAZOLE SODIUM 40 MILLIGRAM(S): 20 TABLET, DELAYED RELEASE ORAL at 06:04

## 2022-05-21 RX ADMIN — Medication 88 MICROGRAM(S): at 06:01

## 2022-05-21 RX ADMIN — DIVALPROEX SODIUM 500 MILLIGRAM(S): 500 TABLET, DELAYED RELEASE ORAL at 06:01

## 2022-05-21 RX ADMIN — Medication 100 MILLIGRAM(S): at 12:17

## 2022-05-21 RX ADMIN — QUETIAPINE FUMARATE 75 MILLIGRAM(S): 200 TABLET, FILM COATED ORAL at 21:35

## 2022-05-21 RX ADMIN — Medication 1 TABLET(S): at 12:17

## 2022-05-21 NOTE — PROGRESS NOTE ADULT - SUBJECTIVE AND OBJECTIVE BOX
Subjective: No new complaints or overnight issues    VITALS  T(C): 37.1 (05-21-22 @ 07:54), Max: 37.1 (05-21-22 @ 07:54)  HR: 92 (05-21-22 @ 16:23) (81 - 92)  BP: 133/73 (05-21-22 @ 16:23) (103/60 - 166/71)  RR: 16 (05-21-22 @ 16:23) (15 - 16)  SpO2: 97% (05-21-22 @ 16:23) (95% - 97%)  Wt(kg): --    REVIEW OF SYSTEMS  Pertinent in last 24 h: Neurological deficits      Physical Exam:  Constitutional - NAD, Comfortable  HEENT - NCAT, EOMI  Chest - breathing comfortably on RA  Cardio - warm, well perfused  Abdomen - Soft, NTND  Extremities - No edema, No calf tenderness   Neurologic Exam -    Stable                Cognitive - Awake, Alert, AAO to self, place, date, year, situation     Cranial Nerves - CN 2-12 intact     Motor - no new deficit     Psychiatric - Mood stable, Affect WNL  -    RECENT LABS/IMAGING                  MEDICATIONS   acetaminophen     Tablet .. 650 milliGRAM(s) every 6 hours PRN  amLODIPine   Tablet 10 milliGRAM(s) daily  apixaban 5 milliGRAM(s) two times a day  dextrose 5%. 1000 milliLiter(s) <Continuous>  dextrose 5%. 1000 milliLiter(s) <Continuous>  diVALproex  milliGRAM(s) two times a day  glucagon  Injectable 1 milliGRAM(s) once  lactobacillus acidophilus 1 Tablet(s) daily  levothyroxine 88 MICROGram(s) daily  pantoprazole    Tablet 40 milliGRAM(s) before breakfast  polyethylene glycol 3350 17 Gram(s) two times a day  QUEtiapine 75 milliGRAM(s) at bedtime  senna 2 Tablet(s) at bedtime  thiamine 100 milliGRAM(s) daily      --------------------------------------------------------------------

## 2022-05-21 NOTE — PROGRESS NOTE ADULT - ASSESSMENT
71 yo M admitted to acute Rehabilitation with ICH    Pt. Stable  Active Issues    HTN--amlodipine    PE/DVT--apixaban    mood--seroquel, depakote    no Medication changes:    Cont. comprehensive inpatient PT, OT and Speech    No acute issues,   Cont. current plan of care and medications as per primary team

## 2022-05-22 PROCEDURE — 99232 SBSQ HOSP IP/OBS MODERATE 35: CPT

## 2022-05-22 RX ADMIN — SENNA PLUS 2 TABLET(S): 8.6 TABLET ORAL at 21:28

## 2022-05-22 RX ADMIN — APIXABAN 5 MILLIGRAM(S): 2.5 TABLET, FILM COATED ORAL at 17:18

## 2022-05-22 RX ADMIN — QUETIAPINE FUMARATE 75 MILLIGRAM(S): 200 TABLET, FILM COATED ORAL at 21:29

## 2022-05-22 RX ADMIN — AMLODIPINE BESYLATE 10 MILLIGRAM(S): 2.5 TABLET ORAL at 06:07

## 2022-05-22 RX ADMIN — Medication 88 MICROGRAM(S): at 06:07

## 2022-05-22 RX ADMIN — PANTOPRAZOLE SODIUM 40 MILLIGRAM(S): 20 TABLET, DELAYED RELEASE ORAL at 06:07

## 2022-05-22 RX ADMIN — DIVALPROEX SODIUM 500 MILLIGRAM(S): 500 TABLET, DELAYED RELEASE ORAL at 17:18

## 2022-05-22 RX ADMIN — DIVALPROEX SODIUM 500 MILLIGRAM(S): 500 TABLET, DELAYED RELEASE ORAL at 06:07

## 2022-05-22 RX ADMIN — APIXABAN 5 MILLIGRAM(S): 2.5 TABLET, FILM COATED ORAL at 06:07

## 2022-05-22 RX ADMIN — Medication 1 TABLET(S): at 11:09

## 2022-05-22 NOTE — PROGRESS NOTE ADULT - SUBJECTIVE AND OBJECTIVE BOX
Subjective: No new complaints or overnight issues, resting comfortably    VITALS  T(C): 37.1 (05-22-22 @ 07:55), Max: 37.2 (05-21-22 @ 21:30)  HR: 87 (05-22-22 @ 07:55) (76 - 93)  BP: 113/78 (05-22-22 @ 07:55) (113/78 - 133/76)  RR: 16 (05-22-22 @ 07:55) (16 - 16)  SpO2: 95% (05-22-22 @ 07:55) (95% - 97%)  Wt(kg): --    REVIEW OF SYSTEMS  Pertinent in last 24 h: Neurological deficits      Physical Exam:  Constitutional - NAD, Comfortable  HEENT - NCAT, EOMI  Chest - breathing comfortably on RA  Cardio - warm, well perfused  Abdomen - Soft, NTND  Extremities - No edema, No calf tenderness   Neurologic Exam -    Stable                Cognitive - Awake, Alert, AAO to self, place, date, year, situation     Cranial Nerves - CN 2-12 intact     Motor - no new deficit     Psychiatric - Mood stable, Affect WNL    RECENT LABS/IMAGING                  MEDICATIONS   acetaminophen     Tablet .. 650 milliGRAM(s) every 6 hours PRN  amLODIPine   Tablet 10 milliGRAM(s) daily  apixaban 5 milliGRAM(s) two times a day  dextrose 5%. 1000 milliLiter(s) <Continuous>  dextrose 5%. 1000 milliLiter(s) <Continuous>  diVALproex  milliGRAM(s) two times a day  glucagon  Injectable 1 milliGRAM(s) once  lactobacillus acidophilus 1 Tablet(s) daily  levothyroxine 88 MICROGram(s) daily  pantoprazole    Tablet 40 milliGRAM(s) before breakfast  polyethylene glycol 3350 17 Gram(s) two times a day  QUEtiapine 75 milliGRAM(s) at bedtime  senna 2 Tablet(s) at bedtime  thiamine 100 milliGRAM(s) daily      --------------------------------------------------------------------

## 2022-05-23 ENCOUNTER — TRANSCRIPTION ENCOUNTER (OUTPATIENT)
Age: 72
End: 2022-05-23

## 2022-05-23 LAB
ALBUMIN SERPL ELPH-MCNC: 2.7 G/DL — LOW (ref 3.3–5)
ALP SERPL-CCNC: 82 U/L — SIGNIFICANT CHANGE UP (ref 40–120)
ALT FLD-CCNC: 25 U/L — SIGNIFICANT CHANGE UP (ref 10–45)
ANION GAP SERPL CALC-SCNC: 6 MMOL/L — SIGNIFICANT CHANGE UP (ref 5–17)
AST SERPL-CCNC: 24 U/L — SIGNIFICANT CHANGE UP (ref 10–40)
BASOPHILS # BLD AUTO: 0.02 K/UL — SIGNIFICANT CHANGE UP (ref 0–0.2)
BASOPHILS NFR BLD AUTO: 0.3 % — SIGNIFICANT CHANGE UP (ref 0–2)
BILIRUB SERPL-MCNC: 0.2 MG/DL — SIGNIFICANT CHANGE UP (ref 0.2–1.2)
BUN SERPL-MCNC: 27 MG/DL — HIGH (ref 7–23)
CALCIUM SERPL-MCNC: 9.2 MG/DL — SIGNIFICANT CHANGE UP (ref 8.4–10.5)
CHLORIDE SERPL-SCNC: 103 MMOL/L — SIGNIFICANT CHANGE UP (ref 96–108)
CO2 SERPL-SCNC: 31 MMOL/L — SIGNIFICANT CHANGE UP (ref 22–31)
CREAT SERPL-MCNC: 1.3 MG/DL — SIGNIFICANT CHANGE UP (ref 0.5–1.3)
EGFR: 59 ML/MIN/1.73M2 — LOW
EOSINOPHIL # BLD AUTO: 0.06 K/UL — SIGNIFICANT CHANGE UP (ref 0–0.5)
EOSINOPHIL NFR BLD AUTO: 0.9 % — SIGNIFICANT CHANGE UP (ref 0–6)
GLUCOSE SERPL-MCNC: 111 MG/DL — HIGH (ref 70–99)
HCT VFR BLD CALC: 35 % — LOW (ref 39–50)
HGB BLD-MCNC: 11.1 G/DL — LOW (ref 13–17)
IMM GRANULOCYTES NFR BLD AUTO: 0.5 % — SIGNIFICANT CHANGE UP (ref 0–1.5)
LYMPHOCYTES # BLD AUTO: 1.89 K/UL — SIGNIFICANT CHANGE UP (ref 1–3.3)
LYMPHOCYTES # BLD AUTO: 28.9 % — SIGNIFICANT CHANGE UP (ref 13–44)
MCHC RBC-ENTMCNC: 29.8 PG — SIGNIFICANT CHANGE UP (ref 27–34)
MCHC RBC-ENTMCNC: 31.7 GM/DL — LOW (ref 32–36)
MCV RBC AUTO: 93.8 FL — SIGNIFICANT CHANGE UP (ref 80–100)
MONOCYTES # BLD AUTO: 1.02 K/UL — HIGH (ref 0–0.9)
MONOCYTES NFR BLD AUTO: 15.6 % — HIGH (ref 2–14)
NEUTROPHILS # BLD AUTO: 3.53 K/UL — SIGNIFICANT CHANGE UP (ref 1.8–7.4)
NEUTROPHILS NFR BLD AUTO: 53.8 % — SIGNIFICANT CHANGE UP (ref 43–77)
NRBC # BLD: 0 /100 WBCS — SIGNIFICANT CHANGE UP (ref 0–0)
PLATELET # BLD AUTO: 288 K/UL — SIGNIFICANT CHANGE UP (ref 150–400)
POTASSIUM SERPL-MCNC: 4.6 MMOL/L — SIGNIFICANT CHANGE UP (ref 3.5–5.3)
POTASSIUM SERPL-SCNC: 4.6 MMOL/L — SIGNIFICANT CHANGE UP (ref 3.5–5.3)
PROT SERPL-MCNC: 8.4 G/DL — HIGH (ref 6–8.3)
RBC # BLD: 3.73 M/UL — LOW (ref 4.2–5.8)
RBC # FLD: 13.5 % — SIGNIFICANT CHANGE UP (ref 10.3–14.5)
SARS-COV-2 RNA SPEC QL NAA+PROBE: SIGNIFICANT CHANGE UP
SODIUM SERPL-SCNC: 140 MMOL/L — SIGNIFICANT CHANGE UP (ref 135–145)
WBC # BLD: 6.55 K/UL — SIGNIFICANT CHANGE UP (ref 3.8–10.5)
WBC # FLD AUTO: 6.55 K/UL — SIGNIFICANT CHANGE UP (ref 3.8–10.5)

## 2022-05-23 PROCEDURE — 99233 SBSQ HOSP IP/OBS HIGH 50: CPT

## 2022-05-23 PROCEDURE — 99232 SBSQ HOSP IP/OBS MODERATE 35: CPT

## 2022-05-23 RX ADMIN — DIVALPROEX SODIUM 500 MILLIGRAM(S): 500 TABLET, DELAYED RELEASE ORAL at 17:29

## 2022-05-23 RX ADMIN — DIVALPROEX SODIUM 500 MILLIGRAM(S): 500 TABLET, DELAYED RELEASE ORAL at 05:25

## 2022-05-23 RX ADMIN — Medication 1 TABLET(S): at 12:01

## 2022-05-23 RX ADMIN — APIXABAN 5 MILLIGRAM(S): 2.5 TABLET, FILM COATED ORAL at 05:25

## 2022-05-23 RX ADMIN — QUETIAPINE FUMARATE 75 MILLIGRAM(S): 200 TABLET, FILM COATED ORAL at 21:58

## 2022-05-23 RX ADMIN — APIXABAN 5 MILLIGRAM(S): 2.5 TABLET, FILM COATED ORAL at 17:29

## 2022-05-23 RX ADMIN — Medication 88 MICROGRAM(S): at 05:25

## 2022-05-23 RX ADMIN — POLYETHYLENE GLYCOL 3350 17 GRAM(S): 17 POWDER, FOR SOLUTION ORAL at 17:29

## 2022-05-23 RX ADMIN — Medication 100 MILLIGRAM(S): at 12:01

## 2022-05-23 RX ADMIN — SENNA PLUS 2 TABLET(S): 8.6 TABLET ORAL at 21:57

## 2022-05-23 RX ADMIN — PANTOPRAZOLE SODIUM 40 MILLIGRAM(S): 20 TABLET, DELAYED RELEASE ORAL at 05:25

## 2022-05-23 NOTE — DISCHARGE NOTE NURSING/CASE MANAGEMENT/SOCIAL WORK - NSDCPEFALRISK_GEN_ALL_CORE
For information on Fall & Injury Prevention, visit: [email or web address] OR  [email or web address] OR  https://www.cdc.gov/steadi/patient.html

## 2022-05-23 NOTE — PROGRESS NOTE ADULT - ASSESSMENT
ASSESSMENT/PLAN  This is a 73 y/o male with PMH of  HTN, hypothyroidism who presented to  [facility] on 4/12  with seizures, found to have cerebellar hemorrhage. Intubated for airway protection at OSH, extubated 4/13. Called to consult for increased chest congestion. CTA chest with atelectasis and small RUL segmental branch PE, LE duplex with L soleal vein DVT. MR head with small foci of hemorrhage. Pt started on heparin gtt per neuro and vascular cardiology, now changed to Eliquis. Hospital course significant for UTI (e-coli) now s/p ABT per ID. He was treated with IVIG and steroids for seizures.   Patient now with gait Instability, ADL impairments and Functional impairments.    # CVA  - MR head with small Right cerebellar hemorrhage  - Continue Comprehensive Rehab Program: PT/OT/ST, 3hours daily and 5 days weekly  - agitation/confusion/nausea/dizziness - CTH (5/9) stable; no further episodes as of 5/17 on current medication regimen -resolved     #Seizures - stable  - Vimpat 150mg BID -discontinued 5/13  - Depakote now 500 BID (5/13)  - VPA level on 5/16 is WNL 84 (5/16)    #HTN  - Amlodipine 10mg daily  - Lisinopril 40mg daily - discontinued   -SBP stable     #Hypothyroidism  - Synthroid 88mcg daily    #Steroid induced hyperglycemia - resolved   - Was on Lantus 10 and premeal Admelog 4 - now dc'd   - FS on admission 131 - trend since completion of steroid <140  - A1c 5.9 on 4/13  - Monitor via labs    #COVID exposure  -Asymptomatic  -Determined to not be true exposure and droplet precautions removed    #DVT/PE  - L soleal vein + RUL segmental branch PE  - Eliquis 5mg BID    #UTI -resolved   - Ceftin 500mg  BID x 7 days (last dose 5/9)    #Mood/Cognition/Behavioral:  - Seroquel 75mg (5/10)  - Risperidone discontinued 5/10   - Neuropsychology decline  - Recreation therapy  - Psychiatry consult for medication regimen appreciated   - Switching seizure ppx to depakote to also help with agitation (titrating) - improved     #Pain management  - Tylenol PRN    #GI ppx  - Protonix 40mg    #Bowel Regimen  - Senna, Miralax BID    #Bladder management  - BS on admission, and q 8 hours (SC if > 400)  - Monitor UO    #FEN   - Diet: Regular [Consistent carb - no snacks]  - SLP: evaluation and treatment    #Skin:  - No active issues at this time  - Pressure injury/Skin: Turn Q2hrs while in bed, OOB to Chair, PT/OT     #Precaution  - Fall, Aspiration, Seizure       Outpatient Follow-up (Specialty/Name of physician):  Juan Bernabe (DO)  Medicine  [address], [address]  [place] [address]  Phone: [phone]  Fax: [phone]  Follow Up Time:     Efren Fragoso (DO)  Cardiology; Internal Medicine  [address], [address]  [place] [address]  Phone: [phone]  Fax: [phone]    TEAM MEETING 5/23/22  SW: lives with wife and 1 adult son; wife on FMLA. PH 4 ZABRINA (no HR), can set up for main level but no Bathroom on 1st FL.  13-14 steps up to bathroom.    OT: SV for eating and grooming, CG for dressing, bathing, and bathroom transfers.  Goal: SV x 2 weeks  PT: min for transfers, amb 100' RW min A, 4 steps min A  SLP: mild receptive language def, nonfluent aphasia, severe cog, dec working memory  barriers: dec cog, poor insight and safety awareness, dec sequencing, behavioral  EDOD: 5/24/22 ROEL

## 2022-05-23 NOTE — PROGRESS NOTE ADULT - ASSESSMENT
72 year old Male with a PMHx of HTN, hypothyroidism, who presented to [facility] via transfer from OSH. Patient originally presented to to [place] ED via EMS after family called 911 following a witnessed seizure lasting for 5 mins. Pt also had tongue laceration. Patient was subsequently intubated for GCS 6 and CT/CTA performed showing a small cerebellar hemorrhage. Hospital course complicated by acute PE and L soleal vein DVT. course further complicated by UTI and completed course of abx. Pt was treated with steroid and IVIG for seizure. Now medically optimized and transferred to [facility] for rehab.    #CKD III   -stable  -avoid nephrotoxics. monitor intake and output  -consider renal input if not improving  -encourage oral hydratoin     #Acute DVT   #PE  -Eliquis 5mg BID    #HTN  -ECHO w/ EF 75%, normal LV systolic function   -Continue Amlodipine 10mg daily  -Well controlled BP. Will not start Lisinopril as of yet  -Monitor BP closely    #Hypothyroid   -Synthroid  -Repeat TFTs in 4 weeks outpatient     #Pre-DM  -HA1C of 5.9  -Outpatient follow up     #New onset seizure of unclear etiology  -s/p IVIG and steroid   -S/p LP- CSF with no growth   -S/P MR brain; Small foci of prior hemorrhage versus calcification involving the right cerebellar hemisphere.  -Vimpat 150mg BID    #CVA  -MRI showed chronic small foci of hemorrhage in right cerebellar hemisphere and chronic bilateral thalamic infarct and right BG infarct.  -cont Eliquis   -cont comprehensive acute rehab program    #Mood  -Seroquel 50mg qHS + Risperidone   -psych follow-up noted    DVT Prophylaxis:  - Eliquis

## 2022-05-23 NOTE — DISCHARGE NOTE NURSING/CASE MANAGEMENT/SOCIAL WORK - PATIENT PORTAL LINK FT
You can access the FollowMyHealth Patient Portal offered by [facility] by registering at the following website: [email or web address]. By joining [facility]’s FollowMyHealth portal, you will also be able to view your health information using other applications (apps) compatible with our system.

## 2022-05-23 NOTE — PROGRESS NOTE ADULT - SUBJECTIVE AND OBJECTIVE BOX
Patient is a 72y old  Male who presents with a chief complaint of CVA (22 May 2022 16:13)    Patient seen and examined at bedside. No acute overnight events.     ALLERGIES:  fish (Hives; Angioedema)  No Known Drug Allergies    MEDICATIONS  (STANDING):  amLODIPine   Tablet 10 milliGRAM(s) Oral daily  apixaban 5 milliGRAM(s) Oral two times a day  dextrose 5%. 1000 milliLiter(s) (100 mL/Hr) IV Continuous <Continuous>  dextrose 5%. 1000 milliLiter(s) (50 mL/Hr) IV Continuous <Continuous>  diVALproex  milliGRAM(s) Oral two times a day  glucagon  Injectable 1 milliGRAM(s) IntraMuscular once  lactobacillus acidophilus 1 Tablet(s) Oral daily  levothyroxine 88 MICROGram(s) Oral daily  pantoprazole    Tablet 40 milliGRAM(s) Oral before breakfast  polyethylene glycol 3350 17 Gram(s) Oral two times a day  QUEtiapine 75 milliGRAM(s) Oral at bedtime  senna 2 Tablet(s) Oral at bedtime  thiamine 100 milliGRAM(s) Oral daily    MEDICATIONS  (PRN):  acetaminophen     Tablet .. 650 milliGRAM(s) Oral every 6 hours PRN Moderate Pain (4 - 6), Severe Pain (7 - 10)    Vital Signs Last 24 Hrs  T(F): 98.1 (23 May 2022 07:33), Max: 98.9 (22 May 2022 21:32)  HR: 89 (23 May 2022 07:33) (89 - 91)  BP: 125/66 (23 May 2022 07:33) (108/70 - 145/83)  RR: 16 (23 May 2022 07:33) (16 - 16)  SpO2: 93% (23 May 2022 07:33) (93% - 94%)  I&O's Summary    PHYSICAL EXAM:  General: NAD, awake, alert. pleasant  ENT: MMM, no tonsilar exudate  Neck: Supple, No JVD  Lungs: Clear to auscultation bilaterally, no wheezes. Good air entry bilaterally   Cardio: RRR, S1/S2, No murmurs  Abdomen: Soft, Nontender, Nondistended; Bowel sounds present  Extremities: No calf tenderness, No pitting edema    LABS:                        11.1   6.55  )-----------( 288      ( 23 May 2022 06:06 )             35.0       05-23    140  |  103  |  27  ----------------------------<  111  4.6   |  31  |  1.30    Ca    9.2      23 May 2022 06:06    TPro  8.4  /  Alb  2.7  /  TBili  0.2  /  DBili  x   /  AST  24  /  ALT  25  /  AlkPhos  82  05-23     04-13 Chol 128 mg/dL LDL -- HDL 67 mg/dL Trig 116 mg/dL    COVID-19 PCR: NotDetec (05-17-22 @ 05:45)  COVID-19 PCR: NotDetec (05-11-22 @ 05:00)  COVID-19 PCR: NotDetec (05-04-22 @ 18:40)  COVID-19 PCR: NotDetec (05-04-22 @ 06:30)  COVID-19 PCR: NotDetec (04-28-22 @ 06:17)    RADIOLOGY & ADDITIONAL TESTS:     Care Discussed with Consultants/Other Providers:

## 2022-05-23 NOTE — PROGRESS NOTE ADULT - SUBJECTIVE AND OBJECTIVE BOX
SUBJECTIVE/ROS: No acute events overnight. He states he is feeling well. Denies chest pain, fever, chills, nausea, vomiting, abdominal pain, headache, or BLE pain.     Patient is a 72y old  Male who presents with a chief complaint of CVA (23 May 2022 11:12)    HPI:  This is a 73 y/o male with PMH of  HTN, hypothyroidism who presented to  [facility] on 4/12  with seizures, found to have cerebellar hemorrhage. Intubated for airway protection at OSH, extubated 4/13. Called to consult for increased chest congestion. CTA chest with atelectasis and small RUL segmental branch PE, LE duplex with L soleal vein DVT. MR head with small foci of hemorrhage. Pt started on heparin gtt per neuro and vascular cardiology, now changed to Eliquis. Hospital course significant for UTI _e-coli) now s/p ABT per ID. He was treated with IVIG and steroids for seizures. Patient was evaluated by PM&R and therapy for functional deficits, gait/ADL impairments and acute rehabilitation was recommended. Patient was medically optimized for discharge to [facility] IRU on 5/4/22. (04 May 2022 13:56)      PAST MEDICAL & SURGICAL HISTORY:  HTN (hypertension)      Diverticulosis      Hypothyroid      No significant past surgical history          Allergies    fish (Hives; Angioedema)  No Known Drug Allergies    Intolerances          PHYSICAL EXAM    Vital Signs Last 24 Hrs  T(C): 36.7 (23 May 2022 07:33), Max: 37.2 (22 May 2022 21:32)  T(F): 98.1 (23 May 2022 07:33), Max: 98.9 (22 May 2022 21:32)  HR: 89 (23 May 2022 07:33) (89 - 91)  BP: 125/66 (23 May 2022 07:33) (108/70 - 145/83)  BP(mean): --  RR: 16 (23 May 2022 07:33) (16 - 16)  SpO2: 93% (23 May 2022 07:33) (93% - 94%)  Daily     Daily       PHYSICAL EXAM  Constitutional - NAD, Comfortable  HEENT - NCAT, EOMI  Neck - Supple, No limited ROM  Chest - CTA bilaterally  Cardiovascular - RRR, S1S2  Abdomen -BS+, Soft, NTND  Extremities - No C/C/E, No calf tenderness   Neurologic Exam - no new focal deficits    RECENT LABS:                          11.1   6.55  )-----------( 288      ( 23 May 2022 06:06 )             35.0     05-23    140  |  103  |  27<H>  ----------------------------<  111<H>  4.6   |  31  |  1.30    Ca    9.2      23 May 2022 06:06    TPro  8.4<H>  /  Alb  2.7<L>  /  TBili  0.2  /  DBili  x   /  AST  24  /  ALT  25  /  AlkPhos  82  05-23    LIVER FUNCTIONS - ( 23 May 2022 06:06 )  Alb: 2.7 g/dL / Pro: 8.4 g/dL / ALK PHOS: 82 U/L / ALT: 25 U/L / AST: 24 U/L / GGT: x                   MEDICATIONS  (STANDING):  amLODIPine   Tablet 10 milliGRAM(s) Oral daily  apixaban 5 milliGRAM(s) Oral two times a day  dextrose 5%. 1000 milliLiter(s) (50 mL/Hr) IV Continuous <Continuous>  dextrose 5%. 1000 milliLiter(s) (100 mL/Hr) IV Continuous <Continuous>  diVALproex  milliGRAM(s) Oral two times a day  glucagon  Injectable 1 milliGRAM(s) IntraMuscular once  lactobacillus acidophilus 1 Tablet(s) Oral daily  levothyroxine 88 MICROGram(s) Oral daily  pantoprazole    Tablet 40 milliGRAM(s) Oral before breakfast  polyethylene glycol 3350 17 Gram(s) Oral two times a day  QUEtiapine 75 milliGRAM(s) Oral at bedtime  senna 2 Tablet(s) Oral at bedtime  thiamine 100 milliGRAM(s) Oral daily    MEDICATIONS  (PRN):  acetaminophen     Tablet .. 650 milliGRAM(s) Oral every 6 hours PRN Moderate Pain (4 - 6), Severe Pain (7 - 10)

## 2022-05-24 VITALS
SYSTOLIC BLOOD PRESSURE: 121 MMHG | OXYGEN SATURATION: 96 % | HEART RATE: 92 BPM | TEMPERATURE: 98 F | RESPIRATION RATE: 16 BRPM | DIASTOLIC BLOOD PRESSURE: 71 MMHG

## 2022-05-24 PROCEDURE — U0003: CPT

## 2022-05-24 PROCEDURE — 81001 URINALYSIS AUTO W/SCOPE: CPT

## 2022-05-24 PROCEDURE — 97116 GAIT TRAINING THERAPY: CPT

## 2022-05-24 PROCEDURE — 36415 COLL VENOUS BLD VENIPUNCTURE: CPT

## 2022-05-24 PROCEDURE — 99232 SBSQ HOSP IP/OBS MODERATE 35: CPT

## 2022-05-24 PROCEDURE — 97163 PT EVAL HIGH COMPLEX 45 MIN: CPT

## 2022-05-24 PROCEDURE — 97535 SELF CARE MNGMENT TRAINING: CPT

## 2022-05-24 PROCEDURE — 92610 EVALUATE SWALLOWING FUNCTION: CPT

## 2022-05-24 PROCEDURE — 92507 TX SP LANG VOICE COMM INDIV: CPT

## 2022-05-24 PROCEDURE — 97530 THERAPEUTIC ACTIVITIES: CPT

## 2022-05-24 PROCEDURE — 80165 DIPROPYLACETIC ACID FREE: CPT

## 2022-05-24 PROCEDURE — 97112 NEUROMUSCULAR REEDUCATION: CPT

## 2022-05-24 PROCEDURE — 80164 ASSAY DIPROPYLACETIC ACD TOT: CPT

## 2022-05-24 PROCEDURE — 92523 SPEECH SOUND LANG COMPREHEN: CPT

## 2022-05-24 PROCEDURE — 82962 GLUCOSE BLOOD TEST: CPT

## 2022-05-24 PROCEDURE — 80048 BASIC METABOLIC PNL TOTAL CA: CPT

## 2022-05-24 PROCEDURE — 85025 COMPLETE CBC W/AUTO DIFF WBC: CPT

## 2022-05-24 PROCEDURE — 70450 CT HEAD/BRAIN W/O DYE: CPT

## 2022-05-24 PROCEDURE — 80053 COMPREHEN METABOLIC PANEL: CPT

## 2022-05-24 PROCEDURE — 99239 HOSP IP/OBS DSCHRG MGMT >30: CPT

## 2022-05-24 PROCEDURE — U0005: CPT

## 2022-05-24 PROCEDURE — 97110 THERAPEUTIC EXERCISES: CPT

## 2022-05-24 PROCEDURE — 97167 OT EVAL HIGH COMPLEX 60 MIN: CPT

## 2022-05-24 RX ADMIN — APIXABAN 5 MILLIGRAM(S): 2.5 TABLET, FILM COATED ORAL at 06:36

## 2022-05-24 RX ADMIN — PANTOPRAZOLE SODIUM 40 MILLIGRAM(S): 20 TABLET, DELAYED RELEASE ORAL at 06:36

## 2022-05-24 RX ADMIN — Medication 100 MILLIGRAM(S): at 12:39

## 2022-05-24 RX ADMIN — AMLODIPINE BESYLATE 10 MILLIGRAM(S): 2.5 TABLET ORAL at 06:36

## 2022-05-24 RX ADMIN — DIVALPROEX SODIUM 500 MILLIGRAM(S): 500 TABLET, DELAYED RELEASE ORAL at 06:35

## 2022-05-24 RX ADMIN — Medication 1 TABLET(S): at 12:39

## 2022-05-24 RX ADMIN — Medication 88 MICROGRAM(S): at 06:36

## 2022-05-24 NOTE — PROGRESS NOTE ADULT - NS ATTEND OPT1 GEN_ALL_CORE
I independently performed the documented:
I attest my time as attending is greater than 50% of the total combined time spent on qualifying patient care activities by the PA/NP and attending.
I attest my time as attending is greater than 50% of the total combined time spent on qualifying patient care activities by the PA/NP and attending.
I independently performed the documented:
I independently performed the documented:
I attest my time as attending is greater than 50% of the total combined time spent on qualifying patient care activities by the PA/NP and attending.
I independently performed the documented:
I independently performed the documented:

## 2022-05-24 NOTE — PROGRESS NOTE ADULT - REASON FOR ADMISSION
CVA

## 2022-05-24 NOTE — PROGRESS NOTE ADULT - SUBJECTIVE AND OBJECTIVE BOX
Patient is a 72y old  Male who presents with a chief complaint of CVA (23 May 2022 13:12)      Patient seen and examined at bedside. No acute overnight events.     ALLERGIES:  fish (Hives; Angioedema)  No Known Drug Allergies    MEDICATIONS  (STANDING):  amLODIPine   Tablet 10 milliGRAM(s) Oral daily  apixaban 5 milliGRAM(s) Oral two times a day  dextrose 5%. 1000 milliLiter(s) (50 mL/Hr) IV Continuous <Continuous>  dextrose 5%. 1000 milliLiter(s) (100 mL/Hr) IV Continuous <Continuous>  diVALproex  milliGRAM(s) Oral two times a day  glucagon  Injectable 1 milliGRAM(s) IntraMuscular once  lactobacillus acidophilus 1 Tablet(s) Oral daily  levothyroxine 88 MICROGram(s) Oral daily  pantoprazole    Tablet 40 milliGRAM(s) Oral before breakfast  polyethylene glycol 3350 17 Gram(s) Oral two times a day  QUEtiapine 75 milliGRAM(s) Oral at bedtime  senna 2 Tablet(s) Oral at bedtime  thiamine 100 milliGRAM(s) Oral daily    MEDICATIONS  (PRN):  acetaminophen     Tablet .. 650 milliGRAM(s) Oral every 6 hours PRN Moderate Pain (4 - 6), Severe Pain (7 - 10)    Vital Signs Last 24 Hrs  T(F): 98.1 (23 May 2022 20:01), Max: 98.1 (23 May 2022 20:01)  HR: 86 (24 May 2022 06:34) (86 - 92)  BP: 156/82 (24 May 2022 06:34) (146/69 - 156/82)  RR: 16 (23 May 2022 20:01) (16 - 16)  SpO2: 96% (23 May 2022 20:01) (96% - 96%)  I&O's Summary    23 May 2022 07:01  -  24 May 2022 07:00  --------------------------------------------------------  IN: 0 mL / OUT: 100 mL / NET: -100 mL    PHYSICAL EXAM:  General: NAD, Awake, alert  ENT: MMM, no tonsilar exudate  Neck: Supple, No JVD  Lungs: Clear to auscultation bilaterally, no wheezes. Good air entry bilaterally   Cardio: RRR, S1/S2, No murmurs  Abdomen: Soft, Nontender, Nondistended; Bowel sounds present  Extremities: No calf tenderness, No pitting edema    LABS:                        11.1   6.55  )-----------( 288      ( 23 May 2022 06:06 )             35.0       05-23    140  |  103  |  27  ----------------------------<  111  4.6   |  31  |  1.30    Ca    9.2      23 May 2022 06:06    TPro  8.4  /  Alb  2.7  /  TBili  0.2  /  DBili  x   /  AST  24  /  ALT  25  /  AlkPhos  82  05-23     04-13 Chol 128 mg/dL LDL -- HDL 67 mg/dL Trig 116 mg/dL    COVID-19 PCR: NotDetec (05-23-22 @ 06:04)  COVID-19 PCR: NotDetec (05-17-22 @ 05:45)  COVID-19 PCR: NotDetec (05-11-22 @ 05:00)  COVID-19 PCR: NotDetec (05-04-22 @ 18:40)  COVID-19 PCR: NotDetec (05-04-22 @ 06:30)    RADIOLOGY & ADDITIONAL TESTS:     Care Discussed with Consultants/Other Providers:

## 2022-05-24 NOTE — PROGRESS NOTE ADULT - NS ATTEND AMEND GEN_ALL_CORE FT
No new complaints.  Neurological exam stable without new deficits.  Discharge plan has been confirmed with team, SW and wife - awaiting insurance preath for [facility] placement.
Patient is being discharged  to [facility] today.  Discharge instructions were discussed with patient and wife, all current medications were reviewed. Patient and wife were educated on importance of medication compliance,  continued  care with PMD and follow-up care with the specialists in the community. Safety and fall risk precautions  were discussed in detail, counseled on healthy life style modifications.  All questions were answered to their satisfaction.    37 min spent
Psychiatry input appreciated  Neurologically stable
No acute events overnight  Stable exam  Enhanced supervision for safety  Medicine and Psychiatry are following
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 agreered to switch  Vimpat to Depakote for seizure control and behavior modification.    Multidisciplinary team meeting today:  patient's functional goals and needs, functional and clinical  progress were discussed, barriers to discharge were identified. Anticipate [facility] facility placement.     EDOD 5/18/22    > 35 min spent , > 50 % coordinating care
No new complaints  Neurological exam is stable  Multidisciplinary team meeting today:  patient's functional goals and needs, functional and clinical  progress were discussed, barriers to discharge were identified. Anticipate  [facility] facility placement.     EDOD 5/24/22    > 37 min spent, > 50 % coordinating care
Sedated today after psychotropic medications adjustment, poor oral intake  Will discontinue Risperdal, lower Seroquel dose  IVF started to ensure hydration  Medicine and Psychiatry are following
Some restlessness during night and morning hours  Neurologically stable  Medicine input appreciated   Full dose AC
Multidisciplinary team meeting today:  patient's functional goals and needs, functional and clinical  progress were discussed, barriers to discharge were identified. Anticipate [facility] facility placement.   ELOS 3-5 days
No new complaints   Neurologically stable without new focal deficits.  Full program  Labs and medications reviewed
Neurologically stable, improving functionally  Discharge plan discussed with team, ROVERTO. [facility] placement is requested by wife, will make arrangements

## 2022-05-24 NOTE — PROGRESS NOTE ADULT - ASSESSMENT
ASSESSMENT/PLAN  This is a 71 y/o male with PMH of  HTN, hypothyroidism who presented to  [facility] on 4/12  with seizures, found to have cerebellar hemorrhage. Intubated for airway protection at OSH, extubated 4/13. Called to consult for increased chest congestion. CTA chest with atelectasis and small RUL segmental branch PE, LE duplex with L soleal vein DVT. MR head with small foci of hemorrhage. Pt started on heparin gtt per neuro and vascular cardiology, now changed to Eliquis. Hospital course significant for UTI (e-coli) now s/p ABT per ID. He was treated with IVIG and steroids for seizures.   Patient now with gait Instability, ADL impairments and Functional impairments.    # CVA  - MR head with small Right cerebellar hemorrhage  - agitation/confusion/nausea/dizziness - CTH (5/9) stable; no further episodes as of 5/17 on current medication regimen -resolved     #Seizures - stable  - Depakote 500 BID (5/13)    #HTN  - Amlodipine 10mg daily  - Lisinopril 40mg daily - discontinued   -SBP stable     #Hypothyroidism  - Synthroid 88mcg daily    #Steroid induced hyperglycemia - resolved   - Was on Lantus 10 and premeal Admelog 4 - now dc'd   - FS on admission 131 - trend since completion of steroid <140  - A1c 5.9 on 4/13    #COVID exposure  -Asymptomatic  -Determined to not be true exposure and droplet precautions removed  -Repeat Covid 5/24 negative     #DVT/PE  - L soleal vein + RUL segmental branch PE  - Eliquis 5mg BID      #Mood/Cognition/Behavioral:  - Seroquel 75mg (5/10)  - Risperidone discontinued 5/10   - Neuropsychology decline  - Recreation therapy  - Psychiatry consult for medication regimen appreciated   - Switching seizure ppx to depakote to also help with agitation (titrating) - improved     #Pain management  - Tylenol PRN    #GI ppx  - Protonix 40mg    #Bowel Regimen  - Senna, Miralax BID    #FEN   - Diet: Regular [Consistent carb - no snacks]  - SLP: evaluation and treatment        Outpatient Follow-up (Specialty/Name of physician):  Juan Bernabe ()  [address], [address]  [place] [address]  Phone: [phone]  Fax: [phone]  Follow Up Time:     Efren Fragoso ()  Cardiology; Internal Medicine  [address], [address]  [place] [address]  Phone: [phone]  Fax: [phone]    TEAM MEETING 5/23/22  SW: lives with wife and 1 adult son; wife on FMLA. PH 4 ZABRINA (no HR), can set up for main level but no Bathroom on 1st FL.  13-14 steps up to bathroom.    OT: SV for eating and grooming, CG for dressing, bathing, and bathroom transfers.  Goal: SV x 2 weeks  PT: min for transfers, amb 100' RW min A, 4 steps min A  SLP: mild receptive language def, nonfluent aphasia, severe cog, dec working memory  barriers: dec cog, poor insight and safety awareness, dec sequencing, behavioral  EDOD: 5/24/22 [facility]    STABLE AND READY FOR DISCHARGE.

## 2022-05-24 NOTE — PROGRESS NOTE ADULT - NUTRITIONAL ASSESSMENT
This patient has been assessed with a concern for Malnutrition and has been determined to have a diagnosis/diagnoses of Moderate protein-calorie malnutrition.    This patient is being managed with:   Diet Consistent Carbohydrate/No Snacks-  Supplement Feeding Modality:  Oral  Glucerna Shake Cans or Servings Per Day:  3       Frequency:  Daily  Entered: May  4 2022  4:58PM    

## 2022-05-24 NOTE — PROGRESS NOTE ADULT - SUBJECTIVE AND OBJECTIVE BOX
SUBJECTIVE/ROS: No acute events overnight. He is stable and ready for discharge to [facility] today.     Patient is a 72y old  Male who presents with a chief complaint of CVA (23 May 2022 11:12)    HPI:  This is a 73 y/o male with PMH of  HTN, hypothyroidism who presented to  [facility] on 4/12  with seizures, found to have cerebellar hemorrhage. Intubated for airway protection at OSH, extubated 4/13. Called to consult for increased chest congestion. CTA chest with atelectasis and small RUL segmental branch PE, LE duplex with L soleal vein DVT. MR head with small foci of hemorrhage. Pt started on heparin gtt per neuro and vascular cardiology, now changed to Eliquis. Hospital course significant for UTI _e-coli) now s/p ABT per ID. He was treated with IVIG and steroids for seizures. Patient was evaluated by PM&R and therapy for functional deficits, gait/ADL impairments and acute rehabilitation was recommended. Patient was medically optimized for discharge to [facility] IRU on 5/4/22. (04 May 2022 13:56)      PAST MEDICAL & SURGICAL HISTORY:  HTN (hypertension)      Diverticulosis      Hypothyroid      No significant past surgical history          Allergies    fish (Hives; Angioedema)  No Known Drug Allergies    Intolerances          Vital Signs Last 24 Hrs  T(C): 36.8 (24 May 2022 12:53), Max: 36.8 (24 May 2022 12:53)  T(F): 98.3 (24 May 2022 12:53), Max: 98.3 (24 May 2022 12:53)  HR: 92 (24 May 2022 12:53) (86 - 92)  BP: 121/71 (24 May 2022 12:53) (121/71 - 156/82)  BP(mean): --  RR: 16 (24 May 2022 12:53) (16 - 16)  SpO2: 96% (24 May 2022 12:53) (96% - 96%)    PHYSICAL EXAM  Constitutional - NAD, Comfortable  HEENT - NCAT, EOMI  Neck - Supple, No limited ROM  Chest - CTA bilaterally  Cardiovascular - RRR, S1S2  Abdomen -BS+, Soft, NTND  Extremities - No C/C/E, No calf tenderness   Neurologic Exam - aox2-3, sullivan 5/5    RECENT LABS:                          11.1   6.55  )-----------( 288      ( 23 May 2022 06:06 )             35.0     05-23    140  |  103  |  27<H>  ----------------------------<  111<H>  4.6   |  31  |  1.30    Ca    9.2      23 May 2022 06:06    TPro  8.4<H>  /  Alb  2.7<L>  /  TBili  0.2  /  DBili  x   /  AST  24  /  ALT  25  /  AlkPhos  82  05-23    LIVER FUNCTIONS - ( 23 May 2022 06:06 )  Alb: 2.7 g/dL / Pro: 8.4 g/dL / ALK PHOS: 82 U/L / ALT: 25 U/L / AST: 24 U/L / GGT: x               MEDICATIONS  (STANDING):  amLODIPine   Tablet 10 milliGRAM(s) Oral daily  apixaban 5 milliGRAM(s) Oral two times a day  dextrose 5%. 1000 milliLiter(s) (100 mL/Hr) IV Continuous <Continuous>  dextrose 5%. 1000 milliLiter(s) (50 mL/Hr) IV Continuous <Continuous>  diVALproex  milliGRAM(s) Oral two times a day  glucagon  Injectable 1 milliGRAM(s) IntraMuscular once  lactobacillus acidophilus 1 Tablet(s) Oral daily  levothyroxine 88 MICROGram(s) Oral daily  pantoprazole    Tablet 40 milliGRAM(s) Oral before breakfast  polyethylene glycol 3350 17 Gram(s) Oral two times a day  QUEtiapine 75 milliGRAM(s) Oral at bedtime  senna 2 Tablet(s) Oral at bedtime  thiamine 100 milliGRAM(s) Oral daily    MEDICATIONS  (PRN):  acetaminophen     Tablet .. 650 milliGRAM(s) Oral every 6 hours PRN Moderate Pain (4 - 6), Severe Pain (7 - 10)

## 2022-05-24 NOTE — PROGRESS NOTE ADULT - NS ATTEND OPT1A GEN_ALL_CORE
Exam/Medical decision making
Exam
Medical decision making
Exam/Medical decision making
History/Exam/Medical decision making

## 2022-05-24 NOTE — PROGRESS NOTE ADULT - ASSESSMENT
72 year old Male with a PMHx of HTN, hypothyroidism, who presented to [facility] via transfer from OSH. Patient originally presented to to [place] ED via EMS after family called 911 following a witnessed seizure lasting for 5 mins. Pt also had tongue laceration. Patient was subsequently intubated for GCS 6 and CT/CTA performed showing a small cerebellar hemorrhage. Hospital course complicated by acute PE and L soleal vein DVT. course further complicated by UTI and completed course of abx. Pt was treated with steroid and IVIG for seizure. Now medically optimized and transferred to [facility] for rehab.    #CKD III   -stable  -avoid nephrotoxics. monitor intake and output  -consider renal input if not improving  -encourage oral hydratoin     #Acute DVT   #PE  -Eliquis 5mg BID    #HTN  -ECHO w/ EF 75%, normal LV systolic function   -Continue Amlodipine 10mg daily  -Well controlled BP. Will not start Lisinopril as of yet  -Monitor BP closely    #Hypothyroid   -Synthroid  -Repeat TFTs in 4 weeks outpatient     #Pre-DM  -HA1C of 5.9  -Outpatient follow up     #New onset seizure of unclear etiology  -s/p IVIG and steroid   -S/p LP- CSF with no growth   -S/P MR brain; Small foci of prior hemorrhage versus calcification involving the right cerebellar hemisphere.  -Vimpat 150mg BID    #CVA  -MRI showed chronic small foci of hemorrhage in right cerebellar hemisphere and chronic bilateral thalamic infarct and right BG infarct.  -cont Eliquis   -cont comprehensive acute rehab program    #Mood  -Seroquel 50mg qHS + Risperidone   -psych follow-up noted    DVT Prophylaxis:  - Eliquis

## 2022-05-24 NOTE — PROGRESS NOTE ADULT - PROVIDER SPECIALTY LIST ADULT
Hospitalist
Hospitalist
Rehab Medicine
Hospitalist
Hospitalist
Neurology
Neurology
Rehab Medicine
Hospitalist
Neurology
Rehab Medicine
Hospitalist
Neurology
Hospitalist
Hospitalist
Rehab Medicine

## 2022-07-04 ENCOUNTER — EMERGENCY (EMERGENCY)
Facility: HOSPITAL | Age: 72
LOS: 0 days | Discharge: HOME | End: 2022-07-04
Attending: STUDENT IN AN ORGANIZED HEALTH CARE EDUCATION/TRAINING PROGRAM
Payer: SELF-PAY

## 2022-07-04 VITALS
DIASTOLIC BLOOD PRESSURE: 64 MMHG | RESPIRATION RATE: 16 BRPM | SYSTOLIC BLOOD PRESSURE: 134 MMHG | OXYGEN SATURATION: 95 % | HEART RATE: 81 BPM | TEMPERATURE: 99 F

## 2022-07-04 VITALS
DIASTOLIC BLOOD PRESSURE: 69 MMHG | OXYGEN SATURATION: 96 % | HEIGHT: 67 IN | SYSTOLIC BLOOD PRESSURE: 117 MMHG | HEART RATE: 79 BPM | TEMPERATURE: 99 F | WEIGHT: 145.06 LBS | RESPIRATION RATE: 16 BRPM

## 2022-07-04 DIAGNOSIS — Z86.718 PERSONAL HISTORY OF OTHER VENOUS THROMBOSIS AND EMBOLISM: ICD-10-CM

## 2022-07-04 DIAGNOSIS — Z79.01 LONG TERM (CURRENT) USE OF ANTICOAGULANTS: ICD-10-CM

## 2022-07-04 DIAGNOSIS — R56.9 UNSPECIFIED CONVULSIONS: ICD-10-CM

## 2022-07-04 DIAGNOSIS — I10 ESSENTIAL (PRIMARY) HYPERTENSION: ICD-10-CM

## 2022-07-04 DIAGNOSIS — E03.9 HYPOTHYROIDISM, UNSPECIFIED: ICD-10-CM

## 2022-07-04 DIAGNOSIS — K57.90 DIVERTICULOSIS OF INTESTINE, PART UNSPECIFIED, WITHOUT PERFORATION OR ABSCESS WITHOUT BLEEDING: ICD-10-CM

## 2022-07-04 DIAGNOSIS — Z91.013 ALLERGY TO SEAFOOD: ICD-10-CM

## 2022-07-04 DIAGNOSIS — Z86.711 PERSONAL HISTORY OF PULMONARY EMBOLISM: ICD-10-CM

## 2022-07-04 DIAGNOSIS — Z91.14 PATIENT'S OTHER NONCOMPLIANCE WITH MEDICATION REGIMEN: ICD-10-CM

## 2022-07-04 DIAGNOSIS — Z87.891 PERSONAL HISTORY OF NICOTINE DEPENDENCE: ICD-10-CM

## 2022-07-04 LAB
ALBUMIN SERPL ELPH-MCNC: 2.2 G/DL — LOW (ref 3.3–5)
ALP SERPL-CCNC: 66 U/L — SIGNIFICANT CHANGE UP (ref 40–120)
ALT FLD-CCNC: 11 U/L — LOW (ref 12–78)
ANION GAP SERPL CALC-SCNC: 6 MMOL/L — SIGNIFICANT CHANGE UP (ref 5–17)
AST SERPL-CCNC: 10 U/L — LOW (ref 15–37)
BASOPHILS # BLD AUTO: 0.02 K/UL — SIGNIFICANT CHANGE UP (ref 0–0.2)
BASOPHILS NFR BLD AUTO: 0.3 % — SIGNIFICANT CHANGE UP (ref 0–2)
BILIRUB SERPL-MCNC: 0.2 MG/DL — SIGNIFICANT CHANGE UP (ref 0.2–1.2)
BUN SERPL-MCNC: 15 MG/DL — SIGNIFICANT CHANGE UP (ref 7–23)
CALCIUM SERPL-MCNC: 8.9 MG/DL — SIGNIFICANT CHANGE UP (ref 8.5–10.1)
CHLORIDE SERPL-SCNC: 102 MMOL/L — SIGNIFICANT CHANGE UP (ref 96–108)
CO2 SERPL-SCNC: 27 MMOL/L — SIGNIFICANT CHANGE UP (ref 22–31)
CREAT SERPL-MCNC: 1.23 MG/DL — SIGNIFICANT CHANGE UP (ref 0.5–1.3)
EGFR: 62 ML/MIN/1.73M2 — SIGNIFICANT CHANGE UP
EOSINOPHIL # BLD AUTO: 0.09 K/UL — SIGNIFICANT CHANGE UP (ref 0–0.5)
EOSINOPHIL NFR BLD AUTO: 1.2 % — SIGNIFICANT CHANGE UP (ref 0–6)
GLUCOSE SERPL-MCNC: 112 MG/DL — HIGH (ref 70–99)
HCT VFR BLD CALC: 29.2 % — LOW (ref 39–50)
HGB BLD-MCNC: 9.5 G/DL — LOW (ref 13–17)
IMM GRANULOCYTES NFR BLD AUTO: 0.1 % — SIGNIFICANT CHANGE UP (ref 0–1.5)
LYMPHOCYTES # BLD AUTO: 1.99 K/UL — SIGNIFICANT CHANGE UP (ref 1–3.3)
LYMPHOCYTES # BLD AUTO: 25.6 % — SIGNIFICANT CHANGE UP (ref 13–44)
MCHC RBC-ENTMCNC: 28.2 PG — SIGNIFICANT CHANGE UP (ref 27–34)
MCHC RBC-ENTMCNC: 32.5 G/DL — SIGNIFICANT CHANGE UP (ref 32–36)
MCV RBC AUTO: 86.6 FL — SIGNIFICANT CHANGE UP (ref 80–100)
MONOCYTES # BLD AUTO: 0.5 K/UL — SIGNIFICANT CHANGE UP (ref 0–0.9)
MONOCYTES NFR BLD AUTO: 6.4 % — SIGNIFICANT CHANGE UP (ref 2–14)
NEUTROPHILS # BLD AUTO: 5.15 K/UL — SIGNIFICANT CHANGE UP (ref 1.8–7.4)
NEUTROPHILS NFR BLD AUTO: 66.4 % — SIGNIFICANT CHANGE UP (ref 43–77)
NRBC # BLD: 0 /100 WBCS — SIGNIFICANT CHANGE UP (ref 0–0)
PLATELET # BLD AUTO: 417 K/UL — HIGH (ref 150–400)
POTASSIUM SERPL-MCNC: 4.5 MMOL/L — SIGNIFICANT CHANGE UP (ref 3.5–5.3)
POTASSIUM SERPL-SCNC: 4.5 MMOL/L — SIGNIFICANT CHANGE UP (ref 3.5–5.3)
PROT SERPL-MCNC: 9.1 GM/DL — HIGH (ref 6–8.3)
RBC # BLD: 3.37 M/UL — LOW (ref 4.2–5.8)
RBC # FLD: 13.8 % — SIGNIFICANT CHANGE UP (ref 10.3–14.5)
SODIUM SERPL-SCNC: 135 MMOL/L — SIGNIFICANT CHANGE UP (ref 135–145)
VALPROATE SERPL-MCNC: 25 UG/ML — LOW (ref 50–100)
WBC # BLD: 7.76 K/UL — SIGNIFICANT CHANGE UP (ref 3.8–10.5)
WBC # FLD AUTO: 7.76 K/UL — SIGNIFICANT CHANGE UP (ref 3.8–10.5)

## 2022-07-04 PROCEDURE — 93010 ELECTROCARDIOGRAM REPORT: CPT

## 2022-07-04 PROCEDURE — 99285 EMERGENCY DEPT VISIT HI MDM: CPT

## 2022-07-04 PROCEDURE — 70450 CT HEAD/BRAIN W/O DYE: CPT | Mod: 26,MA

## 2022-07-04 RX ORDER — DIVALPROEX SODIUM 500 MG/1
500 TABLET, DELAYED RELEASE ORAL ONCE
Refills: 0 | Status: COMPLETED | OUTPATIENT
Start: 2022-07-04 | End: 2022-07-04

## 2022-07-04 RX ORDER — VALPROIC ACID (AS SODIUM SALT) 250 MG/5ML
250 SOLUTION, ORAL ORAL ONCE
Refills: 0 | Status: COMPLETED | OUTPATIENT
Start: 2022-07-04 | End: 2022-07-04

## 2022-07-04 RX ADMIN — Medication 52.5 MILLIGRAM(S): at 22:36

## 2022-07-04 RX ADMIN — DIVALPROEX SODIUM 500 MILLIGRAM(S): 500 TABLET, DELAYED RELEASE ORAL at 21:35

## 2022-07-04 NOTE — ED PROVIDER NOTE - NS ED ATTENDING STATEMENT MOD
This was a shared visit with the PARVEZ. I reviewed and verified the documentation and independently performed the documented:

## 2022-07-04 NOTE — ED PROVIDER NOTE - NSICDXPASTMEDICALHX_GEN_ALL_CORE_FT
PAST MEDICAL HISTORY:  Diverticulosis     HTN (hypertension)     Hypothyroid      Statement Selected

## 2022-07-04 NOTE — ED ADULT NURSE NOTE - CHIEF COMPLAINT QUOTE
BIBA, from home.  per family, pt had "seizure like activity".  grapped onto wife's "arm and wouldn't let go and appeared to be shaking" x 3 minutes.   (PMH-seizure, CVA, HTN, DVT, hypothyroid).  pt takes depakot 500mg daily.  last dose this morning.  (pt dc'd from Rehab 6/24/22).  pt aaox3 in triage    (per wife, pt ambulates with assist)

## 2022-07-04 NOTE — ED PROVIDER NOTE - CARE PROVIDER_API CALL
Nakia Jones  NEUROLOGY  [address] [place] [address]  Phone: [phone]  Fax: [phone]  Follow Up Time: 1-3 Days

## 2022-07-04 NOTE — ED PROVIDER NOTE - PATIENT PORTAL LINK FT
You can access the FollowMyHealth Patient Portal offered by [facility] by registering at the following website: [email or web address]. By joining [facility]’s FollowMyHealth portal, you will also be able to view your health information using other applications (apps) compatible with our system.

## 2022-07-04 NOTE — ED PROVIDER NOTE - OBJECTIVE STATEMENT
73 y/o male with PMH of  HTN, hypothyroidism who presented to  [facility] on 4/12  with seizures, found to have cerebellar hemorrhage. Intubated for airway protection at OSH, extubated 4/13, hospitalization also complicated with patient having PE/DVT, discharged to [facility] 5/24 presents today for seizure like activity.  Per wife, patient was laying in bed when he turned towards her, was groaning, then had a shaking episode lasting about 3 minutes which then resolved and patient was staring into space for about 1 minute then went back towards baseline.  No urinary or fecal incontinence, no oral trauma, and since patient was in bed at the time of the seizure he did not fall or have any other injuries.  Per wife, patient is on Depakote DR 500mg BID, has been complaint with last dose this morning.  Patient was in usual state of health prior to seizure and states he feels fine now.  No headache, fevers, chills, chest pain, sob, calf pain, weakness.

## 2022-07-04 NOTE — ED PROVIDER NOTE - CLINICAL SUMMARY MEDICAL DECISION MAKING FREE TEXT BOX
Pt w/ aforementioned presentation presents for ? seizure activity.  Will check labs, CTH, provide dose of depakote, and reassess.

## 2022-07-04 NOTE — ED PROVIDER NOTE - PHYSICAL EXAMINATION
GENERAL: nontoxic appearing, NAD.   EYES: Conjunctiva noninjected or pale, sclera anicteric  HENT: NC/AT, moist mucous membranes.  No oral trauma or bleeding  NECK: Supple, trachea midline  LUNG: Nonlabored respirations, no wheezes, rales  CV: RRR, Pulses- Radial: 2+ bilateral and equal  ABDOMEN: Nondistended, nontender  MSK: No visible deformities, nontender extremities  SKIN: No rashes, bruises  NEURO: Alert and oriented  	-Cranial Nerves:  	--CN II: PERRL 4mm  	--CN III, IV, VI: EOMI b/l  	--CN V1-3: Facial sensation intact to touch  	--CN VII: No facial asymmetry or droop  	--CN VIII: Hearing intact to rubbing fingers b/l  	--CN IX, X: Palate elevates symmetrically. Normal phonation  	--CN XI: Heading turning and shoulder shrug intact b/l  	--CN XII: Tongue midline with normal movements   PSYCH: calm and cooperative.  No evidence of hallucinations. No apparent risk to self or others.

## 2022-07-04 NOTE — ED ADULT TRIAGE NOTE - ARRIVAL FROM
Home Pt will independently ambulate 150 feet with rolling walker without loss of balance, by 4 weeks

## 2022-07-04 NOTE — ED ADULT NURSE NOTE - OBJECTIVE STATEMENT
72 year old male came in post witnessed seizure x3 mins by wife, at bedside, IV placed upon arrival, no medication given By EMS, A&Ox3 upon arrival, seizure precautions initiated, labs sent, placed on the monitors.

## 2022-07-04 NOTE — ED PROVIDER NOTE - PROVIDER TOKENS
Patient/Caregiver provided printed discharge information. PROVIDER:[TOKEN:[[id number]],FOLLOWUP:[1-3 Days]]

## 2022-07-04 NOTE — ED ADULT TRIAGE NOTE - CHIEF COMPLAINT QUOTE
BIBA, from home.  per family, pt had "seizure like activity".  grapped onto wife's "arm and wouldn't let go and appeared to be shaking" x 3 minutes.   (PMH-seizure, CVA, HTN, DVT, hypothyroid).  pt takes depakot 500mg daily.  last dose this morning.  (pt dc'd from Rehab 6/24/22) BIBA, from home.  per family, pt had "seizure like activity".  grapped onto wife's "arm and wouldn't let go and appeared to be shaking" x 3 minutes.   (PMH-seizure, CVA, HTN, DVT, hypothyroid).  pt takes depakot 500mg daily.  last dose this morning.  (pt dc'd from Rehab 6/24/22).  pt aaox3 in triage    (per wife, pt ambulates with assist)

## 2023-01-01 ENCOUNTER — TRANSCRIPTION ENCOUNTER (OUTPATIENT)
Age: 73
End: 2023-01-01

## 2023-01-01 ENCOUNTER — INPATIENT (INPATIENT)
Facility: HOSPITAL | Age: 73
LOS: 36 days | End: 2023-10-15
Attending: INTERNAL MEDICINE | Admitting: INTERNAL MEDICINE
Payer: COMMERCIAL

## 2023-01-01 ENCOUNTER — INPATIENT (INPATIENT)
Facility: HOSPITAL | Age: 73
LOS: 7 days | Discharge: NURSING, REHAB OR LONG-TERM CARE | End: 2023-02-15
Attending: INTERNAL MEDICINE | Admitting: INTERNAL MEDICINE
Payer: COMMERCIAL

## 2023-01-01 ENCOUNTER — INPATIENT (INPATIENT)
Facility: HOSPITAL | Age: 73
LOS: 7 days | Discharge: HOME HEALTH | End: 2023-06-20
Attending: INTERNAL MEDICINE | Admitting: INTERNAL MEDICINE
Payer: COMMERCIAL

## 2023-01-01 ENCOUNTER — INPATIENT (INPATIENT)
Facility: HOSPITAL | Age: 73
LOS: 7 days | Discharge: NURSING, REHAB OR LONG-TERM CARE | End: 2023-08-14
Attending: INTERNAL MEDICINE | Admitting: INTERNAL MEDICINE
Payer: COMMERCIAL

## 2023-01-01 VITALS
SYSTOLIC BLOOD PRESSURE: 120 MMHG | HEART RATE: 76 BPM | DIASTOLIC BLOOD PRESSURE: 95 MMHG | RESPIRATION RATE: 18 BRPM | TEMPERATURE: 98 F | OXYGEN SATURATION: 100 %

## 2023-01-01 VITALS
SYSTOLIC BLOOD PRESSURE: 123 MMHG | HEART RATE: 79 BPM | OXYGEN SATURATION: 98 % | RESPIRATION RATE: 18 BRPM | DIASTOLIC BLOOD PRESSURE: 83 MMHG

## 2023-01-01 VITALS
TEMPERATURE: 98 F | SYSTOLIC BLOOD PRESSURE: 115 MMHG | RESPIRATION RATE: 16 BRPM | DIASTOLIC BLOOD PRESSURE: 77 MMHG | HEIGHT: 72 IN | HEART RATE: 97 BPM | OXYGEN SATURATION: 95 %

## 2023-01-01 VITALS
HEART RATE: 53 BPM | TEMPERATURE: 98 F | RESPIRATION RATE: 18 BRPM | OXYGEN SATURATION: 94 % | SYSTOLIC BLOOD PRESSURE: 117 MMHG | DIASTOLIC BLOOD PRESSURE: 63 MMHG

## 2023-01-01 VITALS
DIASTOLIC BLOOD PRESSURE: 82 MMHG | HEIGHT: 68 IN | HEART RATE: 104 BPM | RESPIRATION RATE: 12 BRPM | TEMPERATURE: 98 F | SYSTOLIC BLOOD PRESSURE: 121 MMHG | OXYGEN SATURATION: 97 %

## 2023-01-01 VITALS
HEART RATE: 62 BPM | TEMPERATURE: 98 F | DIASTOLIC BLOOD PRESSURE: 68 MMHG | OXYGEN SATURATION: 97 % | SYSTOLIC BLOOD PRESSURE: 111 MMHG | RESPIRATION RATE: 18 BRPM

## 2023-01-01 VITALS
WEIGHT: 169.98 LBS | DIASTOLIC BLOOD PRESSURE: 77 MMHG | HEART RATE: 70 BPM | TEMPERATURE: 99 F | OXYGEN SATURATION: 100 % | SYSTOLIC BLOOD PRESSURE: 131 MMHG | RESPIRATION RATE: 16 BRPM | HEIGHT: 68 IN

## 2023-01-01 VITALS
WEIGHT: 130.07 LBS | TEMPERATURE: 98 F | DIASTOLIC BLOOD PRESSURE: 92 MMHG | SYSTOLIC BLOOD PRESSURE: 134 MMHG | HEIGHT: 67 IN | OXYGEN SATURATION: 100 % | HEART RATE: 72 BPM

## 2023-01-01 DIAGNOSIS — K59.00 CONSTIPATION, UNSPECIFIED: ICD-10-CM

## 2023-01-01 DIAGNOSIS — I69.398 OTHER SEQUELAE OF CEREBRAL INFARCTION: ICD-10-CM

## 2023-01-01 DIAGNOSIS — R62.7 ADULT FAILURE TO THRIVE: ICD-10-CM

## 2023-01-01 DIAGNOSIS — G93.49 OTHER ENCEPHALOPATHY: ICD-10-CM

## 2023-01-01 DIAGNOSIS — G40.909 EPILEPSY, UNSPECIFIED, NOT INTRACTABLE, WITHOUT STATUS EPILEPTICUS: ICD-10-CM

## 2023-01-01 DIAGNOSIS — Z79.890 HORMONE REPLACEMENT THERAPY: ICD-10-CM

## 2023-01-01 DIAGNOSIS — F02.80 DEMENTIA IN OTHER DISEASES CLASSIFIED ELSEWHERE, UNSPECIFIED SEVERITY, WITHOUT BEHAVIORAL DISTURBANCE, PSYCHOTIC DISTURBANCE, MOOD DISTURBANCE, AND ANXIETY: ICD-10-CM

## 2023-01-01 DIAGNOSIS — F03.90 UNSPECIFIED DEMENTIA, UNSPECIFIED SEVERITY, WITHOUT BEHAVIORAL DISTURBANCE, PSYCHOTIC DISTURBANCE, MOOD DISTURBANCE, AND ANXIETY: ICD-10-CM

## 2023-01-01 DIAGNOSIS — R56.9 UNSPECIFIED CONVULSIONS: ICD-10-CM
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
## 2023-01-01 DIAGNOSIS — Z79.899 OTHER LONG TERM (CURRENT) DRUG THERAPY: ICD-10-CM

## 2023-01-01 DIAGNOSIS — D64.9 ANEMIA, UNSPECIFIED: ICD-10-CM

## 2023-01-01 DIAGNOSIS — E16.2 HYPOGLYCEMIA, UNSPECIFIED: ICD-10-CM

## 2023-01-01 DIAGNOSIS — E86.0 DEHYDRATION: ICD-10-CM

## 2023-01-01 DIAGNOSIS — E87.0 HYPEROSMOLALITY AND HYPERNATREMIA: ICD-10-CM

## 2023-01-01 DIAGNOSIS — Z29.9 ENCOUNTER FOR PROPHYLACTIC MEASURES, UNSPECIFIED: ICD-10-CM

## 2023-01-01 DIAGNOSIS — E03.9 HYPOTHYROIDISM, UNSPECIFIED: ICD-10-CM

## 2023-01-01 DIAGNOSIS — I82.409 ACUTE EMBOLISM AND THROMBOSIS OF UNSPECIFIED DEEP VEINS OF UNSPECIFIED LOWER EXTREMITY: ICD-10-CM

## 2023-01-01 DIAGNOSIS — R53.2 FUNCTIONAL QUADRIPLEGIA: ICD-10-CM

## 2023-01-01 DIAGNOSIS — I10 ESSENTIAL (PRIMARY) HYPERTENSION: ICD-10-CM

## 2023-01-01 DIAGNOSIS — Z79.01 LONG TERM (CURRENT) USE OF ANTICOAGULANTS: ICD-10-CM

## 2023-01-01 DIAGNOSIS — R13.10 DYSPHAGIA, UNSPECIFIED: ICD-10-CM

## 2023-01-01 DIAGNOSIS — R00.1 BRADYCARDIA, UNSPECIFIED: ICD-10-CM

## 2023-01-01 DIAGNOSIS — N17.9 ACUTE KIDNEY FAILURE, UNSPECIFIED: ICD-10-CM

## 2023-01-01 DIAGNOSIS — Z51.5 ENCOUNTER FOR PALLIATIVE CARE: ICD-10-CM

## 2023-01-01 DIAGNOSIS — R29.898 OTHER SYMPTOMS AND SIGNS INVOLVING THE MUSCULOSKELETAL SYSTEM: ICD-10-CM

## 2023-01-01 DIAGNOSIS — Z20.822 CONTACT WITH AND (SUSPECTED) EXPOSURE TO COVID-19: ICD-10-CM

## 2023-01-01 DIAGNOSIS — Z71.89 OTHER SPECIFIED COUNSELING: ICD-10-CM

## 2023-01-01 DIAGNOSIS — Z91.013 ALLERGY TO SEAFOOD: ICD-10-CM
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
## 2023-01-01 DIAGNOSIS — F03.90 UNSPECIFIED DEMENTIA WITHOUT BEHAVIORAL DISTURBANCE: ICD-10-CM

## 2023-01-01 DIAGNOSIS — Z86.718 PERSONAL HISTORY OF OTHER VENOUS THROMBOSIS AND EMBOLISM: ICD-10-CM

## 2023-01-01 DIAGNOSIS — G30.9 ALZHEIMER'S DISEASE, UNSPECIFIED: ICD-10-CM

## 2023-01-01 DIAGNOSIS — N28.9 DISORDER OF KIDNEY AND URETER, UNSPECIFIED: ICD-10-CM

## 2023-01-01 DIAGNOSIS — T50.996A UNDERDOSING OF OTHER DRUGS, MEDICAMENTS AND BIOLOGICAL SUBSTANCES, INITIAL ENCOUNTER: ICD-10-CM
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
## 2023-01-01 DIAGNOSIS — Z86.711 PERSONAL HISTORY OF PULMONARY EMBOLISM: ICD-10-CM

## 2023-01-01 DIAGNOSIS — R50.9 FEVER, UNSPECIFIED: ICD-10-CM

## 2023-01-01 DIAGNOSIS — E44.0 MODERATE PROTEIN-CALORIE MALNUTRITION: ICD-10-CM

## 2023-01-01 DIAGNOSIS — Z91.148 PATIENT'S OTHER NONCOMPLIANCE WITH MEDICATION REGIMEN FOR OTHER REASON: ICD-10-CM

## 2023-01-01 LAB
-  AMIKACIN: SIGNIFICANT CHANGE UP
-  AMIKACIN: SIGNIFICANT CHANGE UP
-  AMOXICILLIN/CLAVULANIC ACID: SIGNIFICANT CHANGE UP
-  AMOXICILLIN/CLAVULANIC ACID: SIGNIFICANT CHANGE UP
-  AMPICILLIN/SULBACTAM: SIGNIFICANT CHANGE UP
-  AMPICILLIN/SULBACTAM: SIGNIFICANT CHANGE UP
-  AMPICILLIN: SIGNIFICANT CHANGE UP
-  AZTREONAM: SIGNIFICANT CHANGE UP
-  AZTREONAM: SIGNIFICANT CHANGE UP
-  CEFAZOLIN: SIGNIFICANT CHANGE UP
-  CEFAZOLIN: SIGNIFICANT CHANGE UP
-  CEFEPIME: SIGNIFICANT CHANGE UP
-  CEFEPIME: SIGNIFICANT CHANGE UP
-  CEFOXITIN: SIGNIFICANT CHANGE UP
-  CEFOXITIN: SIGNIFICANT CHANGE UP
-  CEFTRIAXONE: SIGNIFICANT CHANGE UP
-  CEFTRIAXONE: SIGNIFICANT CHANGE UP
-  CEFUROXIME: SIGNIFICANT CHANGE UP
-  CEFUROXIME: SIGNIFICANT CHANGE UP
-  CIPROFLOXACIN: SIGNIFICANT CHANGE UP
-  ERTAPENEM: SIGNIFICANT CHANGE UP
-  ERTAPENEM: SIGNIFICANT CHANGE UP
-  GENTAMICIN: SIGNIFICANT CHANGE UP
-  GENTAMICIN: SIGNIFICANT CHANGE UP
-  IMIPENEM: SIGNIFICANT CHANGE UP
-  LEVOFLOXACIN: SIGNIFICANT CHANGE UP
-  MEROPENEM: SIGNIFICANT CHANGE UP
-  MEROPENEM: SIGNIFICANT CHANGE UP
-  NITROFURANTOIN: SIGNIFICANT CHANGE UP
-  PIPERACILLIN/TAZOBACTAM: SIGNIFICANT CHANGE UP
-  PIPERACILLIN/TAZOBACTAM: SIGNIFICANT CHANGE UP
-  TETRACYCLINE: SIGNIFICANT CHANGE UP
-  TOBRAMYCIN: SIGNIFICANT CHANGE UP
-  TOBRAMYCIN: SIGNIFICANT CHANGE UP
-  TRIMETHOPRIM/SULFAMETHOXAZOLE: SIGNIFICANT CHANGE UP
-  TRIMETHOPRIM/SULFAMETHOXAZOLE: SIGNIFICANT CHANGE UP
-  VANCOMYCIN: SIGNIFICANT CHANGE UP
A1C WITH ESTIMATED AVERAGE GLUCOSE RESULT: 6.3 % — HIGH (ref 4–5.6)
ALBUMIN SERPL ELPH-MCNC: 1.7 G/DL — LOW (ref 3.3–5)
ALBUMIN SERPL ELPH-MCNC: 1.9 G/DL — LOW (ref 3.3–5)
ALBUMIN SERPL ELPH-MCNC: 2 G/DL — LOW (ref 3.3–5)
ALBUMIN SERPL ELPH-MCNC: 2.3 G/DL — LOW (ref 3.3–5)
ALBUMIN SERPL ELPH-MCNC: 2.4 G/DL — LOW (ref 3.3–5)
ALBUMIN SERPL ELPH-MCNC: 2.5 G/DL — LOW (ref 3.3–5)
ALBUMIN SERPL ELPH-MCNC: 2.7 G/DL — LOW (ref 3.3–5)
ALBUMIN SERPL ELPH-MCNC: 2.9 G/DL — LOW (ref 3.3–5)
ALBUMIN SERPL ELPH-MCNC: 3.1 G/DL — LOW (ref 3.3–5)
ALBUMIN SERPL ELPH-MCNC: 3.1 G/DL — LOW (ref 3.3–5)
ALBUMIN SERPL ELPH-MCNC: 3.5 G/DL — SIGNIFICANT CHANGE UP (ref 3.3–5)
ALBUMIN SERPL ELPH-MCNC: 3.6 G/DL — SIGNIFICANT CHANGE UP (ref 3.3–5)
ALP SERPL-CCNC: 55 U/L — SIGNIFICANT CHANGE UP (ref 40–120)
ALP SERPL-CCNC: 60 U/L — SIGNIFICANT CHANGE UP (ref 40–120)
ALP SERPL-CCNC: 62 U/L — SIGNIFICANT CHANGE UP (ref 40–120)
ALP SERPL-CCNC: 68 U/L — SIGNIFICANT CHANGE UP (ref 40–120)
ALP SERPL-CCNC: 71 U/L — SIGNIFICANT CHANGE UP (ref 40–120)
ALP SERPL-CCNC: 73 U/L — SIGNIFICANT CHANGE UP (ref 40–120)
ALP SERPL-CCNC: 83 U/L — SIGNIFICANT CHANGE UP (ref 40–120)
ALT FLD-CCNC: 14 U/L — SIGNIFICANT CHANGE UP (ref 4–41)
ALT FLD-CCNC: 15 U/L — SIGNIFICANT CHANGE UP (ref 12–78)
ALT FLD-CCNC: 17 U/L — SIGNIFICANT CHANGE UP (ref 4–41)
ALT FLD-CCNC: 18 U/L — SIGNIFICANT CHANGE UP (ref 4–41)
ALT FLD-CCNC: 19 U/L — SIGNIFICANT CHANGE UP (ref 4–41)
ALT FLD-CCNC: 24 U/L — SIGNIFICANT CHANGE UP (ref 12–78)
ALT FLD-CCNC: 28 U/L — SIGNIFICANT CHANGE UP (ref 12–78)
ALT FLD-CCNC: 5 U/L — SIGNIFICANT CHANGE UP (ref 4–41)
ALT FLD-CCNC: 6 U/L — SIGNIFICANT CHANGE UP (ref 4–41)
ANION GAP SERPL CALC-SCNC: 10 MMOL/L — SIGNIFICANT CHANGE UP (ref 7–14)
ANION GAP SERPL CALC-SCNC: 11 MMOL/L — SIGNIFICANT CHANGE UP (ref 7–14)
ANION GAP SERPL CALC-SCNC: 12 MMOL/L — SIGNIFICANT CHANGE UP (ref 5–17)
ANION GAP SERPL CALC-SCNC: 12 MMOL/L — SIGNIFICANT CHANGE UP (ref 7–14)
ANION GAP SERPL CALC-SCNC: 13 MMOL/L — SIGNIFICANT CHANGE UP (ref 7–14)
ANION GAP SERPL CALC-SCNC: 14 MMOL/L — SIGNIFICANT CHANGE UP (ref 7–14)
ANION GAP SERPL CALC-SCNC: 15 MMOL/L — HIGH (ref 7–14)
ANION GAP SERPL CALC-SCNC: 19 MMOL/L — HIGH (ref 7–14)
ANION GAP SERPL CALC-SCNC: 3 MMOL/L — LOW (ref 5–17)
ANION GAP SERPL CALC-SCNC: 5 MMOL/L — SIGNIFICANT CHANGE UP (ref 5–17)
ANION GAP SERPL CALC-SCNC: 6 MMOL/L — SIGNIFICANT CHANGE UP (ref 5–17)
ANION GAP SERPL CALC-SCNC: 7 MMOL/L — SIGNIFICANT CHANGE UP (ref 7–14)
ANION GAP SERPL CALC-SCNC: 8 MMOL/L — SIGNIFICANT CHANGE UP (ref 7–14)
ANION GAP SERPL CALC-SCNC: 9 MMOL/L — SIGNIFICANT CHANGE UP (ref 5–17)
ANION GAP SERPL CALC-SCNC: 9 MMOL/L — SIGNIFICANT CHANGE UP (ref 5–17)
ANION GAP SERPL CALC-SCNC: 9 MMOL/L — SIGNIFICANT CHANGE UP (ref 7–14)
ANISOCYTOSIS BLD QL: SLIGHT — SIGNIFICANT CHANGE UP
ANISOCYTOSIS BLD QL: SLIGHT — SIGNIFICANT CHANGE UP
APPEARANCE UR: ABNORMAL
APPEARANCE UR: CLEAR — SIGNIFICANT CHANGE UP
APTT BLD: 31.6 SEC — SIGNIFICANT CHANGE UP (ref 27.5–35.5)
APTT BLD: 32.6 SEC — SIGNIFICANT CHANGE UP (ref 24.5–35.6)
APTT BLD: 35.9 SEC — HIGH (ref 24.5–35.6)
AST SERPL-CCNC: 22 U/L — SIGNIFICANT CHANGE UP (ref 4–40)
AST SERPL-CCNC: 23 U/L — SIGNIFICANT CHANGE UP (ref 4–40)
AST SERPL-CCNC: 24 U/L — SIGNIFICANT CHANGE UP (ref 15–37)
AST SERPL-CCNC: 26 U/L — SIGNIFICANT CHANGE UP (ref 15–37)
AST SERPL-CCNC: 31 U/L — SIGNIFICANT CHANGE UP (ref 15–37)
AST SERPL-CCNC: 35 U/L — SIGNIFICANT CHANGE UP (ref 4–40)
AST SERPL-CCNC: 36 U/L — SIGNIFICANT CHANGE UP (ref 4–40)
AST SERPL-CCNC: 37 U/L — SIGNIFICANT CHANGE UP (ref 4–40)
AST SERPL-CCNC: 41 U/L — HIGH (ref 4–40)
B PERT DNA SPEC QL NAA+PROBE: SIGNIFICANT CHANGE UP
B PERT+PARAPERT DNA PNL SPEC NAA+PROBE: SIGNIFICANT CHANGE UP
BACTERIA # UR AUTO: ABNORMAL
BACTERIA # UR AUTO: ABNORMAL /HPF
BACTERIA # UR AUTO: ABNORMAL /HPF
BASE EXCESS BLDV CALC-SCNC: 3.7 MMOL/L — HIGH (ref -2–3)
BASE EXCESS BLDV CALC-SCNC: 4.7 MMOL/L — HIGH (ref -2–3)
BASE EXCESS BLDV CALC-SCNC: 5 MMOL/L — HIGH (ref -2–3)
BASOPHILS # BLD AUTO: 0 K/UL — SIGNIFICANT CHANGE UP (ref 0–0.2)
BASOPHILS # BLD AUTO: 0 K/UL — SIGNIFICANT CHANGE UP (ref 0–0.2)
BASOPHILS # BLD AUTO: 0.01 K/UL — SIGNIFICANT CHANGE UP (ref 0–0.2)
BASOPHILS # BLD AUTO: 0.02 K/UL — SIGNIFICANT CHANGE UP (ref 0–0.2)
BASOPHILS # BLD AUTO: 0.03 K/UL — SIGNIFICANT CHANGE UP (ref 0–0.2)
BASOPHILS # BLD AUTO: 0.04 K/UL — SIGNIFICANT CHANGE UP (ref 0–0.2)
BASOPHILS # BLD AUTO: 0.04 K/UL — SIGNIFICANT CHANGE UP (ref 0–0.2)
BASOPHILS # BLD AUTO: 0.07 K/UL — SIGNIFICANT CHANGE UP (ref 0–0.2)
BASOPHILS NFR BLD AUTO: 0 % — SIGNIFICANT CHANGE UP (ref 0–2)
BASOPHILS NFR BLD AUTO: 0 % — SIGNIFICANT CHANGE UP (ref 0–2)
BASOPHILS NFR BLD AUTO: 0.2 % — SIGNIFICANT CHANGE UP (ref 0–2)
BASOPHILS NFR BLD AUTO: 0.3 % — SIGNIFICANT CHANGE UP (ref 0–2)
BASOPHILS NFR BLD AUTO: 0.3 % — SIGNIFICANT CHANGE UP (ref 0–2)
BASOPHILS NFR BLD AUTO: 0.4 % — SIGNIFICANT CHANGE UP (ref 0–2)
BASOPHILS NFR BLD AUTO: 0.5 % — SIGNIFICANT CHANGE UP (ref 0–2)
BASOPHILS NFR BLD AUTO: 0.8 % — SIGNIFICANT CHANGE UP (ref 0–2)
BASOPHILS NFR BLD AUTO: 0.8 % — SIGNIFICANT CHANGE UP (ref 0–2)
BASOPHILS NFR BLD AUTO: 0.9 % — SIGNIFICANT CHANGE UP (ref 0–2)
BILIRUB DIRECT SERPL-MCNC: <0.2 MG/DL — SIGNIFICANT CHANGE UP (ref 0–0.3)
BILIRUB INDIRECT FLD-MCNC: >0.1 MG/DL — SIGNIFICANT CHANGE UP (ref 0–1)
BILIRUB SERPL-MCNC: 0.3 MG/DL — SIGNIFICANT CHANGE UP (ref 0.2–1.2)
BILIRUB SERPL-MCNC: 0.4 MG/DL — SIGNIFICANT CHANGE UP (ref 0.2–1.2)
BILIRUB SERPL-MCNC: 0.5 MG/DL — SIGNIFICANT CHANGE UP (ref 0.2–1.2)
BILIRUB UR-MCNC: ABNORMAL
BILIRUB UR-MCNC: NEGATIVE — SIGNIFICANT CHANGE UP
BLD GP AB SCN SERPL QL: SIGNIFICANT CHANGE UP
BLOOD GAS ARTERIAL COMPREHENSIVE RESULT: SIGNIFICANT CHANGE UP
BLOOD GAS VENOUS COMPREHENSIVE RESULT: SIGNIFICANT CHANGE UP
BLOOD GAS VENOUS COMPREHENSIVE RESULT: SIGNIFICANT CHANGE UP
BORDETELLA PARAPERTUSSIS (RAPRVP): SIGNIFICANT CHANGE UP
BUN SERPL-MCNC: 11 MG/DL — SIGNIFICANT CHANGE UP (ref 7–23)
BUN SERPL-MCNC: 11 MG/DL — SIGNIFICANT CHANGE UP (ref 7–23)
BUN SERPL-MCNC: 14 MG/DL — SIGNIFICANT CHANGE UP (ref 7–23)
BUN SERPL-MCNC: 14 MG/DL — SIGNIFICANT CHANGE UP (ref 7–23)
BUN SERPL-MCNC: 15 MG/DL — SIGNIFICANT CHANGE UP (ref 7–23)
BUN SERPL-MCNC: 16 MG/DL — SIGNIFICANT CHANGE UP (ref 7–23)
BUN SERPL-MCNC: 16 MG/DL — SIGNIFICANT CHANGE UP (ref 7–23)
BUN SERPL-MCNC: 18 MG/DL — SIGNIFICANT CHANGE UP (ref 7–23)
BUN SERPL-MCNC: 18 MG/DL — SIGNIFICANT CHANGE UP (ref 7–23)
BUN SERPL-MCNC: 2 MG/DL — LOW (ref 7–23)
BUN SERPL-MCNC: 21 MG/DL — SIGNIFICANT CHANGE UP (ref 7–23)
BUN SERPL-MCNC: 24 MG/DL — HIGH (ref 7–23)
BUN SERPL-MCNC: 26 MG/DL — HIGH (ref 7–23)
BUN SERPL-MCNC: 28 MG/DL — HIGH (ref 7–23)
BUN SERPL-MCNC: 29 MG/DL — HIGH (ref 7–23)
BUN SERPL-MCNC: 3 MG/DL — LOW (ref 7–23)
BUN SERPL-MCNC: 3 MG/DL — LOW (ref 7–23)
BUN SERPL-MCNC: 30 MG/DL — HIGH (ref 7–23)
BUN SERPL-MCNC: 30 MG/DL — HIGH (ref 7–23)
BUN SERPL-MCNC: 31 MG/DL — HIGH (ref 7–23)
BUN SERPL-MCNC: 32 MG/DL — HIGH (ref 7–23)
BUN SERPL-MCNC: 33 MG/DL — HIGH (ref 7–23)
BUN SERPL-MCNC: 33 MG/DL — HIGH (ref 7–23)
BUN SERPL-MCNC: 35 MG/DL — HIGH (ref 7–23)
BUN SERPL-MCNC: 37 MG/DL — HIGH (ref 7–23)
BUN SERPL-MCNC: 37 MG/DL — HIGH (ref 7–23)
BUN SERPL-MCNC: 38 MG/DL — HIGH (ref 7–23)
BUN SERPL-MCNC: 4 MG/DL — LOW (ref 7–23)
BUN SERPL-MCNC: 45 MG/DL — HIGH (ref 7–23)
BUN SERPL-MCNC: 5 MG/DL — LOW (ref 7–23)
BUN SERPL-MCNC: 50 MG/DL — HIGH (ref 7–23)
BUN SERPL-MCNC: 52 MG/DL — HIGH (ref 7–23)
BUN SERPL-MCNC: 53 MG/DL — HIGH (ref 7–23)
BUN SERPL-MCNC: 56 MG/DL — HIGH (ref 7–23)
BUN SERPL-MCNC: 6 MG/DL — LOW (ref 7–23)
BUN SERPL-MCNC: 64 MG/DL — HIGH (ref 7–23)
BUN SERPL-MCNC: 65 MG/DL — HIGH (ref 7–23)
BUN SERPL-MCNC: 7 MG/DL — SIGNIFICANT CHANGE UP (ref 7–23)
BUN SERPL-MCNC: 8 MG/DL — SIGNIFICANT CHANGE UP (ref 7–23)
BUN SERPL-MCNC: 8 MG/DL — SIGNIFICANT CHANGE UP (ref 7–23)
BUN SERPL-MCNC: <2 MG/DL — LOW (ref 7–23)
C PNEUM DNA SPEC QL NAA+PROBE: SIGNIFICANT CHANGE UP
CALCIUM SERPL-MCNC: 7.3 MG/DL — LOW (ref 8.4–10.5)
CALCIUM SERPL-MCNC: 7.4 MG/DL — LOW (ref 8.4–10.5)
CALCIUM SERPL-MCNC: 7.5 MG/DL — LOW (ref 8.4–10.5)
CALCIUM SERPL-MCNC: 7.5 MG/DL — LOW (ref 8.4–10.5)
CALCIUM SERPL-MCNC: 7.6 MG/DL — LOW (ref 8.4–10.5)
CALCIUM SERPL-MCNC: 7.7 MG/DL — LOW (ref 8.4–10.5)
CALCIUM SERPL-MCNC: 7.8 MG/DL — LOW (ref 8.4–10.5)
CALCIUM SERPL-MCNC: 7.9 MG/DL — LOW (ref 8.4–10.5)
CALCIUM SERPL-MCNC: 8 MG/DL — LOW (ref 8.4–10.5)
CALCIUM SERPL-MCNC: 8.1 MG/DL — LOW (ref 8.4–10.5)
CALCIUM SERPL-MCNC: 8.1 MG/DL — LOW (ref 8.5–10.1)
CALCIUM SERPL-MCNC: 8.2 MG/DL — LOW (ref 8.4–10.5)
CALCIUM SERPL-MCNC: 8.3 MG/DL — LOW (ref 8.4–10.5)
CALCIUM SERPL-MCNC: 8.4 MG/DL — SIGNIFICANT CHANGE UP (ref 8.4–10.5)
CALCIUM SERPL-MCNC: 8.7 MG/DL — SIGNIFICANT CHANGE UP (ref 8.4–10.5)
CALCIUM SERPL-MCNC: 8.7 MG/DL — SIGNIFICANT CHANGE UP (ref 8.5–10.1)
CALCIUM SERPL-MCNC: 8.8 MG/DL — SIGNIFICANT CHANGE UP (ref 8.4–10.5)
CALCIUM SERPL-MCNC: 8.8 MG/DL — SIGNIFICANT CHANGE UP (ref 8.5–10.1)
CALCIUM SERPL-MCNC: 9 MG/DL — SIGNIFICANT CHANGE UP (ref 8.4–10.5)
CALCIUM SERPL-MCNC: 9 MG/DL — SIGNIFICANT CHANGE UP (ref 8.4–10.5)
CALCIUM SERPL-MCNC: 9.1 MG/DL — SIGNIFICANT CHANGE UP (ref 8.4–10.5)
CALCIUM SERPL-MCNC: 9.1 MG/DL — SIGNIFICANT CHANGE UP (ref 8.5–10.1)
CALCIUM SERPL-MCNC: 9.2 MG/DL — SIGNIFICANT CHANGE UP (ref 8.5–10.1)
CALCIUM SERPL-MCNC: 9.5 MG/DL — SIGNIFICANT CHANGE UP (ref 8.4–10.5)
CALCIUM SERPL-MCNC: 9.5 MG/DL — SIGNIFICANT CHANGE UP (ref 8.5–10.1)
CALCIUM SERPL-MCNC: 9.7 MG/DL — SIGNIFICANT CHANGE UP (ref 8.4–10.5)
CAST: 4 /LPF — SIGNIFICANT CHANGE UP (ref 0–4)
CHLORIDE BLDV-SCNC: 125 MMOL/L — HIGH (ref 96–108)
CHLORIDE BLDV-SCNC: 126 MMOL/L — HIGH (ref 96–108)
CHLORIDE SERPL-SCNC: 100 MMOL/L — SIGNIFICANT CHANGE UP (ref 98–107)
CHLORIDE SERPL-SCNC: 101 MMOL/L — SIGNIFICANT CHANGE UP (ref 98–107)
CHLORIDE SERPL-SCNC: 102 MMOL/L — SIGNIFICANT CHANGE UP (ref 98–107)
CHLORIDE SERPL-SCNC: 103 MMOL/L — SIGNIFICANT CHANGE UP (ref 98–107)
CHLORIDE SERPL-SCNC: 103 MMOL/L — SIGNIFICANT CHANGE UP (ref 98–107)
CHLORIDE SERPL-SCNC: 104 MMOL/L — SIGNIFICANT CHANGE UP (ref 96–108)
CHLORIDE SERPL-SCNC: 104 MMOL/L — SIGNIFICANT CHANGE UP (ref 98–107)
CHLORIDE SERPL-SCNC: 105 MMOL/L — SIGNIFICANT CHANGE UP (ref 96–108)
CHLORIDE SERPL-SCNC: 105 MMOL/L — SIGNIFICANT CHANGE UP (ref 98–107)
CHLORIDE SERPL-SCNC: 106 MMOL/L — SIGNIFICANT CHANGE UP (ref 96–108)
CHLORIDE SERPL-SCNC: 106 MMOL/L — SIGNIFICANT CHANGE UP (ref 96–108)
CHLORIDE SERPL-SCNC: 106 MMOL/L — SIGNIFICANT CHANGE UP (ref 98–107)
CHLORIDE SERPL-SCNC: 107 MMOL/L — SIGNIFICANT CHANGE UP (ref 96–108)
CHLORIDE SERPL-SCNC: 107 MMOL/L — SIGNIFICANT CHANGE UP (ref 98–107)
CHLORIDE SERPL-SCNC: 108 MMOL/L — HIGH (ref 98–107)
CHLORIDE SERPL-SCNC: 110 MMOL/L — HIGH (ref 96–108)
CHLORIDE SERPL-SCNC: 110 MMOL/L — HIGH (ref 98–107)
CHLORIDE SERPL-SCNC: 110 MMOL/L — HIGH (ref 98–107)
CHLORIDE SERPL-SCNC: 111 MMOL/L — HIGH (ref 96–108)
CHLORIDE SERPL-SCNC: 111 MMOL/L — HIGH (ref 98–107)
CHLORIDE SERPL-SCNC: 111 MMOL/L — HIGH (ref 98–107)
CHLORIDE SERPL-SCNC: 112 MMOL/L — HIGH (ref 98–107)
CHLORIDE SERPL-SCNC: 113 MMOL/L — HIGH (ref 98–107)
CHLORIDE SERPL-SCNC: 117 MMOL/L — HIGH (ref 98–107)
CHLORIDE SERPL-SCNC: 118 MMOL/L — HIGH (ref 98–107)
CHLORIDE SERPL-SCNC: 119 MMOL/L — HIGH (ref 98–107)
CHLORIDE SERPL-SCNC: 123 MMOL/L — HIGH (ref 98–107)
CHLORIDE SERPL-SCNC: 123 MMOL/L — HIGH (ref 98–107)
CHLORIDE SERPL-SCNC: 124 MMOL/L — HIGH (ref 98–107)
CHLORIDE SERPL-SCNC: 125 MMOL/L — HIGH (ref 98–107)
CHLORIDE SERPL-SCNC: 125 MMOL/L — HIGH (ref 98–107)
CHLORIDE SERPL-SCNC: 126 MMOL/L — HIGH (ref 98–107)
CHLORIDE SERPL-SCNC: 126 MMOL/L — HIGH (ref 98–107)
CHLORIDE SERPL-SCNC: 96 MMOL/L — LOW (ref 98–107)
CHLORIDE SERPL-SCNC: 97 MMOL/L — LOW (ref 98–107)
CHLORIDE SERPL-SCNC: 98 MMOL/L — SIGNIFICANT CHANGE UP (ref 96–108)
CHLORIDE SERPL-SCNC: 98 MMOL/L — SIGNIFICANT CHANGE UP (ref 98–107)
CHLORIDE SERPL-SCNC: 98 MMOL/L — SIGNIFICANT CHANGE UP (ref 98–107)
CHOLEST SERPL-MCNC: 117 MG/DL — SIGNIFICANT CHANGE UP
CK SERPL-CCNC: 273 U/L — HIGH (ref 30–200)
CK SERPL-CCNC: 485 U/L — HIGH (ref 30–200)
CK SERPL-CCNC: 661 U/L — HIGH (ref 30–200)
CO2 BLDV-SCNC: 31.7 MMOL/L — HIGH (ref 22–26)
CO2 BLDV-SCNC: 32.9 MMOL/L — HIGH (ref 22–26)
CO2 BLDV-SCNC: 33.5 MMOL/L — HIGH (ref 22–26)
CO2 SERPL-SCNC: 19 MMOL/L — LOW (ref 22–31)
CO2 SERPL-SCNC: 20 MMOL/L — LOW (ref 22–31)
CO2 SERPL-SCNC: 21 MMOL/L — LOW (ref 22–31)
CO2 SERPL-SCNC: 21 MMOL/L — LOW (ref 22–31)
CO2 SERPL-SCNC: 22 MMOL/L — SIGNIFICANT CHANGE UP (ref 22–31)
CO2 SERPL-SCNC: 23 MMOL/L — SIGNIFICANT CHANGE UP (ref 22–31)
CO2 SERPL-SCNC: 24 MMOL/L — SIGNIFICANT CHANGE UP (ref 22–31)
CO2 SERPL-SCNC: 25 MMOL/L — SIGNIFICANT CHANGE UP (ref 22–31)
CO2 SERPL-SCNC: 26 MMOL/L — SIGNIFICANT CHANGE UP (ref 22–31)
CO2 SERPL-SCNC: 27 MMOL/L — SIGNIFICANT CHANGE UP (ref 22–31)
CO2 SERPL-SCNC: 28 MMOL/L — SIGNIFICANT CHANGE UP (ref 22–31)
CO2 SERPL-SCNC: 29 MMOL/L — SIGNIFICANT CHANGE UP (ref 22–31)
CO2 SERPL-SCNC: 31 MMOL/L — SIGNIFICANT CHANGE UP (ref 22–31)
CO2 SERPL-SCNC: 32 MMOL/L — HIGH (ref 22–31)
COLOR SPEC: SIGNIFICANT CHANGE UP
COLOR SPEC: SIGNIFICANT CHANGE UP
COLOR SPEC: YELLOW — SIGNIFICANT CHANGE UP
COLOR SPEC: YELLOW — SIGNIFICANT CHANGE UP
CREAT ?TM UR-MCNC: 184 MG/DL — SIGNIFICANT CHANGE UP
CREAT SERPL-MCNC: 0.55 MG/DL — SIGNIFICANT CHANGE UP (ref 0.5–1.3)
CREAT SERPL-MCNC: 0.58 MG/DL — SIGNIFICANT CHANGE UP (ref 0.5–1.3)
CREAT SERPL-MCNC: 0.59 MG/DL — SIGNIFICANT CHANGE UP (ref 0.5–1.3)
CREAT SERPL-MCNC: 0.6 MG/DL — SIGNIFICANT CHANGE UP (ref 0.5–1.3)
CREAT SERPL-MCNC: 0.61 MG/DL — SIGNIFICANT CHANGE UP (ref 0.5–1.3)
CREAT SERPL-MCNC: 0.62 MG/DL — SIGNIFICANT CHANGE UP (ref 0.5–1.3)
CREAT SERPL-MCNC: 0.63 MG/DL — SIGNIFICANT CHANGE UP (ref 0.5–1.3)
CREAT SERPL-MCNC: 0.64 MG/DL — SIGNIFICANT CHANGE UP (ref 0.5–1.3)
CREAT SERPL-MCNC: 0.65 MG/DL — SIGNIFICANT CHANGE UP (ref 0.5–1.3)
CREAT SERPL-MCNC: 0.68 MG/DL — SIGNIFICANT CHANGE UP (ref 0.5–1.3)
CREAT SERPL-MCNC: 0.69 MG/DL — SIGNIFICANT CHANGE UP (ref 0.5–1.3)
CREAT SERPL-MCNC: 0.7 MG/DL — SIGNIFICANT CHANGE UP (ref 0.5–1.3)
CREAT SERPL-MCNC: 0.7 MG/DL — SIGNIFICANT CHANGE UP (ref 0.5–1.3)
CREAT SERPL-MCNC: 0.73 MG/DL — SIGNIFICANT CHANGE UP (ref 0.5–1.3)
CREAT SERPL-MCNC: 0.75 MG/DL — SIGNIFICANT CHANGE UP (ref 0.5–1.3)
CREAT SERPL-MCNC: 0.8 MG/DL — SIGNIFICANT CHANGE UP (ref 0.5–1.3)
CREAT SERPL-MCNC: 0.8 MG/DL — SIGNIFICANT CHANGE UP (ref 0.5–1.3)
CREAT SERPL-MCNC: 0.82 MG/DL — SIGNIFICANT CHANGE UP (ref 0.5–1.3)
CREAT SERPL-MCNC: 0.82 MG/DL — SIGNIFICANT CHANGE UP (ref 0.5–1.3)
CREAT SERPL-MCNC: 0.83 MG/DL — SIGNIFICANT CHANGE UP (ref 0.5–1.3)
CREAT SERPL-MCNC: 0.85 MG/DL — SIGNIFICANT CHANGE UP (ref 0.5–1.3)
CREAT SERPL-MCNC: 0.86 MG/DL — SIGNIFICANT CHANGE UP (ref 0.5–1.3)
CREAT SERPL-MCNC: 0.86 MG/DL — SIGNIFICANT CHANGE UP (ref 0.5–1.3)
CREAT SERPL-MCNC: 0.87 MG/DL — SIGNIFICANT CHANGE UP (ref 0.5–1.3)
CREAT SERPL-MCNC: 0.88 MG/DL — SIGNIFICANT CHANGE UP (ref 0.5–1.3)
CREAT SERPL-MCNC: 0.93 MG/DL — SIGNIFICANT CHANGE UP (ref 0.5–1.3)
CREAT SERPL-MCNC: 0.95 MG/DL — SIGNIFICANT CHANGE UP (ref 0.5–1.3)
CREAT SERPL-MCNC: 1 MG/DL — SIGNIFICANT CHANGE UP (ref 0.5–1.3)
CREAT SERPL-MCNC: 1 MG/DL — SIGNIFICANT CHANGE UP (ref 0.5–1.3)
CREAT SERPL-MCNC: 1.01 MG/DL — SIGNIFICANT CHANGE UP (ref 0.5–1.3)
CREAT SERPL-MCNC: 1.08 MG/DL — SIGNIFICANT CHANGE UP (ref 0.5–1.3)
CREAT SERPL-MCNC: 1.11 MG/DL — SIGNIFICANT CHANGE UP (ref 0.5–1.3)
CREAT SERPL-MCNC: 1.16 MG/DL — SIGNIFICANT CHANGE UP (ref 0.5–1.3)
CREAT SERPL-MCNC: 1.18 MG/DL — SIGNIFICANT CHANGE UP (ref 0.5–1.3)
CREAT SERPL-MCNC: 1.2 MG/DL — SIGNIFICANT CHANGE UP (ref 0.5–1.3)
CREAT SERPL-MCNC: 1.21 MG/DL — SIGNIFICANT CHANGE UP (ref 0.5–1.3)
CREAT SERPL-MCNC: 1.23 MG/DL — SIGNIFICANT CHANGE UP (ref 0.5–1.3)
CREAT SERPL-MCNC: 1.27 MG/DL — SIGNIFICANT CHANGE UP (ref 0.5–1.3)
CREAT SERPL-MCNC: 1.28 MG/DL — SIGNIFICANT CHANGE UP (ref 0.5–1.3)
CREAT SERPL-MCNC: 1.33 MG/DL — HIGH (ref 0.5–1.3)
CREAT SERPL-MCNC: 1.42 MG/DL — HIGH (ref 0.5–1.3)
CREAT SERPL-MCNC: 1.49 MG/DL — HIGH (ref 0.5–1.3)
CREAT SERPL-MCNC: 1.51 MG/DL — HIGH (ref 0.5–1.3)
CREAT SERPL-MCNC: 1.54 MG/DL — HIGH (ref 0.5–1.3)
CREAT SERPL-MCNC: 1.55 MG/DL — HIGH (ref 0.5–1.3)
CREAT SERPL-MCNC: 1.67 MG/DL — HIGH (ref 0.5–1.3)
CULTURE RESULTS: NO GROWTH — SIGNIFICANT CHANGE UP
CULTURE RESULTS: SIGNIFICANT CHANGE UP
DIFF PNL FLD: ABNORMAL
DIFF PNL FLD: NEGATIVE — SIGNIFICANT CHANGE UP
EGFR: 100 ML/MIN/1.73M2 — SIGNIFICANT CHANGE UP
EGFR: 101 ML/MIN/1.73M2 — SIGNIFICANT CHANGE UP
EGFR: 102 ML/MIN/1.73M2 — SIGNIFICANT CHANGE UP
EGFR: 103 ML/MIN/1.73M2 — SIGNIFICANT CHANGE UP
EGFR: 105 ML/MIN/1.73M2 — SIGNIFICANT CHANGE UP
EGFR: 43 ML/MIN/1.73M2 — LOW
EGFR: 47 ML/MIN/1.73M2 — LOW
EGFR: 47 ML/MIN/1.73M2 — LOW
EGFR: 48 ML/MIN/1.73M2 — LOW
EGFR: 49 ML/MIN/1.73M2 — LOW
EGFR: 52 ML/MIN/1.73M2 — LOW
EGFR: 56 ML/MIN/1.73M2 — LOW
EGFR: 59 ML/MIN/1.73M2 — LOW
EGFR: 60 ML/MIN/1.73M2 — SIGNIFICANT CHANGE UP
EGFR: 62 ML/MIN/1.73M2 — SIGNIFICANT CHANGE UP
EGFR: 63 ML/MIN/1.73M2 — SIGNIFICANT CHANGE UP
EGFR: 64 ML/MIN/1.73M2 — SIGNIFICANT CHANGE UP
EGFR: 65 ML/MIN/1.73M2 — SIGNIFICANT CHANGE UP
EGFR: 67 ML/MIN/1.73M2 — SIGNIFICANT CHANGE UP
EGFR: 70 ML/MIN/1.73M2 — SIGNIFICANT CHANGE UP
EGFR: 72 ML/MIN/1.73M2 — SIGNIFICANT CHANGE UP
EGFR: 79 ML/MIN/1.73M2 — SIGNIFICANT CHANGE UP
EGFR: 85 ML/MIN/1.73M2 — SIGNIFICANT CHANGE UP
EGFR: 87 ML/MIN/1.73M2 — SIGNIFICANT CHANGE UP
EGFR: 91 ML/MIN/1.73M2 — SIGNIFICANT CHANGE UP
EGFR: 92 ML/MIN/1.73M2 — SIGNIFICANT CHANGE UP
EGFR: 92 ML/MIN/1.73M2 — SIGNIFICANT CHANGE UP
EGFR: 93 ML/MIN/1.73M2 — SIGNIFICANT CHANGE UP
EGFR: 95 ML/MIN/1.73M2 — SIGNIFICANT CHANGE UP
EGFR: 96 ML/MIN/1.73M2 — SIGNIFICANT CHANGE UP
EGFR: 97 ML/MIN/1.73M2 — SIGNIFICANT CHANGE UP
EGFR: 97 ML/MIN/1.73M2 — SIGNIFICANT CHANGE UP
EGFR: 98 ML/MIN/1.73M2 — SIGNIFICANT CHANGE UP
EGFR: 98 ML/MIN/1.73M2 — SIGNIFICANT CHANGE UP
EGFR: 99 ML/MIN/1.73M2 — SIGNIFICANT CHANGE UP
EOSINOPHIL # BLD AUTO: 0 K/UL — SIGNIFICANT CHANGE UP (ref 0–0.5)
EOSINOPHIL # BLD AUTO: 0.01 K/UL — SIGNIFICANT CHANGE UP (ref 0–0.5)
EOSINOPHIL # BLD AUTO: 0.03 K/UL — SIGNIFICANT CHANGE UP (ref 0–0.5)
EOSINOPHIL # BLD AUTO: 0.04 K/UL — SIGNIFICANT CHANGE UP (ref 0–0.5)
EOSINOPHIL # BLD AUTO: 0.05 K/UL — SIGNIFICANT CHANGE UP (ref 0–0.5)
EOSINOPHIL # BLD AUTO: 0.05 K/UL — SIGNIFICANT CHANGE UP (ref 0–0.5)
EOSINOPHIL # BLD AUTO: 0.08 K/UL — SIGNIFICANT CHANGE UP (ref 0–0.5)
EOSINOPHIL # BLD AUTO: 0.1 K/UL — SIGNIFICANT CHANGE UP (ref 0–0.5)
EOSINOPHIL # BLD AUTO: 0.13 K/UL — SIGNIFICANT CHANGE UP (ref 0–0.5)
EOSINOPHIL # BLD AUTO: 0.14 K/UL — SIGNIFICANT CHANGE UP (ref 0–0.5)
EOSINOPHIL # BLD AUTO: 0.19 K/UL — SIGNIFICANT CHANGE UP (ref 0–0.5)
EOSINOPHIL NFR BLD AUTO: 0 % — SIGNIFICANT CHANGE UP (ref 0–6)
EOSINOPHIL NFR BLD AUTO: 0.1 % — SIGNIFICANT CHANGE UP (ref 0–6)
EOSINOPHIL NFR BLD AUTO: 0.1 % — SIGNIFICANT CHANGE UP (ref 0–6)
EOSINOPHIL NFR BLD AUTO: 0.2 % — SIGNIFICANT CHANGE UP (ref 0–6)
EOSINOPHIL NFR BLD AUTO: 0.2 % — SIGNIFICANT CHANGE UP (ref 0–6)
EOSINOPHIL NFR BLD AUTO: 0.3 % — SIGNIFICANT CHANGE UP (ref 0–6)
EOSINOPHIL NFR BLD AUTO: 0.8 % — SIGNIFICANT CHANGE UP (ref 0–6)
EOSINOPHIL NFR BLD AUTO: 0.8 % — SIGNIFICANT CHANGE UP (ref 0–6)
EOSINOPHIL NFR BLD AUTO: 0.9 % — SIGNIFICANT CHANGE UP (ref 0–6)
EOSINOPHIL NFR BLD AUTO: 0.9 % — SIGNIFICANT CHANGE UP (ref 0–6)
EOSINOPHIL NFR BLD AUTO: 1 % — SIGNIFICANT CHANGE UP (ref 0–6)
EOSINOPHIL NFR BLD AUTO: 1.2 % — SIGNIFICANT CHANGE UP (ref 0–6)
EOSINOPHIL NFR BLD AUTO: 1.2 % — SIGNIFICANT CHANGE UP (ref 0–6)
EOSINOPHIL NFR BLD AUTO: 1.3 % — SIGNIFICANT CHANGE UP (ref 0–6)
EOSINOPHIL NFR BLD AUTO: 2 % — SIGNIFICANT CHANGE UP (ref 0–6)
EOSINOPHIL NFR BLD AUTO: 2.1 % — SIGNIFICANT CHANGE UP (ref 0–6)
EOSINOPHIL NFR BLD AUTO: 3.6 % — SIGNIFICANT CHANGE UP (ref 0–6)
EPI CELLS # UR: SIGNIFICANT CHANGE UP
EPI CELLS # UR: SIGNIFICANT CHANGE UP
ESTIMATED AVERAGE GLUCOSE: 134 MG/DL — HIGH (ref 68–114)
FLUAV AG NPH QL: SIGNIFICANT CHANGE UP
FLUAV AG NPH QL: SIGNIFICANT CHANGE UP
FLUAV SUBTYP SPEC NAA+PROBE: SIGNIFICANT CHANGE UP
FLUBV AG NPH QL: SIGNIFICANT CHANGE UP
FLUBV AG NPH QL: SIGNIFICANT CHANGE UP
FLUBV RNA SPEC QL NAA+PROBE: SIGNIFICANT CHANGE UP
GAS PNL BLDV: 160 MMOL/L — CRITICAL HIGH (ref 136–145)
GAS PNL BLDV: 163 MMOL/L — CRITICAL HIGH (ref 136–145)
GAS PNL BLDV: SIGNIFICANT CHANGE UP
GAS PNL BLDV: SIGNIFICANT CHANGE UP
GIANT PLATELETS BLD QL SMEAR: PRESENT — SIGNIFICANT CHANGE UP
GIANT PLATELETS BLD QL SMEAR: PRESENT — SIGNIFICANT CHANGE UP
GLUCOSE BLDC GLUCOMTR-MCNC: 100 MG/DL — HIGH (ref 70–99)
GLUCOSE BLDC GLUCOMTR-MCNC: 101 MG/DL — HIGH (ref 70–99)
GLUCOSE BLDC GLUCOMTR-MCNC: 101 MG/DL — HIGH (ref 70–99)
GLUCOSE BLDC GLUCOMTR-MCNC: 104 MG/DL — HIGH (ref 70–99)
GLUCOSE BLDC GLUCOMTR-MCNC: 105 MG/DL — HIGH (ref 70–99)
GLUCOSE BLDC GLUCOMTR-MCNC: 106 MG/DL — HIGH (ref 70–99)
GLUCOSE BLDC GLUCOMTR-MCNC: 107 MG/DL — HIGH (ref 70–99)
GLUCOSE BLDC GLUCOMTR-MCNC: 107 MG/DL — HIGH (ref 70–99)
GLUCOSE BLDC GLUCOMTR-MCNC: 108 MG/DL — HIGH (ref 70–99)
GLUCOSE BLDC GLUCOMTR-MCNC: 108 MG/DL — HIGH (ref 70–99)
GLUCOSE BLDC GLUCOMTR-MCNC: 109 MG/DL — HIGH (ref 70–99)
GLUCOSE BLDC GLUCOMTR-MCNC: 109 MG/DL — HIGH (ref 70–99)
GLUCOSE BLDC GLUCOMTR-MCNC: 110 MG/DL — HIGH (ref 70–99)
GLUCOSE BLDC GLUCOMTR-MCNC: 111 MG/DL — HIGH (ref 70–99)
GLUCOSE BLDC GLUCOMTR-MCNC: 112 MG/DL — HIGH (ref 70–99)
GLUCOSE BLDC GLUCOMTR-MCNC: 113 MG/DL — HIGH (ref 70–99)
GLUCOSE BLDC GLUCOMTR-MCNC: 113 MG/DL — HIGH (ref 70–99)
GLUCOSE BLDC GLUCOMTR-MCNC: 114 MG/DL — HIGH (ref 70–99)
GLUCOSE BLDC GLUCOMTR-MCNC: 115 MG/DL — HIGH (ref 70–99)
GLUCOSE BLDC GLUCOMTR-MCNC: 115 MG/DL — HIGH (ref 70–99)
GLUCOSE BLDC GLUCOMTR-MCNC: 116 MG/DL — HIGH (ref 70–99)
GLUCOSE BLDC GLUCOMTR-MCNC: 116 MG/DL — HIGH (ref 70–99)
GLUCOSE BLDC GLUCOMTR-MCNC: 117 MG/DL — HIGH (ref 70–99)
GLUCOSE BLDC GLUCOMTR-MCNC: 118 MG/DL — HIGH (ref 70–99)
GLUCOSE BLDC GLUCOMTR-MCNC: 119 MG/DL — HIGH (ref 70–99)
GLUCOSE BLDC GLUCOMTR-MCNC: 121 MG/DL — HIGH (ref 70–99)
GLUCOSE BLDC GLUCOMTR-MCNC: 121 MG/DL — HIGH (ref 70–99)
GLUCOSE BLDC GLUCOMTR-MCNC: 122 MG/DL — HIGH (ref 70–99)
GLUCOSE BLDC GLUCOMTR-MCNC: 123 MG/DL — HIGH (ref 70–99)
GLUCOSE BLDC GLUCOMTR-MCNC: 124 MG/DL — HIGH (ref 70–99)
GLUCOSE BLDC GLUCOMTR-MCNC: 125 MG/DL — HIGH (ref 70–99)
GLUCOSE BLDC GLUCOMTR-MCNC: 126 MG/DL — HIGH (ref 70–99)
GLUCOSE BLDC GLUCOMTR-MCNC: 127 MG/DL — HIGH (ref 70–99)
GLUCOSE BLDC GLUCOMTR-MCNC: 129 MG/DL — HIGH (ref 70–99)
GLUCOSE BLDC GLUCOMTR-MCNC: 129 MG/DL — HIGH (ref 70–99)
GLUCOSE BLDC GLUCOMTR-MCNC: 130 MG/DL — HIGH (ref 70–99)
GLUCOSE BLDC GLUCOMTR-MCNC: 130 MG/DL — HIGH (ref 70–99)
GLUCOSE BLDC GLUCOMTR-MCNC: 131 MG/DL — HIGH (ref 70–99)
GLUCOSE BLDC GLUCOMTR-MCNC: 131 MG/DL — HIGH (ref 70–99)
GLUCOSE BLDC GLUCOMTR-MCNC: 132 MG/DL — HIGH (ref 70–99)
GLUCOSE BLDC GLUCOMTR-MCNC: 133 MG/DL — HIGH (ref 70–99)
GLUCOSE BLDC GLUCOMTR-MCNC: 139 MG/DL — HIGH (ref 70–99)
GLUCOSE BLDC GLUCOMTR-MCNC: 140 MG/DL — HIGH (ref 70–99)
GLUCOSE BLDC GLUCOMTR-MCNC: 143 MG/DL — HIGH (ref 70–99)
GLUCOSE BLDC GLUCOMTR-MCNC: 143 MG/DL — HIGH (ref 70–99)
GLUCOSE BLDC GLUCOMTR-MCNC: 145 MG/DL — HIGH (ref 70–99)
GLUCOSE BLDC GLUCOMTR-MCNC: 148 MG/DL — HIGH (ref 70–99)
GLUCOSE BLDC GLUCOMTR-MCNC: 149 MG/DL — HIGH (ref 70–99)
GLUCOSE BLDC GLUCOMTR-MCNC: 151 MG/DL — HIGH (ref 70–99)
GLUCOSE BLDC GLUCOMTR-MCNC: 155 MG/DL — HIGH (ref 70–99)
GLUCOSE BLDC GLUCOMTR-MCNC: 156 MG/DL — HIGH (ref 70–99)
GLUCOSE BLDC GLUCOMTR-MCNC: 161 MG/DL — HIGH (ref 70–99)
GLUCOSE BLDC GLUCOMTR-MCNC: 166 MG/DL — HIGH (ref 70–99)
GLUCOSE BLDC GLUCOMTR-MCNC: 176 MG/DL — HIGH (ref 70–99)
GLUCOSE BLDC GLUCOMTR-MCNC: 202 MG/DL — HIGH (ref 70–99)
GLUCOSE BLDC GLUCOMTR-MCNC: 204 MG/DL — HIGH (ref 70–99)
GLUCOSE BLDC GLUCOMTR-MCNC: 59 MG/DL — LOW (ref 70–99)
GLUCOSE BLDC GLUCOMTR-MCNC: 60 MG/DL — LOW (ref 70–99)
GLUCOSE BLDC GLUCOMTR-MCNC: 62 MG/DL — LOW (ref 70–99)
GLUCOSE BLDC GLUCOMTR-MCNC: 62 MG/DL — LOW (ref 70–99)
GLUCOSE BLDC GLUCOMTR-MCNC: 63 MG/DL — LOW (ref 70–99)
GLUCOSE BLDC GLUCOMTR-MCNC: 64 MG/DL — LOW (ref 70–99)
GLUCOSE BLDC GLUCOMTR-MCNC: 66 MG/DL — LOW (ref 70–99)
GLUCOSE BLDC GLUCOMTR-MCNC: 67 MG/DL — LOW (ref 70–99)
GLUCOSE BLDC GLUCOMTR-MCNC: 67 MG/DL — LOW (ref 70–99)
GLUCOSE BLDC GLUCOMTR-MCNC: 68 MG/DL — LOW (ref 70–99)
GLUCOSE BLDC GLUCOMTR-MCNC: 68 MG/DL — LOW (ref 70–99)
GLUCOSE BLDC GLUCOMTR-MCNC: 70 MG/DL — SIGNIFICANT CHANGE UP (ref 70–99)
GLUCOSE BLDC GLUCOMTR-MCNC: 71 MG/DL — SIGNIFICANT CHANGE UP (ref 70–99)
GLUCOSE BLDC GLUCOMTR-MCNC: 72 MG/DL — SIGNIFICANT CHANGE UP (ref 70–99)
GLUCOSE BLDC GLUCOMTR-MCNC: 73 MG/DL — SIGNIFICANT CHANGE UP (ref 70–99)
GLUCOSE BLDC GLUCOMTR-MCNC: 74 MG/DL — SIGNIFICANT CHANGE UP (ref 70–99)
GLUCOSE BLDC GLUCOMTR-MCNC: 77 MG/DL — SIGNIFICANT CHANGE UP (ref 70–99)
GLUCOSE BLDC GLUCOMTR-MCNC: 77 MG/DL — SIGNIFICANT CHANGE UP (ref 70–99)
GLUCOSE BLDC GLUCOMTR-MCNC: 80 MG/DL — SIGNIFICANT CHANGE UP (ref 70–99)
GLUCOSE BLDC GLUCOMTR-MCNC: 82 MG/DL — SIGNIFICANT CHANGE UP (ref 70–99)
GLUCOSE BLDC GLUCOMTR-MCNC: 82 MG/DL — SIGNIFICANT CHANGE UP (ref 70–99)
GLUCOSE BLDC GLUCOMTR-MCNC: 85 MG/DL — SIGNIFICANT CHANGE UP (ref 70–99)
GLUCOSE BLDC GLUCOMTR-MCNC: 86 MG/DL — SIGNIFICANT CHANGE UP (ref 70–99)
GLUCOSE BLDC GLUCOMTR-MCNC: 87 MG/DL — SIGNIFICANT CHANGE UP (ref 70–99)
GLUCOSE BLDC GLUCOMTR-MCNC: 88 MG/DL — SIGNIFICANT CHANGE UP (ref 70–99)
GLUCOSE BLDC GLUCOMTR-MCNC: 89 MG/DL — SIGNIFICANT CHANGE UP (ref 70–99)
GLUCOSE BLDC GLUCOMTR-MCNC: 90 MG/DL — SIGNIFICANT CHANGE UP (ref 70–99)
GLUCOSE BLDC GLUCOMTR-MCNC: 91 MG/DL — SIGNIFICANT CHANGE UP (ref 70–99)
GLUCOSE BLDC GLUCOMTR-MCNC: 92 MG/DL — SIGNIFICANT CHANGE UP (ref 70–99)
GLUCOSE BLDC GLUCOMTR-MCNC: 93 MG/DL — SIGNIFICANT CHANGE UP (ref 70–99)
GLUCOSE BLDC GLUCOMTR-MCNC: 94 MG/DL — SIGNIFICANT CHANGE UP (ref 70–99)
GLUCOSE BLDC GLUCOMTR-MCNC: 95 MG/DL — SIGNIFICANT CHANGE UP (ref 70–99)
GLUCOSE BLDC GLUCOMTR-MCNC: 96 MG/DL — SIGNIFICANT CHANGE UP (ref 70–99)
GLUCOSE BLDC GLUCOMTR-MCNC: 97 MG/DL — SIGNIFICANT CHANGE UP (ref 70–99)
GLUCOSE BLDC GLUCOMTR-MCNC: 98 MG/DL — SIGNIFICANT CHANGE UP (ref 70–99)
GLUCOSE BLDC GLUCOMTR-MCNC: 99 MG/DL — SIGNIFICANT CHANGE UP (ref 70–99)
GLUCOSE BLDV-MCNC: 130 MG/DL — HIGH (ref 70–99)
GLUCOSE BLDV-MCNC: 160 MG/DL — HIGH (ref 70–99)
GLUCOSE SERPL-MCNC: 104 MG/DL — HIGH (ref 70–99)
GLUCOSE SERPL-MCNC: 112 MG/DL — HIGH (ref 70–99)
GLUCOSE SERPL-MCNC: 112 MG/DL — HIGH (ref 70–99)
GLUCOSE SERPL-MCNC: 113 MG/DL — HIGH (ref 70–99)
GLUCOSE SERPL-MCNC: 115 MG/DL — HIGH (ref 70–99)
GLUCOSE SERPL-MCNC: 116 MG/DL — HIGH (ref 70–99)
GLUCOSE SERPL-MCNC: 117 MG/DL — HIGH (ref 70–99)
GLUCOSE SERPL-MCNC: 118 MG/DL — HIGH (ref 70–99)
GLUCOSE SERPL-MCNC: 122 MG/DL — HIGH (ref 70–99)
GLUCOSE SERPL-MCNC: 122 MG/DL — HIGH (ref 70–99)
GLUCOSE SERPL-MCNC: 123 MG/DL — HIGH (ref 70–99)
GLUCOSE SERPL-MCNC: 124 MG/DL — HIGH (ref 70–99)
GLUCOSE SERPL-MCNC: 124 MG/DL — HIGH (ref 70–99)
GLUCOSE SERPL-MCNC: 125 MG/DL — HIGH (ref 70–99)
GLUCOSE SERPL-MCNC: 128 MG/DL — HIGH (ref 70–99)
GLUCOSE SERPL-MCNC: 130 MG/DL — HIGH (ref 70–99)
GLUCOSE SERPL-MCNC: 131 MG/DL — HIGH (ref 70–99)
GLUCOSE SERPL-MCNC: 135 MG/DL — HIGH (ref 70–99)
GLUCOSE SERPL-MCNC: 135 MG/DL — HIGH (ref 70–99)
GLUCOSE SERPL-MCNC: 137 MG/DL — HIGH (ref 70–99)
GLUCOSE SERPL-MCNC: 145 MG/DL — HIGH (ref 70–99)
GLUCOSE SERPL-MCNC: 147 MG/DL — HIGH (ref 70–99)
GLUCOSE SERPL-MCNC: 149 MG/DL — HIGH (ref 70–99)
GLUCOSE SERPL-MCNC: 151 MG/DL — HIGH (ref 70–99)
GLUCOSE SERPL-MCNC: 153 MG/DL — HIGH (ref 70–99)
GLUCOSE SERPL-MCNC: 160 MG/DL — HIGH (ref 70–99)
GLUCOSE SERPL-MCNC: 163 MG/DL — HIGH (ref 70–99)
GLUCOSE SERPL-MCNC: 170 MG/DL — HIGH (ref 70–99)
GLUCOSE SERPL-MCNC: 171 MG/DL — HIGH (ref 70–99)
GLUCOSE SERPL-MCNC: 180 MG/DL — HIGH (ref 70–99)
GLUCOSE SERPL-MCNC: 181 MG/DL — HIGH (ref 70–99)
GLUCOSE SERPL-MCNC: 183 MG/DL — HIGH (ref 70–99)
GLUCOSE SERPL-MCNC: 236 MG/DL — HIGH (ref 70–99)
GLUCOSE SERPL-MCNC: 50 MG/DL — CRITICAL LOW (ref 70–99)
GLUCOSE SERPL-MCNC: 66 MG/DL — LOW (ref 70–99)
GLUCOSE SERPL-MCNC: 69 MG/DL — LOW (ref 70–99)
GLUCOSE SERPL-MCNC: 70 MG/DL — SIGNIFICANT CHANGE UP (ref 70–99)
GLUCOSE SERPL-MCNC: 73 MG/DL — SIGNIFICANT CHANGE UP (ref 70–99)
GLUCOSE SERPL-MCNC: 74 MG/DL — SIGNIFICANT CHANGE UP (ref 70–99)
GLUCOSE SERPL-MCNC: 78 MG/DL — SIGNIFICANT CHANGE UP (ref 70–99)
GLUCOSE SERPL-MCNC: 79 MG/DL — SIGNIFICANT CHANGE UP (ref 70–99)
GLUCOSE SERPL-MCNC: 79 MG/DL — SIGNIFICANT CHANGE UP (ref 70–99)
GLUCOSE SERPL-MCNC: 81 MG/DL — SIGNIFICANT CHANGE UP (ref 70–99)
GLUCOSE SERPL-MCNC: 81 MG/DL — SIGNIFICANT CHANGE UP (ref 70–99)
GLUCOSE SERPL-MCNC: 84 MG/DL — SIGNIFICANT CHANGE UP (ref 70–99)
GLUCOSE SERPL-MCNC: 85 MG/DL — SIGNIFICANT CHANGE UP (ref 70–99)
GLUCOSE SERPL-MCNC: 87 MG/DL — SIGNIFICANT CHANGE UP (ref 70–99)
GLUCOSE SERPL-MCNC: 87 MG/DL — SIGNIFICANT CHANGE UP (ref 70–99)
GLUCOSE SERPL-MCNC: 90 MG/DL — SIGNIFICANT CHANGE UP (ref 70–99)
GLUCOSE SERPL-MCNC: 92 MG/DL — SIGNIFICANT CHANGE UP (ref 70–99)
GLUCOSE SERPL-MCNC: 93 MG/DL — SIGNIFICANT CHANGE UP (ref 70–99)
GLUCOSE SERPL-MCNC: 93 MG/DL — SIGNIFICANT CHANGE UP (ref 70–99)
GLUCOSE SERPL-MCNC: 95 MG/DL — SIGNIFICANT CHANGE UP (ref 70–99)
GLUCOSE SERPL-MCNC: 96 MG/DL — SIGNIFICANT CHANGE UP (ref 70–99)
GLUCOSE SERPL-MCNC: 97 MG/DL — SIGNIFICANT CHANGE UP (ref 70–99)
GLUCOSE SERPL-MCNC: 97 MG/DL — SIGNIFICANT CHANGE UP (ref 70–99)
GLUCOSE SERPL-MCNC: 98 MG/DL — SIGNIFICANT CHANGE UP (ref 70–99)
GLUCOSE UR QL: NEGATIVE MG/DL — SIGNIFICANT CHANGE UP
HADV DNA SPEC QL NAA+PROBE: SIGNIFICANT CHANGE UP
HCO3 BLDV-SCNC: 30 MMOL/L — HIGH (ref 22–29)
HCO3 BLDV-SCNC: 31 MMOL/L — HIGH (ref 22–29)
HCO3 BLDV-SCNC: 32 MMOL/L — HIGH (ref 22–29)
HCOV 229E RNA SPEC QL NAA+PROBE: SIGNIFICANT CHANGE UP
HCOV HKU1 RNA SPEC QL NAA+PROBE: SIGNIFICANT CHANGE UP
HCOV NL63 RNA SPEC QL NAA+PROBE: SIGNIFICANT CHANGE UP
HCOV OC43 RNA SPEC QL NAA+PROBE: SIGNIFICANT CHANGE UP
HCT VFR BLD CALC: 26 % — LOW (ref 39–50)
HCT VFR BLD CALC: 26.5 % — LOW (ref 39–50)
HCT VFR BLD CALC: 27.3 % — LOW (ref 39–50)
HCT VFR BLD CALC: 27.7 % — LOW (ref 39–50)
HCT VFR BLD CALC: 28.2 % — LOW (ref 39–50)
HCT VFR BLD CALC: 28.3 % — LOW (ref 39–50)
HCT VFR BLD CALC: 28.7 % — LOW (ref 39–50)
HCT VFR BLD CALC: 28.8 % — LOW (ref 39–50)
HCT VFR BLD CALC: 29.3 % — LOW (ref 39–50)
HCT VFR BLD CALC: 29.4 % — LOW (ref 39–50)
HCT VFR BLD CALC: 29.4 % — LOW (ref 39–50)
HCT VFR BLD CALC: 29.5 % — LOW (ref 39–50)
HCT VFR BLD CALC: 29.7 % — LOW (ref 39–50)
HCT VFR BLD CALC: 29.9 % — LOW (ref 39–50)
HCT VFR BLD CALC: 29.9 % — LOW (ref 39–50)
HCT VFR BLD CALC: 30.1 % — LOW (ref 39–50)
HCT VFR BLD CALC: 30.2 % — LOW (ref 39–50)
HCT VFR BLD CALC: 30.2 % — LOW (ref 39–50)
HCT VFR BLD CALC: 30.3 % — LOW (ref 39–50)
HCT VFR BLD CALC: 30.5 % — LOW (ref 39–50)
HCT VFR BLD CALC: 30.8 % — LOW (ref 39–50)
HCT VFR BLD CALC: 30.8 % — LOW (ref 39–50)
HCT VFR BLD CALC: 31.4 % — LOW (ref 39–50)
HCT VFR BLD CALC: 31.7 % — LOW (ref 39–50)
HCT VFR BLD CALC: 32 % — LOW (ref 39–50)
HCT VFR BLD CALC: 32 % — LOW (ref 39–50)
HCT VFR BLD CALC: 32.2 % — LOW (ref 39–50)
HCT VFR BLD CALC: 32.6 % — LOW (ref 39–50)
HCT VFR BLD CALC: 33.1 % — LOW (ref 39–50)
HCT VFR BLD CALC: 33.2 % — LOW (ref 39–50)
HCT VFR BLD CALC: 33.3 % — LOW (ref 39–50)
HCT VFR BLD CALC: 34.8 % — LOW (ref 39–50)
HCT VFR BLD CALC: 34.9 % — LOW (ref 39–50)
HCT VFR BLD CALC: 35.4 % — LOW (ref 39–50)
HCT VFR BLD CALC: 35.4 % — LOW (ref 39–50)
HCT VFR BLD CALC: 36.1 % — LOW (ref 39–50)
HCT VFR BLD CALC: 37 % — LOW (ref 39–50)
HCT VFR BLD CALC: 37 % — LOW (ref 39–50)
HCT VFR BLD CALC: 38.3 % — LOW (ref 39–50)
HCT VFR BLD CALC: 38.6 % — LOW (ref 39–50)
HCT VFR BLD CALC: 39.2 % — SIGNIFICANT CHANGE UP (ref 39–50)
HCT VFR BLD CALC: 39.4 % — SIGNIFICANT CHANGE UP (ref 39–50)
HCT VFR BLD CALC: 42.2 % — SIGNIFICANT CHANGE UP (ref 39–50)
HCT VFR BLD CALC: 43.6 % — SIGNIFICANT CHANGE UP (ref 39–50)
HCT VFR BLD CALC: 44.3 % — SIGNIFICANT CHANGE UP (ref 39–50)
HCT VFR BLD CALC: 44.5 % — SIGNIFICANT CHANGE UP (ref 39–50)
HCT VFR BLD CALC: 51.9 % — HIGH (ref 39–50)
HCT VFR BLDA CALC: 36 % — LOW (ref 39–51)
HCT VFR BLDA CALC: 38 % — LOW (ref 39–51)
HDLC SERPL-MCNC: 48 MG/DL — SIGNIFICANT CHANGE UP
HGB BLD CALC-MCNC: 12.1 G/DL — LOW (ref 12.6–17.4)
HGB BLD CALC-MCNC: 12.5 G/DL — LOW (ref 12.6–17.4)
HGB BLD-MCNC: 10 G/DL — LOW (ref 13–17)
HGB BLD-MCNC: 10.1 G/DL — LOW (ref 13–17)
HGB BLD-MCNC: 10.2 G/DL — LOW (ref 13–17)
HGB BLD-MCNC: 10.3 G/DL — LOW (ref 13–17)
HGB BLD-MCNC: 10.3 G/DL — LOW (ref 13–17)
HGB BLD-MCNC: 10.4 G/DL — LOW (ref 13–17)
HGB BLD-MCNC: 10.5 G/DL — LOW (ref 13–17)
HGB BLD-MCNC: 10.7 G/DL — LOW (ref 13–17)
HGB BLD-MCNC: 10.8 G/DL — LOW (ref 13–17)
HGB BLD-MCNC: 11 G/DL — LOW (ref 13–17)
HGB BLD-MCNC: 11.2 G/DL — LOW (ref 13–17)
HGB BLD-MCNC: 11.4 G/DL — LOW (ref 13–17)
HGB BLD-MCNC: 11.8 G/DL — LOW (ref 13–17)
HGB BLD-MCNC: 11.9 G/DL — LOW (ref 13–17)
HGB BLD-MCNC: 12.2 G/DL — LOW (ref 13–17)
HGB BLD-MCNC: 12.3 G/DL — LOW (ref 13–17)
HGB BLD-MCNC: 12.5 G/DL — LOW (ref 13–17)
HGB BLD-MCNC: 12.7 G/DL — LOW (ref 13–17)
HGB BLD-MCNC: 13.2 G/DL — SIGNIFICANT CHANGE UP (ref 13–17)
HGB BLD-MCNC: 13.7 G/DL — SIGNIFICANT CHANGE UP (ref 13–17)
HGB BLD-MCNC: 13.8 G/DL — SIGNIFICANT CHANGE UP (ref 13–17)
HGB BLD-MCNC: 14.5 G/DL — SIGNIFICANT CHANGE UP (ref 13–17)
HGB BLD-MCNC: 15 G/DL — SIGNIFICANT CHANGE UP (ref 13–17)
HGB BLD-MCNC: 15.2 G/DL — SIGNIFICANT CHANGE UP (ref 13–17)
HGB BLD-MCNC: 8.7 G/DL — LOW (ref 13–17)
HGB BLD-MCNC: 9.2 G/DL — LOW (ref 13–17)
HGB BLD-MCNC: 9.3 G/DL — LOW (ref 13–17)
HGB BLD-MCNC: 9.4 G/DL — LOW (ref 13–17)
HGB BLD-MCNC: 9.5 G/DL — LOW (ref 13–17)
HGB BLD-MCNC: 9.6 G/DL — LOW (ref 13–17)
HGB BLD-MCNC: 9.7 G/DL — LOW (ref 13–17)
HGB BLD-MCNC: 9.7 G/DL — LOW (ref 13–17)
HGB BLD-MCNC: 9.8 G/DL — LOW (ref 13–17)
HGB BLD-MCNC: 9.9 G/DL — LOW (ref 13–17)
HMPV RNA SPEC QL NAA+PROBE: SIGNIFICANT CHANGE UP
HPIV1 RNA SPEC QL NAA+PROBE: SIGNIFICANT CHANGE UP
HPIV2 RNA SPEC QL NAA+PROBE: SIGNIFICANT CHANGE UP
HPIV3 RNA SPEC QL NAA+PROBE: SIGNIFICANT CHANGE UP
HPIV4 RNA SPEC QL NAA+PROBE: SIGNIFICANT CHANGE UP
HYALINE CASTS # UR AUTO: SIGNIFICANT CHANGE UP
IANC: 0.94 K/UL — LOW (ref 1.8–7.4)
IANC: 1.4 K/UL — LOW (ref 1.8–7.4)
IANC: 1.62 K/UL — LOW (ref 1.8–7.4)
IANC: 1.62 K/UL — LOW (ref 1.8–7.4)
IANC: 1.88 K/UL — SIGNIFICANT CHANGE UP (ref 1.8–7.4)
IANC: 1.97 K/UL — SIGNIFICANT CHANGE UP (ref 1.8–7.4)
IANC: 2.3 K/UL — SIGNIFICANT CHANGE UP (ref 1.8–7.4)
IANC: 2.4 K/UL — SIGNIFICANT CHANGE UP (ref 1.8–7.4)
IANC: 2.71 K/UL — SIGNIFICANT CHANGE UP (ref 1.8–7.4)
IANC: 2.74 K/UL — SIGNIFICANT CHANGE UP (ref 1.8–7.4)
IANC: 2.76 K/UL — SIGNIFICANT CHANGE UP (ref 1.8–7.4)
IANC: 3.22 K/UL — SIGNIFICANT CHANGE UP (ref 1.8–7.4)
IANC: 3.45 K/UL — SIGNIFICANT CHANGE UP (ref 1.8–7.4)
IANC: 4.59 K/UL — SIGNIFICANT CHANGE UP (ref 1.8–7.4)
IANC: 5.1 K/UL — SIGNIFICANT CHANGE UP (ref 1.8–7.4)
IANC: 6.29 K/UL — SIGNIFICANT CHANGE UP (ref 1.8–7.4)
IANC: 7.29 K/UL — SIGNIFICANT CHANGE UP (ref 1.8–7.4)
IANC: 7.85 K/UL — HIGH (ref 1.8–7.4)
IMM GRANULOCYTES NFR BLD AUTO: 0.2 % — SIGNIFICANT CHANGE UP (ref 0–0.9)
IMM GRANULOCYTES NFR BLD AUTO: 0.3 % — SIGNIFICANT CHANGE UP (ref 0–0.9)
IMM GRANULOCYTES NFR BLD AUTO: 0.4 % — SIGNIFICANT CHANGE UP (ref 0–0.9)
IMM GRANULOCYTES NFR BLD AUTO: 0.5 % — SIGNIFICANT CHANGE UP (ref 0–0.9)
IMM GRANULOCYTES NFR BLD AUTO: 0.6 % — SIGNIFICANT CHANGE UP (ref 0–0.9)
IMM GRANULOCYTES NFR BLD AUTO: 0.6 % — SIGNIFICANT CHANGE UP (ref 0–0.9)
IMM GRANULOCYTES NFR BLD AUTO: 0.7 % — SIGNIFICANT CHANGE UP (ref 0–0.9)
IMM GRANULOCYTES NFR BLD AUTO: 0.7 % — SIGNIFICANT CHANGE UP (ref 0–0.9)
IMM GRANULOCYTES NFR BLD AUTO: 0.8 % — SIGNIFICANT CHANGE UP (ref 0–0.9)
IMM GRANULOCYTES NFR BLD AUTO: 0.8 % — SIGNIFICANT CHANGE UP (ref 0–0.9)
IMM GRANULOCYTES NFR BLD AUTO: 1 % — HIGH (ref 0–0.9)
IMM GRANULOCYTES NFR BLD AUTO: 1 % — HIGH (ref 0–0.9)
IMM GRANULOCYTES NFR BLD AUTO: 2.2 % — HIGH (ref 0–0.9)
IMM GRANULOCYTES NFR BLD AUTO: 3.2 % — HIGH (ref 0–0.9)
IMM GRANULOCYTES NFR BLD AUTO: 4.3 % — HIGH (ref 0–0.9)
IMM GRANULOCYTES NFR BLD AUTO: 4.6 % — HIGH (ref 0–0.9)
IMM GRANULOCYTES NFR BLD AUTO: 5.4 % — HIGH (ref 0–0.9)
INR BLD: 1.08 RATIO — SIGNIFICANT CHANGE UP (ref 0.88–1.16)
INR BLD: 1.38 RATIO — HIGH (ref 0.85–1.18)
INR BLD: 1.43 RATIO — HIGH (ref 0.85–1.18)
KETONES UR-MCNC: ABNORMAL
KETONES UR-MCNC: ABNORMAL
KETONES UR-MCNC: ABNORMAL MG/DL
KETONES UR-MCNC: ABNORMAL MG/DL
LACTATE BLDV-MCNC: 2.5 MMOL/L — HIGH (ref 0.5–2)
LACTATE BLDV-MCNC: 3 MMOL/L — HIGH (ref 0.5–2)
LACTATE SERPL-SCNC: 2 MMOL/L — SIGNIFICANT CHANGE UP (ref 0.7–2)
LACTATE SERPL-SCNC: 6.9 MMOL/L — CRITICAL HIGH (ref 0.5–2)
LEUKOCYTE ESTERASE UR-ACNC: ABNORMAL
LEUKOCYTE ESTERASE UR-ACNC: NEGATIVE — SIGNIFICANT CHANGE UP
LEVETIRACETAM SERPL-MCNC: 11.8 UG/ML — SIGNIFICANT CHANGE UP (ref 10–40)
LEVETIRACETAM SERPL-MCNC: 16 UG/ML — SIGNIFICANT CHANGE UP (ref 10–40)
LEVETIRACETAM SERPL-MCNC: 17 UG/ML — SIGNIFICANT CHANGE UP (ref 10–40)
LEVETIRACETAM SERPL-MCNC: 24.3 UG/ML — SIGNIFICANT CHANGE UP (ref 10–40)
LEVETIRACETAM SERPL-MCNC: 31.4 UG/ML — SIGNIFICANT CHANGE UP (ref 10–40)
LEVETIRACETAM SERPL-MCNC: <2 UG/ML — LOW (ref 10–40)
LIPID PNL WITH DIRECT LDL SERPL: 52 MG/DL — SIGNIFICANT CHANGE UP
LYMPHOCYTES # BLD AUTO: 0.86 K/UL — LOW (ref 1–3.3)
LYMPHOCYTES # BLD AUTO: 1.14 K/UL — SIGNIFICANT CHANGE UP (ref 1–3.3)
LYMPHOCYTES # BLD AUTO: 1.51 K/UL — SIGNIFICANT CHANGE UP (ref 1–3.3)
LYMPHOCYTES # BLD AUTO: 1.61 K/UL — SIGNIFICANT CHANGE UP (ref 1–3.3)
LYMPHOCYTES # BLD AUTO: 1.69 K/UL — SIGNIFICANT CHANGE UP (ref 1–3.3)
LYMPHOCYTES # BLD AUTO: 1.77 K/UL — SIGNIFICANT CHANGE UP (ref 1–3.3)
LYMPHOCYTES # BLD AUTO: 1.91 K/UL — SIGNIFICANT CHANGE UP (ref 1–3.3)
LYMPHOCYTES # BLD AUTO: 1.93 K/UL — SIGNIFICANT CHANGE UP (ref 1–3.3)
LYMPHOCYTES # BLD AUTO: 15.2 % — SIGNIFICANT CHANGE UP (ref 13–44)
LYMPHOCYTES # BLD AUTO: 18 % — SIGNIFICANT CHANGE UP (ref 13–44)
LYMPHOCYTES # BLD AUTO: 2.06 K/UL — SIGNIFICANT CHANGE UP (ref 1–3.3)
LYMPHOCYTES # BLD AUTO: 2.08 K/UL — SIGNIFICANT CHANGE UP (ref 1–3.3)
LYMPHOCYTES # BLD AUTO: 2.09 K/UL — SIGNIFICANT CHANGE UP (ref 1–3.3)
LYMPHOCYTES # BLD AUTO: 2.11 K/UL — SIGNIFICANT CHANGE UP (ref 1–3.3)
LYMPHOCYTES # BLD AUTO: 2.18 K/UL — SIGNIFICANT CHANGE UP (ref 1–3.3)
LYMPHOCYTES # BLD AUTO: 2.22 K/UL — SIGNIFICANT CHANGE UP (ref 1–3.3)
LYMPHOCYTES # BLD AUTO: 2.23 K/UL — SIGNIFICANT CHANGE UP (ref 1–3.3)
LYMPHOCYTES # BLD AUTO: 2.25 K/UL — SIGNIFICANT CHANGE UP (ref 1–3.3)
LYMPHOCYTES # BLD AUTO: 2.37 K/UL — SIGNIFICANT CHANGE UP (ref 1–3.3)
LYMPHOCYTES # BLD AUTO: 2.4 K/UL — SIGNIFICANT CHANGE UP (ref 1–3.3)
LYMPHOCYTES # BLD AUTO: 2.45 K/UL — SIGNIFICANT CHANGE UP (ref 1–3.3)
LYMPHOCYTES # BLD AUTO: 2.91 K/UL — SIGNIFICANT CHANGE UP (ref 1–3.3)
LYMPHOCYTES # BLD AUTO: 20.2 % — SIGNIFICANT CHANGE UP (ref 13–44)
LYMPHOCYTES # BLD AUTO: 22.7 % — SIGNIFICANT CHANGE UP (ref 13–44)
LYMPHOCYTES # BLD AUTO: 22.7 % — SIGNIFICANT CHANGE UP (ref 13–44)
LYMPHOCYTES # BLD AUTO: 25.4 % — SIGNIFICANT CHANGE UP (ref 13–44)
LYMPHOCYTES # BLD AUTO: 26.5 % — SIGNIFICANT CHANGE UP (ref 13–44)
LYMPHOCYTES # BLD AUTO: 26.6 % — SIGNIFICANT CHANGE UP (ref 13–44)
LYMPHOCYTES # BLD AUTO: 29.5 % — SIGNIFICANT CHANGE UP (ref 13–44)
LYMPHOCYTES # BLD AUTO: 3.02 K/UL — SIGNIFICANT CHANGE UP (ref 1–3.3)
LYMPHOCYTES # BLD AUTO: 3.08 K/UL — SIGNIFICANT CHANGE UP (ref 1–3.3)
LYMPHOCYTES # BLD AUTO: 31.5 % — SIGNIFICANT CHANGE UP (ref 13–44)
LYMPHOCYTES # BLD AUTO: 34.5 % — SIGNIFICANT CHANGE UP (ref 13–44)
LYMPHOCYTES # BLD AUTO: 35.2 % — SIGNIFICANT CHANGE UP (ref 13–44)
LYMPHOCYTES # BLD AUTO: 36.3 % — SIGNIFICANT CHANGE UP (ref 13–44)
LYMPHOCYTES # BLD AUTO: 40.6 % — SIGNIFICANT CHANGE UP (ref 13–44)
LYMPHOCYTES # BLD AUTO: 41 % — SIGNIFICANT CHANGE UP (ref 13–44)
LYMPHOCYTES # BLD AUTO: 41.3 % — SIGNIFICANT CHANGE UP (ref 13–44)
LYMPHOCYTES # BLD AUTO: 43.9 % — SIGNIFICANT CHANGE UP (ref 13–44)
LYMPHOCYTES # BLD AUTO: 44.1 % — HIGH (ref 13–44)
LYMPHOCYTES # BLD AUTO: 46.3 % — HIGH (ref 13–44)
LYMPHOCYTES # BLD AUTO: 50.1 % — HIGH (ref 13–44)
LYMPHOCYTES # BLD AUTO: 54.7 % — HIGH (ref 13–44)
LYMPHOCYTES # BLD AUTO: 67.2 % — HIGH (ref 13–44)
M PNEUMO DNA SPEC QL NAA+PROBE: SIGNIFICANT CHANGE UP
MACROCYTES BLD QL: SLIGHT — SIGNIFICANT CHANGE UP
MAGNESIUM SERPL-MCNC: 1.4 MG/DL — LOW (ref 1.6–2.6)
MAGNESIUM SERPL-MCNC: 1.5 MG/DL — LOW (ref 1.6–2.6)
MAGNESIUM SERPL-MCNC: 1.6 MG/DL — SIGNIFICANT CHANGE UP (ref 1.6–2.6)
MAGNESIUM SERPL-MCNC: 1.7 MG/DL — SIGNIFICANT CHANGE UP (ref 1.6–2.6)
MAGNESIUM SERPL-MCNC: 1.8 MG/DL — SIGNIFICANT CHANGE UP (ref 1.6–2.6)
MAGNESIUM SERPL-MCNC: 1.9 MG/DL — SIGNIFICANT CHANGE UP (ref 1.6–2.6)
MAGNESIUM SERPL-MCNC: 2 MG/DL — SIGNIFICANT CHANGE UP (ref 1.6–2.6)
MAGNESIUM SERPL-MCNC: 2 MG/DL — SIGNIFICANT CHANGE UP (ref 1.6–2.6)
MAGNESIUM SERPL-MCNC: 2.1 MG/DL — SIGNIFICANT CHANGE UP (ref 1.6–2.6)
MAGNESIUM SERPL-MCNC: 2.2 MG/DL — SIGNIFICANT CHANGE UP (ref 1.6–2.6)
MAGNESIUM SERPL-MCNC: 2.2 MG/DL — SIGNIFICANT CHANGE UP (ref 1.6–2.6)
MAGNESIUM SERPL-MCNC: 2.3 MG/DL — SIGNIFICANT CHANGE UP (ref 1.6–2.6)
MAGNESIUM SERPL-MCNC: 2.4 MG/DL — SIGNIFICANT CHANGE UP (ref 1.6–2.6)
MAGNESIUM SERPL-MCNC: 2.4 MG/DL — SIGNIFICANT CHANGE UP (ref 1.6–2.6)
MAGNESIUM SERPL-MCNC: 2.5 MG/DL — SIGNIFICANT CHANGE UP (ref 1.6–2.6)
MANUAL SMEAR VERIFICATION: SIGNIFICANT CHANGE UP
MCHC RBC-ENTMCNC: 29.3 GM/DL — LOW (ref 32–36)
MCHC RBC-ENTMCNC: 29.7 PG — SIGNIFICANT CHANGE UP (ref 27–34)
MCHC RBC-ENTMCNC: 29.8 PG — SIGNIFICANT CHANGE UP (ref 27–34)
MCHC RBC-ENTMCNC: 30 PG — SIGNIFICANT CHANGE UP (ref 27–34)
MCHC RBC-ENTMCNC: 30.1 PG — SIGNIFICANT CHANGE UP (ref 27–34)
MCHC RBC-ENTMCNC: 30.4 GM/DL — LOW (ref 32–36)
MCHC RBC-ENTMCNC: 30.4 PG — SIGNIFICANT CHANGE UP (ref 27–34)
MCHC RBC-ENTMCNC: 30.4 PG — SIGNIFICANT CHANGE UP (ref 27–34)
MCHC RBC-ENTMCNC: 30.5 PG — SIGNIFICANT CHANGE UP (ref 27–34)
MCHC RBC-ENTMCNC: 30.6 PG — SIGNIFICANT CHANGE UP (ref 27–34)
MCHC RBC-ENTMCNC: 30.6 PG — SIGNIFICANT CHANGE UP (ref 27–34)
MCHC RBC-ENTMCNC: 30.7 PG — SIGNIFICANT CHANGE UP (ref 27–34)
MCHC RBC-ENTMCNC: 30.8 PG — SIGNIFICANT CHANGE UP (ref 27–34)
MCHC RBC-ENTMCNC: 30.9 GM/DL — LOW (ref 32–36)
MCHC RBC-ENTMCNC: 30.9 GM/DL — LOW (ref 32–36)
MCHC RBC-ENTMCNC: 30.9 PG — SIGNIFICANT CHANGE UP (ref 27–34)
MCHC RBC-ENTMCNC: 31 PG — SIGNIFICANT CHANGE UP (ref 27–34)
MCHC RBC-ENTMCNC: 31 PG — SIGNIFICANT CHANGE UP (ref 27–34)
MCHC RBC-ENTMCNC: 31.1 PG — SIGNIFICANT CHANGE UP (ref 27–34)
MCHC RBC-ENTMCNC: 31.2 PG — SIGNIFICANT CHANGE UP (ref 27–34)
MCHC RBC-ENTMCNC: 31.2 PG — SIGNIFICANT CHANGE UP (ref 27–34)
MCHC RBC-ENTMCNC: 31.3 PG — SIGNIFICANT CHANGE UP (ref 27–34)
MCHC RBC-ENTMCNC: 31.4 PG — SIGNIFICANT CHANGE UP (ref 27–34)
MCHC RBC-ENTMCNC: 31.4 PG — SIGNIFICANT CHANGE UP (ref 27–34)
MCHC RBC-ENTMCNC: 31.6 PG — SIGNIFICANT CHANGE UP (ref 27–34)
MCHC RBC-ENTMCNC: 31.7 PG — SIGNIFICANT CHANGE UP (ref 27–34)
MCHC RBC-ENTMCNC: 31.9 PG — SIGNIFICANT CHANGE UP (ref 27–34)
MCHC RBC-ENTMCNC: 31.9 PG — SIGNIFICANT CHANGE UP (ref 27–34)
MCHC RBC-ENTMCNC: 32 PG — SIGNIFICANT CHANGE UP (ref 27–34)
MCHC RBC-ENTMCNC: 32 PG — SIGNIFICANT CHANGE UP (ref 27–34)
MCHC RBC-ENTMCNC: 32.1 GM/DL — SIGNIFICANT CHANGE UP (ref 32–36)
MCHC RBC-ENTMCNC: 32.3 GM/DL — SIGNIFICANT CHANGE UP (ref 32–36)
MCHC RBC-ENTMCNC: 32.6 GM/DL — SIGNIFICANT CHANGE UP (ref 32–36)
MCHC RBC-ENTMCNC: 32.6 GM/DL — SIGNIFICANT CHANGE UP (ref 32–36)
MCHC RBC-ENTMCNC: 32.7 G/DL — SIGNIFICANT CHANGE UP (ref 32–36)
MCHC RBC-ENTMCNC: 32.8 G/DL — SIGNIFICANT CHANGE UP (ref 32–36)
MCHC RBC-ENTMCNC: 32.8 G/DL — SIGNIFICANT CHANGE UP (ref 32–36)
MCHC RBC-ENTMCNC: 32.8 GM/DL — SIGNIFICANT CHANGE UP (ref 32–36)
MCHC RBC-ENTMCNC: 32.9 GM/DL — SIGNIFICANT CHANGE UP (ref 32–36)
MCHC RBC-ENTMCNC: 33 G/DL — SIGNIFICANT CHANGE UP (ref 32–36)
MCHC RBC-ENTMCNC: 33 G/DL — SIGNIFICANT CHANGE UP (ref 32–36)
MCHC RBC-ENTMCNC: 33 GM/DL — SIGNIFICANT CHANGE UP (ref 32–36)
MCHC RBC-ENTMCNC: 33.1 G/DL — SIGNIFICANT CHANGE UP (ref 32–36)
MCHC RBC-ENTMCNC: 33.1 GM/DL — SIGNIFICANT CHANGE UP (ref 32–36)
MCHC RBC-ENTMCNC: 33.1 GM/DL — SIGNIFICANT CHANGE UP (ref 32–36)
MCHC RBC-ENTMCNC: 33.2 GM/DL — SIGNIFICANT CHANGE UP (ref 32–36)
MCHC RBC-ENTMCNC: 33.3 GM/DL — SIGNIFICANT CHANGE UP (ref 32–36)
MCHC RBC-ENTMCNC: 33.3 GM/DL — SIGNIFICANT CHANGE UP (ref 32–36)
MCHC RBC-ENTMCNC: 33.4 GM/DL — SIGNIFICANT CHANGE UP (ref 32–36)
MCHC RBC-ENTMCNC: 33.5 G/DL — SIGNIFICANT CHANGE UP (ref 32–36)
MCHC RBC-ENTMCNC: 33.5 GM/DL — SIGNIFICANT CHANGE UP (ref 32–36)
MCHC RBC-ENTMCNC: 33.6 G/DL — SIGNIFICANT CHANGE UP (ref 32–36)
MCHC RBC-ENTMCNC: 33.7 GM/DL — SIGNIFICANT CHANGE UP (ref 32–36)
MCHC RBC-ENTMCNC: 33.8 GM/DL — SIGNIFICANT CHANGE UP (ref 32–36)
MCHC RBC-ENTMCNC: 33.9 GM/DL — SIGNIFICANT CHANGE UP (ref 32–36)
MCHC RBC-ENTMCNC: 34 GM/DL — SIGNIFICANT CHANGE UP (ref 32–36)
MCHC RBC-ENTMCNC: 34.1 GM/DL — SIGNIFICANT CHANGE UP (ref 32–36)
MCHC RBC-ENTMCNC: 34.2 GM/DL — SIGNIFICANT CHANGE UP (ref 32–36)
MCHC RBC-ENTMCNC: 34.4 GM/DL — SIGNIFICANT CHANGE UP (ref 32–36)
MCHC RBC-ENTMCNC: 34.8 GM/DL — SIGNIFICANT CHANGE UP (ref 32–36)
MCHC RBC-ENTMCNC: 35.1 GM/DL — SIGNIFICANT CHANGE UP (ref 32–36)
MCHC RBC-ENTMCNC: 35.4 GM/DL — SIGNIFICANT CHANGE UP (ref 32–36)
MCV RBC AUTO: 101.2 FL — HIGH (ref 80–100)
MCV RBC AUTO: 102.3 FL — HIGH (ref 80–100)
MCV RBC AUTO: 106.8 FL — HIGH (ref 80–100)
MCV RBC AUTO: 89.2 FL — SIGNIFICANT CHANGE UP (ref 80–100)
MCV RBC AUTO: 89.6 FL — SIGNIFICANT CHANGE UP (ref 80–100)
MCV RBC AUTO: 89.6 FL — SIGNIFICANT CHANGE UP (ref 80–100)
MCV RBC AUTO: 89.7 FL — SIGNIFICANT CHANGE UP (ref 80–100)
MCV RBC AUTO: 89.9 FL — SIGNIFICANT CHANGE UP (ref 80–100)
MCV RBC AUTO: 90.2 FL — SIGNIFICANT CHANGE UP (ref 80–100)
MCV RBC AUTO: 90.3 FL — SIGNIFICANT CHANGE UP (ref 80–100)
MCV RBC AUTO: 90.5 FL — SIGNIFICANT CHANGE UP (ref 80–100)
MCV RBC AUTO: 90.6 FL — SIGNIFICANT CHANGE UP (ref 80–100)
MCV RBC AUTO: 90.8 FL — SIGNIFICANT CHANGE UP (ref 80–100)
MCV RBC AUTO: 90.8 FL — SIGNIFICANT CHANGE UP (ref 80–100)
MCV RBC AUTO: 91 FL — SIGNIFICANT CHANGE UP (ref 80–100)
MCV RBC AUTO: 91.1 FL — SIGNIFICANT CHANGE UP (ref 80–100)
MCV RBC AUTO: 91.4 FL — SIGNIFICANT CHANGE UP (ref 80–100)
MCV RBC AUTO: 91.6 FL — SIGNIFICANT CHANGE UP (ref 80–100)
MCV RBC AUTO: 91.6 FL — SIGNIFICANT CHANGE UP (ref 80–100)
MCV RBC AUTO: 91.7 FL — SIGNIFICANT CHANGE UP (ref 80–100)
MCV RBC AUTO: 91.9 FL — SIGNIFICANT CHANGE UP (ref 80–100)
MCV RBC AUTO: 91.9 FL — SIGNIFICANT CHANGE UP (ref 80–100)
MCV RBC AUTO: 92.1 FL — SIGNIFICANT CHANGE UP (ref 80–100)
MCV RBC AUTO: 92.6 FL — SIGNIFICANT CHANGE UP (ref 80–100)
MCV RBC AUTO: 92.6 FL — SIGNIFICANT CHANGE UP (ref 80–100)
MCV RBC AUTO: 92.7 FL — SIGNIFICANT CHANGE UP (ref 80–100)
MCV RBC AUTO: 92.8 FL — SIGNIFICANT CHANGE UP (ref 80–100)
MCV RBC AUTO: 92.9 FL — SIGNIFICANT CHANGE UP (ref 80–100)
MCV RBC AUTO: 93.1 FL — SIGNIFICANT CHANGE UP (ref 80–100)
MCV RBC AUTO: 93.2 FL — SIGNIFICANT CHANGE UP (ref 80–100)
MCV RBC AUTO: 93.2 FL — SIGNIFICANT CHANGE UP (ref 80–100)
MCV RBC AUTO: 93.3 FL — SIGNIFICANT CHANGE UP (ref 80–100)
MCV RBC AUTO: 93.4 FL — SIGNIFICANT CHANGE UP (ref 80–100)
MCV RBC AUTO: 93.6 FL — SIGNIFICANT CHANGE UP (ref 80–100)
MCV RBC AUTO: 93.9 FL — SIGNIFICANT CHANGE UP (ref 80–100)
MCV RBC AUTO: 93.9 FL — SIGNIFICANT CHANGE UP (ref 80–100)
MCV RBC AUTO: 94.1 FL — SIGNIFICANT CHANGE UP (ref 80–100)
MCV RBC AUTO: 94.3 FL — SIGNIFICANT CHANGE UP (ref 80–100)
MCV RBC AUTO: 95.7 FL — SIGNIFICANT CHANGE UP (ref 80–100)
MCV RBC AUTO: 95.9 FL — SIGNIFICANT CHANGE UP (ref 80–100)
MCV RBC AUTO: 95.9 FL — SIGNIFICANT CHANGE UP (ref 80–100)
MCV RBC AUTO: 96.2 FL — SIGNIFICANT CHANGE UP (ref 80–100)
MCV RBC AUTO: 96.3 FL — SIGNIFICANT CHANGE UP (ref 80–100)
MCV RBC AUTO: 96.4 FL — SIGNIFICANT CHANGE UP (ref 80–100)
MCV RBC AUTO: 96.9 FL — SIGNIFICANT CHANGE UP (ref 80–100)
MCV RBC AUTO: 99.1 FL — SIGNIFICANT CHANGE UP (ref 80–100)
METHOD TYPE: SIGNIFICANT CHANGE UP
MONOCYTES # BLD AUTO: 0.28 K/UL — SIGNIFICANT CHANGE UP (ref 0–0.9)
MONOCYTES # BLD AUTO: 0.31 K/UL — SIGNIFICANT CHANGE UP (ref 0–0.9)
MONOCYTES # BLD AUTO: 0.4 K/UL — SIGNIFICANT CHANGE UP (ref 0–0.9)
MONOCYTES # BLD AUTO: 0.41 K/UL — SIGNIFICANT CHANGE UP (ref 0–0.9)
MONOCYTES # BLD AUTO: 0.43 K/UL — SIGNIFICANT CHANGE UP (ref 0–0.9)
MONOCYTES # BLD AUTO: 0.43 K/UL — SIGNIFICANT CHANGE UP (ref 0–0.9)
MONOCYTES # BLD AUTO: 0.47 K/UL — SIGNIFICANT CHANGE UP (ref 0–0.9)
MONOCYTES # BLD AUTO: 0.52 K/UL — SIGNIFICANT CHANGE UP (ref 0–0.9)
MONOCYTES # BLD AUTO: 0.53 K/UL — SIGNIFICANT CHANGE UP (ref 0–0.9)
MONOCYTES # BLD AUTO: 0.53 K/UL — SIGNIFICANT CHANGE UP (ref 0–0.9)
MONOCYTES # BLD AUTO: 0.55 K/UL — SIGNIFICANT CHANGE UP (ref 0–0.9)
MONOCYTES # BLD AUTO: 0.55 K/UL — SIGNIFICANT CHANGE UP (ref 0–0.9)
MONOCYTES # BLD AUTO: 0.56 K/UL — SIGNIFICANT CHANGE UP (ref 0–0.9)
MONOCYTES # BLD AUTO: 0.82 K/UL — SIGNIFICANT CHANGE UP (ref 0–0.9)
MONOCYTES # BLD AUTO: 0.89 K/UL — SIGNIFICANT CHANGE UP (ref 0–0.9)
MONOCYTES # BLD AUTO: 0.95 K/UL — HIGH (ref 0–0.9)
MONOCYTES # BLD AUTO: 0.98 K/UL — HIGH (ref 0–0.9)
MONOCYTES # BLD AUTO: 1.01 K/UL — HIGH (ref 0–0.9)
MONOCYTES # BLD AUTO: 1.12 K/UL — HIGH (ref 0–0.9)
MONOCYTES # BLD AUTO: 1.17 K/UL — HIGH (ref 0–0.9)
MONOCYTES # BLD AUTO: 2.47 K/UL — HIGH (ref 0–0.9)
MONOCYTES # BLD AUTO: 3.89 K/UL — HIGH (ref 0–0.9)
MONOCYTES NFR BLD AUTO: 10.1 % — SIGNIFICANT CHANGE UP (ref 2–14)
MONOCYTES NFR BLD AUTO: 10.1 % — SIGNIFICANT CHANGE UP (ref 2–14)
MONOCYTES NFR BLD AUTO: 11.1 % — SIGNIFICANT CHANGE UP (ref 2–14)
MONOCYTES NFR BLD AUTO: 11.7 % — SIGNIFICANT CHANGE UP (ref 2–14)
MONOCYTES NFR BLD AUTO: 11.8 % — SIGNIFICANT CHANGE UP (ref 2–14)
MONOCYTES NFR BLD AUTO: 11.9 % — SIGNIFICANT CHANGE UP (ref 2–14)
MONOCYTES NFR BLD AUTO: 12.4 % — SIGNIFICANT CHANGE UP (ref 2–14)
MONOCYTES NFR BLD AUTO: 12.7 % — SIGNIFICANT CHANGE UP (ref 2–14)
MONOCYTES NFR BLD AUTO: 15 % — HIGH (ref 2–14)
MONOCYTES NFR BLD AUTO: 16.9 % — HIGH (ref 2–14)
MONOCYTES NFR BLD AUTO: 21.8 % — HIGH (ref 2–14)
MONOCYTES NFR BLD AUTO: 26.1 % — HIGH (ref 2–14)
MONOCYTES NFR BLD AUTO: 6.5 % — SIGNIFICANT CHANGE UP (ref 2–14)
MONOCYTES NFR BLD AUTO: 6.5 % — SIGNIFICANT CHANGE UP (ref 2–14)
MONOCYTES NFR BLD AUTO: 8.3 % — SIGNIFICANT CHANGE UP (ref 2–14)
MONOCYTES NFR BLD AUTO: 8.6 % — SIGNIFICANT CHANGE UP (ref 2–14)
MONOCYTES NFR BLD AUTO: 8.8 % — SIGNIFICANT CHANGE UP (ref 2–14)
MONOCYTES NFR BLD AUTO: 9 % — SIGNIFICANT CHANGE UP (ref 2–14)
MONOCYTES NFR BLD AUTO: 9.8 % — SIGNIFICANT CHANGE UP (ref 2–14)
MONOCYTES NFR BLD AUTO: 9.8 % — SIGNIFICANT CHANGE UP (ref 2–14)
MRSA PCR RESULT.: SIGNIFICANT CHANGE UP
MYELOCYTES NFR BLD: 1.8 % — HIGH (ref 0–0)
NEUTROPHILS # BLD AUTO: 0.65 K/UL — LOW (ref 1.8–7.4)
NEUTROPHILS # BLD AUTO: 1.4 K/UL — LOW (ref 1.8–7.4)
NEUTROPHILS # BLD AUTO: 1.62 K/UL — LOW (ref 1.8–7.4)
NEUTROPHILS # BLD AUTO: 1.62 K/UL — LOW (ref 1.8–7.4)
NEUTROPHILS # BLD AUTO: 1.88 K/UL — SIGNIFICANT CHANGE UP (ref 1.8–7.4)
NEUTROPHILS # BLD AUTO: 1.97 K/UL — SIGNIFICANT CHANGE UP (ref 1.8–7.4)
NEUTROPHILS # BLD AUTO: 2.3 K/UL — SIGNIFICANT CHANGE UP (ref 1.8–7.4)
NEUTROPHILS # BLD AUTO: 2.35 K/UL — SIGNIFICANT CHANGE UP (ref 1.8–7.4)
NEUTROPHILS # BLD AUTO: 2.4 K/UL — SIGNIFICANT CHANGE UP (ref 1.8–7.4)
NEUTROPHILS # BLD AUTO: 2.71 K/UL — SIGNIFICANT CHANGE UP (ref 1.8–7.4)
NEUTROPHILS # BLD AUTO: 2.76 K/UL — SIGNIFICANT CHANGE UP (ref 1.8–7.4)
NEUTROPHILS # BLD AUTO: 2.76 K/UL — SIGNIFICANT CHANGE UP (ref 1.8–7.4)
NEUTROPHILS # BLD AUTO: 3.22 K/UL — SIGNIFICANT CHANGE UP (ref 1.8–7.4)
NEUTROPHILS # BLD AUTO: 3.45 K/UL — SIGNIFICANT CHANGE UP (ref 1.8–7.4)
NEUTROPHILS # BLD AUTO: 3.8 K/UL — SIGNIFICANT CHANGE UP (ref 1.8–7.4)
NEUTROPHILS # BLD AUTO: 4.54 K/UL — SIGNIFICANT CHANGE UP (ref 1.8–7.4)
NEUTROPHILS # BLD AUTO: 4.59 K/UL — SIGNIFICANT CHANGE UP (ref 1.8–7.4)
NEUTROPHILS # BLD AUTO: 4.84 K/UL — SIGNIFICANT CHANGE UP (ref 1.8–7.4)
NEUTROPHILS # BLD AUTO: 5.1 K/UL — SIGNIFICANT CHANGE UP (ref 1.8–7.4)
NEUTROPHILS # BLD AUTO: 6.29 K/UL — SIGNIFICANT CHANGE UP (ref 1.8–7.4)
NEUTROPHILS # BLD AUTO: 7.83 K/UL — HIGH (ref 1.8–7.4)
NEUTROPHILS # BLD AUTO: 7.85 K/UL — HIGH (ref 1.8–7.4)
NEUTROPHILS NFR BLD AUTO: 21 % — LOW (ref 43–77)
NEUTROPHILS NFR BLD AUTO: 37.5 % — LOW (ref 43–77)
NEUTROPHILS NFR BLD AUTO: 38.1 % — LOW (ref 43–77)
NEUTROPHILS NFR BLD AUTO: 38.4 % — LOW (ref 43–77)
NEUTROPHILS NFR BLD AUTO: 38.9 % — LOW (ref 43–77)
NEUTROPHILS NFR BLD AUTO: 39.3 % — LOW (ref 43–77)
NEUTROPHILS NFR BLD AUTO: 41 % — LOW (ref 43–77)
NEUTROPHILS NFR BLD AUTO: 44.5 % — SIGNIFICANT CHANGE UP (ref 43–77)
NEUTROPHILS NFR BLD AUTO: 45.4 % — SIGNIFICANT CHANGE UP (ref 43–77)
NEUTROPHILS NFR BLD AUTO: 45.7 % — SIGNIFICANT CHANGE UP (ref 43–77)
NEUTROPHILS NFR BLD AUTO: 50 % — SIGNIFICANT CHANGE UP (ref 43–77)
NEUTROPHILS NFR BLD AUTO: 50 % — SIGNIFICANT CHANGE UP (ref 43–77)
NEUTROPHILS NFR BLD AUTO: 52.4 % — SIGNIFICANT CHANGE UP (ref 43–77)
NEUTROPHILS NFR BLD AUTO: 52.5 % — SIGNIFICANT CHANGE UP (ref 43–77)
NEUTROPHILS NFR BLD AUTO: 55.3 % — SIGNIFICANT CHANGE UP (ref 43–77)
NEUTROPHILS NFR BLD AUTO: 55.3 % — SIGNIFICANT CHANGE UP (ref 43–77)
NEUTROPHILS NFR BLD AUTO: 57.1 % — SIGNIFICANT CHANGE UP (ref 43–77)
NEUTROPHILS NFR BLD AUTO: 60.3 % — SIGNIFICANT CHANGE UP (ref 43–77)
NEUTROPHILS NFR BLD AUTO: 60.9 % — SIGNIFICANT CHANGE UP (ref 43–77)
NEUTROPHILS NFR BLD AUTO: 64.1 % — SIGNIFICANT CHANGE UP (ref 43–77)
NEUTROPHILS NFR BLD AUTO: 67.2 % — SIGNIFICANT CHANGE UP (ref 43–77)
NEUTROPHILS NFR BLD AUTO: 68 % — SIGNIFICANT CHANGE UP (ref 43–77)
NEUTS BAND # BLD: 6.1 % — HIGH (ref 0–6)
NITRITE UR-MCNC: NEGATIVE — SIGNIFICANT CHANGE UP
NITRITE UR-MCNC: POSITIVE
NON HDL CHOLESTEROL: 69 MG/DL — SIGNIFICANT CHANGE UP
NRBC # BLD: 0 /100 WBCS — SIGNIFICANT CHANGE UP (ref 0–0)
NRBC # BLD: 1 /100 — HIGH (ref 0–0)
NRBC # BLD: 2 /100 WBCS — HIGH (ref 0–0)
NRBC # BLD: 2 /100 — HIGH (ref 0–0)
NRBC # FLD: 0 K/UL — SIGNIFICANT CHANGE UP (ref 0–0)
NRBC # FLD: 0.02 K/UL — HIGH (ref 0–0)
NRBC # FLD: 0.03 K/UL — HIGH (ref 0–0)
NRBC # FLD: 0.04 K/UL — HIGH (ref 0–0)
NRBC # FLD: 0.06 K/UL — HIGH (ref 0–0)
NRBC # FLD: 0.15 K/UL — HIGH (ref 0–0)
ORGANISM # SPEC MICROSCOPIC CNT: SIGNIFICANT CHANGE UP
OSMOLALITY SERPL: 370 MOSM/KG — HIGH (ref 275–295)
OSMOLALITY UR: 513 MOSM/KG — SIGNIFICANT CHANGE UP (ref 50–1200)
OVALOCYTES BLD QL SMEAR: SLIGHT — SIGNIFICANT CHANGE UP
PCO2 BLDV: 52 MMHG — SIGNIFICANT CHANGE UP (ref 42–55)
PCO2 BLDV: 54 MMHG — SIGNIFICANT CHANGE UP (ref 42–55)
PCO2 BLDV: 55 MMHG — SIGNIFICANT CHANGE UP (ref 42–55)
PH BLDV: 7.37 — SIGNIFICANT CHANGE UP (ref 7.32–7.43)
PH UR: 5 — SIGNIFICANT CHANGE UP (ref 5–8)
PH UR: 6 — SIGNIFICANT CHANGE UP (ref 5–8)
PH UR: 6 — SIGNIFICANT CHANGE UP (ref 5–8)
PH UR: 6.5 — SIGNIFICANT CHANGE UP (ref 5–8)
PHOSPHATE SERPL-MCNC: 1.7 MG/DL — LOW (ref 2.5–4.5)
PHOSPHATE SERPL-MCNC: 1.7 MG/DL — LOW (ref 2.5–4.5)
PHOSPHATE SERPL-MCNC: 1.8 MG/DL — LOW (ref 2.5–4.5)
PHOSPHATE SERPL-MCNC: 1.9 MG/DL — LOW (ref 2.5–4.5)
PHOSPHATE SERPL-MCNC: 2 MG/DL — LOW (ref 2.5–4.5)
PHOSPHATE SERPL-MCNC: 2 MG/DL — LOW (ref 2.5–4.5)
PHOSPHATE SERPL-MCNC: 2.1 MG/DL — LOW (ref 2.5–4.5)
PHOSPHATE SERPL-MCNC: 2.2 MG/DL — LOW (ref 2.5–4.5)
PHOSPHATE SERPL-MCNC: 2.2 MG/DL — LOW (ref 2.5–4.5)
PHOSPHATE SERPL-MCNC: 2.3 MG/DL — LOW (ref 2.5–4.5)
PHOSPHATE SERPL-MCNC: 2.4 MG/DL — LOW (ref 2.5–4.5)
PHOSPHATE SERPL-MCNC: 2.5 MG/DL — SIGNIFICANT CHANGE UP (ref 2.5–4.5)
PHOSPHATE SERPL-MCNC: 2.6 MG/DL — SIGNIFICANT CHANGE UP (ref 2.5–4.5)
PHOSPHATE SERPL-MCNC: 2.6 MG/DL — SIGNIFICANT CHANGE UP (ref 2.5–4.5)
PHOSPHATE SERPL-MCNC: 2.7 MG/DL — SIGNIFICANT CHANGE UP (ref 2.5–4.5)
PHOSPHATE SERPL-MCNC: 2.7 MG/DL — SIGNIFICANT CHANGE UP (ref 2.5–4.5)
PHOSPHATE SERPL-MCNC: 2.8 MG/DL — SIGNIFICANT CHANGE UP (ref 2.5–4.5)
PHOSPHATE SERPL-MCNC: 2.8 MG/DL — SIGNIFICANT CHANGE UP (ref 2.5–4.5)
PHOSPHATE SERPL-MCNC: 2.9 MG/DL — SIGNIFICANT CHANGE UP (ref 2.5–4.5)
PHOSPHATE SERPL-MCNC: 3 MG/DL — SIGNIFICANT CHANGE UP (ref 2.5–4.5)
PHOSPHATE SERPL-MCNC: 3.1 MG/DL — SIGNIFICANT CHANGE UP (ref 2.5–4.5)
PHOSPHATE SERPL-MCNC: 3.2 MG/DL — SIGNIFICANT CHANGE UP (ref 2.5–4.5)
PHOSPHATE SERPL-MCNC: 3.3 MG/DL — SIGNIFICANT CHANGE UP (ref 2.5–4.5)
PHOSPHATE SERPL-MCNC: 3.3 MG/DL — SIGNIFICANT CHANGE UP (ref 2.5–4.5)
PHOSPHATE SERPL-MCNC: 3.6 MG/DL — SIGNIFICANT CHANGE UP (ref 2.5–4.5)
PHOSPHATE SERPL-MCNC: 3.6 MG/DL — SIGNIFICANT CHANGE UP (ref 2.5–4.5)
PHOSPHATE SERPL-MCNC: 4.8 MG/DL — HIGH (ref 2.5–4.5)
PLAT MORPH BLD: ABNORMAL
PLAT MORPH BLD: NORMAL — SIGNIFICANT CHANGE UP
PLATELET # BLD AUTO: 103 K/UL — LOW (ref 150–400)
PLATELET # BLD AUTO: 104 K/UL — LOW (ref 150–400)
PLATELET # BLD AUTO: 118 K/UL — LOW (ref 150–400)
PLATELET # BLD AUTO: 121 K/UL — LOW (ref 150–400)
PLATELET # BLD AUTO: 122 K/UL — LOW (ref 150–400)
PLATELET # BLD AUTO: 125 K/UL — LOW (ref 150–400)
PLATELET # BLD AUTO: 125 K/UL — LOW (ref 150–400)
PLATELET # BLD AUTO: 127 K/UL — LOW (ref 150–400)
PLATELET # BLD AUTO: 148 K/UL — LOW (ref 150–400)
PLATELET # BLD AUTO: 155 K/UL — SIGNIFICANT CHANGE UP (ref 150–400)
PLATELET # BLD AUTO: 169 K/UL — SIGNIFICANT CHANGE UP (ref 150–400)
PLATELET # BLD AUTO: 171 K/UL — SIGNIFICANT CHANGE UP (ref 150–400)
PLATELET # BLD AUTO: 173 K/UL — SIGNIFICANT CHANGE UP (ref 150–400)
PLATELET # BLD AUTO: 178 K/UL — SIGNIFICANT CHANGE UP (ref 150–400)
PLATELET # BLD AUTO: 182 K/UL — SIGNIFICANT CHANGE UP (ref 150–400)
PLATELET # BLD AUTO: 191 K/UL — SIGNIFICANT CHANGE UP (ref 150–400)
PLATELET # BLD AUTO: 193 K/UL — SIGNIFICANT CHANGE UP (ref 150–400)
PLATELET # BLD AUTO: 194 K/UL — SIGNIFICANT CHANGE UP (ref 150–400)
PLATELET # BLD AUTO: 201 K/UL — SIGNIFICANT CHANGE UP (ref 150–400)
PLATELET # BLD AUTO: 201 K/UL — SIGNIFICANT CHANGE UP (ref 150–400)
PLATELET # BLD AUTO: 203 K/UL — SIGNIFICANT CHANGE UP (ref 150–400)
PLATELET # BLD AUTO: 203 K/UL — SIGNIFICANT CHANGE UP (ref 150–400)
PLATELET # BLD AUTO: 204 K/UL — SIGNIFICANT CHANGE UP (ref 150–400)
PLATELET # BLD AUTO: 209 K/UL — SIGNIFICANT CHANGE UP (ref 150–400)
PLATELET # BLD AUTO: 212 K/UL — SIGNIFICANT CHANGE UP (ref 150–400)
PLATELET # BLD AUTO: 213 K/UL — SIGNIFICANT CHANGE UP (ref 150–400)
PLATELET # BLD AUTO: 215 K/UL — SIGNIFICANT CHANGE UP (ref 150–400)
PLATELET # BLD AUTO: 217 K/UL — SIGNIFICANT CHANGE UP (ref 150–400)
PLATELET # BLD AUTO: 224 K/UL — SIGNIFICANT CHANGE UP (ref 150–400)
PLATELET # BLD AUTO: 230 K/UL — SIGNIFICANT CHANGE UP (ref 150–400)
PLATELET # BLD AUTO: 237 K/UL — SIGNIFICANT CHANGE UP (ref 150–400)
PLATELET # BLD AUTO: 24 K/UL — LOW (ref 150–400)
PLATELET # BLD AUTO: 264 K/UL — SIGNIFICANT CHANGE UP (ref 150–400)
PLATELET # BLD AUTO: 280 K/UL — SIGNIFICANT CHANGE UP (ref 150–400)
PLATELET # BLD AUTO: 283 K/UL — SIGNIFICANT CHANGE UP (ref 150–400)
PLATELET # BLD AUTO: 34 K/UL — LOW (ref 150–400)
PLATELET # BLD AUTO: 35 K/UL — LOW (ref 150–400)
PLATELET # BLD AUTO: 44 K/UL — LOW (ref 150–400)
PLATELET # BLD AUTO: 73 K/UL — LOW (ref 150–400)
PLATELET # BLD AUTO: 79 K/UL — LOW (ref 150–400)
PLATELET # BLD AUTO: 83 K/UL — LOW (ref 150–400)
PLATELET # BLD AUTO: 87 K/UL — LOW (ref 150–400)
PLATELET # BLD AUTO: 94 K/UL — LOW (ref 150–400)
PLATELET # BLD AUTO: 95 K/UL — LOW (ref 150–400)
PLATELET # BLD AUTO: SIGNIFICANT CHANGE UP K/UL (ref 150–400)
PLATELET # BLD AUTO: SIGNIFICANT CHANGE UP K/UL (ref 150–400)
PLATELET CLUMP BLD QL SMEAR: ABNORMAL
PLATELET COUNT - ESTIMATE: ABNORMAL
PLATELET COUNT - ESTIMATE: NORMAL — SIGNIFICANT CHANGE UP
PO2 BLDV: 30 MMHG — SIGNIFICANT CHANGE UP (ref 25–45)
PO2 BLDV: 41 MMHG — SIGNIFICANT CHANGE UP (ref 25–45)
PO2 BLDV: 45 MMHG — SIGNIFICANT CHANGE UP (ref 25–45)
POIKILOCYTOSIS BLD QL AUTO: SLIGHT — SIGNIFICANT CHANGE UP
POLYCHROMASIA BLD QL SMEAR: SLIGHT — SIGNIFICANT CHANGE UP
POTASSIUM BLDV-SCNC: 4 MMOL/L — SIGNIFICANT CHANGE UP (ref 3.5–5.1)
POTASSIUM BLDV-SCNC: 4.1 MMOL/L — SIGNIFICANT CHANGE UP (ref 3.5–5.1)
POTASSIUM SERPL-MCNC: 3 MMOL/L — LOW (ref 3.5–5.3)
POTASSIUM SERPL-MCNC: 3 MMOL/L — LOW (ref 3.5–5.3)
POTASSIUM SERPL-MCNC: 3.1 MMOL/L — LOW (ref 3.5–5.3)
POTASSIUM SERPL-MCNC: 3.2 MMOL/L — LOW (ref 3.5–5.3)
POTASSIUM SERPL-MCNC: 3.3 MMOL/L — LOW (ref 3.5–5.3)
POTASSIUM SERPL-MCNC: 3.3 MMOL/L — LOW (ref 3.5–5.3)
POTASSIUM SERPL-MCNC: 3.4 MMOL/L — LOW (ref 3.5–5.3)
POTASSIUM SERPL-MCNC: 3.5 MMOL/L — SIGNIFICANT CHANGE UP (ref 3.5–5.3)
POTASSIUM SERPL-MCNC: 3.6 MMOL/L — SIGNIFICANT CHANGE UP (ref 3.5–5.3)
POTASSIUM SERPL-MCNC: 3.7 MMOL/L — SIGNIFICANT CHANGE UP (ref 3.5–5.3)
POTASSIUM SERPL-MCNC: 3.8 MMOL/L — SIGNIFICANT CHANGE UP (ref 3.5–5.3)
POTASSIUM SERPL-MCNC: 3.8 MMOL/L — SIGNIFICANT CHANGE UP (ref 3.5–5.3)
POTASSIUM SERPL-MCNC: 3.9 MMOL/L — SIGNIFICANT CHANGE UP (ref 3.5–5.3)
POTASSIUM SERPL-MCNC: 4 MMOL/L — SIGNIFICANT CHANGE UP (ref 3.5–5.3)
POTASSIUM SERPL-MCNC: 4.1 MMOL/L — SIGNIFICANT CHANGE UP (ref 3.5–5.3)
POTASSIUM SERPL-MCNC: 4.2 MMOL/L — SIGNIFICANT CHANGE UP (ref 3.5–5.3)
POTASSIUM SERPL-MCNC: 4.3 MMOL/L — SIGNIFICANT CHANGE UP (ref 3.5–5.3)
POTASSIUM SERPL-MCNC: 4.4 MMOL/L — SIGNIFICANT CHANGE UP (ref 3.5–5.3)
POTASSIUM SERPL-MCNC: 4.6 MMOL/L — SIGNIFICANT CHANGE UP (ref 3.5–5.3)
POTASSIUM SERPL-MCNC: 4.7 MMOL/L — SIGNIFICANT CHANGE UP (ref 3.5–5.3)
POTASSIUM SERPL-MCNC: 4.8 MMOL/L — SIGNIFICANT CHANGE UP (ref 3.5–5.3)
POTASSIUM SERPL-MCNC: 4.9 MMOL/L — SIGNIFICANT CHANGE UP (ref 3.5–5.3)
POTASSIUM SERPL-MCNC: 4.9 MMOL/L — SIGNIFICANT CHANGE UP (ref 3.5–5.3)
POTASSIUM SERPL-MCNC: 5 MMOL/L — SIGNIFICANT CHANGE UP (ref 3.5–5.3)
POTASSIUM SERPL-MCNC: 5.2 MMOL/L — SIGNIFICANT CHANGE UP (ref 3.5–5.3)
POTASSIUM SERPL-SCNC: 3 MMOL/L — LOW (ref 3.5–5.3)
POTASSIUM SERPL-SCNC: 3 MMOL/L — LOW (ref 3.5–5.3)
POTASSIUM SERPL-SCNC: 3.1 MMOL/L — LOW (ref 3.5–5.3)
POTASSIUM SERPL-SCNC: 3.2 MMOL/L — LOW (ref 3.5–5.3)
POTASSIUM SERPL-SCNC: 3.3 MMOL/L — LOW (ref 3.5–5.3)
POTASSIUM SERPL-SCNC: 3.3 MMOL/L — LOW (ref 3.5–5.3)
POTASSIUM SERPL-SCNC: 3.4 MMOL/L — LOW (ref 3.5–5.3)
POTASSIUM SERPL-SCNC: 3.5 MMOL/L — SIGNIFICANT CHANGE UP (ref 3.5–5.3)
POTASSIUM SERPL-SCNC: 3.6 MMOL/L — SIGNIFICANT CHANGE UP (ref 3.5–5.3)
POTASSIUM SERPL-SCNC: 3.7 MMOL/L — SIGNIFICANT CHANGE UP (ref 3.5–5.3)
POTASSIUM SERPL-SCNC: 3.8 MMOL/L — SIGNIFICANT CHANGE UP (ref 3.5–5.3)
POTASSIUM SERPL-SCNC: 3.8 MMOL/L — SIGNIFICANT CHANGE UP (ref 3.5–5.3)
POTASSIUM SERPL-SCNC: 3.9 MMOL/L — SIGNIFICANT CHANGE UP (ref 3.5–5.3)
POTASSIUM SERPL-SCNC: 4 MMOL/L — SIGNIFICANT CHANGE UP (ref 3.5–5.3)
POTASSIUM SERPL-SCNC: 4.1 MMOL/L — SIGNIFICANT CHANGE UP (ref 3.5–5.3)
POTASSIUM SERPL-SCNC: 4.2 MMOL/L — SIGNIFICANT CHANGE UP (ref 3.5–5.3)
POTASSIUM SERPL-SCNC: 4.3 MMOL/L — SIGNIFICANT CHANGE UP (ref 3.5–5.3)
POTASSIUM SERPL-SCNC: 4.4 MMOL/L — SIGNIFICANT CHANGE UP (ref 3.5–5.3)
POTASSIUM SERPL-SCNC: 4.6 MMOL/L — SIGNIFICANT CHANGE UP (ref 3.5–5.3)
POTASSIUM SERPL-SCNC: 4.7 MMOL/L — SIGNIFICANT CHANGE UP (ref 3.5–5.3)
POTASSIUM SERPL-SCNC: 4.8 MMOL/L — SIGNIFICANT CHANGE UP (ref 3.5–5.3)
POTASSIUM SERPL-SCNC: 4.9 MMOL/L — SIGNIFICANT CHANGE UP (ref 3.5–5.3)
POTASSIUM SERPL-SCNC: 4.9 MMOL/L — SIGNIFICANT CHANGE UP (ref 3.5–5.3)
POTASSIUM SERPL-SCNC: 5 MMOL/L — SIGNIFICANT CHANGE UP (ref 3.5–5.3)
POTASSIUM SERPL-SCNC: 5.2 MMOL/L — SIGNIFICANT CHANGE UP (ref 3.5–5.3)
POTASSIUM UR-SCNC: 57.2 MMOL/L — SIGNIFICANT CHANGE UP
PROCALCITONIN SERPL-MCNC: 0.1 NG/ML — SIGNIFICANT CHANGE UP (ref 0.02–0.1)
PROT ?TM UR-MCNC: 186 MG/DL — SIGNIFICANT CHANGE UP
PROT SERPL-MCNC: 6 G/DL — SIGNIFICANT CHANGE UP (ref 6–8.3)
PROT SERPL-MCNC: 6.7 G/DL — SIGNIFICANT CHANGE UP (ref 6–8.3)
PROT SERPL-MCNC: 7 G/DL — SIGNIFICANT CHANGE UP (ref 6–8.3)
PROT SERPL-MCNC: 7.2 GM/DL — SIGNIFICANT CHANGE UP (ref 6–8.3)
PROT SERPL-MCNC: 8.1 G/DL — SIGNIFICANT CHANGE UP (ref 6–8.3)
PROT SERPL-MCNC: 8.4 G/DL — HIGH (ref 6–8.3)
PROT SERPL-MCNC: 8.6 GM/DL — HIGH (ref 6–8.3)
PROT SERPL-MCNC: 8.9 G/DL — HIGH (ref 6–8.3)
PROT SERPL-MCNC: 9.7 GM/DL — HIGH (ref 6–8.3)
PROT UR-MCNC: 100 MG/DL
PROT UR-MCNC: 30 MG/DL
PROT UR-MCNC: 30 MG/DL
PROT UR-MCNC: 300 MG/DL
PROT/CREAT UR-RTO: 1 RATIO — HIGH (ref 0–0.2)
PROTHROM AB SERPL-ACNC: 12.9 SEC — SIGNIFICANT CHANGE UP (ref 10.5–13.4)
PROTHROM AB SERPL-ACNC: 15.3 SEC — HIGH (ref 9.5–13)
PROTHROM AB SERPL-ACNC: 15.9 SEC — HIGH (ref 9.5–13)
RAPID RVP RESULT: SIGNIFICANT CHANGE UP
RAPID RVP RESULT: SIGNIFICANT CHANGE UP
RBC # BLD: 2.79 M/UL — LOW (ref 4.2–5.8)
RBC # BLD: 2.97 M/UL — LOW (ref 4.2–5.8)
RBC # BLD: 2.97 M/UL — LOW (ref 4.2–5.8)
RBC # BLD: 2.99 M/UL — LOW (ref 4.2–5.8)
RBC # BLD: 3.03 M/UL — LOW (ref 4.2–5.8)
RBC # BLD: 3.03 M/UL — LOW (ref 4.2–5.8)
RBC # BLD: 3.06 M/UL — LOW (ref 4.2–5.8)
RBC # BLD: 3.14 M/UL — LOW (ref 4.2–5.8)
RBC # BLD: 3.15 M/UL — LOW (ref 4.2–5.8)
RBC # BLD: 3.22 M/UL — LOW (ref 4.2–5.8)
RBC # BLD: 3.23 M/UL — LOW (ref 4.2–5.8)
RBC # BLD: 3.24 M/UL — LOW (ref 4.2–5.8)
RBC # BLD: 3.25 M/UL — LOW (ref 4.2–5.8)
RBC # BLD: 3.27 M/UL — LOW (ref 4.2–5.8)
RBC # BLD: 3.28 M/UL — LOW (ref 4.2–5.8)
RBC # BLD: 3.29 M/UL — LOW (ref 4.2–5.8)
RBC # BLD: 3.32 M/UL — LOW (ref 4.2–5.8)
RBC # BLD: 3.32 M/UL — LOW (ref 4.2–5.8)
RBC # BLD: 3.35 M/UL — LOW (ref 4.2–5.8)
RBC # BLD: 3.36 M/UL — LOW (ref 4.2–5.8)
RBC # BLD: 3.38 M/UL — LOW (ref 4.2–5.8)
RBC # BLD: 3.43 M/UL — LOW (ref 4.2–5.8)
RBC # BLD: 3.45 M/UL — LOW (ref 4.2–5.8)
RBC # BLD: 3.46 M/UL — LOW (ref 4.2–5.8)
RBC # BLD: 3.48 M/UL — LOW (ref 4.2–5.8)
RBC # BLD: 3.5 M/UL — LOW (ref 4.2–5.8)
RBC # BLD: 3.6 M/UL — LOW (ref 4.2–5.8)
RBC # BLD: 3.69 M/UL — LOW (ref 4.2–5.8)
RBC # BLD: 3.7 M/UL — LOW (ref 4.2–5.8)
RBC # BLD: 3.8 M/UL — LOW (ref 4.2–5.8)
RBC # BLD: 3.83 M/UL — LOW (ref 4.2–5.8)
RBC # BLD: 3.83 M/UL — LOW (ref 4.2–5.8)
RBC # BLD: 3.86 M/UL — LOW (ref 4.2–5.8)
RBC # BLD: 3.9 M/UL — LOW (ref 4.2–5.8)
RBC # BLD: 3.98 M/UL — LOW (ref 4.2–5.8)
RBC # BLD: 4.01 M/UL — LOW (ref 4.2–5.8)
RBC # BLD: 4.09 M/UL — LOW (ref 4.2–5.8)
RBC # BLD: 4.3 M/UL — SIGNIFICANT CHANGE UP (ref 4.2–5.8)
RBC # BLD: 4.47 M/UL — SIGNIFICANT CHANGE UP (ref 4.2–5.8)
RBC # BLD: 4.59 M/UL — SIGNIFICANT CHANGE UP (ref 4.2–5.8)
RBC # BLD: 4.66 M/UL — SIGNIFICANT CHANGE UP (ref 4.2–5.8)
RBC # BLD: 4.74 M/UL — SIGNIFICANT CHANGE UP (ref 4.2–5.8)
RBC # BLD: 4.86 M/UL — SIGNIFICANT CHANGE UP (ref 4.2–5.8)
RBC # FLD: 14.1 % — SIGNIFICANT CHANGE UP (ref 10.3–14.5)
RBC # FLD: 14.2 % — SIGNIFICANT CHANGE UP (ref 10.3–14.5)
RBC # FLD: 14.2 % — SIGNIFICANT CHANGE UP (ref 10.3–14.5)
RBC # FLD: 14.3 % — SIGNIFICANT CHANGE UP (ref 10.3–14.5)
RBC # FLD: 14.5 % — SIGNIFICANT CHANGE UP (ref 10.3–14.5)
RBC # FLD: 14.6 % — HIGH (ref 10.3–14.5)
RBC # FLD: 14.7 % — HIGH (ref 10.3–14.5)
RBC # FLD: 14.8 % — HIGH (ref 10.3–14.5)
RBC # FLD: 14.8 % — HIGH (ref 10.3–14.5)
RBC # FLD: 14.9 % — HIGH (ref 10.3–14.5)
RBC # FLD: 15 % — HIGH (ref 10.3–14.5)
RBC # FLD: 15.1 % — HIGH (ref 10.3–14.5)
RBC # FLD: 15.2 % — HIGH (ref 10.3–14.5)
RBC # FLD: 15.3 % — HIGH (ref 10.3–14.5)
RBC # FLD: 15.3 % — HIGH (ref 10.3–14.5)
RBC # FLD: 15.4 % — HIGH (ref 10.3–14.5)
RBC # FLD: 15.5 % — HIGH (ref 10.3–14.5)
RBC # FLD: 15.5 % — HIGH (ref 10.3–14.5)
RBC # FLD: 15.7 % — HIGH (ref 10.3–14.5)
RBC # FLD: 15.9 % — HIGH (ref 10.3–14.5)
RBC # FLD: 16.2 % — HIGH (ref 10.3–14.5)
RBC # FLD: 16.3 % — HIGH (ref 10.3–14.5)
RBC # FLD: 16.5 % — HIGH (ref 10.3–14.5)
RBC BLD AUTO: ABNORMAL
RBC BLD AUTO: NORMAL — SIGNIFICANT CHANGE UP
RBC CASTS # UR COMP ASSIST: 1 /HPF — SIGNIFICANT CHANGE UP (ref 0–4)
RBC CASTS # UR COMP ASSIST: 9 /HPF — HIGH (ref 0–4)
RBC CASTS # UR COMP ASSIST: ABNORMAL /HPF (ref 0–4)
RBC CASTS # UR COMP ASSIST: SIGNIFICANT CHANGE UP /HPF (ref 0–4)
RSV RNA SPEC QL NAA+PROBE: SIGNIFICANT CHANGE UP
RV+EV RNA SPEC QL NAA+PROBE: SIGNIFICANT CHANGE UP
S AUREUS DNA NOSE QL NAA+PROBE: DETECTED
S AUREUS DNA NOSE QL NAA+PROBE: SIGNIFICANT CHANGE UP
S AUREUS DNA NOSE QL NAA+PROBE: SIGNIFICANT CHANGE UP
SAO2 % BLDV: 40.8 % — LOW (ref 67–88)
SAO2 % BLDV: 65.3 % — LOW (ref 67–88)
SAO2 % BLDV: 71.3 % — SIGNIFICANT CHANGE UP (ref 67–88)
SARS-COV-2 RNA SPEC QL NAA+PROBE: SIGNIFICANT CHANGE UP
SMUDGE CELLS # BLD: PRESENT — SIGNIFICANT CHANGE UP
SMUDGE CELLS # BLD: PRESENT — SIGNIFICANT CHANGE UP
SODIUM SERPL-SCNC: 132 MMOL/L — LOW (ref 135–145)
SODIUM SERPL-SCNC: 132 MMOL/L — LOW (ref 135–145)
SODIUM SERPL-SCNC: 133 MMOL/L — LOW (ref 135–145)
SODIUM SERPL-SCNC: 134 MMOL/L — LOW (ref 135–145)
SODIUM SERPL-SCNC: 135 MMOL/L — SIGNIFICANT CHANGE UP (ref 135–145)
SODIUM SERPL-SCNC: 136 MMOL/L — SIGNIFICANT CHANGE UP (ref 135–145)
SODIUM SERPL-SCNC: 137 MMOL/L — SIGNIFICANT CHANGE UP (ref 135–145)
SODIUM SERPL-SCNC: 137 MMOL/L — SIGNIFICANT CHANGE UP (ref 135–145)
SODIUM SERPL-SCNC: 138 MMOL/L — SIGNIFICANT CHANGE UP (ref 135–145)
SODIUM SERPL-SCNC: 139 MMOL/L — SIGNIFICANT CHANGE UP (ref 135–145)
SODIUM SERPL-SCNC: 140 MMOL/L — SIGNIFICANT CHANGE UP (ref 135–145)
SODIUM SERPL-SCNC: 141 MMOL/L — SIGNIFICANT CHANGE UP (ref 135–145)
SODIUM SERPL-SCNC: 142 MMOL/L — SIGNIFICANT CHANGE UP (ref 135–145)
SODIUM SERPL-SCNC: 142 MMOL/L — SIGNIFICANT CHANGE UP (ref 135–145)
SODIUM SERPL-SCNC: 143 MMOL/L — SIGNIFICANT CHANGE UP (ref 135–145)
SODIUM SERPL-SCNC: 144 MMOL/L — SIGNIFICANT CHANGE UP (ref 135–145)
SODIUM SERPL-SCNC: 145 MMOL/L — SIGNIFICANT CHANGE UP (ref 135–145)
SODIUM SERPL-SCNC: 145 MMOL/L — SIGNIFICANT CHANGE UP (ref 135–145)
SODIUM SERPL-SCNC: 146 MMOL/L — HIGH (ref 135–145)
SODIUM SERPL-SCNC: 147 MMOL/L — HIGH (ref 135–145)
SODIUM SERPL-SCNC: 148 MMOL/L — HIGH (ref 135–145)
SODIUM SERPL-SCNC: 153 MMOL/L — HIGH (ref 135–145)
SODIUM SERPL-SCNC: 155 MMOL/L — HIGH (ref 135–145)
SODIUM SERPL-SCNC: 158 MMOL/L — HIGH (ref 135–145)
SODIUM SERPL-SCNC: 159 MMOL/L — HIGH (ref 135–145)
SODIUM SERPL-SCNC: 160 MMOL/L — CRITICAL HIGH (ref 135–145)
SODIUM SERPL-SCNC: 162 MMOL/L — CRITICAL HIGH (ref 135–145)
SODIUM SERPL-SCNC: 163 MMOL/L — CRITICAL HIGH (ref 135–145)
SODIUM SERPL-SCNC: 165 MMOL/L — CRITICAL HIGH (ref 135–145)
SODIUM SERPL-SCNC: 166 MMOL/L — CRITICAL HIGH (ref 135–145)
SODIUM UR-SCNC: 37 MMOL/L — SIGNIFICANT CHANGE UP
SP GR SPEC: 1.02 — SIGNIFICANT CHANGE UP (ref 1.01–1.02)
SP GR SPEC: 1.02 — SIGNIFICANT CHANGE UP (ref 1.01–1.02)
SP GR SPEC: 1.02 — SIGNIFICANT CHANGE UP (ref 1–1.03)
SP GR SPEC: 1.03 — HIGH (ref 1–1.03)
SPECIMEN SOURCE: SIGNIFICANT CHANGE UP
SQUAMOUS # UR AUTO: 5 /HPF — SIGNIFICANT CHANGE UP (ref 0–5)
T4 FREE SERPL-MCNC: 1.3 NG/DL — SIGNIFICANT CHANGE UP (ref 0.9–1.8)
TRIGL SERPL-MCNC: 82 MG/DL — SIGNIFICANT CHANGE UP
TROPONIN T, HIGH SENSITIVITY RESULT: 33 NG/L — SIGNIFICANT CHANGE UP
TROPONIN T, HIGH SENSITIVITY RESULT: 35 NG/L — SIGNIFICANT CHANGE UP
TSH SERPL-MCNC: 1.4 UU/ML — SIGNIFICANT CHANGE UP (ref 0.36–3.74)
TSH SERPL-MCNC: 3.29 UIU/ML — SIGNIFICANT CHANGE UP (ref 0.27–4.2)
UROBILINOGEN FLD QL: 1 MG/DL
UROBILINOGEN FLD QL: 1 MG/DL — SIGNIFICANT CHANGE UP (ref 0.2–1)
UROBILINOGEN FLD QL: 1 MG/DL — SIGNIFICANT CHANGE UP (ref 0.2–1)
UROBILINOGEN FLD QL: NEGATIVE MG/DL — SIGNIFICANT CHANGE UP
UUN UR-MCNC: 723.8 MG/DL — SIGNIFICANT CHANGE UP
VALPROATE SERPL-MCNC: 100.1 UG/ML — HIGH (ref 50–100)
VALPROATE SERPL-MCNC: 101 UG/ML — HIGH (ref 50–100)
VALPROATE SERPL-MCNC: 38.1 UG/ML — LOW (ref 50–100)
VALPROATE SERPL-MCNC: 59 UG/ML — SIGNIFICANT CHANGE UP (ref 50–100)
VALPROATE SERPL-MCNC: 74.9 UG/ML — SIGNIFICANT CHANGE UP (ref 50–100)
VALPROATE SERPL-MCNC: 79 UG/ML — SIGNIFICANT CHANGE UP (ref 50–100)
VARIANT LYMPHS # BLD: 1.7 % — SIGNIFICANT CHANGE UP (ref 0–6)
WBC # BLD: 14.6 K/UL — HIGH (ref 3.8–10.5)
WBC # BLD: 14.93 K/UL — HIGH (ref 3.8–10.5)
WBC # BLD: 3.04 K/UL — LOW (ref 3.8–10.5)
WBC # BLD: 3.1 K/UL — LOW (ref 3.8–10.5)
WBC # BLD: 3.33 K/UL — LOW (ref 3.8–10.5)
WBC # BLD: 3.45 K/UL — LOW (ref 3.8–10.5)
WBC # BLD: 3.55 K/UL — LOW (ref 3.8–10.5)
WBC # BLD: 3.64 K/UL — LOW (ref 3.8–10.5)
WBC # BLD: 3.67 K/UL — LOW (ref 3.8–10.5)
WBC # BLD: 3.75 K/UL — LOW (ref 3.8–10.5)
WBC # BLD: 4.15 K/UL — SIGNIFICANT CHANGE UP (ref 3.8–10.5)
WBC # BLD: 4.17 K/UL — SIGNIFICANT CHANGE UP (ref 3.8–10.5)
WBC # BLD: 4.33 K/UL — SIGNIFICANT CHANGE UP (ref 3.8–10.5)
WBC # BLD: 4.43 K/UL — SIGNIFICANT CHANGE UP (ref 3.8–10.5)
WBC # BLD: 4.45 K/UL — SIGNIFICANT CHANGE UP (ref 3.8–10.5)
WBC # BLD: 4.49 K/UL — SIGNIFICANT CHANGE UP (ref 3.8–10.5)
WBC # BLD: 4.76 K/UL — SIGNIFICANT CHANGE UP (ref 3.8–10.5)
WBC # BLD: 4.78 K/UL — SIGNIFICANT CHANGE UP (ref 3.8–10.5)
WBC # BLD: 4.79 K/UL — SIGNIFICANT CHANGE UP (ref 3.8–10.5)
WBC # BLD: 4.79 K/UL — SIGNIFICANT CHANGE UP (ref 3.8–10.5)
WBC # BLD: 4.8 K/UL — SIGNIFICANT CHANGE UP (ref 3.8–10.5)
WBC # BLD: 5.03 K/UL — SIGNIFICANT CHANGE UP (ref 3.8–10.5)
WBC # BLD: 5.06 K/UL — SIGNIFICANT CHANGE UP (ref 3.8–10.5)
WBC # BLD: 5.13 K/UL — SIGNIFICANT CHANGE UP (ref 3.8–10.5)
WBC # BLD: 5.26 K/UL — SIGNIFICANT CHANGE UP (ref 3.8–10.5)
WBC # BLD: 5.28 K/UL — SIGNIFICANT CHANGE UP (ref 3.8–10.5)
WBC # BLD: 5.31 K/UL — SIGNIFICANT CHANGE UP (ref 3.8–10.5)
WBC # BLD: 5.36 K/UL — SIGNIFICANT CHANGE UP (ref 3.8–10.5)
WBC # BLD: 5.39 K/UL — SIGNIFICANT CHANGE UP (ref 3.8–10.5)
WBC # BLD: 5.4 K/UL — SIGNIFICANT CHANGE UP (ref 3.8–10.5)
WBC # BLD: 5.46 K/UL — SIGNIFICANT CHANGE UP (ref 3.8–10.5)
WBC # BLD: 5.6 K/UL — SIGNIFICANT CHANGE UP (ref 3.8–10.5)
WBC # BLD: 5.81 K/UL — SIGNIFICANT CHANGE UP (ref 3.8–10.5)
WBC # BLD: 6 K/UL — SIGNIFICANT CHANGE UP (ref 3.8–10.5)
WBC # BLD: 6.01 K/UL — SIGNIFICANT CHANGE UP (ref 3.8–10.5)
WBC # BLD: 6.6 K/UL — SIGNIFICANT CHANGE UP (ref 3.8–10.5)
WBC # BLD: 6.64 K/UL — SIGNIFICANT CHANGE UP (ref 3.8–10.5)
WBC # BLD: 6.66 K/UL — SIGNIFICANT CHANGE UP (ref 3.8–10.5)
WBC # BLD: 7.39 K/UL — SIGNIFICANT CHANGE UP (ref 3.8–10.5)
WBC # BLD: 7.52 K/UL — SIGNIFICANT CHANGE UP (ref 3.8–10.5)
WBC # BLD: 7.59 K/UL — SIGNIFICANT CHANGE UP (ref 3.8–10.5)
WBC # BLD: 7.95 K/UL — SIGNIFICANT CHANGE UP (ref 3.8–10.5)
WBC # BLD: 7.99 K/UL — SIGNIFICANT CHANGE UP (ref 3.8–10.5)
WBC # BLD: 8.31 K/UL — SIGNIFICANT CHANGE UP (ref 3.8–10.5)
WBC # BLD: 8.46 K/UL — SIGNIFICANT CHANGE UP (ref 3.8–10.5)
WBC # BLD: 9.82 K/UL — SIGNIFICANT CHANGE UP (ref 3.8–10.5)
WBC # FLD AUTO: 14.6 K/UL — HIGH (ref 3.8–10.5)
WBC # FLD AUTO: 14.93 K/UL — HIGH (ref 3.8–10.5)
WBC # FLD AUTO: 3.04 K/UL — LOW (ref 3.8–10.5)
WBC # FLD AUTO: 3.1 K/UL — LOW (ref 3.8–10.5)
WBC # FLD AUTO: 3.33 K/UL — LOW (ref 3.8–10.5)
WBC # FLD AUTO: 3.45 K/UL — LOW (ref 3.8–10.5)
WBC # FLD AUTO: 3.55 K/UL — LOW (ref 3.8–10.5)
WBC # FLD AUTO: 3.64 K/UL — LOW (ref 3.8–10.5)
WBC # FLD AUTO: 3.67 K/UL — LOW (ref 3.8–10.5)
WBC # FLD AUTO: 3.75 K/UL — LOW (ref 3.8–10.5)
WBC # FLD AUTO: 4.15 K/UL — SIGNIFICANT CHANGE UP (ref 3.8–10.5)
WBC # FLD AUTO: 4.17 K/UL — SIGNIFICANT CHANGE UP (ref 3.8–10.5)
WBC # FLD AUTO: 4.33 K/UL — SIGNIFICANT CHANGE UP (ref 3.8–10.5)
WBC # FLD AUTO: 4.43 K/UL — SIGNIFICANT CHANGE UP (ref 3.8–10.5)
WBC # FLD AUTO: 4.45 K/UL — SIGNIFICANT CHANGE UP (ref 3.8–10.5)
WBC # FLD AUTO: 4.49 K/UL — SIGNIFICANT CHANGE UP (ref 3.8–10.5)
WBC # FLD AUTO: 4.76 K/UL — SIGNIFICANT CHANGE UP (ref 3.8–10.5)
WBC # FLD AUTO: 4.78 K/UL — SIGNIFICANT CHANGE UP (ref 3.8–10.5)
WBC # FLD AUTO: 4.79 K/UL — SIGNIFICANT CHANGE UP (ref 3.8–10.5)
WBC # FLD AUTO: 4.79 K/UL — SIGNIFICANT CHANGE UP (ref 3.8–10.5)
WBC # FLD AUTO: 4.8 K/UL — SIGNIFICANT CHANGE UP (ref 3.8–10.5)
WBC # FLD AUTO: 5.03 K/UL — SIGNIFICANT CHANGE UP (ref 3.8–10.5)
WBC # FLD AUTO: 5.06 K/UL — SIGNIFICANT CHANGE UP (ref 3.8–10.5)
WBC # FLD AUTO: 5.13 K/UL — SIGNIFICANT CHANGE UP (ref 3.8–10.5)
WBC # FLD AUTO: 5.26 K/UL — SIGNIFICANT CHANGE UP (ref 3.8–10.5)
WBC # FLD AUTO: 5.28 K/UL — SIGNIFICANT CHANGE UP (ref 3.8–10.5)
WBC # FLD AUTO: 5.31 K/UL — SIGNIFICANT CHANGE UP (ref 3.8–10.5)
WBC # FLD AUTO: 5.36 K/UL — SIGNIFICANT CHANGE UP (ref 3.8–10.5)
WBC # FLD AUTO: 5.39 K/UL — SIGNIFICANT CHANGE UP (ref 3.8–10.5)
WBC # FLD AUTO: 5.4 K/UL — SIGNIFICANT CHANGE UP (ref 3.8–10.5)
WBC # FLD AUTO: 5.46 K/UL — SIGNIFICANT CHANGE UP (ref 3.8–10.5)
WBC # FLD AUTO: 5.6 K/UL — SIGNIFICANT CHANGE UP (ref 3.8–10.5)
WBC # FLD AUTO: 5.81 K/UL — SIGNIFICANT CHANGE UP (ref 3.8–10.5)
WBC # FLD AUTO: 6 K/UL — SIGNIFICANT CHANGE UP (ref 3.8–10.5)
WBC # FLD AUTO: 6.01 K/UL — SIGNIFICANT CHANGE UP (ref 3.8–10.5)
WBC # FLD AUTO: 6.6 K/UL — SIGNIFICANT CHANGE UP (ref 3.8–10.5)
WBC # FLD AUTO: 6.64 K/UL — SIGNIFICANT CHANGE UP (ref 3.8–10.5)
WBC # FLD AUTO: 6.66 K/UL — SIGNIFICANT CHANGE UP (ref 3.8–10.5)
WBC # FLD AUTO: 7.39 K/UL — SIGNIFICANT CHANGE UP (ref 3.8–10.5)
WBC # FLD AUTO: 7.52 K/UL — SIGNIFICANT CHANGE UP (ref 3.8–10.5)
WBC # FLD AUTO: 7.59 K/UL — SIGNIFICANT CHANGE UP (ref 3.8–10.5)
WBC # FLD AUTO: 7.95 K/UL — SIGNIFICANT CHANGE UP (ref 3.8–10.5)
WBC # FLD AUTO: 7.99 K/UL — SIGNIFICANT CHANGE UP (ref 3.8–10.5)
WBC # FLD AUTO: 8.31 K/UL — SIGNIFICANT CHANGE UP (ref 3.8–10.5)
WBC # FLD AUTO: 8.46 K/UL — SIGNIFICANT CHANGE UP (ref 3.8–10.5)
WBC # FLD AUTO: 9.82 K/UL — SIGNIFICANT CHANGE UP (ref 3.8–10.5)
WBC UR QL: 0 /HPF — SIGNIFICANT CHANGE UP (ref 0–5)
WBC UR QL: 1066 /HPF — HIGH (ref 0–5)
WBC UR QL: SIGNIFICANT CHANGE UP
WBC UR QL: SIGNIFICANT CHANGE UP
WNV IGG TITR FLD: POSITIVE
WNV IGM SPEC QL: NEGATIVE — SIGNIFICANT CHANGE UP

## 2023-01-01 PROCEDURE — 71045 X-RAY EXAM CHEST 1 VIEW: CPT | Mod: 26,76

## 2023-01-01 PROCEDURE — 99232 SBSQ HOSP IP/OBS MODERATE 35: CPT

## 2023-01-01 PROCEDURE — 99223 1ST HOSP IP/OBS HIGH 75: CPT | Mod: GC

## 2023-01-01 PROCEDURE — 99497 ADVNCD CARE PLAN 30 MIN: CPT | Mod: 25

## 2023-01-01 PROCEDURE — 93010 ELECTROCARDIOGRAM REPORT: CPT

## 2023-01-01 PROCEDURE — 71045 X-RAY EXAM CHEST 1 VIEW: CPT | Mod: 26

## 2023-01-01 PROCEDURE — 70551 MRI BRAIN STEM W/O DYE: CPT | Mod: 26

## 2023-01-01 PROCEDURE — 99222 1ST HOSP IP/OBS MODERATE 55: CPT

## 2023-01-01 PROCEDURE — 99233 SBSQ HOSP IP/OBS HIGH 50: CPT

## 2023-01-01 PROCEDURE — 99285 EMERGENCY DEPT VISIT HI MDM: CPT

## 2023-01-01 PROCEDURE — 99223 1ST HOSP IP/OBS HIGH 75: CPT

## 2023-01-01 PROCEDURE — 70450 CT HEAD/BRAIN W/O DYE: CPT | Mod: 26,MA

## 2023-01-01 PROCEDURE — 95816 EEG AWAKE AND DROWSY: CPT | Mod: 26

## 2023-01-01 PROCEDURE — 99239 HOSP IP/OBS DSCHRG MGMT >30: CPT

## 2023-01-01 PROCEDURE — 93970 EXTREMITY STUDY: CPT | Mod: 26

## 2023-01-01 PROCEDURE — ZZZZZ: CPT

## 2023-01-01 PROCEDURE — 73630 X-RAY EXAM OF FOOT: CPT | Mod: 26,50

## 2023-01-01 PROCEDURE — 73620 X-RAY EXAM OF FOOT: CPT | Mod: 26,LT

## 2023-01-01 PROCEDURE — 95720 EEG PHY/QHP EA INCR W/VEEG: CPT

## 2023-01-01 RX ORDER — ENOXAPARIN SODIUM 100 MG/ML
65 INJECTION SUBCUTANEOUS EVERY 12 HOURS
Refills: 0 | Status: DISCONTINUED | OUTPATIENT
Start: 2023-01-01 | End: 2023-01-01

## 2023-01-01 RX ORDER — LEVOTHYROXINE SODIUM 125 MCG
70 TABLET ORAL
Refills: 0 | Status: DISCONTINUED | OUTPATIENT
Start: 2023-01-01 | End: 2023-01-01

## 2023-01-01 RX ORDER — VALPROIC ACID (AS SODIUM SALT) 250 MG/5ML
500 SOLUTION, ORAL ORAL ONCE
Refills: 0 | Status: COMPLETED | OUTPATIENT
Start: 2023-01-01 | End: 2023-01-01

## 2023-01-01 RX ORDER — APIXABAN 2.5 MG/1
5 TABLET, FILM COATED ORAL EVERY 12 HOURS
Refills: 0 | Status: DISCONTINUED | OUTPATIENT
Start: 2023-01-01 | End: 2023-01-01

## 2023-01-01 RX ORDER — SODIUM CHLORIDE 9 MG/ML
1000 INJECTION, SOLUTION INTRAVENOUS
Refills: 0 | Status: DISCONTINUED | OUTPATIENT
Start: 2023-01-01 | End: 2023-01-01

## 2023-01-01 RX ORDER — DIVALPROEX SODIUM 500 MG/1
3 TABLET, DELAYED RELEASE ORAL
Qty: 90 | Refills: 0
Start: 2023-01-01 | End: 2023-01-01

## 2023-01-01 RX ORDER — LEVETIRACETAM 250 MG/1
5 TABLET, FILM COATED ORAL
Qty: 0 | Refills: 0 | DISCHARGE
Start: 2023-01-01

## 2023-01-01 RX ORDER — LANOLIN ALCOHOL/MO/W.PET/CERES
3 CREAM (GRAM) TOPICAL AT BEDTIME
Refills: 0 | Status: DISCONTINUED | OUTPATIENT
Start: 2023-01-01 | End: 2023-01-01

## 2023-01-01 RX ORDER — RIVAROXABAN 15 MG-20MG
10 KIT ORAL
Refills: 0 | Status: DISCONTINUED | OUTPATIENT
Start: 2023-01-01 | End: 2023-01-01

## 2023-01-01 RX ORDER — POTASSIUM CHLORIDE 20 MEQ
10 PACKET (EA) ORAL
Refills: 0 | Status: COMPLETED | OUTPATIENT
Start: 2023-01-01 | End: 2023-01-01

## 2023-01-01 RX ORDER — POTASSIUM PHOSPHATE, MONOBASIC POTASSIUM PHOSPHATE, DIBASIC 236; 224 MG/ML; MG/ML
30 INJECTION, SOLUTION INTRAVENOUS ONCE
Refills: 0 | Status: COMPLETED | OUTPATIENT
Start: 2023-01-01 | End: 2023-01-01

## 2023-01-01 RX ORDER — DEXAMETHASONE 0.5 MG/5ML
1 ELIXIR ORAL
Qty: 0 | Refills: 0 | DISCHARGE
Start: 2023-01-01

## 2023-01-01 RX ORDER — POTASSIUM PHOSPHATE, MONOBASIC POTASSIUM PHOSPHATE, DIBASIC 236; 224 MG/ML; MG/ML
15 INJECTION, SOLUTION INTRAVENOUS ONCE
Refills: 0 | Status: COMPLETED | OUTPATIENT
Start: 2023-01-01 | End: 2023-01-01

## 2023-01-01 RX ORDER — ACETAMINOPHEN 500 MG
2 TABLET ORAL
Qty: 0 | Refills: 0 | DISCHARGE
Start: 2023-01-01

## 2023-01-01 RX ORDER — POTASSIUM CHLORIDE 20 MEQ
10 PACKET (EA) ORAL ONCE
Refills: 0 | Status: DISCONTINUED | OUTPATIENT
Start: 2023-01-01 | End: 2023-01-01

## 2023-01-01 RX ORDER — RIVAROXABAN 15 MG-20MG
1 KIT ORAL
Qty: 0 | Refills: 0 | DISCHARGE
Start: 2023-01-01

## 2023-01-01 RX ORDER — MAGNESIUM SULFATE 500 MG/ML
1 VIAL (ML) INJECTION ONCE
Refills: 0 | Status: COMPLETED | OUTPATIENT
Start: 2023-01-01 | End: 2023-01-01

## 2023-01-01 RX ORDER — SODIUM,POTASSIUM PHOSPHATES 278-250MG
1 POWDER IN PACKET (EA) ORAL ONCE
Refills: 0 | Status: DISCONTINUED | OUTPATIENT
Start: 2023-01-01 | End: 2023-01-01

## 2023-01-01 RX ORDER — MUPIROCIN 20 MG/G
1 OINTMENT TOPICAL
Refills: 0 | Status: DISCONTINUED | OUTPATIENT
Start: 2023-01-01 | End: 2023-01-01

## 2023-01-01 RX ORDER — PIPERACILLIN AND TAZOBACTAM 4; .5 G/20ML; G/20ML
3.38 INJECTION, POWDER, LYOPHILIZED, FOR SOLUTION INTRAVENOUS ONCE
Refills: 0 | Status: COMPLETED | OUTPATIENT
Start: 2023-01-01 | End: 2023-01-01

## 2023-01-01 RX ORDER — PANTOPRAZOLE SODIUM 20 MG/1
40 TABLET, DELAYED RELEASE ORAL
Refills: 0 | Status: DISCONTINUED | OUTPATIENT
Start: 2023-01-01 | End: 2023-01-01

## 2023-01-01 RX ORDER — FUROSEMIDE 40 MG
20 TABLET ORAL DAILY
Refills: 0 | Status: DISCONTINUED | OUTPATIENT
Start: 2023-01-01 | End: 2023-01-01

## 2023-01-01 RX ORDER — LEVETIRACETAM 250 MG/1
500 TABLET, FILM COATED ORAL EVERY 12 HOURS
Refills: 0 | Status: DISCONTINUED | OUTPATIENT
Start: 2023-01-01 | End: 2023-01-01

## 2023-01-01 RX ORDER — SODIUM CHLORIDE 9 MG/ML
1000 INJECTION INTRAMUSCULAR; INTRAVENOUS; SUBCUTANEOUS ONCE
Refills: 0 | Status: COMPLETED | OUTPATIENT
Start: 2023-01-01 | End: 2023-01-01

## 2023-01-01 RX ORDER — SODIUM CHLORIDE 0.65 %
1 AEROSOL, SPRAY (ML) NASAL EVERY 4 HOURS
Refills: 0 | Status: DISCONTINUED | OUTPATIENT
Start: 2023-01-01 | End: 2023-01-01

## 2023-01-01 RX ORDER — MAGNESIUM SULFATE 500 MG/ML
2 VIAL (ML) INJECTION ONCE
Refills: 0 | Status: COMPLETED | OUTPATIENT
Start: 2023-01-01 | End: 2023-01-01

## 2023-01-01 RX ORDER — FUROSEMIDE 40 MG
20 TABLET ORAL ONCE
Refills: 0 | Status: COMPLETED | OUTPATIENT
Start: 2023-01-01 | End: 2023-01-01

## 2023-01-01 RX ORDER — DEXTROSE 50 % IN WATER 50 %
25 SYRINGE (ML) INTRAVENOUS ONCE
Refills: 0 | Status: DISCONTINUED | OUTPATIENT
Start: 2023-01-01 | End: 2023-01-01

## 2023-01-01 RX ORDER — DIVALPROEX SODIUM 500 MG/1
500 TABLET, DELAYED RELEASE ORAL DAILY
Refills: 0 | Status: DISCONTINUED | OUTPATIENT
Start: 2023-01-01 | End: 2023-01-01

## 2023-01-01 RX ORDER — THIAMINE MONONITRATE (VIT B1) 100 MG
100 TABLET ORAL DAILY
Refills: 0 | Status: DISCONTINUED | OUTPATIENT
Start: 2023-01-01 | End: 2023-01-01

## 2023-01-01 RX ORDER — POTASSIUM CHLORIDE 20 MEQ
40 PACKET (EA) ORAL EVERY 4 HOURS
Refills: 0 | Status: DISCONTINUED | OUTPATIENT
Start: 2023-01-01 | End: 2023-01-01

## 2023-01-01 RX ORDER — COLLAGENASE CLOSTRIDIUM HIST. 250 UNIT/G
1 OINTMENT (GRAM) TOPICAL DAILY
Refills: 0 | Status: DISCONTINUED | OUTPATIENT
Start: 2023-01-01 | End: 2023-01-01

## 2023-01-01 RX ORDER — QUETIAPINE FUMARATE 200 MG/1
50 TABLET, FILM COATED ORAL AT BEDTIME
Refills: 0 | Status: DISCONTINUED | OUTPATIENT
Start: 2023-01-01 | End: 2023-01-01

## 2023-01-01 RX ORDER — VANCOMYCIN HCL 1 G
1000 VIAL (EA) INTRAVENOUS ONCE
Refills: 0 | Status: COMPLETED | OUTPATIENT
Start: 2023-01-01 | End: 2023-01-01

## 2023-01-01 RX ORDER — AMLODIPINE BESYLATE 2.5 MG/1
10 TABLET ORAL DAILY
Refills: 0 | Status: DISCONTINUED | OUTPATIENT
Start: 2023-01-01 | End: 2023-01-01

## 2023-01-01 RX ORDER — DIVALPROEX SODIUM 500 MG/1
2 TABLET, DELAYED RELEASE ORAL
Refills: 0 | DISCHARGE

## 2023-01-01 RX ORDER — MULTIVIT-MIN/FERROUS GLUCONATE 9 MG/15 ML
1 LIQUID (ML) ORAL DAILY
Refills: 0 | Status: DISCONTINUED | OUTPATIENT
Start: 2023-01-01 | End: 2023-01-01

## 2023-01-01 RX ORDER — ALBUMIN HUMAN 25 %
50 VIAL (ML) INTRAVENOUS EVERY 8 HOURS
Refills: 0 | Status: COMPLETED | OUTPATIENT
Start: 2023-01-01 | End: 2023-01-01

## 2023-01-01 RX ORDER — SODIUM CHLORIDE 9 MG/ML
1000 INJECTION, SOLUTION INTRAVENOUS ONCE
Refills: 0 | Status: COMPLETED | OUTPATIENT
Start: 2023-01-01 | End: 2023-01-01

## 2023-01-01 RX ORDER — LEVOTHYROXINE SODIUM 125 MCG
88 TABLET ORAL DAILY
Refills: 0 | Status: DISCONTINUED | OUTPATIENT
Start: 2023-01-01 | End: 2023-01-01

## 2023-01-01 RX ORDER — LEVETIRACETAM 250 MG/1
1 TABLET, FILM COATED ORAL
Qty: 60 | Refills: 0
Start: 2023-01-01 | End: 2023-01-01

## 2023-01-01 RX ORDER — DEXTROSE 50 % IN WATER 50 %
25 SYRINGE (ML) INTRAVENOUS ONCE
Refills: 0 | Status: COMPLETED | OUTPATIENT
Start: 2023-01-01 | End: 2023-01-01

## 2023-01-01 RX ORDER — QUETIAPINE FUMARATE 200 MG/1
75 TABLET, FILM COATED ORAL AT BEDTIME
Refills: 0 | Status: DISCONTINUED | OUTPATIENT
Start: 2023-01-01 | End: 2023-01-01

## 2023-01-01 RX ORDER — DIVALPROEX SODIUM 500 MG/1
1 TABLET, DELAYED RELEASE ORAL
Qty: 30 | Refills: 0
Start: 2023-01-01 | End: 2023-01-01

## 2023-01-01 RX ORDER — POTASSIUM CHLORIDE 20 MEQ
40 PACKET (EA) ORAL ONCE
Refills: 0 | Status: DISCONTINUED | OUTPATIENT
Start: 2023-01-01 | End: 2023-01-01

## 2023-01-01 RX ORDER — THIAMINE MONONITRATE (VIT B1) 100 MG
1 TABLET ORAL
Qty: 0 | Refills: 0 | DISCHARGE
Start: 2023-01-01

## 2023-01-01 RX ORDER — DIVALPROEX SODIUM 500 MG/1
2 TABLET, DELAYED RELEASE ORAL
Qty: 0 | Refills: 0 | DISCHARGE
Start: 2023-01-01

## 2023-01-01 RX ORDER — FUROSEMIDE 40 MG
20 TABLET ORAL
Refills: 0 | Status: DISCONTINUED | OUTPATIENT
Start: 2023-01-01 | End: 2023-01-01

## 2023-01-01 RX ORDER — SENNA PLUS 8.6 MG/1
2 TABLET ORAL AT BEDTIME
Refills: 0 | Status: DISCONTINUED | OUTPATIENT
Start: 2023-01-01 | End: 2023-01-01

## 2023-01-01 RX ORDER — VALPROIC ACID (AS SODIUM SALT) 250 MG/5ML
500 SOLUTION, ORAL ORAL EVERY 12 HOURS
Refills: 0 | Status: DISCONTINUED | OUTPATIENT
Start: 2023-01-01 | End: 2023-01-01

## 2023-01-01 RX ORDER — DEXTROSE 10 % IN WATER 10 %
1000 INTRAVENOUS SOLUTION INTRAVENOUS
Refills: 0 | Status: DISCONTINUED | OUTPATIENT
Start: 2023-01-01 | End: 2023-01-01

## 2023-01-01 RX ORDER — LEVOTHYROXINE SODIUM 125 MCG
66 TABLET ORAL AT BEDTIME
Refills: 0 | Status: DISCONTINUED | OUTPATIENT
Start: 2023-01-01 | End: 2023-01-01

## 2023-01-01 RX ORDER — LEVETIRACETAM 250 MG/1
1500 TABLET, FILM COATED ORAL ONCE
Refills: 0 | Status: COMPLETED | OUTPATIENT
Start: 2023-01-01 | End: 2023-01-01

## 2023-01-01 RX ORDER — DIVALPROEX SODIUM 500 MG/1
1000 TABLET, DELAYED RELEASE ORAL
Refills: 0 | Status: DISCONTINUED | OUTPATIENT
Start: 2023-01-01 | End: 2023-01-01

## 2023-01-01 RX ORDER — DEXTROSE 50 % IN WATER 50 %
12.5 SYRINGE (ML) INTRAVENOUS ONCE
Refills: 0 | Status: DISCONTINUED | OUTPATIENT
Start: 2023-01-01 | End: 2023-01-01

## 2023-01-01 RX ORDER — QUETIAPINE FUMARATE 200 MG/1
3 TABLET, FILM COATED ORAL
Qty: 0 | Refills: 0 | DISCHARGE
Start: 2023-01-01

## 2023-01-01 RX ORDER — DEXTROSE 50 % IN WATER 50 %
12.5 SYRINGE (ML) INTRAVENOUS ONCE
Refills: 0 | Status: COMPLETED | OUTPATIENT
Start: 2023-01-01 | End: 2023-01-01

## 2023-01-01 RX ORDER — CHLORHEXIDINE GLUCONATE 213 G/1000ML
1 SOLUTION TOPICAL DAILY
Refills: 0 | Status: DISCONTINUED | OUTPATIENT
Start: 2023-01-01 | End: 2023-01-01

## 2023-01-01 RX ORDER — DIVALPROEX SODIUM 500 MG/1
750 TABLET, DELAYED RELEASE ORAL AT BEDTIME
Refills: 0 | Status: DISCONTINUED | OUTPATIENT
Start: 2023-01-01 | End: 2023-01-01

## 2023-01-01 RX ORDER — VALPROIC ACID (AS SODIUM SALT) 250 MG/5ML
500 SOLUTION, ORAL ORAL
Refills: 0 | Status: DISCONTINUED | OUTPATIENT
Start: 2023-01-01 | End: 2023-01-01

## 2023-01-01 RX ORDER — RIVAROXABAN 15 MG-20MG
1 KIT ORAL
Qty: 30 | Refills: 0
Start: 2023-01-01 | End: 2023-01-01

## 2023-01-01 RX ORDER — SODIUM CHLORIDE 9 MG/ML
1000 INJECTION INTRAMUSCULAR; INTRAVENOUS; SUBCUTANEOUS
Refills: 0 | Status: DISCONTINUED | OUTPATIENT
Start: 2023-01-01 | End: 2023-01-01

## 2023-01-01 RX ORDER — VALPROIC ACID (AS SODIUM SALT) 250 MG/5ML
500 SOLUTION, ORAL ORAL EVERY 6 HOURS
Refills: 0 | Status: DISCONTINUED | OUTPATIENT
Start: 2023-01-01 | End: 2023-01-01

## 2023-01-01 RX ORDER — AMLODIPINE BESYLATE 2.5 MG/1
1 TABLET ORAL
Qty: 0 | Refills: 0 | DISCHARGE
Start: 2023-01-01

## 2023-01-01 RX ORDER — FUROSEMIDE 40 MG
20 TABLET ORAL
Qty: 0 | Refills: 0 | DISCHARGE
Start: 2023-01-01

## 2023-01-01 RX ORDER — DIVALPROEX SODIUM 500 MG/1
500 TABLET, DELAYED RELEASE ORAL
Refills: 0 | Status: DISCONTINUED | OUTPATIENT
Start: 2023-01-01 | End: 2023-01-01

## 2023-01-01 RX ORDER — ALBUMIN HUMAN 25 %
50 VIAL (ML) INTRAVENOUS EVERY 8 HOURS
Refills: 0 | Status: DISCONTINUED | OUTPATIENT
Start: 2023-01-01 | End: 2023-01-01

## 2023-01-01 RX ORDER — PANTOPRAZOLE SODIUM 20 MG/1
40 TABLET, DELAYED RELEASE ORAL DAILY
Refills: 0 | Status: DISCONTINUED | OUTPATIENT
Start: 2023-01-01 | End: 2023-01-01

## 2023-01-01 RX ORDER — LEVOTHYROXINE SODIUM 125 MCG
70 TABLET ORAL ONCE
Refills: 0 | Status: COMPLETED | OUTPATIENT
Start: 2023-01-01 | End: 2023-01-01

## 2023-01-01 RX ORDER — FUROSEMIDE 40 MG
40 TABLET ORAL ONCE
Refills: 0 | Status: COMPLETED | OUTPATIENT
Start: 2023-01-01 | End: 2023-01-01

## 2023-01-01 RX ORDER — CEFTRIAXONE 500 MG/1
1000 INJECTION, POWDER, FOR SOLUTION INTRAMUSCULAR; INTRAVENOUS EVERY 24 HOURS
Refills: 0 | Status: COMPLETED | OUTPATIENT
Start: 2023-01-01 | End: 2023-01-01

## 2023-01-01 RX ORDER — POTASSIUM PHOSPHATE, MONOBASIC POTASSIUM PHOSPHATE, DIBASIC 236; 224 MG/ML; MG/ML
15 INJECTION, SOLUTION INTRAVENOUS ONCE
Refills: 0 | Status: DISCONTINUED | OUTPATIENT
Start: 2023-01-01 | End: 2023-01-01

## 2023-01-01 RX ORDER — DEXAMETHASONE 0.5 MG/5ML
4 ELIXIR ORAL
Refills: 0 | Status: DISCONTINUED | OUTPATIENT
Start: 2023-01-01 | End: 2023-01-01

## 2023-01-01 RX ORDER — ENOXAPARIN SODIUM 100 MG/ML
70 INJECTION SUBCUTANEOUS EVERY 12 HOURS
Refills: 0 | Status: DISCONTINUED | OUTPATIENT
Start: 2023-01-01 | End: 2023-01-01

## 2023-01-01 RX ORDER — LACTOBACILLUS ACIDOPHILUS 100MM CELL
1 CAPSULE ORAL DAILY
Refills: 0 | Status: DISCONTINUED | OUTPATIENT
Start: 2023-01-01 | End: 2023-01-01

## 2023-01-01 RX ORDER — MUPIROCIN 20 MG/G
1 OINTMENT TOPICAL
Refills: 0 | Status: COMPLETED | OUTPATIENT
Start: 2023-01-01 | End: 2023-01-01

## 2023-01-01 RX ORDER — GLUCAGON INJECTION, SOLUTION 0.5 MG/.1ML
1 INJECTION, SOLUTION SUBCUTANEOUS ONCE
Refills: 0 | Status: DISCONTINUED | OUTPATIENT
Start: 2023-01-01 | End: 2023-01-01

## 2023-01-01 RX ORDER — LEVETIRACETAM 250 MG/1
500 TABLET, FILM COATED ORAL
Refills: 0 | Status: DISCONTINUED | OUTPATIENT
Start: 2023-01-01 | End: 2023-01-01

## 2023-01-01 RX ORDER — LEVOTHYROXINE SODIUM 125 MCG
1 TABLET ORAL
Qty: 0 | Refills: 0 | DISCHARGE
Start: 2023-01-01

## 2023-01-01 RX ORDER — AMLODIPINE BESYLATE 2.5 MG/1
5 TABLET ORAL DAILY
Refills: 0 | Status: DISCONTINUED | OUTPATIENT
Start: 2023-01-01 | End: 2023-01-01

## 2023-01-01 RX ORDER — SODIUM CHLORIDE 0.65 %
2 AEROSOL, SPRAY (ML) NASAL
Qty: 0 | Refills: 0 | DISCHARGE
Start: 2023-01-01

## 2023-01-01 RX ORDER — SODIUM CHLORIDE 9 MG/ML
1000 INJECTION INTRAMUSCULAR; INTRAVENOUS; SUBCUTANEOUS
Refills: 0 | Status: COMPLETED | OUTPATIENT
Start: 2023-01-01 | End: 2023-01-01

## 2023-01-01 RX ORDER — VALPROIC ACID (AS SODIUM SALT) 250 MG/5ML
5 SOLUTION, ORAL ORAL
Qty: 0 | Refills: 0 | DISCHARGE
Start: 2023-01-01

## 2023-01-01 RX ORDER — DEXTROSE 50 % IN WATER 50 %
15 SYRINGE (ML) INTRAVENOUS ONCE
Refills: 0 | Status: DISCONTINUED | OUTPATIENT
Start: 2023-01-01 | End: 2023-01-01

## 2023-01-01 RX ORDER — LEVETIRACETAM 250 MG/1
1 TABLET, FILM COATED ORAL
Qty: 0 | Refills: 0 | DISCHARGE
Start: 2023-01-01

## 2023-01-01 RX ORDER — CEFTRIAXONE 500 MG/1
1000 INJECTION, POWDER, FOR SOLUTION INTRAMUSCULAR; INTRAVENOUS ONCE
Refills: 0 | Status: COMPLETED | OUTPATIENT
Start: 2023-01-01 | End: 2023-01-01

## 2023-01-01 RX ORDER — DIVALPROEX SODIUM 500 MG/1
500 TABLET, DELAYED RELEASE ORAL AT BEDTIME
Refills: 0 | Status: DISCONTINUED | OUTPATIENT
Start: 2023-01-01 | End: 2023-01-01

## 2023-01-01 RX ORDER — ACETAMINOPHEN 500 MG
650 TABLET ORAL EVERY 6 HOURS
Refills: 0 | Status: DISCONTINUED | OUTPATIENT
Start: 2023-01-01 | End: 2023-01-01

## 2023-01-01 RX ORDER — RIVAROXABAN 15 MG-20MG
20 KIT ORAL
Refills: 0 | Status: DISCONTINUED | OUTPATIENT
Start: 2023-01-01 | End: 2023-01-01

## 2023-01-01 RX ORDER — ENOXAPARIN SODIUM 100 MG/ML
64 INJECTION SUBCUTANEOUS EVERY 12 HOURS
Refills: 0 | Status: DISCONTINUED | OUTPATIENT
Start: 2023-01-01 | End: 2023-01-01

## 2023-01-01 RX ORDER — ONDANSETRON 8 MG/1
4 TABLET, FILM COATED ORAL EVERY 8 HOURS
Refills: 0 | Status: DISCONTINUED | OUTPATIENT
Start: 2023-01-01 | End: 2023-01-01

## 2023-01-01 RX ORDER — VALPROIC ACID (AS SODIUM SALT) 250 MG/5ML
1000 SOLUTION, ORAL ORAL
Refills: 0 | Status: DISCONTINUED | OUTPATIENT
Start: 2023-01-01 | End: 2023-01-01

## 2023-01-01 RX ORDER — POLYETHYLENE GLYCOL 3350 17 G/17G
17 POWDER, FOR SOLUTION ORAL
Refills: 0 | Status: DISCONTINUED | OUTPATIENT
Start: 2023-01-01 | End: 2023-01-01

## 2023-01-01 RX ORDER — FUROSEMIDE 40 MG
20 TABLET ORAL DAILY
Refills: 0 | Status: COMPLETED | OUTPATIENT
Start: 2023-01-01 | End: 2023-01-01

## 2023-01-01 RX ORDER — SODIUM CHLORIDE 9 MG/ML
500 INJECTION INTRAMUSCULAR; INTRAVENOUS; SUBCUTANEOUS ONCE
Refills: 0 | Status: COMPLETED | OUTPATIENT
Start: 2023-01-01 | End: 2023-01-01

## 2023-01-01 RX ORDER — MAGNESIUM SULFATE 500 MG/ML
1 VIAL (ML) INJECTION ONCE
Refills: 0 | Status: DISCONTINUED | OUTPATIENT
Start: 2023-01-01 | End: 2023-01-01

## 2023-01-01 RX ORDER — PANTOPRAZOLE SODIUM 20 MG/1
40 TABLET, DELAYED RELEASE ORAL
Qty: 0 | Refills: 0 | DISCHARGE
Start: 2023-01-01

## 2023-01-01 RX ORDER — PIPERACILLIN AND TAZOBACTAM 4; .5 G/20ML; G/20ML
3.38 INJECTION, POWDER, LYOPHILIZED, FOR SOLUTION INTRAVENOUS EVERY 8 HOURS
Refills: 0 | Status: DISCONTINUED | OUTPATIENT
Start: 2023-01-01 | End: 2023-01-01

## 2023-01-01 RX ORDER — ENOXAPARIN SODIUM 100 MG/ML
74 INJECTION SUBCUTANEOUS EVERY 12 HOURS
Refills: 0 | Status: DISCONTINUED | OUTPATIENT
Start: 2023-01-01 | End: 2023-01-01

## 2023-01-01 RX ORDER — SODIUM,POTASSIUM PHOSPHATES 278-250MG
1 POWDER IN PACKET (EA) ORAL THREE TIMES A DAY
Refills: 0 | Status: COMPLETED | OUTPATIENT
Start: 2023-01-01 | End: 2023-01-01

## 2023-01-01 RX ORDER — POTASSIUM CHLORIDE 20 MEQ
20 PACKET (EA) ORAL
Refills: 0 | Status: COMPLETED | OUTPATIENT
Start: 2023-01-01 | End: 2023-01-01

## 2023-01-01 RX ADMIN — Medication 1 TABLET(S): at 11:53

## 2023-01-01 RX ADMIN — Medication 100 MILLIEQUIVALENT(S): at 05:23

## 2023-01-01 RX ADMIN — CHLORHEXIDINE GLUCONATE 1 APPLICATION(S): 213 SOLUTION TOPICAL at 17:29

## 2023-01-01 RX ADMIN — Medication 55 MILLIGRAM(S): at 18:16

## 2023-01-01 RX ADMIN — Medication 100 MILLIEQUIVALENT(S): at 10:19

## 2023-01-01 RX ADMIN — AMLODIPINE BESYLATE 10 MILLIGRAM(S): 2.5 TABLET ORAL at 05:28

## 2023-01-01 RX ADMIN — Medication 50 MILLILITER(S): at 06:08

## 2023-01-01 RX ADMIN — Medication 1 APPLICATION(S): at 13:06

## 2023-01-01 RX ADMIN — Medication 55 MILLIGRAM(S): at 11:25

## 2023-01-01 RX ADMIN — Medication 100 MILLIGRAM(S): at 11:50

## 2023-01-01 RX ADMIN — ENOXAPARIN SODIUM 65 MILLIGRAM(S): 100 INJECTION SUBCUTANEOUS at 06:13

## 2023-01-01 RX ADMIN — Medication 55 MILLIGRAM(S): at 12:31

## 2023-01-01 RX ADMIN — Medication 1 SPRAY(S): at 22:43

## 2023-01-01 RX ADMIN — Medication 1 APPLICATION(S): at 12:28

## 2023-01-01 RX ADMIN — Medication 100 MILLIEQUIVALENT(S): at 17:00

## 2023-01-01 RX ADMIN — Medication 55 MILLIGRAM(S): at 11:26

## 2023-01-01 RX ADMIN — LEVETIRACETAM 400 MILLIGRAM(S): 250 TABLET, FILM COATED ORAL at 05:04

## 2023-01-01 RX ADMIN — Medication 50 MILLILITER(S): at 14:11

## 2023-01-01 RX ADMIN — Medication 55 MILLIGRAM(S): at 00:04

## 2023-01-01 RX ADMIN — ENOXAPARIN SODIUM 65 MILLIGRAM(S): 100 INJECTION SUBCUTANEOUS at 05:07

## 2023-01-01 RX ADMIN — APIXABAN 5 MILLIGRAM(S): 2.5 TABLET, FILM COATED ORAL at 06:18

## 2023-01-01 RX ADMIN — Medication 88 MICROGRAM(S): at 05:25

## 2023-01-01 RX ADMIN — ENOXAPARIN SODIUM 65 MILLIGRAM(S): 100 INJECTION SUBCUTANEOUS at 05:01

## 2023-01-01 RX ADMIN — AMLODIPINE BESYLATE 10 MILLIGRAM(S): 2.5 TABLET ORAL at 05:26

## 2023-01-01 RX ADMIN — LEVETIRACETAM 400 MILLIGRAM(S): 250 TABLET, FILM COATED ORAL at 05:25

## 2023-01-01 RX ADMIN — CEFTRIAXONE 100 MILLIGRAM(S): 500 INJECTION, POWDER, FOR SOLUTION INTRAMUSCULAR; INTRAVENOUS at 12:33

## 2023-01-01 RX ADMIN — LEVETIRACETAM 400 MILLIGRAM(S): 250 TABLET, FILM COATED ORAL at 20:17

## 2023-01-01 RX ADMIN — Medication 50 MILLILITER(S): at 23:02

## 2023-01-01 RX ADMIN — ENOXAPARIN SODIUM 65 MILLIGRAM(S): 100 INJECTION SUBCUTANEOUS at 05:10

## 2023-01-01 RX ADMIN — Medication 50 MILLIEQUIVALENT(S): at 15:00

## 2023-01-01 RX ADMIN — AMLODIPINE BESYLATE 10 MILLIGRAM(S): 2.5 TABLET ORAL at 05:30

## 2023-01-01 RX ADMIN — SODIUM CHLORIDE 75 MILLILITER(S): 9 INJECTION, SOLUTION INTRAVENOUS at 14:23

## 2023-01-01 RX ADMIN — ENOXAPARIN SODIUM 65 MILLIGRAM(S): 100 INJECTION SUBCUTANEOUS at 17:24

## 2023-01-01 RX ADMIN — MUPIROCIN 1 APPLICATION(S): 20 OINTMENT TOPICAL at 05:12

## 2023-01-01 RX ADMIN — AMLODIPINE BESYLATE 10 MILLIGRAM(S): 2.5 TABLET ORAL at 05:14

## 2023-01-01 RX ADMIN — LEVETIRACETAM 400 MILLIGRAM(S): 250 TABLET, FILM COATED ORAL at 05:39

## 2023-01-01 RX ADMIN — Medication 70 MICROGRAM(S): at 06:13

## 2023-01-01 RX ADMIN — Medication 55 MILLIGRAM(S): at 11:58

## 2023-01-01 RX ADMIN — AMLODIPINE BESYLATE 5 MILLIGRAM(S): 2.5 TABLET ORAL at 06:14

## 2023-01-01 RX ADMIN — Medication 500 MILLIGRAM(S): at 12:16

## 2023-01-01 RX ADMIN — Medication 55 MILLIGRAM(S): at 02:41

## 2023-01-01 RX ADMIN — PANTOPRAZOLE SODIUM 40 MILLIGRAM(S): 20 TABLET, DELAYED RELEASE ORAL at 05:38

## 2023-01-01 RX ADMIN — ENOXAPARIN SODIUM 65 MILLIGRAM(S): 100 INJECTION SUBCUTANEOUS at 17:56

## 2023-01-01 RX ADMIN — Medication 55 MILLIGRAM(S): at 12:53

## 2023-01-01 RX ADMIN — Medication 55 MILLIGRAM(S): at 22:44

## 2023-01-01 RX ADMIN — Medication 55 MILLIGRAM(S): at 17:43

## 2023-01-01 RX ADMIN — Medication 20 MILLIGRAM(S): at 08:44

## 2023-01-01 RX ADMIN — POTASSIUM PHOSPHATE, MONOBASIC POTASSIUM PHOSPHATE, DIBASIC 62.5 MILLIMOLE(S): 236; 224 INJECTION, SOLUTION INTRAVENOUS at 09:42

## 2023-01-01 RX ADMIN — Medication 100 MILLIGRAM(S): at 12:11

## 2023-01-01 RX ADMIN — DIVALPROEX SODIUM 500 MILLIGRAM(S): 500 TABLET, DELAYED RELEASE ORAL at 12:11

## 2023-01-01 RX ADMIN — ENOXAPARIN SODIUM 65 MILLIGRAM(S): 100 INJECTION SUBCUTANEOUS at 18:13

## 2023-01-01 RX ADMIN — Medication 70 MICROGRAM(S): at 13:57

## 2023-01-01 RX ADMIN — Medication 55 MILLIGRAM(S): at 23:17

## 2023-01-01 RX ADMIN — LEVETIRACETAM 400 MILLIGRAM(S): 250 TABLET, FILM COATED ORAL at 05:27

## 2023-01-01 RX ADMIN — Medication 20 MILLIGRAM(S): at 05:19

## 2023-01-01 RX ADMIN — Medication 100 MILLIEQUIVALENT(S): at 08:55

## 2023-01-01 RX ADMIN — Medication 4 MILLIGRAM(S): at 05:50

## 2023-01-01 RX ADMIN — QUETIAPINE FUMARATE 75 MILLIGRAM(S): 200 TABLET, FILM COATED ORAL at 21:54

## 2023-01-01 RX ADMIN — DIVALPROEX SODIUM 500 MILLIGRAM(S): 500 TABLET, DELAYED RELEASE ORAL at 12:40

## 2023-01-01 RX ADMIN — CHLORHEXIDINE GLUCONATE 1 APPLICATION(S): 213 SOLUTION TOPICAL at 17:40

## 2023-01-01 RX ADMIN — LEVETIRACETAM 500 MILLIGRAM(S): 250 TABLET, FILM COATED ORAL at 17:25

## 2023-01-01 RX ADMIN — Medication 70 MICROGRAM(S): at 07:33

## 2023-01-01 RX ADMIN — CHLORHEXIDINE GLUCONATE 1 APPLICATION(S): 213 SOLUTION TOPICAL at 13:10

## 2023-01-01 RX ADMIN — Medication 20 MILLIGRAM(S): at 17:45

## 2023-01-01 RX ADMIN — SODIUM CHLORIDE 60 MILLILITER(S): 9 INJECTION, SOLUTION INTRAVENOUS at 06:01

## 2023-01-01 RX ADMIN — LEVETIRACETAM 400 MILLIGRAM(S): 250 TABLET, FILM COATED ORAL at 21:31

## 2023-01-01 RX ADMIN — Medication 1 SPRAY(S): at 14:31

## 2023-01-01 RX ADMIN — QUETIAPINE FUMARATE 75 MILLIGRAM(S): 200 TABLET, FILM COATED ORAL at 21:17

## 2023-01-01 RX ADMIN — LEVETIRACETAM 400 MILLIGRAM(S): 250 TABLET, FILM COATED ORAL at 17:40

## 2023-01-01 RX ADMIN — LEVETIRACETAM 400 MILLIGRAM(S): 250 TABLET, FILM COATED ORAL at 17:56

## 2023-01-01 RX ADMIN — Medication 55 MILLIGRAM(S): at 19:15

## 2023-01-01 RX ADMIN — Medication 1 APPLICATION(S): at 11:08

## 2023-01-01 RX ADMIN — APIXABAN 5 MILLIGRAM(S): 2.5 TABLET, FILM COATED ORAL at 17:37

## 2023-01-01 RX ADMIN — Medication 55 MILLIGRAM(S): at 17:12

## 2023-01-01 RX ADMIN — ENOXAPARIN SODIUM 65 MILLIGRAM(S): 100 INJECTION SUBCUTANEOUS at 05:20

## 2023-01-01 RX ADMIN — SODIUM CHLORIDE 50 MILLILITER(S): 9 INJECTION, SOLUTION INTRAVENOUS at 16:02

## 2023-01-01 RX ADMIN — Medication 1 SPRAY(S): at 01:38

## 2023-01-01 RX ADMIN — LEVETIRACETAM 400 MILLIGRAM(S): 250 TABLET, FILM COATED ORAL at 17:39

## 2023-01-01 RX ADMIN — Medication 55 MILLIGRAM(S): at 11:33

## 2023-01-01 RX ADMIN — Medication 100 MILLIGRAM(S): at 17:06

## 2023-01-01 RX ADMIN — LEVETIRACETAM 400 MILLIGRAM(S): 250 TABLET, FILM COATED ORAL at 18:03

## 2023-01-01 RX ADMIN — Medication 55 MILLIGRAM(S): at 06:18

## 2023-01-01 RX ADMIN — DIVALPROEX SODIUM 500 MILLIGRAM(S): 500 TABLET, DELAYED RELEASE ORAL at 12:05

## 2023-01-01 RX ADMIN — DIVALPROEX SODIUM 500 MILLIGRAM(S): 500 TABLET, DELAYED RELEASE ORAL at 11:45

## 2023-01-01 RX ADMIN — PIPERACILLIN AND TAZOBACTAM 25 GRAM(S): 4; .5 INJECTION, POWDER, LYOPHILIZED, FOR SOLUTION INTRAVENOUS at 21:16

## 2023-01-01 RX ADMIN — SODIUM CHLORIDE 75 MILLILITER(S): 9 INJECTION, SOLUTION INTRAVENOUS at 18:53

## 2023-01-01 RX ADMIN — LEVETIRACETAM 500 MILLIGRAM(S): 250 TABLET, FILM COATED ORAL at 17:58

## 2023-01-01 RX ADMIN — DIVALPROEX SODIUM 750 MILLIGRAM(S): 500 TABLET, DELAYED RELEASE ORAL at 21:11

## 2023-01-01 RX ADMIN — Medication 1 APPLICATION(S): at 11:33

## 2023-01-01 RX ADMIN — ENOXAPARIN SODIUM 65 MILLIGRAM(S): 100 INJECTION SUBCUTANEOUS at 17:36

## 2023-01-01 RX ADMIN — AMLODIPINE BESYLATE 10 MILLIGRAM(S): 2.5 TABLET ORAL at 05:43

## 2023-01-01 RX ADMIN — POTASSIUM PHOSPHATE, MONOBASIC POTASSIUM PHOSPHATE, DIBASIC 83.33 MILLIMOLE(S): 236; 224 INJECTION, SOLUTION INTRAVENOUS at 12:05

## 2023-01-01 RX ADMIN — SODIUM CHLORIDE 50 MILLILITER(S): 9 INJECTION, SOLUTION INTRAVENOUS at 09:42

## 2023-01-01 RX ADMIN — Medication 55 MILLIGRAM(S): at 08:41

## 2023-01-01 RX ADMIN — Medication 50 MILLILITER(S): at 13:55

## 2023-01-01 RX ADMIN — PIPERACILLIN AND TAZOBACTAM 25 GRAM(S): 4; .5 INJECTION, POWDER, LYOPHILIZED, FOR SOLUTION INTRAVENOUS at 13:06

## 2023-01-01 RX ADMIN — Medication 55 MILLIGRAM(S): at 05:12

## 2023-01-01 RX ADMIN — ENOXAPARIN SODIUM 70 MILLIGRAM(S): 100 INJECTION SUBCUTANEOUS at 17:24

## 2023-01-01 RX ADMIN — AMLODIPINE BESYLATE 10 MILLIGRAM(S): 2.5 TABLET ORAL at 06:38

## 2023-01-01 RX ADMIN — Medication 55 MILLIGRAM(S): at 23:15

## 2023-01-01 RX ADMIN — PANTOPRAZOLE SODIUM 40 MILLIGRAM(S): 20 TABLET, DELAYED RELEASE ORAL at 05:34

## 2023-01-01 RX ADMIN — AMLODIPINE BESYLATE 10 MILLIGRAM(S): 2.5 TABLET ORAL at 14:19

## 2023-01-01 RX ADMIN — Medication 55 MILLIGRAM(S): at 23:28

## 2023-01-01 RX ADMIN — Medication 55 MILLIGRAM(S): at 06:13

## 2023-01-01 RX ADMIN — ENOXAPARIN SODIUM 65 MILLIGRAM(S): 100 INJECTION SUBCUTANEOUS at 18:26

## 2023-01-01 RX ADMIN — Medication 100 MILLIEQUIVALENT(S): at 11:27

## 2023-01-01 RX ADMIN — LEVETIRACETAM 400 MILLIGRAM(S): 250 TABLET, FILM COATED ORAL at 17:30

## 2023-01-01 RX ADMIN — QUETIAPINE FUMARATE 50 MILLIGRAM(S): 200 TABLET, FILM COATED ORAL at 21:51

## 2023-01-01 RX ADMIN — SODIUM CHLORIDE 65 MILLILITER(S): 9 INJECTION, SOLUTION INTRAVENOUS at 08:56

## 2023-01-01 RX ADMIN — Medication 55 MILLIGRAM(S): at 06:30

## 2023-01-01 RX ADMIN — LEVETIRACETAM 400 MILLIGRAM(S): 250 TABLET, FILM COATED ORAL at 05:10

## 2023-01-01 RX ADMIN — ENOXAPARIN SODIUM 65 MILLIGRAM(S): 100 INJECTION SUBCUTANEOUS at 06:14

## 2023-01-01 RX ADMIN — Medication 100 MILLIGRAM(S): at 11:04

## 2023-01-01 RX ADMIN — LEVETIRACETAM 400 MILLIGRAM(S): 250 TABLET, FILM COATED ORAL at 17:24

## 2023-01-01 RX ADMIN — Medication 55 MILLIGRAM(S): at 00:33

## 2023-01-01 RX ADMIN — Medication 55 MILLIGRAM(S): at 18:22

## 2023-01-01 RX ADMIN — Medication 55 MILLIGRAM(S): at 11:06

## 2023-01-01 RX ADMIN — LEVETIRACETAM 400 MILLIGRAM(S): 250 TABLET, FILM COATED ORAL at 05:22

## 2023-01-01 RX ADMIN — SODIUM CHLORIDE 75 MILLILITER(S): 9 INJECTION INTRAMUSCULAR; INTRAVENOUS; SUBCUTANEOUS at 00:17

## 2023-01-01 RX ADMIN — APIXABAN 5 MILLIGRAM(S): 2.5 TABLET, FILM COATED ORAL at 17:52

## 2023-01-01 RX ADMIN — Medication 70 MICROGRAM(S): at 05:12

## 2023-01-01 RX ADMIN — Medication 55 MILLIGRAM(S): at 23:56

## 2023-01-01 RX ADMIN — AMLODIPINE BESYLATE 10 MILLIGRAM(S): 2.5 TABLET ORAL at 05:53

## 2023-01-01 RX ADMIN — Medication 55 MILLIGRAM(S): at 06:16

## 2023-01-01 RX ADMIN — DIVALPROEX SODIUM 750 MILLIGRAM(S): 500 TABLET, DELAYED RELEASE ORAL at 21:17

## 2023-01-01 RX ADMIN — APIXABAN 5 MILLIGRAM(S): 2.5 TABLET, FILM COATED ORAL at 17:49

## 2023-01-01 RX ADMIN — Medication 50 MILLIEQUIVALENT(S): at 19:15

## 2023-01-01 RX ADMIN — RIVAROXABAN 10 MILLIGRAM(S): KIT at 18:02

## 2023-01-01 RX ADMIN — Medication 55 MILLIGRAM(S): at 12:25

## 2023-01-01 RX ADMIN — DIVALPROEX SODIUM 750 MILLIGRAM(S): 500 TABLET, DELAYED RELEASE ORAL at 21:49

## 2023-01-01 RX ADMIN — AMLODIPINE BESYLATE 10 MILLIGRAM(S): 2.5 TABLET ORAL at 05:50

## 2023-01-01 RX ADMIN — Medication 55 MILLIGRAM(S): at 05:46

## 2023-01-01 RX ADMIN — Medication 20 MILLIGRAM(S): at 06:26

## 2023-01-01 RX ADMIN — DIVALPROEX SODIUM 1000 MILLIGRAM(S): 500 TABLET, DELAYED RELEASE ORAL at 05:35

## 2023-01-01 RX ADMIN — Medication 100 MILLIEQUIVALENT(S): at 09:59

## 2023-01-01 RX ADMIN — Medication 55 MILLIGRAM(S): at 05:10

## 2023-01-01 RX ADMIN — LEVETIRACETAM 400 MILLIGRAM(S): 250 TABLET, FILM COATED ORAL at 05:11

## 2023-01-01 RX ADMIN — Medication 50 MILLIEQUIVALENT(S): at 17:47

## 2023-01-01 RX ADMIN — Medication 100 MILLIGRAM(S): at 11:27

## 2023-01-01 RX ADMIN — Medication 1 TABLET(S): at 11:12

## 2023-01-01 RX ADMIN — CEFTRIAXONE 100 MILLIGRAM(S): 500 INJECTION, POWDER, FOR SOLUTION INTRAMUSCULAR; INTRAVENOUS at 13:51

## 2023-01-01 RX ADMIN — APIXABAN 5 MILLIGRAM(S): 2.5 TABLET, FILM COATED ORAL at 17:20

## 2023-01-01 RX ADMIN — LEVETIRACETAM 400 MILLIGRAM(S): 250 TABLET, FILM COATED ORAL at 17:32

## 2023-01-01 RX ADMIN — POLYETHYLENE GLYCOL 3350 17 GRAM(S): 17 POWDER, FOR SOLUTION ORAL at 17:30

## 2023-01-01 RX ADMIN — Medication 20 MILLIGRAM(S): at 04:20

## 2023-01-01 RX ADMIN — Medication 25 GRAM(S): at 16:05

## 2023-01-01 RX ADMIN — LEVETIRACETAM 400 MILLIGRAM(S): 250 TABLET, FILM COATED ORAL at 17:03

## 2023-01-01 RX ADMIN — ENOXAPARIN SODIUM 65 MILLIGRAM(S): 100 INJECTION SUBCUTANEOUS at 07:41

## 2023-01-01 RX ADMIN — CHLORHEXIDINE GLUCONATE 1 APPLICATION(S): 213 SOLUTION TOPICAL at 12:06

## 2023-01-01 RX ADMIN — POTASSIUM PHOSPHATE, MONOBASIC POTASSIUM PHOSPHATE, DIBASIC 83.33 MILLIMOLE(S): 236; 224 INJECTION, SOLUTION INTRAVENOUS at 09:26

## 2023-01-01 RX ADMIN — Medication 100 MILLIGRAM(S): at 11:59

## 2023-01-01 RX ADMIN — Medication 12.5 GRAM(S): at 07:39

## 2023-01-01 RX ADMIN — SODIUM CHLORIDE 75 MILLILITER(S): 9 INJECTION INTRAMUSCULAR; INTRAVENOUS; SUBCUTANEOUS at 12:48

## 2023-01-01 RX ADMIN — Medication 30 MILLILITER(S): at 12:31

## 2023-01-01 RX ADMIN — QUETIAPINE FUMARATE 75 MILLIGRAM(S): 200 TABLET, FILM COATED ORAL at 21:55

## 2023-01-01 RX ADMIN — Medication 88 MICROGRAM(S): at 05:20

## 2023-01-01 RX ADMIN — Medication 50 MILLILITER(S): at 22:45

## 2023-01-01 RX ADMIN — ENOXAPARIN SODIUM 65 MILLIGRAM(S): 100 INJECTION SUBCUTANEOUS at 17:10

## 2023-01-01 RX ADMIN — DIVALPROEX SODIUM 500 MILLIGRAM(S): 500 TABLET, DELAYED RELEASE ORAL at 23:07

## 2023-01-01 RX ADMIN — LEVETIRACETAM 400 MILLIGRAM(S): 250 TABLET, FILM COATED ORAL at 18:19

## 2023-01-01 RX ADMIN — Medication 55 MILLIGRAM(S): at 05:19

## 2023-01-01 RX ADMIN — Medication 55 MILLIGRAM(S): at 00:06

## 2023-01-01 RX ADMIN — Medication 1 TABLET(S): at 11:20

## 2023-01-01 RX ADMIN — Medication 55 MILLIGRAM(S): at 12:29

## 2023-01-01 RX ADMIN — SODIUM CHLORIDE 60 MILLILITER(S): 9 INJECTION, SOLUTION INTRAVENOUS at 13:00

## 2023-01-01 RX ADMIN — Medication 55 MILLIGRAM(S): at 14:32

## 2023-01-01 RX ADMIN — DIVALPROEX SODIUM 500 MILLIGRAM(S): 500 TABLET, DELAYED RELEASE ORAL at 21:52

## 2023-01-01 RX ADMIN — Medication 55 MILLIGRAM(S): at 00:11

## 2023-01-01 RX ADMIN — Medication 100 MILLIEQUIVALENT(S): at 14:48

## 2023-01-01 RX ADMIN — Medication 70 MICROGRAM(S): at 05:33

## 2023-01-01 RX ADMIN — Medication 88 MICROGRAM(S): at 05:28

## 2023-01-01 RX ADMIN — QUETIAPINE FUMARATE 75 MILLIGRAM(S): 200 TABLET, FILM COATED ORAL at 21:49

## 2023-01-01 RX ADMIN — POTASSIUM PHOSPHATE, MONOBASIC POTASSIUM PHOSPHATE, DIBASIC 83.33 MILLIMOLE(S): 236; 224 INJECTION, SOLUTION INTRAVENOUS at 10:06

## 2023-01-01 RX ADMIN — Medication 70 MICROGRAM(S): at 05:29

## 2023-01-01 RX ADMIN — PIPERACILLIN AND TAZOBACTAM 25 GRAM(S): 4; .5 INJECTION, POWDER, LYOPHILIZED, FOR SOLUTION INTRAVENOUS at 18:12

## 2023-01-01 RX ADMIN — CHLORHEXIDINE GLUCONATE 1 APPLICATION(S): 213 SOLUTION TOPICAL at 12:44

## 2023-01-01 RX ADMIN — Medication 55 MILLIGRAM(S): at 12:26

## 2023-01-01 RX ADMIN — Medication 1 SPRAY(S): at 02:53

## 2023-01-01 RX ADMIN — LEVETIRACETAM 500 MILLIGRAM(S): 250 TABLET, FILM COATED ORAL at 18:10

## 2023-01-01 RX ADMIN — SODIUM CHLORIDE 50 MILLILITER(S): 9 INJECTION, SOLUTION INTRAVENOUS at 08:10

## 2023-01-01 RX ADMIN — Medication 88 MICROGRAM(S): at 06:18

## 2023-01-01 RX ADMIN — Medication 100 MILLIGRAM(S): at 11:57

## 2023-01-01 RX ADMIN — Medication 100 MILLIEQUIVALENT(S): at 20:51

## 2023-01-01 RX ADMIN — CEFTRIAXONE 100 MILLIGRAM(S): 500 INJECTION, POWDER, FOR SOLUTION INTRAMUSCULAR; INTRAVENOUS at 02:07

## 2023-01-01 RX ADMIN — Medication 55 MILLIGRAM(S): at 17:31

## 2023-01-01 RX ADMIN — Medication 12.5 GRAM(S): at 13:43

## 2023-01-01 RX ADMIN — Medication 1 SPRAY(S): at 17:45

## 2023-01-01 RX ADMIN — Medication 55 MILLIGRAM(S): at 23:49

## 2023-01-01 RX ADMIN — LEVETIRACETAM 400 MILLIGRAM(S): 250 TABLET, FILM COATED ORAL at 05:30

## 2023-01-01 RX ADMIN — ENOXAPARIN SODIUM 70 MILLIGRAM(S): 100 INJECTION SUBCUTANEOUS at 18:38

## 2023-01-01 RX ADMIN — CHLORHEXIDINE GLUCONATE 1 APPLICATION(S): 213 SOLUTION TOPICAL at 11:33

## 2023-01-01 RX ADMIN — Medication 20 MILLIGRAM(S): at 15:45

## 2023-01-01 RX ADMIN — Medication 70 MICROGRAM(S): at 06:45

## 2023-01-01 RX ADMIN — DIVALPROEX SODIUM 500 MILLIGRAM(S): 500 TABLET, DELAYED RELEASE ORAL at 22:06

## 2023-01-01 RX ADMIN — Medication 1 SPRAY(S): at 10:19

## 2023-01-01 RX ADMIN — Medication 40 MILLIGRAM(S): at 17:53

## 2023-01-01 RX ADMIN — Medication 55 MILLIGRAM(S): at 23:42

## 2023-01-01 RX ADMIN — MUPIROCIN 1 APPLICATION(S): 20 OINTMENT TOPICAL at 18:32

## 2023-01-01 RX ADMIN — SODIUM CHLORIDE 75 MILLILITER(S): 9 INJECTION INTRAMUSCULAR; INTRAVENOUS; SUBCUTANEOUS at 01:49

## 2023-01-01 RX ADMIN — ENOXAPARIN SODIUM 65 MILLIGRAM(S): 100 INJECTION SUBCUTANEOUS at 18:31

## 2023-01-01 RX ADMIN — QUETIAPINE FUMARATE 50 MILLIGRAM(S): 200 TABLET, FILM COATED ORAL at 22:09

## 2023-01-01 RX ADMIN — LEVETIRACETAM 500 MILLIGRAM(S): 250 TABLET, FILM COATED ORAL at 06:37

## 2023-01-01 RX ADMIN — Medication 55 MILLIGRAM(S): at 00:19

## 2023-01-01 RX ADMIN — Medication 100 MILLIGRAM(S): at 11:53

## 2023-01-01 RX ADMIN — POLYETHYLENE GLYCOL 3350 17 GRAM(S): 17 POWDER, FOR SOLUTION ORAL at 05:37

## 2023-01-01 RX ADMIN — Medication 55 MILLIGRAM(S): at 19:14

## 2023-01-01 RX ADMIN — AMLODIPINE BESYLATE 10 MILLIGRAM(S): 2.5 TABLET ORAL at 05:34

## 2023-01-01 RX ADMIN — PANTOPRAZOLE SODIUM 40 MILLIGRAM(S): 20 TABLET, DELAYED RELEASE ORAL at 06:21

## 2023-01-01 RX ADMIN — Medication 55 MILLIGRAM(S): at 13:53

## 2023-01-01 RX ADMIN — Medication 88 MICROGRAM(S): at 05:29

## 2023-01-01 RX ADMIN — LEVETIRACETAM 400 MILLIGRAM(S): 250 TABLET, FILM COATED ORAL at 17:04

## 2023-01-01 RX ADMIN — ENOXAPARIN SODIUM 65 MILLIGRAM(S): 100 INJECTION SUBCUTANEOUS at 17:32

## 2023-01-01 RX ADMIN — LEVETIRACETAM 400 MILLIGRAM(S): 250 TABLET, FILM COATED ORAL at 17:57

## 2023-01-01 RX ADMIN — MUPIROCIN 1 APPLICATION(S): 20 OINTMENT TOPICAL at 17:13

## 2023-01-01 RX ADMIN — APIXABAN 5 MILLIGRAM(S): 2.5 TABLET, FILM COATED ORAL at 05:20

## 2023-01-01 RX ADMIN — SODIUM CHLORIDE 75 MILLILITER(S): 9 INJECTION INTRAMUSCULAR; INTRAVENOUS; SUBCUTANEOUS at 19:23

## 2023-01-01 RX ADMIN — PANTOPRAZOLE SODIUM 40 MILLIGRAM(S): 20 TABLET, DELAYED RELEASE ORAL at 06:18

## 2023-01-01 RX ADMIN — QUETIAPINE FUMARATE 75 MILLIGRAM(S): 200 TABLET, FILM COATED ORAL at 21:02

## 2023-01-01 RX ADMIN — Medication 55 MILLIGRAM(S): at 06:20

## 2023-01-01 RX ADMIN — QUETIAPINE FUMARATE 50 MILLIGRAM(S): 200 TABLET, FILM COATED ORAL at 22:16

## 2023-01-01 RX ADMIN — SODIUM CHLORIDE 60 MILLILITER(S): 9 INJECTION, SOLUTION INTRAVENOUS at 05:45

## 2023-01-01 RX ADMIN — Medication 1 TABLET(S): at 12:39

## 2023-01-01 RX ADMIN — QUETIAPINE FUMARATE 50 MILLIGRAM(S): 200 TABLET, FILM COATED ORAL at 23:06

## 2023-01-01 RX ADMIN — CHLORHEXIDINE GLUCONATE 1 APPLICATION(S): 213 SOLUTION TOPICAL at 11:27

## 2023-01-01 RX ADMIN — LEVETIRACETAM 400 MILLIGRAM(S): 250 TABLET, FILM COATED ORAL at 19:06

## 2023-01-01 RX ADMIN — Medication 55 MILLIGRAM(S): at 15:11

## 2023-01-01 RX ADMIN — QUETIAPINE FUMARATE 75 MILLIGRAM(S): 200 TABLET, FILM COATED ORAL at 21:14

## 2023-01-01 RX ADMIN — PIPERACILLIN AND TAZOBACTAM 25 GRAM(S): 4; .5 INJECTION, POWDER, LYOPHILIZED, FOR SOLUTION INTRAVENOUS at 05:13

## 2023-01-01 RX ADMIN — DIVALPROEX SODIUM 500 MILLIGRAM(S): 500 TABLET, DELAYED RELEASE ORAL at 13:20

## 2023-01-01 RX ADMIN — Medication 1 SPRAY(S): at 14:04

## 2023-01-01 RX ADMIN — Medication 1 SPRAY(S): at 10:54

## 2023-01-01 RX ADMIN — Medication 88 MICROGRAM(S): at 06:40

## 2023-01-01 RX ADMIN — MUPIROCIN 1 APPLICATION(S): 20 OINTMENT TOPICAL at 05:34

## 2023-01-01 RX ADMIN — LEVETIRACETAM 400 MILLIGRAM(S): 250 TABLET, FILM COATED ORAL at 18:30

## 2023-01-01 RX ADMIN — Medication 20 MILLIGRAM(S): at 14:32

## 2023-01-01 RX ADMIN — Medication 55 MILLIGRAM(S): at 05:30

## 2023-01-01 RX ADMIN — CHLORHEXIDINE GLUCONATE 1 APPLICATION(S): 213 SOLUTION TOPICAL at 18:16

## 2023-01-01 RX ADMIN — Medication 70 MICROGRAM(S): at 05:21

## 2023-01-01 RX ADMIN — Medication 55 MILLIGRAM(S): at 03:45

## 2023-01-01 RX ADMIN — SODIUM CHLORIDE 50 MILLILITER(S): 9 INJECTION, SOLUTION INTRAVENOUS at 15:14

## 2023-01-01 RX ADMIN — Medication 88 MICROGRAM(S): at 05:34

## 2023-01-01 RX ADMIN — ENOXAPARIN SODIUM 65 MILLIGRAM(S): 100 INJECTION SUBCUTANEOUS at 05:13

## 2023-01-01 RX ADMIN — Medication 500 MILLIGRAM(S): at 18:10

## 2023-01-01 RX ADMIN — APIXABAN 5 MILLIGRAM(S): 2.5 TABLET, FILM COATED ORAL at 18:23

## 2023-01-01 RX ADMIN — SODIUM CHLORIDE 1000 MILLILITER(S): 9 INJECTION, SOLUTION INTRAVENOUS at 04:59

## 2023-01-01 RX ADMIN — QUETIAPINE FUMARATE 75 MILLIGRAM(S): 200 TABLET, FILM COATED ORAL at 21:05

## 2023-01-01 RX ADMIN — Medication 55 MILLIGRAM(S): at 05:45

## 2023-01-01 RX ADMIN — Medication 70 MICROGRAM(S): at 05:23

## 2023-01-01 RX ADMIN — ENOXAPARIN SODIUM 65 MILLIGRAM(S): 100 INJECTION SUBCUTANEOUS at 05:43

## 2023-01-01 RX ADMIN — QUETIAPINE FUMARATE 75 MILLIGRAM(S): 200 TABLET, FILM COATED ORAL at 21:10

## 2023-01-01 RX ADMIN — Medication 70 MICROGRAM(S): at 06:44

## 2023-01-01 RX ADMIN — PANTOPRAZOLE SODIUM 40 MILLIGRAM(S): 20 TABLET, DELAYED RELEASE ORAL at 06:39

## 2023-01-01 RX ADMIN — Medication 100 MILLIGRAM(S): at 14:01

## 2023-01-01 RX ADMIN — QUETIAPINE FUMARATE 75 MILLIGRAM(S): 200 TABLET, FILM COATED ORAL at 23:07

## 2023-01-01 RX ADMIN — Medication 100 GRAM(S): at 23:14

## 2023-01-01 RX ADMIN — LEVETIRACETAM 400 MILLIGRAM(S): 250 TABLET, FILM COATED ORAL at 17:13

## 2023-01-01 RX ADMIN — LEVETIRACETAM 400 MILLIGRAM(S): 250 TABLET, FILM COATED ORAL at 18:58

## 2023-01-01 RX ADMIN — LEVETIRACETAM 400 MILLIGRAM(S): 250 TABLET, FILM COATED ORAL at 17:36

## 2023-01-01 RX ADMIN — MUPIROCIN 1 APPLICATION(S): 20 OINTMENT TOPICAL at 18:10

## 2023-01-01 RX ADMIN — Medication 3 MILLIGRAM(S): at 22:17

## 2023-01-01 RX ADMIN — CHLORHEXIDINE GLUCONATE 1 APPLICATION(S): 213 SOLUTION TOPICAL at 11:57

## 2023-01-01 RX ADMIN — Medication 55 MILLIGRAM(S): at 11:46

## 2023-01-01 RX ADMIN — Medication 55 MILLIGRAM(S): at 05:47

## 2023-01-01 RX ADMIN — Medication 1 TABLET(S): at 13:37

## 2023-01-01 RX ADMIN — LEVETIRACETAM 400 MILLIGRAM(S): 250 TABLET, FILM COATED ORAL at 18:05

## 2023-01-01 RX ADMIN — Medication 1 APPLICATION(S): at 11:59

## 2023-01-01 RX ADMIN — LEVETIRACETAM 400 MILLIGRAM(S): 250 TABLET, FILM COATED ORAL at 05:18

## 2023-01-01 RX ADMIN — Medication 1 APPLICATION(S): at 12:00

## 2023-01-01 RX ADMIN — Medication 70 MICROGRAM(S): at 06:41

## 2023-01-01 RX ADMIN — QUETIAPINE FUMARATE 75 MILLIGRAM(S): 200 TABLET, FILM COATED ORAL at 23:13

## 2023-01-01 RX ADMIN — Medication 1 SPRAY(S): at 06:39

## 2023-01-01 RX ADMIN — DIVALPROEX SODIUM 500 MILLIGRAM(S): 500 TABLET, DELAYED RELEASE ORAL at 22:17

## 2023-01-01 RX ADMIN — Medication 55 MILLIGRAM(S): at 15:00

## 2023-01-01 RX ADMIN — Medication 55 MILLIGRAM(S): at 18:50

## 2023-01-01 RX ADMIN — PANTOPRAZOLE SODIUM 40 MILLIGRAM(S): 20 TABLET, DELAYED RELEASE ORAL at 06:07

## 2023-01-01 RX ADMIN — APIXABAN 5 MILLIGRAM(S): 2.5 TABLET, FILM COATED ORAL at 05:38

## 2023-01-01 RX ADMIN — MUPIROCIN 1 APPLICATION(S): 20 OINTMENT TOPICAL at 05:48

## 2023-01-01 RX ADMIN — Medication 1 APPLICATION(S): at 11:47

## 2023-01-01 RX ADMIN — Medication 12.5 GRAM(S): at 08:43

## 2023-01-01 RX ADMIN — AMLODIPINE BESYLATE 10 MILLIGRAM(S): 2.5 TABLET ORAL at 05:13

## 2023-01-01 RX ADMIN — Medication 55 MILLIGRAM(S): at 00:18

## 2023-01-01 RX ADMIN — ENOXAPARIN SODIUM 65 MILLIGRAM(S): 100 INJECTION SUBCUTANEOUS at 06:08

## 2023-01-01 RX ADMIN — Medication 55 MILLIGRAM(S): at 14:56

## 2023-01-01 RX ADMIN — Medication 1 APPLICATION(S): at 13:57

## 2023-01-01 RX ADMIN — MUPIROCIN 1 APPLICATION(S): 20 OINTMENT TOPICAL at 17:33

## 2023-01-01 RX ADMIN — Medication 55 MILLIGRAM(S): at 17:54

## 2023-01-01 RX ADMIN — QUETIAPINE FUMARATE 75 MILLIGRAM(S): 200 TABLET, FILM COATED ORAL at 21:28

## 2023-01-01 RX ADMIN — Medication 55 MILLIGRAM(S): at 13:15

## 2023-01-01 RX ADMIN — Medication 100 MILLIGRAM(S): at 14:55

## 2023-01-01 RX ADMIN — CHLORHEXIDINE GLUCONATE 1 APPLICATION(S): 213 SOLUTION TOPICAL at 11:08

## 2023-01-01 RX ADMIN — Medication 20 MILLIGRAM(S): at 23:07

## 2023-01-01 RX ADMIN — Medication 55 MILLIGRAM(S): at 18:18

## 2023-01-01 RX ADMIN — PIPERACILLIN AND TAZOBACTAM 25 GRAM(S): 4; .5 INJECTION, POWDER, LYOPHILIZED, FOR SOLUTION INTRAVENOUS at 05:24

## 2023-01-01 RX ADMIN — SENNA PLUS 2 TABLET(S): 8.6 TABLET ORAL at 21:07

## 2023-01-01 RX ADMIN — Medication 25 GRAM(S): at 11:09

## 2023-01-01 RX ADMIN — LEVETIRACETAM 400 MILLIGRAM(S): 250 TABLET, FILM COATED ORAL at 05:35

## 2023-01-01 RX ADMIN — Medication 20 MILLIGRAM(S): at 13:54

## 2023-01-01 RX ADMIN — Medication 100 MILLIEQUIVALENT(S): at 13:14

## 2023-01-01 RX ADMIN — CHLORHEXIDINE GLUCONATE 1 APPLICATION(S): 213 SOLUTION TOPICAL at 12:32

## 2023-01-01 RX ADMIN — Medication 20 MILLIGRAM(S): at 07:13

## 2023-01-01 RX ADMIN — Medication 100 MILLIGRAM(S): at 12:22

## 2023-01-01 RX ADMIN — Medication 100 MILLIGRAM(S): at 12:47

## 2023-01-01 RX ADMIN — APIXABAN 5 MILLIGRAM(S): 2.5 TABLET, FILM COATED ORAL at 05:37

## 2023-01-01 RX ADMIN — Medication 55 MILLIGRAM(S): at 17:10

## 2023-01-01 RX ADMIN — AMLODIPINE BESYLATE 5 MILLIGRAM(S): 2.5 TABLET ORAL at 05:29

## 2023-01-01 RX ADMIN — CHLORHEXIDINE GLUCONATE 1 APPLICATION(S): 213 SOLUTION TOPICAL at 12:01

## 2023-01-01 RX ADMIN — ENOXAPARIN SODIUM 65 MILLIGRAM(S): 100 INJECTION SUBCUTANEOUS at 05:25

## 2023-01-01 RX ADMIN — DIVALPROEX SODIUM 500 MILLIGRAM(S): 500 TABLET, DELAYED RELEASE ORAL at 11:04

## 2023-01-01 RX ADMIN — CHLORHEXIDINE GLUCONATE 1 APPLICATION(S): 213 SOLUTION TOPICAL at 11:06

## 2023-01-01 RX ADMIN — DIVALPROEX SODIUM 500 MILLIGRAM(S): 500 TABLET, DELAYED RELEASE ORAL at 11:35

## 2023-01-01 RX ADMIN — CHLORHEXIDINE GLUCONATE 1 APPLICATION(S): 213 SOLUTION TOPICAL at 17:05

## 2023-01-01 RX ADMIN — Medication 70 MICROGRAM(S): at 06:11

## 2023-01-01 RX ADMIN — CHLORHEXIDINE GLUCONATE 1 APPLICATION(S): 213 SOLUTION TOPICAL at 11:47

## 2023-01-01 RX ADMIN — PANTOPRAZOLE SODIUM 40 MILLIGRAM(S): 20 TABLET, DELAYED RELEASE ORAL at 06:29

## 2023-01-01 RX ADMIN — Medication 20 MILLIGRAM(S): at 09:21

## 2023-01-01 RX ADMIN — Medication 50 MILLILITER(S): at 21:17

## 2023-01-01 RX ADMIN — DIVALPROEX SODIUM 1000 MILLIGRAM(S): 500 TABLET, DELAYED RELEASE ORAL at 17:25

## 2023-01-01 RX ADMIN — QUETIAPINE FUMARATE 75 MILLIGRAM(S): 200 TABLET, FILM COATED ORAL at 22:39

## 2023-01-01 RX ADMIN — Medication 1 SPRAY(S): at 04:59

## 2023-01-01 RX ADMIN — Medication 1 APPLICATION(S): at 12:53

## 2023-01-01 RX ADMIN — ENOXAPARIN SODIUM 65 MILLIGRAM(S): 100 INJECTION SUBCUTANEOUS at 06:31

## 2023-01-01 RX ADMIN — SODIUM CHLORIDE 60 MILLILITER(S): 9 INJECTION, SOLUTION INTRAVENOUS at 13:12

## 2023-01-01 RX ADMIN — AMLODIPINE BESYLATE 10 MILLIGRAM(S): 2.5 TABLET ORAL at 06:12

## 2023-01-01 RX ADMIN — CHLORHEXIDINE GLUCONATE 1 APPLICATION(S): 213 SOLUTION TOPICAL at 12:08

## 2023-01-01 RX ADMIN — QUETIAPINE FUMARATE 50 MILLIGRAM(S): 200 TABLET, FILM COATED ORAL at 22:07

## 2023-01-01 RX ADMIN — LEVETIRACETAM 500 MILLIGRAM(S): 250 TABLET, FILM COATED ORAL at 05:21

## 2023-01-01 RX ADMIN — MUPIROCIN 1 APPLICATION(S): 20 OINTMENT TOPICAL at 17:31

## 2023-01-01 RX ADMIN — Medication 88 MICROGRAM(S): at 05:30

## 2023-01-01 RX ADMIN — Medication 70 MICROGRAM(S): at 07:08

## 2023-01-01 RX ADMIN — MUPIROCIN 1 APPLICATION(S): 20 OINTMENT TOPICAL at 17:44

## 2023-01-01 RX ADMIN — Medication 100 MILLIEQUIVALENT(S): at 15:54

## 2023-01-01 RX ADMIN — Medication 1 SPRAY(S): at 11:26

## 2023-01-01 RX ADMIN — Medication 1 SPRAY(S): at 06:47

## 2023-01-01 RX ADMIN — LEVETIRACETAM 500 MILLIGRAM(S): 250 TABLET, FILM COATED ORAL at 05:35

## 2023-01-01 RX ADMIN — SODIUM CHLORIDE 50 MILLILITER(S): 9 INJECTION, SOLUTION INTRAVENOUS at 15:36

## 2023-01-01 RX ADMIN — Medication 4 MILLIGRAM(S): at 06:29

## 2023-01-01 RX ADMIN — Medication 100 MILLIGRAM(S): at 12:00

## 2023-01-01 RX ADMIN — LEVETIRACETAM 500 MILLIGRAM(S): 250 TABLET, FILM COATED ORAL at 12:16

## 2023-01-01 RX ADMIN — Medication 100 MILLIGRAM(S): at 12:15

## 2023-01-01 RX ADMIN — Medication 20 MILLIGRAM(S): at 06:40

## 2023-01-01 RX ADMIN — QUETIAPINE FUMARATE 75 MILLIGRAM(S): 200 TABLET, FILM COATED ORAL at 21:51

## 2023-01-01 RX ADMIN — Medication 55 MILLIGRAM(S): at 06:32

## 2023-01-01 RX ADMIN — Medication 1 APPLICATION(S): at 12:26

## 2023-01-01 RX ADMIN — DIVALPROEX SODIUM 1000 MILLIGRAM(S): 500 TABLET, DELAYED RELEASE ORAL at 17:06

## 2023-01-01 RX ADMIN — ENOXAPARIN SODIUM 65 MILLIGRAM(S): 100 INJECTION SUBCUTANEOUS at 18:50

## 2023-01-01 RX ADMIN — MUPIROCIN 1 APPLICATION(S): 20 OINTMENT TOPICAL at 05:22

## 2023-01-01 RX ADMIN — Medication 70 MICROGRAM(S): at 05:27

## 2023-01-01 RX ADMIN — MUPIROCIN 1 APPLICATION(S): 20 OINTMENT TOPICAL at 05:36

## 2023-01-01 RX ADMIN — MUPIROCIN 1 APPLICATION(S): 20 OINTMENT TOPICAL at 05:07

## 2023-01-01 RX ADMIN — Medication 1 SPRAY(S): at 18:08

## 2023-01-01 RX ADMIN — ENOXAPARIN SODIUM 65 MILLIGRAM(S): 100 INJECTION SUBCUTANEOUS at 06:25

## 2023-01-01 RX ADMIN — SODIUM CHLORIDE 60 MILLILITER(S): 9 INJECTION, SOLUTION INTRAVENOUS at 11:59

## 2023-01-01 RX ADMIN — LEVETIRACETAM 400 MILLIGRAM(S): 250 TABLET, FILM COATED ORAL at 17:47

## 2023-01-01 RX ADMIN — ENOXAPARIN SODIUM 70 MILLIGRAM(S): 100 INJECTION SUBCUTANEOUS at 05:21

## 2023-01-01 RX ADMIN — Medication 25 GRAM(S): at 09:56

## 2023-01-01 RX ADMIN — Medication 55 MILLIGRAM(S): at 05:00

## 2023-01-01 RX ADMIN — Medication 70 MICROGRAM(S): at 12:25

## 2023-01-01 RX ADMIN — POTASSIUM PHOSPHATE, MONOBASIC POTASSIUM PHOSPHATE, DIBASIC 83.33 MILLIMOLE(S): 236; 224 INJECTION, SOLUTION INTRAVENOUS at 14:57

## 2023-01-01 RX ADMIN — Medication 88 MICROGRAM(S): at 06:07

## 2023-01-01 RX ADMIN — DIVALPROEX SODIUM 500 MILLIGRAM(S): 500 TABLET, DELAYED RELEASE ORAL at 16:38

## 2023-01-01 RX ADMIN — Medication 88 MICROGRAM(S): at 05:46

## 2023-01-01 RX ADMIN — Medication 55 MILLIGRAM(S): at 17:26

## 2023-01-01 RX ADMIN — CHLORHEXIDINE GLUCONATE 1 APPLICATION(S): 213 SOLUTION TOPICAL at 11:49

## 2023-01-01 RX ADMIN — Medication 20 MILLIGRAM(S): at 06:45

## 2023-01-01 RX ADMIN — Medication 55 MILLIGRAM(S): at 06:31

## 2023-01-01 RX ADMIN — RIVAROXABAN 20 MILLIGRAM(S): KIT at 19:08

## 2023-01-01 RX ADMIN — Medication 55 MILLIGRAM(S): at 01:25

## 2023-01-01 RX ADMIN — Medication 55 MILLIGRAM(S): at 05:02

## 2023-01-01 RX ADMIN — Medication 100 MILLIGRAM(S): at 11:35

## 2023-01-01 RX ADMIN — Medication 100 MILLIEQUIVALENT(S): at 19:52

## 2023-01-01 RX ADMIN — SODIUM CHLORIDE 75 MILLILITER(S): 9 INJECTION, SOLUTION INTRAVENOUS at 09:44

## 2023-01-01 RX ADMIN — SODIUM CHLORIDE 100 MILLILITER(S): 9 INJECTION INTRAMUSCULAR; INTRAVENOUS; SUBCUTANEOUS at 14:47

## 2023-01-01 RX ADMIN — LEVETIRACETAM 400 MILLIGRAM(S): 250 TABLET, FILM COATED ORAL at 18:40

## 2023-01-01 RX ADMIN — POTASSIUM PHOSPHATE, MONOBASIC POTASSIUM PHOSPHATE, DIBASIC 62.5 MILLIMOLE(S): 236; 224 INJECTION, SOLUTION INTRAVENOUS at 11:59

## 2023-01-01 RX ADMIN — Medication 1 SPRAY(S): at 11:22

## 2023-01-01 RX ADMIN — Medication 100 MILLIEQUIVALENT(S): at 18:46

## 2023-01-01 RX ADMIN — Medication 88 MICROGRAM(S): at 05:37

## 2023-01-01 RX ADMIN — LEVETIRACETAM 400 MILLIGRAM(S): 250 TABLET, FILM COATED ORAL at 18:55

## 2023-01-01 RX ADMIN — Medication 25 GRAM(S): at 08:54

## 2023-01-01 RX ADMIN — Medication 100 MILLIEQUIVALENT(S): at 14:57

## 2023-01-01 RX ADMIN — Medication 55 MILLIGRAM(S): at 06:45

## 2023-01-01 RX ADMIN — Medication 55 MILLIGRAM(S): at 05:01

## 2023-01-01 RX ADMIN — SODIUM CHLORIDE 60 MILLILITER(S): 9 INJECTION, SOLUTION INTRAVENOUS at 09:04

## 2023-01-01 RX ADMIN — Medication 55 MILLIGRAM(S): at 18:08

## 2023-01-01 RX ADMIN — QUETIAPINE FUMARATE 75 MILLIGRAM(S): 200 TABLET, FILM COATED ORAL at 21:42

## 2023-01-01 RX ADMIN — Medication 100 MILLIEQUIVALENT(S): at 15:41

## 2023-01-01 RX ADMIN — Medication 55 MILLIGRAM(S): at 12:54

## 2023-01-01 RX ADMIN — Medication 100 MILLIEQUIVALENT(S): at 12:17

## 2023-01-01 RX ADMIN — Medication 100 MILLIGRAM(S): at 13:20

## 2023-01-01 RX ADMIN — Medication 1 TABLET(S): at 14:09

## 2023-01-01 RX ADMIN — MUPIROCIN 1 APPLICATION(S): 20 OINTMENT TOPICAL at 06:31

## 2023-01-01 RX ADMIN — Medication 55 MILLIGRAM(S): at 17:04

## 2023-01-01 RX ADMIN — Medication 1 TABLET(S): at 11:27

## 2023-01-01 RX ADMIN — Medication 50 MILLILITER(S): at 09:47

## 2023-01-01 RX ADMIN — Medication 100 MILLIEQUIVALENT(S): at 15:53

## 2023-01-01 RX ADMIN — LEVETIRACETAM 400 MILLIGRAM(S): 250 TABLET, FILM COATED ORAL at 18:54

## 2023-01-01 RX ADMIN — Medication 55 MILLIGRAM(S): at 23:01

## 2023-01-01 RX ADMIN — SODIUM CHLORIDE 100 MILLILITER(S): 9 INJECTION, SOLUTION INTRAVENOUS at 16:46

## 2023-01-01 RX ADMIN — ENOXAPARIN SODIUM 65 MILLIGRAM(S): 100 INJECTION SUBCUTANEOUS at 05:21

## 2023-01-01 RX ADMIN — AMLODIPINE BESYLATE 10 MILLIGRAM(S): 2.5 TABLET ORAL at 05:07

## 2023-01-01 RX ADMIN — Medication 1 TABLET(S): at 06:27

## 2023-01-01 RX ADMIN — Medication 70 MICROGRAM(S): at 07:28

## 2023-01-01 RX ADMIN — Medication 1 SPRAY(S): at 21:58

## 2023-01-01 RX ADMIN — Medication 55 MILLIGRAM(S): at 18:07

## 2023-01-01 RX ADMIN — Medication 66 MICROGRAM(S): at 21:11

## 2023-01-01 RX ADMIN — Medication 1 APPLICATION(S): at 11:21

## 2023-01-01 RX ADMIN — SODIUM CHLORIDE 1000 MILLILITER(S): 9 INJECTION INTRAMUSCULAR; INTRAVENOUS; SUBCUTANEOUS at 02:07

## 2023-01-01 RX ADMIN — Medication 1 TABLET(S): at 11:04

## 2023-01-01 RX ADMIN — RIVAROXABAN 20 MILLIGRAM(S): KIT at 18:02

## 2023-01-01 RX ADMIN — Medication 1 TABLET(S): at 12:53

## 2023-01-01 RX ADMIN — LEVETIRACETAM 400 MILLIGRAM(S): 250 TABLET, FILM COATED ORAL at 06:17

## 2023-01-01 RX ADMIN — POTASSIUM PHOSPHATE, MONOBASIC POTASSIUM PHOSPHATE, DIBASIC 83.33 MILLIMOLE(S): 236; 224 INJECTION, SOLUTION INTRAVENOUS at 14:30

## 2023-01-01 RX ADMIN — Medication 55 MILLIGRAM(S): at 18:24

## 2023-01-01 RX ADMIN — Medication 55 MILLIGRAM(S): at 01:37

## 2023-01-01 RX ADMIN — CHLORHEXIDINE GLUCONATE 1 APPLICATION(S): 213 SOLUTION TOPICAL at 12:26

## 2023-01-01 RX ADMIN — PIPERACILLIN AND TAZOBACTAM 200 GRAM(S): 4; .5 INJECTION, POWDER, LYOPHILIZED, FOR SOLUTION INTRAVENOUS at 10:28

## 2023-01-01 RX ADMIN — Medication 1 SPRAY(S): at 05:49

## 2023-01-01 RX ADMIN — PANTOPRAZOLE SODIUM 40 MILLIGRAM(S): 20 TABLET, DELAYED RELEASE ORAL at 06:01

## 2023-01-01 RX ADMIN — DIVALPROEX SODIUM 500 MILLIGRAM(S): 500 TABLET, DELAYED RELEASE ORAL at 11:26

## 2023-01-01 RX ADMIN — Medication 12.5 GRAM(S): at 07:31

## 2023-01-01 RX ADMIN — CHLORHEXIDINE GLUCONATE 1 APPLICATION(S): 213 SOLUTION TOPICAL at 12:22

## 2023-01-01 RX ADMIN — Medication 100 MILLIGRAM(S): at 12:30

## 2023-01-01 RX ADMIN — Medication 250 MILLIGRAM(S): at 12:13

## 2023-01-01 RX ADMIN — LEVETIRACETAM 400 MILLIGRAM(S): 250 TABLET, FILM COATED ORAL at 18:16

## 2023-01-01 RX ADMIN — ENOXAPARIN SODIUM 65 MILLIGRAM(S): 100 INJECTION SUBCUTANEOUS at 17:28

## 2023-01-01 RX ADMIN — Medication 100 MILLIEQUIVALENT(S): at 11:35

## 2023-01-01 RX ADMIN — QUETIAPINE FUMARATE 75 MILLIGRAM(S): 200 TABLET, FILM COATED ORAL at 22:06

## 2023-01-01 RX ADMIN — Medication 100 MILLIEQUIVALENT(S): at 02:16

## 2023-01-01 RX ADMIN — AMLODIPINE BESYLATE 10 MILLIGRAM(S): 2.5 TABLET ORAL at 05:21

## 2023-01-01 RX ADMIN — LEVETIRACETAM 400 MILLIGRAM(S): 250 TABLET, FILM COATED ORAL at 06:25

## 2023-01-01 RX ADMIN — CHLORHEXIDINE GLUCONATE 1 APPLICATION(S): 213 SOLUTION TOPICAL at 12:54

## 2023-01-01 RX ADMIN — LEVETIRACETAM 400 MILLIGRAM(S): 250 TABLET, FILM COATED ORAL at 05:07

## 2023-01-01 RX ADMIN — AMLODIPINE BESYLATE 10 MILLIGRAM(S): 2.5 TABLET ORAL at 11:12

## 2023-01-01 RX ADMIN — CEFTRIAXONE 100 MILLIGRAM(S): 500 INJECTION, POWDER, FOR SOLUTION INTRAMUSCULAR; INTRAVENOUS at 13:41

## 2023-01-01 RX ADMIN — AMLODIPINE BESYLATE 10 MILLIGRAM(S): 2.5 TABLET ORAL at 05:44

## 2023-01-01 RX ADMIN — Medication 250 MILLIGRAM(S): at 22:22

## 2023-01-01 RX ADMIN — SODIUM CHLORIDE 75 MILLILITER(S): 9 INJECTION, SOLUTION INTRAVENOUS at 13:25

## 2023-01-01 RX ADMIN — ENOXAPARIN SODIUM 65 MILLIGRAM(S): 100 INJECTION SUBCUTANEOUS at 05:02

## 2023-01-01 RX ADMIN — DIVALPROEX SODIUM 750 MILLIGRAM(S): 500 TABLET, DELAYED RELEASE ORAL at 21:13

## 2023-01-01 RX ADMIN — AMLODIPINE BESYLATE 5 MILLIGRAM(S): 2.5 TABLET ORAL at 05:28

## 2023-01-01 RX ADMIN — Medication 88 MICROGRAM(S): at 05:53

## 2023-01-01 RX ADMIN — Medication 70 MICROGRAM(S): at 06:39

## 2023-01-01 RX ADMIN — LEVETIRACETAM 400 MILLIGRAM(S): 250 TABLET, FILM COATED ORAL at 06:08

## 2023-01-01 RX ADMIN — Medication 70 MICROGRAM(S): at 07:58

## 2023-01-01 RX ADMIN — ENOXAPARIN SODIUM 65 MILLIGRAM(S): 100 INJECTION SUBCUTANEOUS at 17:34

## 2023-01-01 RX ADMIN — Medication 1 TABLET(S): at 11:57

## 2023-01-01 RX ADMIN — SENNA PLUS 2 TABLET(S): 8.6 TABLET ORAL at 21:14

## 2023-01-01 RX ADMIN — Medication 88 MICROGRAM(S): at 06:38

## 2023-01-01 RX ADMIN — Medication 55 MILLIGRAM(S): at 18:01

## 2023-01-01 RX ADMIN — LEVETIRACETAM 400 MILLIGRAM(S): 250 TABLET, FILM COATED ORAL at 05:17

## 2023-01-01 RX ADMIN — Medication 1 APPLICATION(S): at 11:15

## 2023-01-01 RX ADMIN — LEVETIRACETAM 400 MILLIGRAM(S): 250 TABLET, FILM COATED ORAL at 05:44

## 2023-01-01 RX ADMIN — LEVETIRACETAM 500 MILLIGRAM(S): 250 TABLET, FILM COATED ORAL at 17:06

## 2023-01-01 RX ADMIN — ENOXAPARIN SODIUM 65 MILLIGRAM(S): 100 INJECTION SUBCUTANEOUS at 18:06

## 2023-01-01 RX ADMIN — Medication 1 APPLICATION(S): at 15:01

## 2023-01-01 RX ADMIN — Medication 55 MILLIGRAM(S): at 11:04

## 2023-01-01 RX ADMIN — DIVALPROEX SODIUM 1000 MILLIGRAM(S): 500 TABLET, DELAYED RELEASE ORAL at 19:09

## 2023-01-01 RX ADMIN — Medication 1 SPRAY(S): at 14:54

## 2023-01-01 RX ADMIN — QUETIAPINE FUMARATE 75 MILLIGRAM(S): 200 TABLET, FILM COATED ORAL at 21:59

## 2023-01-01 RX ADMIN — LEVETIRACETAM 400 MILLIGRAM(S): 250 TABLET, FILM COATED ORAL at 06:20

## 2023-01-01 RX ADMIN — Medication 1 APPLICATION(S): at 14:57

## 2023-01-01 RX ADMIN — Medication 4 MILLIGRAM(S): at 21:34

## 2023-01-01 RX ADMIN — ENOXAPARIN SODIUM 65 MILLIGRAM(S): 100 INJECTION SUBCUTANEOUS at 17:14

## 2023-01-01 RX ADMIN — Medication 1 MILLIGRAM(S): at 18:36

## 2023-01-01 RX ADMIN — DIVALPROEX SODIUM 1000 MILLIGRAM(S): 500 TABLET, DELAYED RELEASE ORAL at 06:37

## 2023-01-01 RX ADMIN — CHLORHEXIDINE GLUCONATE 1 APPLICATION(S): 213 SOLUTION TOPICAL at 13:11

## 2023-01-01 RX ADMIN — ENOXAPARIN SODIUM 65 MILLIGRAM(S): 100 INJECTION SUBCUTANEOUS at 05:50

## 2023-01-01 RX ADMIN — Medication 1 SPRAY(S): at 06:19

## 2023-01-01 RX ADMIN — Medication 25 GRAM(S): at 17:59

## 2023-01-01 RX ADMIN — LEVETIRACETAM 400 MILLIGRAM(S): 250 TABLET, FILM COATED ORAL at 06:30

## 2023-01-01 RX ADMIN — AMLODIPINE BESYLATE 10 MILLIGRAM(S): 2.5 TABLET ORAL at 05:37

## 2023-01-01 RX ADMIN — CHLORHEXIDINE GLUCONATE 1 APPLICATION(S): 213 SOLUTION TOPICAL at 12:13

## 2023-01-01 RX ADMIN — ENOXAPARIN SODIUM 65 MILLIGRAM(S): 100 INJECTION SUBCUTANEOUS at 17:45

## 2023-01-01 RX ADMIN — SODIUM CHLORIDE 75 MILLILITER(S): 9 INJECTION INTRAMUSCULAR; INTRAVENOUS; SUBCUTANEOUS at 07:28

## 2023-01-01 RX ADMIN — LEVETIRACETAM 400 MILLIGRAM(S): 250 TABLET, FILM COATED ORAL at 05:19

## 2023-01-01 RX ADMIN — Medication 100 MILLIEQUIVALENT(S): at 10:14

## 2023-01-01 RX ADMIN — PANTOPRAZOLE SODIUM 40 MILLIGRAM(S): 20 TABLET, DELAYED RELEASE ORAL at 06:25

## 2023-01-01 RX ADMIN — AMLODIPINE BESYLATE 10 MILLIGRAM(S): 2.5 TABLET ORAL at 05:36

## 2023-01-01 RX ADMIN — Medication 55 MILLIGRAM(S): at 00:29

## 2023-01-01 RX ADMIN — Medication 55 MILLIGRAM(S): at 23:27

## 2023-01-01 RX ADMIN — AMLODIPINE BESYLATE 10 MILLIGRAM(S): 2.5 TABLET ORAL at 05:20

## 2023-01-01 RX ADMIN — SODIUM CHLORIDE 75 MILLILITER(S): 9 INJECTION INTRAMUSCULAR; INTRAVENOUS; SUBCUTANEOUS at 08:43

## 2023-01-01 RX ADMIN — Medication 55 MILLIGRAM(S): at 00:12

## 2023-01-01 RX ADMIN — Medication 85 MILLIMOLE(S): at 23:56

## 2023-01-01 RX ADMIN — CHLORHEXIDINE GLUCONATE 1 APPLICATION(S): 213 SOLUTION TOPICAL at 12:19

## 2023-01-01 RX ADMIN — Medication 25 GRAM(S): at 09:03

## 2023-01-01 RX ADMIN — Medication 100 MILLIGRAM(S): at 19:06

## 2023-01-01 RX ADMIN — LEVETIRACETAM 400 MILLIGRAM(S): 250 TABLET, FILM COATED ORAL at 18:01

## 2023-01-01 RX ADMIN — APIXABAN 5 MILLIGRAM(S): 2.5 TABLET, FILM COATED ORAL at 05:46

## 2023-01-01 RX ADMIN — Medication 1 APPLICATION(S): at 17:12

## 2023-01-01 RX ADMIN — QUETIAPINE FUMARATE 75 MILLIGRAM(S): 200 TABLET, FILM COATED ORAL at 21:37

## 2023-01-01 RX ADMIN — Medication 1 SPRAY(S): at 18:06

## 2023-01-01 RX ADMIN — LEVETIRACETAM 400 MILLIGRAM(S): 250 TABLET, FILM COATED ORAL at 05:02

## 2023-01-01 RX ADMIN — LEVETIRACETAM 400 MILLIGRAM(S): 250 TABLET, FILM COATED ORAL at 18:49

## 2023-01-01 RX ADMIN — QUETIAPINE FUMARATE 75 MILLIGRAM(S): 200 TABLET, FILM COATED ORAL at 22:22

## 2023-01-01 RX ADMIN — Medication 1 TABLET(S): at 23:19

## 2023-01-01 RX ADMIN — Medication 100 GRAM(S): at 20:52

## 2023-01-01 RX ADMIN — Medication 55 MILLIGRAM(S): at 18:28

## 2023-01-01 RX ADMIN — ENOXAPARIN SODIUM 65 MILLIGRAM(S): 100 INJECTION SUBCUTANEOUS at 06:11

## 2023-01-01 RX ADMIN — SODIUM CHLORIDE 50 MILLILITER(S): 9 INJECTION, SOLUTION INTRAVENOUS at 08:48

## 2023-01-01 RX ADMIN — Medication 100 MILLIGRAM(S): at 12:36

## 2023-01-01 RX ADMIN — Medication 50 MILLILITER(S): at 14:39

## 2023-01-01 RX ADMIN — SODIUM CHLORIDE 75 MILLILITER(S): 9 INJECTION INTRAMUSCULAR; INTRAVENOUS; SUBCUTANEOUS at 17:15

## 2023-01-01 RX ADMIN — Medication 66 MICROGRAM(S): at 21:51

## 2023-01-01 RX ADMIN — LEVETIRACETAM 500 MILLIGRAM(S): 250 TABLET, FILM COATED ORAL at 17:20

## 2023-01-01 RX ADMIN — Medication 1 SPRAY(S): at 18:19

## 2023-01-01 RX ADMIN — Medication 1 SPRAY(S): at 06:44

## 2023-01-01 RX ADMIN — PANTOPRAZOLE SODIUM 40 MILLIGRAM(S): 20 TABLET, DELAYED RELEASE ORAL at 06:20

## 2023-01-01 RX ADMIN — Medication 55 MILLIGRAM(S): at 11:15

## 2023-01-01 RX ADMIN — POTASSIUM PHOSPHATE, MONOBASIC POTASSIUM PHOSPHATE, DIBASIC 83.33 MILLIMOLE(S): 236; 224 INJECTION, SOLUTION INTRAVENOUS at 19:34

## 2023-01-01 RX ADMIN — ENOXAPARIN SODIUM 65 MILLIGRAM(S): 100 INJECTION SUBCUTANEOUS at 17:48

## 2023-01-01 RX ADMIN — Medication 2 MILLIGRAM(S): at 22:35

## 2023-01-01 RX ADMIN — LEVETIRACETAM 400 MILLIGRAM(S): 250 TABLET, FILM COATED ORAL at 05:23

## 2023-01-01 RX ADMIN — Medication 55 MILLIGRAM(S): at 05:21

## 2023-01-01 RX ADMIN — SODIUM CHLORIDE 100 MILLILITER(S): 9 INJECTION INTRAMUSCULAR; INTRAVENOUS; SUBCUTANEOUS at 12:13

## 2023-01-01 RX ADMIN — MUPIROCIN 1 APPLICATION(S): 20 OINTMENT TOPICAL at 06:38

## 2023-01-01 RX ADMIN — Medication 100 MILLIGRAM(S): at 12:39

## 2023-01-01 RX ADMIN — APIXABAN 5 MILLIGRAM(S): 2.5 TABLET, FILM COATED ORAL at 06:08

## 2023-01-01 RX ADMIN — AMLODIPINE BESYLATE 10 MILLIGRAM(S): 2.5 TABLET ORAL at 05:01

## 2023-01-01 RX ADMIN — Medication 55 MILLIGRAM(S): at 18:30

## 2023-01-01 RX ADMIN — PANTOPRAZOLE SODIUM 40 MILLIGRAM(S): 20 TABLET, DELAYED RELEASE ORAL at 06:08

## 2023-01-01 RX ADMIN — ENOXAPARIN SODIUM 65 MILLIGRAM(S): 100 INJECTION SUBCUTANEOUS at 17:04

## 2023-01-01 RX ADMIN — Medication 55 MILLIGRAM(S): at 23:57

## 2023-01-01 RX ADMIN — Medication 55 MILLIGRAM(S): at 05:44

## 2023-01-01 RX ADMIN — MUPIROCIN 1 APPLICATION(S): 20 OINTMENT TOPICAL at 18:50

## 2023-01-01 RX ADMIN — ENOXAPARIN SODIUM 65 MILLIGRAM(S): 100 INJECTION SUBCUTANEOUS at 17:01

## 2023-01-01 RX ADMIN — Medication 100 MILLIGRAM(S): at 12:53

## 2023-01-01 RX ADMIN — Medication 55 MILLIGRAM(S): at 22:47

## 2023-01-01 RX ADMIN — Medication 70 MICROGRAM(S): at 06:49

## 2023-01-01 RX ADMIN — POLYETHYLENE GLYCOL 3350 17 GRAM(S): 17 POWDER, FOR SOLUTION ORAL at 05:52

## 2023-01-01 RX ADMIN — PIPERACILLIN AND TAZOBACTAM 25 GRAM(S): 4; .5 INJECTION, POWDER, LYOPHILIZED, FOR SOLUTION INTRAVENOUS at 01:55

## 2023-01-01 RX ADMIN — ENOXAPARIN SODIUM 65 MILLIGRAM(S): 100 INJECTION SUBCUTANEOUS at 18:55

## 2023-01-01 RX ADMIN — AMLODIPINE BESYLATE 10 MILLIGRAM(S): 2.5 TABLET ORAL at 06:07

## 2023-01-01 RX ADMIN — Medication 100 MILLIGRAM(S): at 11:45

## 2023-01-01 RX ADMIN — Medication 20 MILLIGRAM(S): at 14:01

## 2023-01-01 RX ADMIN — APIXABAN 5 MILLIGRAM(S): 2.5 TABLET, FILM COATED ORAL at 18:10

## 2023-01-01 RX ADMIN — LEVETIRACETAM 500 MILLIGRAM(S): 250 TABLET, FILM COATED ORAL at 06:12

## 2023-01-01 RX ADMIN — Medication 55 MILLIGRAM(S): at 00:21

## 2023-01-01 RX ADMIN — LEVETIRACETAM 400 MILLIGRAM(S): 250 TABLET, FILM COATED ORAL at 05:26

## 2023-01-01 RX ADMIN — LEVETIRACETAM 400 MILLIGRAM(S): 250 TABLET, FILM COATED ORAL at 18:34

## 2023-01-01 RX ADMIN — ENOXAPARIN SODIUM 70 MILLIGRAM(S): 100 INJECTION SUBCUTANEOUS at 05:06

## 2023-01-01 RX ADMIN — Medication 55 MILLIGRAM(S): at 11:16

## 2023-01-01 RX ADMIN — LEVETIRACETAM 400 MILLIGRAM(S): 250 TABLET, FILM COATED ORAL at 17:12

## 2023-01-01 RX ADMIN — SODIUM CHLORIDE 1000 MILLILITER(S): 9 INJECTION INTRAMUSCULAR; INTRAVENOUS; SUBCUTANEOUS at 09:36

## 2023-01-01 RX ADMIN — Medication 100 MILLIEQUIVALENT(S): at 16:37

## 2023-01-01 RX ADMIN — Medication 55 MILLIGRAM(S): at 17:15

## 2023-01-01 RX ADMIN — Medication 70 MICROGRAM(S): at 13:14

## 2023-01-01 RX ADMIN — Medication 20 MILLIGRAM(S): at 05:22

## 2023-01-01 RX ADMIN — CHLORHEXIDINE GLUCONATE 1 APPLICATION(S): 213 SOLUTION TOPICAL at 16:38

## 2023-01-01 RX ADMIN — Medication 88 MICROGRAM(S): at 05:38

## 2023-01-01 RX ADMIN — Medication 100 MILLIEQUIVALENT(S): at 09:56

## 2023-01-01 RX ADMIN — QUETIAPINE FUMARATE 75 MILLIGRAM(S): 200 TABLET, FILM COATED ORAL at 22:49

## 2023-01-01 RX ADMIN — DIVALPROEX SODIUM 500 MILLIGRAM(S): 500 TABLET, DELAYED RELEASE ORAL at 11:12

## 2023-01-01 RX ADMIN — SENNA PLUS 2 TABLET(S): 8.6 TABLET ORAL at 21:49

## 2023-01-01 RX ADMIN — Medication 55 MILLIGRAM(S): at 05:26

## 2023-01-01 RX ADMIN — ENOXAPARIN SODIUM 65 MILLIGRAM(S): 100 INJECTION SUBCUTANEOUS at 05:34

## 2023-01-01 RX ADMIN — Medication 55 MILLIGRAM(S): at 23:21

## 2023-01-01 RX ADMIN — LEVETIRACETAM 500 MILLIGRAM(S): 250 TABLET, FILM COATED ORAL at 05:30

## 2023-01-01 RX ADMIN — CHLORHEXIDINE GLUCONATE 1 APPLICATION(S): 213 SOLUTION TOPICAL at 12:00

## 2023-01-01 RX ADMIN — Medication 1 APPLICATION(S): at 12:09

## 2023-01-01 RX ADMIN — Medication 100 MILLIGRAM(S): at 11:20

## 2023-01-01 RX ADMIN — PIPERACILLIN AND TAZOBACTAM 25 GRAM(S): 4; .5 INJECTION, POWDER, LYOPHILIZED, FOR SOLUTION INTRAVENOUS at 21:27

## 2023-01-01 RX ADMIN — Medication 1 SPRAY(S): at 14:01

## 2023-01-01 RX ADMIN — LEVETIRACETAM 500 MILLIGRAM(S): 250 TABLET, FILM COATED ORAL at 19:08

## 2023-01-01 RX ADMIN — Medication 55 MILLIGRAM(S): at 17:14

## 2023-01-01 RX ADMIN — Medication 55 MILLIGRAM(S): at 18:10

## 2023-01-01 RX ADMIN — QUETIAPINE FUMARATE 75 MILLIGRAM(S): 200 TABLET, FILM COATED ORAL at 22:02

## 2023-01-01 RX ADMIN — ENOXAPARIN SODIUM 65 MILLIGRAM(S): 100 INJECTION SUBCUTANEOUS at 18:01

## 2023-01-01 RX ADMIN — Medication 100 MILLIEQUIVALENT(S): at 12:40

## 2023-01-01 RX ADMIN — APIXABAN 5 MILLIGRAM(S): 2.5 TABLET, FILM COATED ORAL at 17:53

## 2023-01-01 RX ADMIN — AMLODIPINE BESYLATE 10 MILLIGRAM(S): 2.5 TABLET ORAL at 06:16

## 2023-01-01 RX ADMIN — Medication 55 MILLIGRAM(S): at 18:15

## 2023-01-01 RX ADMIN — Medication 55 MILLIGRAM(S): at 08:26

## 2023-01-01 RX ADMIN — Medication 500 MILLIGRAM(S): at 06:12

## 2023-01-01 RX ADMIN — LEVETIRACETAM 400 MILLIGRAM(S): 250 TABLET, FILM COATED ORAL at 17:58

## 2023-01-01 RX ADMIN — SODIUM CHLORIDE 75 MILLILITER(S): 9 INJECTION INTRAMUSCULAR; INTRAVENOUS; SUBCUTANEOUS at 14:48

## 2023-01-01 RX ADMIN — Medication 88 MICROGRAM(S): at 05:44

## 2023-01-01 RX ADMIN — Medication 1 APPLICATION(S): at 12:37

## 2023-01-01 RX ADMIN — ENOXAPARIN SODIUM 65 MILLIGRAM(S): 100 INJECTION SUBCUTANEOUS at 05:28

## 2023-01-01 RX ADMIN — Medication 4 MILLIGRAM(S): at 06:25

## 2023-01-01 RX ADMIN — AMLODIPINE BESYLATE 10 MILLIGRAM(S): 2.5 TABLET ORAL at 06:17

## 2023-01-01 RX ADMIN — Medication 1 APPLICATION(S): at 12:01

## 2023-01-01 RX ADMIN — ENOXAPARIN SODIUM 65 MILLIGRAM(S): 100 INJECTION SUBCUTANEOUS at 05:22

## 2023-01-01 RX ADMIN — Medication 1 SPRAY(S): at 17:12

## 2023-01-01 RX ADMIN — APIXABAN 5 MILLIGRAM(S): 2.5 TABLET, FILM COATED ORAL at 16:38

## 2023-01-01 RX ADMIN — Medication 1 APPLICATION(S): at 11:27

## 2023-01-01 RX ADMIN — ENOXAPARIN SODIUM 70 MILLIGRAM(S): 100 INJECTION SUBCUTANEOUS at 05:25

## 2023-01-01 RX ADMIN — APIXABAN 5 MILLIGRAM(S): 2.5 TABLET, FILM COATED ORAL at 17:14

## 2023-01-01 RX ADMIN — Medication 70 MICROGRAM(S): at 04:37

## 2023-01-01 RX ADMIN — ENOXAPARIN SODIUM 65 MILLIGRAM(S): 100 INJECTION SUBCUTANEOUS at 06:15

## 2023-01-01 RX ADMIN — DIVALPROEX SODIUM 750 MILLIGRAM(S): 500 TABLET, DELAYED RELEASE ORAL at 22:17

## 2023-01-01 RX ADMIN — SODIUM CHLORIDE 100 MILLILITER(S): 9 INJECTION, SOLUTION INTRAVENOUS at 05:53

## 2023-01-01 RX ADMIN — AMLODIPINE BESYLATE 10 MILLIGRAM(S): 2.5 TABLET ORAL at 06:10

## 2023-01-01 RX ADMIN — APIXABAN 5 MILLIGRAM(S): 2.5 TABLET, FILM COATED ORAL at 17:29

## 2023-01-01 RX ADMIN — AMLODIPINE BESYLATE 10 MILLIGRAM(S): 2.5 TABLET ORAL at 06:19

## 2023-01-01 RX ADMIN — Medication 85 MILLIMOLE(S): at 12:12

## 2023-01-01 RX ADMIN — Medication 55 MILLIGRAM(S): at 03:38

## 2023-01-01 RX ADMIN — Medication 55 MILLIGRAM(S): at 18:52

## 2023-01-01 RX ADMIN — Medication 55 MILLIGRAM(S): at 05:39

## 2023-01-01 RX ADMIN — QUETIAPINE FUMARATE 75 MILLIGRAM(S): 200 TABLET, FILM COATED ORAL at 22:01

## 2023-01-01 RX ADMIN — ENOXAPARIN SODIUM 65 MILLIGRAM(S): 100 INJECTION SUBCUTANEOUS at 17:43

## 2023-01-01 RX ADMIN — SODIUM CHLORIDE 75 MILLILITER(S): 9 INJECTION INTRAMUSCULAR; INTRAVENOUS; SUBCUTANEOUS at 06:07

## 2023-01-01 RX ADMIN — POTASSIUM PHOSPHATE, MONOBASIC POTASSIUM PHOSPHATE, DIBASIC 83.33 MILLIMOLE(S): 236; 224 INJECTION, SOLUTION INTRAVENOUS at 18:39

## 2023-01-01 RX ADMIN — Medication 100 MILLIGRAM(S): at 12:05

## 2023-01-01 RX ADMIN — SODIUM CHLORIDE 60 MILLILITER(S): 9 INJECTION, SOLUTION INTRAVENOUS at 23:58

## 2023-01-01 RX ADMIN — DIVALPROEX SODIUM 1000 MILLIGRAM(S): 500 TABLET, DELAYED RELEASE ORAL at 05:22

## 2023-01-01 RX ADMIN — MUPIROCIN 1 APPLICATION(S): 20 OINTMENT TOPICAL at 17:25

## 2023-01-01 RX ADMIN — Medication 1 TABLET(S): at 11:52

## 2023-01-01 RX ADMIN — AMLODIPINE BESYLATE 10 MILLIGRAM(S): 2.5 TABLET ORAL at 05:47

## 2023-01-01 RX ADMIN — AMLODIPINE BESYLATE 10 MILLIGRAM(S): 2.5 TABLET ORAL at 06:18

## 2023-01-01 RX ADMIN — Medication 70 MICROGRAM(S): at 05:02

## 2023-01-01 RX ADMIN — Medication 88 MICROGRAM(S): at 05:21

## 2023-01-01 RX ADMIN — Medication 25 GRAM(S): at 14:12

## 2023-01-01 RX ADMIN — QUETIAPINE FUMARATE 75 MILLIGRAM(S): 200 TABLET, FILM COATED ORAL at 22:10

## 2023-01-01 RX ADMIN — DIVALPROEX SODIUM 1000 MILLIGRAM(S): 500 TABLET, DELAYED RELEASE ORAL at 17:57

## 2023-01-01 RX ADMIN — Medication 25 GRAM(S): at 02:48

## 2023-01-01 RX ADMIN — QUETIAPINE FUMARATE 75 MILLIGRAM(S): 200 TABLET, FILM COATED ORAL at 23:01

## 2023-01-01 RX ADMIN — Medication 1 TABLET(S): at 11:50

## 2023-01-01 RX ADMIN — APIXABAN 5 MILLIGRAM(S): 2.5 TABLET, FILM COATED ORAL at 05:53

## 2023-01-01 RX ADMIN — Medication 55 MILLIGRAM(S): at 06:58

## 2023-01-01 RX ADMIN — Medication 100 MILLIEQUIVALENT(S): at 16:45

## 2023-01-01 RX ADMIN — Medication 1 SPRAY(S): at 13:21

## 2023-01-01 RX ADMIN — LEVETIRACETAM 400 MILLIGRAM(S): 250 TABLET, FILM COATED ORAL at 07:38

## 2023-01-01 RX ADMIN — DIVALPROEX SODIUM 750 MILLIGRAM(S): 500 TABLET, DELAYED RELEASE ORAL at 21:05

## 2023-01-01 RX ADMIN — SODIUM CHLORIDE 50 MILLILITER(S): 9 INJECTION, SOLUTION INTRAVENOUS at 05:43

## 2023-01-01 RX ADMIN — Medication 20 MILLIGRAM(S): at 05:28

## 2023-01-01 RX ADMIN — Medication 55 MILLIGRAM(S): at 12:36

## 2023-01-01 RX ADMIN — PIPERACILLIN AND TAZOBACTAM 25 GRAM(S): 4; .5 INJECTION, POWDER, LYOPHILIZED, FOR SOLUTION INTRAVENOUS at 13:00

## 2023-01-01 RX ADMIN — MUPIROCIN 1 APPLICATION(S): 20 OINTMENT TOPICAL at 05:26

## 2023-01-01 RX ADMIN — CHLORHEXIDINE GLUCONATE 1 APPLICATION(S): 213 SOLUTION TOPICAL at 11:15

## 2023-01-01 RX ADMIN — POTASSIUM PHOSPHATE, MONOBASIC POTASSIUM PHOSPHATE, DIBASIC 62.5 MILLIMOLE(S): 236; 224 INJECTION, SOLUTION INTRAVENOUS at 00:21

## 2023-01-01 RX ADMIN — PANTOPRAZOLE SODIUM 40 MILLIGRAM(S): 20 TABLET, DELAYED RELEASE ORAL at 06:12

## 2023-01-01 RX ADMIN — Medication 55 MILLIGRAM(S): at 11:59

## 2023-01-01 RX ADMIN — LEVETIRACETAM 400 MILLIGRAM(S): 250 TABLET, FILM COATED ORAL at 06:15

## 2023-01-01 RX ADMIN — LEVETIRACETAM 400 MILLIGRAM(S): 250 TABLET, FILM COATED ORAL at 17:27

## 2023-01-01 RX ADMIN — PANTOPRAZOLE SODIUM 40 MILLIGRAM(S): 20 TABLET, DELAYED RELEASE ORAL at 06:19

## 2023-01-01 RX ADMIN — Medication 100 MILLIGRAM(S): at 12:26

## 2023-01-01 RX ADMIN — CHLORHEXIDINE GLUCONATE 1 APPLICATION(S): 213 SOLUTION TOPICAL at 11:24

## 2023-01-01 RX ADMIN — Medication 55 MILLIGRAM(S): at 18:23

## 2023-01-01 RX ADMIN — Medication 20 MILLIGRAM(S): at 05:29

## 2023-01-01 RX ADMIN — Medication 55 MILLIGRAM(S): at 12:04

## 2023-01-01 RX ADMIN — SENNA PLUS 2 TABLET(S): 8.6 TABLET ORAL at 21:18

## 2023-01-01 RX ADMIN — LEVETIRACETAM 400 MILLIGRAM(S): 250 TABLET, FILM COATED ORAL at 06:38

## 2023-01-01 RX ADMIN — ENOXAPARIN SODIUM 65 MILLIGRAM(S): 100 INJECTION SUBCUTANEOUS at 06:09

## 2023-01-01 RX ADMIN — LEVETIRACETAM 400 MILLIGRAM(S): 250 TABLET, FILM COATED ORAL at 17:16

## 2023-01-01 RX ADMIN — Medication 4 MILLIGRAM(S): at 18:07

## 2023-01-01 RX ADMIN — Medication 55 MILLIGRAM(S): at 00:51

## 2023-01-01 RX ADMIN — LEVETIRACETAM 400 MILLIGRAM(S): 250 TABLET, FILM COATED ORAL at 17:26

## 2023-01-01 RX ADMIN — APIXABAN 5 MILLIGRAM(S): 2.5 TABLET, FILM COATED ORAL at 05:29

## 2023-01-01 RX ADMIN — SODIUM CHLORIDE 75 MILLILITER(S): 9 INJECTION, SOLUTION INTRAVENOUS at 22:44

## 2023-01-01 RX ADMIN — Medication 100 MILLIGRAM(S): at 15:00

## 2023-01-01 RX ADMIN — POTASSIUM PHOSPHATE, MONOBASIC POTASSIUM PHOSPHATE, DIBASIC 62.5 MILLIMOLE(S): 236; 224 INJECTION, SOLUTION INTRAVENOUS at 12:27

## 2023-01-01 RX ADMIN — LEVETIRACETAM 400 MILLIGRAM(S): 250 TABLET, FILM COATED ORAL at 07:58

## 2023-01-01 RX ADMIN — LEVETIRACETAM 400 MILLIGRAM(S): 250 TABLET, FILM COATED ORAL at 17:42

## 2023-01-01 RX ADMIN — SODIUM CHLORIDE 60 MILLILITER(S): 9 INJECTION, SOLUTION INTRAVENOUS at 12:41

## 2023-01-01 RX ADMIN — LEVETIRACETAM 400 MILLIGRAM(S): 250 TABLET, FILM COATED ORAL at 18:28

## 2023-01-01 RX ADMIN — Medication 1 APPLICATION(S): at 12:38

## 2023-01-01 RX ADMIN — Medication 55 MILLIGRAM(S): at 17:20

## 2023-01-01 RX ADMIN — DIVALPROEX SODIUM 1000 MILLIGRAM(S): 500 TABLET, DELAYED RELEASE ORAL at 10:53

## 2023-01-01 RX ADMIN — PIPERACILLIN AND TAZOBACTAM 25 GRAM(S): 4; .5 INJECTION, POWDER, LYOPHILIZED, FOR SOLUTION INTRAVENOUS at 09:25

## 2023-01-01 RX ADMIN — CHLORHEXIDINE GLUCONATE 1 APPLICATION(S): 213 SOLUTION TOPICAL at 11:22

## 2023-01-01 RX ADMIN — Medication 55 MILLIGRAM(S): at 17:01

## 2023-01-01 RX ADMIN — LEVETIRACETAM 400 MILLIGRAM(S): 250 TABLET, FILM COATED ORAL at 17:45

## 2023-01-01 RX ADMIN — DIVALPROEX SODIUM 500 MILLIGRAM(S): 500 TABLET, DELAYED RELEASE ORAL at 22:09

## 2023-01-01 RX ADMIN — LEVETIRACETAM 400 MILLIGRAM(S): 250 TABLET, FILM COATED ORAL at 05:00

## 2023-01-01 RX ADMIN — Medication 100 GRAM(S): at 20:27

## 2023-01-01 RX ADMIN — Medication 55 MILLIGRAM(S): at 13:58

## 2023-01-01 RX ADMIN — CHLORHEXIDINE GLUCONATE 1 APPLICATION(S): 213 SOLUTION TOPICAL at 13:57

## 2023-01-01 RX ADMIN — POTASSIUM PHOSPHATE, MONOBASIC POTASSIUM PHOSPHATE, DIBASIC 83.33 MILLIMOLE(S): 236; 224 INJECTION, SOLUTION INTRAVENOUS at 21:36

## 2023-01-01 RX ADMIN — LEVETIRACETAM 420 MILLIGRAM(S): 250 TABLET, FILM COATED ORAL at 18:10

## 2023-01-01 RX ADMIN — PANTOPRAZOLE SODIUM 40 MILLIGRAM(S): 20 TABLET, DELAYED RELEASE ORAL at 14:06

## 2023-01-01 RX ADMIN — ENOXAPARIN SODIUM 65 MILLIGRAM(S): 100 INJECTION SUBCUTANEOUS at 17:51

## 2023-01-01 RX ADMIN — Medication 70 MICROGRAM(S): at 05:09

## 2023-01-01 RX ADMIN — LEVETIRACETAM 400 MILLIGRAM(S): 250 TABLET, FILM COATED ORAL at 05:47

## 2023-01-01 RX ADMIN — Medication 70 MICROGRAM(S): at 05:22

## 2023-01-01 RX ADMIN — Medication 88 MICROGRAM(S): at 05:36

## 2023-01-01 RX ADMIN — MUPIROCIN 1 APPLICATION(S): 20 OINTMENT TOPICAL at 17:24

## 2023-01-01 RX ADMIN — ENOXAPARIN SODIUM 65 MILLIGRAM(S): 100 INJECTION SUBCUTANEOUS at 05:48

## 2023-01-01 RX ADMIN — DIVALPROEX SODIUM 500 MILLIGRAM(S): 500 TABLET, DELAYED RELEASE ORAL at 11:52

## 2023-01-01 RX ADMIN — AMLODIPINE BESYLATE 10 MILLIGRAM(S): 2.5 TABLET ORAL at 06:44

## 2023-01-01 RX ADMIN — AMLODIPINE BESYLATE 10 MILLIGRAM(S): 2.5 TABLET ORAL at 06:14

## 2023-01-01 RX ADMIN — Medication 70 MICROGRAM(S): at 07:12

## 2023-01-01 RX ADMIN — CHLORHEXIDINE GLUCONATE 1 APPLICATION(S): 213 SOLUTION TOPICAL at 12:28

## 2023-01-01 RX ADMIN — DIVALPROEX SODIUM 750 MILLIGRAM(S): 500 TABLET, DELAYED RELEASE ORAL at 21:07

## 2023-01-01 RX ADMIN — QUETIAPINE FUMARATE 75 MILLIGRAM(S): 200 TABLET, FILM COATED ORAL at 21:33

## 2023-01-01 RX ADMIN — APIXABAN 5 MILLIGRAM(S): 2.5 TABLET, FILM COATED ORAL at 06:40

## 2023-01-01 RX ADMIN — Medication 70 MICROGRAM(S): at 05:36

## 2023-01-01 RX ADMIN — SODIUM CHLORIDE 75 MILLILITER(S): 9 INJECTION, SOLUTION INTRAVENOUS at 00:10

## 2023-01-01 RX ADMIN — RIVAROXABAN 20 MILLIGRAM(S): KIT at 21:37

## 2023-01-01 RX ADMIN — Medication 55 MILLIGRAM(S): at 06:53

## 2023-01-01 RX ADMIN — Medication 55 MILLIGRAM(S): at 11:08

## 2023-01-01 RX ADMIN — APIXABAN 5 MILLIGRAM(S): 2.5 TABLET, FILM COATED ORAL at 06:13

## 2023-01-01 RX ADMIN — PIPERACILLIN AND TAZOBACTAM 25 GRAM(S): 4; .5 INJECTION, POWDER, LYOPHILIZED, FOR SOLUTION INTRAVENOUS at 21:35

## 2023-01-01 RX ADMIN — SENNA PLUS 2 TABLET(S): 8.6 TABLET ORAL at 22:09

## 2023-01-01 RX ADMIN — Medication 1 APPLICATION(S): at 13:11

## 2023-01-01 RX ADMIN — AMLODIPINE BESYLATE 10 MILLIGRAM(S): 2.5 TABLET ORAL at 06:40

## 2023-01-01 RX ADMIN — PIPERACILLIN AND TAZOBACTAM 25 GRAM(S): 4; .5 INJECTION, POWDER, LYOPHILIZED, FOR SOLUTION INTRAVENOUS at 06:17

## 2023-01-01 RX ADMIN — AMLODIPINE BESYLATE 10 MILLIGRAM(S): 2.5 TABLET ORAL at 05:46

## 2023-01-01 RX ADMIN — Medication 55 MILLIGRAM(S): at 13:12

## 2023-01-01 RX ADMIN — DIVALPROEX SODIUM 500 MILLIGRAM(S): 500 TABLET, DELAYED RELEASE ORAL at 11:53

## 2023-01-01 RX ADMIN — ENOXAPARIN SODIUM 65 MILLIGRAM(S): 100 INJECTION SUBCUTANEOUS at 17:39

## 2023-01-01 RX ADMIN — Medication 1 APPLICATION(S): at 11:49

## 2023-01-01 RX ADMIN — SODIUM CHLORIDE 60 MILLILITER(S): 9 INJECTION, SOLUTION INTRAVENOUS at 14:35

## 2023-01-01 RX ADMIN — Medication 100 MILLIEQUIVALENT(S): at 14:24

## 2023-01-01 RX ADMIN — SENNA PLUS 2 TABLET(S): 8.6 TABLET ORAL at 21:52

## 2023-01-01 RX ADMIN — Medication 55 MILLIGRAM(S): at 06:40

## 2023-01-01 RX ADMIN — Medication 100 MILLIGRAM(S): at 14:31

## 2023-01-01 RX ADMIN — Medication 55 MILLIGRAM(S): at 05:42

## 2023-01-01 RX ADMIN — SODIUM CHLORIDE 60 MILLILITER(S): 9 INJECTION, SOLUTION INTRAVENOUS at 17:10

## 2023-01-01 RX ADMIN — Medication 1 SPRAY(S): at 09:50

## 2023-01-01 RX ADMIN — Medication 1 TABLET(S): at 13:20

## 2023-01-01 RX ADMIN — ENOXAPARIN SODIUM 65 MILLIGRAM(S): 100 INJECTION SUBCUTANEOUS at 17:12

## 2023-01-01 RX ADMIN — Medication 1 SPRAY(S): at 05:19

## 2023-01-01 RX ADMIN — QUETIAPINE FUMARATE 75 MILLIGRAM(S): 200 TABLET, FILM COATED ORAL at 22:00

## 2023-01-01 RX ADMIN — Medication 100 MILLIEQUIVALENT(S): at 11:04

## 2023-01-01 RX ADMIN — Medication 55 MILLIGRAM(S): at 05:55

## 2023-01-01 RX ADMIN — Medication 4 MILLIGRAM(S): at 17:05

## 2023-01-01 RX ADMIN — CHLORHEXIDINE GLUCONATE 1 APPLICATION(S): 213 SOLUTION TOPICAL at 19:06

## 2023-01-01 RX ADMIN — Medication 3 MILLIGRAM(S): at 21:10

## 2023-01-01 RX ADMIN — Medication 70 MICROGRAM(S): at 06:06

## 2023-01-01 RX ADMIN — CHLORHEXIDINE GLUCONATE 1 APPLICATION(S): 213 SOLUTION TOPICAL at 15:01

## 2023-01-01 RX ADMIN — QUETIAPINE FUMARATE 75 MILLIGRAM(S): 200 TABLET, FILM COATED ORAL at 21:07

## 2023-01-01 RX ADMIN — AMLODIPINE BESYLATE 10 MILLIGRAM(S): 2.5 TABLET ORAL at 05:39

## 2023-01-01 RX ADMIN — Medication 55 MILLIGRAM(S): at 18:12

## 2023-01-01 RX ADMIN — POLYETHYLENE GLYCOL 3350 17 GRAM(S): 17 POWDER, FOR SOLUTION ORAL at 05:57

## 2023-01-01 RX ADMIN — LEVETIRACETAM 400 MILLIGRAM(S): 250 TABLET, FILM COATED ORAL at 18:12

## 2023-01-01 RX ADMIN — Medication 100 MILLIGRAM(S): at 18:12

## 2023-01-01 RX ADMIN — LEVETIRACETAM 400 MILLIGRAM(S): 250 TABLET, FILM COATED ORAL at 06:23

## 2023-01-01 RX ADMIN — DIVALPROEX SODIUM 500 MILLIGRAM(S): 500 TABLET, DELAYED RELEASE ORAL at 12:45

## 2023-01-01 RX ADMIN — Medication 1 SPRAY(S): at 17:13

## 2023-01-01 RX ADMIN — Medication 1 APPLICATION(S): at 11:57

## 2023-01-01 RX ADMIN — Medication 1 SPRAY(S): at 07:06

## 2023-01-01 RX ADMIN — APIXABAN 5 MILLIGRAM(S): 2.5 TABLET, FILM COATED ORAL at 17:43

## 2023-01-01 RX ADMIN — CHLORHEXIDINE GLUCONATE 1 APPLICATION(S): 213 SOLUTION TOPICAL at 11:04

## 2023-01-01 RX ADMIN — Medication 100 MILLIEQUIVALENT(S): at 11:03

## 2023-01-01 RX ADMIN — POTASSIUM PHOSPHATE, MONOBASIC POTASSIUM PHOSPHATE, DIBASIC 83.33 MILLIMOLE(S): 236; 224 INJECTION, SOLUTION INTRAVENOUS at 11:27

## 2023-01-01 RX ADMIN — LEVETIRACETAM 400 MILLIGRAM(S): 250 TABLET, FILM COATED ORAL at 07:00

## 2023-01-01 RX ADMIN — Medication 100 MILLIGRAM(S): at 11:26

## 2023-01-01 RX ADMIN — Medication 55 MILLIGRAM(S): at 12:12

## 2023-01-01 RX ADMIN — APIXABAN 5 MILLIGRAM(S): 2.5 TABLET, FILM COATED ORAL at 18:17

## 2023-01-01 RX ADMIN — QUETIAPINE FUMARATE 75 MILLIGRAM(S): 200 TABLET, FILM COATED ORAL at 22:16

## 2023-01-01 RX ADMIN — SENNA PLUS 2 TABLET(S): 8.6 TABLET ORAL at 22:07

## 2023-01-01 RX ADMIN — QUETIAPINE FUMARATE 75 MILLIGRAM(S): 200 TABLET, FILM COATED ORAL at 22:05

## 2023-01-01 RX ADMIN — Medication 55 MILLIGRAM(S): at 11:49

## 2023-01-01 RX ADMIN — CHLORHEXIDINE GLUCONATE 1 APPLICATION(S): 213 SOLUTION TOPICAL at 11:05

## 2023-01-01 RX ADMIN — Medication 1 TABLET(S): at 11:35

## 2023-01-01 RX ADMIN — Medication 25 GRAM(S): at 08:45

## 2023-01-01 RX ADMIN — Medication 100 MILLIGRAM(S): at 12:25

## 2023-01-01 RX ADMIN — LEVETIRACETAM 400 MILLIGRAM(S): 250 TABLET, FILM COATED ORAL at 18:09

## 2023-01-01 RX ADMIN — LEVETIRACETAM 400 MILLIGRAM(S): 250 TABLET, FILM COATED ORAL at 06:14

## 2023-01-01 RX ADMIN — APIXABAN 5 MILLIGRAM(S): 2.5 TABLET, FILM COATED ORAL at 05:28

## 2023-01-01 RX ADMIN — PIPERACILLIN AND TAZOBACTAM 25 GRAM(S): 4; .5 INJECTION, POWDER, LYOPHILIZED, FOR SOLUTION INTRAVENOUS at 19:03

## 2023-01-01 RX ADMIN — SODIUM CHLORIDE 50 MILLILITER(S): 9 INJECTION, SOLUTION INTRAVENOUS at 14:11

## 2023-01-01 RX ADMIN — SODIUM CHLORIDE 1000 MILLILITER(S): 9 INJECTION INTRAMUSCULAR; INTRAVENOUS; SUBCUTANEOUS at 21:19

## 2023-01-01 RX ADMIN — Medication 55 MILLIGRAM(S): at 06:10

## 2023-01-01 RX ADMIN — DIVALPROEX SODIUM 1000 MILLIGRAM(S): 500 TABLET, DELAYED RELEASE ORAL at 05:25

## 2023-01-01 RX ADMIN — Medication 100 MILLIGRAM(S): at 11:13

## 2023-01-01 RX ADMIN — Medication 1 SPRAY(S): at 23:14

## 2023-01-01 RX ADMIN — ENOXAPARIN SODIUM 65 MILLIGRAM(S): 100 INJECTION SUBCUTANEOUS at 05:15

## 2023-01-01 RX ADMIN — AMLODIPINE BESYLATE 10 MILLIGRAM(S): 2.5 TABLET ORAL at 05:22

## 2023-01-01 RX ADMIN — Medication 55 MILLIGRAM(S): at 18:31

## 2023-01-01 RX ADMIN — Medication 55 MILLIGRAM(S): at 17:32

## 2023-01-01 RX ADMIN — Medication 1 TABLET(S): at 19:06

## 2023-01-01 RX ADMIN — Medication 50 MILLILITER(S): at 05:42

## 2023-01-01 RX ADMIN — SODIUM CHLORIDE 50 MILLILITER(S): 9 INJECTION, SOLUTION INTRAVENOUS at 21:28

## 2023-01-01 RX ADMIN — APIXABAN 5 MILLIGRAM(S): 2.5 TABLET, FILM COATED ORAL at 05:35

## 2023-01-01 RX ADMIN — Medication 1 APPLICATION(S): at 12:12

## 2023-01-01 RX ADMIN — SODIUM CHLORIDE 50 MILLILITER(S): 9 INJECTION, SOLUTION INTRAVENOUS at 11:53

## 2023-01-01 RX ADMIN — Medication 55 MILLIGRAM(S): at 18:17

## 2023-01-01 RX ADMIN — Medication 88 MICROGRAM(S): at 06:13

## 2023-01-01 RX ADMIN — Medication 55 MILLIGRAM(S): at 13:21

## 2023-01-01 RX ADMIN — Medication 1 SPRAY(S): at 23:09

## 2023-01-01 RX ADMIN — Medication 50 MILLILITER(S): at 00:18

## 2023-01-01 RX ADMIN — Medication 100 MILLIEQUIVALENT(S): at 23:58

## 2023-01-01 RX ADMIN — SODIUM CHLORIDE 500 MILLILITER(S): 9 INJECTION INTRAMUSCULAR; INTRAVENOUS; SUBCUTANEOUS at 20:35

## 2023-01-01 RX ADMIN — PANTOPRAZOLE SODIUM 40 MILLIGRAM(S): 20 TABLET, DELAYED RELEASE ORAL at 07:57

## 2023-01-01 RX ADMIN — DIVALPROEX SODIUM 500 MILLIGRAM(S): 500 TABLET, DELAYED RELEASE ORAL at 11:54

## 2023-01-01 RX ADMIN — Medication 50 MILLILITER(S): at 22:19

## 2023-01-01 RX ADMIN — SENNA PLUS 2 TABLET(S): 8.6 TABLET ORAL at 22:19

## 2023-01-01 RX ADMIN — Medication 55 MILLIGRAM(S): at 23:55

## 2023-01-01 RX ADMIN — Medication 55 MILLIGRAM(S): at 07:25

## 2023-01-01 RX ADMIN — Medication 55 MILLIGRAM(S): at 01:38

## 2023-01-01 RX ADMIN — Medication 20 MILLIGRAM(S): at 11:28

## 2023-01-01 RX ADMIN — Medication 55 MILLIGRAM(S): at 05:33

## 2023-01-01 RX ADMIN — PANTOPRAZOLE SODIUM 40 MILLIGRAM(S): 20 TABLET, DELAYED RELEASE ORAL at 11:28

## 2023-01-01 RX ADMIN — Medication 100 MILLIEQUIVALENT(S): at 14:36

## 2023-01-01 RX ADMIN — Medication 20 MILLIGRAM(S): at 14:55

## 2023-01-01 RX ADMIN — QUETIAPINE FUMARATE 50 MILLIGRAM(S): 200 TABLET, FILM COATED ORAL at 21:47

## 2023-01-01 RX ADMIN — LEVETIRACETAM 500 MILLIGRAM(S): 250 TABLET, FILM COATED ORAL at 05:25

## 2023-01-01 RX ADMIN — Medication 1 APPLICATION(S): at 12:22

## 2023-01-01 RX ADMIN — SODIUM CHLORIDE 65 MILLILITER(S): 9 INJECTION, SOLUTION INTRAVENOUS at 21:32

## 2023-01-01 RX ADMIN — Medication 100 MILLIGRAM(S): at 12:19

## 2023-01-01 RX ADMIN — PIPERACILLIN AND TAZOBACTAM 200 GRAM(S): 4; .5 INJECTION, POWDER, LYOPHILIZED, FOR SOLUTION INTRAVENOUS at 21:19

## 2023-01-01 RX ADMIN — Medication 4 MILLIGRAM(S): at 17:21

## 2023-01-01 RX ADMIN — DIVALPROEX SODIUM 1000 MILLIGRAM(S): 500 TABLET, DELAYED RELEASE ORAL at 21:01

## 2023-01-01 RX ADMIN — LEVETIRACETAM 400 MILLIGRAM(S): 250 TABLET, FILM COATED ORAL at 07:07

## 2023-01-01 RX ADMIN — Medication 1 APPLICATION(S): at 12:32

## 2023-01-01 RX ADMIN — APIXABAN 5 MILLIGRAM(S): 2.5 TABLET, FILM COATED ORAL at 05:52

## 2023-01-01 RX ADMIN — LEVETIRACETAM 400 MILLIGRAM(S): 250 TABLET, FILM COATED ORAL at 05:50

## 2023-01-01 RX ADMIN — Medication 55 MILLIGRAM(S): at 02:09

## 2023-02-07 NOTE — ED ADULT NURSE REASSESSMENT NOTE - NS ED NURSE REASSESS COMMENT FT1
Pt had seizure around 2235, lasting less than 1 minute. Dr Multani at bedside. Pt appears to have bit his tongue as some blood was noted around his mouth. No vomit noted. Non-rebreather placed on patient, SpO2 100% at this time. Medications given per MAR orders. At this time, patient is post-ictal and sleepy, wife is at bedside. Condom catheter placed in attempt to collect sample for urinalysis since patient urinates on his own.

## 2023-02-07 NOTE — ED ADULT TRIAGE NOTE - CHIEF COMPLAINT QUOTE
BIBEMS for witnessed seizure by wife.  Per wife, Pt has hx of seizures, last seizure last week.  Per wife, normally seizures last about 1 min,  however this time "it was different, he was shaking violently for 4-5 mins"  Denies any pain at this time. IV Rt wrist #20  hx of seizures, htn, thyroid issue BIBEMS for witnessed seizure by wife.  Per wife, Pt has hx of seizures on Depakote, last seizure last week.  Per wife, normally seizures last about 1 min,  however this time "it was different, he was shaking violently for 4-5 mins"  Denies any pain at this time. IV Rt wrist #20  hx of seizures, htn, thyroid issue BIBEMS for witnessed seizure by wife.  Per wife, Pt has hx of seizures on Depakote, last seizure last week.  Per wife, normally seizures last about 1 min,  however this time "it was different, he was shaking violently for 4-5 mins"  Denies any pain at this time. IV Rt wrist #20, arrived on 4L NC  hx of seizures, htn, thyroid issue

## 2023-02-07 NOTE — ED ADULT NURSE NOTE - OBJECTIVE STATEMENT
Pt AAOx1 (name).  73 year old male BIBEMS and presents to ED s/p witnessed seizure by wife who is at bedside. Per wife, pt has history of seizures and is on Depakote. Patient last had a seizure last week.  Per wife, seizures generally last about 1 min,  however, this time "it was different, he was shaking violently for 7 minutes". Per wife patient is not back to baseline mental status.  Denies any pain at this time. Wife states he was in bed and did not hit his head or have any episodes of emesis. Respirations equal and unlabored. No acute distress noted at this time. Dr Multani at bedside to evaluate.

## 2023-02-07 NOTE — ED ADULT NURSE NOTE - CHIEF COMPLAINT QUOTE
BIBEMS for witnessed seizure by wife.  Per wife, Pt has hx of seizures on Depakote, last seizure last week.  Per wife, normally seizures last about 1 min,  however this time "it was different, he was shaking violently for 4-5 mins"  Denies any pain at this time. IV Rt wrist #20  hx of seizures, htn, thyroid issue

## 2023-02-08 NOTE — EEG REPORT - NS EEG TEXT BOX
PEPITO [facility]ALL [id number]     Study Date: 		02-08-23      --------------------------------------------------------------------------------------------------  History:  CC/ HPI Patient is a 73y old  Male who presents with a chief complaint of AMS    MEDICATIONS  (STANDING):  amLODIPine   Tablet 10 milliGRAM(s) Oral daily  apixaban 5 milliGRAM(s) Oral every 12 hours  divalproex  milliGRAM(s) Oral daily  divalproex  milliGRAM(s) Oral at bedtime  lactated ringers. 1000 milliLiter(s) (100 mL/Hr) IV Continuous <Continuous>  lactobacillus acidophilus 1 Tablet(s) Oral daily  levothyroxine 88 MICROGram(s) Oral daily  pantoprazole    Tablet 40 milliGRAM(s) Oral before breakfast  polyethylene glycol 3350 17 Gram(s) Oral two times a day  QUEtiapine 75 milliGRAM(s) Oral at bedtime  senna 2 Tablet(s) Oral at bedtime  thiamine 100 milliGRAM(s) Oral daily    --------------------------------------------------------------------------------------------------  Study Interpretation:    [Abbreviation Key:  PDR=alpha rhythm/posterior dominant rhythm. A-P=anterior posterior.  Amplitude: ‘very low’:<20; ‘low’:20-49; ‘medium’:; ‘high’:>150uV.  Persistence for periodic/rhythmic patterns (% of epoch) ‘rare’:<1%; ‘occasional’:1-10%; ‘frequent’:10-50%; ‘abundant’:50-90%; ‘continuous’:>90%.  Persistence for sporadic discharges: ‘rare’:<1/hr; ‘occasional’:1/min-1/hr; ‘frequent’:>1/min; ‘abundant’:>1/10 sec.  RPP=rhythmic and periodic patterns; GRDA=generalized rhythmic delta activity; FIRDA=frontal intermittent GRDA; LRDA=lateralized rhythmic delta activity; TIRDA=temporal intermittent rhythmic delta activity;  LPD=PLED=lateralized periodic discharges; GPD=generalized periodic discharges; BIPDs =bilateral independent periodic discharges; Mf=multifocal; SIRPDs=stimulus induced rhythmic, periodic, or ictal appearing discharges; BIRDs=brief potentially ictal rhythmic discharges >4 Hz, lasting .5-10s; PFA (paroxysmal bursts >13 Hz or =8 Hz <10s).  Modifiers: +F=with fast component; +S=with spike component; +R=with rhythmic component.  S-B=burst suppression pattern.  Max=maximal. N1-drowsy; N2-stage II sleep; N3-slow wave sleep. SSS/BETS=small sharp spikes/benign epileptiform transients of sleep. HV=hyperventilation; PS=photic stimulation]    FINDINGS:      Background:  Continuity: continuous  Symmetry: symmetric  PDR: 7 Hz activity, with amplitude to 30 uV, that attenuated to eye opening.    Reactivity: present  Voltage: normal (between 20-150uV)  Anterior Posterior Gradient: present  Other background findings: none  Breach: absent    Background Slowing:  Generalized slowing: diffuse irregular delta and theta activity.  Focal slowing: none was present.    State Changes:   -N2 sleep transients were not recorded.    Sporadic Epileptiform Discharges:    None    Rhythmic and Periodic Patterns (RPPs):  None     Electrographic and Electroclinical seizures:  None    Other Clinical Events:  None    Activation Procedures:   -Hyperventilation was not performed.    -Photic stimulation was performed and did not elicit any abnormalities.       Artifacts:  Intermittent myogenic and movement artifacts were noted.    ECG:  The heart rate on single channel ECG was predominantly between 60-66 BPM.    EEG Classification / Summary:  Abnormal EEG study  Moderate generalized background slowing    -----------------------------------------------------------------------------------------------------    Clinical Impression:  Moderate diffuse/multi-focal cerebral dysfunction, not specific as to etiology.  There were no epileptiform abnormalities recorded.      -------------------------------------------------------------------------------------------------------  [facility] EEG Reading Room Ph#: [phone]  Epilepsy Answering Service after 5PM and before 8:30AM: Ph#: [phone]    Clifford Thibodeaux M.D.   of Neurology, [facility]

## 2023-02-08 NOTE — ED PROVIDER NOTE - NSFOLLOWUPINSTRUCTIONS_ED_ALL_ED_FT
You were seen in the ED for seizure.    Continue depakote as prescribed.    Follow up with your neurologist.    A seizure is abnormal electrical activity in the brain; the specific cause may or may not be found. Prior to a seizure you may experience a warning sensation (aura) that may include fear, nausea, dizziness, and visual changes such as flashing lights of spots. Common symptoms during the seizure may include an altered mental status, rhythmic jerking movements, drooling, grunting, loss of bladder or bowel control, or tongue biting. After a seizure, you may feel confused and sleepy.     Do not swim, drive, operate machinery, or engage in any risky activity during which a seizure could cause further injury to you or others. Teach friends and family what to do if you HAVE a seizure which includes laying you on the ground with your head on a cushion and turning you to the side to keep your breathing passages clear in case of vomiting.    SEEK IMMEDIATE MEDICAL CARE IF YOU HAVE ANY OF THE FOLLOWING SYMPTOMS: seizure lasting over 5 minutes, not waking up or persistent altered mental status after the seizure, or more frequent or worsening seizures.

## 2023-02-08 NOTE — H&P ADULT - NSHPPHYSICALEXAM_GEN_ALL_CORE
T(C): 36.2 (02-08-23 @ 08:31), Max: 36.7 (02-07-23 @ 21:41)  HR: 55 (02-08-23 @ 08:31) (55 - 80)  BP: 132/52 (02-08-23 @ 08:31) (120/68 - 136/74)  RR: 11 (02-08-23 @ 08:31) (11 - 23)  SpO2: 92% (02-08-23 @ 08:31) (92% - 100%)    CONSTITUTIONAL: Well groomed, no apparent distress  EYES: PERRLA and symmetric, EOMI, No conjunctival or scleral injection, non-icteric  ENMT: Oral mucosa with moist membranes. Normal dentition.  NECK: Supple, symmetric. No JVD.  RESP: No respiratory distress, no use of accessory muscles; CTA b/l, no WRR  CV: RRR, +S1S2, no MRG  GI: Soft, NT, ND, no rebound, no guarding  : No suprapubic tenderness. No CVA tenderness.  LYMPH: No cervical LAD or tenderness  MSK: No spinal tenderness, normal muscle strength/tone  EXTREMITIES: No pedal edema  SKIN: No rashes or ulcers noted  NEURO: Alert, responsive, non-verbal

## 2023-02-08 NOTE — ED ADULT NURSE REASSESSMENT NOTE - NS ED NURSE REASSESS COMMENT FT1
Pt sleeping comfortably in bed, easy to arouse, AOx1 when awake. No complaints at this time. Respirations equal and unlabored. No acute distress noted at this time. Wife at bedside. Pt sleeping comfortably in bed, easy to arouse, AOx1 when awake. No complaints at this time. Respirations equal and unlabored. No acute distress noted at this time. Wife at bedside. Straight cath performed per MD orders, sterile procedure protocol followed. Urine obtained and sent to lab for urinalysis.

## 2023-02-08 NOTE — ED PROVIDER NOTE - OBJECTIVE STATEMENT
72 y/o M w/ PMH HTN, hypothyroidism, seizure disorder, presenting to the ED for seizure. Per wife, patient has "small seizures" frequently that only last for a few moments, last seizure last week. Normally they last 1 minute but tonight it lasted for 4-5 minutes. Patient was post ictal afterwards and began to arouse upon arrival to the ED. He currently is on depakote, wife reports compliance with medication. No fever, chills, N/V, or other acute symptoms.

## 2023-02-08 NOTE — ED PROVIDER NOTE - PHYSICAL EXAMINATION
GENERAL: Awake, alert, NAD  HEENT: NC/AT, moist mucous membranes, PERRL, EOMI  LUNGS: CTAB, no wheezes or crackles   CARDIAC: RRR, no m/r/g  ABDOMEN: Soft, normal BS, non tender, non distended, no rebound, no guarding  BACK: No midline spinal tenderness, no CVA tenderness  EXT: No edema, no calf tenderness, 2+ DP pulses bilaterally, no deformities.  NEURO: A&Ox2. Moving all extremities.  SKIN: Warm and dry. No rash.  PSYCH: Normal affect.

## 2023-02-08 NOTE — ED PROVIDER NOTE - PATIENT PORTAL LINK FT
You can access the FollowMyHealth Patient Portal offered by [facility] by registering at the following website: [email or web address]. By joining [facility]’s FollowMyHealth portal, you will also be able to view your health information using other applications (apps) compatible with our system.

## 2023-02-08 NOTE — ED PROVIDER NOTE - PROGRESS NOTE DETAILS
Patient with seizure activity in the ED lasting <1 minute, ativan dosed, valproic acid loaded. Labs WNL. CTH unremarkable. Depakote level within normal limits. Patient can be discharged home to f/u with neurology. Patient with seizure activity in the ED lasting <1 minute, ativan dosed, valproic acid loaded. Labs WNL. CTH unremarkable. Depakote level within normal limits. Patient with prolonged post-ictal state in the ED, still not completely arouseable but will briefly open eyes to stimuli. Will need admission for breakthrough seizure on depakote.

## 2023-02-08 NOTE — ED PROVIDER NOTE - CLINICAL SUMMARY MEDICAL DECISION MAKING FREE TEXT BOX
74 y/o M with PMH HTN, hypothyroidism, dementia presenting to the ED for seizure.  Vitals stable.  Patient is back to baseline on exam.  However, patient w/ seizure in the ED, requiring ativan.  Will check depakote level. Metabolic workup with labs. CTH to r/o acute intracranial process.  Will load with depakote.  Likely can be discharged home in setting of negative work up given known seizure hx.

## 2023-02-08 NOTE — H&P ADULT - NSHPLABSRESULTS_GEN_ALL_CORE
13.8   6.66  )-----------( 209      ( 07 Feb 2023 22:10 )             42.2       02-07    139  |  98  |  37<H>  ----------------------------<  124<H>  4.6   |  29  |  1.54<H>    Ca    9.5      07 Feb 2023 22:10    TPro  9.7<H>  /  Alb  3.6  /  TBili  0.3  /  DBili  x   /  AST  31  /  ALT  28  /  AlkPhos  71  02-07

## 2023-02-08 NOTE — ED ADULT NURSE REASSESSMENT NOTE - NS ED NURSE REASSESS COMMENT FT1
Patient with prolonged post-ictal state in the ED, still not completely arouseable, but will briefly open eyes to stimuli. Wife at bedside. VSS. Respirations equal and unlabored. No acute distress noted at this time. Patient with prolonged post-ictal state in the ED, easy to arouse with stimuli, however, patient only will briefly open eyes. Wife at bedside. VSS. Respirations equal and unlabored. No acute distress noted at this time.

## 2023-02-08 NOTE — H&P ADULT - HISTORY OF PRESENT ILLNESS
Pt non-verbal but alert, likely due to post-ictal state. Hx obtained via chart review and wife.    72 y/o M w/PMHx of HTN, hypothyroidism, seizure disorder, dementia, presents due to seizure. Per wife, patient has "small seizures" frequently that only last for a few moments, last seizure last week. Normally they last 1 minute but the one last night lasted for 4-5 minutes. Denies trauma. He currently is on depakote, wife reports compliance with medication. No fever, chills, N/V, or other acute symptoms. Denies pain.

## 2023-02-08 NOTE — H&P ADULT - ASSESSMENT
#Seizure    #Hypothyroidism  - F/u TSH  - Continue home levothyroxine    #Hx of DVT/PE  - Continue home Eliquis    #Dementia  - Continue home Seroquel    #HTN  - Continue home amlodipine    Code: Full  VTE: Eliquis  Diet: Soft, Bite sized #Seizure  - Seizure and Fall precautions  - Continue Valproate 500mg BID  - Ativan 2mg IV PRN Breakthrough Seizure  - F/u EEG  - CT Head: No acute process noted  - Neurology consulted    #JESSICA  - Unknown baseline  - Cr of 1.54  - LR@100    #Hypothyroidism  - F/u TSH  - Continue home levothyroxine    #Hx of DVT/PE  - Continue home Eliquis    #Dementia  - Continue home Seroquel    #HTN  - Continue home amlodipine    Code: Full  VTE: Eliquis  Diet: Soft, Bite sized #Seizure  - Seizure and Fall precautions  - Depakote level of 59  - Increase Depakote to 500mg in AM, 750mg in PM per Neuro  - Ativan 2mg IV PRN Breakthrough Seizure  - F/u EEG  - CT Head: No acute process noted  - Neurology consulted, Dr. Brock, f/u recommendations    #JESSICA  - Unknown baseline  - Cr of 1.54  - LR@100    #Hypothyroidism  - F/u TSH  - Continue home levothyroxine    #Hx of DVT/PE  - Continue home Eliquis    #Dementia  - Continue home Seroquel    #HTN  - Continue home amlodipine    Code: Full  VTE: Eliquis  Diet: Soft, Bite sized

## 2023-02-09 NOTE — DISCHARGE NOTE PROVIDER - HOSPITAL COURSE
#Seizure  - Seizure and Fall precautions  - Depakote level of 59  - Increase Depakote to 500mg in AM, 750mg in PM per Neuro  - Ativan 2mg IV PRN Breakthrough Seizure  - F/u EEG  - CT Head: No acute process noted  - Neurology consulted, Dr. Brock, f/u recommendations    #JESSICA  - Unknown baseline  - Cr of 1.54  - LR@100    #Hypothyroidism  - F/u TSH  - Continue home levothyroxine    #Hx of DVT/PE  - Continue home Eliquis    #Dementia  - Continue home Seroquel    #HTN  - Continue home amlodipine    Code: Full  VTE: Eliquis  Diet: Soft, Bite sized     Problem/Plan - 1:  ·  Problem: Seizure.      Problem/Plan - 2:  ·  Problem: JESSICA (acute kidney injury).    74 y/o M w/PMHx of HTN, hypothyroidism, seizure disorder, dementia, presented due to seizure.  Seen by Neurology Dr. Brock.  Dosage of Depakote was increased.  MRI showed < from: MR Head No Cont (02.13.23 @ 11:12) >   Moderate atrophy and extensive small vessel white matter ischemic changes. No acute infarcts, hemorrhage or mass.  < end of copied text >  EEG showed that epileptiform abnormalities recorded.  Seen by PT, recommended [facility].        #Seizure  - Seizure and Fall precautions  - Depakote level of 59  - Increase Depakote to 500mg in AM, 750mg in PM per Neuro  -  EEG noted  - CT Head: No acute process noted  - Neurology consulted, Dr. Brock, MRI completed  -vpa level OK on current dose    #JESSICA: due due to dehydration  stable    Hypothyroidism  - Continue home levothyroxine    #Hx of DVT/PE  - Continue home Eliquis    #Dementia  - Continue home Seroquel    #HTN  - Continue home amlodipine    Code: Full  VTE: Eliquis  Diet: Soft, Bite sized    Stable for d/c to [facility].   Disposition: Stable for discharge.  Outpatient followup discussed.  Total time spent on discharge is  35  minutes.

## 2023-02-09 NOTE — DISCHARGE NOTE PROVIDER - CARE PROVIDER_API CALL
Mamadou Brock)  Neurology; Neurophysiology  [address]  [place] [address]  Phone: [phone]  Fax: [phone]  Follow Up Time: 2 weeks   Mamadou Brock)  Neurology; Neurophysiology  [address], [address]  [place] [address]  Phone: [phone]  Fax: [phone]  Follow Up Time: 2 weeks    PMD,   Phone: (   )    -  Fax: (   )    -  Follow Up Time:

## 2023-02-09 NOTE — DISCHARGE NOTE PROVIDER - NSDCCPCAREPLAN_GEN_ALL_CORE_FT
PRINCIPAL DISCHARGE DIAGNOSIS  Diagnosis: Seizure  Assessment and Plan of Treatment: You were diagnosed with a breakthrough seizure  please increase your depakote the following  500mg in the morning  750mg at night  Please followup with Dr. Brock in one week post discharge       PRINCIPAL DISCHARGE DIAGNOSIS  Diagnosis: Seizure  Assessment and Plan of Treatment: You were diagnosed with a breakthrough seizure  please increase your depakote the following  500mg in the morning  750mg at night  Please followup with Dr. Brock in one week post discharge      SECONDARY DISCHARGE DIAGNOSES  Diagnosis: JESSICA (acute kidney injury)  Assessment and Plan of Treatment:     Diagnosis: Alzheimer's dementia  Assessment and Plan of Treatment:     Diagnosis: DVT, lower extremity  Assessment and Plan of Treatment:     Diagnosis: Pulmonary embolism  Assessment and Plan of Treatment:

## 2023-02-09 NOTE — PHYSICAL THERAPY INITIAL EVALUATION ADULT - ADDITIONAL COMMENTS
Pt unable to provide PLOF and home setting. Attempted to call wife, Leena [phone], but not available. Based on chart, pt lives with wife and ambulatory with assist.

## 2023-02-09 NOTE — PHYSICAL THERAPY INITIAL EVALUATION ADULT - BED MOBILITY TRAINING, PT EVAL
Pt will perform all aspects of bed mobility with supervision to help prevent pressure ulcers, by 4 weeks.

## 2023-02-09 NOTE — CONSULT NOTE ADULT - ASSESSMENT
72 y/o M w/PMHx of HTN, hypothyroidism, seizure disorder, dementia, presents due to seizure. Per wife, patient has "small seizures" frequently that only last for a few moments, last seizure last week. Normally they last 1 minute but the one last night lasted for 4-5 minutes. Denies trauma. He currently is on depakote, wife reports compliance with medication. VPA level 59 PE with advanced cognitive changes left facial. CTH chronic  R pontine bilaterally thalamic infarct  right cerebellar hypodensity    Impression: seizure    VPA level 59, can increase dose to 500/750  clarify if at baseline, if not can consider MRI brain  On AC (PE last year), from a stroke perspective no need to be on Aspirin 81 if on AC  supportive care  EEG reviewed

## 2023-02-09 NOTE — DISCHARGE NOTE PROVIDER - ATTENDING DISCHARGE PHYSICAL EXAMINATION:
Objective:    Vitals:  T(C): 37.1 (02-09-23 @ 11:04), Max: 37.1 (02-09-23 @ 00:06)  HR: 60 (02-09-23 @ 11:04) (60 - 80)  BP: 125/74 (02-09-23 @ 11:04) (110/74 - 138/74)  RR: 18 (02-09-23 @ 11:04) (16 - 19)  SpO2: 97% (02-09-23 @ 11:04) (95% - 98%)    Physical Exam:  General: comfortable, no acute distress,elderly  HEENT: Atraumatic, no LAD, trachea midline, PERRLA  Cardiovascular: normal s1s2, no murmurs, gallops or fricition rubs  Pulmonary: clear to ausculation Bilaterally, no wheezing , rhonchi  Gastrointestinal: soft non tender non distended, no masses felt, no organomegally  Muscloskeletal: no lower extremity edema, intact bilateral lower extremity pulses  Neurological: CN II-12 intact. No focal weakness  Psychiatrical: normal mood, cooperative  SKIN: no rash, lesions or ulcers

## 2023-02-09 NOTE — PHYSICAL THERAPY INITIAL EVALUATION ADULT - PERTINENT HX OF CURRENT PROBLEM, REHAB EVAL
Pt is a 73-y/o, male, admitted to [facility], [place] due to seizures. As per chart, wife states that pt has "small seizure" that only lasts for 1 minute, the recent seizure was 4-5 mins. Pt now referred to PT for functional evaluation.

## 2023-02-09 NOTE — PATIENT PROFILE ADULT - FALL HARM RISK - HARM RISK INTERVENTIONS
Assistance OOB with selected safe patient handling equipment/Communicate Risk of Fall with Harm to all staff/Monitor for mental status changes/Move patient closer to nurses' station/Reinforce activity limits and safety measures with patient and family/Reorient to person, place and time as needed/Tailored Fall Risk Interventions/Toileting schedule using arm’s reach rule for commode and bathroom/Use of alarms - bed, chair and/or voice tab/Visual Cue: Yellow wristband and red socks/Bed in lowest position, wheels locked, appropriate side rails in place/Call bell, personal items and telephone in reach/Instruct patient to call for assistance before getting out of bed or chair/Non-slip footwear when patient is out of bed/[place] to call system/Physically safe environment - no spills, clutter or unnecessary equipment/Purposeful Proactive Rounding/Room/bathroom lighting operational, light cord in reach Assistance with ambulation/Assistance OOB with selected safe patient handling equipment/Communicate Risk of Fall with Harm to all staff/Monitor for mental status changes/Move patient closer to nurses' station/Reinforce activity limits and safety measures with patient and family/Reorient to person, place and time as needed/Tailored Fall Risk Interventions/Toileting schedule using arm’s reach rule for commode and bathroom/Use of alarms - bed, chair and/or voice tab/Visual Cue: Yellow wristband and red socks/Bed in lowest position, wheels locked, appropriate side rails in place/Call bell, personal items and telephone in reach/Instruct patient to call for assistance before getting out of bed or chair/Non-slip footwear when patient is out of bed/[place] to call system/Physically safe environment - no spills, clutter or unnecessary equipment/Purposeful Proactive Rounding/Room/bathroom lighting operational, light cord in reach

## 2023-02-09 NOTE — PHYSICAL THERAPY INITIAL EVALUATION ADULT - BALANCE TRAINING, PT EVAL
Pt will perform sitting, transfers, standing and ambulation with good balance using appropriate assistive device and prevent falls by - weeks.

## 2023-02-09 NOTE — CONSULT NOTE ADULT - SUBJECTIVE AND OBJECTIVE BOX
Neurology consult    PEPITO BALL73yMale     Patient is a 73y old  Male who presents with a chief complaint of Seizure (2023 10:27)      HPI:  Pt non-verbal but alert, likely due to post-ictal state. Hx obtained via chart review and wife.    74 y/o M w/PMHx of HTN, hypothyroidism, seizure disorder, dementia, presents due to seizure. Per wife, patient has "small seizures" frequently that only last for a few moments, last seizure last week. Normally they last 1 minute but the one last night lasted for 4-5 minutes. Denies trauma. He currently is on depakote, wife reports compliance with medication. No fever, chills, N/V, or other acute symptoms. Denies pain. (2023 10:27)          acetaminophen     Tablet .. 650 milliGRAM(s) Oral every 6 hours PRN  amLODIPine   Tablet 10 milliGRAM(s) Oral daily  apixaban 5 milliGRAM(s) Oral every 12 hours  divalproex  milliGRAM(s) Oral daily  divalproex  milliGRAM(s) Oral at bedtime  lactated ringers. 1000 milliLiter(s) IV Continuous <Continuous>  lactobacillus acidophilus 1 Tablet(s) Oral daily  levothyroxine 88 MICROGram(s) Oral daily  LORazepam   Injectable 2 milliGRAM(s) IV Push every 4 hours PRN  melatonin 3 milliGRAM(s) Oral at bedtime PRN  pantoprazole    Tablet 40 milliGRAM(s) Oral before breakfast  polyethylene glycol 3350 17 Gram(s) Oral two times a day  QUEtiapine 75 milliGRAM(s) Oral at bedtime  senna 2 Tablet(s) Oral at bedtime  thiamine 100 milliGRAM(s) Oral daily      PMH: HTN (hypertension)    Diverticulitis    Diverticulosis    Hypothyroid         PSH: No significant past surgical history        Family history: No history of dementia, strokes, or seizures   FAMILY HISTORY:      SOCIAL HISTORY:  No history of tobacco or alcohol use     Allergies    fish (Hives; Angioedema)  No Known Drug Allergies    Intolerances            Vital Signs Last 24 Hrs  T(C): 36.3 (2023 04:31), Max: 37.1 (2023 00:06)  T(F): 97.3 (2023 04:31), Max: 98.8 (2023 00:06)  HR: 65 (2023 04:31) (60 - 80)  BP: 116/71 (2023 04:31) (110/74 - 138/74)  BP(mean): --  RR: 18 (2023 04:31) (16 - 19)  SpO2: 95% (2023 04:31) (95% - 98%)    Parameters below as of 2023 02:01  Patient On (Oxygen Delivery Method): room air          On Neurological Examination:    Mental Status - Patient is alert, awake, oriented X1 perseverates says its augiust and he is in [place] follows sim,ple commands  Cranial Nerves - PERRL, EOMI, VFF, V1-V3 intact, left facial, tongue/uvula midline    Motor Exam -   no drift    Sensory    Intact to light touch and pinprick bilaterally    Coord: FTN intact bilaterally     Gait -  ndeferred     NIHSS 5       LABS:  CBC Full  -  ( 2023 06:40 )  WBC Count : 5.39 K/uL  RBC Count : 3.70 M/uL  Hemoglobin : 11.0 g/dL  Hematocrit : 33.2 %  Platelet Count - Automated : 217 K/uL  Mean Cell Volume : 89.7 fl  Mean Cell Hemoglobin : 29.7 pg  Mean Cell Hemoglobin Concentration : 33.1 g/dL  Auto Neutrophil # : x  Auto Lymphocyte # : x  Auto Monocyte # : x  Auto Eosinophil # : x  Auto Basophil # : x  Auto Neutrophil % : x  Auto Lymphocyte % : x  Auto Monocyte % : x  Auto Eosinophil % : x  Auto Basophil % : x    Urinalysis Basic - ( 2023 02:15 )    Color: Yellow / Appearance: Clear / S.025 / pH: x  Gluc: x / Ketone: Trace  / Bili: Negative / Urobili: Negative mg/dL   Blood: x / Protein: 30 mg/dL / Nitrite: Negative   Leuk Esterase: Negative / RBC: 6-10 /HPF / WBC 0-2   Sq Epi: x / Non Sq Epi: Few / Bacteria: Few      -    140  |  105  |  31<H>  ----------------------------<  98  4.0   |  29  |  1.18    Ca    8.8      2023 06:40    TPro  7.2  /  Alb  2.7<L>  /  TBili  0.4  /  DBili  x   /  AST  24  /  ALT  24  /  AlkPhos  60  -    LIVER FUNCTIONS - ( 2023 06:40 )  Alb: 2.7 g/dL / Pro: 7.2 gm/dL / ALK PHOS: 60 U/L / ALT: 24 U/L / AST: 24 U/L / GGT: x           Hemoglobin A1C:   Lipid Panel  @ 06:40  Total Cholesterol, Serum 117  LDL --  Triglycerides 82            RADIOLOGY  < from: CT Head No Cont (23 @ 01:02) >    There is no acute intracranial hemorrhage, mass effect, midline shift,   extra-axial collection, hydrocephalus, or evidence of acute vascular   territorial infarction. Moderate patchy hypodensities within the   periventricular and subcortical white matter, although nonspecific,   likely reflect chronic microvascular disease. Cerebral volume loss   contributes to prominence of the ventricles and sulci. 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 Stable 4 mm hypodensity   within the right cerebellum, may represent a cavernoma.    The visualized paranasal sinuses and mastoid air cells are clear.   Intraorbital contents are unremarkable. Calvarium is intact.    IMPRESSION:    No acute intracranial hemorrhage, mass effect, or evidence of acute   vascular territorial infarction. If clinical symptoms persist or worsen,   more sensitive evaluation with brain MRI may be obtained, if no  contraindications exist.    < end of copied text >

## 2023-02-09 NOTE — DISCHARGE NOTE PROVIDER - NSDCMRMEDTOKEN_GEN_ALL_CORE_FT
acetaminophen 325 mg oral tablet: 2 tab(s) orally every 6 hours, As needed, Moderate Pain (4 - 6), Severe Pain (7 - 10)  amLODIPine 10 mg oral tablet: 1 tab(s) orally once a day  apixaban 5 mg oral tablet: 1 tab(s) orally 2 times a day   divalproex sodium 500 mg oral delayed release tablet: 1 tab(s) orally 2 times a day  lactobacillus acidophilus oral capsule: 1 tab(s) orally once a day (at bedtime)  levothyroxine 88 mcg (0.088 mg) oral tablet: 1 tab(s) orally once a day  pantoprazole 40 mg oral delayed release tablet: 1 tab(s) orally once a day (before a meal)  polyethylene glycol 3350 oral powder for reconstitution: 17 gram(s) orally 2 times a day  QUEtiapine 50 mg oral tablet: 1.5 tab(s) orally once a day (at bedtime)   senna oral tablet: 2 tab(s) orally once a day (at bedtime)  thiamine 100 mg oral tablet: 1 tab(s) orally once a day   acetaminophen 325 mg oral tablet: 2 tab(s) orally every 6 hours, As needed, Temp greater or equal to 38C (100.4F), Mild Pain (1 - 3)  amLODIPine 10 mg oral tablet: 1 tab(s) orally once a day  apixaban 5 mg oral tablet: 1 tab(s) orally 2 times a day   divalproex sodium 250 mg oral delayed release tablet: 3 tab(s) orally once a day (at bedtime)  divalproex sodium 500 mg oral delayed release tablet: 1 tab(s) orally once a day  lactobacillus acidophilus oral capsule: 1 tab(s) orally once a day (at bedtime)  levothyroxine 88 mcg (0.088 mg) oral tablet: 1 tab(s) orally once a day  pantoprazole 40 mg oral delayed release tablet: 1 tab(s) orally once a day (before a meal)  polyethylene glycol 3350 oral powder for reconstitution: 17 gram(s) orally 2 times a day  QUEtiapine 50 mg oral tablet: 1.5 tab(s) orally once a day (at bedtime)   senna oral tablet: 2 tab(s) orally once a day (at bedtime)  thiamine 100 mg oral tablet: 1 tab(s) orally once a day

## 2023-02-09 NOTE — DISCHARGE NOTE PROVIDER - NSDCFUADDINST_GEN_ALL_CORE_FT
It is important to see your primary physician as well as any specialty physicians within the next week to perform a comprehensive medical review.  Call their offices for an appointment as soon as you leave the hospital.  You will also need to see them for renewal of your medications.  If have any difficulty following with a physician, contact the [facility] [phone] or via [email or web address].   To obtain your results, you can access the [facility] Patient Portal at [email or web address].  Your medical issues appear to be stable at this time, but if your symptoms recur or worsen, contact your physicians and/or return to the hospital if necessary.  If you encounter any issues or questions with your medication, call your physicians before stopping the medication.  Do not drive.  Limit your diet to 2 grams of sodium daily.

## 2023-02-09 NOTE — DISCHARGE NOTE PROVIDER - PROVIDER TOKENS
PROVIDER:[TOKEN:[[id number]],FOLLOWUP:[2 weeks]] PROVIDER:[TOKEN:[[id number]],FOLLOWUP:[2 weeks]],FREE:[LAST:[PMD],PHONE:[(   )    -],FAX:[(   )    -]]

## 2023-02-14 NOTE — DIETITIAN INITIAL EVALUATION ADULT - RD TO REMAIN AVAILABLE
Reason for Call:  Other call back and returning call    Detailed comments: Mom says she is returning a call to the clinic for Indira's lab results. Did not see an encounter. Please call and let her know the results of today's test, Thank you     Phone Number Patient can be reached at: Home number on file [phone] (home)    Best Time: Any    Can we leave a detailed message on this number? YES    Call taken on 2/21/2018 at 4:25 PM by Phillip Burden       yes

## 2023-02-14 NOTE — DIETITIAN INITIAL EVALUATION ADULT - ORAL INTAKE PTA/DIET HISTORY
Pt appeared forgetful, confusion noted, was able to state date of birth accurately.  Pt lived @ home c spouse PTA.  Pt appears to have own teeth, food allergy to fish/salmon noted.  Pt c fluctuation in oral intake c overall intake <75% of meals, 1 unconsumed Ensure supplement noted @ bedside.  D/C plans for Rehab noted.

## 2023-02-14 NOTE — DIETITIAN INITIAL EVALUATION ADULT - NS FNS WEIGHT CHANGE REASON
As per previous adm RD note on 05/05/22, wt. of 66.1 kg/145.7 LBS noted.  As per current wt. of 59 kg/130 LBS, wt. loss of ~ 16 LBS noted in 9 months.

## 2023-02-14 NOTE — PROGRESS NOTE ADULT - PROBLEM SELECTOR PROBLEM 2
JESSICA (acute kidney injury)

## 2023-02-14 NOTE — DIETITIAN INITIAL EVALUATION ADULT - PERTINENT LABORATORY DATA
02-13    143  |  106  |  28<H>  ----------------------------<  84  4.3   |  28  |  1.28    Ca    8.7      13 Feb 2023 07:42    A1C with Estimated Average Glucose Result: 6.3 % <H-pre-diabetic range>(02-09-23 @ 06:40)  A1C with Estimated Average Glucose Result: 5.9 % <H>(04-13-22 @ 09:20)

## 2023-02-14 NOTE — PROGRESS NOTE ADULT - NSPROGADDITIONALINFOA_GEN_ALL_CORE
DC READY  PT recommends rehab  WIFE agrees and requests the same
at baseline  depakote increased  spoke to patients wife  discussed recommendaitons from neurology

## 2023-02-14 NOTE — DIETITIAN INITIAL EVALUATION ADULT - NS FNS DIET ORDER
Soft and Bite sized, Ensure Plus High Protein Therapeutic Nutrition 8 oz 3 x day=1050 calories, 60 grams protein (02/08)

## 2023-02-14 NOTE — DIETITIAN INITIAL EVALUATION ADULT - PERTINENT MEDS FT
MEDICATIONS  (STANDING):  amLODIPine   Tablet 10 milliGRAM(s) Oral daily  apixaban 5 milliGRAM(s) Oral every 12 hours  divalproex  milliGRAM(s) Oral daily  divalproex  milliGRAM(s) Oral at bedtime  lactobacillus acidophilus 1 Tablet(s) Oral daily  levothyroxine 88 MICROGram(s) Oral daily  pantoprazole    Tablet 40 milliGRAM(s) Oral before breakfast  polyethylene glycol 3350 17 Gram(s) Oral two times a day  QUEtiapine 75 milliGRAM(s) Oral at bedtime  senna 2 Tablet(s) Oral at bedtime  thiamine 100 milliGRAM(s) Oral daily    MEDICATIONS  (PRN):  acetaminophen     Tablet .. 650 milliGRAM(s) Oral every 6 hours PRN Temp greater or equal to 38C (100.4F), Mild Pain (1 - 3)  melatonin 3 milliGRAM(s) Oral at bedtime PRN Insomnia

## 2023-02-14 NOTE — DIETITIAN INITIAL EVALUATION ADULT - OTHER CALCULATIONS
Height (cm): 170.2 (02-07)  Weight (kg): 58.967 (02-07)  BMI (kg/m2): 20.4 (02-07)  IBW: 67.1 kg  % IBW:87%

## 2023-02-15 NOTE — DISCHARGE NOTE NURSING/CASE MANAGEMENT/SOCIAL WORK - PATIENT PORTAL LINK FT
You can access the FollowMyHealth Patient Portal offered by [facility] by registering at the following website: [email or web address]. By joining [facility]’s FollowMyHealth portal, you will also be able to view your health information using other applications (apps) compatible with our system.

## 2023-02-15 NOTE — PROGRESS NOTE ADULT - SUBJECTIVE AND OBJECTIVE BOX
Patient seen and examined  at baseline  depakojanet reese  spoke to patients wife  discussed recommendaitons from neurology  noted patient WALKS at home with asisstance informed wife medical team will apply for walker  Review of Systems:  General:denies fever chills, headache, weakness  HEENT: denies blurry vision,diffculty swallowing, difficulty hearing, tinnitus  Cardiovascular: denies chest pain  ,palpitations  Pulmonary:denies shortness of breath, cough, wheezing, hemoptysis  Gastrointestinal: denies abdominal pain, constipation, diarrhea,nausea , vomiting, hematochezia  : denies hematuria, dysuria, or incontinence  Neurological: denies weakness, numbness , tingling, dizziness, tremors  MSK: denies muscle pain, difficulty ambulating, swelling, back pain  skin: denies skin rash, itching, burning, or  skin lesions  Psychiatrical: denies mood disturbances, anxierty, feeling depressed, depression , or difficulty sleeping    Objective:  Vitals  T(C): 37.1 (02-09-23 @ 11:04), Max: 37.1 (02-09-23 @ 00:06)  HR: 60 (02-09-23 @ 11:04) (60 - 80)  BP: 125/74 (02-09-23 @ 11:04) (110/74 - 138/74)  RR: 18 (02-09-23 @ 11:04) (16 - 19)  SpO2: 97% (02-09-23 @ 11:04) (95% - 98%)    Physical Exam:  General: comfortable, no acute distress,elderly  HEENT: Atraumatic, no LAD, trachea midline, PERRLA  Cardiovascular: normal s1s2, no murmurs, gallops or fricition rubs  Pulmonary: clear to ausculation Bilaterally, no wheezing , rhonchi  Gastrointestinal: soft non tender non distended, no masses felt, no organomegally  Muscloskeletal: no lower extremity edema, intact bilateral lower extremity pulses  Neurological: CN II-12 intact. No focal weakness  Psychiatrical: normal mood, cooperative  SKIN: no rash, lesions or ulcers    Labs:                          11.0   5.39  )-----------( 217      ( 09 Feb 2023 06:40 )             33.2     02-09    140  |  105  |  31<H>  ----------------------------<  98  4.0   |  29  |  1.18    Ca    8.8      09 Feb 2023 06:40    TPro  7.2  /  Alb  2.7<L>  /  TBili  0.4  /  DBili  x   /  AST  24  /  ALT  24  /  AlkPhos  60  02-09    LIVER FUNCTIONS - ( 09 Feb 2023 06:40 )  Alb: 2.7 g/dL / Pro: 7.2 gm/dL / ALK PHOS: 60 U/L / ALT: 24 U/L / AST: 24 U/L / GGT: x                 Active Medications  MEDICATIONS  (STANDING):  amLODIPine   Tablet 10 milliGRAM(s) Oral daily  apixaban 5 milliGRAM(s) Oral every 12 hours  divalproex  milliGRAM(s) Oral daily  divalproex  milliGRAM(s) Oral at bedtime  lactated ringers. 1000 milliLiter(s) (100 mL/Hr) IV Continuous <Continuous>  lactobacillus acidophilus 1 Tablet(s) Oral daily  levothyroxine 88 MICROGram(s) Oral daily  pantoprazole    Tablet 40 milliGRAM(s) Oral before breakfast  polyethylene glycol 3350 17 Gram(s) Oral two times a day  QUEtiapine 75 milliGRAM(s) Oral at bedtime  senna 2 Tablet(s) Oral at bedtime  thiamine 100 milliGRAM(s) Oral daily    MEDICATIONS  (PRN):  acetaminophen     Tablet .. 650 milliGRAM(s) Oral every 6 hours PRN Temp greater or equal to 38C (100.4F), Mild Pain (1 - 3)  LORazepam   Injectable 2 milliGRAM(s) IV Push every 4 hours PRN Seizure  melatonin 3 milliGRAM(s) Oral at bedtime PRN Insomnia    
Patient seen and examined  at baseline  depakote increased  PT evaled: Wife requesting ROEL    Review of Systems:  General:denies fever chills, headache, weakness  HEENT: denies blurry vision,diffculty swallowing, difficulty hearing, tinnitus  Cardiovascular: denies chest pain  ,palpitations  Pulmonary:denies shortness of breath, cough, wheezing, hemoptysis  Gastrointestinal: denies abdominal pain, constipation, diarrhea,nausea , vomiting, hematochezia  : denies hematuria, dysuria, or incontinence  Neurological: denies weakness, numbness , tingling, dizziness, tremors  MSK: denies muscle pain, difficulty ambulating, swelling, back pain  skin: denies skin rash, itching, burning, or  skin lesions  Psychiatrical: denies mood disturbances, anxierty, feeling depressed, depression , or difficulty sleeping    Objective:  Vital Signs Last 24 Hrs  T(C): 36.7 (10 Feb 2023 10:28), Max: 37.2 (10 Feb 2023 04:28)  T(F): 98.1 (10 Feb 2023 10:28), Max: 99 (10 Feb 2023 04:28)  HR: 63 (10 Feb 2023 10:28) (60 - 76)  BP: 145/76 (10 Feb 2023 10:28) (99/62 - 145/76)  BP(mean): --  RR: 19 (10 Feb 2023 10:28) (18 - 19)  SpO2: 93% (10 Feb 2023 10:28) (93% - 99%)    Parameters below as of 10 Feb 2023 10:28  Patient On (Oxygen Delivery Method): room air        Physical Exam:  General: comfortable, no acute distress,elderly  HEENT: Atraumatic, no LAD, trachea midline, PERRLA  Cardiovascular: normal s1s2, no murmurs, gallops or fricition rubs  Pulmonary: clear to ausculation Bilaterally, no wheezing , rhonchi  Gastrointestinal: soft non tender non distended, no masses felt, no organomegally  Muscloskeletal: no lower extremity edema, intact bilateral lower extremity pulses  Neurological: CN II-12 intact. No focal weakness  Psychiatrical: normal mood, cooperative  SKIN: no rash, lesions or ulcers    Labs:                          11.0   5.39  )-----------( 217      ( 09 Feb 2023 06:40 )             33.2     02-09    140  |  105  |  31<H>  ----------------------------<  98  4.0   |  29  |  1.18    Ca    8.8      09 Feb 2023 06:40    TPro  7.2  /  Alb  2.7<L>  /  TBili  0.4  /  DBili  x   /  AST  24  /  ALT  24  /  AlkPhos  60  02-09    LIVER FUNCTIONS - ( 09 Feb 2023 06:40 )  Alb: 2.7 g/dL / Pro: 7.2 gm/dL / ALK PHOS: 60 U/L / ALT: 24 U/L / AST: 24 U/L / GGT: x                 Active Medications  MEDICATIONS  (STANDING):  amLODIPine   Tablet 10 milliGRAM(s) Oral daily  apixaban 5 milliGRAM(s) Oral every 12 hours  divalproex  milliGRAM(s) Oral daily  divalproex  milliGRAM(s) Oral at bedtime  lactated ringers. 1000 milliLiter(s) (100 mL/Hr) IV Continuous <Continuous>  lactobacillus acidophilus 1 Tablet(s) Oral daily  levothyroxine 88 MICROGram(s) Oral daily  pantoprazole    Tablet 40 milliGRAM(s) Oral before breakfast  polyethylene glycol 3350 17 Gram(s) Oral two times a day  QUEtiapine 75 milliGRAM(s) Oral at bedtime  senna 2 Tablet(s) Oral at bedtime  thiamine 100 milliGRAM(s) Oral daily    MEDICATIONS  (PRN):  acetaminophen     Tablet .. 650 milliGRAM(s) Oral every 6 hours PRN Temp greater or equal to 38C (100.4F), Mild Pain (1 - 3)  LORazepam   Injectable 2 milliGRAM(s) IV Push every 4 hours PRN Seizure  melatonin 3 milliGRAM(s) Oral at bedtime PRN Insomnia    
Patient seen and examined  no acute overnight events  pleasantly confused  for ROEL  Review of Systems:  General:denies fever chills, headache, weakness  HEENT: denies blurry vision,diffculty swallowing, difficulty hearing, tinnitus  Cardiovascular: denies chest pain  ,palpitations  Pulmonary:denies shortness of breath, cough, wheezing, hemoptysis  Gastrointestinal: denies abdominal pain, constipation, diarrhea,nausea , vomiting, hematochezia  : denies hematuria, dysuria, or incontinence  Neurological: denies weakness, numbness , tingling, dizziness, tremors  MSK: denies muscle pain, difficulty ambulating, swelling, back pain  skin: denies skin rash, itching, burning, or  skin lesions  Psychiatrical: denies mood disturbances, anxierty, feeling depressed, depression , or difficulty sleeping    Objective:  Vitals  Vital Signs Last 24 Hrs  T(C): 36.3 (14 Feb 2023 05:19), Max: 36.8 (14 Feb 2023 00:29)  T(F): 97.4 (14 Feb 2023 05:19), Max: 98.2 (14 Feb 2023 00:29)  HR: 60 (14 Feb 2023 05:19) (60 - 82)  BP: 145/82 (14 Feb 2023 05:19) (121/78 - 145/82)  BP(mean): 98 (14 Feb 2023 00:29) (97 - 98)  RR: 18 (14 Feb 2023 05:19) (18 - 19)  SpO2: 93% (14 Feb 2023 05:19) (93% - 99%)        Physical Exam:  General: comfortable, no acute distress  HEENT: Atraumatic, no LAD, trachea midline, PERRLA  Cardiovascular: normal s1s2, no murmurs, gallops or fricition rubs  Pulmonary: clear to ausculation Bilaterally, no wheezing , rhonchi  Gastrointestinal: soft non tender non distended, no masses felt, no organomegally  Muscloskeletal: no lower extremity edema, intact bilateral lower extremity pulses  Neurological: CN II-12 intact. No focal weakness ax 02  Psychiatrical: normal mood, cooperative  SKIN: no rash, lesions or ulcers    Labs:                          12.2   6.64  )-----------( 215      ( 13 Feb 2023 07:42 )             37.0     02-13    143  |  106  |  28<H>  ----------------------------<  84  4.3   |  28  |  1.28    Ca    8.7      13 Feb 2023 07:42              Active Medications  MEDICATIONS  (STANDING):  amLODIPine   Tablet 10 milliGRAM(s) Oral daily  apixaban 5 milliGRAM(s) Oral every 12 hours  divalproex  milliGRAM(s) Oral daily  divalproex  milliGRAM(s) Oral at bedtime  lactobacillus acidophilus 1 Tablet(s) Oral daily  levothyroxine 88 MICROGram(s) Oral daily  pantoprazole    Tablet 40 milliGRAM(s) Oral before breakfast  polyethylene glycol 3350 17 Gram(s) Oral two times a day  QUEtiapine 75 milliGRAM(s) Oral at bedtime  senna 2 Tablet(s) Oral at bedtime  thiamine 100 milliGRAM(s) Oral daily    MEDICATIONS  (PRN):  acetaminophen     Tablet .. 650 milliGRAM(s) Oral every 6 hours PRN Temp greater or equal to 38C (100.4F), Mild Pain (1 - 3)  melatonin 3 milliGRAM(s) Oral at bedtime PRN Insomnia    
Patient seen and examined  no acute overnight events  pleasantly confuused  for SASR  Review of Systems:  General:denies fever chills, headache, weakness  HEENT: denies blurry vision,diffculty swallowing, difficulty hearing, tinnitus  Cardiovascular: denies chest pain  ,palpitations  Pulmonary:denies shortness of breath, cough, wheezing, hemoptysis  Gastrointestinal: denies abdominal pain, constipation, diarrhea,nausea , vomiting, hematochezia  : denies hematuria, dysuria, or incontinence  Neurological: denies weakness, numbness , tingling, dizziness, tremors  MSK: denies muscle pain, difficulty ambulating, swelling, back pain  skin: denies skin rash, itching, burning, or  skin lesions  Psychiatrical: denies mood disturbances, anxierty, feeling depressed, depression , or difficulty sleeping    Objective:  Vitals  T(C): 36.5 (02-13-23 @ 05:17), Max: 37.1 (02-12-23 @ 16:54)  HR: 58 (02-13-23 @ 05:17) (57 - 64)  BP: 122/72 (02-13-23 @ 05:17) (112/68 - 124/71)  RR: 18 (02-13-23 @ 05:17) (18 - 18)  SpO2: 97% (02-13-23 @ 05:17) (97% - 97%)    Physical Exam:  General: comfortable, no acute distress  HEENT: Atraumatic, no LAD, trachea midline, PERRLA  Cardiovascular: normal s1s2, no murmurs, gallops or fricition rubs  Pulmonary: clear to ausculation Bilaterally, no wheezing , rhonchi  Gastrointestinal: soft non tender non distended, no masses felt, no organomegally  Muscloskeletal: no lower extremity edema, intact bilateral lower extremity pulses  Neurological: CN II-12 intact. No focal weakness  Psychiatrical: normal mood, cooperative  SKIN: no rash, lesions or ulcers    Labs:                          12.2   6.64  )-----------( 215      ( 13 Feb 2023 07:42 )             37.0     02-13    143  |  106  |  28<H>  ----------------------------<  84  4.3   |  28  |  1.28    Ca    8.7      13 Feb 2023 07:42              Active Medications  MEDICATIONS  (STANDING):  amLODIPine   Tablet 10 milliGRAM(s) Oral daily  apixaban 5 milliGRAM(s) Oral every 12 hours  divalproex  milliGRAM(s) Oral daily  divalproex  milliGRAM(s) Oral at bedtime  lactobacillus acidophilus 1 Tablet(s) Oral daily  levothyroxine 88 MICROGram(s) Oral daily  pantoprazole    Tablet 40 milliGRAM(s) Oral before breakfast  polyethylene glycol 3350 17 Gram(s) Oral two times a day  QUEtiapine 75 milliGRAM(s) Oral at bedtime  senna 2 Tablet(s) Oral at bedtime  thiamine 100 milliGRAM(s) Oral daily    MEDICATIONS  (PRN):  acetaminophen     Tablet .. 650 milliGRAM(s) Oral every 6 hours PRN Temp greater or equal to 38C (100.4F), Mild Pain (1 - 3)  melatonin 3 milliGRAM(s) Oral at bedtime PRN Insomnia    
Patient seen and examined  at baseline  depakote increased  PT evaled: Wife requesting ROEL    Review of Systems:  General:denies fever chills, headache, weakness  HEENT: denies blurry vision,diffculty swallowing, difficulty hearing, tinnitus  Cardiovascular: denies chest pain  ,palpitations  Pulmonary:denies shortness of breath, cough, wheezing, hemoptysis  Gastrointestinal: denies abdominal pain, constipation, diarrhea,nausea , vomiting, hematochezia  : denies hematuria, dysuria, or incontinence  Neurological: denies weakness, numbness , tingling, dizziness, tremors  MSK: denies muscle pain, difficulty ambulating, swelling, back pain  skin: denies skin rash, itching, burning, or  skin lesions  Psychiatrical: denies mood disturbances, anxierty, feeling depressed, depression , or difficulty sleeping    Objective:  Vital Signs Last 24 Hrs  T(C): 36.6 (11 Feb 2023 10:43), Max: 36.6 (10 Feb 2023 16:36)  T(F): 97.8 (11 Feb 2023 10:43), Max: 97.9 (10 Feb 2023 16:36)  HR: 71 (11 Feb 2023 10:43) (68 - 73)  BP: 106/63 (11 Feb 2023 10:43) (106/63 - 130/77)  BP(mean): --  RR: 18 (11 Feb 2023 10:43) (18 - 18)  SpO2: 98% (11 Feb 2023 10:43) (94% - 98%)    Parameters below as of 11 Feb 2023 10:43  Patient On (Oxygen Delivery Method): room air            Physical Exam:  General: comfortable, no acute distress,elderly  HEENT: Atraumatic, no LAD, trachea midline, PERRLA  Cardiovascular: normal s1s2, no murmurs, gallops or fricition rubs  Pulmonary: clear to ausculation Bilaterally, no wheezing , rhonchi  Gastrointestinal: soft non tender non distended, no masses felt, no organomegally  Muscloskeletal: no lower extremity edema, intact bilateral lower extremity pulses  Neurological: CN II-12 intact. No focal weakness  Psychiatrical: normal mood, cooperative  SKIN: no rash, lesions or ulcers    Labs:                          11.0   5.39  )-----------( 217      ( 09 Feb 2023 06:40 )             33.2     02-09    140  |  105  |  31<H>  ----------------------------<  98  4.0   |  29  |  1.18    Ca    8.8      09 Feb 2023 06:40    TPro  7.2  /  Alb  2.7<L>  /  TBili  0.4  /  DBili  x   /  AST  24  /  ALT  24  /  AlkPhos  60  02-09    LIVER FUNCTIONS - ( 09 Feb 2023 06:40 )  Alb: 2.7 g/dL / Pro: 7.2 gm/dL / ALK PHOS: 60 U/L / ALT: 24 U/L / AST: 24 U/L / GGT: x                 Active Medications  MEDICATIONS  (STANDING):  amLODIPine   Tablet 10 milliGRAM(s) Oral daily  apixaban 5 milliGRAM(s) Oral every 12 hours  divalproex  milliGRAM(s) Oral daily  divalproex  milliGRAM(s) Oral at bedtime  lactated ringers. 1000 milliLiter(s) (100 mL/Hr) IV Continuous <Continuous>  lactobacillus acidophilus 1 Tablet(s) Oral daily  levothyroxine 88 MICROGram(s) Oral daily  pantoprazole    Tablet 40 milliGRAM(s) Oral before breakfast  polyethylene glycol 3350 17 Gram(s) Oral two times a day  QUEtiapine 75 milliGRAM(s) Oral at bedtime  senna 2 Tablet(s) Oral at bedtime  thiamine 100 milliGRAM(s) Oral daily    MEDICATIONS  (PRN):  acetaminophen     Tablet .. 650 milliGRAM(s) Oral every 6 hours PRN Temp greater or equal to 38C (100.4F), Mild Pain (1 - 3)  LORazepam   Injectable 2 milliGRAM(s) IV Push every 4 hours PRN Seizure  melatonin 3 milliGRAM(s) Oral at bedtime PRN Insomnia    
Patient seen and examined  at baseline  depakote increased  PT evaled: Wife requesting ROEL    Review of Systems:  General:denies fever chills, headache, weakness  HEENT: denies blurry vision,diffculty swallowing, difficulty hearing, tinnitus  Cardiovascular: denies chest pain  ,palpitations  Pulmonary:denies shortness of breath, cough, wheezing, hemoptysis  Gastrointestinal: denies abdominal pain, constipation, diarrhea,nausea , vomiting, hematochezia  : denies hematuria, dysuria, or incontinence  Neurological: denies weakness, numbness , tingling, dizziness, tremors  MSK: denies muscle pain, difficulty ambulating, swelling, back pain  skin: denies skin rash, itching, burning, or  skin lesions  Psychiatrical: denies mood disturbances, anxierty, feeling depressed, depression , or difficulty sleeping    Objective:  Vital Signs Last 24 Hrs  T(C): 36.2 (12 Feb 2023 05:26), Max: 36.8 (11 Feb 2023 16:25)  T(F): 97.2 (12 Feb 2023 05:26), Max: 98.3 (11 Feb 2023 16:25)  HR: 65 (12 Feb 2023 05:26) (65 - 75)  BP: 117/71 (12 Feb 2023 05:26) (106/63 - 120/67)  BP(mean): --  RR: 18 (12 Feb 2023 05:26) (18 - 18)  SpO2: 96% (12 Feb 2023 05:26) (95% - 98%)    Parameters below as of 12 Feb 2023 05:26  Patient On (Oxygen Delivery Method): room air              Physical Exam:  General: comfortable, no acute distress,elderly  HEENT: Atraumatic, no LAD, trachea midline, PERRLA  Cardiovascular: normal s1s2, no murmurs, gallops or fricition rubs  Pulmonary: clear to ausculation Bilaterally, no wheezing , rhonchi  Gastrointestinal: soft non tender non distended, no masses felt, no organomegally  Muscloskeletal: no lower extremity edema, intact bilateral lower extremity pulses  Neurological: CN II-12 intact. No focal weakness  Psychiatrical: normal mood, cooperative  SKIN: no rash, lesions or ulcers    Labs:                          11.0   5.39  )-----------( 217      ( 09 Feb 2023 06:40 )             33.2     02-09    140  |  105  |  31<H>  ----------------------------<  98  4.0   |  29  |  1.18    Ca    8.8      09 Feb 2023 06:40    TPro  7.2  /  Alb  2.7<L>  /  TBili  0.4  /  DBili  x   /  AST  24  /  ALT  24  /  AlkPhos  60  02-09    LIVER FUNCTIONS - ( 09 Feb 2023 06:40 )  Alb: 2.7 g/dL / Pro: 7.2 gm/dL / ALK PHOS: 60 U/L / ALT: 24 U/L / AST: 24 U/L / GGT: x                 Active Medications  MEDICATIONS  (STANDING):  amLODIPine   Tablet 10 milliGRAM(s) Oral daily  apixaban 5 milliGRAM(s) Oral every 12 hours  divalproex  milliGRAM(s) Oral daily  divalproex  milliGRAM(s) Oral at bedtime  lactated ringers. 1000 milliLiter(s) (100 mL/Hr) IV Continuous <Continuous>  lactobacillus acidophilus 1 Tablet(s) Oral daily  levothyroxine 88 MICROGram(s) Oral daily  pantoprazole    Tablet 40 milliGRAM(s) Oral before breakfast  polyethylene glycol 3350 17 Gram(s) Oral two times a day  QUEtiapine 75 milliGRAM(s) Oral at bedtime  senna 2 Tablet(s) Oral at bedtime  thiamine 100 milliGRAM(s) Oral daily    MEDICATIONS  (PRN):  acetaminophen     Tablet .. 650 milliGRAM(s) Oral every 6 hours PRN Temp greater or equal to 38C (100.4F), Mild Pain (1 - 3)  LORazepam   Injectable 2 milliGRAM(s) IV Push every 4 hours PRN Seizure  melatonin 3 milliGRAM(s) Oral at bedtime PRN Insomnia    
Patient seen and examined this am. No new events      MEDICATIONS:    acetaminophen     Tablet .. 650 milliGRAM(s) Oral every 6 hours PRN  amLODIPine   Tablet 10 milliGRAM(s) Oral daily  apixaban 5 milliGRAM(s) Oral every 12 hours  divalproex  milliGRAM(s) Oral daily  divalproex  milliGRAM(s) Oral at bedtime  lactobacillus acidophilus 1 Tablet(s) Oral daily  levothyroxine 88 MICROGram(s) Oral daily  melatonin 3 milliGRAM(s) Oral at bedtime PRN  pantoprazole    Tablet 40 milliGRAM(s) Oral before breakfast  polyethylene glycol 3350 17 Gram(s) Oral two times a day  QUEtiapine 75 milliGRAM(s) Oral at bedtime  senna 2 Tablet(s) Oral at bedtime  thiamine 100 milliGRAM(s) Oral daily      LABS:                          11.0   5.39  )-----------( 217      ( 09 Feb 2023 06:40 )             33.2     02-09    140  |  105  |  31<H>  ----------------------------<  98  4.0   |  29  |  1.18    Ca    8.8      09 Feb 2023 06:40    TPro  7.2  /  Alb  2.7<L>  /  TBili  0.4  /  DBili  x   /  AST  24  /  ALT  24  /  AlkPhos  60  02-09    CAPILLARY BLOOD GLUCOSE            I&O's Summary    09 Feb 2023 07:01  -  10 Feb 2023 07:00  --------------------------------------------------------  IN: 960 mL / OUT: 0 mL / NET: 960 mL      Vital Signs Last 24 Hrs  T(C): 37.2 (10 Feb 2023 04:28), Max: 37.2 (10 Feb 2023 04:28)  T(F): 99 (10 Feb 2023 04:28), Max: 99 (10 Feb 2023 04:28)  HR: 60 (10 Feb 2023 04:28) (60 - 76)  BP: 107/67 (10 Feb 2023 04:28) (99/62 - 132/78)  BP(mean): --  RR: 18 (10 Feb 2023 04:28) (18 - 18)  SpO2: 99% (10 Feb 2023 04:28) (95% - 99%)    Parameters below as of 09 Feb 2023 23:34  Patient On (Oxygen Delivery Method): room air              On Neurological Examination:    Mental Status - Patient is alert, awake, oriented X1 perseverates says its augiust and he is in [place] follows sim,ple commands  Cranial Nerves - PERRL, EOMI, VFF, V1-V3 intact, left facial, tongue/uvula midline    Motor Exam -   no drift    Sensory    Intact to light touch and pinprick bilaterally    Coord: FTN intact bilaterally     Gait -  ndeferred     NIHSS 5           RADIOLOGY  < from: CT Head No Cont (02.08.23 @ 01:02) >    There is no acute intracranial hemorrhage, mass effect, midline shift,   extra-axial collection, hydrocephalus, or evidence of acute vascular   territorial infarction. Moderate patchy hypodensities within the   periventricular and subcortical white matter, although nonspecific,   likely reflect chronic microvascular disease. Cerebral volume loss   contributes to prominence of the ventricles and sulci. 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 Stable 4 mm hypodensity   within the right cerebellum, may represent a cavernoma.    The visualized paranasal sinuses and mastoid air cells are clear.   Intraorbital contents are unremarkable. Calvarium is intact.    IMPRESSION:    No acute intracranial hemorrhage, mass effect, or evidence of acute   vascular territorial infarction. If clinical symptoms persist or worsen,   more sensitive evaluation with brain MRI may be obtained, if no  contraindications exist.    < end of copied text >                
                          Patient: PEPITO BALL [id number] 73y Male                            Hospitalist Attending Note    Offers no complaints.      ____________________PHYSICAL EXAM:  GENERAL:  NAD, awake  HEENT: NCAT  CARDIOVASCULAR:  S1, S2  LUNGS: CTAB  ABDOMEN:  soft, (-) tenderness, (-) distension, (+) bowel sounds, (-) guarding, (-) rebound (-) rigidity  EXTREMITIES:  no cyanosis / clubbing / edema.   NEURO: strength symmetric, sensation grossly intact.   ____________________     VITALS:  Vital Signs Last 24 Hrs  T(C): 36.8 (15 Feb 2023 10:24), Max: 36.9 (2023 16:12)  T(F): 98.2 (15 Feb 2023 10:24), Max: 98.4 (2023 16:12)  HR: 70 (15 Feb 2023 10:24) (50 - 70)  BP: 117/65 (15 Feb 2023 10:24) (114/68 - 129/68)  BP(mean): --  RR: 17 (15 Feb 2023 10:24) (16 - 18)  SpO2: 96% (15 Feb 2023 10:24) (96% - 100%)    Parameters below as of 15 Feb 2023 10:24  Patient On (Oxygen Delivery Method): room air     Daily     Daily Weight in k.8 (15 Feb 2023 05:50)  CAPILLARY BLOOD GLUCOSE        I&O's Summary    2023 07:  -  15 Feb 2023 07:00  --------------------------------------------------------  IN: 1560 mL / OUT: 0 mL / NET: 1560 mL    15 Feb 2023 07:01  -  15 Feb 2023 11:09  --------------------------------------------------------  IN: 240 mL / OUT: 0 mL / NET: 240 mL        HISTORY:  PAST MEDICAL & SURGICAL HISTORY:  HTN (hypertension)      Diverticulosis      Hypothyroid      No significant past surgical history      Allergies    fish (Hives; Angioedema)  No Known Drug Allergies  [place] (Swelling; Angioedema)    Intolerances       LABS:                        11.4   5.28  )-----------( 204      ( 15 Feb 2023 06:20 )             34.8     02-15    140  |  106  |  30<H>  ----------------------------<  93  4.1   |  25  |  1.23    Ca    9.1      15 Feb 2023 06:20                    MEDICATIONS:  MEDICATIONS  (STANDING):  amLODIPine   Tablet 10 milliGRAM(s) Oral daily  apixaban 5 milliGRAM(s) Oral every 12 hours  divalproex  milliGRAM(s) Oral daily  divalproex  milliGRAM(s) Oral at bedtime  lactobacillus acidophilus 1 Tablet(s) Oral daily  levothyroxine 88 MICROGram(s) Oral daily  pantoprazole    Tablet 40 milliGRAM(s) Oral before breakfast  polyethylene glycol 3350 17 Gram(s) Oral two times a day  QUEtiapine 75 milliGRAM(s) Oral at bedtime  senna 2 Tablet(s) Oral at bedtime  thiamine 100 milliGRAM(s) Oral daily    MEDICATIONS  (PRN):  acetaminophen     Tablet .. 650 milliGRAM(s) Oral every 6 hours PRN Temp greater or equal to 38C (100.4F), Mild Pain (1 - 3)  melatonin 3 milliGRAM(s) Oral at bedtime PRN Insomnia  
Patient seen and examined this am. No new events      MEDICATIONS:    acetaminophen     Tablet .. 650 milliGRAM(s) Oral every 6 hours PRN  amLODIPine   Tablet 10 milliGRAM(s) Oral daily  apixaban 5 milliGRAM(s) Oral every 12 hours  divalproex  milliGRAM(s) Oral daily  divalproex  milliGRAM(s) Oral at bedtime  lactobacillus acidophilus 1 Tablet(s) Oral daily  levothyroxine 88 MICROGram(s) Oral daily  melatonin 3 milliGRAM(s) Oral at bedtime PRN  pantoprazole    Tablet 40 milliGRAM(s) Oral before breakfast  polyethylene glycol 3350 17 Gram(s) Oral two times a day  QUEtiapine 75 milliGRAM(s) Oral at bedtime  senna 2 Tablet(s) Oral at bedtime  thiamine 100 milliGRAM(s) Oral daily      LABS:            CAPILLARY BLOOD GLUCOSE            I&O's Summary    Vital Signs Last 24 Hrs  T(C): 36.2 (12 Feb 2023 05:26), Max: 36.8 (11 Feb 2023 16:25)  T(F): 97.2 (12 Feb 2023 05:26), Max: 98.3 (11 Feb 2023 16:25)  HR: 65 (12 Feb 2023 05:26) (65 - 75)  BP: 117/71 (12 Feb 2023 05:26) (106/63 - 120/67)  BP(mean): --  RR: 18 (12 Feb 2023 05:26) (18 - 18)  SpO2: 96% (12 Feb 2023 05:26) (95% - 98%)    Parameters below as of 12 Feb 2023 05:26  Patient On (Oxygen Delivery Method): room air              On Neurological Examination:    Mental Status - Patient is alert, awake, oriented X1 perseverates says its augiust and he is in [place] follows sim,ple commands  Cranial Nerves - PERRL, EOMI, VFF, V1-V3 intact, left facial, tongue/uvula midline    Motor Exam -   no drift    Sensory    Intact to light touch and pinprick bilaterally    Coord: FTN intact bilaterally     Gait -  ndeferred     NIHSS 5           RADIOLOGY  < from: CT Head No Cont (02.08.23 @ 01:02) >    There is no acute intracranial hemorrhage, mass effect, midline shift,   extra-axial collection, hydrocephalus, or evidence of acute vascular   territorial infarction. Moderate patchy hypodensities within the   periventricular and subcortical white matter, although nonspecific,   likely reflect chronic microvascular disease. Cerebral volume loss   contributes to prominence of the ventricles and sulci. 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 Stable 4 mm hypodensity   within the right cerebellum, may represent a cavernoma.    The visualized paranasal sinuses and mastoid air cells are clear.   Intraorbital contents are unremarkable. Calvarium is intact.    IMPRESSION:    No acute intracranial hemorrhage, mass effect, or evidence of acute   vascular territorial infarction. If clinical symptoms persist or worsen,   more sensitive evaluation with brain MRI may be obtained, if no  contraindications exist.    < end of copied text >                
Patient seen and examined this am. No new events    MEDICATIONS:    acetaminophen     Tablet .. 650 milliGRAM(s) Oral every 6 hours PRN  amLODIPine   Tablet 10 milliGRAM(s) Oral daily  apixaban 5 milliGRAM(s) Oral every 12 hours  divalproex  milliGRAM(s) Oral daily  divalproex  milliGRAM(s) Oral at bedtime  lactobacillus acidophilus 1 Tablet(s) Oral daily  levothyroxine 88 MICROGram(s) Oral daily  melatonin 3 milliGRAM(s) Oral at bedtime PRN  pantoprazole    Tablet 40 milliGRAM(s) Oral before breakfast  polyethylene glycol 3350 17 Gram(s) Oral two times a day  QUEtiapine 75 milliGRAM(s) Oral at bedtime  senna 2 Tablet(s) Oral at bedtime  thiamine 100 milliGRAM(s) Oral daily      LABS:                          12.2   6.64  )-----------( 215      ( 13 Feb 2023 07:42 )             37.0     02-13    143  |  106  |  28<H>  ----------------------------<  84  4.3   |  28  |  1.28    Ca    8.7      13 Feb 2023 07:42      CAPILLARY BLOOD GLUCOSE            I&O's Summary    Vital Signs Last 24 Hrs  T(C): 36.5 (13 Feb 2023 05:17), Max: 37.1 (12 Feb 2023 16:54)  T(F): 97.7 (13 Feb 2023 05:17), Max: 98.8 (12 Feb 2023 16:54)  HR: 58 (13 Feb 2023 05:17) (57 - 64)  BP: 122/72 (13 Feb 2023 05:17) (112/68 - 124/71)  BP(mean): --  RR: 18 (13 Feb 2023 05:17) (18 - 18)  SpO2: 97% (13 Feb 2023 05:17) (97% - 97%)    Parameters below as of 12 Feb 2023 16:54  Patient On (Oxygen Delivery Method): room air        On Neurological Examination:    Mental Status - Patient is alert, awake, oriented X1 perseverates says its augiust and he is in August follows sim,ple commands  Cranial Nerves - PERRL, EOMI, VFF, V1-V3 intact, left facial, tongue/uvula midline    Motor Exam -   no drift    Sensory    Intact to light touch and pinprick bilaterally    Coord: FTN intact bilaterally     Gait -  ndeferred     NIHSS 5           RADIOLOGY  < from: CT Head No Cont (02.08.23 @ 01:02) >    There is no acute intracranial hemorrhage, mass effect, midline shift,   extra-axial collection, hydrocephalus, or evidence of acute vascular   territorial infarction. Moderate patchy hypodensities within the   periventricular and subcortical white matter, although nonspecific,   likely reflect chronic microvascular disease. Cerebral volume loss   contributes to prominence of the ventricles and sulci. 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 Stable 4 mm hypodensity   within the right cerebellum, may represent a cavernoma.    The visualized paranasal sinuses and mastoid air cells are clear.   Intraorbital contents are unremarkable. Calvarium is intact.    IMPRESSION:    No acute intracranial hemorrhage, mass effect, or evidence of acute   vascular territorial infarction. If clinical symptoms persist or worsen,   more sensitive evaluation with brain MRI may be obtained, if no  contraindications exist.    < end of copied text >

## 2023-02-15 NOTE — PROGRESS NOTE ADULT - PROVIDER SPECIALTY LIST ADULT
Neurology
Internal Medicine
Neurology
Hospitalist
Neurology
Internal Medicine

## 2023-02-15 NOTE — PROGRESS NOTE ADULT - ASSESSMENT
#Seizure  - Seizure and Fall precautions  - Depakote level of 59  - Increase Depakote to 500mg in AM, 750mg in PM per Neuro  -  EEG  - CT Head: No acute process noted  - Neurology consulted, Dr. Brock, f/u recommendations: outpatient MRI    #JESSICA: due due to dehydration  - Cr of 1.54 on admit: s/p IVF: now stable    Hypothyroidism  - F/u TSH  - Continue home levothyroxine    #Hx of DVT/PE  - Continue home Eliquis    #Dementia  - Continue home Seroquel    #HTN  - Continue home amlodipine    Code: Full  VTE: Eliquis  Diet: Soft, Bite sized
72 y/o M w/PMHx of HTN, hypothyroidism, seizure disorder, dementia, presented due to seizure.  Seen by Neurology Dr. Brock.  Dosage of Depakote was increased.  MRI showed < from: MR Head No Cont (02.13.23 @ 11:12) >   Moderate atrophy and extensive small vessel white matter ischemic changes. No acute infarcts, hemorrhage or mass.  < end of copied text >  EEG showed that epileptiform abnormalities recorded.  Seen by PT, recommended [facility].        #Seizure  - Seizure and Fall precautions  - Depakote level of 59  - Increase Depakote to 500mg in AM, 750mg in PM per Neuro  -  EEG noted  - CT Head: No acute process noted  - Neurology consulted, Dr. Brock, MRI completed  -vpa level OK on current dose    #JESSICA: due due to dehydration  stable    Hypothyroidism  - Continue home levothyroxine    #Hx of DVT/PE  - Continue home Eliquis    #Dementia  - Continue home Seroquel    #HTN  - Continue home amlodipine    Code: Full  VTE: Eliquis  Diet: Soft, Bite sized    Stable for d/c to [facility]. 
74 y/o M w/PMHx of HTN, hypothyroidism, seizure disorder, dementia, presents due to seizure. Per wife, patient has "small seizures" frequently that only last for a few moments, last seizure last week. Normally they last 1 minute but the one last night lasted for 4-5 minutes. Denies trauma. He currently is on depakote, wife reports compliance with medication. VPA level 59 PE with advanced cognitive changes left facial. CTH chronic  R pontine bilaterally thalamic infarct  right cerebellar hypodensity    Impression: seizure    Follow up MRI read  /750  On AC (PE last year), from a stroke perspective no need to be on Aspirin 81 if on AC  supportive care  EEG reviewed  Can follow up with Neurology, Dr. Mamadou Brock at [phone]
74 y/o M w/PMHx of HTN, hypothyroidism, seizure disorder, dementia, presents due to seizure. Per wife, patient has "small seizures" frequently that only last for a few moments, last seizure last week. Normally they last 1 minute but the one last night lasted for 4-5 minutes. Denies trauma. He currently is on depakote, wife reports compliance with medication. VPA level 59 PE with advanced cognitive changes left facial. CTH chronic  R pontine bilaterally thalamic infarct  right cerebellar hypodensity    Impression: seizure    VPA level 59, increase dose to 500/750  clarify if at baseline, if not can consider MRI brain  On AC (PE last year), from a stroke perspective no need to be on Aspirin 81 if on AC  supportive care  EEG reviewed  Can follow up with Neurology, Dr. Mamadou Brock at [phone]
72 y/o M w/PMHx of HTN, hypothyroidism, seizure disorder, dementia, presents due to seizure. Per wife, patient has "small seizures" frequently that only last for a few moments, last seizure last week. Normally they last 1 minute but the one last night lasted for 4-5 minutes. Denies trauma. He currently is on depakote, wife reports compliance with medication. VPA level 59 PE with advanced cognitive changes left facial. CTH chronic  R pontine bilaterally thalamic infarct  right cerebellar hypodensity    Impression: seizure    /750  Consider VPA level diamante am  clarify if at baseline, if not can consider MRI brain  On AC (PE last year), from a stroke perspective no need to be on Aspirin 81 if on AC  supportive care  EEG reviewed  Can follow up with Neurology, Dr. Mamadou Brock at [phone]
74 y/o M w/PMHx of HTN, hypothyroidism, seizure disorder, dementia, presents due to seizure    #Seizure  - Seizure and Fall precautions  - Depakote level of 59  - Increase Depakote to 500mg in AM, 750mg in PM per Neuro  -  EEG noted  - CT Head: No acute process noted  - Neurology consulted, Dr. Brock, MRI completed  -vpa level OK on current dose    #JESSICA: due due to dehydration  stable    Hypothyroidism  - Continue home levothyroxine    #Hx of DVT/PE  - Continue home Eliquis    #Dementia  - Continue home Seroquel    #HTN  - Continue home amlodipine    Code: Full  VTE: Eliquis  Diet: Soft, Bite sized
72 y/o M w/PMHx of HTN, hypothyroidism, seizure disorder, dementia, presents due to seizure    #Seizure  - Seizure and Fall precautions  - Depakote level of 59  - Increase Depakote to 500mg in AM, 750mg in PM per Neuro  -  EEG noted  - CT Head: No acute process noted  - Neurology consulted, Dr. Brock, MRI while in house  -vpa level OK on current dose    #JESSICA: due due to dehydration  stable    Hypothyroidism  - Continue home levothyroxine    #Hx of DVT/PE  - Continue home Eliquis    #Dementia  - Continue home Seroquel    #HTN  - Continue home amlodipine    Code: Full  VTE: Eliquis  Diet: Soft, Bite sized
#Seizure  - Seizure and Fall precautions  - Depakote level of 59  - Increase Depakote to 500mg in AM, 750mg in PM per Neuro  - Ativan 2mg IV PRN Breakthrough Seizure  - F/u EEG  - CT Head: No acute process noted  - Neurology consulted, Dr. Brock, f/u recommendations    #JESSICA  - Unknown baseline  - Cr of 1.54  - LR@100    #Hypothyroidism  - F/u TSH  - Continue home levothyroxine    #Hx of DVT/PE  - Continue home Eliquis    #Dementia  - Continue home Seroquel    #HTN  - Continue home amlodipine    Code: Full  VTE: Eliquis  Diet: Soft, Bite sized
74 y/o M w/PMHx of HTN, hypothyroidism, seizure disorder, dementia, presents due to seizure    #Seizure  - Seizure and Fall precautions  - Depakote level of 59  - Increase Depakote to 500mg in AM, 750mg in PM per Neuro  -  EEG noted  - CT Head: No acute process noted  - Neurology consulted, Dr. Brock, MRI while in house  -check am VPA level    #JESSICA: due due to dehydration  - Cr of 1.54 on admit: s/p IVF: now stable    Hypothyroidism  - F/u TSH  - Continue home levothyroxine    #Hx of DVT/PE  - Continue home Eliquis    #Dementia  - Continue home Seroquel    #HTN  - Continue home amlodipine    Code: Full  VTE: Eliquis  Diet: Soft, Bite sized
#Seizure  - Seizure and Fall precautions  - Depakote level of 59  - Increase Depakote to 500mg in AM, 750mg in PM per Neuro  - Ativan 2mg IV PRN Breakthrough Seizure  - F/u EEG  - CT Head: No acute process noted  - Neurology consulted, Dr. Brock, f/u recommendations    #JESSICA  - Unknown baseline  - Cr of 1.54  - LR@100    #Hypothyroidism  - F/u TSH  - Continue home levothyroxine    #Hx of DVT/PE  - Continue home Eliquis    #Dementia  - Continue home Seroquel    #HTN  - Continue home amlodipine    Code: Full  VTE: Eliquis  Diet: Soft, Bite sized

## 2023-05-12 NOTE — DISCHARGE NOTE PROVIDER - REASON FOR ADMISSION
[facility] Anesthesia Pre-op History and Physical    Aditi Osuna MRN# [id number]   Age: 49 year old YOB: 1973      Date of Surgery: 05/12/23   [facility]      Date of Exam 5/12/2023 Facility (In hospital)       Home clinic: [facility]  Primary care provider: Rebekah Antunez    Type of procedure: colonoscopy         Chief Complaint and/or Reason for Procedure:   Family history of colorectal polyps         Active problem list:     Patient Active Problem List    Diagnosis Date Noted     Screening for cervical cancer      Priority: Medium     2013 NIL pap  8/25/16 NIL pap, Neg HPV  9/6/17 NIL pap, Neg HPV  11/17/21 NIL pap, Neg HPV.        Mixed stress and urge urinary incontinence 08/12/2021     Priority: Medium     IUD (intrauterine device) in place 12/10/2019     Priority: Medium     Mirena placed 9/26/2019       Insomnia, unspecified type 12/10/2019     Priority: Medium            Medications (include herbals and vitamins):   Any Plavix use in the last 7 days? No     No current facility-administered medications for this encounter.             Allergies:      Allergies   Allergen Reactions     Amoxicillin-Pot Clavulanate Nausea and Vomiting     Sulfa Antibiotics Rash     Allergy to Latex? No  Allergy to tape?   No  Intolerances: None            Physical Exam:   All vitals have been reviewed  No data found.  No intake/output data recorded.  Airway assessment:   Patient is able to open mouth wide  Patient is able to stick out tongue}      ENT:   Normocephalic, without obvious abnormality, atraumatic, sinuses nontender on palpation, external ears without lesions, oral pharynx with moist mucous membranes, tonsils without erythema or exudates, gums normal and good dentition.     Neck:   Supple, symmetrical, trachea midline, no adenopathy, thyroid symmetric, not enlarged and no tenderness, skin normal     Lungs:   No  increased work of breathing, good air exchange, clear to auscultation bilaterally, no crackles or wheezing     Cardiovascular:   Normal apical impulse, regular rate and rhythm, normal S1 and S2, no S3 or S4, and no murmur noted     Mallampati Class: II      Anesthetic risk and/or ASA classification: II             Lab / Radiology Results:     Lab Results   Component Value Date    WBC 4.7 02/28/2022    WBC 9.4 02/27/2017    RBC 4.09 02/28/2022    RBC 4.69 02/27/2017    HGB 12.8 02/28/2022    HGB 15.2 02/27/2017    HCT 36.3 02/28/2022    HCT 42.4 02/27/2017    MCV 89 02/28/2022    MCV 90 02/27/2017    RDW 11.6 02/28/2022    RDW 11.8 02/27/2017     02/28/2022     02/27/2017      Lab Results   Component Value Date     02/28/2022     02/27/2017    CO2 21 02/28/2022    CO2 23 02/27/2017    BUN 10 02/28/2022    BUN 6 02/27/2017     No components found for: PTINR  No components found for: APTT[APTT}         To Bermeo MD         Seizure

## 2023-06-12 NOTE — H&P ADULT - NSICDXPASTMEDICALHX_GEN_ALL_CORE_FT
PAST MEDICAL HISTORY:  Deep vein thrombosis (DVT)     Diverticulosis     HTN (hypertension)     Hypothyroid     Pulmonary embolism

## 2023-06-12 NOTE — H&P ADULT - HISTORY OF PRESENT ILLNESS
C/C: Seizure.    Patient is 71 yo M with PMH of seizure disorder on valproic acid, hypothyroidism, dementia, PE/DVT on Apixaban brought in by ambulance with c/o witnessed seizure by his wife at 4:30 pm today at home. Then, he had another witnessed seizure en route to hospital in ambulance.  Seizures and post ictal period are typical of his presentation as reported by wife. His wife not present in ED during my evaluation in ED.  Seems that patient is in post-ictal state. He is confused but mumbles few un-recognizable word. Has no motor deficits. He only repeats that he is sick. Most of the informations has been obtained from EMR documentation and in discussion with ED provider. Reportedly, he has no falls / trauma, fevers/chills, cough or exacerbating factors.     Significant labs: WBC 7.95k, Hb 13.2, BUN 35, Cr 1.55, , LFTs wnl,   LA 2.0  Valproic level 101 (normal ).    No CT head was obtained in ED.    Neurology consult called by ED provider, and Keppra 1500 mg IV given per recommendation,

## 2023-06-12 NOTE — H&P ADULT - ASSESSMENT
Patient is 71 yo M with PMH of seizure disorder, hypothyroidism, dementia, on Apixaban (??) brought in by ambulance with c/o witnessed seizure by his wife at 4:30 pm today at home. Then, he had another witnessed seizure en route to hospital in ambulance.  Seizures and post ictal period are typical of his presentation as reported by wife. His wife not present in ED during my evaluation in ED.  Seems that patient is in post-ictal state. He is confused but mumbles few un-recognizable word. Has no motor deficits. He only repeats that he is sick. Most of the informations has been obtained from EMR documentation and in discussion with ED provider. Reportedly, he has no falls / trauma, fevers/chills, cough or exacerbating factors.       # Breakthrough seizure, likely developing resistance to valproic acid.  His Valproic level above normal 101 (normal range ).  Admit to medical floor with telemetry.  Vitals and neuro-cheks per protocol.  Seizure and fall precaution.  Gentle hydration.  Keppra 500 mg po started (has received 1500 mg IV loading dose in ED).  EEG in am.  Follow neurology consult in am,    # HTN.  On Amlodipine.    # H/o DVT and PE.  Continue his Apixaban.    # Hypothyroidism.  On Synthroid.    # H/o Dementia  On Seroquel.    # H/o Constipation.  Continue his laxatives.    DVT prophylaxis: On Apixaban.     Patient is 73 yo M with PMH of seizure disorder, hypothyroidism, dementia, on Apixaban (??) brought in by ambulance with c/o witnessed seizure by his wife at 4:30 pm today at home. Then, he had another witnessed seizure en route to hospital in ambulance.  Seizures and post ictal period are typical of his presentation as reported by wife. His wife not present in ED during my evaluation in ED.  Seems that patient is in post-ictal state. He is confused but mumbles few un-recognizable word. Has no motor deficits. He only repeats that he is sick. Most of the informations has been obtained from EMR documentation and in discussion with ED provider. Reportedly, he has no falls / trauma, fevers/chills, cough or exacerbating factors.       # Breakthrough seizure, likely developing resistance to valproic acid.  His Valproic level above normal 101 (normal range ).  Admit to medical floor with telemetry.  Vitals and neuro-cheks per protocol.  Seizure and fall precaution.  Gentle hydration.  Keppra 500 mg po started (has received 1500 mg IV loading dose in ED).  EEG in am.  Follow neurology consult in am.    # JESSICA, likely dehydration.  Adequate hydration.  Cr 1.55, BUN 35.  Repeat BMP in am.    # HTN.  On Amlodipine.    # H/o DVT and PE.  Continue his Apixaban.    # Hypothyroidism.  On Synthroid.    # H/o Dementia  On Seroquel.    # H/o Constipation.  Continue his laxatives.    DVT prophylaxis: On Apixaban.

## 2023-06-12 NOTE — H&P ADULT - SOCIAL HISTORY
Problem: Pain Management  Goal: Pain level will decrease to patient's comfort goal  Patient encouraged to call if he is aj pain.  Pain will be addressed every four hours as needed.       Unable to obtain

## 2023-06-12 NOTE — ED PROVIDER NOTE - CLINICAL SUMMARY MEDICAL DECISION MAKING FREE TEXT BOX
71 yo M hx seizure disorder, hypothyroidism, dementia, brought in by ambulance s/p witnessed seizure by wife at 4:30 pm. Pt then had another witnessed seizure en route to hospital. Seizures and post ictal period are typical of his presentation as reported by wife. Pt's wife provides history- she denies any known falls/truma, fevers/chills, cough or exacerbating factors. Of note, pt states that he had increased Depakote level several weeks ago which contributed to edema and his decreased mobility. Given similar presentation and known seizure disorder, opt not to repeat CT H at this time, plan for labs including electrolytes, blood cultures. TBA

## 2023-06-12 NOTE — ED ADULT TRIAGE NOTE - CHIEF COMPLAINT QUOTE
BIBA from home, witnessed seizure last seen 0600 , wife came home from work and found patient seizing around 1630. EMS arrived at 1700 and patient just stopped seizing as per EMS. post ictal in triage, moving all extremities. f/s 158. as per wife visiting nurse states that patient temp was 102 at 1630

## 2023-06-12 NOTE — ED ADULT NURSE NOTE - OBJECTIVE STATEMENT
patient alert, awake but disoriented, BIBA for seizures. pt wife at bedside states pt had 2 seizures today, last seizure at 1630 EMS arrived at 1700 unaware of how long, depakote medication given in AM. pt is currently post ictal assocated with confusion. pmh of seizures, HTN, diverticulosis and hypothyroidism. seizure precautions in place, pt placed on continous cardiac monitor and pulse ox, pt currently laying in stretcher comfortable and in no acute distress or discomfort at this moment.

## 2023-06-12 NOTE — H&P ADULT - NSHPLABSRESULTS_GEN_ALL_CORE
LABS:                        13.2   7.95  )-----------( 155      ( 12 Jun 2023 19:32 )             39.4     06-12    137  |  104  |  35<H>  ----------------------------<  135<H>  4.9   |  28  |  1.55<H>    Ca    9.2      12 Jun 2023 19:32    TPro  8.6<H>  /  Alb  2.5<L>  /  TBili  0.4  /  DBili  x   /  AST  26  /  ALT  15  /  AlkPhos  71  06-12    PT/INR - ( 12 Jun 2023 19:32 )   PT: 12.9 sec;   INR: 1.08 ratio         PTT - ( 12 Jun 2023 19:32 )  PTT:31.6 sec        < from: MR Head No Cont (02.13.23 @ 11:12) >      INTERPRETATION:  CLINICAL INDICATION: Seizure      Magnetic resonance imaging of the brain was carried out with transaxial   SPGR, FLAIR, fast spin echo T2 weighted images, axial susceptibility   weighted series, diffusion weighted series and sagittal T1 weighted   series on a 1.5 Steph magnet.    Comparison is made with the prior brain CT of 2/8/2023.    Ventricular and sulcal prominence is consistent with moderate atrophy.   Fairly extensive small vessel white matter ischemic changes are noted.   There are old bilateral thalamic lacunar infarcts. No acute infarcts are   seen. There is no old or new hemorrhage.    The sellar and parasellar structures are unremarkable. Paranasal sinuses   are clear.    Impression: Moderate atrophy and extensive small vessel white matter   ischemic changes. No acute infarcts, hemorrhage or mass.    --- End of Report ---    < end of copied text >

## 2023-06-12 NOTE — ED ADULT NURSE NOTE - NSFALLHARMRISKINTERV_ED_ALL_ED

## 2023-06-12 NOTE — H&P ADULT - NSHPPHYSICALEXAM_GEN_ALL_CORE
PHYSICAL EXAM:  GENERAL: NAD, lying in bed comfortably  HEAD:  Atraumatic, Normocephalic.  EYES: EOMI, PERRLA, conjunctiva and sclera clear  ENT: Moist mucous membranes  NECK: Supple, No JVD  CHEST/LUNG: Clear to auscultation bilaterally; No rales, rhonchi, wheezing, or rubs. Unlabored respirations  HEART: Regular rate and rhythm; No murmurs, rubs, or gallops  ABDOMEN: Bowel sounds present; Soft, Nontender, Nondistended. No hepatomegally  EXTREMITIES:  2+ Peripheral Pulses, brisk capillary refill. No clubbing, cyanosis, or edema  NERVOUS SYSTEM:  Alert, No deficits.  confused, moves his hands and legs.  MSK: FROM all 4 extremities, full and equal strength  SKIN: No rashes or lesions

## 2023-06-13 NOTE — CONSULT NOTE ADULT - ASSESSMENT
73 yo M with PMH of seizure disorder on valproic acid, hypothyroidism, dementia, PE/DVT on Apixaban brought in by ambulance with c/o witnessed seizure by his wife. VPA level 101. PE psychomotor slowing no clear focality although appears to move left more readily than right. s/p Keppra load    Impression: seizure    Continue with  BID  Can add Keppra 500 BID  Patient on AC  CT head or MR Brain  routine EEG

## 2023-06-13 NOTE — PATIENT PROFILE ADULT - FALL HARM RISK - HARM RISK INTERVENTIONS
Assistance with ambulation/Assistance OOB with selected safe patient handling equipment/Communicate Risk of Fall with Harm to all staff/Monitor for mental status changes/Move patient closer to nurses' station/Reinforce activity limits and safety measures with patient and family/Reorient to person, place and time as needed/Tailored Fall Risk Interventions/Toileting schedule using arm’s reach rule for commode and bathroom/Use of alarms - bed, chair and/or voice tab/Visual Cue: Yellow wristband and red socks/Bed in lowest position, wheels locked, appropriate side rails in place/Call bell, personal items and telephone in reach/Instruct patient to call for assistance before getting out of bed or chair/Non-slip footwear when patient is out of bed/[place] to call system/Physically safe environment - no spills, clutter or unnecessary equipment/Purposeful Proactive Rounding/Room/bathroom lighting operational, light cord in reach

## 2023-06-13 NOTE — CONSULT NOTE ADULT - SUBJECTIVE AND OBJECTIVE BOX
Neurology consult    PEPITO BALL73yMale     Patient is a 73y old  Male who presents with a chief complaint of     HPI:  C/C: Seizure.    Patient is 73 yo M with PMH of seizure disorder on valproic acid, hypothyroidism, dementia, PE/DVT on Apixaban brought in by ambulance with c/o witnessed seizure by his wife at 4:30 pm today at home. Then, he had another witnessed seizure en route to hospital in ambulance.  Seizures and post ictal period are typical of his presentation as reported by wife. His wife not present in ED during my evaluation in ED.  Seems that patient is in post-ictal state. He is confused but mumbles few un-recognizable word. Has no motor deficits. He only repeats that he is sick. Most of the informations has been obtained from EMR documentation and in discussion with ED provider. Reportedly, he has no falls / trauma, fevers/chills, cough or exacerbating factors.     Significant labs: WBC 7.95k, Hb 13.2, BUN 35, Cr 1.55, , LFTs wnl,   LA 2.0  Valproic level 101 (normal ).    No CT head was obtained in ED.    Neurology consult called by ED provider, and Keppra 1500 mg IV given per recommendation, (2023 21:42)        MEDICATIONS    acetaminophen     Tablet .. 650 milliGRAM(s) Oral every 6 hours PRN  aluminum hydroxide/magnesium hydroxide/simethicone Suspension 30 milliLiter(s) Oral every 4 hours PRN  amLODIPine   Tablet 10 milliGRAM(s) Oral daily  apixaban 5 milliGRAM(s) Oral every 12 hours  divalproex  milliGRAM(s) Oral at bedtime  divalproex  milliGRAM(s) Oral daily  levothyroxine 88 MICROGram(s) Oral daily  melatonin 3 milliGRAM(s) Oral at bedtime PRN  ondansetron Injectable 4 milliGRAM(s) IV Push every 8 hours PRN  pantoprazole    Tablet 40 milliGRAM(s) Oral before breakfast  QUEtiapine 50 milliGRAM(s) Oral at bedtime  senna 2 Tablet(s) Oral at bedtime  sodium chloride 0.9%. 1000 milliLiter(s) IV Continuous <Continuous>  thiamine 100 milliGRAM(s) Oral daily      PMH: HTN (hypertension)    Diverticulitis    Diverticulosis    Hypothyroid    Deep vein thrombosis (DVT)    Pulmonary embolism         PSH: No significant past surgical history        Family history: No history of dementia, strokes, or seizures   FAMILY HISTORY:      SOCIAL HISTORY:  No history of tobacco or alcohol use     Allergies    No Known Drug Allergies  fish (Hives; Angioedema)  [place] (Swelling; Angioedema)    Intolerances        Height (cm): 172.7 ( @ 17:54)  Weight (kg): 73.1 ( @ 01:11)  BMI (kg/m2): 24.5 ( @ 01:11)    Vital Signs Last 24 Hrs  T(C): 36.4 (2023 04:51), Max: 37 (2023 17:54)  T(F): 97.5 (2023 04:51), Max: 98.6 (2023 17:54)  HR: 62 (2023 04:51) (38 - 70)  BP: 136/85 (2023 04:51) (102/56 - 158/73)  BP(mean): --  RR: 16 (2023 04:51) (14 - 20)  SpO2: 96% (2023 04:51) (94% - 100%)    Parameters below as of 2023 04:51  Patient On (Oxygen Delivery Method): room air          On Neurological Examination:    Mental Status - Patient is alert, awake, oriented X2 psychomotor slowing    Cranial Nerves - PERRL, EOMI, VFF, V1-V3 intact, subtle R NL flattening, tongue/uvula midline    Motor Exam -   no drift appears to move left side more readily    Sensory    Intact to light touch and pinprick bilaterally    Coord: FTN intact bilaterally     Gait - deferred            LABS:  CBC Full  -  ( 2023 19:32 )  WBC Count : 7.95 K/uL  RBC Count : 4.30 M/uL  Hemoglobin : 13.2 g/dL  Hematocrit : 39.4 %  Platelet Count - Automated : 155 K/uL  Mean Cell Volume : 91.6 fl  Mean Cell Hemoglobin : 30.7 pg  Mean Cell Hemoglobin Concentration : 33.5 g/dL  Auto Neutrophil # : 4.84 K/uL  Auto Lymphocyte # : 2.11 K/uL  Auto Monocyte # : 0.95 K/uL  Auto Eosinophil # : 0.00 K/uL  Auto Basophil # : 0.02 K/uL  Auto Neutrophil % : 60.9 %  Auto Lymphocyte % : 26.5 %  Auto Monocyte % : 11.9 %  Auto Eosinophil % : 0.0 %  Auto Basophil % : 0.3 %    Urinalysis Basic - ( 2023 20:12 )    Color: Yellow / Appearance: Clear / S.020 / pH: x  Gluc: x / Ketone: Trace  / Bili: Negative / Urobili: 1 mg/dL   Blood: x / Protein: 30 mg/dL / Nitrite: Negative   Leuk Esterase: Trace / RBC: 0-2 /HPF / WBC 0-2   Sq Epi: x / Non Sq Epi: x / Bacteria: x          137  |  104  |  35<H>  ----------------------------<  135<H>  4.9   |  28  |  1.55<H>    Ca    9.2      2023 19:32    TPro  8.6<H>  /  Alb  2.5<L>  /  TBili  0.4  /  DBili  x   /  AST  26  /  ALT  15  /  AlkPhos  71  06-12    LIVER FUNCTIONS - ( 2023 19:32 )  Alb: 2.5 g/dL / Pro: 8.6 gm/dL / ALK PHOS: 71 U/L / ALT: 15 U/L / AST: 26 U/L / GGT: x           Hemoglobin A1C:       PT/INR - ( 2023 19:32 )   PT: 12.9 sec;   INR: 1.08 ratio         PTT - ( 2023 19:32 )  PTT:31.6 sec      RADIOLOGY

## 2023-06-13 NOTE — EEG REPORT - NS EEG TEXT BOX
[facility]   COMPREHENSIVE EPILEPSY CENTER   REPORT OF ROUTINE VIDEO EEG     [facility]: [address] [place] [address] , [address], [place] [address], Ph#: [phone]  [facility]: [id number], [place] [address], Ph#: [phone]  Office: [address] [place] [address], [facility] [address], [place] [address] Ph#: [phone]    Patient Name: PEPITO BALL  Age and : 73y (50)  MRN #: [id number]  Location: [facility] [address]  Referring Physician: Hadley Marcum    Study Date: 23    _____________________________________________________________  TECHNICAL INFORMATION    Placement and Labeling of Electrodes:  The EEG was performed utilizing 20 channels referential EEG connections (coronal over temporal over parasagittal montage) using all standard 10-20 electrode placements with EKG.  Recording was at a sampling rate of 256 samples per second per channel.  Time synchronized digital video recording was done simultaneously with EEG recording.  A low light infrared camera was used for low light recording.  Eligio and seizure detection algorithms were utilized.    _____________________________________________________________  HISTORY    Patient is a 73y old  Male who presents with a chief complaint of AMS    PERTINENT MEDICATION:  MEDICATIONS  (STANDING):  amLODIPine   Tablet 10 milliGRAM(s) Oral daily  apixaban 5 milliGRAM(s) Oral every 12 hours  divalproex  milliGRAM(s) Oral at bedtime  divalproex  milliGRAM(s) Oral daily  levothyroxine 88 MICROGram(s) Oral daily  pantoprazole    Tablet 40 milliGRAM(s) Oral before breakfast  QUEtiapine 50 milliGRAM(s) Oral at bedtime  senna 2 Tablet(s) Oral at bedtime  sodium chloride 0.9%. 1000 milliLiter(s) (75 mL/Hr) IV Continuous <Continuous>  thiamine 100 milliGRAM(s) Oral daily    _____________________________________________________________  STUDY INTERPRETATION    Findings: The background was continuous, spontaneously variable and reactive. During wakefulness, the posterior dominant rhythm consisted of symmetric, well-modulated 8-9 Hz activity, with amplitude to 30 uV, that attenuated to eye opening.  Low amplitude frontal beta was noted in wakefulness.    Background Slowing:  -Intermittent diffuse theta and polymorphic delta slowing.    Focal Slowing:   None was present.    Sleep Background:  Stage II sleep transients were not recorded.  Drowsiness and stage II sleep transients were not recorded.    Other Non-Epileptiform Findings:  None were present.    Interictal Epileptiform Activity:   None were present.    Events:  Clinical events: None recorded.  Seizures: None recorded.    Activation Procedures:   Hyperventilation was not performed.    Photic stimulation was performed and did not elicit any abnormality.     Artifacts:  Intermittent myogenic and movement artifacts were noted.    ECG:  The heart rate on single channel ECG was predominantly between 60-80 BPM.    _____________________________________________________________  EEG SUMMARY/CLASSIFICATION    Abnormal EEG in the awake state.  -Intermittent diffuse theta and polymorphic delta slowing.    _____________________________________________________________  EEG IMPRESSION/CLINICAL CORRELATE    Abnormal EEG study.  1. Mild nonspecific diffuse or multifocal cerebral dysfunction.   2. No epileptiform pattern or seizure seen.    Brandon August MD  Neurology Attending Physician

## 2023-06-13 NOTE — PATIENT PROFILE ADULT - NSPROIMPLANTSMEDDEV_GEN_A_NUR
OCCUPATIONAL THERAPY EVALUATION - INPATIENT     Room Number: 217/217-A  Evaluation Date: 7/2/2019  Type of Evaluation: Initial  Presenting Problem: (sepsis, neutropenia, sepsis /CA)    Physician Order: IP Consult to Occupational Therapy  Reason for Therapy function. The patient's Approx Degree of Impairment: 66.57% has been calculated based on documentation in the [facility] '6 clicks' Inpatient Daily Activity Short Form.   Research supports that patients with this level of impairment may benefit from ALPHONSE - dep 7/15/2009    Performed by Narciso Norwood MD at [facility]   • NEEDLE BIOPSY LEFT     •       1 no dm   • OTHER SURGICAL HISTORY      urethra polyp    • OTHER SURGICAL HISTORY      colposcopy ascus 2006 fibroids dx   • TONSILLECTO such as brushing teeth?: A Little  -   Eating meals?: A Little    AM-PAC Score:  Score: 12  Approx Degree of Impairment: 66.57%  Standardized Score (AM-PAC Scale): 30.6  CMS Modifier (G-Code): CL    FUNCTIONAL TRANSFER ASSESSMENT  Supine to Sit : (mod a x None

## 2023-06-13 NOTE — PROGRESS NOTE ADULT - ASSESSMENT
74 y/o M w/PMHx of HTN, hypothyroidism, seizure disorder, dementia, presented due to seizure.  Seen by Neurology Dr. Brock.  VPA continued.  Keppra added.   EEG showed no epileptiform activity.     # Seizure - Seen by Neuro Dr. Brock.  Continue VPA.  Added Keppra.  MRI brain.    # Anemia - Drop in H/H noted.  ? hemodilution.  Repeat H/H.    # JESSICA - Cr 1.55 -> 1.2 post IV hydration.  Monitor BMP  # Acquired Hypothyroidism - continue Synthroid.  # Hx of DVT/PE - Continue home Eliquis  # Dementia- Continue home Seroquel  #HTN - Continue home amlodipine  # Inpatient DVT Proph - on anticoagulation

## 2023-06-13 NOTE — PROGRESS NOTE ADULT - SUBJECTIVE AND OBJECTIVE BOX
Patient: PEPITO BALL [id number] 73y Male                            Hospitalist Attending Note    Offers no complaints.    Deneis bleeding.     ____________________PHYSICAL EXAM:  GENERAL:  NAD, awake  HEENT: NCAT  CARDIOVASCULAR:  S1, S2  LUNGS: CTAB  ABDOMEN:  soft, (-) tenderness, (-) distension, (+) bowel sounds, (-) guarding, (-) rebound (-) rigidity  EXTREMITIES:  no cyanosis / clubbing / edema.   NEURO: strength symmetric, sensation grossly intact.   ____________________    VITALS:  Vital Signs Last 24 Hrs  T(C): 36.4 (2023 04:51), Max: 37 (2023 17:54)  T(F): 97.5 (2023 04:51), Max: 98.6 (2023 17:54)  HR: 62 (2023 04:51) (38 - 70)  BP: 136/85 (2023 04:51) (102/56 - 158/73)  BP(mean): --  RR: 16 (2023 04:51) (14 - 20)  SpO2: 96% (2023 04:51) (94% - 100%)    Parameters below as of 2023 04:51  Patient On (Oxygen Delivery Method): room air     Daily Height in cm: 172.72 (2023 17:54)    Daily Weight in k.1 (2023 01:11)  CAPILLARY BLOOD GLUCOSE      POCT Blood Glucose.: 158 mg/dL (2023 18:00)    I&O's Summary    2023 07:01  -  2023 17:48  --------------------------------------------------------  IN: 240 mL / OUT: 0 mL / NET: 240 mL        LABS:                        9.8    5.60  )-----------( 125      ( 2023 12:05 )             29.9     06-13    144  |  110<H>  |  33<H>  ----------------------------<  180<H>  4.1   |  29  |  1.21    Ca    8.1<L>      2023 12:05  Mg     2.3         TPro  8.6<H>  /  Alb  2.5<L>  /  TBili  0.4  /  DBili  x   /  AST  26  /  ALT  15  /  AlkPhos  71  12    PT/INR - ( 2023 19:32 )   PT: 12.9 sec;   INR: 1.08 ratio         PTT - ( 2023 19:32 )  PTT:31.6 sec  LIVER FUNCTIONS - ( 2023 19:32 )  Alb: 2.5 g/dL / Pro: 8.6 gm/dL / ALK PHOS: 71 U/L / ALT: 15 U/L / AST: 26 U/L / GGT: x           Urinalysis Basic - ( 2023 20:12 )    Color: Yellow / Appearance: Clear / S.020 / pH: x  Gluc: x / Ketone: Trace  / Bili: Negative / Urobili: 1 mg/dL   Blood: x / Protein: 30 mg/dL / Nitrite: Negative   Leuk Esterase: Trace / RBC: 0-2 /HPF / WBC 0-2   Sq Epi: x / Non Sq Epi: x / Bacteria: x              MEDICATIONS:  acetaminophen     Tablet .. 650 milliGRAM(s) Oral every 6 hours PRN  aluminum hydroxide/magnesium hydroxide/simethicone Suspension 30 milliLiter(s) Oral every 4 hours PRN  amLODIPine   Tablet 10 milliGRAM(s) Oral daily  apixaban 5 milliGRAM(s) Oral every 12 hours  divalproex  milliGRAM(s) Oral at bedtime  divalproex  milliGRAM(s) Oral daily  levothyroxine 88 MICROGram(s) Oral daily  melatonin 3 milliGRAM(s) Oral at bedtime PRN  ondansetron Injectable 4 milliGRAM(s) IV Push every 8 hours PRN  pantoprazole    Tablet 40 milliGRAM(s) Oral before breakfast  QUEtiapine 50 milliGRAM(s) Oral at bedtime  senna 2 Tablet(s) Oral at bedtime  sodium chloride 0.9%. 1000 milliLiter(s) IV Continuous <Continuous>  thiamine 100 milliGRAM(s) Oral daily

## 2023-06-13 NOTE — PROVIDER CONTACT NOTE (OTHER) - ASSESSMENT
Dipak to 38 non sustained. No change in mental status. Patient denies pain/ discomfort. Asymptomatic.

## 2023-06-14 NOTE — PROGRESS NOTE ADULT - ASSESSMENT
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 VPA level 101. PE psychomotor slowing no clear focality although appears to move left more readily than right. s/p Keppra load    Impression: seizure    Continue with  BID   Keppra 500 BID  Patient on AC  CT head or MR Brain  routine EEG negative  Can follow up with Neurology, Dr. Mamadou Brock at [phone]

## 2023-06-14 NOTE — PROGRESS NOTE ADULT - SUBJECTIVE AND OBJECTIVE BOX
Patient seen and examined this am. No new events    MEDICATIONS:    acetaminophen     Tablet .. 650 milliGRAM(s) Oral every 6 hours PRN  aluminum hydroxide/magnesium hydroxide/simethicone Suspension 30 milliLiter(s) Oral every 4 hours PRN  amLODIPine   Tablet 10 milliGRAM(s) Oral daily  apixaban 5 milliGRAM(s) Oral every 12 hours  divalproex  milliGRAM(s) Oral at bedtime  divalproex  milliGRAM(s) Oral daily  levothyroxine 88 MICROGram(s) Oral daily  melatonin 3 milliGRAM(s) Oral at bedtime PRN  ondansetron Injectable 4 milliGRAM(s) IV Push every 8 hours PRN  pantoprazole    Tablet 40 milliGRAM(s) Oral before breakfast  QUEtiapine 50 milliGRAM(s) Oral at bedtime  senna 2 Tablet(s) Oral at bedtime  sodium chloride 0.9%. 1000 milliLiter(s) IV Continuous <Continuous>  thiamine 100 milliGRAM(s) Oral daily      LABS:                          10.0   4.43  )-----------( 125      ( 2023 05:40 )             30.3     06-14    143  |  111<H>  |  30<H>  ----------------------------<  81  3.7   |  27  |  0.93    Ca    8.8      2023 05:40  Mg     2.3     06-13    TPro  8.6<H>  /  Alb  2.5<L>  /  TBili  0.4  /  DBili  x   /  AST  26  /  ALT  15  /  AlkPhos  71  06-12    CAPILLARY BLOOD GLUCOSE        PT/INR - ( 2023 19:32 )   PT: 12.9 sec;   INR: 1.08 ratio         PTT - ( 2023 19:32 )  PTT:31.6 sec  Urinalysis Basic - ( 2023 20:12 )    Color: Yellow / Appearance: Clear / S.020 / pH: x  Gluc: x / Ketone: Trace  / Bili: Negative / Urobili: 1 mg/dL   Blood: x / Protein: 30 mg/dL / Nitrite: Negative   Leuk Esterase: Trace / RBC: 0-2 /HPF / WBC 0-2   Sq Epi: x / Non Sq Epi: x / Bacteria: x      I&O's Summary    2023 07:01  -  2023 07:00  --------------------------------------------------------  IN: 240 mL / OUT: 0 mL / NET: 240 mL      Vital Signs Last 24 Hrs  T(C): 37 (2023 05:15), Max: 37 (2023 05:15)  T(F): 98.6 (2023 05:15), Max: 98.6 (2023 05:15)  HR: 51 (:09) (50 - 56)  BP: 118/66 (2023 07:09) (105/63 - 124/66)  BP(mean): --  RR: 18 (2023 07:09) (18 - 18)  SpO2: 96% (:09) (95% - 98%)    Parameters below as of 2023 07:09  Patient On (Oxygen Delivery Method): room air        On Neurological Examination:    Mental Status - Patient is alert, awake, oriented X2 psychomotor slowing    Cranial Nerves - PERRL, EOMI, VFF, V1-V3 intact, subtle R NL flattening, tongue/uvula midline    Motor Exam -   no drift appears to move left side more readily    Sensory    Intact to light touch and pinprick bilaterally    Coord: FTN intact bilaterally     Gait - deferred       RADIOLOGY

## 2023-06-14 NOTE — PROGRESS NOTE ADULT - SUBJECTIVE AND OBJECTIVE BOX
Patient: PEPITO BALL [id number] 73y Male                            Hospitalist Attending Note    Offers no complaints.    Deneis bleeding.     ____________________PHYSICAL EXAM:  GENERAL:  NAD, awake  HEENT: NCAT  CARDIOVASCULAR:  S1, S2  LUNGS: CTAB  ABDOMEN:  soft, (-) tenderness, (-) distension, (+) bowel sounds, (-) guarding, (-) rebound (-) rigidity  EXTREMITIES:  no cyanosis / clubbing / edema.   NEURO: strength symmetric, sensation grossly intact.   ____________________    VITALS:  Vital Signs Last 24 Hrs  T(C): 36.7 (2023 15:42), Max: 37 (2023 05:15)  T(F): 98 (2023 15:42), Max: 98.6 (2023 05:15)  HR: 51 (2023 15:42) (50 - 60)  BP: 136/71 (2023 15:42) (105/63 - 136/71)  BP(mean): --  RR: 17 (2023 15:42) (17 - 18)  SpO2: 98% (2023 15:42) (96% - 98%)    Parameters below as of 2023 10:30  Patient On (Oxygen Delivery Method): room air     Daily     Daily   CAPILLARY BLOOD GLUCOSE        I&O's Summary    2023 07:  -  2023 07:00  --------------------------------------------------------  IN: 240 mL / OUT: 0 mL / NET: 240 mL    2023 07:01  -  2023 17:05  --------------------------------------------------------  IN: 240 mL / OUT: 0 mL / NET: 240 mL        LABS:                        10.0   4.43  )-----------( 125      ( 2023 05:40 )             30.3     06-14    143  |  111<H>  |  30<H>  ----------------------------<  81  3.7   |  27  |  0.93    Ca    8.8      2023 05:40  Mg     2.3     06-13    TPro  8.6<H>  /  Alb  2.5<L>  /  TBili  0.4  /  DBili  x   /  AST  26  /  ALT  15  /  AlkPhos  71  06-12    PT/INR - ( 2023 19:32 )   PT: 12.9 sec;   INR: 1.08 ratio         PTT - ( 2023 19:32 )  PTT:31.6 sec  LIVER FUNCTIONS - ( 2023 19:32 )  Alb: 2.5 g/dL / Pro: 8.6 gm/dL / ALK PHOS: 71 U/L / ALT: 15 U/L / AST: 26 U/L / GGT: x           Urinalysis Basic - ( 2023 20:12 )    Color: Yellow / Appearance: Clear / S.020 / pH: x  Gluc: x / Ketone: Trace  / Bili: Negative / Urobili: 1 mg/dL   Blood: x / Protein: 30 mg/dL / Nitrite: Negative   Leuk Esterase: Trace / RBC: 0-2 /HPF / WBC 0-2   Sq Epi: x / Non Sq Epi: x / Bacteria: x            Culture - Urine (collected 2023 20:12)  Source: Clean Catch Clean Catch (Midstream)  Preliminary Report (2023 10:41):    50,000 - 99,000 CFU/mL Gram Negative Rods    Culture - Blood (collected 2023 19:42)  Source: .Blood Blood-Peripheral  Preliminary Report (2023 03:02):    No growth to date.    Culture - Blood (collected 2023 19:32)  Source: .Blood Blood-Peripheral  Preliminary Report (2023 03:02):    No growth to date.        MEDICATIONS:  acetaminophen     Tablet .. 650 milliGRAM(s) Oral every 6 hours PRN  aluminum hydroxide/magnesium hydroxide/simethicone Suspension 30 milliLiter(s) Oral every 4 hours PRN  amLODIPine   Tablet 10 milliGRAM(s) Oral daily  apixaban 5 milliGRAM(s) Oral every 12 hours  divalproex  milliGRAM(s) Oral at bedtime  divalproex  milliGRAM(s) Oral daily  levothyroxine 88 MICROGram(s) Oral daily  melatonin 3 milliGRAM(s) Oral at bedtime PRN  ondansetron Injectable 4 milliGRAM(s) IV Push every 8 hours PRN  pantoprazole    Tablet 40 milliGRAM(s) Oral before breakfast  QUEtiapine 50 milliGRAM(s) Oral at bedtime  senna 2 Tablet(s) Oral at bedtime  sodium chloride 0.9%. 1000 milliLiter(s) IV Continuous <Continuous>  thiamine 100 milliGRAM(s) Oral daily

## 2023-06-14 NOTE — PROGRESS NOTE ADULT - ASSESSMENT
72 y/o M w/PMHx of HTN, hypothyroidism, seizure disorder, dementia, presented due to seizure.  Seen by Neurology Dr. Brock.  VPA continued.  Keppra added.   EEG showed no epileptiform activity.   MRI: Limited exam due to motion. This exam is not of diagnostic quality.      # Seizure - Seen by Neuro Dr. Brock.  Continue VPA.  Added Keppra.  ? repeat MRI brain vs outpatient study.   # Anemia - Drop in H/H noted.  Suspect hemodilution, H/H stable.  # JESSICA - Cr 1.55 -> 1.2 post IV hydration.  Monitor BMP  # Acquired Hypothyroidism - continue Synthroid.  # Hx of DVT/PE - Continue home Eliquis  # Dementia- Continue home Seroquel  #HTN - Continue home amlodipine  # Inpatient DVT Proph - on anticoagulation

## 2023-06-15 NOTE — PROGRESS NOTE ADULT - ASSESSMENT
72 y/o M w/PMHx of HTN, hypothyroidism, seizure disorder, dementia, presented due to seizure.  Seen by Neurology Dr. Brock.  VPA continued.  Keppra added.   EEG showed no epileptiform activity.   MRI: Limited exam due to motion. This exam is not of diagnostic quality.      # Bradycardia pt does not seem to be symptomatic - noted on Tele.  Obtain EKG.  TTE to assess EF.  Vitals noted from 2/2023, HR was not as low.  Consult Cardiology in am.   # Seizure - Seen by Neuro Dr. Brock.  Continue VPA.  Added Keppra.  Repeat MRI as outpatient.  Discussed with Neurology.   # Anemia - Drop in H/H noted.  Suspect hemodilution, H/H stable.  # JESSICA - Cr 1.55 -> 1.2 post IV hydration.  Monitor BMP  # Acquired Hypothyroidism - continue Synthroid.  # Hx of DVT/PE - Continue home Eliquis  # Dementia- Continue home Seroquel  #HTN - Continue home amlodipine  # Inpatient DVT Proph - on anticoagulation     Plan of care d/w daughter Leena at [phone] in agreement.

## 2023-06-15 NOTE — PROGRESS NOTE ADULT - ASSESSMENT
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 VPA level 101. PE psychomotor slowing no clear focality although appears to move left more readily than right. s/p Keppra load    Impression: seizure    Continue with  BID   Keppra 500 BID  Patient on AC  MR brain very limited, exam improving, can defer to outpt  routine EEG negative  Can follow up with Neurology, Dr. Mamadou Brock at [phone]

## 2023-06-15 NOTE — PROGRESS NOTE ADULT - SUBJECTIVE AND OBJECTIVE BOX
Patient seen and examined this am. No new events    MEDICATIONS:    acetaminophen     Tablet .. 650 milliGRAM(s) Oral every 6 hours PRN  aluminum hydroxide/magnesium hydroxide/simethicone Suspension 30 milliLiter(s) Oral every 4 hours PRN  amLODIPine   Tablet 10 milliGRAM(s) Oral daily  apixaban 5 milliGRAM(s) Oral every 12 hours  divalproex  milliGRAM(s) Oral at bedtime  divalproex  milliGRAM(s) Oral daily  levothyroxine 88 MICROGram(s) Oral daily  melatonin 3 milliGRAM(s) Oral at bedtime PRN  ondansetron Injectable 4 milliGRAM(s) IV Push every 8 hours PRN  pantoprazole    Tablet 40 milliGRAM(s) Oral before breakfast  QUEtiapine 50 milliGRAM(s) Oral at bedtime  senna 2 Tablet(s) Oral at bedtime  sodium chloride 0.9%. 1000 milliLiter(s) IV Continuous <Continuous>  thiamine 100 milliGRAM(s) Oral daily      LABS:                          10.0   4.43  )-----------( 125      ( 14 Jun 2023 05:40 )             30.3     06-14    143  |  111<H>  |  30<H>  ----------------------------<  81  3.7   |  27  |  0.93    Ca    8.8      14 Jun 2023 05:40  Mg     2.3     06-13      CAPILLARY BLOOD GLUCOSE            I&O's Summary    14 Jun 2023 07:01  -  15 Tony 2023 07:00  --------------------------------------------------------  IN: 1140 mL / OUT: 0 mL / NET: 1140 mL      Vital Signs Last 24 Hrs  T(C): 36.5 (15 Tony 2023 04:24), Max: 36.7 (14 Jun 2023 15:42)  T(F): 97.7 (15 Tony 2023 04:24), Max: 98 (14 Jun 2023 15:42)  HR: 51 (15 Tony 2023 04:24) (51 - 60)  BP: 120/69 (15 Tony 2023 04:24) (109/58 - 136/71)  BP(mean): --  RR: 17 (15 Tony 2023 04:24) (17 - 17)  SpO2: 97% (15 Tony 2023 04:24) (95% - 98%)    Parameters below as of 15 Tony 2023 04:24  Patient On (Oxygen Delivery Method): room air        On Neurological Examination:    Mental Status - Patient is alert, awake, oriented X2 psychomotor slowing    Cranial Nerves - PERRL, EOMI, VFF, V1-V3 intact, subtle R NL flattening, tongue/uvula midline    Motor Exam -   no drift appears to move left side more readily    Sensory    Intact to light touch and pinprick bilaterally    Coord: FTN intact bilaterally     Gait - deferred       RADIOLOGY

## 2023-06-15 NOTE — PROGRESS NOTE ADULT - SUBJECTIVE AND OBJECTIVE BOX
Patient: PEPITO BALL [id number] 73y Male                            Hospitalist Attending Note    Offers no complaints.    Denies bleeding.   HR noted 30s.      ____________________PHYSICAL EXAM:  GENERAL:  NAD, awake  HEENT: NCAT  CARDIOVASCULAR:  S1, S2  LUNGS: CTAB  ABDOMEN:  soft, (-) tenderness, (-) distension, (+) bowel sounds, (-) guarding, (-) rebound (-) rigidity  EXTREMITIES:  no cyanosis / clubbing / edema.   NEURO: strength symmetric, sensation grossly intact.   ____________________    VITALS:  Vital Signs Last 24 Hrs  T(C): 36.7 (2023 15:42), Max: 37 (2023 05:15)  T(F): 98 (2023 15:42), Max: 98.6 (2023 05:15)  HR: 51 (2023 15:42) (50 - 60)  BP: 136/71 (2023 15:42) (105/63 - 136/71)  BP(mean): --  RR: 17 (2023 15:42) (17 - 18)  SpO2: 98% (2023 15:42) (96% - 98%)    Parameters below as of 2023 10:30  Patient On (Oxygen Delivery Method): room air     Daily     Daily   CAPILLARY BLOOD GLUCOSE        I&O's Summary    2023 07:  -  2023 07:00  --------------------------------------------------------  IN: 240 mL / OUT: 0 mL / NET: 240 mL    2023 07:  -  2023 17:05  --------------------------------------------------------  IN: 240 mL / OUT: 0 mL / NET: 240 mL        LABS:                        10.0   4.43  )-----------( 125      ( 2023 05:40 )             30.3     06-14    143  |  111<H>  |  30<H>  ----------------------------<  81  3.7   |  27  |  0.93    Ca    8.8      2023 05:40  Mg     2.3     06-13    TPro  8.6<H>  /  Alb  2.5<L>  /  TBili  0.4  /  DBili  x   /  AST  26  /  ALT  15  /  AlkPhos  71  06-12    PT/INR - ( 2023 19:32 )   PT: 12.9 sec;   INR: 1.08 ratio         PTT - ( 2023 19:32 )  PTT:31.6 sec  LIVER FUNCTIONS - ( 2023 19:32 )  Alb: 2.5 g/dL / Pro: 8.6 gm/dL / ALK PHOS: 71 U/L / ALT: 15 U/L / AST: 26 U/L / GGT: x           Urinalysis Basic - ( 2023 20:12 )    Color: Yellow / Appearance: Clear / S.020 / pH: x  Gluc: x / Ketone: Trace  / Bili: Negative / Urobili: 1 mg/dL   Blood: x / Protein: 30 mg/dL / Nitrite: Negative   Leuk Esterase: Trace / RBC: 0-2 /HPF / WBC 0-2   Sq Epi: x / Non Sq Epi: x / Bacteria: x            Culture - Urine (collected 2023 20:12)  Source: Clean Catch Clean Catch (Midstream)  Preliminary Report (2023 10:41):    50,000 - 99,000 CFU/mL Gram Negative Rods    Culture - Blood (collected 2023 19:42)  Source: .Blood Blood-Peripheral  Preliminary Report (2023 03:02):    No growth to date.    Culture - Blood (collected 2023 19:32)  Source: .Blood Blood-Peripheral  Preliminary Report (2023 03:02):    No growth to date.        MEDICATIONS:  acetaminophen     Tablet .. 650 milliGRAM(s) Oral every 6 hours PRN  aluminum hydroxide/magnesium hydroxide/simethicone Suspension 30 milliLiter(s) Oral every 4 hours PRN  amLODIPine   Tablet 10 milliGRAM(s) Oral daily  apixaban 5 milliGRAM(s) Oral every 12 hours  divalproex  milliGRAM(s) Oral at bedtime  divalproex  milliGRAM(s) Oral daily  levothyroxine 88 MICROGram(s) Oral daily  melatonin 3 milliGRAM(s) Oral at bedtime PRN  ondansetron Injectable 4 milliGRAM(s) IV Push every 8 hours PRN  pantoprazole    Tablet 40 milliGRAM(s) Oral before breakfast  QUEtiapine 50 milliGRAM(s) Oral at bedtime  senna 2 Tablet(s) Oral at bedtime  sodium chloride 0.9%. 1000 milliLiter(s) IV Continuous <Continuous>  thiamine 100 milliGRAM(s) Oral daily

## 2023-06-16 NOTE — DISCHARGE NOTE PROVIDER - PROVIDER TOKENS
PROVIDER:[TOKEN:[[id number]]],PROVIDER:[TOKEN:[[id number]]],FREE:[LAST:[PMD],PHONE:[(   )    -],FAX:[(   )    -]]

## 2023-06-16 NOTE — PROGRESS NOTE ADULT - ASSESSMENT
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 VPA level 101. PE psychomotor slowing no clear focality although appears to move left more readily than right. s/p Keppra load    Impression: seizure    Continue with  BID   Keppra 500 BID  Patient on AC  MR brain very limited, exam improving, can defer to outpt  routine EEG negative  -No further inpatient Neurologic workup at this time  Can follow up with Neurology, Dr. Mamadou Brock at [phone]

## 2023-06-16 NOTE — DISCHARGE NOTE PROVIDER - HOSPITAL COURSE
74 y/o M w/PMHx of HTN, hypothyroidism, seizure disorder, dementia, presented due to seizure.  Seen by Neurology Dr. Brock.  VPA continued.  Keppra added.   EEG showed no epileptiform activity.  MRI: Limited exam due to motion. This exam is not of diagnostic quality. Seen by cardiology for bradycardia.  OUtpatient f/u d/w wife.    74 y/o M w/PMHx of HTN, hypothyroidism, seizure disorder, dementia, presented due to seizure.  Seen by Neurology Dr. Brock.  VPA continued.  Keppra added.   EEG showed no epileptiform activity.   MRI: Limited exam due to motion. This exam is not of diagnostic quality.    Course:     # Bradycardia. sinus and asymptomatic. TTE showed EF 60-65%. Discussed with cardiology service. no further inpatient work up.   # Seizure - recurrent. resolved. cont current oral AEDs at home. seizure precautions. Discussed with neurologist.   # Anemia - improved. no active gross bleeding.   # JESSICA - Likely pre-renal with dehydration. improved.   # Acquired Hypothyroidism - continue Synthroid.  # Hx of DVT/PE - Continue home Eliquis  # Dementia- Continue home Seroquel  #HTN - Continue home amlodipine     Seen and examined by me today. Vitals stable.   I have discussed all the inpatient radiographic findings with the patient and stressed that patient follows with the PCP for further outpatient care. I have educated patient to use [employer] patient portal (as instructed on the discharge paperwork) to access medical records outside the hospital.   All questions welcomed and answered appropriately. Patient verbalized understanding of post discharge physician's follows up and discharge instructions.   DC time spent by me face to face excluding billable procedures 38 mins

## 2023-06-16 NOTE — DISCHARGE NOTE PROVIDER - NSDCFUADDINST_GEN_ALL_CORE_FT
It is important to see your primary physician as well as any specialty physicians within the next week to perform a comprehensive medical review.  Call their offices for an appointment as soon as you leave the hospital.  You will also need to see them for renewal of your medications.  If have any difficulty following with a physician, contact the [facility] [phone] or via [email or web address].   To obtain your results, you can access the [email or web address] Patient Portal at [email or web address].  Your medical issues appear to be stable at this time, but if your symptoms recur or worsen, contact your physicians and/or return to the hospital if necessary.  If you encounter any issues or questions with your medication, call your physicians before stopping the medication.  Do not drive.  Limit your diet to 2 grams of sodium daily.  1) It is important to see your primary physician as well as other necessary consultants within next 7-10 days to perform a comprehensive medical review.  Call their offices for an appointment as soon as you leave the hospital.  If you do not have a primary physician or unable to reach your PCP, contact the [facility]ician [facility]erral Service at [phone].  Your medical issues appear to be stable at this time, but if your symptoms recur or worsen, contact your physicians and/or return to the hospital if necessary.  If you encounter any issues or questions with your medication, call your physicians before stopping the medication.    2) Please access [facility] Patient portal (as instructed on the discharge paperwork) to access your medical records at any time after discharge.     3) Seizure precautions. no driving per [facility] law.

## 2023-06-16 NOTE — CONSULT NOTE ADULT - SUBJECTIVE AND OBJECTIVE BOX
CHIEF COMPLAINT:  Patient is a 73y old  Male who presents with a chief complaint of Seizure (15 Tony 2023 17:28)      HPI:  C/C: Seizure.    Patient is 71 yo M with PMH of seizure disorder on valproic acid, hypothyroidism, dementia, PE/DVT on Apixaban brought in by ambulance with c/o witnessed seizure by his wife at 4:30 pm today at home. Then, he had another witnessed seizure en route to hospital in ambulance.  Seizures and post ictal period are typical of his presentation as reported by wife.  Seems that patient is in post-ictal state. He is confused but mumbles few un-recognizable word. Has no motor deficits. He only repeats that he is sick. Most of the informations has been obtained from EMR documentation and in discussion with ED provider. Reportedly, he has no falls / trauma, fevers/chills, cough or exacerbating factors.       ALLERGIES:  No Known Drug Allergies  fish (Hives; Angioedema)  [place] (Swelling; Angioedema)          Home Medications:  acetaminophen 325 mg oral tablet: 2 tab(s) orally every 6 hours, As needed, Temp greater or equal to 38C (100.4F), Mild Pain (1 - 3) (15 Tony 2023 15:19)  lactobacillus acidophilus oral capsule: 1 tab(s) orally once a day (at bedtime) (15 Tony 2023 15:28)  polyethylene glycol 3350 oral powder for reconstitution: 17 gram(s) orally 2 times a day (15 Tony 2023 15:28)  senna oral tablet: 2 tab(s) orally once a day (at bedtime) (15 Tony 2023 15:28)    PAST MEDICAL & SURGICAL HISTORY:  HTN (hypertension)      Diverticulosis      Hypothyroid      Deep vein thrombosis (DVT)      Pulmonary embolism      No significant past surgical history        FAMILY HISTORY:      SOCIAL HISTORY:    REVIEW OF SYSTEMS:  General:  No wt loss, fevers, chills, night sweats  Eyes:  Good vision, no reported pain  ENT:  No sore throat, pain, runny nose, dysphagia  CV:  No pain, palpitations, hypo/hypertension  Resp:  No dyspnea, cough, tachypnea, wheezing  GI:  No pain, nausea, vomiting, diarrhea, constipation  :  No pain, bleeding, incontinence, nocturia  Muscle:  No pain, weakness  Breast:  No pain, abscess, mass, discharge  Neuro:  No weakness, tingling, memory problems  Psych:  No fatigue, insomnia, mood problems, depression  Endocrine:  No polyuria, polydipsia, cold/heat intolerance  Heme:  No petechiae, ecchymosis, easy bruisability  Skin:  No rash, edema    PHYSICAL EXAM:  Vital Signs:  Vital Signs Last 24 Hrs  T(C): 36.7 (2023 11:05), Max: 36.7 (2023 11:05)  T(F): 98 (2023 11:05), Max: 98 (:05)  HR: 63 (:05) (52 - 63)  BP: 142/78 (2023 11:05) (121/61 - 143/78)  RR: 17 (:) (17 - 18)  SpO2: 96% (:) (96% - 98%)    Parameters below as of :  Patient On (Oxygen Delivery Method): room air      I&O's Summary    15 Tony 2023 07:  -  2023 07:00  --------------------------------------------------------  IN: 480 mL / OUT: 0 mL / NET: 480 mL    :  -  2023 15:13  --------------------------------------------------------  IN: 180 mL / OUT: 0 mL / NET: 180 mL      I&O's Detail    15 Tony 2023 07:  -  2023 07:00  --------------------------------------------------------  IN:    Oral Fluid: 480 mL  Total IN: 480 mL    OUT:  Total OUT: 0 mL    Total NET: 480 mL      2023 07:  -  2023 15:13  --------------------------------------------------------  IN:    Oral Fluid: 180 mL  Total IN: 180 mL    OUT:  Total OUT: 0 mL    Total NET: 180 mL      Tele:     Constitutional: well developed, normal appearance, well groomed, well nourished, no deformities and no acute distress.   Eyes: the conjunctiva exhibited no abnormalities and the eyelids demonstrated no xanthelasmas.   HEENT: normal oral mucosa, no oral pallor and no oral cyanosis.   Neck: normal jugular venous A waves present, normal jugular venous V waves present and no jugular venous barriga A waves.   Pulmonary: no respiratory distress, normal respiratory rhythm and effort, no accessory muscle use and lungs were clear to auscultation bilaterally.   Cardiovascular: heart rate and rhythm were normal, normal S1 and S2 and no murmur, gallop, rub, heave or thrill are present.   Abdomen: soft, non-tender, no hepato-splenomegaly and no abdominal mass palpated.   Musculoskeletal: the gait could not be assessed..   Extremities: no clubbing of the fingernails, no localized cyanosis, no petechial hemorrhages and no ischemic changes.   Skin: normal skin color and pigmentation, no rash, no venous stasis, no skin lesions, no skin ulcer and no xanthoma was observed.   Psychiatric: oriented to person, place, and time, the affect was normal, the mood was normal and not feeling anxious.      LABORATORY:        IMAGIN. Left ventricular ejection fraction, by visual estimation, is 60 to   65%.   2. Technically limited study.   3. Normal global left ventricular systolic function.   4. Normal left ventricular internal cavity size.   5. Spectral Doppler shows impaired relaxation pattern of left   ventricular myocardial filling (Grade I diastolic dysfunction).   6. Normal right ventricular size and function.   7. Normal left atrial size.   8. Normal right atrial size.   9. There is no evidence of pericardial effusion.  10. Mild thickening and calcification of the anterior and posterior   mitral valve leaflets.  11. Trace mitral valve regurgitation.  12. Mild-moderate tricuspid regurgitation.  13. Sclerotic aortic valve with decreased opening.  14. Estimated pulmonary artery systolic pressure is 39.6 mmHg assuming a   right atrial pressure of 8 mmHg, which is consistent with borderline   pulmonary hypertension.    < end of copied text >  ASSESSMENT:  Patient is 71 yo M with PMH of seizure disorder on valproic acid, hypothyroidism, dementia, PE/DVT on Apixaban brought in by ambulance with c/o witnessed seizure by his wife at 4:30 pm today at home. Then, he had another witnessed seizure en route to hospital in ambulance.  Seizures and post ictal period are typical of his presentation as reported by wife.  Seems that patient is in post-ictal state. He is confused but mumbles few un-recognizable word. Has no motor deficits. He only repeats that he is sick. Most of the informations has been obtained from EMR documentation and in discussion with ED provider. Reportedly, he has no falls / trauma, fevers/chills, cough or exacerbating factors.     PLAN:     MEDICATIONS  (STANDING):  amLODIPine   Tablet 10 milliGRAM(s) Oral daily  apixaban 5 milliGRAM(s) Oral every 12 hours  divalproex  milliGRAM(s) Oral at bedtime  divalproex  milliGRAM(s) Oral daily  levothyroxine 88 MICROGram(s) Oral daily  pantoprazole    Tablet 40 milliGRAM(s) Oral before breakfast  QUEtiapine 50 milliGRAM(s) Oral at bedtime  senna 2 Tablet(s) Oral at bedtime  thiamine 100 milliGRAM(s) Oral daily      Yanick Foster MD, FACC, WILL, FASNC, FACP  Director, Heart Failure Services  [facility]  , Department of Cardiology  [facility]     CHIEF COMPLAINT:  Patient is a 73y old  Male who presents with a chief complaint of Seizure (15 Tony 2023 17:28)      HPI:  C/C: Seizure.    Patient is 71 yo M with PMH of seizure disorder on valproic acid, hypothyroidism, dementia, PE/DVT on Apixaban brought in by ambulance with c/o witnessed seizure by his wife at 4:30 pm today at home. Then, he had another witnessed seizure en route to hospital in ambulance.  Seizures and post ictal period are typical of his presentation as reported by wife.  Seems that patient is in post-ictal state. He is confused but mumbles few un-recognizable word. Has no motor deficits. He only repeats that he is sick. Most of the informations has been obtained from EMR documentation and in discussion with ED provider. Reportedly, he has no falls / trauma, fevers/chills, cough or exacerbating factors.     Had SB yesterday down to 30 bpm and 2.5 sec pause. SR/SB today.     ALLERGIES:  No Known Drug Allergies  fish (Hives; Angioedema)  [place] (Swelling; Angioedema)          Home Medications:  acetaminophen 325 mg oral tablet: 2 tab(s) orally every 6 hours, As needed, Temp greater or equal to 38C (100.4F), Mild Pain (1 - 3) (15 Tony 2023 15:19)  lactobacillus acidophilus oral capsule: 1 tab(s) orally once a day (at bedtime) (15 Tony 2023 15:28)  polyethylene glycol 3350 oral powder for reconstitution: 17 gram(s) orally 2 times a day (15 Tony 2023 15:28)  senna oral tablet: 2 tab(s) orally once a day (at bedtime) (15 Tony 2023 15:28)    PAST MEDICAL & SURGICAL HISTORY:  HTN (hypertension)      Diverticulosis      Hypothyroid      Deep vein thrombosis (DVT)      Pulmonary embolism      No significant past surgical history        FAMILY HISTORY:      SOCIAL HISTORY:    REVIEW OF SYSTEMS:  General:  No wt loss, fevers, chills, night sweats  Eyes:  Good vision, no reported pain  ENT:  No sore throat, pain, runny nose, dysphagia  CV:  No pain, palpitations, hypo/hypertension  Resp:  No dyspnea, cough, tachypnea, wheezing  GI:  No pain, nausea, vomiting, diarrhea, constipation  :  No pain, bleeding, incontinence, nocturia  Muscle:  No pain, weakness  Breast:  No pain, abscess, mass, discharge  Neuro:  No weakness, tingling, memory problems  Psych:  No fatigue, insomnia, mood problems, depression  Endocrine:  No polyuria, polydipsia, cold/heat intolerance  Heme:  No petechiae, ecchymosis, easy bruisability  Skin:  No rash, edema    PHYSICAL EXAM:  Vital Signs:  Vital Signs Last 24 Hrs  T(C): 36.7 (2023 11:05), Max: 36.7 (2023 11:05)  T(F): 98 (2023 11:05), Max: 98 (2023 11:05)  HR: 63 (2023 11:05) (52 - 63)  BP: 142/78 (2023 11:05) (121/61 - 143/78)  RR: 17 (:05) (17 - 18)  SpO2: 96% (:05) (96% - 98%)    Parameters below as of 2023 11:  Patient On (Oxygen Delivery Method): room air      I&O's Summary    15 Tony 2023 07:  -  2023 07:00  --------------------------------------------------------  IN: 480 mL / OUT: 0 mL / NET: 480 mL    :  -  2023 15:13  --------------------------------------------------------  IN: 180 mL / OUT: 0 mL / NET: 180 mL      I&O's Detail    15 Tony 2023 07:  -  2023 07:00  --------------------------------------------------------  IN:    Oral Fluid: 480 mL  Total IN: 480 mL    OUT:  Total OUT: 0 mL    Total NET: 480 mL      2023 07:01  -  2023 15:13  --------------------------------------------------------  IN:    Oral Fluid: 180 mL  Total IN: 180 mL    OUT:  Total OUT: 0 mL    Total NET: 180 mL      Tele:     Constitutional: well developed, normal appearance, well groomed, well nourished, no deformities and no acute distress.   Eyes: the conjunctiva exhibited no abnormalities and the eyelids demonstrated no xanthelasmas.   HEENT: normal oral mucosa, no oral pallor and no oral cyanosis.   Neck: normal jugular venous A waves present, normal jugular venous V waves present and no jugular venous barriga A waves.   Pulmonary: no respiratory distress, normal respiratory rhythm and effort, no accessory muscle use and lungs were clear to auscultation bilaterally.   Cardiovascular: heart rate and rhythm were normal, normal S1 and S2 and no murmur, gallop, rub, heave or thrill are present.   Abdomen: soft, non-tender, no hepato-splenomegaly and no abdominal mass palpated.   Musculoskeletal: the gait could not be assessed..   Extremities: no clubbing of the fingernails, no localized cyanosis, no petechial hemorrhages and no ischemic changes.   Skin: normal skin color and pigmentation, no rash, no venous stasis, no skin lesions, no skin ulcer and no xanthoma was observed.   Psychiatric: oriented to person, place, and time, the affect was normal, the mood was normal and not feeling anxious.      LABORATORY:        IMAGIN. Left ventricular ejection fraction, by visual estimation, is 60 to   65%.   2. Technically limited study.   3. Normal global left ventricular systolic function.   4. Normal left ventricular internal cavity size.   5. Spectral Doppler shows impaired relaxation pattern of left   ventricular myocardial filling (Grade I diastolic dysfunction).   6. Normal right ventricular size and function.   7. Normal left atrial size.   8. Normal right atrial size.   9. There is no evidence of pericardial effusion.  10. Mild thickening and calcification of the anterior and posterior   mitral valve leaflets.  11. Trace mitral valve regurgitation.  12. Mild-moderate tricuspid regurgitation.  13. Sclerotic aortic valve with decreased opening.  14. Estimated pulmonary artery systolic pressure is 39.6 mmHg assuming a   right atrial pressure of 8 mmHg, which is consistent with borderline   pulmonary hypertension.    < end of copied text >  ASSESSMENT:  Patient is 71 yo M with PMH of seizure disorder on valproic acid, hypothyroidism, dementia, PE/DVT on Apixaban brought in by ambulance with c/o witnessed seizure by his wife at 4:30 pm today at home. Then, he had another witnessed seizure en route to hospital in ambulance.  Seizures and post ictal period are typical of his presentation as reported by wife.  Seems that patient is in post-ictal state. He is confused but mumbles few un-recognizable word. Has no motor deficits. He only repeats that he is sick. Most of the informations has been obtained from EMR documentation and in discussion with ED provider. Reportedly, he has no falls / trauma, fevers/chills, cough or exacerbating factors.     PLAN:     MEDICATIONS  (STANDING):  amLODIPine   Tablet 10 milliGRAM(s) Oral daily  apixaban 5 milliGRAM(s) Oral every 12 hours  divalproex  milliGRAM(s) Oral at bedtime  divalproex  milliGRAM(s) Oral daily  levothyroxine 88 MICROGram(s) Oral daily  pantoprazole    Tablet 40 milliGRAM(s) Oral before breakfast  QUEtiapine 50 milliGRAM(s) Oral at bedtime  senna 2 Tablet(s) Oral at bedtime  thiamine 100 milliGRAM(s) Oral daily    tele, avoid AV renzo blockers.  signing off call back prn    Yanick Foster MD, FACC, FASMARISA, KING, FACP  Director, Heart Failure Services  [facility]  , Department of Cardiology  [facility]     CHIEF COMPLAINT:  Patient is a 73y old  Male who presents with a chief complaint of Seizure (15 Tony 2023 17:28)      HPI:  C/C: Seizure.    72-year-old with seizure disorder, hypothyroidism, dementia, prior PE DVT on DOAC therapy brought in with seizure activity.  Noted to have sinus bradycardia and 2.5-second pause.  Seen resting comfortably in bed much improved no acute distress.  Sinus rhythm on the monitor.    ALLERGIES:  No Known Drug Allergies  fish (Hives; Angioedema)  [place] (Swelling; Angioedema)          Home Medications:  acetaminophen 325 mg oral tablet: 2 tab(s) orally every 6 hours, As needed, Temp greater or equal to 38C (100.4F), Mild Pain (1 - 3) (15 Tony 2023 15:19)  lactobacillus acidophilus oral capsule: 1 tab(s) orally once a day (at bedtime) (15 Tony 2023 15:28)  polyethylene glycol 3350 oral powder for reconstitution: 17 gram(s) orally 2 times a day (15 Tony 2023 15:28)  senna oral tablet: 2 tab(s) orally once a day (at bedtime) (15 Tony 2023 15:28)    PAST MEDICAL & SURGICAL HISTORY:  HTN (hypertension)      Diverticulosis      Hypothyroid      Deep vein thrombosis (DVT)      Pulmonary embolism      No significant past surgical history        FAMILY HISTORY:  n/a    SOCIAL HISTORY:  denied tobacco    REVIEW OF SYSTEMS:  General:  No wt loss, fevers, chills, night sweats  Eyes:  Good vision, no reported pain  ENT:  No sore throat, pain, runny nose, dysphagia  CV:  No pain, palpitations, hypo/hypertension  Resp:  No dyspnea, cough, tachypnea, wheezing  GI:  No pain, nausea, vomiting, diarrhea, constipation  :  No pain, bleeding, incontinence, nocturia  Muscle:  No pain, weakness  Breast:  No pain, abscess, mass, discharge  Neuro:  No weakness, tingling, memory problems  Psych:  No fatigue, insomnia, mood problems, depression  Endocrine:  No polyuria, polydipsia, cold/heat intolerance  Heme:  No petechiae, ecchymosis, easy bruisability  Skin:  No rash, edema    PHYSICAL EXAM:  Vital Signs:  Vital Signs Last 24 Hrs  T(C): 36.7 (2023 11:05), Max: 36.7 (2023 11:05)  T(F): 98 (2023 11:05), Max: 98 (2023 11:05)  HR: 63 (:) (52 - 63)  BP: 142/78 (:) (121/61 - 143/78)  RR: 17 (:05) (17 - 18)  SpO2: 96% (:) (96% - 98%)    Parameters below as of   Patient On (Oxygen Delivery Method): room air      I&O's Summary    15 Tony 2023 07:  -  2023 07:00  --------------------------------------------------------  IN: 480 mL / OUT: 0 mL / NET: 480 mL    :  -  2023 15:13  --------------------------------------------------------  IN: 180 mL / OUT: 0 mL / NET: 180 mL      I&O's Detail    15 Tony 2023 07:  -  2023 07:00  --------------------------------------------------------  IN:    Oral Fluid: 480 mL  Total IN: 480 mL    OUT:  Total OUT: 0 mL    Total NET: 480 mL      2023 07:  -  2023 15:13  --------------------------------------------------------  IN:    Oral Fluid: 180 mL  Total IN: 180 mL    OUT:  Total OUT: 0 mL    Total NET: 180 mL      Tele: SR    Constitutional: well developed, normal appearance, well groomed, well nourished, no deformities and no acute distress.   Eyes: the conjunctiva exhibited no abnormalities and the eyelids demonstrated no xanthelasmas.   HEENT: normal oral mucosa, no oral pallor and no oral cyanosis.   Neck: normal jugular venous A waves present, normal jugular venous V waves present and no jugular venous barriga A waves.   Pulmonary: no respiratory distress, normal respiratory rhythm and effort, no accessory muscle use and lungs were clear to auscultation bilaterally.   Cardiovascular: heart rate and rhythm were normal, normal S1 and S2 and no murmur, gallop, rub, heave or thrill are present.   Abdomen: soft, non-tender, no hepato-splenomegaly and no abdominal mass palpated.   Musculoskeletal: the gait could not be assessed.  Extremities: no clubbing of the fingernails, no localized cyanosis, no petechial hemorrhages and no ischemic changes.   Skin: normal skin color and pigmentation, no rash, no venous stasis, no skin lesions, no skin ulcer and no xanthoma was observed.       LABORATORY:        IMAGIN. Left ventricular ejection fraction, by visual estimation, is 60 to   65%.   2. Technically limited study.   3. Normal global left ventricular systolic function.   4. Normal left ventricular internal cavity size.   5. Spectral Doppler shows impaired relaxation pattern of left   ventricular myocardial filling (Grade I diastolic dysfunction).   6. Normal right ventricular size and function.   7. Normal left atrial size.   8. Normal right atrial size.   9. There is no evidence of pericardial effusion.  10. Mild thickening and calcification of the anterior and posterior   mitral valve leaflets.  11. Trace mitral valve regurgitation.  12. Mild-moderate tricuspid regurgitation.  13. Sclerotic aortic valve with decreased opening.  14. Estimated pulmonary artery systolic pressure is 39.6 mmHg assuming a   right atrial pressure of 8 mmHg, which is consistent with borderline   pulmonary hypertension.    < end of copied text >  ASSESSMENT:  72-year-old with seizure disorder, hypothyroidism, dementia, prior PE DVT on DOAC therapy brought in with seizure activity.  Noted to have sinus bradycardia and 2.5-second pause.  Seen resting comfortably in bed much improved no acute distress.  Sinus rhythm on the monitor.    PLAN:     Continue amlodipine for blood pressure management, apixaban for anticoagulation in setting of prior PE and DVT.  Neurologic and thyroid medications.  Continue telemetry monitoring.  Avoid AV renzo blockers.  Signing off.  Call back if needed.    Yanick Foster MD, FACC, FASMARISA, KING, FACP  Director, Heart Failure Services  [facility]  , Department of Cardiology  [facility]

## 2023-06-16 NOTE — DISCHARGE NOTE PROVIDER - NSDCCPCAREPLAN_GEN_ALL_CORE_FT
PRINCIPAL DISCHARGE DIAGNOSIS  Diagnosis: Seizure  Assessment and Plan of Treatment:       SECONDARY DISCHARGE DIAGNOSES  Diagnosis: Bradycardia  Assessment and Plan of Treatment:

## 2023-06-16 NOTE — PROGRESS NOTE ADULT - ASSESSMENT
74 y/o M w/PMHx of HTN, hypothyroidism, seizure disorder, dementia, presented due to seizure.  Seen by Neurology Dr. Brock.  VPA continued.  Keppra added.   EEG showed no epileptiform activity.   MRI: Limited exam due to motion. This exam is not of diagnostic quality.      # Bradycardia pt does not seem to be symptomatic - noted on Tele.  EKG showed sinus bradycardia.  TTE showed EF 60-65%. Cardiology input appreciated.   No further workup planned.    # Seizure - Seen by Neuro Dr. Brock.  Continue VPA.  Added Keppra.  Repeat MRI as outpatient.  Discussed with Neurology.   # Anemia - Drop in H/H noted.  Suspect hemodilution, H/H stable.  # JESSICA - Cr 1.55 -> 1.2 post IV hydration.  Monitor BMP  # Acquired Hypothyroidism - continue Synthroid.  # Hx of DVT/PE - Continue home Eliquis  # Dementia- Continue home Seroquel  #HTN - Continue home amlodipine  # Inpatient DVT Proph - on anticoagulation     Plan of care d/w wife Leena at [phone] in agreement.    Plan d/c home in am with homecare.

## 2023-06-16 NOTE — PROGRESS NOTE ADULT - SUBJECTIVE AND OBJECTIVE BOX
Patient: PEPITO BALL [id number] 73y Male                            Hospitalist Attending Note    Offers no complaints.    Denies bleeding.     ____________________PHYSICAL EXAM:  GENERAL:  NAD, awake  HEENT: NCAT  CARDIOVASCULAR:  S1, S2  LUNGS: CTAB  ABDOMEN:  soft, (-) tenderness, (-) distension, (+) bowel sounds, (-) guarding, (-) rebound (-) rigidity  EXTREMITIES:  no cyanosis / clubbing / edema.   NEURO: strength symmetric, sensation grossly intact.   ____________________    VITALS:  Vital Signs Last 24 Hrs  T(C): 36.4 (16 Jun 2023 17:33), Max: 36.7 (16 Jun 2023 11:05)  T(F): 97.6 (16 Jun 2023 17:33), Max: 98 (16 Jun 2023 11:05)  HR: 69 (16 Jun 2023 17:33) (52 - 69)  BP: 128/73 (16 Jun 2023 17:33) (121/61 - 142/78)  BP(mean): --  RR: 18 (16 Jun 2023 17:33) (17 - 18)  SpO2: 99% (16 Jun 2023 17:33) (96% - 99%)    Parameters below as of 16 Jun 2023 17:33  Patient On (Oxygen Delivery Method): room air     Daily     Daily   CAPILLARY BLOOD GLUCOSE        I&O's Summary    15 Tony 2023 07:01  -  16 Jun 2023 07:00  --------------------------------------------------------  IN: 480 mL / OUT: 0 mL / NET: 480 mL    16 Jun 2023 07:01  -  16 Jun 2023 17:42  --------------------------------------------------------  IN: 180 mL / OUT: 0 mL / NET: 180 mL        LABS:                        MEDICATIONS:  acetaminophen     Tablet .. 650 milliGRAM(s) Oral every 6 hours PRN  aluminum hydroxide/magnesium hydroxide/simethicone Suspension 30 milliLiter(s) Oral every 4 hours PRN  amLODIPine   Tablet 10 milliGRAM(s) Oral daily  apixaban 5 milliGRAM(s) Oral every 12 hours  divalproex  milliGRAM(s) Oral at bedtime  divalproex  milliGRAM(s) Oral daily  levothyroxine 88 MICROGram(s) Oral daily  melatonin 3 milliGRAM(s) Oral at bedtime PRN  ondansetron Injectable 4 milliGRAM(s) IV Push every 8 hours PRN  pantoprazole    Tablet 40 milliGRAM(s) Oral before breakfast  QUEtiapine 50 milliGRAM(s) Oral at bedtime  senna 2 Tablet(s) Oral at bedtime  thiamine 100 milliGRAM(s) Oral daily

## 2023-06-16 NOTE — DISCHARGE NOTE PROVIDER - NSDCMRMEDTOKEN_GEN_ALL_CORE_FT
amLODIPine 10 mg oral tablet: 1 tab(s) orally once a day  apixaban 5 mg oral tablet: 1 tab(s) orally 2 times a day   divalproex sodium 250 mg oral delayed release tablet: 3 tab(s) orally once a day (at bedtime)  divalproex sodium 500 mg oral delayed release tablet: 1 tab(s) orally once a day  levothyroxine 88 mcg (0.088 mg) oral tablet: 1 tab(s) orally once a day  pantoprazole 40 mg oral delayed release tablet: 1 tab(s) orally once a day (before a meal)  polyethylene glycol 3350 oral powder for reconstitution: 17 gram(s) orally 2 times a day  QUEtiapine 50 mg oral tablet: 1.5 tab(s) orally once a day (at bedtime)   senna oral tablet: 2 tab(s) orally once a day (at bedtime)  thiamine 100 mg oral tablet: 1 tab(s) orally once a day   amLODIPine 10 mg oral tablet: 1 tab(s) orally once a day  apixaban 5 mg oral tablet: 1 tab(s) orally 2 times a day   divalproex sodium 250 mg oral delayed release tablet: 3 tab(s) orally once a day (at bedtime)  divalproex sodium 500 mg oral delayed release tablet: 1 tab(s) orally once a day  levETIRAcetam 500 mg oral tablet: 1 tab(s) orally 2 times a day  levothyroxine 88 mcg (0.088 mg) oral tablet: 1 tab(s) orally once a day  pantoprazole 40 mg oral delayed release tablet: 1 tab(s) orally once a day (before a meal)  polyethylene glycol 3350 oral powder for reconstitution: 17 gram(s) orally 2 times a day  QUEtiapine 50 mg oral tablet: 1.5 tab(s) orally once a day (at bedtime)   senna oral tablet: 2 tab(s) orally once a day (at bedtime)  thiamine 100 mg oral tablet: 1 tab(s) orally once a day

## 2023-06-16 NOTE — DISCHARGE NOTE PROVIDER - DETAILS OF MALNUTRITION DIAGNOSIS/DIAGNOSES
This patient has been assessed with a concern for Malnutrition and was treated during this hospitalization for the following Nutrition diagnosis/diagnoses:     -  06/19/2023: Moderate protein-calorie malnutrition

## 2023-06-16 NOTE — PROGRESS NOTE ADULT - SUBJECTIVE AND OBJECTIVE BOX
Patient seen and examined this am. No new events    MEDICATIONS:    acetaminophen     Tablet .. 650 milliGRAM(s) Oral every 6 hours PRN  aluminum hydroxide/magnesium hydroxide/simethicone Suspension 30 milliLiter(s) Oral every 4 hours PRN  amLODIPine   Tablet 10 milliGRAM(s) Oral daily  apixaban 5 milliGRAM(s) Oral every 12 hours  divalproex  milliGRAM(s) Oral at bedtime  divalproex  milliGRAM(s) Oral daily  levothyroxine 88 MICROGram(s) Oral daily  melatonin 3 milliGRAM(s) Oral at bedtime PRN  ondansetron Injectable 4 milliGRAM(s) IV Push every 8 hours PRN  pantoprazole    Tablet 40 milliGRAM(s) Oral before breakfast  QUEtiapine 50 milliGRAM(s) Oral at bedtime  senna 2 Tablet(s) Oral at bedtime  thiamine 100 milliGRAM(s) Oral daily      LABS:            CAPILLARY BLOOD GLUCOSE            I&O's Summary    15 Tony 2023 07:01  -  16 Jun 2023 07:00  --------------------------------------------------------  IN: 480 mL / OUT: 0 mL / NET: 480 mL      Vital Signs Last 24 Hrs  T(C): 36.3 (16 Jun 2023 04:41), Max: 36.6 (15 Tony 2023 11:05)  T(F): 97.3 (16 Jun 2023 04:41), Max: 97.9 (15 Tony 2023 11:05)  HR: 58 (16 Jun 2023 04:41) (53 - 58)  BP: 123/71 (16 Jun 2023 04:41) (121/61 - 143/78)  BP(mean): --  RR: 18 (16 Jun 2023 04:41) (18 - 18)  SpO2: 98% (16 Jun 2023 04:41) (97% - 98%)    Parameters below as of 16 Jun 2023 04:41  Patient On (Oxygen Delivery Method): room air          On Neurological Examination:    Mental Status - Patient is alert, awake, oriented X2 psychomotor slowing    Cranial Nerves - PERRL, EOMI, VFF, V1-V3 intact, subtle R NL flattening, tongue/uvula midline    Motor Exam -   no drift appears to move left side more readily    Sensory    Intact to light touch and pinprick bilaterally    Coord: FTN intact bilaterally     Gait - deferred       RADIOLOGY

## 2023-06-17 NOTE — PROGRESS NOTE ADULT - ASSESSMENT
73 yo M with PMH of seizure disorder on valproic acid, hypothyroidism, dementia, PE/DVT on Apixaban brought in by ambulance with c/o witnessed seizure by his wife. VPA level 101. PE psychomotor slowing no clear focality although appears to move left more readily than right. s/p Keppra load    Impression: seizure    Continue with  BID   Keppra 500 BID  Patient on AC  MR brain very limited, exam improving, can defer to outpt  routine EEG negative  -No further inpatient Neurologic workup at this time  Can follow up with Neurology, Dr. Mamadou Brock at [phone]

## 2023-06-17 NOTE — PROGRESS NOTE ADULT - SUBJECTIVE AND OBJECTIVE BOX
Patient seen and examined this am. No new events      MEDICATIONS:    acetaminophen     Tablet .. 650 milliGRAM(s) Oral every 6 hours PRN  aluminum hydroxide/magnesium hydroxide/simethicone Suspension 30 milliLiter(s) Oral every 4 hours PRN  apixaban 5 milliGRAM(s) Oral every 12 hours  divalproex  milliGRAM(s) Oral at bedtime  divalproex  milliGRAM(s) Oral daily  levothyroxine 88 MICROGram(s) Oral daily  melatonin 3 milliGRAM(s) Oral at bedtime PRN  ondansetron Injectable 4 milliGRAM(s) IV Push every 8 hours PRN  pantoprazole    Tablet 40 milliGRAM(s) Oral before breakfast  QUEtiapine 50 milliGRAM(s) Oral at bedtime  senna 2 Tablet(s) Oral at bedtime  thiamine 100 milliGRAM(s) Oral daily      LABS:            CAPILLARY BLOOD GLUCOSE            I&O's Summary    16 Jun 2023 07:01  -  17 Jun 2023 07:00  --------------------------------------------------------  IN: 420 mL / OUT: 0 mL / NET: 420 mL      Vital Signs Last 24 Hrs  T(C): 36.3 (17 Jun 2023 16:23), Max: 37.1 (17 Jun 2023 12:12)  T(F): 97.4 (17 Jun 2023 16:23), Max: 98.7 (17 Jun 2023 12:12)  HR: 59 (17 Jun 2023 16:23) (51 - 73)  BP: 115/71 (17 Jun 2023 16:23) (111/62 - 128/83)  BP(mean): --  RR: 18 (17 Jun 2023 16:23) (18 - 18)  SpO2: 98% (17 Jun 2023 16:23) (95% - 99%)    Parameters below as of 17 Jun 2023 05:05  Patient On (Oxygen Delivery Method): room air        On Neurological Examination:    Mental Status - Patient is alert, awake, oriented X2 psychomotor slowing    Cranial Nerves - PERRL, EOMI, VFF, V1-V3 intact, subtle R NL flattening, tongue/uvula midline    Motor Exam -   no drift appears to move left side more readily    Sensory    Intact to light touch and pinprick bilaterally    Coord: FTN intact bilaterally     Gait - deferred       RADIOLOGY

## 2023-06-17 NOTE — PROGRESS NOTE ADULT - ASSESSMENT
72 y/o M w/PMHx of HTN, hypothyroidism, seizure disorder, dementia, presented due to seizure.  Seen by Neurology Dr. Brock.  VPA continued.  Keppra added.   EEG showed no epileptiform activity.   MRI: Limited exam due to motion. This exam is not of diagnostic quality.      # Bradycardia pt does not seem to be symptomatic - noted on Tele.  EKG showed sinus bradycardia.  TTE showed EF 60-65%. Cardiology input appreciated.   No further workup planned.  Will decrease Amlodipine to 5mg BP is stable.   # Seizure - Seen by Neuro Dr. Brock.  Continue VPA.  Added Keppra.  Repeat MRI as outpatient.  Discussed with Neurology.   # Anemia - Drop in H/H noted.  Suspect hemodilution, H/H stable.  # JESSICA - Cr 1.55 -> 1.2 post IV hydration.  Monitor BMP  # Acquired Hypothyroidism - continue Synthroid.  # Hx of DVT/PE - Continue home Eliquis  # Dementia- Continue home Seroquel  #HTN - Continue home amlodipine  # Inpatient DVT Proph - on anticoagulation     Plan of care d/w wife Leena at [phone] in agreement.    Spoke with wife this morning- would like patient to be re-evaluated for Physical therapy- states that stopped walking in May, was walking with a walker before, now only laying in bed.   PT eval ordered  Hopefully discharge home tomorrow

## 2023-06-17 NOTE — PROGRESS NOTE ADULT - SUBJECTIVE AND OBJECTIVE BOX
Patient is a 73y old  Male who presents with a chief complaint of Seizure (16 Jun 2023 17:41)      INTERVAL HPI/OVERNIGHT EVENTS: Pt AOx1, but pleasantly answers questions, states that has no pain or any complaints.   Last BM 6/14- pt is incontinent     MEDICATIONS  (STANDING):  amLODIPine   Tablet 10 milliGRAM(s) Oral daily  apixaban 5 milliGRAM(s) Oral every 12 hours  divalproex  milliGRAM(s) Oral at bedtime  divalproex  milliGRAM(s) Oral daily  levothyroxine 88 MICROGram(s) Oral daily  pantoprazole    Tablet 40 milliGRAM(s) Oral before breakfast  QUEtiapine 50 milliGRAM(s) Oral at bedtime  senna 2 Tablet(s) Oral at bedtime  thiamine 100 milliGRAM(s) Oral daily    MEDICATIONS  (PRN):  acetaminophen     Tablet .. 650 milliGRAM(s) Oral every 6 hours PRN Temp greater or equal to 38C (100.4F), Mild Pain (1 - 3)  aluminum hydroxide/magnesium hydroxide/simethicone Suspension 30 milliLiter(s) Oral every 4 hours PRN Dyspepsia  melatonin 3 milliGRAM(s) Oral at bedtime PRN Insomnia  ondansetron Injectable 4 milliGRAM(s) IV Push every 8 hours PRN Nausea and/or Vomiting      Allergies    No Known Drug Allergies  fish (Hives; Angioedema)  [place] (Swelling; Angioedema)    Intolerances        REVIEW OF SYSTEMS:  CONSTITUTIONAL: No fever, weight loss, or fatigue  EYES: No eye pain, visual disturbances, or discharge  ENMT:  No difficulty hearing, tinnitus, vertigo; No sinus or throat pain  NECK: No pain or stiffness  BREASTS: No pain, masses, or nipple discharge  RESPIRATORY: No cough, wheezing, chills or hemoptysis; No shortness of breath  CARDIOVASCULAR: No chest pain, palpitations, dizziness, or leg swelling  GASTROINTESTINAL: No abdominal or epigastric pain. No nausea, vomiting, or hematemesis; No diarrhea or constipation. No melena or hematochezia.  GENITOURINARY: No dysuria, frequency, hematuria, or incontinence  NEUROLOGICAL: No headaches, memory loss, loss of strength, numbness, or tremors  SKIN: No itching, burning, rashes, or lesions   LYMPH NODES: No enlarged glands  ENDOCRINE: No heat or cold intolerance; No hair loss  MUSCULOSKELETAL: No joint pain or swelling; No muscle, back, or extremity pain  PSYCHIATRIC: No depression, anxiety, mood swings, or difficulty sleeping  HEME/LYMPH: No easy bruising, or bleeding gums  ALLERGY AND IMMUNOLOGIC: No hives or eczema    Vital Signs Last 24 Hrs  T(C): 36.7 (17 Jun 2023 05:05), Max: 36.9 (16 Jun 2023 23:55)  T(F): 98 (17 Jun 2023 05:05), Max: 98.5 (16 Jun 2023 23:55)  HR: 73 (17 Jun 2023 08:44) (51 - 73)  BP: 123/69 (17 Jun 2023 05:05) (111/62 - 128/73)  BP(mean): --  RR: 18 (17 Jun 2023 05:05) (18 - 18)  SpO2: 99% (17 Jun 2023 05:05) (95% - 99%)    Parameters below as of 17 Jun 2023 05:05  Patient On (Oxygen Delivery Method): room air        PHYSICAL EXAM:  GENERAL: NAD, well-groomed, well-developed  HEAD:  Atraumatic, Normocephalic  EYES: EOMI, PERRLA, conjunctiva and sclera clear  ENMT: No tonsillar erythema, exudates, or enlargement; Moist mucous membranes, Good dentition, No lesions  NECK: Supple, No JVD  NERVOUS SYSTEM:  Alert & Oriented X1/2, Good concentration; Motor Strength 5/5 B/L upper and lower extremities; DTRs 2+ intact and symmetric  CHEST/LUNG: Clear to percussion bilaterally; No rales, rhonchi, wheezing, or rubs  HEART: Regular rate and rhythm; No murmurs, rubs, or gallops  ABDOMEN: Soft, Nontender, Nondistended; Bowel sounds present  EXTREMITIES:  2+ Peripheral Pulses, No clubbing, cyanosis, or edema  SKIN: No rashes or lesions    LABS:              CAPILLARY BLOOD GLUCOSE          RADIOLOGY & ADDITIONAL TESTS:    Imaging Personally Reviewed:  [ ] YES  [ ] NO    Consultant(s) Notes Reviewed:  [ ] YES  [ ] NO    Care Discussed with Consultants/Other Providers [ ] YES  [ ] NO

## 2023-06-18 NOTE — PROGRESS NOTE ADULT - SUBJECTIVE AND OBJECTIVE BOX
Patient is a 73y old  Male who presents with a chief complaint of Seizure (16 Jun 2023 17:41)      INTERVAL HPI/OVERNIGHT EVENTS: Pt AOx1, but pleasantly answers questions  No acute events overnight.     MEDICATIONS  (STANDING):  amLODIPine   Tablet 10 milliGRAM(s) Oral daily  apixaban 5 milliGRAM(s) Oral every 12 hours  divalproex  milliGRAM(s) Oral at bedtime  divalproex  milliGRAM(s) Oral daily  levothyroxine 88 MICROGram(s) Oral daily  pantoprazole    Tablet 40 milliGRAM(s) Oral before breakfast  QUEtiapine 50 milliGRAM(s) Oral at bedtime  senna 2 Tablet(s) Oral at bedtime  thiamine 100 milliGRAM(s) Oral daily    MEDICATIONS  (PRN):  acetaminophen     Tablet .. 650 milliGRAM(s) Oral every 6 hours PRN Temp greater or equal to 38C (100.4F), Mild Pain (1 - 3)  aluminum hydroxide/magnesium hydroxide/simethicone Suspension 30 milliLiter(s) Oral every 4 hours PRN Dyspepsia  melatonin 3 milliGRAM(s) Oral at bedtime PRN Insomnia  ondansetron Injectable 4 milliGRAM(s) IV Push every 8 hours PRN Nausea and/or Vomiting      Allergies    No Known Drug Allergies  fish (Hives; Angioedema)  [place] (Swelling; Angioedema)    Intolerances        REVIEW OF SYSTEMS:  CONSTITUTIONAL: No fever, weight loss, or fatigue  EYES: No eye pain, visual disturbances, or discharge  ENMT:  No difficulty hearing, tinnitus, vertigo; No sinus or throat pain  NECK: No pain or stiffness  BREASTS: No pain, masses, or nipple discharge  RESPIRATORY: No cough, wheezing, chills or hemoptysis; No shortness of breath  CARDIOVASCULAR: No chest pain, palpitations, dizziness, or leg swelling  GASTROINTESTINAL: No abdominal or epigastric pain. No nausea, vomiting, or hematemesis; No diarrhea or constipation. No melena or hematochezia.  GENITOURINARY: No dysuria, frequency, hematuria, or incontinence  NEUROLOGICAL: No headaches, memory loss, loss of strength, numbness, or tremors  SKIN: No itching, burning, rashes, or lesions   LYMPH NODES: No enlarged glands  ENDOCRINE: No heat or cold intolerance; No hair loss  MUSCULOSKELETAL: No joint pain or swelling; No muscle, back, or extremity pain  PSYCHIATRIC: No depression, anxiety, mood swings, or difficulty sleeping  HEME/LYMPH: No easy bruising, or bleeding gums  ALLERGY AND IMMUNOLOGIC: No hives or eczema    Vital Signs Last 24 Hrs  ICU Vital Signs Last 24 Hrs  T(C): 36.5 (18 Jun 2023 10:48), Max: 36.5 (18 Jun 2023 10:48)  T(F): 97.7 (18 Jun 2023 10:48), Max: 97.7 (18 Jun 2023 10:48)  HR: 57 (18 Jun 2023 10:48) (45 - 59)  BP: 124/74 (18 Jun 2023 10:48) (100/67 - 124/74)  BP(mean): --  ABP: --  ABP(mean): --  RR: 17 (18 Jun 2023 10:48) (17 - 18)  SpO2: 97% (18 Jun 2023 10:48) (96% - 98%)    O2 Parameters below as of 18 Jun 2023 10:48  Patient On (Oxygen Delivery Method): room air                PHYSICAL EXAM:  GENERAL: NAD, well-groomed, well-developed  HEAD:  Atraumatic, Normocephalic  EYES: EOMI, PERRLA, conjunctiva and sclera clear  ENMT: No tonsillar erythema, exudates, or enlargement; Moist mucous membranes, Good dentition, No lesions  NECK: Supple, No JVD  NERVOUS SYSTEM:  Alert & Oriented X1/2, Good concentration; Motor Strength 5/5 B/L upper and lower extremities; DTRs 2+ intact and symmetric  CHEST/LUNG: Clear to percussion bilaterally; No rales, rhonchi, wheezing, or rubs  HEART: Regular rate and rhythm; No murmurs, rubs, or gallops  ABDOMEN: Soft, Nontender, Nondistended; Bowel sounds present  EXTREMITIES:  2+ Peripheral Pulses, No clubbing, cyanosis, or edema  SKIN: No rashes or lesions    LABS:

## 2023-06-18 NOTE — CHART NOTE - NSCHARTNOTEFT_GEN_A_CORE
Chart/Event Note  [address]S [address]  PEPITO BALL, 73y, Male  [id number]    Reason for Notification: Patient's family states that patient is having a seizure  Notified by RN that the above patient had seizure like activity. Upon arrival to patient's room patient is in bed laying on his side, snoring, unresponsive to painful stimuli, no shaking movements, incontinent of urine and stool. Vitals stable. Per staff patient had full body shaking that only lasted a few seconds then became unresponsive and began to snore. On review of tele artifact can be observe for less then 10 seconds.           T(C): 36.3 (06-18-23 @ 16:09), Max: 36.5 (06-18-23 @ 10:48)  HR: 71 (06-18-23 @ 17:29) (45 - 71)  BP: 125/76 (06-18-23 @ 17:29) (100/67 - 125/76)  RR: 17 (06-18-23 @ 16:09) (17 - 18)  SpO2: 93% (06-18-23 @ 17:29) (93% - 98%)                        11.9   5.46  )-----------( 191      ( 18 Jun 2023 06:35 )             35.4          Assessment/Plan:  73y-year-old Male with a past medical history of seizure disorder, hypothyroidism, dementia, PE/DV, admitted for seizure, now with seizure like activity that lasted less than 10 seconds. Patient's vital are stable and appears post-ictal. Neurology already consulted and completed full workup with medication recommendation.     1) Medications adjusted based on neurology recommendations   2) Continue seizure precautions  Discussed with Dr.Morkis Kelsey Martel, NP  Department of Medicine

## 2023-06-18 NOTE — PROGRESS NOTE ADULT - ASSESSMENT
72 y/o M w/PMHx of HTN, hypothyroidism, seizure disorder, dementia, presented due to seizure.  Seen by Neurology Dr. Brock.  VPA continued.  Keppra added.   EEG showed no epileptiform activity.   MRI: Limited exam due to motion. This exam is not of diagnostic quality.      # Bradycardia pt does not seem to be symptomatic - noted on Tele.  EKG showed sinus bradycardia.  TTE showed EF 60-65%. Cardiology input appreciated.   No further workup planned.  Will decrease Amlodipine to 5mg BP is stable.   # Seizure - Seen by Neuro Dr. Brock.  Continue VPA.  Added Keppra.  Repeat MRI as outpatient.  Discussed with Neurology.   # Anemia - Drop in H/H noted.  Suspect hemodilution, H/H stable.  # JESSICA - Cr 1.55 -> 1.2 post IV hydration.  Monitor BMP  # Acquired Hypothyroidism - continue Synthroid.  # Hx of DVT/PE - Continue home Eliquis  # Dementia- Continue home Seroquel  #HTN - Continue home amlodipine  # Inpatient DVT Proph - on anticoagulation     Plan of care d/w wife Leena at [phone] in agreement.    Spoke with wife- would like patient to be re-evaluated for Physical therapy- states that stopped walking in May, was walking with a walker before, now only laying in bed.   PT eval ordered  Hopefully discharge after weekend

## 2023-06-19 NOTE — DIETITIAN NUTRITION RISK NOTIFICATION - TREATMENT: THE FOLLOWING DIET HAS BEEN RECOMMENDED
Diet, Regular:   Supplement Feeding Modality:  Oral  Ensure Plus High Protein Cans or Servings Per Day:  1       Frequency:  Two Times a day (06-19-23 @ 14:27) [Pending Verification By Attending]  Diet, Regular (06-12-23 @ 21:50) [Active]

## 2023-06-19 NOTE — DIETITIAN INITIAL EVALUATION ADULT - OTHER INFO
Pt unable to provide history at time of visit- with history of dementia.    Weight history  as per previous RD note (02/14/2023) 59kg. Weight gain as noted below.    RD remains available.

## 2023-06-19 NOTE — DIETITIAN NUTRITION RISK NOTIFICATION - ETIOLOGY-BASIS
Denied, he needs a follow up for any further refills. Can be with myself or a different provider at [place]. If he does not want to FU with pain management he needs to request this med from his PCP.   Chronic illness

## 2023-06-19 NOTE — PROGRESS NOTE ADULT - ASSESSMENT
73 yo M with PMH of seizure disorder on valproic acid, hypothyroidism, dementia, PE/DVT on Apixaban brought in by ambulance with c/o witnessed seizure by his wife. VPA level 101. PE psychomotor slowing no clear focality although appears to move left more readily than right. s/p Keppra load    Impression: seizure    Continue with  BID   Keppra 500 BID  Patient on AC  MR brain very limited, exam improving, can defer to outpt  routine EEG no seizures  -No further inpatient Neurologic workup at this time  Can follow up with Neurology, Dr. Mamadou Brock at [phone] 71 yo M with PMH of seizure disorder on valproic acid, hypothyroidism, dementia, PE/DVT on Apixaban brought in by ambulance with c/o witnessed seizure by his wife. VPA level 101. PE psychomotor slowing no clear focality although appears to move left more readily than right. s/p Keppra load    Impression: seizure    seizure activity yesterday, patient missed meds  Continue with  BID   Keppra 500 BID  Patient on AC  MR brain very limited, exam improving, can defer to outpt, consider CT head  routine EEG no seizures  -No further inpatient Neurologic workup at this time  Can follow up with Neurology, Dr. Mamadou Brock at [phone]

## 2023-06-19 NOTE — PROGRESS NOTE ADULT - SUBJECTIVE AND OBJECTIVE BOX
Patient seen and examined this am. No new events    MEDICATIONS:    acetaminophen     Tablet .. 650 milliGRAM(s) Oral every 6 hours PRN  aluminum hydroxide/magnesium hydroxide/simethicone Suspension 30 milliLiter(s) Oral every 4 hours PRN  amLODIPine   Tablet 5 milliGRAM(s) Oral daily  apixaban 5 milliGRAM(s) Oral every 12 hours  levETIRAcetam  IVPB 500 milliGRAM(s) IV Intermittent every 12 hours  levothyroxine 88 MICROGram(s) Oral daily  melatonin 3 milliGRAM(s) Oral at bedtime PRN  ondansetron Injectable 4 milliGRAM(s) IV Push every 8 hours PRN  pantoprazole    Tablet 40 milliGRAM(s) Oral before breakfast  QUEtiapine 50 milliGRAM(s) Oral at bedtime  senna 2 Tablet(s) Oral at bedtime  thiamine 100 milliGRAM(s) Oral daily  valproate sodium  IVPB 500 milliGRAM(s) IV Intermittent every 12 hours      LABS:                          11.9   5.46  )-----------( 191      ( 18 Jun 2023 06:35 )             35.4     06-18    142  |  107  |  18  ----------------------------<  87  4.4   |  32<H>  |  1.20    Ca    8.8      18 Jun 2023 06:35  Mg     2.2     06-18      CAPILLARY BLOOD GLUCOSE      POCT Blood Glucose.: 115 mg/dL (18 Jun 2023 17:38)        I&O's Summary    18 Jun 2023 07:01  -  19 Jun 2023 07:00  --------------------------------------------------------  IN: 390 mL / OUT: 0 mL / NET: 390 mL    19 Jun 2023 07:01  -  19 Jun 2023 10:12  --------------------------------------------------------  IN: 240 mL / OUT: 0 mL / NET: 240 mL      Vital Signs Last 24 Hrs  T(C): 36.9 (19 Jun 2023 05:12), Max: 36.9 (19 Jun 2023 05:12)  T(F): 98.4 (19 Jun 2023 05:12), Max: 98.4 (19 Jun 2023 05:12)  HR: 73 (19 Jun 2023 05:12) (57 - 73)  BP: 144/73 (19 Jun 2023 05:12) (109/62 - 144/73)  BP(mean): --  RR: 17 (19 Jun 2023 05:12) (17 - 17)  SpO2: 99% (19 Jun 2023 05:12) (93% - 100%)    Parameters below as of 19 Jun 2023 05:12  Patient On (Oxygen Delivery Method): room air          On Neurological Examination:    Mental Status - Patient is alert, awake, oriented X2 psychomotor slowing    Cranial Nerves - PERRL, EOMI, VFF, V1-V3 intact, subtle R NL flattening, tongue/uvula midline    Motor Exam -   no drift appears to move left side more readily    Sensory    Intact to light touch and pinprick bilaterally    Coord: FTN intact bilaterally     Gait - deferred       RADIOLOGY                 Patient seen and examined this am. seizure activity    MEDICATIONS:    acetaminophen     Tablet .. 650 milliGRAM(s) Oral every 6 hours PRN  aluminum hydroxide/magnesium hydroxide/simethicone Suspension 30 milliLiter(s) Oral every 4 hours PRN  amLODIPine   Tablet 5 milliGRAM(s) Oral daily  apixaban 5 milliGRAM(s) Oral every 12 hours  levETIRAcetam  IVPB 500 milliGRAM(s) IV Intermittent every 12 hours  levothyroxine 88 MICROGram(s) Oral daily  melatonin 3 milliGRAM(s) Oral at bedtime PRN  ondansetron Injectable 4 milliGRAM(s) IV Push every 8 hours PRN  pantoprazole    Tablet 40 milliGRAM(s) Oral before breakfast  QUEtiapine 50 milliGRAM(s) Oral at bedtime  senna 2 Tablet(s) Oral at bedtime  thiamine 100 milliGRAM(s) Oral daily  valproate sodium  IVPB 500 milliGRAM(s) IV Intermittent every 12 hours      LABS:                          11.9   5.46  )-----------( 191      ( 18 Jun 2023 06:35 )             35.4     06-18    142  |  107  |  18  ----------------------------<  87  4.4   |  32<H>  |  1.20    Ca    8.8      18 Jun 2023 06:35  Mg     2.2     06-18      CAPILLARY BLOOD GLUCOSE      POCT Blood Glucose.: 115 mg/dL (18 Jun 2023 17:38)        I&O's Summary    18 Jun 2023 07:01  -  19 Jun 2023 07:00  --------------------------------------------------------  IN: 390 mL / OUT: 0 mL / NET: 390 mL    19 Jun 2023 07:01  -  19 Jun 2023 10:12  --------------------------------------------------------  IN: 240 mL / OUT: 0 mL / NET: 240 mL      Vital Signs Last 24 Hrs  T(C): 36.9 (19 Jun 2023 05:12), Max: 36.9 (19 Jun 2023 05:12)  T(F): 98.4 (19 Jun 2023 05:12), Max: 98.4 (19 Jun 2023 05:12)  HR: 73 (19 Jun 2023 05:12) (57 - 73)  BP: 144/73 (19 Jun 2023 05:12) (109/62 - 144/73)  BP(mean): --  RR: 17 (19 Jun 2023 05:12) (17 - 17)  SpO2: 99% (19 Jun 2023 05:12) (93% - 100%)    Parameters below as of 19 Jun 2023 05:12  Patient On (Oxygen Delivery Method): room air          On Neurological Examination:    Mental Status - Patient is alert, awake, oriented X2 psychomotor slowing    Cranial Nerves - PERRL, EOMI, VFF, V1-V3 intact, subtle R NL flattening, tongue/uvula midline    Motor Exam -   no drift appears to move left side more readily    Sensory    Intact to light touch and pinprick bilaterally    Coord: FTN intact bilaterally     Gait - deferred       RADIOLOGY

## 2023-06-19 NOTE — DIETITIAN INITIAL EVALUATION ADULT - PERTINENT LABORATORY DATA
06-18    142  |  107  |  18  ----------------------------<  87  4.4   |  32<H>  |  1.20    Ca    8.8      18 Jun 2023 06:35  Mg     2.2     06-18    POCT Blood Glucose.: 115 mg/dL (06-18-23 @ 17:38)  A1C with Estimated Average Glucose Result: 6.3 % (02-09-23 @ 06:40)

## 2023-06-19 NOTE — PROGRESS NOTE ADULT - ASSESSMENT
74 y/o M w/PMHx of HTN, hypothyroidism, seizure disorder, dementia, presented due to seizure.  Seen by Neurology Dr. Brock.  VPA continued.  Keppra added.   EEG showed no epileptiform activity.   MRI: Limited exam due to motion. This exam is not of diagnostic quality.      # Bradycardia. sinus and asymptomatic. TTE showed EF 60-65%. Discussed with cardiology service. no further inpatient work up. DC tele.   # Seizure - recurrent. Patient has no iv access and did not received his AEDs. changed  iv AEDs to oral. seizure precautions. Discussed with neurologist.   # Anemia - improved. no active gross bleeding.   # JESSICA - Likely pre-renal with dehydration. improved.   # Acquired Hypothyroidism - continue Synthroid.  # Hx of DVT/PE - Continue home Eliquis  # Dementia- Continue home Seroquel  #HTN - Continue home amlodipine  # Inpatient DVT Proph - on anticoagulation     wife Leena at [phone] called and left message.   PT eval repeat pending.     Full code   DVT ppx: Eliquis  Time spent by me managing the patient 53 mins

## 2023-06-19 NOTE — PROGRESS NOTE ADULT - SUBJECTIVE AND OBJECTIVE BOX
CHIEF COMPLAINT: Seizure episode in last 24 hours reported  patient lost iv access. iv AED not given  no report of any incontinence, vomiting or active gross bleeding  no fever   no sob       PHYSICAL EXAM:    GENERAL: Moderately built, no acute distress   CHEST/LUNG:  No wheezing, no crackles   HEART: Regular rate and rhythm; + SM  ABDOMEN: Soft, Nontender, Nondistended; Bowel sounds present  EXTREMITIES:  No clubbing, cyanosis, or edema  Psychiatry: AAO x his name only.       OBJECTIVE DATA:   Vital Signs Last 24 Hrs  T(C): 36.9 (19 Jun 2023 10:56), Max: 36.9 (19 Jun 2023 05:12)  T(F): 98.4 (19 Jun 2023 10:56), Max: 98.4 (19 Jun 2023 05:12)  HR: 56 (19 Jun 2023 10:56) (56 - 73)  BP: 127/63 (19 Jun 2023 10:56) (109/62 - 144/73)  BP(mean): --  RR: 18 (19 Jun 2023 10:56) (17 - 18)  SpO2: 97% (19 Jun 2023 10:56) (93% - 100%)    Parameters below as of 19 Jun 2023 10:56  Patient On (Oxygen Delivery Method): room air               Daily     Daily   LABS:                        11.9   5.46  )-----------( 191      ( 18 Jun 2023 06:35 )             35.4             06-18    142  |  107  |  18  ----------------------------<  87  4.4   |  32<H>  |  1.20    Ca    8.8      18 Jun 2023 06:35  Mg     2.2     06-18                          CAPILLARY BLOOD GLUCOSE      POCT Blood Glucose.: 115 mg/dL (18 Jun 2023 17:38)         Interval Radiology studies: reviewed by me    < from: MR Head No Cont (06.14.23 @ 16:27) >  IMPRESSION: Limited exam due to motion. This exam is not of diagnostic   quality.    < end of copied text >    Tele reviewed by me showed sinus rhythm    MEDICATIONS  (STANDING):  amLODIPine   Tablet 5 milliGRAM(s) Oral daily  apixaban 5 milliGRAM(s) Oral every 12 hours  levETIRAcetam 500 milliGRAM(s) Oral two times a day  levothyroxine 88 MICROGram(s) Oral daily  pantoprazole    Tablet 40 milliGRAM(s) Oral before breakfast  QUEtiapine 50 milliGRAM(s) Oral at bedtime  senna 2 Tablet(s) Oral at bedtime  thiamine 100 milliGRAM(s) Oral daily  valproic acid 500 milliGRAM(s) Oral two times a day    MEDICATIONS  (PRN):  acetaminophen     Tablet .. 650 milliGRAM(s) Oral every 6 hours PRN Temp greater or equal to 38C (100.4F), Mild Pain (1 - 3)  aluminum hydroxide/magnesium hydroxide/simethicone Suspension 30 milliLiter(s) Oral every 4 hours PRN Dyspepsia  melatonin 3 milliGRAM(s) Oral at bedtime PRN Insomnia  ondansetron Injectable 4 milliGRAM(s) IV Push every 8 hours PRN Nausea and/or Vomiting

## 2023-06-19 NOTE — PHYSICAL THERAPY INITIAL EVALUATION ADULT - ADDITIONAL COMMENTS
pt lives with spouse in a private home, pt is unable to follow v.c/t.c, pt require max a x 1 for bed mob and pt is non-amb. pt is dependent in all ADL, and transfers.

## 2023-06-19 NOTE — DIETITIAN INITIAL EVALUATION ADULT - PERTINENT MEDS FT
MEDICATIONS  (STANDING):  amLODIPine   Tablet 5 milliGRAM(s) Oral daily  apixaban 5 milliGRAM(s) Oral every 12 hours  levETIRAcetam 500 milliGRAM(s) Oral two times a day  levothyroxine 88 MICROGram(s) Oral daily  pantoprazole    Tablet 40 milliGRAM(s) Oral before breakfast  QUEtiapine 50 milliGRAM(s) Oral at bedtime  senna 2 Tablet(s) Oral at bedtime  thiamine 100 milliGRAM(s) Oral daily  valproic acid 500 milliGRAM(s) Oral two times a day    MEDICATIONS  (PRN):  acetaminophen     Tablet .. 650 milliGRAM(s) Oral every 6 hours PRN Temp greater or equal to 38C (100.4F), Mild Pain (1 - 3)  aluminum hydroxide/magnesium hydroxide/simethicone Suspension 30 milliLiter(s) Oral every 4 hours PRN Dyspepsia  melatonin 3 milliGRAM(s) Oral at bedtime PRN Insomnia  ondansetron Injectable 4 milliGRAM(s) IV Push every 8 hours PRN Nausea and/or Vomiting

## 2023-06-20 NOTE — DISCHARGE NOTE NURSING/CASE MANAGEMENT/SOCIAL WORK - PATIENT PORTAL LINK FT
You can access the FollowMyHealth Patient Portal offered by [facility] by registering at the following website: [email or web address]. By joining [facility]’s FollowMyHealth portal, you will also be able to view your health information using other applications (apps) compatible with our system.

## 2023-06-20 NOTE — PROGRESS NOTE ADULT - ASSESSMENT
73 yo M with PMH of seizure disorder on valproic acid, hypothyroidism, dementia, PE/DVT on Apixaban brought in by ambulance with c/o witnessed seizure by his wife. VPA level 101. PE psychomotor slowing no clear focality although appears to move left more readily than right. s/p Keppra load    Impression: seizure    seizure activity 6/18, patient missed meds, now restarted  Continue with  BID   Keppra 500 BID  Patient on AC  MR brain very limited, exam improving, can defer to outpt, consider CT head  routine EEG no seizures  -No further inpatient Neurologic workup at this time  Can follow up with Neurology, Dr. Mamadou Brock at [phone]

## 2023-06-20 NOTE — PROGRESS NOTE ADULT - SUBJECTIVE AND OBJECTIVE BOX
Patient seen and examined this am. seizure activity 6/18    MEDICATIONS:    acetaminophen     Tablet .. 650 milliGRAM(s) Oral every 6 hours PRN  aluminum hydroxide/magnesium hydroxide/simethicone Suspension 30 milliLiter(s) Oral every 4 hours PRN  amLODIPine   Tablet 5 milliGRAM(s) Oral daily  apixaban 5 milliGRAM(s) Oral every 12 hours  levETIRAcetam 500 milliGRAM(s) Oral two times a day  levothyroxine 88 MICROGram(s) Oral daily  melatonin 3 milliGRAM(s) Oral at bedtime PRN  ondansetron Injectable 4 milliGRAM(s) IV Push every 8 hours PRN  pantoprazole    Tablet 40 milliGRAM(s) Oral before breakfast  QUEtiapine 50 milliGRAM(s) Oral at bedtime  senna 2 Tablet(s) Oral at bedtime  thiamine 100 milliGRAM(s) Oral daily  valproic acid 500 milliGRAM(s) Oral two times a day      LABS:            CAPILLARY BLOOD GLUCOSE            I&O's Summary    19 Jun 2023 07:01  -  20 Jun 2023 07:00  --------------------------------------------------------  IN: 440 mL / OUT: 0 mL / NET: 440 mL      Vital Signs Last 24 Hrs  T(C): 36.5 (20 Jun 2023 04:10), Max: 37 (19 Jun 2023 15:40)  T(F): 97.7 (20 Jun 2023 04:10), Max: 98.6 (19 Jun 2023 15:40)  HR: 52 (20 Jun 2023 04:10) (52 - 83)  BP: 111/53 (20 Jun 2023 04:10) (104/64 - 127/63)  BP(mean): --  RR: 18 (20 Jun 2023 04:10) (18 - 18)  SpO2: 98% (20 Jun 2023 04:10) (95% - 98%)    Parameters below as of 20 Jun 2023 04:10  Patient On (Oxygen Delivery Method): room air          On Neurological Examination:    Mental Status - Patient is alert, awake, oriented X2 psychomotor slowing    Cranial Nerves - PERRL, EOMI, VFF, V1-V3 intact, subtle R NL flattening, tongue/uvula midline    Motor Exam -   no drift appears to move left side more readily    Sensory    Intact to light touch and pinprick bilaterally    Coord: FTN intact bilaterally     Gait - deferred       RADIOLOGY

## 2023-06-20 NOTE — PROGRESS NOTE ADULT - PROVIDER SPECIALTY LIST ADULT
Neurology
Neurology
Hospitalist
Neurology
Hospitalist
Hospitalist
Neurology
Hospitalist

## 2023-07-08 NOTE — DISCHARGE NOTE ADULT - PATIENT PORTAL LINK FT
“You can access the FollowHealth Patient Portal, offered by [facility], by registering with the following website: [email or web address]”
PAST SURGICAL HISTORY:  No significant past surgical history

## 2023-08-06 PROBLEM — I26.99 OTHER PULMONARY EMBOLISM WITHOUT ACUTE COR PULMONALE: Chronic | Status: ACTIVE | Noted: 2023-01-01

## 2023-08-06 PROBLEM — I82.409 ACUTE EMBOLISM AND THROMBOSIS OF UNSPECIFIED DEEP VEINS OF UNSPECIFIED LOWER EXTREMITY: Chronic | Status: ACTIVE | Noted: 2023-01-01

## 2023-08-06 NOTE — ED ADULT NURSE REASSESSMENT NOTE - NS ED NURSE REASSESS COMMENT FT1
pt a&ox0 consistent upon arrival. neuro at bedside at this time. pt afebrile at this time. pt safety maintained. pt pending medicine bed. pt appears in NAD

## 2023-08-06 NOTE — ED ADULT NURSE REASSESSMENT NOTE - NS ED NURSE REASSESS COMMENT FT1
pt at baseline mentation. pt repeat labs collected and sent. 18fr straight cath using sterile technique completed obtained ~100cc of bright yellow urine. urine collected and sent. pt medicated as per md orders. pt pending vancomycin at this time. pt respirations even and unlabored. pt appears in NAD, safety maintained.

## 2023-08-06 NOTE — ED ADULT NURSE NOTE - NSFALLRISKINTERV_ED_ALL_ED

## 2023-08-06 NOTE — H&P ADULT - HISTORY OF PRESENT ILLNESS
72 yo m h/o dementia, seizure disorders on keppra and divalproex, h/p dvt/pe has been off eliquis since June 2023  insurance issues. Presents to ed in setting of multiple seizures most recently at 6am Sunday morning. Per wife with whom I spoke  by phone, pt taking seizure meds as prescribed. CT head here acutelky negative. Tamx 100.5 (R), normal wbc and procalcitonin. UA neg. Diffuse rigidity noted; pt on seroquel, CK ordered

## 2023-08-06 NOTE — H&P ADULT - NSHPPHYSICALEXAM_GEN_ALL_CORE
PHYSICAL EXAM:      Constitutional: NAD, well-groomed, well-developed  HEENT: EOMI, Normal Hearing  Neck: No LAD, No JVD  Back: Normal spine flexure, No CVA tenderness  Respiratory: CTAB  Cardiovascular: S1 and S2, RRR  Gastrointestinal: BS+, soft, NT/ND  Extremities: No peripheral edema  Vascular: 2+ peripheral pulses  Neurological: answers some yes/know questions with nod , follows some commands  Musculoskeletal: diffuse rigidity   Skin: No rashes

## 2023-08-06 NOTE — H&P ADULT - PROBLEM SELECTOR PLAN 1
-breakthrough seizures maybe precipitated by infectious insult  vs  need to increase seizure meds; levels pending  -seen by neuro, who recommend eeg, MR  -pt with low grade fever, normal wbc, and procalcitonin with AMS; all explainable by post ictal state; received vanco, zosyn in ed; low suspicion for bact meningitis , but would obtain ID input in am -breakthrough seizures maybe precipitated by infectious insult  vs  need to increase seizure meds; levels pending  -seen by neuro, who recommend eeg, MR  -pt with low grade fever, normal wbc, and procalcitonin with AMS; all explainable by post ictal state; received vanco, zosyn in ed; low suspicion for bact meningitis , but would obtain ID input in am  -npo pending swallow eval in am; asp, seizure, fall precautions

## 2023-08-06 NOTE — CONSULT NOTE ADULT - CONSULT REASON
seizures  time of consult 10:41PM 8/6/23 neuro consulted to assist seizure management while inpatient only  time of consult 10:41PM 8/6/23

## 2023-08-06 NOTE — ED ADULT NURSE NOTE - CHIEF COMPLAINT QUOTE
wife states " he is having seizures since Friday and he is not using his right arm and right leg and his right face and right arm is swollen " also endorses to drooling. h/o CVA . PE .wife  denies use of any AC patient is bed bound since 2 months .wife unable to provide any details about the patient. patient is alert non verbal , not fallowing commands in triage.  seen by Dr Pedroza for stroke eval. no code stroke called.

## 2023-08-06 NOTE — ED PROVIDER NOTE - PHYSICAL EXAMINATION
Gen'l: Well developed, NAD  HEENT: Nl  Neck: NO LAD/JVD  Back: No midline spinal TTP  CV: RRR/NMRG  GI: Soft, NT/ND  Ext: No deformity/peripheral edema  Vasc: intact pulses x 4  Neuro: Limited response to questions/regard/limited nod, similar to baseline per family  MSK: Diffusely contracted  Skin: No overt rash

## 2023-08-06 NOTE — ED PROVIDER NOTE - PROGRESS NOTE DETAILS
Attending MD Garcia.  Pt with trace leukesterase, low grade fever.  Suspect viral syndrome NOS lowering seizure threshold as etiology of breakthrough seizures.  Pt antibiosed in setting of unclear exact fever origin.  Stable for admission to medicine for ongoing care.  Neuro called for consult to follow inpt.

## 2023-08-06 NOTE — ED PROVIDER NOTE - ATTENDING CONTRIBUTION TO CARE
Attending MD Garcia:  I performed a history and physical exam of the patient and discussed their management with the resident. I reviewed the resident's note and agree with the documented findings and plan of care. My medical decision making and observations are found above.

## 2023-08-06 NOTE — CONSULT NOTE ADULT - SUBJECTIVE AND OBJECTIVE BOX
Neurology Consultation     HPI: Patient PEPITO BALL is a 74yo M w/ PMHx HTN, hypothyroidism, seizure disorder (on VPA, keppra), dementia, JESSICA, anemia, DVT/PE on eliquis, prior stroke, who presents to ED for seizure concern. Patient unable to provide history. No family member at bedside when patient evaluated by myself as they left. Called phone number in chart and unable to reach for obtaining history. Per ED history, patient's family member states on Friday she witnessed him having a possible seizure and he was unresponsive for 3 minutes.  Patient states that he came back to 5 number states he came back to about 10 minutes after.  Member states this episode occurred again 23 so they decided to come get evaluated.      Per chart review, patient A&Ox1 baseline (to name only). Prior PT notes state he is unable to follow commands, dependent on all ADLs, he is non-ambulatory. Impaired memory. Mention normal tone.    ROS:    NIHSS:   preMRS:      Review of Systems: unable to obtain    PAST MEDICAL & SURGICAL HISTORY:  HTN (hypertension)    Diverticulosis    Hypothyroid    Deep vein thrombosis (DVT)    Pulmonary embolism    No significant past surgical history    FAMILY HISTORY:    MEDICATIONS   MEDICATIONS  (STANDING):    MEDICATIONS  (PRN):    ALLERGIES/INTOLERANCES:  Allergies  fish (Hives; Angioedema)  No Known Drug Allergies  [place] (Swelling; Angioedema)    Intolerances    VITALS & EXAMINATION:  Vital Signs Last 24 Hrs  T(C): 37.4 (06 Aug 2023 22:53), Max: 38.1 (06 Aug 2023 20:05)  T(F): 99.3 (06 Aug 2023 22:53), Max: 100.5 (06 Aug 2023 20:05)  HR: 88 (06 Aug 2023 22:53) (88 - 107)  BP: 136/94 (06 Aug 2023 22:53) (106/67 - 136/98)  BP(mean): 106 (06 Aug 2023 15:20) (106 - 106)  RR: 16 (06 Aug 2023 22:53) (12 - 20)  SpO2: 100% (06 Aug 2023 22:53) (97% - 100%)    Parameters below as of 06 Aug 2023 22:53  Patient On (Oxygen Delivery Method): room air       LABORATORY:  CBC                       14.5   9.82  )-----------( 283      ( 06 Aug 2023 19:15 )             43.6     Chem -    137  |  98  |  32<H>  ----------------------------<  145<H>  4.4   |  26  |  1.49<H>    Ca    9.7      06 Aug 2023 19:15    TPro  8.9<H>  /  Alb  3.5  /  TBili  0.4  /  DBili  x   /  AST  36  /  ALT  19  /  AlkPhos  83  -    LFTs LIVER FUNCTIONS - ( 06 Aug 2023 19:15 )  Alb: 3.5 g/dL / Pro: 8.9 g/dL / ALK PHOS: 83 U/L / ALT: 19 U/L / AST: 36 U/L / GGT: x           Coagulopathy   Lipid Panel   A1c   Cardiac enzymes     U/A Urinalysis Basic - ( 06 Aug 2023 21:10 )    Color: Dark Yellow / Appearance: Clear / S.033 / pH: x  Gluc: x / Ketone: Trace mg/dL  / Bili: Negative / Urobili: 1.0 mg/dL   Blood: x / Protein: 100 mg/dL / Nitrite: Negative   Leuk Esterase: Trace / RBC: 1 /HPF / WBC 0 /HPF   Sq Epi: x / Non Sq Epi: x / Bacteria: Occasional /HPF      CSF  Other    STUDIES & IMAGING: (EEG, CT, MR, U/S, TTE/STEPHANIE):    < from: CT Head No Cont (23 @ 19:04) >    ACC: [id number] EXAM:  CT BRAIN   ORDERED BY: SAE LOPEZ     PROCEDURE DATE:  2023          INTERPRETATION:  PROCEDURE: CT brain without intravenous contrast    INDICATION: Altered mental status    TECHNIQUE: Multiple axial images were obtained at 5 mm intervals from the   skull base to the vertex. Sagittal and coronal reformatted images were   obtained from the axial data set. The images were reviewed in brain and   bone windows.    COMPARISON: 2023    FINDINGS: The CT examination demonstrates the ventricles, cisternal   spaces, and cortical sulci to be within normal limits. There is no   midline shift or extra axial collections. The gray white differentiation   appears within normal limits. There is no intracranial hemorrhage or   acute transcortical infarct. There is sequelae of moderate chronic white   matter microvascular ischemic disease. The bony windows demonstrates no   fractures. The visualized paranasal sinuses are within normal limits. The   mastoid air cells arewell aerated.    IMPRESSION: No acute intracranial abnormality.    --- End of Report ---      MARY MARTINI MD; Attending Radiologist  This document has been electronically signed. Aug  6 2023  7:11PM    < end of copied text >   Neurology Consultation     HPI: Patient Omer Dobbins a 74yo M w/ PMHx HTN, hypothyroidism, seizure disorder (on VPA, keppra), dementia, JESSICA, anemia, DVT/PE (was on eliquis), prior stroke, who presents to ED for seizure concern. Patient unable to provide history. Called spouse () who provided history. She states patient at baseline currently has been A&Ox1 (only to name). Sometimes is able to recognize his wife and other times not able to. Has difficulty following commands, and requires help with all ADLs. He since approximately 2023 not been ambulatory. Has been in and out of hospitals, recently for seizures. Has seizures approximately every 6-8 weeks that are "not usually bad." On Friday night 23 he had a possible seizure with less responsiveness. His symptoms resolved and he was his usual self after 10 minutes. He had another seizure event that she states was bad today 23. His face was twisted and was drooling afterward. His hands were all twisted and he looked like he was in pain. He has not missed much of his seizure medication dose keppra 500mg BID, valproic acid 1000mg BID (per spouse). Of important note, he was recently on eliquis for hx of DVT/PE but after he was discharged and spouse went to  the medication from pharmacy, the insurance denied coverage and thus he has not been on it since then.       ROS:    NIHSS:   preMRS:      Review of Systems: unable to obtain    PAST MEDICAL & SURGICAL HISTORY:  HTN (hypertension)    Diverticulosis    Hypothyroid    Deep vein thrombosis (DVT)    Pulmonary embolism    No significant past surgical history    FAMILY HISTORY:    MEDICATIONS   MEDICATIONS  (STANDING):    MEDICATIONS  (PRN):    ALLERGIES/INTOLERANCES:  Allergies  fish (Hives; Angioedema)  No Known Drug Allergies  [place] (Swelling; Angioedema)    Intolerances    VITALS & EXAMINATION:  Vital Signs Last 24 Hrs  T(C): 37.4 (06 Aug 2023 22:53), Max: 38.1 (06 Aug 2023 20:05)  T(F): 99.3 (06 Aug 2023 22:53), Max: 100.5 (06 Aug 2023 20:05)  HR: 88 (06 Aug 2023 22:53) (88 - 107)  BP: 136/94 (06 Aug 2023 22:53) (106/67 - 136/98)  BP(mean): 106 (06 Aug 2023 15:20) (106 - 106)  RR: 16 (06 Aug 2023 22:53) (12 - 20)  SpO2: 100% (06 Aug 2023 22:53) (97% - 100%)    Parameters below as of 06 Aug 2023 22:53  Patient On (Oxygen Delivery Method): room air       LABORATORY:  CBC                       14.5   9.82  )-----------( 283      ( 06 Aug 2023 19:15 )             43.6     Chem -    137  |  98  |  32<H>  ----------------------------<  145<H>  4.4   |  26  |  1.49<H>    Ca    9.7      06 Aug 2023 19:15    TPro  8.9<H>  /  Alb  3.5  /  TBili  0.4  /  DBili  x   /  AST  36  /  ALT  19  /  AlkPhos  83  08-    LFTs LIVER FUNCTIONS - ( 06 Aug 2023 19:15 )  Alb: 3.5 g/dL / Pro: 8.9 g/dL / ALK PHOS: 83 U/L / ALT: 19 U/L / AST: 36 U/L / GGT: x           Coagulopathy   Lipid Panel   A1c   Cardiac enzymes     U/A Urinalysis Basic - ( 06 Aug 2023 21:10 )    Color: Dark Yellow / Appearance: Clear / S.033 / pH: x  Gluc: x / Ketone: Trace mg/dL  / Bili: Negative / Urobili: 1.0 mg/dL   Blood: x / Protein: 100 mg/dL / Nitrite: Negative   Leuk Esterase: Trace / RBC: 1 /HPF / WBC 0 /HPF   Sq Epi: x / Non Sq Epi: x / Bacteria: Occasional /HPF      CSF  Other    STUDIES & IMAGING: (EEG, CT, MR, U/S, TTE/STEPHANIE):    < from: CT Head No Cont (23 @ 19:04) >    ACC: [id number] EXAM:  CT BRAIN   ORDERED BY: SAE LOPEZ     PROCEDURE DATE:  2023          INTERPRETATION:  PROCEDURE: CT brain without intravenous contrast    INDICATION: Altered mental status    TECHNIQUE: Multiple axial images were obtained at 5 mm intervals from the   skull base to the vertex. Sagittal and coronal reformatted images were   obtained from the axial data set. The images were reviewed in brain and   bone windows.    COMPARISON: 2023    FINDINGS: The CT examination demonstrates the ventricles, cisternal   spaces, and cortical sulci to be within normal limits. There is no   midline shift or extra axial collections. The gray white differentiation   appears within normal limits. There is no intracranial hemorrhage or   acute transcortical infarct. There is sequelae of moderate chronic white   matter microvascular ischemic disease. The bony windows demonstrates no   fractures. The visualized paranasal sinuses are within normal limits. The   mastoid air cells arewell aerated.    IMPRESSION: No acute intracranial abnormality.    --- End of Report ---    MARY MARTINI MD; Attending Radiologist  This document has been electronically signed. Aug  6 2023  7:11PM    < end of copied text >   Neurology Consultation     HPI: Patient Omer Dobbins a 72yo M w/ PMHx HTN, hypothyroidism, seizure disorder (on VPA, keppra), dementia, JESSICA, anemia, DVT/PE (was on eliquis), prior stroke, who presents to ED for seizure concern. Patient unable to provide history. Called spouse () who provided history. She states patient at baseline currently has been A&Ox1 (only to name). Sometimes is able to recognize his wife and other times not able to. Has difficulty following commands, and requires help with all ADLs. He since approximately 2023 not been ambulatory. Has been in and out of hospitals, recently for seizures. Has seizures approximately every 6-8 weeks that are "not usually bad." On Friday night 23 he had a possible seizure with less responsiveness. His symptoms resolved and he was his usual self after 10 minutes. He had another seizure event that she states was bad today 23. His face was twisted and was drooling afterward. His hands were all twisted and he looked like he was in pain. He has not missed much of his seizure medication dose keppra 500mg BID, valproic acid 1000mg BID (per spouse). Of important note, he was recently on eliquis for hx of DVT/PE but after he was discharged and spouse went to  the medication from pharmacy, the insurance denied coverage and thus he has not been on it since then.    NIHSS: 21  preMRS:  5     Review of Systems: unable to obtain    Spouse states home meds: keppra 500mg BID, valproic acid 1000mg BID, synthroid 88mcg, amlodipine 10mg daily      PAST MEDICAL & SURGICAL HISTORY:  HTN (hypertension)    Diverticulosis    Hypothyroid    Deep vein thrombosis (DVT)    Pulmonary embolism    FAMILY HISTORY:    MEDICATIONS   MEDICATIONS  (STANDING):    MEDICATIONS  (PRN):    ALLERGIES/INTOLERANCES:  Allergies  fish (Hives; Angioedema)  No Known Drug Allergies  [place] (Swelling; Angioedema)    Intolerances    VITALS & EXAMINATION:  Vital Signs Last 24 Hrs  T(C): 37.4 (06 Aug 2023 22:53), Max: 38.1 (06 Aug 2023 20:05)  T(F): 99.3 (06 Aug 2023 22:53), Max: 100.5 (06 Aug 2023 20:05)  HR: 88 (06 Aug 2023 22:53) (88 - 107)  BP: 136/94 (06 Aug 2023 22:53) (106/67 - 136/98)  BP(mean): 106 (06 Aug 2023 15:20) (106 - 106)  RR: 16 (06 Aug 2023 22:53) (12 - 20)  SpO2: 100% (06 Aug 2023 22:53) (97% - 100%)    Parameters below as of 06 Aug 2023 22:53  Patient On (Oxygen Delivery Method): room air    General:  Constitutional: Male, appears stated age, not in distress, resting when not disturbed  Eyes: clear sclera;   Extremities: No cyanosis;   Resp: breathing regularly    Neurological (>12):  MS: Eyes open, occasionally tracks and other times just stares forward. Only able to state his name. Otherwise not oriented place, situation, date time. Follows no commands.    Language: Speech is 1-2 words. Not repeating.   CNs: PERRL (R 3mm, L 3mm) and not sluggish. VFF to blink to threat. EOMI. No disconjugate gaze. Has head lying to R side on bed, difficult to make out asymmetry but may have. Not following for tongue testing.     Motor - Normal bulk and increased tone throughout. Has paratonia + addnl stiffness neck and all extremities (unclear chronicity). B/l UE some effort antigravity,  str present (R somewhat more than L). Both UE able to slow fall slightly. B/l LE held flexed at hips and knees. Some movement but none witnessed antigravity for b/l LE.      Sensation: Intact to noxious stimuli x4 extremities.   Reflexes L/R:  Biceps(C5) 3/3  BR(C6) 3/3   Triceps(C7)  2/2 Patellar(L4)  3/3      Ankles 2/3  Ankle clonus R only, non L side.  + glabellar reflex  - mistry reflex b/l  + palmomental reflex  Toes: downgoing  Coordination/ Gait: cannot assess     LABORATORY:  CBC                       14.5   9.82  )-----------( 283      ( 06 Aug 2023 19:15 )             43.6     Chem 08-06    137  |  98  |  32<H>  ----------------------------<  145<H>  4.4   |  26  |  1.49<H>    Ca    9.7      06 Aug 2023 19:15    TPro  8.9<H>  /  Alb  3.5  /  TBili  0.4  /  DBili  x   /  AST  36  /  ALT  19  /  AlkPhos  83  08-06    LFTs LIVER FUNCTIONS - ( 06 Aug 2023 19:15 )  Alb: 3.5 g/dL / Pro: 8.9 g/dL / ALK PHOS: 83 U/L / ALT: 19 U/L / AST: 36 U/L / GGT: x           Coagulopathy   Lipid Panel   A1c   Cardiac enzymes     U/A Urinalysis Basic - ( 06 Aug 2023 21:10 )    Color: Dark Yellow / Appearance: Clear / S.033 / pH: x  Gluc: x / Ketone: Trace mg/dL  / Bili: Negative / Urobili: 1.0 mg/dL   Blood: x / Protein: 100 mg/dL / Nitrite: Negative   Leuk Esterase: Trace / RBC: 1 /HPF / WBC 0 /HPF   Sq Epi: x / Non Sq Epi: x / Bacteria: Occasional /HPF    CSF  Other    STUDIES & IMAGING: (EEG, CT, MR, U/S, TTE/STEPHANIE):    < from: CT Head No Cont (23 @ 19:04) >    ACC: [id number] EXAM:  CT BRAIN   ORDERED BY: SAE LOPEZ     PROCEDURE DATE:  2023      INTERPRETATION:  PROCEDURE: CT brain without intravenous contrast    INDICATION: Altered mental status    TECHNIQUE: Multiple axial images were obtained at 5 mm intervals from the   skull base to the vertex. Sagittal and coronal reformatted images were   obtained from the axial data set. The images were reviewed in brain and   bone windows.    COMPARISON: 2023    FINDINGS: The CT examination demonstrates the ventricles, cisternal   spaces, and cortical sulci to be within normal limits. There is no   midline shift or extra axial collections. The gray white differentiation   appears within normal limits. There is no intracranial hemorrhage or   acute transcortical infarct. There is sequelae of moderate chronic white   matter microvascular ischemic disease. The bony windows demonstrates no   fractures. The visualized paranasal sinuses are within normal limits. The   mastoid air cells arewell aerated.    IMPRESSION: No acute intracranial abnormality.    --- End of Report ---    MARY MARTINI MD; Attending Radiologist  This document has been electronically signed. Aug  6 2023  7:11PM    < end of copied text >   Neurology Consultation     HPI: Patient Omer Dobibns a 74yo M w/ PMHx HTN, hypothyroidism, seizure disorder (on VPA, keppra), dementia, JESSICA, anemia, DVT/PE (was on eliquis), prior stroke, who presents to ED for seizure concern. Patient unable to provide history. Called spouse () who provided history. She states patient at baseline currently has been A&Ox1 (only to name). Sometimes is able to recognize his wife and other times not able to. Has difficulty following commands, and requires help with all ADLs. He since approximately 2023 not been ambulatory. Has been in and out of hospitals, recently for seizures. Has seizures approximately every 6-8 weeks that are "not usually bad." On Friday night 23 he had a possible seizure with less responsiveness. His symptoms resolved and he was his usual self after 10 minutes. He had another seizure event that she states was bad today 23. His face was twisted and was drooling afterward. His hands were all twisted and he looked like he was in pain. He has not missed much of his seizure medication dose keppra 500mg BID, valproic acid 1000mg BID (per spouse). Of important note, he was recently on eliquis for hx of DVT/PE but after he was discharged and spouse went to  the medication from pharmacy, the insurance denied coverage and thus he has not been on it since then.    NIHSS: 21  preMRS:  5     Review of Systems: unable to obtain    Spouse states home meds: keppra 500mg BID, valproic acid 1000mg BID, synthroid 88mcg, amlodipine 10mg daily  Per IM team, spouse also told them about seroquel which she had not to us    PAST MEDICAL & SURGICAL HISTORY:  HTN (hypertension)    Diverticulosis    Hypothyroid    Deep vein thrombosis (DVT)    Pulmonary embolism    FAMILY HISTORY:    MEDICATIONS   MEDICATIONS  (STANDING):    MEDICATIONS  (PRN):    ALLERGIES/INTOLERANCES:  Allergies  fish (Hives; Angioedema)  No Known Drug Allergies  [place] (Swelling; Angioedema)    Intolerances    VITALS & EXAMINATION:  Vital Signs Last 24 Hrs  T(C): 37.4 (06 Aug 2023 22:53), Max: 38.1 (06 Aug 2023 20:05)  T(F): 99.3 (06 Aug 2023 22:53), Max: 100.5 (06 Aug 2023 20:05)  HR: 88 (06 Aug 2023 22:53) (88 - 107)  BP: 136/94 (06 Aug 2023 22:53) (106/67 - 136/98)  BP(mean): 106 (06 Aug 2023 15:20) (106 - 106)  RR: 16 (06 Aug 2023 22:53) (12 - 20)  SpO2: 100% (06 Aug 2023 22:53) (97% - 100%)    Parameters below as of 06 Aug 2023 22:53  Patient On (Oxygen Delivery Method): room air    General:  Constitutional: Male, appears stated age, not in distress, resting when not disturbed  Eyes: clear sclera;   Extremities: No cyanosis;   Resp: breathing regularly    Neurological (>12):  MS: Eyes open, occasionally tracks and other times just stares forward. Only able to state his name. Otherwise not oriented place, situation, date time. Follows no commands.    Language: Speech is 1-2 words. Not repeating.   CNs: PERRL (R 3mm, L 3mm) and not sluggish. VFF to blink to threat. EOMI. No disconjugate gaze. Has head lying to R side on bed, difficult to make out asymmetry but may have. Not following for tongue testing.     Motor - Normal bulk and increased tone throughout. Has paratonia + addnl stiffness neck and all extremities (unclear chronicity). B/l UE some effort antigravity,  str present (R somewhat more than L). Both UE able to slow fall slightly. B/l LE held flexed at hips and knees. Some movement but none witnessed antigravity for b/l LE.      Sensation: Intact to noxious stimuli x4 extremities.   Reflexes L/R:  Biceps(C5) 3/3  BR(C6) 3/3   Triceps(C7)  2/2 Patellar(L4)  3/3      Ankles 2/3  Ankle clonus R only, non L side.  + glabellar reflex  - mistry reflex b/l  + palmomental reflex  Toes: downgoing  Coordination/ Gait: cannot assess     LABORATORY:  CBC                       14.5   9.82  )-----------( 283      ( 06 Aug 2023 19:15 )             43.6     Chem 08-06    137  |  98  |  32<H>  ----------------------------<  145<H>  4.4   |  26  |  1.49<H>    Ca    9.7      06 Aug 2023 19:15    TPro  8.9<H>  /  Alb  3.5  /  TBili  0.4  /  DBili  x   /  AST  36  /  ALT  19  /  AlkPhos  83  08-06    LFTs LIVER FUNCTIONS - ( 06 Aug 2023 19:15 )  Alb: 3.5 g/dL / Pro: 8.9 g/dL / ALK PHOS: 83 U/L / ALT: 19 U/L / AST: 36 U/L / GGT: x           Coagulopathy   Lipid Panel   A1c   Cardiac enzymes     U/A Urinalysis Basic - ( 06 Aug 2023 21:10 )    Color: Dark Yellow / Appearance: Clear / S.033 / pH: x  Gluc: x / Ketone: Trace mg/dL  / Bili: Negative / Urobili: 1.0 mg/dL   Blood: x / Protein: 100 mg/dL / Nitrite: Negative   Leuk Esterase: Trace / RBC: 1 /HPF / WBC 0 /HPF   Sq Epi: x / Non Sq Epi: x / Bacteria: Occasional /HPF    CSF  Other    STUDIES & IMAGING: (EEG, CT, MR, U/S, TTE/STEPHANIE):    < from: CT Head No Cont (23 @ 19:04) >    ACC: [id number] EXAM:  CT BRAIN   ORDERED BY: SAE LOPEZ     PROCEDURE DATE:  2023      INTERPRETATION:  PROCEDURE: CT brain without intravenous contrast    INDICATION: Altered mental status    TECHNIQUE: Multiple axial images were obtained at 5 mm intervals from the   skull base to the vertex. Sagittal and coronal reformatted images were   obtained from the axial data set. The images were reviewed in brain and   bone windows.    COMPARISON: 2023    FINDINGS: The CT examination demonstrates the ventricles, cisternal   spaces, and cortical sulci to be within normal limits. There is no   midline shift or extra axial collections. The gray white differentiation   appears within normal limits. There is no intracranial hemorrhage or   acute transcortical infarct. There is sequelae of moderate chronic white   matter microvascular ischemic disease. The bony windows demonstrates no   fractures. The visualized paranasal sinuses are within normal limits. The   mastoid air cells arewell aerated.    IMPRESSION: No acute intracranial abnormality.    --- End of Report ---    MARY MARTINI MD; Attending Radiologist  This document has been electronically signed. Aug  6 2023  7:11PM    < end of copied text >

## 2023-08-06 NOTE — ED ADULT NURSE REASSESSMENT NOTE - NS ED NURSE REASSESS COMMENT FT1
received report from  CHAVA valiente. Pt is a/o x 0 at baseline as per day shift rn. pt arouses to tactile stimuli. seizure precautions in place, suction at bedside. rectal temp obtained. no complaints of chest pain, headache, nausea, dizziness, vomiting, SOB, fever, chills  verbalized. Pt has 20g iv placed to left arm with no redness or swelling noted. pt respirations even and unlabored. pt normal sinus on monitor. pt safety maintained. pt appears in NAD and pending disposition at this time.

## 2023-08-06 NOTE — CONSULT NOTE ADULT - ATTENDING COMMENTS
Mr. Dobbins a 73 year old unknown handed man with PMHx Epilepsy disorder, Strokes, Advance dementia and bedbound, JESSICA, anemia, DVT/PE (was on eliquis),  who presents to ED for seizures. Neurology consulted for seizure management.   Since history and exam are very limited, therefore, patient will benefit from further work up to rule out treatable etiologies MRI brain & VEEG.  Please check the Depakote level before morning dose.   Continue medical management, neuro- check and fall precaution.  GI and DVT prophylaxis.  I agree with above assessment and plan.  Patient is at high risk for complications and morbidity or mortality. There is high probability of imminent or life threatening deterioration in the patient's condition.  Patient is unable or incompetent to participate in giving a history and/or making decisions and discussion is necessary for determining treatment decisions.  My cumulative time taking care of this critically ill patient is 40 minutes  If you have any further questions, please do not hesitate to contact our team.  Thank you for allowing us to participate in this patient care.

## 2023-08-06 NOTE — ED ADULT NURSE NOTE - OBJECTIVE STATEMENT
Pt received with wife at bedside. As per wife pt had seizure on Thursday and has been less responsive since. Pt is able to open eyes, speech is limited. Cardiac monitor in place-vitals WNL. As per wife pt not on blood thinners. Respirations are even and unlabored on room air.

## 2023-08-06 NOTE — ED PROVIDER NOTE - OBJECTIVE STATEMENT
Patient is a 72-year-old male with a past medical history of seizure disorder on valproic acid and Keppra , PE/DVT on apixaban, dementia, hypothyroidism who presents emergency department with family member for possible seizure.  Patient family member states that since Friday patient has been altered from his baseline.  Patient's family member states on Friday she witnessed him having a possible seizure and he was unresponsive for 3 minutes.  Patient states that he came back to 5 number states he came back to about 10 minutes after.  Member states this episode occurred again today so they decided to come get evaluated.  Patient family member denies any fevers.

## 2023-08-06 NOTE — ED ADULT TRIAGE NOTE - CHIEF COMPLAINT QUOTE
wife states " he is having seizures since Friday and he is not using his right arm and right leg and his right face and right arm is swollen " also endorses to drooling. h/o CVA . PE .wife  denies use of any AC patient is bed bound since 2 months .wife unable to provide any details about the patient. patient is alert non verbal , not fallowing commands in triage.  wife states " he is having seizures since Friday and he is not using his right arm and right leg and his right face and right arm is swollen " also endorses to drooling. h/o CVA . PE .wife  denies use of any AC patient is bed bound since 2 months .wife unable to provide any details about the patient. patient is alert non verbal , not fallowing commands in triage.  seen by Dr Pedroza for stroke eval. no code stroke called.

## 2023-08-06 NOTE — CONSULT NOTE ADULT - ASSESSMENT
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 At baseline A&Ox1 (only to name). Difficulty following commands, requires help with all ADLs. Had seizure Friday night 8/4/23 he had a possible seizure with less responsiveness. His symptoms resolved and he was his usual self after 10 minutes. He had another seizure event that she states was bad today 8/6/23. His face was twisted and was drooling afterward. His hands were all twisted and he looked like he was in pain. He has not missed much of his seizure medication dose keppra 500mg BID, valproic acid 1000mg BID (per spouse). Of important note, he was recently on eliquis for hx of DVT/PE but after he was discharged and spouse went to  the medication from pharmacy, the insurance denied coverage and thus he has not been on it since then.    Here, vs tmax 100.5F, [id number], /67, 18RR, 99%RA. Exam above. Wife states he is more his baseline while she saw him in ED although less participatory. Labs without leukocytosis, anemia, low/high plts. Troponin 35>33. Procal was sent given concern for infxn 0.10. CMP notable for JESSICA Cr 1.49, BUN 32, albumin 3.5. UA, RVP done. VPA level 100.10 (unclear when drawn in relation to dose). CTH no acute findings.     Impression: INCOMPLETE    Not suggestive of NMS/ SS, as she reports no recent use of offending agents recently.     Recommendations:  [ ] obtain blood levels of AEDs: keppra, valproic acid  [ ] Continue his seizure medications for now: keppra 500mg BID, valproic acid 1000mg BID per wife  [ ] Ck, UA, Utox, ammonia, troponin, lactate, A1c, lipid panel, ESR, CRP  [ ] No contraindication from our end to continuing broad spectrum antibiotics as indicated. Will defer infectious workup to primary team/ ID.  [ ] video EEG  [ ] MRI brain w/ and w/o contrast (if contrast is contraindicated, can obtain without contrast)    [ ] DVT/PE w/u and management per primary team  [ ] From prior stroke history (prior lacunar infarcts), eliquis can serve as secondary stroke prevention.   [ ] can consider atorvastatin 40mg (titrate to LDL <70) unless contraindicated - check CK    [ ] neuro checks q4hrs, seizure, fall & aspiration precautions  [ ] tele   [ ] DVT ppx as per primary team   [ ] Unable to currently discuss driving/ SUDEP given clinical status.      Delay in note entry/ note updates is due to patient care. Preliminary evaluation and impression including differential diagnosis is not limited to that listed above. Non-neurologic findings seen on imaging to be worked up per primary team if applicable. Discussed patient care with primary team (both ED and IM teams overnight), patient's spouse and in agreement. Also discussed above recs with neuro attending at time of patient evaluation. Recommendations will be finalized/amended once signed by attending. 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 At baseline A&Ox1 (only to name). Difficulty following commands, requires help with all ADLs. Had seizure Friday night 8/4/23 he had a possible seizure with less responsiveness. His symptoms resolved and he was his usual self after 10 minutes. He had another seizure event that she states was bad today 8/6/23. His face was twisted and was drooling afterward. His hands were all twisted and he looked like he was in pain. He has not missed much of his seizure medication dose keppra 500mg BID, valproic acid 1000mg BID (per spouse). Of important note, he was recently on eliquis for hx of DVT/PE but after he was discharged and spouse went to  the medication from pharmacy, the insurance denied coverage and thus he has not been on it since then.    Here, vs tmax 100.5F, [id number], /67, 18RR, 99%RA. Exam above. Wife states he is more his baseline while she saw him in ED although less participatory. Labs without leukocytosis, anemia, low/high plts. Troponin 35>33. Procal was sent given concern for infxn 0.10. CMP notable for JESSICA Cr 1.49, BUN 32, albumin 3.5. UA, RVP done. VPA level 100.10 (unclear when drawn in relation to dose). CTH no acute findings.     Impression: Recent seizures possibly provoked from unclear etiology. Concern for infectious etiology as a trigger but workup thus far has been unrevealing for clear source but still pending. Later notified that patient is also taking seroquel which will be held for differential also including NMS (has low grade fever, increased tone, will check for CK).      Recommendations:  [ ] obtain blood levels of AEDs: keppra, valproic acid  [ ] Continue his seizure medications for now: keppra 500mg BID, valproic acid 1000mg BID per wife  [ ] CK, UA, Utox, ammonia, troponin, lactate, A1c, lipid panel, ESR, CRP, iron studies, B12, folate, TSH,   [ ] No contraindication from our end to continuing broad spectrum antibiotics as indicated. Will defer infectious workup to primary team/ ID.  [ ] video EEG  [ ] MRI brain w/ and w/o contrast (if contrast is contraindicated, can obtain without contrast)    [ ] DVT/PE w/u and management per primary team  [ ] From prior stroke history (prior lacunar infarcts), eliquis can serve as secondary stroke prevention.   [ ] can consider atorvastatin 40mg (titrate to LDL <70) unless contraindicated - check CK  [ ] hold seroquel or other agents that can lead to NMS/ SS. Monitor electrolytes.    [ ] neuro checks q4hrs, seizure, fall & aspiration precautions  [ ] tele   [ ] DVT ppx as per primary team   [ ] Unable to currently discuss driving/ SUDEP given clinical status.      Delay in note entry/ note updates is due to patient care. Preliminary evaluation and impression including differential diagnosis is not limited to that listed above. Non-neurologic findings seen on imaging to be worked up per primary team if applicable. Discussed patient care with primary team (both ED and IM teams overnight), patient's spouse and in agreement. Also discussed above recs with neuro attending at time of patient evaluation. Recommendations will be finalized/amended once signed by attending. 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 At baseline A&Ox1 (only to name). Difficulty following commands, requires help with all ADLs. Had seizure Friday night 8/4/23 he had a possible seizure with less responsiveness. His symptoms resolved and he was his usual self after 10 minutes. He had another seizure event that she states was bad today 8/6/23. His face was twisted and was drooling afterward. His hands were all twisted and he looked like he was in pain. He has not missed much of his seizure medication dose keppra 500mg BID, valproic acid 1000mg BID (per spouse). Of important note, he was recently on eliquis for hx of DVT/PE but after he was discharged and spouse went to  the medication from pharmacy, the insurance denied coverage and thus he has not been on it since then.    Here, vs tmax 100.5F, [id number], /67, 18RR, 99%RA. Exam above. Wife states he is more his baseline while she saw him in ED although less participatory. Labs without leukocytosis, anemia, low/high plts. Troponin 35>33. Procal was sent given concern for infxn 0.10. CMP notable for JESSICA Cr 1.49, BUN 32, albumin 3.5. UA, RVP done. VPA level 100.10 (unclear when drawn in relation to dose). CTH no acute findings.     Impression: Recent seizures possibly provoked from unclear etiology. Concern for infectious etiology as a trigger but workup thus far has been unrevealing for clear source but still pending. Later notified that patient is also taking seroquel which will be held for differential also including NMS (has low grade fever, increased tone, will check for CK).      Recommendations:  [ ] obtain blood levels of AEDs: keppra, valproic acid  [ ] Continue his seizure medications for now: keppra 500mg BID, valproic acid 1000mg BID per wife  [ ] CK, UA, Utox, ammonia, troponin, lactate, A1c, lipid panel, ESR, CRP, iron studies, B12, folate, TSH,   [ ] No contraindication from our end to continuing broad spectrum antibiotics as indicated. Will defer infectious workup to primary team/ ID.  [ ] video EEG  [ ] MRI brain w/ and w/o contrast (if contrast is contraindicated, can obtain without contrast)    [ ] DVT/PE w/u and management per primary team  [ ] From prior stroke history (prior lacunar infarcts), eliquis or other full dose anticoagulation can also serve as secondary stroke prevention.   [ ] can consider atorvastatin 40mg (titrate to LDL <70) unless contraindicated - check CK  [ ] hold seroquel or other agents that can lead to NMS/ SS. Monitor electrolytes.    [ ] neuro checks q4hrs, seizure, fall & aspiration precautions  [ ] tele   [ ] DVT ppx as per primary team   [ ] Unable to currently discuss driving/ SUDEP given clinical status.      Delay in note entry/ note updates is due to patient care. Preliminary evaluation and impression including differential diagnosis is not limited to that listed above. Non-neurologic findings seen on imaging to be worked up per primary team if applicable. Discussed patient care with primary team (both ED and IM teams overnight), patient's spouse and in agreement. Also discussed above recs with neuro attending at time of patient evaluation. Recommendations will be finalized/amended once signed by attending. 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 At baseline A&Ox1 (only to name). Difficulty following commands, requires help with all ADLs. Had seizure Friday night 8/4/23 he had a possible seizure with less responsiveness. His symptoms resolved and he was his usual self after 10 minutes. He had another seizure event that she states was bad today 8/6/23. His face was twisted and was drooling afterward. His hands were all twisted and he looked like he was in pain. He has not missed much of his seizure medication dose keppra 500mg BID, valproic acid 1000mg BID (per spouse). Of important note, he was recently on eliquis for hx of DVT/PE but after he was discharged and spouse went to  the medication from pharmacy, the insurance denied coverage and thus he has not been on it since then.    Here, vs tmax 100.5F, [id number], /67, 18RR, 99%RA. Exam above. Wife states he is more his baseline while she saw him in ED although less participatory. Labs without leukocytosis, anemia, low/high plts. Troponin 35>33. Procal was sent given concern for infxn 0.10. CMP notable for JESSICA Cr 1.49, BUN 32, albumin 3.5. UA, RVP done. VPA level 100.10 (unclear when drawn in relation to dose). CTH no acute findings.     Impression: Recent seizures possibly provoked from unclear etiology. Concern for infectious etiology as a trigger but workup thus far has been unrevealing for clear source but still pending. Will also evaluate for acute intracranial pathology. Later notified that patient is also taking seroquel which will be held for differential also including NMS (has low grade fever, increased tone, will check for CK).      Recommendations:  [ ] obtain blood levels of AEDs: keppra, valproic acid  [ ] Continue his seizure medications for now: keppra 500mg BID, valproic acid 1000mg BID per wife  [ ] CK, UA, Utox, ammonia, troponin, lactate, A1c, lipid panel, ESR, CRP, iron studies, B12, folate, TSH,   [ ] No contraindication from our end to continuing broad spectrum antibiotics as indicated. Will defer infectious workup to primary team/ ID.  [ ] video EEG  [ ] MRI brain w/ and w/o contrast (if contrast is contraindicated, can obtain without contrast)    [ ] DVT/PE w/u and management per primary team  [ ] From prior stroke history (prior lacunar infarcts), eliquis or other full dose anticoagulation can also serve as secondary stroke prevention.   [ ] can consider atorvastatin 40mg (titrate to LDL <70) unless contraindicated - check CK  [ ] hold seroquel or other agents that can lead to NMS/ SS. Monitor electrolytes.    [ ] neuro checks q4hrs, seizure, fall & aspiration precautions  [ ] tele   [ ] DVT ppx as per primary team   [ ] Unable to currently discuss driving/ SUDEP given clinical status.      Delay in note entry/ note updates is due to patient care. Preliminary evaluation and impression including differential diagnosis is not limited to that listed above. Non-neurologic findings seen on imaging to be worked up per primary team if applicable. Discussed patient care with primary team (both ED and IM teams overnight), patient's spouse and in agreement. Also discussed above recs with neuro attending at time of patient evaluation. Recommendations will be finalized/amended once signed by attending.

## 2023-08-06 NOTE — ED PROVIDER NOTE - CLINICAL SUMMARY MEDICAL DECISION MAKING FREE TEXT BOX
Patient presents emergency department for possible seizure.  Patient is hemodynamically stable on presentation.  Patient is slightly tachycardic.  We will obtain rectal temp.  Patient is altered from baseline according to family member.  We will obtain infectious work-up and CT head of to evaluate for any acute pathologies pending labs and imaging for further disposition. Patient presents emergency department for possible seizure.  Patient is hemodynamically stable on presentation.  Patient is slightly tachycardic.  We will obtain rectal temp.  Patient is altered from baseline according to family member.  We will obtain infectious work-up and CT head of to evaluate for any acute pathologies pending labs and imaging for further disposition.    Attending MD Garcia. Agree with above.  Pt is 74 yo male with pmhx of seizure d/o on keppra/valproate with recent admin for med adjustment.  Now in ED with AMS since Friday, A & O x 1 at baseline but responds to family.  Now since Friday has had reduced mental status.  Episode friday estimated ~2-3 min, with eye rolling back, unresponsive, ~10-15 min return to near baseline but persistent lack of complete resolution and now recurrent episode x 1-2 min this AM.  No temp check at home.  No infectious sxs endorsed. Patient presents emergency department for possible seizure.  Patient is hemodynamically stable on presentation.  Patient is slightly tachycardic.  We will obtain rectal temp.  Patient is altered from baseline according to family member.  We will obtain infectious work-up and CT head of to evaluate for any acute pathologies pending labs and imaging for further disposition.    Attending MD Garcia. Agree with above.  Pt is 72 yo male with pmhx of seizure d/o on keppra/valproate with recent admin for med adjustment.  Now in ED with AMS since Friday, A & O x 1 at baseline but responds to family.  Now since Friday has had reduced mental status.  Episode friday estimated ~2-3 min, with eye rolling back, unresponsive, ~10-15 min return to near baseline but persistent lack of complete resolution and now recurrent episode x 1-2 min this AM.  No temp check at home.  No infectious sxs endorsed..

## 2023-08-06 NOTE — H&P ADULT - PROBLEM SELECTOR PLAN 2
-2/2 seizure vs infectious focus, less likely NMS  -c/w AEDs  -ck ordered, hold Seroquel for now  -received vanc, zosyn in ed, hold further abx, follow cultures, obtain ID input

## 2023-08-07 NOTE — ED ADULT NURSE REASSESSMENT NOTE - NS ED NURSE REASSESS COMMENT FT1
pt a&ox1 at this time. more alert and arousable. weight obtained, in flow-sheet. pt pending ESSU 1 spot when ready. seizure precautions in place. IV in tact. pt appears in NAD and safety maintained.

## 2023-08-07 NOTE — CHART NOTE - NSCHARTNOTEFT_GEN_A_CORE
EEG preliminary read (not final) on the initial recording hour(s) = x 1    No seizures recorded.    Moderate slowing noted, nonspecific.  Final report to follow tomorrow morning after completion of study.    [facility] EEG Reading Room Ph#: [phone]  Epilepsy Answering Service after 5PM and before 8:30AM: Ph#: [phone]

## 2023-08-07 NOTE — PATIENT PROFILE ADULT - FALL HARM RISK - RISK INTERVENTIONS
Assistance OOB with selected safe patient handling equipment/Assistance with ambulation/Communicate Fall Risk and Risk Factors to all staff, patient, and family/Discuss with provider need for PT consult/Monitor gait and stability/Reinforce activity limits and safety measures with patient and family/Visual Cue: Yellow wristband/Bed in lowest position, wheels locked, appropriate side rails in place/Call bell, personal items and telephone in reach/Instruct patient to call for assistance before getting out of bed or chair/Non-slip footwear when patient is out of bed/[place] to call system/Physically safe environment - no spills, clutter or unnecessary equipment/Purposeful Proactive Rounding/Room/bathroom lighting operational, light cord in reach

## 2023-08-07 NOTE — CONSULT NOTE ADULT - SUBJECTIVE AND OBJECTIVE BOX
HPI:  72 yo m h/o dementia, seizure disorders on keppra and divalproex, h/o DVT/PE who presents to ed in setting of multiple seizures most recently at 6am Sunday morning. He had a fever to 100.5      Admitted to [facility] in July for seizures.     PAST MEDICAL & SURGICAL HISTORY:  HTN (hypertension)      Diverticulosis      Hypothyroid      Deep vein thrombosis (DVT)      Pulmonary embolism      No significant past surgical history          Allergies    fish (Hives; Angioedema)  No Known Drug Allergies  [place] (Swelling; Angioedema)    Intolerances        ANTIMICROBIALS:      OTHER MEDS:  enoxaparin Injectable 65 milliGRAM(s) SubCutaneous every 12 hours  levETIRAcetam  IVPB 500 milliGRAM(s) IV Intermittent every 12 hours  levothyroxine Injectable 66 MICROGram(s) IV Push at bedtime  sodium chloride 0.9%. 1000 milliLiter(s) IV Continuous <Continuous>  valproate sodium  IVPB 500 milliGRAM(s) IV Intermittent every 6 hours      SOCIAL HISTORY:    pt unable to provide history  FAMILY HISTORY:  pt unable to provide history    REVIEW OF SYSTEMS  [ x ] ROS unobtainable because:  pt unable to provide history  [  ] All other systems negative except as noted below:	    Constitutional:  [ ] fever [ ] chills  [ ] weight loss  [ ] weakness  Skin:  [ ] rash [ ] phlebitis	  Eyes: [ ] icterus [ ] pain  [ ] discharge	  ENMT: [ ] sore throat  [ ] thrush [ ] ulcers [ ] exudates  Respiratory: [ ] dyspnea [ ] hemoptysis [ ] cough [ ] sputum	  Cardiovascular:  [ ] chest pain [ ] palpitations [ ] edema	  Gastrointestinal:  [ ] nausea [ ] vomiting [ ] diarrhea [ ] constipation [ ] pain	  Genitourinary:  [ ] dysuria [ ] frequency [ ] hematuria [ ] discharge [ ] flank pain  [ ] incontinence  Musculoskeletal:  [ ] myalgias [ ] arthralgias [ ] arthritis  [ ] back pain  Neurological:  [ ] headache [ ] seizures  [ ] confusion/altered mental status  Psychiatric:  [ ] anxiety [ ] depression	  Hematology/Lymphatics:  [ ] lymphadenopathy  Endocrine:  [ ] adrenal [ ] thyroid  Allergic/Immunologic:	 [ ] transplant [ ] seasonal    PHYSICAL EXAM:  General: [ ] non-toxic  HEAD/EYES: [ ] PERRL [x ] white sclera [ ] icterus  ENT:  [ ] normal [x ] supple [ ] thrush [ ] pharyngeal exudate  Cardiovascular:   [ ] murmur [x ] normal [ ] PPM/AICD  Respiratory:  [ x] clear to ausculation bilaterally  GI:  [x ] soft, non-tender, normal bowel sounds  :  [ ] rogel [ ] no CVA tenderness   Musculoskeletal:  x[ ] no synovitis  Neurologic:  [ ] non-focal exam   Skin:  [x ] no rash  Lymph: [x ] no lymphadenopathy  Psychiatric:  [ ] appropriate affect [ ] alert & oriented  Lines:  [x ] no phlebitis [ ] central line          Drug Dosing Weight  Height (cm): 170.2 (06 Aug 2023 19:26)  Weight (kg): 64 (07 Aug 2023 01:33)  BMI (kg/m2): 22.1 (07 Aug 2023 01:33)  BSA (m2): 1.74 (07 Aug 2023 01:33)    Vital Signs Last 24 Hrs  T(F): 98.5 (08-07-23 @ 15:18), Max: 100.5 (08-06-23 @ 20:05)    Vital Signs Last 24 Hrs  HR: 90 (08-07-23 @ 15:18) (84 - 103)  BP: 143/84 (08-07-23 @ 15:18) (106/67 - 149/98)  RR: 18 (08-07-23 @ 15:18)  SpO2: 98% (08-07-23 @ 15:18) (98% - 100%)  Wt(kg): --                          15.0   14.60 )-----------( 237      ( 07 Aug 2023 06:09 )             44.5       08-07    133<L>  |  102  |  33<H>  ----------------------------<  115<H>  5.0   |  20<L>  |  1.11    Ca    9.1      07 Aug 2023 06:09    TPro  8.1  /  Alb  2.9<L>  /  TBili  0.5  /  DBili  x   /  AST  35  /  ALT  17  /  AlkPhos  71  08-07      Urinalysis Basic - ( 07 Aug 2023 06:09 )    Color: x / Appearance: x / SG: x / pH: x  Gluc: 115 mg/dL / Ketone: x  / Bili: x / Urobili: x   Blood: x / Protein: x / Nitrite: x   Leuk Esterase: x / RBC: x / WBC x   Sq Epi: x / Non Sq Epi: x / Bacteria: x        MICROBIOLOGY:    RADIOLOGY:  < from: CT Head No Cont (08.06.23 @ 19:04) >  ACC: [id number] EXAM:  CT BRAIN   ORDERED BY: SAE LOPEZ     PROCEDURE DATE:  08/06/2023          INTERPRETATION:  PROCEDURE: CT brain without intravenous contrast    INDICATION: Altered mental status    TECHNIQUE: Multiple axial images were obtained at 5 mm intervals from the   skull base to the vertex. Sagittal and coronal reformatted images were   obtained from the axial data set. The images were reviewed in brain and   bone windows.    COMPARISON: 2/7/2023    FINDINGS: The CT examination demonstrates the ventricles, cisternal   spaces, and cortical sulci to be within normal limits. There is no   midline shift or extra axial collections. The gray white differentiation   appears within normal limits. There is no intracranial hemorrhage or   acute transcortical infarct. There is sequelae of moderate chronic white   matter microvascular ischemic disease. The bony windows demonstrates no   fractures. The visualized paranasal sinuses are within normal limits. The   mastoid air cells arewell aerated.    IMPRESSION: No acute intracranial abnormality.    < end of copied text >    < from: Xray Chest 1 View- PORTABLE-Urgent (Xray Chest 1 View- PORTABLE-Urgent .) (08.07.23 @ 01:57) >    ACC: [id number] EXAM:  XR CHEST PORTABLE URGENT 1V   ORDERED BY: SHELBIE BOWMAN     PROCEDURE DATE:  08/07/2023          INTERPRETATION:  EXAMINATION: XR CHEST URGENT    CLINICAL INDICATION: fever    TECHNIQUE: Single frontal, portable view of the chest was obtained.    COMPARISON: Chest x-ray 6/12/2023    FINDINGS:    LUNGS/PLEURA: The lungs are clear. No pleural effusion. No pneumothorax.    HEART AND MEDIASTINUM: The heart size is similar to six weeks with   metaphysitis dominick small.    OTHER: No acute osseous abnormalities.    IMPRESSION:    Clear lungs.    < end of copied text >

## 2023-08-07 NOTE — PATIENT PROFILE ADULT - NSPROGENOTHERPROVIDER_GEN_A_NUR
Patient AXOX4, remains on 4 points restraints. Pt is able to move all extremities. He is requesting to take a shower now, however explained to pt he will  be able to , after release from restraints. Plan is for patient to be CT scanned, Patient  continues to display extremely violent and aggressive behavior despite being medicated. 1:1 constant observation in progress with security present. none

## 2023-08-07 NOTE — CONSULT NOTE ADULT - ASSESSMENT
73 year old gentleman with a history of dementia and known siezure disorder presents with increased seizure.  Noted to have a increased WBC and low grade fever    1) Altered Mental status  ? if pt is at baseline  Reported h/o dementia  CT head without acute pathology   Neurology following    2) Fever  T max to 100.6 rectally  ? if this is related to seizure  Chest x ray has clear lungs  RVP negative  UA without pyuria  Follow up blood cultures    Noted to have rigidity to UE and LE  Check CPK  Check [place] serology    If mental status is not at baseline , consider LP    3) Leukocytosis  Monitor    Can monitor off antibioitcs. No clear focus of infection at this time.    3) Leukocytosis  Seizure may be a contributing factor  Continue to monitor

## 2023-08-08 NOTE — PHYSICAL THERAPY INITIAL EVALUATION ADULT - PERTINENT HX OF CURRENT PROBLEM, REHAB EVAL
73 year old male Presents to ED in setting of multiple seizures most recently at 6am Sunday morning. Per wife with whom I spoke  by phone, pt taking seizure meds as prescribed. CT head here acutely negative.

## 2023-08-08 NOTE — PHYSICAL THERAPY INITIAL EVALUATION ADULT - LEVEL OF INDEPENDENCE: SUPINE/SIT, REHAB EVAL
due to patient not following commands consistently or participating in PT evaluation, + EEG in place at this time./unable to perform

## 2023-08-08 NOTE — SWALLOW BEDSIDE ASSESSMENT ADULT - SWALLOW EVAL: RECOMMENDED FEEDING/EATING TECHNIQUES
small sips/ small spoonfuls/crush medication (when feasible)/maintain upright posture during/after eating for 30 mins/oral hygiene

## 2023-08-08 NOTE — SWALLOW BEDSIDE ASSESSMENT ADULT - SWALLOW EVAL: PROGNOSIS
Diagnosis Continued: 4. Moderate pharyngeal dysphagia suspected for mildly thick liquids and thin liquids characterized by initiation of the pharyngeal swallow and hyolaryngeal excursion upon digital palpation with throat clearing noted with mildly thick and thin liquids and cough response noted with thin liquids.

## 2023-08-08 NOTE — EEG REPORT - NS EEG TEXT BOX
[facility] [id number]     Study Date: 		08-7 19:32-08:00 08-08-23  Duration in hours:  x07:39    --------------------------------------------------------------------------------------------------  History:  CC/ HPI Patient is a 73y old  Male who presents with a chief complaint of seizures (08 Aug 2023 07:17)    MEDICATIONS  (STANDING):  chlorhexidine 2% Cloths 1 Application(s) Topical daily  enoxaparin Injectable 70 milliGRAM(s) SubCutaneous every 12 hours  levETIRAcetam  IVPB 500 milliGRAM(s) IV Intermittent every 12 hours  levothyroxine Injectable 66 MICROGram(s) IV Push at bedtime  sodium chloride 0.9%. 1000 milliLiter(s) (75 mL/Hr) IV Continuous <Continuous>  valproate sodium  IVPB 500 milliGRAM(s) IV Intermittent every 6 hours    --------------------------------------------------------------------------------------------------  Study Interpretation:    [Abbreviation Key:  PDR=alpha rhythm/posterior dominant rhythm. A-P=anterior posterior.  Amplitude: ‘very low’:<20; ‘low’:20-49; ‘medium’:; ‘high’:>150uV.  Persistence for periodic/rhythmic patterns (% of epoch) ‘rare’:<1%; ‘occasional’:1-10%; ‘frequent’:10-50%; ‘abundant’:50-90%; ‘continuous’:>90%.  Persistence for sporadic discharges: ‘rare’:<1/hr; ‘occasional’:1/min-1/hr; ‘frequent’:>1/min; ‘abundant’:>1/10 sec.  RPP=rhythmic and periodic patterns; GRDA=generalized rhythmic delta activity; FIRDA=frontal intermittent GRDA; LRDA=lateralized rhythmic delta activity; TIRDA=temporal intermittent rhythmic delta activity;  LPD=PLED=lateralized periodic discharges; GPD=generalized periodic discharges; BIPDs =bilateral independent periodic discharges; Mf=multifocal; SIRPDs=stimulus induced rhythmic, periodic, or ictal appearing discharges; BIRDs=brief potentially ictal rhythmic discharges >4 Hz, lasting .5-10s; PFA (paroxysmal bursts >13 Hz or =8 Hz <10s).  Modifiers: +F=with fast component; +S=with spike component; +R=with rhythmic component.  S-B=burst suppression pattern.  Max=maximal. N1-drowsy; N2-stage II sleep; N3-slow wave sleep. SSS/BETS=small sharp spikes/benign epileptiform transients of sleep. HV=hyperventilation; PS=photic stimulation]    FINDINGS:      Background:  Continuity: continuous  Symmetry: symmetric  PDR: 7-8 Hz activity, with amplitude to 40 uV, that attenuated to eye opening.    Reactivity: present  Voltage: normal (between 20-150uV)  Anterior Posterior Gradient: present  Other background findings: none  Breach: absent    Background Slowing:  Generalized slowing: diffuse irregular delta and theta activity.  Focal slowing: none was present.    State Changes:   -Drowsiness noted with increased slowing, attenuation of fast activity, vertex transients.  -Present with N2 sleep transients with symmetric spindles and K-complexes.    Sporadic Epileptiform Discharges:    None    Rhythmic and Periodic Patterns (RPPs):  None     Electrographic and Electroclinical seizures:  None    Other Clinical Events:  None    Activation Procedures:   -Hyperventilation was not performed.    -Photic stimulation was not performed.     Artifacts:  Intermittent myogenic and movement artifacts were noted.    EEG Classification / Summary:  Abnormal EEG study  Moderate generalized background slowing    -----------------------------------------------------------------------------------------------------    Clinical Impression:  Moderate diffuse/multi-focal cerebral dysfunction, not specific as to etiology.  There were no epileptiform abnormalities recorded.      -------------------------------------------------------------------------------------------------------  [facility] EEG Reading Room Ph#: [phone]  Epilepsy Answering Service after 5PM and before 8:30AM: Ph#: [phone]    Clifford Thibodeaux M.D.   of Neurology, [facility]	   [facility] [id number]     Study Date: 		08-7 19:32-13:00 08-08-23  Duration in hours:  x12:39    --------------------------------------------------------------------------------------------------  History:  CC/ HPI Patient is a 73y old  Male who presents with a chief complaint of seizures (08 Aug 2023 07:17)    MEDICATIONS  (STANDING):  chlorhexidine 2% Cloths 1 Application(s) Topical daily  enoxaparin Injectable 70 milliGRAM(s) SubCutaneous every 12 hours  levETIRAcetam  IVPB 500 milliGRAM(s) IV Intermittent every 12 hours  levothyroxine Injectable 66 MICROGram(s) IV Push at bedtime  sodium chloride 0.9%. 1000 milliLiter(s) (75 mL/Hr) IV Continuous <Continuous>  valproate sodium  IVPB 500 milliGRAM(s) IV Intermittent every 6 hours    --------------------------------------------------------------------------------------------------  Study Interpretation:    [Abbreviation Key:  PDR=alpha rhythm/posterior dominant rhythm. A-P=anterior posterior.  Amplitude: ‘very low’:<20; ‘low’:20-49; ‘medium’:; ‘high’:>150uV.  Persistence for periodic/rhythmic patterns (% of epoch) ‘rare’:<1%; ‘occasional’:1-10%; ‘frequent’:10-50%; ‘abundant’:50-90%; ‘continuous’:>90%.  Persistence for sporadic discharges: ‘rare’:<1/hr; ‘occasional’:1/min-1/hr; ‘frequent’:>1/min; ‘abundant’:>1/10 sec.  RPP=rhythmic and periodic patterns; GRDA=generalized rhythmic delta activity; FIRDA=frontal intermittent GRDA; LRDA=lateralized rhythmic delta activity; TIRDA=temporal intermittent rhythmic delta activity;  LPD=PLED=lateralized periodic discharges; GPD=generalized periodic discharges; BIPDs =bilateral independent periodic discharges; Mf=multifocal; SIRPDs=stimulus induced rhythmic, periodic, or ictal appearing discharges; BIRDs=brief potentially ictal rhythmic discharges >4 Hz, lasting .5-10s; PFA (paroxysmal bursts >13 Hz or =8 Hz <10s).  Modifiers: +F=with fast component; +S=with spike component; +R=with rhythmic component.  S-B=burst suppression pattern.  Max=maximal. N1-drowsy; N2-stage II sleep; N3-slow wave sleep. SSS/BETS=small sharp spikes/benign epileptiform transients of sleep. HV=hyperventilation; PS=photic stimulation]    FINDINGS:      Background:  Continuity: continuous  Symmetry: symmetric  PDR: 7-8 Hz activity, with amplitude to 40 uV, that attenuated to eye opening.    Reactivity: present  Voltage: normal (between 20-150uV)  Anterior Posterior Gradient: present  Other background findings: none  Breach: absent    Background Slowing:  Generalized slowing: diffuse irregular delta and theta activity.  Focal slowing: none was present.    State Changes:   -Drowsiness noted with increased slowing, attenuation of fast activity, vertex transients.  -Present with N2 sleep transients with symmetric spindles and K-complexes.    Sporadic Epileptiform Discharges:    None    Rhythmic and Periodic Patterns (RPPs):  None     Electrographic and Electroclinical seizures:  None    Other Clinical Events:  None    Activation Procedures:   -Hyperventilation was not performed.    -Photic stimulation was not performed.     Artifacts:  Intermittent myogenic and movement artifacts were noted.    EEG Classification / Summary:  Abnormal EEG study  Moderate generalized background slowing    -----------------------------------------------------------------------------------------------------    Clinical Impression:  Moderate diffuse/multi-focal cerebral dysfunction, not specific as to etiology.  There were no epileptiform abnormalities recorded.      -------------------------------------------------------------------------------------------------------  [facility] EEG Reading Room Ph#: [phone]  Epilepsy Answering Service after 5PM and before 8:30AM: Ph#: [phone]    Clifford Thibodeaux M.D.   of Neurology, [facility]

## 2023-08-08 NOTE — CONSULT NOTE ADULT - SUBJECTIVE AND OBJECTIVE BOX
CHIEF COMPLAINT:Patient is a 73y old  Male who presents with a chief complaint of seizures (07 Aug 2023 16:11)      HISTORY OF PRESENT ILLNESS:    73 male with history as below admitted with multiple sz episode  ROS limited due to mental status     PAST MEDICAL & SURGICAL HISTORY:  HTN (hypertension)      Diverticulosis      Hypothyroid      Deep vein thrombosis (DVT)      Pulmonary embolism      No significant past surgical history              MEDICATIONS:  enoxaparin Injectable 65 milliGRAM(s) SubCutaneous every 12 hours        levETIRAcetam  IVPB 500 milliGRAM(s) IV Intermittent every 12 hours  valproate sodium  IVPB 500 milliGRAM(s) IV Intermittent every 6 hours      levothyroxine Injectable 66 MICROGram(s) IV Push at bedtime    chlorhexidine 2% Cloths 1 Application(s) Topical daily  sodium chloride 0.9%. 1000 milliLiter(s) IV Continuous <Continuous>      FAMILY HISTORY:      Non-contributory    SOCIAL HISTORY:    No tobacco, drugs or etoh    Allergies    fish (Hives; Angioedema)  No Known Drug Allergies  Pike (Swelling; Angioedema)    Intolerances    	    REVIEW OF SYSTEMS:  as above  The rest of the 14 points ROS reviewed and except above they are unremarkable.        PHYSICAL EXAM:  T(C): 36.3 (08-08-23 @ 03:15), Max: 36.9 (08-07-23 @ 15:18)  HR: 76 (08-08-23 @ 03:15) (71 - 90)  BP: 141/83 (08-08-23 @ 03:15) (128/85 - 162/94)  RR: 18 (08-08-23 @ 03:15) (16 - 23)  SpO2: 97% (08-08-23 @ 03:15) (96% - 100%)  Wt(kg): --  I&O's Summary      JVP: Normal  Neck: supple  Lung: clear   CV: S1 S2 , Murmur:  Abd: soft  Ext: No edema  neuro: obtunded   Psych: flat affect  Skin: normal    LABS/DATA:    TELEMETRY: 	    ECG:  	   	  CARDIAC MARKERS:    < from: TTE Echo Complete w/o Contrast w/ Doppler (06.15.23 @ 19:50) >   1. Left ventricular ejection fraction, by visual estimation, is 60 to   65%.   2. Technically limited study.   3. Normal global left ventricular systolic function.   4. Normal left ventricular internal cavity size.   5. Spectral Doppler shows impaired relaxation pattern of left   ventricular myocardial filling (Grade I diastolic dysfunction).   6. Normal right ventricular size and function.   7. Normal left atrial size.   8. Normal right atrial size.   9. There is no evidence of pericardial effusion.  10. Mild thickening and calcification of the anterior and posterior   mitral valve leaflets.  11. Trace mitral valve regurgitation.  12. Mild-moderate tricuspid regurgitation.  13. Sclerotic aortic valve with decreased opening.  14. Estimated pulmonary artery systolic pressure is 39.6 mmHg assuming a   right atrial pressure of 8 mmHg, which is consistent with borderline   pulmonary hypertension.    < end of copied text >                      33 <<== 08-06-23 @ 21:10                35 <<== 08-06-23 @ 19:15                              12.7   6.01  )-----------( 173      ( 08 Aug 2023 06:31 )             38.6     08-08    139  |  105  |  32<H>  ----------------------------<  93  4.9   |  23  |  0.95    Ca    8.7      08 Aug 2023 06:31  Phos  3.3     08-08  Mg     2.10     08-08    TPro  8.1  /  Alb  2.9<L>  /  TBili  0.5  /  DBili  x   /  AST  35  /  ALT  17  /  AlkPhos  71  08-07    proBNP:   Lipid Profile:   HgA1c:   TSH:

## 2023-08-08 NOTE — SWALLOW BEDSIDE ASSESSMENT ADULT - ASR SWALLOW RECOMMEND DIAG
Objective testing Not warranted at this time given patient with overt s/s with mildly thick liquids and thin liquids only and clear lungs indicated on most recent chest imaging

## 2023-08-08 NOTE — SWALLOW BEDSIDE ASSESSMENT ADULT - SWALLOW EVAL: DIAGNOSIS
1. Mild oral dysphagia for soft and bite-sized solids, mildly thick liquids and thin liquids characterized by adequate acceptance and containment, adequate mastication of soft solids, adequate anterior to posterior transport with suspect premature spillage of mildly thick liquids and thin liquids with adequate clearance and slow anterior to posterior transport with adequate oral clearance of soft and bite-sized solids. 2. Functional oral stage suspected for puree and moderately thick liquids characterized by adequate acceptance and containment, adequate manipulation and anterior to posterior transport with adequate oral clearance. 3. Functional pharyngeal stage suspected for puree, soft and bite-sized and moderately thick liquids characterized by initiation of the pharyngeal swallow and hyolarygneal excursion upon digital palpation with no overt s/s aspiration noted. Continued Below

## 2023-08-08 NOTE — SWALLOW BEDSIDE ASSESSMENT ADULT - ADDITIONAL RECOMMENDATIONS
1. RD consult as patient may benefit from nutritional supplements (i.e., Ensure Pudding) to meet nutritional needs. 2. This service to follow up to ensure tolerance of recommended PO/ reassess for diet advancement as schedule permits. 3. Medical team further advised to reconsult this service if patient is with a change in medical status or change in tolerance of recommended PO.

## 2023-08-08 NOTE — SWALLOW BEDSIDE ASSESSMENT ADULT - PHARYNGEAL PHASE
Within functional limits Cough post oral intake/Throat clear post oral intake Throat clear post oral intake

## 2023-08-08 NOTE — PHYSICAL THERAPY INITIAL EVALUATION ADULT - ADDITIONAL COMMENTS
Patient is poor historian, social history taken from chart. Patient lives with wife, unknown prior level of function please see care coordination note.

## 2023-08-08 NOTE — SWALLOW BEDSIDE ASSESSMENT ADULT - COMMENTS
Per H&P 8/6, "74 yo m h/o dementia, seizure disorders on keppra and divalproex, h/p dvt/pe has been off eliquis since June 2023  insurance issues. Presents to ed in setting of multiple seizures most recently at 6am Sunday morning. Per wife with whom I spoke  by phone, pt taking seizure meds as prescribed. CT head here acutelky negative. Tamx 100.5 (R), normal wbc and procalcitonin. UA neg. Diffuse rigidity noted; pt on seroquel, CK ordered"    CXR 8/7: IMPRESSION: Clear lungs.    CT Head 8/6: IMPRESSION: No acute intracranial abnormality.    Patient received sleeping in bed, easily arousable to awake/ alert state given verbal cues, Ox1, minimally verbal however able follow simple directives. RN reporting patient consumed puree without difficulty and has not attempted to give liquids.

## 2023-08-08 NOTE — CONSULT NOTE ADULT - ASSESSMENT
Sz   on antiepileptic rx  fu with neurology     History of DVT   on lovenox     HTN  monitor BP   amend meds as needed    mild elevated trop   not consistent with acute MI  eventual stress test in future as outpt can be considered

## 2023-08-08 NOTE — SWALLOW BEDSIDE ASSESSMENT ADULT - CONSISTENCIES ADMINISTERED
3 tsp trials per consistency/moderately thick/pureed 2 tsp trials/soft & bite-sized ~ 2 oz/mildly thick 4 oz/thin liquid

## 2023-08-11 NOTE — CHART NOTE - NSCHARTNOTEFT_GEN_A_CORE
Xarelto Rx sent to [facility], Confirmed that medication is covered with a 20 dollars Copay  will transition pt to Xarelto

## 2023-08-14 NOTE — DISCHARGE NOTE PROVIDER - CARE PROVIDER_API CALL
Please F/U with PCP,   Phone: (   )    -  Fax: (   )    -  Follow Up Time: 1 week   Mamadou Brock)  Neurology; Neurophysiology  [address] [facility] [address], [address]  [place] [address]  Phone: [phone]  Fax: [phone]  Follow Up Time: 2 weeks    Please F/U with PCP,   Phone: (   )    -  Fax: (   )    -  Follow Up Time: 1 week

## 2023-08-14 NOTE — PROGRESS NOTE ADULT - PROVIDER SPECIALTY LIST ADULT
Cardiology
Cardiology
Hospitalist
Cardiology
Hospitalist
Infectious Disease
Cardiology
Hospitalist
Cardiology
Cardiology
Hospitalist

## 2023-08-14 NOTE — PROGRESS NOTE ADULT - ASSESSMENT
72 yo m with breakthrough seizures        Problem/Plan - 1:  ·  Problem: Seizures.   ·  Plan: -breakthrough seizures maybe precipitated by infectious insult  vs  need to increase seizure meds; levels pending  -seen by neuro, who recommend eeg, MR  -pt with low grade fever, normal wbc, and procalcitonin with AMS; all explainable by post ictal state; received vanco, zosyn in ed; low suspicion for bact meningitis , but would obtain ID input in am- ID fu     Problem/Plan - 2:  ·  Problem: Fever.   ·  Plan: -2/2 seizure vs infectious focus, less likely NMS  -c/w AEDs  -ck ordered, hold Seroquel for now  -received vanc, zosyn   - ID fu     Problem/Plan - 3:  ·  Problem: Rigidity.   ·  Plan: -seizure induced? NMS? (pt on seroquel)  -ck ordered, hold Seroquel for now; continue with AEDs.  Problem/Plan - 4:  ·  Problem: DVT of leg (deep venous thrombosis).   ·  Plan: off eliquis since June 2023  insurance issues  will place on lovenox bid for now.    Problem/Plan - 5:  ·  Problem: Hypothyroid.   ·  Plan: c/w synthroid, tsh ordered.    Problem/Plan - 6:  ·  Problem: Renal insufficiency.   ·  Plan: follow post IVF renal function.    Problem/Plan - 7:  ·  Problem: Encounter for deep vein thrombosis (DVT) prophylaxis.   ·  Plan: on bid lovenox.  
74 yo m with breakthrough seizures        Problem/Plan - 1:  ·  Problem: Seizures.   ·  Plan: -breakthrough seizures maybe precipitated by infectious insult  vs  need to increase seizure meds; levels pending  - MRI pending  - neuro fu     Problem/Plan - 2:  ·  Problem: Fever.   ·  Plan: -2/2 seizure vs infectious focus, less likely NMS  -c/w AEDs  - resolved   - ID fu     Problem/Plan - 3:·  Problem: Rigidity.   ·  Plan: -seizure induced?   - improved     Problem/Plan - 4:  ·  Problem: DVT of leg (deep venous thrombosis).   ·  Plan: off eliquis since June 2023  insurance issues  will place on lovenox bid for now.    Problem/Plan - 5:  ·  Problem: Hypothyroid.   ·  Plan: c/w synthroid, tsh ordered.    Problem/Plan - 6:  ·  Problem: Renal insufficiency.   ·  Plan: follow post IVF renal function.    Problem/Plan - 7:  ·  Problem: Encounter for deep vein thrombosis (DVT) prophylaxis.   ·  Plan: on bid lovenox.
74 yo m with breakthrough seizures        Problem/Plan - 1:  ·  Problem: Seizures.   ·  Plan: -breakthrough seizures maybe precipitated by infectious insult  vs  need to increase seizure meds; levels pending  - MRI reviewed  - neuro fu     Problem/Plan - 2:  ·  Problem: Fever.   ·  Plan: -2/2 seizure vs infectious focus, less likely NMS  -c/w AEDs  - resolved   - ID fu     Problem/Plan - 3:·  Problem: Rigidity.   ·  Plan: -seizure induced? - improved     Problem/Plan - 4:  ·  Problem: DVT of leg (deep venous thrombosis).   ·  Plan: off eliquis since June 2023  insurance issues  will place on lovenox bid for now.    Problem/Plan - 5:  ·  Problem: Hypothyroid.   ·  Plan: c/w synthroid, tsh ordered.    Problem/Plan - 6:  ·  Problem: Renal insufficiency.   ·  Plan: follow post IVF renal function.    Problem/Plan - 7:  ·  Problem: Encounter for deep vein thrombosis (DVT) prophylaxis.   ·  Plan: on bid lovenox.  
Sz   on antiepileptic rx  fu with neurology     History of DVT   on a/c    HTN  stable     mild elevated trop   not consistent with acute MI  stress induced in setting of sx   eventual stress test in future as outpt can be considered 
74 yo m with breakthrough seizures        Problem/Plan - 1:  ·  Problem: Seizures.   ·  Plan: -breakthrough seizures maybe precipitated by infectious insult  vs  need to increase seizure meds; levels pending  - MRI reviewed  - neuro fu     Problem/Plan - 2:  ·  Problem: Fever.   ·  Plan: -2/2 seizure vs infectious focus, less likely NMS  -c/w AEDs  - resolved - ID fu     Problem/Plan - 3:·  Problem: Rigidity.   ·  Plan: -seizure induced? - improved     Problem/Plan - 4:  ·  Problem: DVT of leg (deep venous thrombosis).   ·  Plan: off eliquis since June 2023  insurance issues  will place on lovenox bid for now.    Problem/Plan - 5:  ·  Problem: Hypothyroid.   ·  Plan: c/w synthroid, tsh ordered.    Problem/Plan - 6:  ·  Problem: Renal insufficiency.   ·  Plan: follow post IVF renal function.    Problem/Plan - 7:  ·  Problem: Encounter for deep vein thrombosis (DVT) prophylaxis.   ·  Plan: on bid lovenox.        
Sz   on antiepileptic rx  fu with neurology     History of DVT   on a/c    HTN  stable     mild elevated trop   not consistent with acute MI  stress induced in setting of sz  eventual stress test in future as outpt can be considered 
Sz   on antiepileptic rx  fu with neurology     History of DVT   on lovenox     HTN  stable     mild elevated trop   not consistent with acute MI  stress induced in setting of sx   eventual stress test in future as outpt can be considered 
72 yo m with breakthrough seizures        Problem/Plan - 1:  ·  Problem: Seizures.   ·  Plan: -breakthrough seizures maybe precipitated by infectious insult  vs  need to increase seizure meds; levels pending  -seen by neuro, who recommend eeg, MR  -pt with low grade fever, normal wbc, and procalcitonin with AMS; all explainable by post ictal state; received vanco, zosyn in ed; low suspicion for bact meningitis , but would obtain ID input in am- ID fu     Problem/Plan - 2:  ·  Problem: Fever.   ·  Plan: -2/2 seizure vs infectious focus, less likely NMS  -c/w AEDs  -ck ordered, hold Seroquel for now  - received vanc, zosyn   - ID fu     Problem/Plan - 3:  ·  Problem: Rigidity.   ·  Plan: -seizure induced? NMS? (pt on seroquel)  -ck ordered, hold Seroquel for now; continue with AEDs.  Problem/Plan - 4:  ·  Problem: DVT of leg (deep venous thrombosis).   ·  Plan: off eliquis since June 2023  insurance issues  will place on lovenox bid for now.    Problem/Plan - 5:  ·  Problem: Hypothyroid.   ·  Plan: c/w synthroid, tsh ordered.    Problem/Plan - 6:  ·  Problem: Renal insufficiency.   ·  Plan: follow post IVF renal function.    Problem/Plan - 7:  ·  Problem: Encounter for deep vein thrombosis (DVT) prophylaxis.   ·  Plan: on bid lovenox.
73 year old gentleman with a history of dementia and known siezure disorder presents with increased seizure.  Noted to have a increased WBC and low grade fever    1) Altered Mental status  MS improved today- ? if he is at baseline  Reported h/o dementia  CT head without acute pathology   Neurology following    2) Fever  T max to 100.6 rectally in ED  ? if this is related to seizure  Chest x ray has clear lungs  RVP negative  UA without pyuria  blood cultures are without growth    3) Leukocytosis   improved    Will sign off  Call ID service if we can be of assistance        
74 yo m with breakthrough seizures        Problem/Plan - 1:  ·  Problem: Seizures.   ·  Plan: -breakthrough seizures maybe precipitated by infectious insult  vs  need to increase seizure meds; levels pending  - MRI pending  - neuro fu     Problem/Plan - 2:  ·  Problem: Fever.   ·  Plan: -2/2 seizure vs infectious focus, less likely NMS  -c/w AEDs  -ck ordered, hold Seroquel for now  - ID fu     Problem/Plan - 3:·  Problem: Rigidity.   ·  Plan: -seizure induced? NMS? (pt on seroquel)  -ck ordered, hold Seroquel for now; continue with AEDs.    Problem/Plan - 4:  ·  Problem: DVT of leg (deep venous thrombosis).   ·  Plan: off eliquis since June 2023  insurance issues  will place on lovenox bid for now.    Problem/Plan - 5:  ·  Problem: Hypothyroid.   ·  Plan: c/w synthroid, tsh ordered.    Problem/Plan - 6:  ·  Problem: Renal insufficiency.   ·  Plan: follow post IVF renal function.    Problem/Plan - 7:  ·  Problem: Encounter for deep vein thrombosis (DVT) prophylaxis.   ·  Plan: on bid lovenox.    
Sz   on antiepileptic rx  fu with neurology     History of DVT   on lovenox     HTN  stable     mild elevated trop   not consistent with acute MI  stress induced in setting of sx   eventual stress test in future as outpt can be considered 
Sz   on antiepileptic rx  fu with neurology     History of DVT   on lovenox     HTN  stable     mild elevated trop   not consistent with acute MI  stress induced in setting of sx   eventual stress test in future as outpt can be considered 
{\rtf1\dibkdn51585\ansi\ivwacwc8854\ftnbj\uc1\deff0  {\fonttbl{\f0 \fnil Segoe UI;}{\f1 \fnil \fcharset0 Segoe UI;}{\f2 \fnil Times New Jacques;}}  {\colortbl ;\mca088\egpiy132\hzdt113 ;\red0\green0\blue0 ;\red0\green0\tjiy533 ;\red0\green0\blue0 ;}  {\stylesheet{\f0\fs20 Normal;}{\cs1 Default Paragraph Font;}{\cs2\f0\fs16 Line Number;}{\cs3\f2\fs24\ul\cf3 Hyperlink;}}  {\*\revtbl{Unknown;}}  \eaqnyu92478\epodtw14446\ffdnh3895\nzhmy9003\urawh3749\wvmlg3334\khxetow578\hanvgde558\nogrowautofit\jgdvke697\formshade\nofeaturethrottle1\dntblnsbdb\fet4\aendnotes\aftnnrlc\pgbrdrhead\pgbrdrfoot  \sectd\yrsaun40241\nzwvbj65619\guttersxn0\gsxxyhuo2893\zmbivssh3418\wujewpnl7039\ambsnion7888\uijppst663\cwedjtv613\sbkpage\pgncont\pgndec  \plain\plain\f0\fs24\ql\plain\f0\fs24\plain\f1\fs16\ybhg1178\hich\f1\dbch\f1\loch\f1\cf2\fs16 74 yo m with breakthrough seizures {\*\bkmkstart bkcommentCR}{\*\bkmkend bkcommentCR} \par  \par  \par  \plain\f1\fs16\nfiw8710\hich\f1\dbch\f1\loch\f1\cf2\fs16\b\ul{\field{\*\fldinst HYPERLINK [id number] }{\fldrslt Problem/Plan - 1:}}\plain\f1\fs16\jeyd7688\hich\f1\dbch\f1\loch\f1\cf2\fs16\ql\par  \'b7  {\*\bkmkstart aj20267355779}{\*\bkmkend nt46085414206}Problem: {\*\bkmkstart sm17858147235}{\*\bkmkend hx50030708264}Seizures. \par  \'b7  {\*\bkmkstart cz97471993020}{\*\bkmkend xi38076542828}Plan: {\*\bkmkstart ip86145382176}{\*\bkmkend hk25018542548}-breakthrough seizures maybe precipitated by infectious insult  vs  need to increase seizure meds; levels pending\par  -seen by neuro, who recommend eeg, MR\par  -pt with low grade fever, normal wbc, and procalcitonin with AMS; all explainable by post ictal state; received vanco, zosyn in ed; low suspicion for bact meningitis , but would obtain ID input in am\plain\f1\fs16\engc0874\hich\f1\dbch\f1\loch\f1\cf2\fs16\strike\plain\f1\fs16\xyht5923\hich\f1\dbch\f1\loch\f1\cf2\fs16   - ID fu \par  \par  \plain\f1\fs16\jeuj5392\hich\f1\dbch\f1\loch\f1\cf2\fs16\b\ul{\field{\*\fldinst HYPERLINK [id number] }{\fldrslt Problem/Plan - 2:}}\plain\f1\fs16\danc4223\hich\f1\dbch\f1\loch\f1\cf2\fs16\ql\par  \'b7  {\*\bkmkstart jf00281246188}{\*\bkmkend cw19438574636}Problem: {\*\bkmkstart ve87210497882}{\*\bkmkend bk29600605659}Fever. \par  \'b7  {\*\bkmkstart wi75467357741}{\*\bkmkend ru54660710327}Plan: {\*\bkmkstart rn02151535851}{\*\bkmkend cu50911241852}-2/2 seizure vs infectious focus, less likely NMS\par  -c/w AEDs\par  -ck ordered, hold Seroquel for now\par  -received vanc, zosyn \par  - ID fu \par  \par  \plain\f1\fs16\iglq4721\hich\f1\dbch\f1\loch\f1\cf2\fs16\b\ul{\field{\*\fldinst HYPERLINK [id number] }{\fldrslt Problem/Plan - 3:}}\plain\f1\fs16\zsey9500\hich\f1\dbch\f1\loch\f1\cf2\fs16\ql\par  \'b7  {\*\bkmkstart sh33246970390}{\*\bkmkend lh04043852597}Problem: {\*\bkmkstart wd67600595235}{\*\bkmkend lh58995828851}Rigidity. \par  \'b7  {\*\bkmkstart qf19997904313}{\*\bkmkend wj94597433307}Plan: {\*\bkmkstart ox90939789335}{\*\bkmkend hu59514165407}-seizure induced? NMS? (pt on seroquel)\par  -ck ordered, hold Seroquel for now; continue with AEDs.\par  \par  \plain\f1\fs16\btpm9143\hich\f1\dbch\f1\loch\f1\cf2\fs16\b\ul{\field{\*\fldinst HYPERLINK [id number] }{\fldrslt Problem/Plan - 4:}}\plain\f1\fs16\reym0288\hich\f1\dbch\f1\loch\f1\cf2\fs16\ql\par  \'b7  {\*\bkmkstart kh16510990447}{\*\bkmkend uw78835154690}Problem: {\*\bkmkstart kq34142497840}{\*\bkmkend gd37365916065}DVT of leg (deep venous thrombosis). \par  \'b7  {\*\bkmkstart lx65166307024}{\*\bkmkend jd49502146383}Plan: {\*\bkmkstart jt78380211077}{\*\bkmkend ev30025608673}off eliquis since June 2023  insurance issues\par  will place on lovenox bid for now.\par  \par  \plain\f1\fs16\rcsr5678\hich\f1\dbch\f1\loch\f1\cf2\fs16\b\ul{\field{\*\fldinst HYPERLINK [id number] }{\fldrslt Problem/Plan - 5:}}\plain\f1\fs16\ayyn6874\hich\f1\dbch\f1\loch\f1\cf2\fs16\ql\par  \'b7  {\*\bkmkstart qc11664313396}{\*\bkmkend gw78962891529}Problem: {\*\bkmkstart rv53217034854}{\*\bkmkend vg27713794846}Hypothyroid. \par  \'b7  {\*\bkmkstart qs33126929490}{\*\bkmkend sv83716453756}Plan: {\*\bkmkstart kv87973710851}{\*\bkmkend me66657274372}c/w synthroid, tsh ordered.\par  \par  \plain\f1\fs16\dxzj9622\hich\f1\dbch\f1\loch\f1\cf2\fs16\b\ul{\field{\*\fldinst HYPERLINK [id number] }{\fldrslt Problem/Plan - 6:}}\plain\f1\fs16\zhgq1326\hich\f1\dbch\f1\loch\f1\cf2\fs16\ql\par  \'b7  {\*\bkmkstart rk77224684148}{\*\bkmkend uq99340062030}Problem: {\*\bkmkstart oc62949554778}{\*\bkmkend tv63252674205}Renal insufficiency. \par  \'b7  {\*\bkmkstart gz19630748405}{\*\bkmkend ix57956436464}Plan: {\*\bkmkstart ua54687271129}{\*\bkmkend yh86849623779}follow post IVF renal function.\par  \par  \plain\f1\fs16\llpp3314\hich\f1\dbch\f1\loch\f1\cf2\fs16\b\ul{\field{\*\fldinst HYPERLINK [id number] }{\fldrslt Problem/Plan - 7:}}\plain\f1\fs16\nmcn3943\hich\f1\dbch\f1\loch\f1\cf2\fs16\ql\par  \'b7  {\*\bkmkstart px52285028098}{\*\bkmkend wq88683155226}Problem: {\*\bkmkstart bt34037551041}{\*\bkmkend xw77091099410}Encounter for deep vein thrombosis (DVT) prophylaxis. \par  \'b7  {\*\bkmkstart qb58142026302}{\*\bkmkend yt60947636514}Plan: {\*\bkmkstart us89443923696}{\*\bkmkend cs14813059906}on bid lovenox.\plain\f0\fs20\xnju2187\hich\f0\dbch\f0\loch\f0\fs20\par  }  
Sz   on antiepileptic rx  fu with neurology     History of DVT   on lovenox     HTN  monitor BP   amend meds as needed    mild elevated trop   not consistent with acute MI  stress induced in setting of sx   eventual stress test in future as outpt can be considered

## 2023-08-14 NOTE — DISCHARGE NOTE NURSING/CASE MANAGEMENT/SOCIAL WORK - PATIENT PORTAL LINK FT
You can access the FollowMyHealth Patient Portal offered by [facility] by registering at the following website: [email or web address]. By joining [employer]’s FollowMyHealth portal, you will also be able to view your health information using other applications (apps) compatible with our system.

## 2023-08-14 NOTE — CHART NOTE - NSCHARTNOTEFT_GEN_A_CORE
MR brain w/wo contrast;   IMPRESSION:  Significant motion artifact rendering the study near nondiagnostic.  1.  No evidence of large territorial infarct.  2.  Probable small lacunar infarcts throughout the basal ganglia,   thalami, and curt.  3.  Prominent increased T2/FLAIR signal throughout the deep and   periventricular white matter and curt, likely compatible with chronic   small vessel disease.  4.  Significant parenchymal volume loss.    EEG:   EEG Classification / Summary:  Abnormal EEG study  Moderate generalized background slowing    -----------------------------------------------------------------------------------------------------    Clinical Impression:  Moderate diffuse/multi-focal cerebral dysfunction, not specific as to etiology.  There were no epileptiform abnormalities recorded.      Impression:   Hx of recent seizures in the setting of likely missed doses of his AEDS. due to Keppra level being <2.0   Recommendations:   Continue on Keppra 500mg Q12hr   Continue on Depakote 1g BID   Continue on his home medications   Rest of management per primary team   No further inpatient Neurological workup.    Can follow up with Neurologist- Dr. Wilkins. Call [phone] on upon discharge.     Case discussed with Neurology Attending, Dr. Trevino. MR brain w/wo contrast;   IMPRESSION:  Significant motion artifact rendering the study near nondiagnostic.  1.  No evidence of large territorial infarct.  2.  Probable small lacunar infarcts throughout the basal ganglia,   thalami, and curt.  3.  Prominent increased T2/FLAIR signal throughout the deep and   periventricular white matter and curt, likely compatible with chronic   small vessel disease.  4.  Significant parenchymal volume loss.    EEG:   EEG Classification / Summary:  Abnormal EEG study  Moderate generalized background slowing    -----------------------------------------------------------------------------------------------------    Clinical Impression:  Moderate diffuse/multi-focal cerebral dysfunction, not specific as to etiology.  There were no epileptiform abnormalities recorded.      Impression:   Hx of recent seizures in the setting of likely missed doses of his AEDS. due to Keppra level being <2.0   Recommendations:   Continue on Keppra 500mg Q12hr   Continue on Depakote 1g BID   Continue on his home medications   Rest of management per primary team   No further inpatient Neurological workup.    Can follow up with Neurologist- Dr. Wilkins. Call [phone] on upon discharge.     Case discussed with Neurology Attending, Dr. Trevino.  Thank you

## 2023-08-14 NOTE — DISCHARGE NOTE PROVIDER - DISCHARGE DIET
Low Sodium Diet/Minced and Moist Diet/Nutrition Supplements Low Sodium Diet/Minced and Moist Diet/Moderately Thick Liquids/Nutrition Supplements

## 2023-08-14 NOTE — DISCHARGE NOTE PROVIDER - NSDCMRMEDTOKEN_GEN_ALL_CORE_FT
amLODIPine 10 mg oral tablet: 1 tab(s) orally once a day  apixaban 5 mg oral tablet: 1 tab(s) orally 2 times a day   divalproex sodium 500 mg oral delayed release tablet: 2 orally 2 times a day  levETIRAcetam 500 mg oral tablet: 1 tab(s) orally 2 times a day  levothyroxine 88 mcg (0.088 mg) oral tablet: 1 tab(s) orally once a day  QUEtiapine 50 mg oral tablet: 1.5 tab(s) orally once a day (at bedtime)   thiamine 100 mg oral tablet: 1 tab(s) orally once a day  Xarelto 20 mg oral tablet: 1 tab(s) orally once a day   amLODIPine 10 mg oral tablet: 1 tab(s) orally once a day  divalproex sodium 500 mg oral delayed release tablet: 2 tab(s) orally 2 times a day  levETIRAcetam 500 mg oral tablet: 1 tab(s) orally 2 times a day  levothyroxine 88 mcg (0.088 mg) oral tablet: 1 tab(s) orally once a day  QUEtiapine 25 mg oral tablet: 3 tab(s) orally once a day (at bedtime)  rivaroxaban 20 mg oral tablet: 1 tab(s) orally once a day (before a meal)  thiamine 100 mg oral tablet: 1 tab(s) orally once a day

## 2023-08-14 NOTE — PROGRESS NOTE ADULT - SUBJECTIVE AND OBJECTIVE BOX
Subjective: Patient seen and examined. No new events except as noted.     SUBJECTIVE/ROS:        MEDICATIONS:  MEDICATIONS  (STANDING):  amLODIPine   Tablet 10 milliGRAM(s) Oral daily  chlorhexidine 2% Cloths 1 Application(s) Topical daily  divalproex DR 1000 milliGRAM(s) Oral two times a day  enoxaparin Injectable 70 milliGRAM(s) SubCutaneous every 12 hours  levETIRAcetam 500 milliGRAM(s) Oral two times a day  levothyroxine 88 MICROGram(s) Oral daily  mupirocin 2% Ointment 1 Application(s) Topical two times a day  QUEtiapine 75 milliGRAM(s) Oral at bedtime  sodium chloride 0.9%. 1000 milliLiter(s) (75 mL/Hr) IV Continuous <Continuous>  thiamine 100 milliGRAM(s) Oral daily      PHYSICAL EXAM:  T(C): 36.7 (08-10-23 @ 05:00), Max: 36.7 (08-09-23 @ 09:14)  HR: 59 (08-10-23 @ 05:00) (59 - 71)  BP: 128/82 (08-10-23 @ 05:00) (125/70 - 138/65)  RR: 18 (08-10-23 @ 05:00) (17 - 18)  SpO2: 98% (08-10-23 @ 05:00) (98% - 99%)  Wt(kg): --  I&O's Summary          JVP: Normal  Neck: supple  Lung: clear   CV: S1 S2 , Murmur:  Abd: soft  Ext: No edema  neuro: Awake / alert  Psych: flat affect  Skin: normal``    LABS/DATA:    CARDIAC MARKERS:  CARDIAC MARKERS ( 08 Aug 2023 06:31 )  x     / x     / 273 U/L / x     / x                                    10.5   7.39  )-----------( 201      ( 09 Aug 2023 05:52 )             32.0     08-09    139  |  108<H>  |  26<H>  ----------------------------<  70  4.3   |  24  |  0.82    Ca    8.3<L>      09 Aug 2023 05:52  Phos  2.9     08-09  Mg     2.40     08-09      proBNP:   Lipid Profile:   HgA1c:   TSH:     TELE:  EKG:        
    Subjective: Patient seen and examined. No new events except as noted.     SUBJECTIVE/ROS:  nad    MEDICATIONS:  MEDICATIONS  (STANDING):  amLODIPine   Tablet 10 milliGRAM(s) Oral daily  chlorhexidine 2% Cloths 1 Application(s) Topical daily  divalproex DR 1000 milliGRAM(s) Oral two times a day  levETIRAcetam 500 milliGRAM(s) Oral two times a day  levothyroxine 88 MICROGram(s) Oral daily  mupirocin 2% Ointment 1 Application(s) Topical two times a day  QUEtiapine 75 milliGRAM(s) Oral at bedtime  rivaroxaban 20 milliGRAM(s) Oral with dinner  sodium chloride 0.9%. 1000 milliLiter(s) (75 mL/Hr) IV Continuous <Continuous>  thiamine 100 milliGRAM(s) Oral daily      PHYSICAL EXAM:  T(C): 36.7 (08-14-23 @ 01:49), Max: 36.7 (08-13-23 @ 21:00)  HR: 70 (08-14-23 @ 01:49) (70 - 73)  BP: 133/67 (08-14-23 @ 01:49) (110/70 - 133/67)  RR: 18 (08-14-23 @ 01:49) (17 - 19)  SpO2: 94% (08-14-23 @ 01:49) (94% - 97%)  Wt(kg): --  I&O's Summary          JVP: Normal  Neck: supple  Lung: clear   CV: S1 S2 , Murmur:  Abd: soft  Ext: No edema  neuro: Awake / alert  Psych: flat affect  Skin: normal``    LABS/DATA:    CARDIAC MARKERS:                                12.5   4.79  )-----------( 118      ( 14 Aug 2023 05:30 )             38.3     08-14    141  |  105  |  16  ----------------------------<  69<L>  4.7   |  25  |  1.01    Ca    9.0      14 Aug 2023 05:30  Phos  3.1     08-14  Mg     2.10     08-14      proBNP:   Lipid Profile:   HgA1c:   TSH:     TELE:  EKG:        
    Subjective: Patient seen and examined. No new events except as noted.     SUBJECTIVE/ROS:  nad    MEDICATIONS:  MEDICATIONS  (STANDING):  amLODIPine   Tablet 10 milliGRAM(s) Oral daily  chlorhexidine 2% Cloths 1 Application(s) Topical daily  enoxaparin Injectable 70 milliGRAM(s) SubCutaneous every 12 hours  levETIRAcetam  IVPB 500 milliGRAM(s) IV Intermittent every 12 hours  levothyroxine Injectable 66 MICROGram(s) IV Push at bedtime  mupirocin 2% Ointment 1 Application(s) Topical two times a day  QUEtiapine 75 milliGRAM(s) Oral at bedtime  sodium chloride 0.9%. 1000 milliLiter(s) (75 mL/Hr) IV Continuous <Continuous>  thiamine 100 milliGRAM(s) Oral daily  valproate sodium  IVPB 500 milliGRAM(s) IV Intermittent every 6 hours      PHYSICAL EXAM:  T(C): 36.6 (08-09-23 @ 05:00), Max: 36.9 (08-08-23 @ 20:00)  HR: 60 (08-09-23 @ 05:00) (60 - 84)  BP: 139/90 (08-09-23 @ 05:00) (117/65 - 147/78)  RR: 18 (08-09-23 @ 05:00) (17 - 18)  SpO2: 99% (08-09-23 @ 05:00) (98% - 99%)  Wt(kg): --  I&O's Summary          JVP: Normal  Neck: supple  Lung: clear   CV: S1 S2 , Murmur:  Abd: soft  Ext: No edema  neuro: Awake / alert  Psych: flat affect  Skin: normal``    LABS/DATA:    CARDIAC MARKERS:  CARDIAC MARKERS ( 08 Aug 2023 06:31 )  x     / x     / 273 U/L / x     / x      CARDIAC MARKERS ( 07 Aug 2023 06:09 )  x     / x     / 485 U/L / x     / x      CARDIAC MARKERS ( 06 Aug 2023 21:10 )  x     / x     / 661 U/L / x     / x                                    10.5   7.39  )-----------( 201      ( 09 Aug 2023 05:52 )             32.0     08-09    139  |  108<H>  |  26<H>  ----------------------------<  70  4.3   |  24  |  0.82    Ca    8.3<L>      09 Aug 2023 05:52  Phos  2.9     08-09  Mg     2.40     08-09      proBNP:   Lipid Profile:   HgA1c:   TSH:     TELE:  EKG:        
Follow Up:      Inverval History/ROS:Patient is a 73y old  Male who presents with a chief complaint of seizures (08 Aug 2023 07:17)    More alert today.    Answers simple questions  No fever      Allergies    fish (Hives; Angioedema)  No Known Drug Allergies  [place] (Swelling; Angioedema)    Intolerances        ANTIMICROBIALS:      OTHER MEDS:  amLODIPine   Tablet 10 milliGRAM(s) Oral daily  chlorhexidine 2% Cloths 1 Application(s) Topical daily  enoxaparin Injectable 70 milliGRAM(s) SubCutaneous every 12 hours  levETIRAcetam  IVPB 500 milliGRAM(s) IV Intermittent every 12 hours  levothyroxine Injectable 66 MICROGram(s) IV Push at bedtime  mupirocin 2% Ointment 1 Application(s) Topical two times a day  QUEtiapine 75 milliGRAM(s) Oral at bedtime  sodium chloride 0.9%. 1000 milliLiter(s) IV Continuous <Continuous>  thiamine 100 milliGRAM(s) Oral daily  valproate sodium  IVPB 500 milliGRAM(s) IV Intermittent every 6 hours      Vital Signs Last 24 Hrs  T(C): 36.8 (08 Aug 2023 14:00), Max: 36.8 (08 Aug 2023 14:00)  T(F): 98.3 (08 Aug 2023 14:00), Max: 98.3 (08 Aug 2023 14:00)  HR: 84 (08 Aug 2023 14:00) (71 - 84)  BP: 147/78 (08 Aug 2023 14:00) (141/83 - 162/94)  BP(mean): --  RR: 18 (08 Aug 2023 14:00) (16 - 18)  SpO2: 98% (08 Aug 2023 14:00) (96% - 100%)    Parameters below as of 08 Aug 2023 14:00  Patient On (Oxygen Delivery Method): room air        PHYSICAL EXAM:  General: x[ ] non-toxic  HEAD/EYES: [ ] PERRL [ x] white sclera [ ] icterus  ENT:  [ ] normal [x ] supple [ ] thrush [ ] pharyngeal exudate  Cardiovascular:   [ ] murmur [x ] normal [ ] PPM/AICD  Respiratory:  [ x] clear to ausculation bilaterally  GI:  [x ] soft, non-tender, normal bowel sounds  :  [ ] rogel [ ] no CVA tenderness   Musculoskeletal:  [ ] no synovitis  Neurologic:  [ ] non-focal exam   Skin:  [x ] no rash  Lymph: [ ] no lymphadenopathy  Psychiatric:  [ ] appropriate affect [ ] alert & oriented  Lines:  [x ] no phlebitis [ ] central line                                12.7   6.01  )-----------( 173      ( 08 Aug 2023 06:31 )             38.6       08-08    139  |  105  |  32<H>  ----------------------------<  93  4.9   |  23  |  0.95    Ca    8.7      08 Aug 2023 06:31  Phos  3.3     08-08  Mg     2.10     08-08    TPro  8.1  /  Alb  2.9<L>  /  TBili  0.5  /  DBili  x   /  AST  35  /  ALT  17  /  AlkPhos  71  08-07      Urinalysis Basic - ( 08 Aug 2023 06:31 )    Color: x / Appearance: x / SG: x / pH: x  Gluc: 93 mg/dL / Ketone: x  / Bili: x / Urobili: x   Blood: x / Protein: x / Nitrite: x   Leuk Esterase: x / RBC: x / WBC x   Sq Epi: x / Non Sq Epi: x / Bacteria: x        MICROBIOLOGY:Culture Results:   No growth (08-06-23 @ 21:10)  Culture Results:   No growth at 24 hours (08-06-23 @ 18:58)  Culture Results:   No growth at 24 hours (08-06-23 @ 18:30)      RADIOLOGY:    
Patient is a 73y old  Male who presents with a chief complaint of seizures (10 Aug 2023 07:07)    Date of servie : 08-10-23 @ 14:45  INTERVAL HPI/OVERNIGHT EVENTS:  T(C): 36.6 (08-10-23 @ 11:00), Max: 36.7 (08-10-23 @ 05:00)  HR: 60 (08-10-23 @ 11:00) (59 - 68)  BP: 140/73 (08-10-23 @ 11:00) (125/70 - 140/73)  RR: 18 (08-10-23 @ 11:00) (17 - 18)  SpO2: 100% (08-10-23 @ 11:00) (98% - 100%)  Wt(kg): --  I&O's Summary      LABS:                        10.5   7.39  )-----------( 201      ( 09 Aug 2023 05:52 )             32.0     08-09    139  |  108<H>  |  26<H>  ----------------------------<  70  4.3   |  24  |  0.82    Ca    8.3<L>      09 Aug 2023 05:52  Phos  2.9     08-09  Mg     2.40     08-09        Urinalysis Basic - ( 09 Aug 2023 05:52 )    Color: x / Appearance: x / SG: x / pH: x  Gluc: 70 mg/dL / Ketone: x  / Bili: x / Urobili: x   Blood: x / Protein: x / Nitrite: x   Leuk Esterase: x / RBC: x / WBC x   Sq Epi: x / Non Sq Epi: x / Bacteria: x      CAPILLARY BLOOD GLUCOSE            Urinalysis Basic - ( 09 Aug 2023 05:52 )    Color: x / Appearance: x / SG: x / pH: x  Gluc: 70 mg/dL / Ketone: x  / Bili: x / Urobili: x   Blood: x / Protein: x / Nitrite: x   Leuk Esterase: x / RBC: x / WBC x   Sq Epi: x / Non Sq Epi: x / Bacteria: x        MEDICATIONS  (STANDING):  amLODIPine   Tablet 10 milliGRAM(s) Oral daily  chlorhexidine 2% Cloths 1 Application(s) Topical daily  divalproex DR 1000 milliGRAM(s) Oral two times a day  enoxaparin Injectable 70 milliGRAM(s) SubCutaneous every 12 hours  levETIRAcetam 500 milliGRAM(s) Oral two times a day  levothyroxine 88 MICROGram(s) Oral daily  mupirocin 2% Ointment 1 Application(s) Topical two times a day  QUEtiapine 75 milliGRAM(s) Oral at bedtime  sodium chloride 0.9%. 1000 milliLiter(s) (75 mL/Hr) IV Continuous <Continuous>  thiamine 100 milliGRAM(s) Oral daily    MEDICATIONS  (PRN):          PHYSICAL EXAM:  GENERAL: NAD, well-groomed, well-developed  HEAD:  Atraumatic, Normocephalic  CHEST/LUNG: Clear to percussion bilaterally; No rales, rhonchi, wheezing, or rubs  HEART: Regular rate and rhythm; No murmurs, rubs, or gallops  ABDOMEN: Soft, Nontender, Nondistended; Bowel sounds present  EXTREMITIES:  2+ Peripheral Pulses, No clubbing, cyanosis, or edema  LYMPH: No lymphadenopathy noted  SKIN: No rashes or lesions    Care Discussed with Consultants/Other Providers [ ] YES  [ ] NO
Patient is a 73y old  Male who presents with a chief complaint of seizures (13 Aug 2023 06:49)    Date of servie : 08-13-23 @ 18:46  INTERVAL HPI/OVERNIGHT EVENTS:  T(C): 36.4 (08-13-23 @ 11:44), Max: 36.8 (08-12-23 @ 21:14)  HR: 73 (08-13-23 @ 11:44) (69 - 78)  BP: 110/70 (08-13-23 @ 11:44) (106/71 - 136/77)  RR: 19 (08-13-23 @ 11:44) (18 - 19)  SpO2: 97% (08-13-23 @ 11:44) (96% - 100%)  Wt(kg): --  I&O's Summary    12 Aug 2023 07:01  -  13 Aug 2023 07:00  --------------------------------------------------------  IN: 0 mL / OUT: 200 mL / NET: -200 mL        LABS:                        11.9   5.03  )-----------( 127      ( 13 Aug 2023 06:07 )             36.1     08-13    141  |  104  |  14  ----------------------------<  81  4.4   |  26  |  0.86    Ca    8.8      13 Aug 2023 06:07  Phos  2.8     08-13  Mg     2.00     08-13        Urinalysis Basic - ( 13 Aug 2023 06:07 )    Color: x / Appearance: x / SG: x / pH: x  Gluc: 81 mg/dL / Ketone: x  / Bili: x / Urobili: x   Blood: x / Protein: x / Nitrite: x   Leuk Esterase: x / RBC: x / WBC x   Sq Epi: x / Non Sq Epi: x / Bacteria: x      CAPILLARY BLOOD GLUCOSE            Urinalysis Basic - ( 13 Aug 2023 06:07 )    Color: x / Appearance: x / SG: x / pH: x  Gluc: 81 mg/dL / Ketone: x  / Bili: x / Urobili: x   Blood: x / Protein: x / Nitrite: x   Leuk Esterase: x / RBC: x / WBC x   Sq Epi: x / Non Sq Epi: x / Bacteria: x        MEDICATIONS  (STANDING):  amLODIPine   Tablet 10 milliGRAM(s) Oral daily  chlorhexidine 2% Cloths 1 Application(s) Topical daily  divalproex DR 1000 milliGRAM(s) Oral two times a day  levETIRAcetam 500 milliGRAM(s) Oral two times a day  levothyroxine 88 MICROGram(s) Oral daily  mupirocin 2% Ointment 1 Application(s) Topical two times a day  QUEtiapine 75 milliGRAM(s) Oral at bedtime  rivaroxaban 20 milliGRAM(s) Oral with dinner  sodium chloride 0.9%. 1000 milliLiter(s) (75 mL/Hr) IV Continuous <Continuous>  thiamine 100 milliGRAM(s) Oral daily    MEDICATIONS  (PRN):          PHYSICAL EXAM:  GENERAL: NAD, well-groomed, well-developed  HEAD:  Atraumatic, Normocephalic  CHEST/LUNG: Clear to percussion bilaterally; No rales, rhonchi, wheezing, or rubs  HEART: Regular rate and rhythm; No murmurs, rubs, or gallops  ABDOMEN: Soft, Nontender, Nondistended; Bowel sounds present  EXTREMITIES:  2+ Peripheral Pulses, No clubbing, cyanosis, or edema  LYMPH: No lymphadenopathy noted  SKIN: No rashes or lesions    Care Discussed with Consultants/Other Providers [ ] YES  [ ] NO
    Subjective: Patient seen and examined. No new events except as noted.     SUBJECTIVE/ROS:  nad      MEDICATIONS:  MEDICATIONS  (STANDING):  amLODIPine   Tablet 10 milliGRAM(s) Oral daily  chlorhexidine 2% Cloths 1 Application(s) Topical daily  divalproex DR 1000 milliGRAM(s) Oral two times a day  levETIRAcetam 500 milliGRAM(s) Oral two times a day  levothyroxine 88 MICROGram(s) Oral daily  mupirocin 2% Ointment 1 Application(s) Topical two times a day  QUEtiapine 75 milliGRAM(s) Oral at bedtime  rivaroxaban 20 milliGRAM(s) Oral with dinner  sodium chloride 0.9%. 1000 milliLiter(s) (75 mL/Hr) IV Continuous <Continuous>  thiamine 100 milliGRAM(s) Oral daily      PHYSICAL EXAM:  T(C): 36.7 (08-13-23 @ 05:54), Max: 36.8 (08-12-23 @ 21:14)  HR: 69 (08-13-23 @ 05:54) (69 - 78)  BP: 106/71 (08-13-23 @ 05:54) (106/71 - 136/77)  RR: 18 (08-13-23 @ 05:54) (18 - 18)  SpO2: 96% (08-13-23 @ 05:54) (96% - 100%)  Wt(kg): --  I&O's Summary    11 Aug 2023 07:01  -  12 Aug 2023 07:00  --------------------------------------------------------  IN: 0 mL / OUT: 300 mL / NET: -300 mL            JVP: Normal  Neck: supple  Lung: clear   CV: S1 S2 , Murmur:  Abd: soft  Ext: No edema  neuro: Awake / alert  Psych: flat affect  Skin: normal``    LABS/DATA:    CARDIAC MARKERS:                                12.3   4.17  )-----------( 203      ( 12 Aug 2023 06:42 )             37.0     08-12    140  |  104  |  15  ----------------------------<  78  4.4   |  26  |  0.87    Ca    8.8      12 Aug 2023 06:42  Phos  3.3     08-12  Mg     2.10     08-12      proBNP:   Lipid Profile:   HgA1c:   TSH:     TELE:  EKG:        
Patient is a 73y old  Male who presents with a chief complaint of seizures (06 Aug 2023 23:53)    Date of servie : 08-07-23 @ 13:45  INTERVAL HPI/OVERNIGHT EVENTS:  T(C): 36.6 (08-07-23 @ 09:56), Max: 38.1 (08-06-23 @ 20:05)  HR: 89 (08-07-23 @ 09:56) (84 - 107)  BP: 128/85 (08-07-23 @ 09:56) (106/67 - 149/98)  RR: 23 (08-07-23 @ 09:56) (12 - 23)  SpO2: 99% (08-07-23 @ 09:56) (97% - 100%)  Wt(kg): --  I&O's Summary      LABS:                        15.0   14.60 )-----------( 237      ( 07 Aug 2023 06:09 )             44.5     08-07    133<L>  |  102  |  33<H>  ----------------------------<  115<H>  5.0   |  20<L>  |  1.11    Ca    9.1      07 Aug 2023 06:09    TPro  8.1  /  Alb  2.9<L>  /  TBili  0.5  /  DBili  x   /  AST  35  /  ALT  17  /  AlkPhos  71  08-07      Urinalysis Basic - ( 07 Aug 2023 06:09 )    Color: x / Appearance: x / SG: x / pH: x  Gluc: 115 mg/dL / Ketone: x  / Bili: x / Urobili: x   Blood: x / Protein: x / Nitrite: x   Leuk Esterase: x / RBC: x / WBC x   Sq Epi: x / Non Sq Epi: x / Bacteria: x      CAPILLARY BLOOD GLUCOSE      POCT Blood Glucose.: 162 mg/dL (06 Aug 2023 13:52)        Urinalysis Basic - ( 07 Aug 2023 06:09 )    Color: x / Appearance: x / SG: x / pH: x  Gluc: 115 mg/dL / Ketone: x  / Bili: x / Urobili: x   Blood: x / Protein: x / Nitrite: x   Leuk Esterase: x / RBC: x / WBC x   Sq Epi: x / Non Sq Epi: x / Bacteria: x        MEDICATIONS  (STANDING):  enoxaparin Injectable 65 milliGRAM(s) SubCutaneous every 12 hours  levETIRAcetam  IVPB 500 milliGRAM(s) IV Intermittent every 12 hours  levothyroxine Injectable 66 MICROGram(s) IV Push at bedtime  sodium chloride 0.9%. 1000 milliLiter(s) (100 mL/Hr) IV Continuous <Continuous>  valproate sodium  IVPB 500 milliGRAM(s) IV Intermittent every 6 hours    MEDICATIONS  (PRN):          PHYSICAL EXAM:  GENERAL: NAD, well-groomed, well-developed  HEAD:  Atraumatic, Normocephalic  CHEST/LUNG: Clear to percussion bilaterally; No rales, rhonchi, wheezing, or rubs  HEART: Regular rate and rhythm; No murmurs, rubs, or gallops  ABDOMEN: Soft, Nontender, Nondistended; Bowel sounds present  EXTREMITIES:  2+ Peripheral Pulses, No clubbing, cyanosis, or edema  LYMPH: No lymphadenopathy noted  SKIN: No rashes or lesions    Care Discussed with Consultants/Other Providers [ ] YES  [ ] NO
Patient is a 73y old  Male who presents with a chief complaint of seizures (11 Aug 2023 09:38)    Date of servie : 08-11-23 @ 17:40  INTERVAL HPI/OVERNIGHT EVENTS:  T(C): 36.4 (08-11-23 @ 11:00), Max: 36.8 (08-10-23 @ 23:00)  HR: 54 (08-11-23 @ 11:00) (54 - 70)  BP: 139/66 (08-11-23 @ 11:00) (126/86 - 139/66)  RR: 17 (08-11-23 @ 11:00) (17 - 18)  SpO2: 99% (08-11-23 @ 11:00) (96% - 100%)  Wt(kg): --  I&O's Summary      LABS:                        11.8   5.31  )-----------( 148      ( 11 Aug 2023 06:30 )             35.4     08-11    140  |  104  |  14  ----------------------------<  116<H>  3.6   |  26  |  0.88    Ca    8.8      11 Aug 2023 06:30  Phos  2.9     08-11  Mg     1.90     08-11        Urinalysis Basic - ( 11 Aug 2023 06:30 )    Color: x / Appearance: x / SG: x / pH: x  Gluc: 116 mg/dL / Ketone: x  / Bili: x / Urobili: x   Blood: x / Protein: x / Nitrite: x   Leuk Esterase: x / RBC: x / WBC x   Sq Epi: x / Non Sq Epi: x / Bacteria: x      CAPILLARY BLOOD GLUCOSE            Urinalysis Basic - ( 11 Aug 2023 06:30 )    Color: x / Appearance: x / SG: x / pH: x  Gluc: 116 mg/dL / Ketone: x  / Bili: x / Urobili: x   Blood: x / Protein: x / Nitrite: x   Leuk Esterase: x / RBC: x / WBC x   Sq Epi: x / Non Sq Epi: x / Bacteria: x        MEDICATIONS  (STANDING):  amLODIPine   Tablet 10 milliGRAM(s) Oral daily  chlorhexidine 2% Cloths 1 Application(s) Topical daily  divalproex DR 1000 milliGRAM(s) Oral two times a day  levETIRAcetam 500 milliGRAM(s) Oral two times a day  levothyroxine 88 MICROGram(s) Oral daily  mupirocin 2% Ointment 1 Application(s) Topical two times a day  QUEtiapine 75 milliGRAM(s) Oral at bedtime  rivaroxaban 20 milliGRAM(s) Oral with dinner  sodium chloride 0.9%. 1000 milliLiter(s) (75 mL/Hr) IV Continuous <Continuous>  thiamine 100 milliGRAM(s) Oral daily    MEDICATIONS  (PRN):          PHYSICAL EXAM:  GENERAL: NAD, well-groomed, well-developed  HEAD:  Atraumatic, Normocephalic  CHEST/LUNG: Clear to percussion bilaterally; No rales, rhonchi, wheezing, or rubs  HEART: Regular rate and rhythm; No murmurs, rubs, or gallops  ABDOMEN: Soft, Nontender, Nondistended; Bowel sounds present  EXTREMITIES:  2+ Peripheral Pulses, No clubbing, cyanosis, or edema  LYMPH: No lymphadenopathy noted  SKIN: No rashes or lesions    Care Discussed with Consultants/Other Providers [ ] YES  [ ] NO
Patient is a 73y old  Male who presents with a chief complaint of seizures (08 Aug 2023 15:41)    Date of servie : 08-08-23 @ 19:03  INTERVAL HPI/OVERNIGHT EVENTS:  T(C): 36.8 (08-08-23 @ 14:00), Max: 36.8 (08-08-23 @ 14:00)  HR: 84 (08-08-23 @ 14:00) (71 - 84)  BP: 147/78 (08-08-23 @ 14:00) (141/83 - 155/88)  RR: 18 (08-08-23 @ 14:00) (17 - 18)  SpO2: 98% (08-08-23 @ 14:00) (96% - 98%)  Wt(kg): --  I&O's Summary      LABS:                        12.7   6.01  )-----------( 173      ( 08 Aug 2023 06:31 )             38.6     08-08    139  |  105  |  32<H>  ----------------------------<  93  4.9   |  23  |  0.95    Ca    8.7      08 Aug 2023 06:31  Phos  3.3     08-08  Mg     2.10     08-08    TPro  8.1  /  Alb  2.9<L>  /  TBili  0.5  /  DBili  x   /  AST  35  /  ALT  17  /  AlkPhos  71  08-07      Urinalysis Basic - ( 08 Aug 2023 06:31 )    Color: x / Appearance: x / SG: x / pH: x  Gluc: 93 mg/dL / Ketone: x  / Bili: x / Urobili: x   Blood: x / Protein: x / Nitrite: x   Leuk Esterase: x / RBC: x / WBC x   Sq Epi: x / Non Sq Epi: x / Bacteria: x      CAPILLARY BLOOD GLUCOSE            Urinalysis Basic - ( 08 Aug 2023 06:31 )    Color: x / Appearance: x / SG: x / pH: x  Gluc: 93 mg/dL / Ketone: x  / Bili: x / Urobili: x   Blood: x / Protein: x / Nitrite: x   Leuk Esterase: x / RBC: x / WBC x   Sq Epi: x / Non Sq Epi: x / Bacteria: x        MEDICATIONS  (STANDING):  amLODIPine   Tablet 10 milliGRAM(s) Oral daily  chlorhexidine 2% Cloths 1 Application(s) Topical daily  enoxaparin Injectable 70 milliGRAM(s) SubCutaneous every 12 hours  levETIRAcetam  IVPB 500 milliGRAM(s) IV Intermittent every 12 hours  levothyroxine Injectable 66 MICROGram(s) IV Push at bedtime  mupirocin 2% Ointment 1 Application(s) Topical two times a day  QUEtiapine 75 milliGRAM(s) Oral at bedtime  sodium chloride 0.9%. 1000 milliLiter(s) (75 mL/Hr) IV Continuous <Continuous>  thiamine 100 milliGRAM(s) Oral daily  valproate sodium  IVPB 500 milliGRAM(s) IV Intermittent every 6 hours    MEDICATIONS  (PRN):          PHYSICAL EXAM:  GENERAL: NAD, well-groomed, well-developed  HEAD:  Atraumatic, Normocephalic  CHEST/LUNG: Clear to percussion bilaterally; No rales, rhonchi, wheezing, or rubs  HEART: Regular rate and rhythm; No murmurs, rubs, or gallops  ABDOMEN: Soft, Nontender, Nondistended; Bowel sounds present  EXTREMITIES:  2+ Peripheral Pulses, No clubbing, cyanosis, or edema  LYMPH: No lymphadenopathy noted  SKIN: No rashes or lesions    Care Discussed with Consultants/Other Providers [ ] YES  [ ] NO
    Subjective: Patient seen and examined. No new events except as noted.     SUBJECTIVE/ROS:  nad      MEDICATIONS:  MEDICATIONS  (STANDING):  amLODIPine   Tablet 10 milliGRAM(s) Oral daily  chlorhexidine 2% Cloths 1 Application(s) Topical daily  divalproex DR 1000 milliGRAM(s) Oral two times a day  enoxaparin Injectable 70 milliGRAM(s) SubCutaneous every 12 hours  levETIRAcetam 500 milliGRAM(s) Oral two times a day  levothyroxine 88 MICROGram(s) Oral daily  mupirocin 2% Ointment 1 Application(s) Topical two times a day  QUEtiapine 75 milliGRAM(s) Oral at bedtime  sodium chloride 0.9%. 1000 milliLiter(s) (75 mL/Hr) IV Continuous <Continuous>  thiamine 100 milliGRAM(s) Oral daily      PHYSICAL EXAM:  T(C): 36.4 (08-11-23 @ 05:00), Max: 36.8 (08-10-23 @ 23:00)  HR: 60 (08-11-23 @ 05:00) (60 - 71)  BP: 128/64 (08-11-23 @ 05:00) (126/86 - 149/81)  RR: 18 (08-11-23 @ 05:00) (18 - 18)  SpO2: 96% (08-11-23 @ 05:00) (96% - 100%)  Wt(kg): --  I&O's Summary          JVP: Normal  Neck: supple  Lung: clear   CV: S1 S2 , Murmur:  Abd: soft  Ext: No edema  neuro: Awake / alert  Psych: flat affect  Skin: normal``    LABS/DATA:    CARDIAC MARKERS:                                11.8   5.31  )-----------( 148      ( 11 Aug 2023 06:30 )             35.4     08-11    140  |  104  |  14  ----------------------------<  116<H>  3.6   |  26  |  0.88    Ca    8.8      11 Aug 2023 06:30  Phos  2.9     08-11  Mg     1.90     08-11      proBNP:   Lipid Profile:   HgA1c:   TSH:     TELE:  EKG:        
    Subjective: Patient seen and examined. No new events except as noted.     SUBJECTIVE/ROS:  nad      MEDICATIONS:  MEDICATIONS  (STANDING):  amLODIPine   Tablet 10 milliGRAM(s) Oral daily  chlorhexidine 2% Cloths 1 Application(s) Topical daily  divalproex DR 1000 milliGRAM(s) Oral two times a day  levETIRAcetam 500 milliGRAM(s) Oral two times a day  levothyroxine 88 MICROGram(s) Oral daily  mupirocin 2% Ointment 1 Application(s) Topical two times a day  QUEtiapine 75 milliGRAM(s) Oral at bedtime  rivaroxaban 20 milliGRAM(s) Oral with dinner  sodium chloride 0.9%. 1000 milliLiter(s) (75 mL/Hr) IV Continuous <Continuous>  thiamine 100 milliGRAM(s) Oral daily      PHYSICAL EXAM:  T(C): 36.6 (08-12-23 @ 00:20), Max: 36.6 (08-11-23 @ 17:00)  HR: 65 (08-12-23 @ 00:20) (54 - 65)  BP: 116/59 (08-12-23 @ 00:20) (116/59 - 139/66)  RR: 18 (08-12-23 @ 00:20) (17 - 19)  SpO2: 96% (08-12-23 @ 00:20) (96% - 99%)  Wt(kg): --  I&O's Summary          JVP: Normal  Neck: supple  Lung: clear   CV: S1 S2 , Murmur:  Abd: soft  Ext: No edema  neuro: Awake / alert  Psych: flat affect  Skin: normal``    LABS/DATA:    CARDIAC MARKERS:                                11.8   5.31  )-----------( 148      ( 11 Aug 2023 06:30 )             35.4     08-11    140  |  104  |  14  ----------------------------<  116<H>  3.6   |  26  |  0.88    Ca    8.8      11 Aug 2023 06:30  Phos  2.9     08-11  Mg     1.90     08-11      proBNP:   Lipid Profile:   HgA1c:   TSH:     TELE:  EKG:        
Patient is a 73y old  Male who presents with a chief complaint of seizures (09 Aug 2023 09:00)    Date of servie : 08-09-23 @ 15:25  INTERVAL HPI/OVERNIGHT EVENTS:  T(C): 36.6 (08-09-23 @ 05:00), Max: 36.9 (08-08-23 @ 20:00)  HR: 60 (08-09-23 @ 05:00) (60 - 63)  BP: 139/90 (08-09-23 @ 05:00) (117/65 - 139/90)  RR: 18 (08-09-23 @ 05:00) (18 - 18)  SpO2: 99% (08-09-23 @ 05:00) (98% - 99%)  Wt(kg): --  I&O's Summary      LABS:                        10.5   7.39  )-----------( 201      ( 09 Aug 2023 05:52 )             32.0     08-09    139  |  108<H>  |  26<H>  ----------------------------<  70  4.3   |  24  |  0.82    Ca    8.3<L>      09 Aug 2023 05:52  Phos  2.9     08-09  Mg     2.40     08-09        Urinalysis Basic - ( 09 Aug 2023 05:52 )    Color: x / Appearance: x / SG: x / pH: x  Gluc: 70 mg/dL / Ketone: x  / Bili: x / Urobili: x   Blood: x / Protein: x / Nitrite: x   Leuk Esterase: x / RBC: x / WBC x   Sq Epi: x / Non Sq Epi: x / Bacteria: x      CAPILLARY BLOOD GLUCOSE            Urinalysis Basic - ( 09 Aug 2023 05:52 )    Color: x / Appearance: x / SG: x / pH: x  Gluc: 70 mg/dL / Ketone: x  / Bili: x / Urobili: x   Blood: x / Protein: x / Nitrite: x   Leuk Esterase: x / RBC: x / WBC x   Sq Epi: x / Non Sq Epi: x / Bacteria: x        MEDICATIONS  (STANDING):  amLODIPine   Tablet 10 milliGRAM(s) Oral daily  chlorhexidine 2% Cloths 1 Application(s) Topical daily  divalproex DR 1000 milliGRAM(s) Oral two times a day  enoxaparin Injectable 70 milliGRAM(s) SubCutaneous every 12 hours  levETIRAcetam 500 milliGRAM(s) Oral two times a day  mupirocin 2% Ointment 1 Application(s) Topical two times a day  QUEtiapine 75 milliGRAM(s) Oral at bedtime  sodium chloride 0.9%. 1000 milliLiter(s) (75 mL/Hr) IV Continuous <Continuous>  thiamine 100 milliGRAM(s) Oral daily    MEDICATIONS  (PRN):          PHYSICAL EXAM:  GENERAL: NAD, well-groomed, well-developed  HEAD:  Atraumatic, Normocephalic  CHEST/LUNG: Clear to percussion bilaterally; No rales, rhonchi, wheezing, or rubs  HEART: Regular rate and rhythm; No murmurs, rubs, or gallops  ABDOMEN: Soft, Nontender, Nondistended; Bowel sounds present  EXTREMITIES:  2+ Peripheral Pulses, No clubbing, cyanosis, or edema  LYMPH: No lymphadenopathy noted  SKIN: No rashes or lesions    Care Discussed with Consultants/Other Providers [ ] YES  [ ] NO
Patient is a 73y old  Male who presents with a chief complaint of seizures (12 Aug 2023 06:14)    Date of servie : 08-12-23 @ 17:36  INTERVAL HPI/OVERNIGHT EVENTS:  T(C): 36.7 (08-12-23 @ 12:43), Max: 36.7 (08-12-23 @ 06:00)  HR: 74 (08-12-23 @ 12:43) (61 - 79)  BP: 112/94 (08-12-23 @ 12:43) (112/94 - 129/56)  RR: 18 (08-12-23 @ 12:43) (17 - 18)  SpO2: 100% (08-12-23 @ 12:43) (96% - 100%)  Wt(kg): --  I&O's Summary    11 Aug 2023 07:01  -  12 Aug 2023 07:00  --------------------------------------------------------  IN: 0 mL / OUT: 300 mL / NET: -300 mL        LABS:                        12.3   4.17  )-----------( 203      ( 12 Aug 2023 06:42 )             37.0     08-12    140  |  104  |  15  ----------------------------<  78  4.4   |  26  |  0.87    Ca    8.8      12 Aug 2023 06:42  Phos  3.3     08-12  Mg     2.10     08-12        Urinalysis Basic - ( 12 Aug 2023 06:42 )    Color: x / Appearance: x / SG: x / pH: x  Gluc: 78 mg/dL / Ketone: x  / Bili: x / Urobili: x   Blood: x / Protein: x / Nitrite: x   Leuk Esterase: x / RBC: x / WBC x   Sq Epi: x / Non Sq Epi: x / Bacteria: x      CAPILLARY BLOOD GLUCOSE            Urinalysis Basic - ( 12 Aug 2023 06:42 )    Color: x / Appearance: x / SG: x / pH: x  Gluc: 78 mg/dL / Ketone: x  / Bili: x / Urobili: x   Blood: x / Protein: x / Nitrite: x   Leuk Esterase: x / RBC: x / WBC x   Sq Epi: x / Non Sq Epi: x / Bacteria: x        MEDICATIONS  (STANDING):  amLODIPine   Tablet 10 milliGRAM(s) Oral daily  chlorhexidine 2% Cloths 1 Application(s) Topical daily  divalproex DR 1000 milliGRAM(s) Oral two times a day  levETIRAcetam 500 milliGRAM(s) Oral two times a day  levothyroxine 88 MICROGram(s) Oral daily  mupirocin 2% Ointment 1 Application(s) Topical two times a day  QUEtiapine 75 milliGRAM(s) Oral at bedtime  rivaroxaban 20 milliGRAM(s) Oral with dinner  sodium chloride 0.9%. 1000 milliLiter(s) (75 mL/Hr) IV Continuous <Continuous>  thiamine 100 milliGRAM(s) Oral daily    MEDICATIONS  (PRN):          PHYSICAL EXAM:  GENERAL: NAD, well-groomed, well-developed  HEAD:  Atraumatic, Normocephalic  CHEST/LUNG: Clear to percussion bilaterally; No rales, rhonchi, wheezing, or rubs  HEART: Regular rate and rhythm; No murmurs, rubs, or gallops  ABDOMEN: Soft, Nontender, Nondistended; Bowel sounds present  EXTREMITIES:  2+ Peripheral Pulses, No clubbing, cyanosis, or edema  LYMPH: No lymphadenopathy noted  SKIN: No rashes or lesions    Care Discussed with Consultants/Other Providers [ ] YES  [ ] NO

## 2023-08-14 NOTE — DISCHARGE NOTE NURSING/CASE MANAGEMENT/SOCIAL WORK - FLU SEASON?
Pt presents with mild-mod oral and suspected pharyngeal dysphagia 2/2 overt s/s aspiration/penetration
No

## 2023-08-14 NOTE — DISCHARGE NOTE PROVIDER - PROVIDER TOKENS
FREE:[LAST:[Please F/U with PCP],PHONE:[(   )    -],FAX:[(   )    -],FOLLOWUP:[1 week]] PROVIDER:[TOKEN:[[id number]],FOLLOWUP:[2 weeks]],FREE:[LAST:[Please F/U with PCP],PHONE:[(   )    -],FAX:[(   )    -],FOLLOWUP:[1 week]]

## 2023-08-14 NOTE — PHARMACOTHERAPY INTERVENTION NOTE - COMMENTS
Patient's wife was educated on the switch from Eliquis to Xarelto as well as the purpose and administration. Discussed adverse effects in detail and when to seek medical attention (blood in stool, vomit, etc.). Informed patient to notify all providers he is on this and before any planned procedures. All questions and concerns were answered and addressed. Patient's wife verbalized understanding.    Unique Camejo, Pharm.D.  Clinical Pharmacy Specialist  [facility] [id number]

## 2023-08-14 NOTE — PROGRESS NOTE ADULT - REASON FOR ADMISSION
seizures

## 2023-08-14 NOTE — DISCHARGE NOTE PROVIDER - HOSPITAL COURSE
74 yo m h/o dementia, seizure disorders on keppra and divalproex, h/p dvt/pe has been off eliquis since June 2023  insurance issues. Presents to ED in setting of multiple seizures     Seizures.   ·  Seen by Neurology- recent seizures in the setting of likely missed doses of his AEDS. due to Keppra level being <2.0   Recommendations:   Continue on Keppra 500mg Q12hr   Continue on Depakote 1g BID   Continue on his home medications   No further inpatient Neurological workup.   Can follow up with Neurologist- Dr. Brock. Call [phone] on upon discharge.      Fever.   -infectious work up was negative     DVT of leg (deep venous thrombosis).   · confirmed that xarelto is covered by pharmacy     Hypothyroid.   ·  continue synthroid, tsh ordered.     Renal insufficiency.   ·resolved    case discussed with attending, patient stable for discharge

## 2023-08-14 NOTE — DISCHARGE NOTE NURSING/CASE MANAGEMENT/SOCIAL WORK - NSDCPEFALRISK_GEN_ALL_CORE
For information on Fall & Injury Prevention, visit: [email or web address] OR  [email or web address] OR  https://www.cdc.gov/steadi/patient.html

## 2023-08-14 NOTE — DISCHARGE NOTE PROVIDER - EXTENDED VTE YES NO FOR MLM ENOXAPARIN
,
hemorrhoid as above, will d/c supportive care, colorectal f/u. Patient counseled regarding conditions which should prompt return.

## 2023-08-14 NOTE — DISCHARGE NOTE PROVIDER - NSDCCPCAREPLAN_GEN_ALL_CORE_FT
PRINCIPAL DISCHARGE DIAGNOSIS  Diagnosis: Seizure  Assessment and Plan of Treatment: You were admitted for having seizures, your medications have been adjusted and your symtpoms have resolved, please follow-up with Mamadou Mancuso within 2 weeks.      SECONDARY DISCHARGE DIAGNOSES  Diagnosis: DVT of leg (deep venous thrombosis)  Assessment and Plan of Treatment: You were previosuly in [facility] for DVT, we have started you on Xarelto     PRINCIPAL DISCHARGE DIAGNOSIS  Diagnosis: Seizure  Assessment and Plan of Treatment: You were admitted for having seizures, your medications have been adjusted and your symtpoms have resolved, please follow-up with Mamadou Mancuso within 2 weeks.      SECONDARY DISCHARGE DIAGNOSES  Diagnosis: DVT of leg (deep venous thrombosis)  Assessment and Plan of Treatment: You were previosuly on Elqiuis for DVT, we have started you on Xarelto  Please Follow up with Your PMD in 1 -2 weeks

## 2023-09-08 NOTE — ED ADULT NURSE NOTE - CHIEF COMPLAINT QUOTE
Pt sent from [facility] for failure to thrive. Per EMS, facility stated he has been refusing to eat. Hx. CVA with left sided weakness. HTN. DVT. PE. Hypothyroid. Pt at baseline A&Ox0. Pt arousable to tactile stimuli. Respirations even and unlabored. Pt arrives on 3L nasal canula. EKG in progress.

## 2023-09-08 NOTE — ED ADULT TRIAGE NOTE - CHIEF COMPLAINT QUOTE
Pt sent from [facility] for failure to thrive. Per EMS, facility stated he has been refusing to eat. Hx. CVA with left sided weakness. HTN. DVT. PE. Hypothyroid. Pt at baseline A&Ox0. Pt arousable to tactile stimuli. Respirations even and unlabored. Pt arrives on 3L nasal canula. EKG in progress.

## 2023-09-08 NOTE — ED ADULT NURSE NOTE - OBJECTIVE STATEMENT
Patient came in sent in from Nursing home. As per report, patient has been refusing to eat . No other complaints. Patient response to tactile stimuli. Patient is placed on cardiac monitor with continues pulse ox. Nursing care continues

## 2023-09-08 NOTE — ED ADULT NURSE NOTE - NSFALLHARMRISKINTERV_ED_ALL_ED

## 2023-09-09 NOTE — H&P ADULT - ASSESSMENT
73-year-old male history of dementia, baseline mental status AxOx0, seizure disorder on Keppra and divalproex, history of DVT/PE off Eliquis, hypertension, hypothyroidism presenting sent in from his nursing home for evaluation of failure to thrive, found to have hypernatremia and new renal failure on outside labs.  Per documentation that arrived with the patient, patient has been refusing to eat since yesterday.    1 Hypernatremia  - sec to dehydration  - sp NS bolus  - cw ivf  - BMP q 6  - renal called    2 Lactic acidosis  - ro infection  - cw zosyn  - sp vanco  - IVF  - monitor    3 Seizure disorder  - cw Keppra and valproic acid   - check Keppra level    4 hx of  DVT/PE  - cw AC    Prognosis guarded

## 2023-09-09 NOTE — H&P ADULT - NSHPPHYSICALEXAM_GEN_ALL_CORE
General: frail  Neurology: A&Ox0  Respiratory: CTA B/L  CV: S1S2+  Abdominal: Soft, NT, ND +BS, Last BM  Extremities: edema +

## 2023-09-09 NOTE — CONSULT NOTE ADULT - SUBJECTIVE AND OBJECTIVE BOX
PEPITO BALL  Patient is a 73y old  Male who presents with a chief complaint of FTT (09 Sep 2023 09:23)    HPI:  Asked to see this elderly man with failure to thrive. Sent in for weakness. He opens his eyes and makes eye contact and nods.   Noted to have hypernatremia. Chart states that he was not eating in NH.       PAST MEDICAL & SURGICAL HISTORY:  HTN (hypertension)      Diverticulosis      Hypothyroid      Deep vein thrombosis (DVT)      Pulmonary embolism      No significant past surgical history        MEDICATIONS  (STANDING):  amLODIPine   Tablet 10 milliGRAM(s) Oral daily  divalproex DR 1000 milliGRAM(s) Oral two times a day  levETIRAcetam 500 milliGRAM(s) Oral two times a day  levothyroxine 88 MICROGram(s) Oral daily  piperacillin/tazobactam IVPB.- 3.375 Gram(s) IV Intermittent once  piperacillin/tazobactam IVPB.. 3.375 Gram(s) IV Intermittent every 8 hours  QUEtiapine 75 milliGRAM(s) Oral at bedtime  rivaroxaban 10 milliGRAM(s) Oral with dinner  sodium chloride 0.9%. 1000 milliLiter(s) (100 mL/Hr) IV Continuous <Continuous>  thiamine 100 milliGRAM(s) Oral daily  vancomycin  IVPB 1000 milliGRAM(s) IV Intermittent once    Allergies    fish (Hives; Angioedema)  No Known Drug Allergies  [place] (Swelling; Angioedema)    Intolerances      FAMILY HISTORY:      REVIEW OF SYSTEMS  Unable to assess.      Vital Signs Last 24 Hrs  T(C): 36.6 (09 Sep 2023 12:01), Max: 37.3 (09 Sep 2023 00:30)  T(F): 97.9 (09 Sep 2023 12:01), Max: 99.2 (09 Sep 2023 00:30)  HR: 76 (09 Sep 2023 12:01) (76 - 97)  BP: 121/71 (09 Sep 2023 12:01) (115/77 - 141/78)  BP(mean): --  RR: 18 (09 Sep 2023 12:01) (16 - 20)  SpO2: 98% (09 Sep 2023 12:01) (94% - 100%)    Parameters below as of 09 Sep 2023 12:01  Patient On (Oxygen Delivery Method): nasal cannula  O2 Flow (L/min): 4      PHYSICAL EXAMINATION:  Constitutional:  He appears comfortable and not distressed. Not diaphoretic.    Head and Neck:  The thyroid is normal. Trachea is midline. Dry oral mucosa with lesion on lower lip.    Breasts: Normal examination.    Respiratory: The lungs are clear to auscultation. No dullness and expansion is normal.    Cardiovascular: S1 and S2 are normal. No murmurs rubs or gallops are present.    Gastrointestinal: The abdomen is soft. No tenderness is present. No masses are present. Bowel sounds are normal.    Genitourinary: The bladder is not distended. No CVA tenderness is present.    Extremities: No edema is noted. No deformities are present.    Neurological: Awake. Tone, power and sensation are normal.     Skin: No lesions are seen  or palpated.    Lymph Nodes: No lymphadenopathy is present.                            11.9   7.99  )-----------( 230      ( 09 Sep 2023 08:48 )             39.2     09-09    166<HH>  |  126<H>  |  56<H>  ----------------------------<  135<H>  4.0   |  31  |  1.42<H>    Ca    8.8      09 Sep 2023 08:48  Phos  2.8     09-09  Mg     2.40     09-09    TPro  8.4<H>  /  Alb  3.1<L>  /  TBili  0.4  /  DBili  x   /  AST  37  /  ALT  18  /  AlkPhos  73  09-09    LIVER FUNCTIONS - ( 09 Sep 2023 00:00 )  Alb: 3.1 g/dL / Pro: 8.4 g/dL / ALK PHOS: 73 U/L / ALT: 18 U/L / AST: 37 U/L / GGT: x         < from: Xray Chest 1 View- PORTABLE-Urgent (Xray Chest 1 View- PORTABLE-Urgent .) (09.09.23 @ 01:14) >  No focal consolidations.    < end of copied text >  Blood Gas Venous - Lactate: 2.5 mmol/L (09.09.23 @ 09:22)     pH, Venous: 7.37  pCO2, Venous: 54 mmHg  pO2, Venous: 41 mmHg  HCO3, Venous: 31 mmol/L  Base Excess, Venous: 4.7 mmol/L  Oxygen Saturation, Venous: 65.3 %  Total CO2, Venous: 32.9 mmol/L  Blood Gas Source Venous: Venous  Protein/Creatinine Ratio Calculation: 1.0 Ratio (09.09.23 @ 00:34)

## 2023-09-09 NOTE — CONSULT NOTE ADULT - ASSESSMENT
Hypernatremia:  Due to dehydration, poor intake.  He is hemodynamically stable now.  - Change NS to D5W at 60 ml/hour.  - Trend BMP BID.    JESSICA:  Cue to Pre-Renal azotemia.  - Hydrate as stated above.

## 2023-09-09 NOTE — ED PROVIDER NOTE - ATTENDING CONTRIBUTION TO CARE
I (Srinivas) agree with above, I performed a history and physical. Counseled deyanira medical staff, physician assistant, and/or medical student on medical decision making as documented. Medical decisions and treatment interventions were made in real time during the patient encounter. Additionally and/or with the following exceptions: 73-year-old male past medical history dementia, seizure disorder on Keppra and valproate, DVT PE, hypertension who is presenting to the emergency department with failure to thrive.  Unable to obtain history from patient.  Patient is chronically ill-appearing abdomen soft nontender nondistended CBC within normal limits, INR slightly elevated to 1.38, sodium 1.68, creatinine 1.67 with BUN 65 suggestive of dehydration and failure to thrive.  Patient also had a urinary tract infection.  Was admitted to the hospital for treatment.  The patient's condition was not amenable to outpatient treatment due either the lack of feasibility of outpatient care coordination, possibility for further decompensation with adverse outcome if discharge, or treatments and diagnostic  modalities only available during an inpatient hospitalization.

## 2023-09-09 NOTE — ED PROVIDER NOTE - PROGRESS NOTE DETAILS
Saida Ku MD, PGY2:  Patient hyponatremic to 165, suspect that this is in the setting of dehydration, will replete fluids now, patient's creatinine is 1.67, likely prerenal.  Urine analysis shows evidence of urinary tract infection, will give ceftriaxone and admit patient.

## 2023-09-09 NOTE — H&P ADULT - HISTORY OF PRESENT ILLNESS
73-year-old male history of dementia, baseline mental status AxOx0, seizure disorder on Keppra and divalproex, history of DVT/PE off Eliquis, hypertension, hypothyroidism presenting sent in from his nursing home for evaluation of failure to thrive, found to have hypernatremia and new renal failure on outside labs.  Per documentation that arrived with the patient, patient has been refusing to eat since yesterday.

## 2023-09-09 NOTE — ED PROVIDER NOTE - CARE PLAN
Principal Discharge DX:	Adult failure to thrive  Secondary Diagnosis:	JESSICA (acute kidney injury)   1

## 2023-09-09 NOTE — H&P ADULT - NSHPLABSRESULTS_GEN_ALL_CORE
Lab Results:  CBC  CBC Full  -  ( 09 Sep 2023 06:00 )  WBC Count : x  RBC Count : 4.86 M/uL  Hemoglobin : 15.2 g/dL  Hematocrit : 51.9 %  Platelet Count - Automated : 171 K/uL  Mean Cell Volume : 106.8 fL  Mean Cell Hemoglobin : 31.3 pg  Mean Cell Hemoglobin Concentration : 29.3 gm/dL  Auto Neutrophil # : x  Auto Lymphocyte # : x  Auto Monocyte # : x  Auto Eosinophil # : x  Auto Basophil # : x  Auto Neutrophil % : x  Auto Lymphocyte % : x  Auto Monocyte % : x  Auto Eosinophil % : x  Auto Basophil % : x    .		Differential:	[] Automated		[] Manual  Chemistry                        15.2   x     )-----------( 171      ( 09 Sep 2023 06:00 )             51.9     09-09    163<HH>  |  123<H>  |  64<H>  ----------------------------<  104<H>  5.2   |  21<L>  |  1.51<H>    Ca    9.0      09 Sep 2023 06:00  Phos  3.2     09-09  Mg     2.50     09-09    TPro  8.4<H>  /  Alb  3.1<L>  /  TBili  0.4  /  DBili  x   /  AST  37  /  ALT  18  /  AlkPhos  73  09-09    LIVER FUNCTIONS - ( 09 Sep 2023 00:00 )  Alb: 3.1 g/dL / Pro: 8.4 g/dL / ALK PHOS: 73 U/L / ALT: 18 U/L / AST: 37 U/L / GGT: x           PT/INR - ( 09 Sep 2023 00:00 )   PT: 15.3 sec;   INR: 1.38 ratio         PTT - ( 09 Sep 2023 00:00 )  PTT:32.6 sec  Urinalysis Basic - ( 09 Sep 2023 06:00 )    Color: x / Appearance: x / SG: x / pH: x  Gluc: 104 mg/dL / Ketone: x  / Bili: x / Urobili: x   Blood: x / Protein: x / Nitrite: x   Leuk Esterase: x / RBC: x / WBC x   Sq Epi: x / Non Sq Epi: x / Bacteria: x            MICROBIOLOGY/CULTURES:      RADIOLOGY RESULTS: reviewed

## 2023-09-09 NOTE — ED PROVIDER NOTE - CLINICAL SUMMARY MEDICAL DECISION MAKING FREE TEXT BOX
73-year-old male history of dementia, baseline mental status AxOx0, seizure disorder on Keppra and divalproex, history of DVT/PE off Eliquis, hypertension, hypothyroidism presenting sent in from his nursing home for evaluation of failure to thrive, found to have hypernatremia and new renal failure on outside labs.  Per documentation that arrived with the patient, patient has been refusing to eat since yesterday.  He was receiving IV fluids at the nursing home.  Outside labs showed a sodium of 166, chloride of 123, BUN of 64 and creatinine of 1.41.  On arrival patient was hemodynamically stable on 3 L of nasal cannula satting 95%.  Patient was at his baseline mental status, noninteractive but does respond to pain.  Patient was not tachycardic, without any evidence of respiratory distress, lungs were clear bilaterally, abdomen was soft nontender, pulses were intact bilaterally.    Differential diagnosis includes but is not limited to dehydration causing hyponatremia, malnutrition, concern for urinary tract infection, versus pneumonia.  Will get EKG, CBC, CMP, urine studies and urine culture, coags, chest x-ray.  Patient will need to be admitted.  We will give fluids once we see the sodium level.

## 2023-09-10 NOTE — PATIENT PROFILE ADULT - FALL HARM RISK - RISK INTERVENTIONS
Assistance OOB with selected safe patient handling equipment/Assistance with ambulation/Communicate Fall Risk and Risk Factors to all staff, patient, and family/Discuss with provider need for PT consult/Monitor gait and stability/Reinforce activity limits and safety measures with patient and family/Visual Cue: Yellow wristband/Bed in lowest position, wheels locked, appropriate side rails in place/Call bell, personal items and telephone in reach/Instruct patient to call for assistance before getting out of bed or chair/Non-slip footwear when patient is out of bed/[place] to call system/Physically safe environment - no spills, clutter or unnecessary equipment/Purposeful Proactive Rounding/Room/bathroom lighting operational, light cord in reach

## 2023-09-10 NOTE — PROGRESS NOTE ADULT - SUBJECTIVE AND OBJECTIVE BOX
PEPITO BALL  73y  Patient is a 73y old  Male who presents with a chief complaint of FTT (10 Sep 2023 12:54)    HPI:  Followed for Hypernatremia and JESSICA.  On D5W.    HEALTH ISSUES - PROBLEM Dx:        MEDICATIONS  (STANDING):  amLODIPine   Tablet 10 milliGRAM(s) Oral daily  dextrose 5%. 1000 milliLiter(s) (75 mL/Hr) IV Continuous <Continuous>  divalproex DR 1000 milliGRAM(s) Oral two times a day  levETIRAcetam  IVPB 500 milliGRAM(s) IV Intermittent every 12 hours  levothyroxine 88 MICROGram(s) Oral daily  piperacillin/tazobactam IVPB.. 3.375 Gram(s) IV Intermittent every 8 hours  QUEtiapine 75 milliGRAM(s) Oral at bedtime  rivaroxaban 10 milliGRAM(s) Oral with dinner  thiamine 100 milliGRAM(s) Oral daily    MEDICATIONS  (PRN):    Vital Signs Last 24 Hrs  T(C): 36.6 (10 Sep 2023 11:12), Max: 36.7 (10 Sep 2023 05:35)  T(F): 97.9 (10 Sep 2023 11:12), Max: 98 (10 Sep 2023 05:35)  HR: 64 (10 Sep 2023 11:12) (64 - 76)  BP: 121/69 (10 Sep 2023 11:12) (105/64 - 126/93)  BP(mean): 75 (09 Sep 2023 20:45) (75 - 75)  RR: 18 (10 Sep 2023 11:12) (18 - 20)  SpO2: 98% (10 Sep 2023 11:12) (98% - 100%)    Parameters below as of 10 Sep 2023 11:12  Patient On (Oxygen Delivery Method): nasal cannula  O2 Flow (L/min): 4    Daily     Daily     PHYSICAL EXAM:  Constitutional: He appears comfortable and not distressed. Not diaphoretic.    Neck:  The thyroid is normal. Trachea is midline.     Breasts: Normal examination.    Respiratory: The lungs are clear to auscultation. No dullness and expansion is normal.    Cardiovascular: S1 and S2 are normal. No murmurs, rubs or gallops are present.    Gastrointestinal: The abdomen is soft. No tenderness is present. No masses are present. Bowel sounds are normal.    Genitourinary: The bladder is not distended. No CVA tenderness is present.    Extremities: No edema is noted. No deformities are present.    Neurological: Cognition is normal. Tone, power and sensation are normal. Gait is steady.    Skin: No lesions are seen  or palpated.    Lymph Nodes: No lymphadenopathy is present.    Psychiatric: Mood is appropriate. No hallucinations or flight of ideas are noted.                              10.3   6.00  )-----------( 193      ( 10 Sep 2023 11:07 )             33.3     09-10    155<H>  |  119<H>  |  38<H>  ----------------------------<  170<H>  3.5   |  25  |  1.00    Ca    7.8<L>      10 Sep 2023 11:07  Phos  1.8     09-10  Mg     1.90     09-10    TPro  6.7  /  Alb  2.3<L>  /  TBili  0.4  /  DBili  x   /  AST  41<H>  /  ALT  14  /  AlkPhos  68  09-10    Urinalysis Basic - ( 10 Sep 2023 11:07 )    Color: x / Appearance: x / SG: x / pH: x  Gluc: 170 mg/dL / Ketone: x  / Bili: x / Urobili: x   Blood: x / Protein: x / Nitrite: x   Leuk Esterase: x / RBC: x / WBC x   Sq Epi: x / Non Sq Epi: x / Bacteria: x

## 2023-09-10 NOTE — PROGRESS NOTE ADULT - ASSESSMENT
73-year-old male history of dementia, baseline mental status AxOx0, seizure disorder on Keppra and divalproex, history of DVT/PE off Eliquis, hypertension, hypothyroidism presenting sent in from his nursing home for evaluation of failure to thrive, found to have hypernatremia and new renal failure on outside labs.  Per documentation that arrived with the patient, patient has been refusing to eat since yesterday.    1 Hypernatremia  - sec to dehydration  - sp NS bolus  - cw ivf  - BMP q 6  - renal fu     2 Lactic acidosis  - ro infection  - cw zosyn  - sp vanco  - IVF  - monitor    3 Seizure disorder  - cw Keppra and valproic acid   - check Keppra level    4 hx of  DVT/PE  - cw AC    Prognosis guarded

## 2023-09-10 NOTE — SWALLOW BEDSIDE ASSESSMENT ADULT - NS SPL SWALLOW CLINIC TRIAL FT
Patient with decline/refusal for PO trials with patient shaking head "no" upon utensil presentation with no attempt to retrieve/strip from utensil presentation and tight labial seal.

## 2023-09-10 NOTE — PROGRESS NOTE ADULT - SUBJECTIVE AND OBJECTIVE BOX
Patient is a 73y old  Male who presents with a chief complaint of FTT (10 Sep 2023 06:20)    Date of servie : 09-10-23 @ 12:55  INTERVAL HPI/OVERNIGHT EVENTS:  T(C): 36.6 (09-10-23 @ 11:12), Max: 36.7 (09-10-23 @ 05:35)  HR: 64 (09-10-23 @ 11:12) (64 - 76)  BP: 121/69 (09-10-23 @ 11:12) (105/64 - 126/93)  RR: 18 (09-10-23 @ 11:12) (18 - 20)  SpO2: 98% (09-10-23 @ 11:12) (98% - 100%)  Wt(kg): --  I&O's Summary      LABS:                        10.3   6.00  )-----------( 193      ( 10 Sep 2023 11:07 )             33.3     09-10    155<H>  |  119<H>  |  38<H>  ----------------------------<  170<H>  3.5   |  25  |  1.00    Ca    7.8<L>      10 Sep 2023 11:07  Phos  1.8     09-10  Mg     1.90     09-10    TPro  6.7  /  Alb  2.3<L>  /  TBili  0.4  /  DBili  x   /  AST  41<H>  /  ALT  14  /  AlkPhos  68  09-10    PT/INR - ( 09 Sep 2023 00:00 )   PT: 15.3 sec;   INR: 1.38 ratio         PTT - ( 09 Sep 2023 00:00 )  PTT:32.6 sec  Urinalysis Basic - ( 10 Sep 2023 11:07 )    Color: x / Appearance: x / SG: x / pH: x  Gluc: 170 mg/dL / Ketone: x  / Bili: x / Urobili: x   Blood: x / Protein: x / Nitrite: x   Leuk Esterase: x / RBC: x / WBC x   Sq Epi: x / Non Sq Epi: x / Bacteria: x      CAPILLARY BLOOD GLUCOSE      POCT Blood Glucose.: 126 mg/dL (10 Sep 2023 07:01)  POCT Blood Glucose.: 132 mg/dL (10 Sep 2023 00:59)  POCT Blood Glucose.: 156 mg/dL (09 Sep 2023 17:12)        Urinalysis Basic - ( 10 Sep 2023 11:07 )    Color: x / Appearance: x / SG: x / pH: x  Gluc: 170 mg/dL / Ketone: x  / Bili: x / Urobili: x   Blood: x / Protein: x / Nitrite: x   Leuk Esterase: x / RBC: x / WBC x   Sq Epi: x / Non Sq Epi: x / Bacteria: x        MEDICATIONS  (STANDING):  amLODIPine   Tablet 10 milliGRAM(s) Oral daily  dextrose 5%. 1000 milliLiter(s) (75 mL/Hr) IV Continuous <Continuous>  divalproex DR 1000 milliGRAM(s) Oral two times a day  levETIRAcetam  IVPB 500 milliGRAM(s) IV Intermittent every 12 hours  levothyroxine 88 MICROGram(s) Oral daily  piperacillin/tazobactam IVPB.. 3.375 Gram(s) IV Intermittent every 8 hours  QUEtiapine 75 milliGRAM(s) Oral at bedtime  rivaroxaban 10 milliGRAM(s) Oral with dinner  thiamine 100 milliGRAM(s) Oral daily    MEDICATIONS  (PRN):          PHYSICAL EXAM:  GENERAL: frail  CHEST/LUNG: Clear to percussion bilaterally; No rales, rhonchi, wheezing, or rubs  HEART: Regular rate and rhythm; No murmurs, rubs, or gallops  ABDOMEN: Soft, Nontender, Nondistended; Bowel sounds present  EXTREMITIES:  edema +    Care Discussed with Consultants/Other Providers [ x] YES  [ ] NO

## 2023-09-10 NOTE — PROGRESS NOTE ADULT - ASSESSMENT
Hypernatremia:  Due to dehydration, poor intake.  He is hemodynamically stable now.  - Reduce D5W to 50 ml/hour.  - Trend BMP BID.    JESSICA:  Due to Pre-Renal azotemia.  - Hydrate as stated above.

## 2023-09-10 NOTE — CONSULT NOTE ADULT - ASSESSMENT
FFT  hyponatremia    nutritional optimization   fu with nephrology   tx of sepsis    Sepsis  likely due to UTI  on abx    HTN  stable    history of DVT  on xarelto  FFT  hypernatremia    nutritional optimization   fu with nephrology   tx of sepsis    Sepsis  likely due to UTI  on abx    HTN  stable    history of DVT  on xarelto

## 2023-09-10 NOTE — CONSULT NOTE ADULT - SUBJECTIVE AND OBJECTIVE BOX
CHIEF COMPLAINT:Patient is a 73y old  Male who presents with a chief complaint of FTT (09 Sep 2023 12:06)      HISTORY OF PRESENT ILLNESS:    73 male with  history as below admitted with decreased PO intake and lethargy   ROS limited but states "I am alright "    PAST MEDICAL & SURGICAL HISTORY:  HTN (hypertension)      Diverticulosis      Hypothyroid      Deep vein thrombosis (DVT)      Pulmonary embolism      No significant past surgical history              MEDICATIONS:  amLODIPine   Tablet 10 milliGRAM(s) Oral daily  rivaroxaban 10 milliGRAM(s) Oral with dinner    piperacillin/tazobactam IVPB.. 3.375 Gram(s) IV Intermittent every 8 hours      divalproex DR 1000 milliGRAM(s) Oral two times a day  levETIRAcetam  IVPB 500 milliGRAM(s) IV Intermittent every 12 hours  QUEtiapine 75 milliGRAM(s) Oral at bedtime      levothyroxine 88 MICROGram(s) Oral daily    dextrose 5%. 1000 milliLiter(s) IV Continuous <Continuous>  thiamine 100 milliGRAM(s) Oral daily      FAMILY HISTORY:      Non-contributory    SOCIAL HISTORY:    No tobacco, drugs or etoh    Allergies    fish (Hives; Angioedema)  No Known Drug Allergies  [place] (Swelling; Angioedema)    Intolerances    	    REVIEW OF SYSTEMS:  as above  The rest of the 14 points ROS reviewed and except above they are unremarkable.        PHYSICAL EXAM:  T(C): 36.6 (09-10-23 @ 00:55), Max: 36.6 (09-09-23 @ 12:01)  HR: 71 (09-10-23 @ 00:55) (71 - 76)  BP: 126/93 (09-10-23 @ 00:55) (105/64 - 126/93)  RR: 18 (09-10-23 @ 00:55) (18 - 20)  SpO2: 98% (09-10-23 @ 00:55) (98% - 100%)  Wt(kg): --  I&O's Summary      JVP: Normal  Neck: supple  Lung: clear   CV: S1 S2 , Murmur:  Abd: soft  Ext: No edema  neuro: Awake / lethargic   Psych: flat affect  Skin: normal    LABS/DATA:    TELEMETRY: 	    ECG:  	sinus, non specific st wave abn   	  CARDIAC MARKERS:          Summary:   1. Left ventricular ejection fraction, by visual estimation, is 60 to   65%.   2. Technically limited study.   3. Normal global left ventricular systolic function.   4. Normal left ventricular internal cavity size.   5. Spectral Doppler shows impaired relaxation pattern of left   ventricular myocardial filling (Grade I diastolic dysfunction).   6. Normal right ventricular size and function.   7. Normal left atrial size.   8. Normal right atrial size.   9. There is no evidence of pericardial effusion.  10. Mild thickening and calcification of the anterior and posterior   mitral valve leaflets.  11. Trace mitral valve regurgitation.  12. Mild-moderate tricuspid regurgitation.  13. Sclerotic aortic valve with decreased opening.  14. Estimated pulmonary artery systolic pressure is 39.6 mmHg assuming a   right atrial pressure of 8 mmHg, which is consistent with borderline   pulmonary hypertension.                              11.9   7.99  )-----------( 230      ( 09 Sep 2023 08:48 )             39.2     09-09    158<H>  |  126<H>  |  50<H>  ----------------------------<  151<H>  4.2   |  22  |  1.16    Ca    8.2<L>      09 Sep 2023 23:20  Phos  2.1     09-09  Mg     2.10     09-09    TPro  8.4<H>  /  Alb  3.1<L>  /  TBili  0.4  /  DBili  x   /  AST  37  /  ALT  18  /  AlkPhos  73  09-09    proBNP:   Lipid Profile:   HgA1c:   TSH:

## 2023-09-10 NOTE — SWALLOW BEDSIDE ASSESSMENT ADULT - SWALLOW EVAL: DIAGNOSIS
Patient is not orally receptive to PO trials with refusal for PO trials upon utensil presentation with patient shaking his head "no" despite SLP encouragement. Patient makes no attempt to retrieve from utensil presentation with tight labial seal. Therefore oral and pharyngeal stage could not be assessed. Patient with decline/refusal for PO trials upon utensil presentation with patient shaking his head "no" despite SLP encouragement. Patient makes no attempt to retrieve from utensil presentation with tight labial seal. Therefore oral and pharyngeal stage could not be assessed.

## 2023-09-10 NOTE — SWALLOW BEDSIDE ASSESSMENT ADULT - COMMENTS
H&P Adult 9/9: "73-year-old male history of dementia, baseline mental status AxOx0, seizure disorder on Keppra and divalproex, history of DVT/PE off Eliquis, hypertension, hypothyroidism presenting sent in from his nursing home for evaluation of failure to thrive, found to have hypernatremia and new renal failure on outside labs.  Per documentation that arrived with the patient, patient has been refusing to eat since yesterday."     CXR 9/9: "Impression: No focal consolidation"    Per nursing home charting, patient receives soft solids with pureed meats with mildly thick liquids.     Of note, patient is known to this department, seen during a recent previous admission on 8/9/23 with recommendations of Minced and Moist Solids with Moderately Thick Liquids. (see report for details)     Patient seen awake/alert to verbal stimuli during clinical swallow evaluation this AM. Patient follows simple one step directives and answers simple yes/no questions inconsistently. Patient with nasal cannula.

## 2023-09-11 NOTE — PROGRESS NOTE ADULT - ASSESSMENT
FFT  hypernatremia    nutritional optimization   fu with nephrology   tx of sepsis    Sepsis  likely due to UTI  on abx    HTN  stable    history of DVT  on xarelto

## 2023-09-11 NOTE — CONSULT NOTE ADULT - ASSESSMENT
73y Male with bilateral heel wound   - Patient seen and evaluated  - Afebrile, WBC 5.06  - L foot posterior heel wound to dermis with central stable eschar, R foot heel unstageable DTI with mild bogginess, both heel wounds no periwound erythema no drainage, no acute signs of infection.   - Excisional debridement carried out with removal of R foot heel necrotic eschar dermis tissue to the level of dermis, and not beyond using sterile suture removal . Post-debridement wound depth to dermis, granular base, focal fibrotic patches with midl serosanguineous drainage.    - Podiatry stable for discharge, follow up info in Discharge provider note.   - While in house, Santyl R foot heel, Betadine to L foot heel, followed by Allevyn pad DAILY. STRICT decubitus precautions, Z- FLOWS boots at all time when in bed and in chair resting.   - reconsult PRN   - Discussed with attending.

## 2023-09-11 NOTE — DIETITIAN INITIAL EVALUATION ADULT - OTHER INFO
73-year-old male history of dementia, baseline mental status AxOx0, seizure disorder on Keppra and divalproex, history of DVT/PE off Eliquis, hypertension, hypothyroidism presenting sent in from his nursing home for evaluation of failure to thrive, found to have hypernatremia and new renal failure on outside labs.    Pt seen confused, NPO at present. SLP attempted to perform swallow eval but patient with decline/refusal for PO trials.  Pt with unstagable to Lt & Rt heel per wound nurse note. Suggest MVI, Vit. C supplement for healing. Noted +1 edema Rt hand per RN flow sheet. 1. Defer Nutrition Plan of Care to MD based on GOC discussion and decisions of Pt's family;  2. If alternate means of Nutrition to initiate, consult nutrition for recommendations.

## 2023-09-11 NOTE — PROGRESS NOTE ADULT - SUBJECTIVE AND OBJECTIVE BOX
Subjective: Patient seen and examined. No new events except as noted.     SUBJECTIVE/ROS:  nad      MEDICATIONS:  MEDICATIONS  (STANDING):  amLODIPine   Tablet 10 milliGRAM(s) Oral daily  dextrose 5%. 1000 milliLiter(s) (50 mL/Hr) IV Continuous <Continuous>  divalproex DR 1000 milliGRAM(s) Oral two times a day  levETIRAcetam  IVPB 500 milliGRAM(s) IV Intermittent every 12 hours  levothyroxine 88 MICROGram(s) Oral daily  piperacillin/tazobactam IVPB.. 3.375 Gram(s) IV Intermittent every 8 hours  QUEtiapine 75 milliGRAM(s) Oral at bedtime  rivaroxaban 10 milliGRAM(s) Oral with dinner  thiamine 100 milliGRAM(s) Oral daily      PHYSICAL EXAM:  T(C): 37 (09-11-23 @ 04:10), Max: 37 (09-11-23 @ 04:10)  HR: 66 (09-11-23 @ 04:10) (64 - 72)  BP: 130/81 (09-11-23 @ 04:10) (120/55 - 131/69)  RR: 20 (09-11-23 @ 04:10) (18 - 20)  SpO2: 100% (09-11-23 @ 04:10) (98% - 100%)  Wt(kg): --  I&O's Summary          JVP: Normal  Neck: supple  Lung: clear   CV: S1 S2 , Murmur:  Abd: soft  Ext: No edema  neuro: Awake   Psych: flat affect  Skin: normal``    LABS/DATA:    CARDIAC MARKERS:                                11.2   5.06  )-----------( 103      ( 11 Sep 2023 05:59 )             34.9     09-11    147<H>  |  113<H>  |  29<H>  ----------------------------<  131<H>  3.9   |  23  |  0.86    Ca    8.0<L>      11 Sep 2023 05:59  Phos  1.7     09-11  Mg     1.90     09-11    TPro  6.7  /  Alb  2.3<L>  /  TBili  0.4  /  DBili  x   /  AST  41<H>  /  ALT  14  /  AlkPhos  68  09-10    proBNP:   Lipid Profile:   HgA1c:   TSH:     TELE:  EKG:

## 2023-09-11 NOTE — DISCHARGE NOTE PROVIDER - NSDCCPTREATMENT_GEN_ALL_CORE_FT
PRINCIPAL PROCEDURE  Procedure: Chest xray, PA & lateral  Findings and Treatment:   < end of copied text >  INTERPRETATION:  EXAMINATION: XR CHEST URGENT  CLINICAL INDICATION: Desaturation on room air, on NGT feeds  TECHNIQUE: Single frontal, portable view of the chest was obtained.  COMPARISON: Chest x-ray 10/7/2023.  FINDINGS:  Support devices: Enteric tube tip in stomach.  The cardiomediastinal silhouette is  not accurately assessed in this AP   projection.  Increased moderate right pleural effusion with new airspace opacities. 3   cm area of alveolar consolidation, new  No pneumothorax.  No acute bony abnormality.  IMPRESSION:  Increased moderate right pleural effusion/adjacent atelectasis.  A 3 cm area of consolidation is new.  --- End of Report ---< from: Xray Chest 1 View- PORTABLE-Urgent (Xray Chest 1 View- PORTABLE-Urgent .) (10.09.23 @ 04:19) >        SECONDARY PROCEDURE  Procedure: Xray of right foot, three weight-bearing views  Findings and Treatment:   < end of copied text >  INTERPRETATION:  CLINICAL INDICATION: bilateral foot pain  EXAM:  Frontal, oblique, and lateral views of both feet from 9/12/2023 at 0956.   Compared to prior study from 8/8/2023.  IMPRESSION:  Bandaging material overlies bilateral posterior heel regions. No tracking   soft tissue gas collections, radiopaque foreign bodies, or gross   radiographic evidence for osteomyelitis.  No fractures or dislocations.  Tarsometatarsal alignment maintained without evidence for a Lisfranc   injury.  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   Preserved remaining visualized joint spaces and no joint marginerosions.  Small plantar posterior calcaneal enthesophytes. Conspicuous os peroneum   adjacent to left calcaneocuboid articulation.  No lytic or blastic lesions.  --- End of Report ---< from: Xray Foot AP + Lateral + Oblique, Bilat (09.12.23 @ 09:56) >

## 2023-09-11 NOTE — ADVANCED PRACTICE NURSE CONSULT - RECOMMEDATIONS
Recommending podiatry consult for bilateral heel full thickness wounds    Recommending imaging to rule out OM, gas or fluid collections to bilateral feet    Topical Recommendations    Nasal cannula: Secure nasal cannula with posey trach ties to offload pressure, thank you.     Bilateral heels: Cleanse with NS, pat dry. Paint with betadine, allow to dry. Secure with abdominal pad and kerlix, change daily and PRN if soiled. Float heels with heel elevation boots at all times.    Sacrum to bilateral buttocks: Cleanse with skin cleanser, pat dry. Apply Mark moisture barrier cream twice a day and PRN with incontinent episodes.     Continue low air loss bed therapy, continue heel elevation, continue to turn & reposition per protocol, soft pillow between bony prominences, continue moisture management with barrier creams & single breathable pad, continue measures to decrease friction/shear/pressure. Continue with nutritional support as per dietary/orders.    Plan of care discussed with primary ACP Maria Del Carmen Christian.    Please contact Wound Care Service Line if we can be of further assistance (ext [phone]).

## 2023-09-11 NOTE — PROGRESS NOTE ADULT - ASSESSMENT
73-year-old male history of dementia, baseline mental status AxOx0, seizure disorder on Keppra and divalproex, history of DVT/PE off Eliquis, hypertension, hypothyroidism presenting sent in from his nursing home for evaluation of failure to thrive, found to have hypernatremia and new renal failure on outside labs.  Per documentation that arrived with the patient, patient has been refusing to eat since yesterday.    1 Hypernatremia  - sec to dehydration  - sp NS bolus  - cw ivf  - BMP q 12  - renal fu     2 Lactic acidosis  - ro infection  - cw zosyn  - IVF  - monitor    3 Seizure disorder  - cw Keppra and valproic acid   - check Keppra level    4 hx of  DVT/PE  - cw AC    5 Dysphagia  - NPO now  - speech and swallow fu     Prognosis guarded

## 2023-09-11 NOTE — CONSULT NOTE ADULT - SUBJECTIVE AND OBJECTIVE BOX
PEPITO BALL  [id number] 73yM   --------------------------------------  Patient is a 73y old  Male who presents with a chief complaint of FTT (11 Sep 2023 15:42)      HPI:  73-year-old male history of dementia, baseline mental status AxOx0, seizure disorder on Keppra and divalproex, history of DVT/PE off Eliquis, hypertension, hypothyroidism presenting sent in from his nursing home for evaluation of failure to thrive, found to have hypernatremia and new renal failure on outside labs.  Per documentation that arrived with the patient, patient has been refusing to eat since yesterday. (09 Sep 2023 09:23)      PAST MEDICAL & SURGICAL HISTORY:  HTN (hypertension)      Diverticulosis      Hypothyroid      Deep vein thrombosis (DVT)      Pulmonary embolism      No significant past surgical history          MEDICATIONS  (STANDING):  amLODIPine   Tablet 10 milliGRAM(s) Oral daily  dextrose 5% + sodium chloride 0.9%. 1000 milliLiter(s) (50 mL/Hr) IV Continuous <Continuous>  divalproex DR 1000 milliGRAM(s) Oral two times a day  levETIRAcetam  IVPB 500 milliGRAM(s) IV Intermittent every 12 hours  levothyroxine 88 MICROGram(s) Oral daily  piperacillin/tazobactam IVPB.. 3.375 Gram(s) IV Intermittent every 8 hours  QUEtiapine 75 milliGRAM(s) Oral at bedtime  rivaroxaban 10 milliGRAM(s) Oral with dinner  thiamine 100 milliGRAM(s) Oral daily    MEDICATIONS  (PRN):      Allergies    fish (Hives; Angioedema)  No Known Drug Allergies  [place] (Swelling; Angioedema)    Intolerances        --------------------------------------  VITALS:    Vital Signs Last 24 Hrs  T(C): 37.2 (11 Sep 2023 17:00), Max: 37.2 (11 Sep 2023 17:00)  T(F): 99 (11 Sep 2023 17:00), Max: 99 (11 Sep 2023 17:00)  HR: 80 (11 Sep 2023 17:00) (66 - 80)  BP: 145/82 (11 Sep 2023 17:00) (130/81 - 151/69)  BP(mean): --  RR: 18 (11 Sep 2023 17:00) (18 - 20)  SpO2: 100% (11 Sep 2023 17:00) (100% - 100%)    Parameters below as of 11 Sep 2023 17:00  Patient On (Oxygen Delivery Method): nasal cannula  O2 Flow (L/min): 4      --------------------------------------  LABS:                          11.2   5.06  )-----------( 103      ( 11 Sep 2023 05:59 )             34.9       09-11    144  |  111<H>  |  21  ----------------------------<  145<H>  4.3   |  23  |  0.82    Ca    7.7<L>      11 Sep 2023 18:40  Phos  2.9     09-11  Mg     1.60     09-11    TPro  6.7  /  Alb  2.3<L>  /  TBili  0.4  /  DBili  x   /  AST  41<H>  /  ALT  14  /  AlkPhos  68  09-10      CAPILLARY BLOOD GLUCOSE      POCT Blood Glucose.: 151 mg/dL (11 Sep 2023 18:08)  POCT Blood Glucose.: 129 mg/dL (11 Sep 2023 06:44)  POCT Blood Glucose.: 131 mg/dL (11 Sep 2023 00:51)          LOWER EXTREMITY PHYSICAL EXAM:    Vascular: DP/PT 2/4, B/L, CFT < 3seconds B/L, Temperature gradient warm to cool, B/L.   Neuro: Epicritic sensation intact to the level of digit, B/L.  Musculoskeletal/Ortho: unremarkable   Skin: L foot posterior heel wound to dermis with central stable eschar, R foot heel unstageable DTI with mild bogginess, both heel wounds no periwound erythema no drainage, no acute signs of infection.     RADIOLOGY & ADDITIONAL STUDIES:    ACC: [id number] EXAM:  XR FOOT 2 VIEWS LT   ORDERED BY: ROBINSON WONG     PROCEDURE DATE:  08/08/2023          INTERPRETATION:  CLINICAL INDICATION: left foot pain    EXAM:  Frontal oblique lateral left foot from 8/8/2023 at 1759. No similar prior   studies available for comparison.    IMPRESSION:  No fractures or dislocations.    Tarsometatarsal alignment maintained without evidence for a Lisfranc   injury.    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   Preserved remaining visualized joint spaces and no joint margin erosions.    Small plantar posterior calcaneal enthesophytes.    No lytic or blastic lesions.    --- End of Report ---            MAYA FIGUEROA MD; Attending Radiologist  This document has been electronically signed. Aug  9 2023 10:27AM

## 2023-09-11 NOTE — DISCHARGE NOTE PROVIDER - NSDCCPCAREPLAN_GEN_ALL_CORE_FT
PRINCIPAL DISCHARGE DIAGNOSIS  Diagnosis: Adult failure to thrive  Assessment and Plan of Treatment: You came to the hospital for failure to thrive. You were found to have high sodium which was corrected while in the hospital. You were assessed by speech and swallow who recommended a pureed diet and mildly thick liquids. Upon further discussion with your family, it was decided that your comfort took most priority over further aggressive interventions. You will be going to subacute rehab with hospice care for further focus on your comfort.      SECONDARY DISCHARGE DIAGNOSES  Diagnosis: JESSICA (acute kidney injury)  Assessment and Plan of Treatment: You were found to have a kidney injury which was corrected with fluids. Please continue to stay hydrated.

## 2023-09-11 NOTE — DISCHARGE NOTE PROVIDER - CARE PROVIDER_API CALL
Mamadou Moser  Podiatric Medicine and Surgery  [address], [address]  [place] [address]  Phone: [phone]  Fax: [phone]  Follow Up Time:

## 2023-09-11 NOTE — DISCHARGE NOTE PROVIDER - HOSPITAL COURSE
73-year-old male history of dementia, baseline mental status AxOx0, seizure disorder on Keppra and divalproex, history of DVT/PE off Eliquis, hypertension, hypothyroidism presenting sent in from his nursing home for evaluation of failure to thrive, found to have hypernatremia and new renal failure on outside labs.  Per documentation that arrived with the patient, patient has been refusing to eat since yesterday. Pt's hypernatremia resolved with fluids. Speech and swallow saw patient and recommended pureed diet with mildly thick liquids. Podiatry also saw pt for bilateral heel wounds. These were debrided and cared for. Upon GOC discussion with family, comfort care was decided and patient was transferred to [facility] with hospice care.

## 2023-09-11 NOTE — PROGRESS NOTE ADULT - ASSESSMENT
73-year-old male history of dementia, baseline mental status AxOx0, seizure disorder on Keppra and divalproex, history of DVT/PE off Eliquis, hypertension, hypothyroidism presenting sent in from his nursing home for evaluation of failure to thrive, found to have hypernatremia and new renal failure on outside labs.    Hypernatremia:  Due to dehydration, poor intake.  continue d5  pt still NPO  na improving   - Trend BMP BID.    JESSICA:  Due to Pre-Renal azotemia.  - Hydrate as stated above.  resolved    hypophos  supplemented  monitor    htn  controlled  continue current meds  monitor

## 2023-09-11 NOTE — DISCHARGE NOTE PROVIDER - DETAILS OF MALNUTRITION DIAGNOSIS/DIAGNOSES
This patient has been assessed with a concern for Malnutrition and was treated during this hospitalization for the following Nutrition diagnosis/diagnoses:     -  10/04/2023: Severe protein-calorie malnutrition

## 2023-09-11 NOTE — PROGRESS NOTE ADULT - SUBJECTIVE AND OBJECTIVE BOX
Patient is a 73y old  Male who presents with a chief complaint of Failure to thrive in adult     (11 Sep 2023 14:46)    Date of servie : 09-11-23 @ 15:42  INTERVAL HPI/OVERNIGHT EVENTS:  T(C): 36.3 (09-11-23 @ 13:05), Max: 37 (09-11-23 @ 04:10)  HR: 68 (09-11-23 @ 13:05) (66 - 80)  BP: 151/69 (09-11-23 @ 13:05) (130/81 - 151/69)  RR: 18 (09-11-23 @ 13:05) (18 - 20)  SpO2: 100% (09-11-23 @ 13:05) (100% - 100%)  Wt(kg): --  I&O's Summary      LABS:                        11.2   5.06  )-----------( 103      ( 11 Sep 2023 05:59 )             34.9     09-11    140  |  112<H>  |  24<H>  ----------------------------<  160<H>  4.4   |  19<L>  |  0.80    Ca    7.7<L>      11 Sep 2023 12:50  Phos  3.1     09-11  Mg     1.80     09-11    TPro  6.7  /  Alb  2.3<L>  /  TBili  0.4  /  DBili  x   /  AST  41<H>  /  ALT  14  /  AlkPhos  68  09-10      Urinalysis Basic - ( 11 Sep 2023 12:50 )    Color: x / Appearance: x / SG: x / pH: x  Gluc: 160 mg/dL / Ketone: x  / Bili: x / Urobili: x   Blood: x / Protein: x / Nitrite: x   Leuk Esterase: x / RBC: x / WBC x   Sq Epi: x / Non Sq Epi: x / Bacteria: x      CAPILLARY BLOOD GLUCOSE      POCT Blood Glucose.: 129 mg/dL (11 Sep 2023 06:44)  POCT Blood Glucose.: 131 mg/dL (11 Sep 2023 00:51)  POCT Blood Glucose.: 110 mg/dL (10 Sep 2023 18:50)        Urinalysis Basic - ( 11 Sep 2023 12:50 )    Color: x / Appearance: x / SG: x / pH: x  Gluc: 160 mg/dL / Ketone: x  / Bili: x / Urobili: x   Blood: x / Protein: x / Nitrite: x   Leuk Esterase: x / RBC: x / WBC x   Sq Epi: x / Non Sq Epi: x / Bacteria: x        MEDICATIONS  (STANDING):  amLODIPine   Tablet 10 milliGRAM(s) Oral daily  dextrose 5% + sodium chloride 0.9%. 1000 milliLiter(s) (50 mL/Hr) IV Continuous <Continuous>  divalproex DR 1000 milliGRAM(s) Oral two times a day  levETIRAcetam  IVPB 500 milliGRAM(s) IV Intermittent every 12 hours  levothyroxine 88 MICROGram(s) Oral daily  piperacillin/tazobactam IVPB.. 3.375 Gram(s) IV Intermittent every 8 hours  QUEtiapine 75 milliGRAM(s) Oral at bedtime  rivaroxaban 10 milliGRAM(s) Oral with dinner  thiamine 100 milliGRAM(s) Oral daily    MEDICATIONS  (PRN):          PHYSICAL EXAM:  GENERAL: frail  CHEST/LUNG: Clear to percussion bilaterally; No rales, rhonchi, wheezing, or rubs  HEART: Regular rate and rhythm; No murmurs, rubs, or gallops  ABDOMEN: Soft, Nontender, Nondistended; Bowel sounds present  EXTREMITIES:  edema +    Care Discussed with Consultants/Other Providers [x ] YES  [ ] NO

## 2023-09-11 NOTE — DISCHARGE NOTE PROVIDER - NSDCMRMEDTOKEN_GEN_ALL_CORE_FT
amLODIPine 10 mg oral tablet: 1 tab(s) orally once a day  divalproex sodium 500 mg oral delayed release tablet: 2 tab(s) orally 2 times a day  levETIRAcetam 500 mg oral tablet: 1 tab(s) orally 2 times a day  levothyroxine 88 mcg (0.088 mg) oral tablet: 1 tab(s) orally once a day  QUEtiapine 25 mg oral tablet: 3 tab(s) orally once a day (at bedtime)  rivaroxaban 20 mg oral tablet: 1 tab(s) orally once a day (before a meal)  thiamine 100 mg oral tablet: 1 tab(s) orally once a day   amLODIPine 10 mg oral tablet: 1 tab(s) orally once a day  dexAMETHasone 4 mg oral tablet: 1 tab(s) orally 2 times a day  furosemide 100 mg/100 mL-0.9% intravenous solution: 20 milliliter(s) intravenous 2 times a day  levETIRAcetam 100 mg/mL intravenous solution: 5 milliliter(s) intravenous every 12 hours  levothyroxine 88 mcg (0.088 mg) oral tablet: 1 tab(s) orally once a day  pantoprazole 40 mg oral granule, delayed release: 40 milligram(s) orally once a day  QUEtiapine 25 mg oral tablet: 3 tab(s) orally once a day (at bedtime)  rivaroxaban 20 mg oral tablet: 1 tab(s) orally once a day (before a meal)  sodium chloride 0.65% nasal spray: 2 spray(s) nasal 2 times a day  thiamine 100 mg oral tablet: 1 tab(s) orally once a day  valproic acid (as valproate sodium) 100 mg/mL intravenous solution: 5 milliliter(s) intravenous every 6 hours

## 2023-09-11 NOTE — DIETITIAN INITIAL EVALUATION ADULT - ADD RECOMMEND
1. Defer Nutrition Plan of Care to MD based on GOC discussion and decisions of Pt's family;  2. If alternate means of Nutrition to initiate, consult nutrition for recommendations;

## 2023-09-11 NOTE — DISCHARGE NOTE PROVIDER - DID THE PATIENT PRESENT WITH OR WAS TREATED FOR MALNUTRITION DURING THIS ADMISSION
Transposition Flap Text: The defect edges were debeveled with a #15 scalpel blade.  Given the location of the defect and the proximity to free margins a transposition flap was deemed most appropriate.  Using a sterile surgical marker, an appropriate transposition flap was drawn incorporating the defect.    The area thus outlined was incised deep to adipose tissue with a #15 scalpel blade.  The skin margins were undermined to an appropriate distance in all directions utilizing iris scissors. No Yes...

## 2023-09-11 NOTE — DIETITIAN INITIAL EVALUATION ADULT - PERTINENT LABORATORY DATA
09-11    140  |  112<H>  |  24<H>  ----------------------------<  160<H>  4.4   |  19<L>  |  0.80    Ca    7.7<L>      11 Sep 2023 12:50  Phos  3.1     09-11  Mg     1.80     09-11    TPro  6.7  /  Alb  2.3<L>  /  TBili  0.4  /  DBili  x   /  AST  41<H>  /  ALT  14  /  AlkPhos  68  09-10  POCT Blood Glucose.: 129 mg/dL (09-11-23 @ 06:44)  A1C with Estimated Average Glucose Result: 6.3 % (02-09-23 @ 06:40)

## 2023-09-11 NOTE — PROGRESS NOTE ADULT - SUBJECTIVE AND OBJECTIVE BOX
[facility] [place] [facility]   MD ARI MILLS MD KRISTINE SOLTANPOUR, LAN BAXTER    TEL:  OFFICE: [phone]  From 5pm-7am Answering Service [phone]    -- RENAL FOLLOW UP NOTE ---Date of Service 09-11-23 @ 12:27    Patient is a 73y old  Male who presents with a chief complaint of FTT (11 Sep 2023 07:33)      Patient seen and examined at bedside. No chest pain/sob    VITALS:  T(F): 97.9 (09-11-23 @ 08:10), Max: 98.6 (09-11-23 @ 04:10)  HR: 80 (09-11-23 @ 08:10)  BP: 139/86 (09-11-23 @ 08:10)  RR: 18 (09-11-23 @ 08:10)  SpO2: 100% (09-11-23 @ 08:10)  Wt(kg): --        PHYSICAL EXAM:  General: NAD  Neck: No JVD  Respiratory: CTAB, no wheezes, rales or rhonchi  Cardiovascular: S1, S2, RRR  Gastrointestinal: BS+, soft, NT/ND  Extremities: No peripheral edema    Hospital Medications:   MEDICATIONS  (STANDING):  amLODIPine   Tablet 10 milliGRAM(s) Oral daily  dextrose 5%. 1000 milliLiter(s) (50 mL/Hr) IV Continuous <Continuous>  divalproex DR 1000 milliGRAM(s) Oral two times a day  levETIRAcetam  IVPB 500 milliGRAM(s) IV Intermittent every 12 hours  levothyroxine 88 MICROGram(s) Oral daily  piperacillin/tazobactam IVPB.. 3.375 Gram(s) IV Intermittent every 8 hours  QUEtiapine 75 milliGRAM(s) Oral at bedtime  rivaroxaban 10 milliGRAM(s) Oral with dinner  thiamine 100 milliGRAM(s) Oral daily      LABS:  09-11    147<H>  |  113<H>  |  29<H>  ----------------------------<  131<H>  3.9   |  23  |  0.86    Ca    8.0<L>      11 Sep 2023 05:59  Phos  1.7     09-11  Mg     1.90     09-11    TPro  6.7  /  Alb  2.3<L>  /  TBili  0.4  /  DBili      /  AST  41<H>  /  ALT  14  /  AlkPhos  68  09-10    Creatinine Trend: 0.86 <--, 0.85 <--, 1.00 <--, 1.00 <--, 1.08 <--, 1.16 <--, 1.27 <--, 1.33 <--, 1.42 <--, 1.51 <--, 1.67 <--    Phosphorus: 1.7 mg/dL (09-11 @ 05:59)  Phosphorus: 1.8 mg/dL (09-10 @ 21:00)  Phosphorus: 1.7 mg/dL (09-10 @ 13:45)                              11.2   5.06  )-----------( 103      ( 11 Sep 2023 05:59 )             34.9     Urine Studies:  Urinalysis - [09-11-23 @ 05:59]      Color  / Appearance  / SG  / pH       Gluc 131 / Ketone   / Bili  / Urobili        Blood  / Protein  / Leuk Est  / Nitrite       RBC  / WBC  / Hyaline  / Gran  / Sq Epi  / Non Sq Epi  / Bacteria     Urine Creatinine 184      [09-09-23 @ 00:34]  Urine Protein 186      [09-09-23 @ 00:34]  Urine Sodium 37      [09-09-23 @ 00:34]  Urine Urea Nitrogen 723.8      [09-09-23 @ 00:34]  Urine Potassium 57.2      [09-09-23 @ 00:34]  Urine Osmolality 513      [09-09-23 @ 00:34]    TSH 3.29      [08-07-23 @ 06:09]  Lipid: chol 117, TG 82, HDL 48, LDL --      [02-09-23 @ 06:40]        RADIOLOGY & ADDITIONAL STUDIES:   [facility] [place] [facility]   MD ARI MILLS MD KRISTINE SOLTANPOUR, DO ANGELA WONG, PA    TEL:  OFFICE: [phone]  From 5pm-7am Answering Service [phone]    -- RENAL FOLLOW UP NOTE ---Date of Service 09-11-23 @ 12:27    Patient is a 73y old  Male who presents with a chief complaint of FTT (11 Sep 2023 07:33)      Patient seen and examined at bedside.     VITALS:  T(F): 97.9 (09-11-23 @ 08:10), Max: 98.6 (09-11-23 @ 04:10)  HR: 80 (09-11-23 @ 08:10)  BP: 139/86 (09-11-23 @ 08:10)  RR: 18 (09-11-23 @ 08:10)  SpO2: 100% (09-11-23 @ 08:10)  Wt(kg): --        PHYSICAL EXAM:  General: NAD  Neck: No JVD  Respiratory: CTAB, no wheezes, rales or rhonchi  Cardiovascular: S1, S2, RRR  Gastrointestinal: BS+, soft, NT/ND  Extremities: No peripheral edema    Hospital Medications:   MEDICATIONS  (STANDING):  amLODIPine   Tablet 10 milliGRAM(s) Oral daily  dextrose 5%. 1000 milliLiter(s) (50 mL/Hr) IV Continuous <Continuous>  divalproex DR 1000 milliGRAM(s) Oral two times a day  levETIRAcetam  IVPB 500 milliGRAM(s) IV Intermittent every 12 hours  levothyroxine 88 MICROGram(s) Oral daily  piperacillin/tazobactam IVPB.. 3.375 Gram(s) IV Intermittent every 8 hours  QUEtiapine 75 milliGRAM(s) Oral at bedtime  rivaroxaban 10 milliGRAM(s) Oral with dinner  thiamine 100 milliGRAM(s) Oral daily      LABS:  09-11    147<H>  |  113<H>  |  29<H>  ----------------------------<  131<H>  3.9   |  23  |  0.86    Ca    8.0<L>      11 Sep 2023 05:59  Phos  1.7     09-11  Mg     1.90     09-11    TPro  6.7  /  Alb  2.3<L>  /  TBili  0.4  /  DBili      /  AST  41<H>  /  ALT  14  /  AlkPhos  68  09-10    Creatinine Trend: 0.86 <--, 0.85 <--, 1.00 <--, 1.00 <--, 1.08 <--, 1.16 <--, 1.27 <--, 1.33 <--, 1.42 <--, 1.51 <--, 1.67 <--    Phosphorus: 1.7 mg/dL (09-11 @ 05:59)  Phosphorus: 1.8 mg/dL (09-10 @ 21:00)  Phosphorus: 1.7 mg/dL (09-10 @ 13:45)                              11.2   5.06  )-----------( 103      ( 11 Sep 2023 05:59 )             34.9     Urine Studies:  Urinalysis - [09-11-23 @ 05:59]      Color  / Appearance  / SG  / pH       Gluc 131 / Ketone   / Bili  / Urobili        Blood  / Protein  / Leuk Est  / Nitrite       RBC  / WBC  / Hyaline  / Gran  / Sq Epi  / Non Sq Epi  / Bacteria     Urine Creatinine 184      [09-09-23 @ 00:34]  Urine Protein 186      [09-09-23 @ 00:34]  Urine Sodium 37      [09-09-23 @ 00:34]  Urine Urea Nitrogen 723.8      [09-09-23 @ 00:34]  Urine Potassium 57.2      [09-09-23 @ 00:34]  Urine Osmolality 513      [09-09-23 @ 00:34]    TSH 3.29      [08-07-23 @ 06:09]  Lipid: chol 117, TG 82, HDL 48, LDL --      [02-09-23 @ 06:40]        RADIOLOGY & ADDITIONAL STUDIES:

## 2023-09-11 NOTE — DIETITIAN INITIAL EVALUATION ADULT - PERTINENT MEDS FT
MEDICATIONS  (STANDING):  amLODIPine   Tablet 10 milliGRAM(s) Oral daily  dextrose 5% + sodium chloride 0.9%. 1000 milliLiter(s) (50 mL/Hr) IV Continuous <Continuous>  divalproex DR 1000 milliGRAM(s) Oral two times a day  levETIRAcetam  IVPB 500 milliGRAM(s) IV Intermittent every 12 hours  levothyroxine 88 MICROGram(s) Oral daily  piperacillin/tazobactam IVPB.. 3.375 Gram(s) IV Intermittent every 8 hours  QUEtiapine 75 milliGRAM(s) Oral at bedtime  rivaroxaban 10 milliGRAM(s) Oral with dinner  thiamine 100 milliGRAM(s) Oral daily    MEDICATIONS  (PRN):

## 2023-09-11 NOTE — DISCHARGE NOTE PROVIDER - NSDCFUADDAPPT_GEN_ALL_CORE_FT
================================  Podiatry Discharge Instructions:  - Follow up: Please follow up with Dr. Moser within 1 week of discharge from the hospital, please call [phone] for appointment and discuss that you recently were seen in the hospital.  - Wound Care:     please apply Santyl to R foot heel followed by 4x4 Gauze, and Kerlex. DAILY   please apply Betadine to L foot heel followed by 4x4 Gauze, and Kerlex. DAILY      - Weight bearing: STRICT decubitus precautions, Z- FLOWS boots at all time when in bed and in chair resting.    - Antibiotics: Please continue as instructed.  ================================

## 2023-09-11 NOTE — DIETITIAN INITIAL EVALUATION ADULT - NSFNSPHYEXAMSKINFT_GEN_A_CORE
Pressure Injury 1: Left:, heel, Unstageable  Pressure Injury 2: Right:, heel, Unstageable  Pressure Injury 3: none, none  Pressure Injury 4: none, none  Pressure Injury 5: none, none  Pressure Injury 6: none, none  Pressure Injury 7: none, none  Pressure Injury 8: none, none  Pressure Injury 9: none, none  Pressure Injury 10: none, none  Pressure Injury 11: none, none, Pressure Injury 1: Left:, heel, Suspected deep tissue injury  Pressure Injury 2: Right:, Suspected deep tissue injury  Pressure Injury 3: none, none  Pressure Injury 4: none, none  Pressure Injury 5: none, none  Pressure Injury 6: none, none  Pressure Injury 7: none, none  Pressure Injury 8: none, none  Pressure Injury 9: none, none  Pressure Injury 10: none, none  Pressure Injury 11: none, none

## 2023-09-12 NOTE — CHART NOTE - NSCHARTNOTEFT_GEN_A_CORE
- Xray result reviewed:     Bandaging material overlies bilateral posterior heel regions. No tracking   soft tissue gas collections, radiopaque foreign bodies, or gross   radiographic evidence for osteomyelitis.    - reconsult PRN.   - follow up info in Discharge provider note.   - wound care order on sunrise

## 2023-09-12 NOTE — PROGRESS NOTE ADULT - SUBJECTIVE AND OBJECTIVE BOX
[facility] [place] [facility]   MD ARI MILLS MD KRISTINE SOLTANPOUR, DO ANGELA WONG, PA    TEL:  OFFICE: [phone]  From 5pm-7am Answering Service [phone]    -- RENAL FOLLOW UP NOTE ---Date of Service 09-12-23 @ 11:26    Patient is a 73y old  Male who presents with a chief complaint of FTT (12 Sep 2023 06:40)      Patient seen and examined at bedside.     VITALS:  T(F): 98.2 (09-12-23 @ 05:47), Max: 99 (09-11-23 @ 17:00)  HR: 72 (09-12-23 @ 05:47)  BP: 129/65 (09-12-23 @ 05:47)  RR: 18 (09-12-23 @ 05:47)  SpO2: 100% (09-12-23 @ 05:47)  Wt(kg): --        PHYSICAL EXAM:  General: NAD, nonverbal  Neck: No JVD  Respiratory: CTAB, no wheezes, rales or rhonchi  Cardiovascular: S1, S2, RRR  Gastrointestinal: BS+, soft, NT/ND  Extremities: No peripheral edema    Hospital Medications:   MEDICATIONS  (STANDING):  amLODIPine   Tablet 10 milliGRAM(s) Oral daily  collagenase Ointment 1 Application(s) Topical daily  dextrose 5% + sodium chloride 0.9%. 1000 milliLiter(s) (50 mL/Hr) IV Continuous <Continuous>  divalproex DR 1000 milliGRAM(s) Oral two times a day  levETIRAcetam  IVPB 500 milliGRAM(s) IV Intermittent every 12 hours  levothyroxine 88 MICROGram(s) Oral daily  piperacillin/tazobactam IVPB.. 3.375 Gram(s) IV Intermittent every 8 hours  QUEtiapine 75 milliGRAM(s) Oral at bedtime  rivaroxaban 10 milliGRAM(s) Oral with dinner  thiamine 100 milliGRAM(s) Oral daily      LABS:  09-12    145  |  111<H>  |  16  ----------------------------<  124<H>  4.0   |  25  |  0.75    Ca    7.3<L>      12 Sep 2023 05:37  Phos  2.9     09-12  Mg     1.50     09-12      Creatinine Trend: 0.75 <--, 0.80 <--, 0.82 <--, 0.80 <--, 0.86 <--, 0.85 <--, 1.00 <--, 1.00 <--, 1.08 <--, 1.16 <--, 1.27 <--, 1.33 <--, 1.42 <--, 1.51 <--, 1.67 <--    Phosphorus: 2.9 mg/dL (09-12 @ 05:37)  Phosphorus: 2.3 mg/dL (09-11 @ 22:27)  Phosphorus: 2.9 mg/dL (09-11 @ 18:40)  Phosphorus: 3.1 mg/dL (09-11 @ 12:50)                              10.7   4.78  )-----------( 201      ( 12 Sep 2023 05:37 )             33.1     Urine Studies:  Urinalysis - [09-12-23 @ 05:37]      Color  / Appearance  / SG  / pH       Gluc 124 / Ketone   / Bili  / Urobili        Blood  / Protein  / Leuk Est  / Nitrite       RBC  / WBC  / Hyaline  / Gran  / Sq Epi  / Non Sq Epi  / Bacteria     Urine Creatinine 184      [09-09-23 @ 00:34]  Urine Protein 186      [09-09-23 @ 00:34]  Urine Sodium 37      [09-09-23 @ 00:34]  Urine Urea Nitrogen 723.8      [09-09-23 @ 00:34]  Urine Potassium 57.2      [09-09-23 @ 00:34]  Urine Osmolality 513      [09-09-23 @ 00:34]    TSH 3.29      [08-07-23 @ 06:09]  Lipid: chol 117, TG 82, HDL 48, LDL --      [02-09-23 @ 06:40]        RADIOLOGY & ADDITIONAL STUDIES:

## 2023-09-12 NOTE — PROGRESS NOTE ADULT - ASSESSMENT
73-year-old male history of dementia, baseline mental status AxOx0, seizure disorder on Keppra and divalproex, history of DVT/PE off Eliquis, hypertension, hypothyroidism presenting sent in from his nursing home for evaluation of failure to thrive, found to have hypernatremia and new renal failure on outside labs.  Per documentation that arrived with the patient, patient has been refusing to eat since yesterday.    1 Hypernatremia  - sec to dehydration  - BMP q daily   - renal fu     2 Lactic acidosis  - ro infection  - cw zosyn  - IVF  - monitor    3 Seizure disorder  - cw Keppra and valproic acid   4 hx of  DVT/PE  - cw AC    5 Dysphagia  - NPO now  - speech and swallow fu     Prognosis guarded

## 2023-09-12 NOTE — PROGRESS NOTE ADULT - SUBJECTIVE AND OBJECTIVE BOX
Subjective: Patient seen and examined. No new events except as noted.     SUBJECTIVE/ROS:  nad      MEDICATIONS:  MEDICATIONS  (STANDING):  amLODIPine   Tablet 10 milliGRAM(s) Oral daily  collagenase Ointment 1 Application(s) Topical daily  dextrose 5% + sodium chloride 0.9%. 1000 milliLiter(s) (50 mL/Hr) IV Continuous <Continuous>  divalproex DR 1000 milliGRAM(s) Oral two times a day  levETIRAcetam  IVPB 500 milliGRAM(s) IV Intermittent every 12 hours  levothyroxine 88 MICROGram(s) Oral daily  piperacillin/tazobactam IVPB.. 3.375 Gram(s) IV Intermittent every 8 hours  QUEtiapine 75 milliGRAM(s) Oral at bedtime  rivaroxaban 10 milliGRAM(s) Oral with dinner  thiamine 100 milliGRAM(s) Oral daily      PHYSICAL EXAM:  T(C): 36.8 (09-12-23 @ 05:47), Max: 37.2 (09-11-23 @ 17:00)  HR: 72 (09-12-23 @ 05:47) (68 - 80)  BP: 129/65 (09-12-23 @ 05:47) (128/78 - 151/69)  RR: 18 (09-12-23 @ 05:47) (18 - 18)  SpO2: 100% (09-12-23 @ 05:47) (100% - 100%)  Wt(kg): --  I&O's Summary          JVP: Normal  Neck: supple  Lung: clear   CV: S1 S2 , Murmur:  Abd: soft  Ext: No edema  neuro: Awake / alert  Psych: flat affect  Skin: normal``    LABS/DATA:    CARDIAC MARKERS:                                11.2   5.06  )-----------( 103      ( 11 Sep 2023 05:59 )             34.9     09-11    146<H>  |  110<H>  |  18  ----------------------------<  125<H>  3.3<L>   |  26  |  0.80    Ca    7.8<L>      11 Sep 2023 22:27  Phos  2.3     09-11  Mg     1.60     09-11      proBNP:   Lipid Profile:   HgA1c:   TSH:     TELE:  EKG:

## 2023-09-12 NOTE — PROGRESS NOTE ADULT - SUBJECTIVE AND OBJECTIVE BOX
Patient is a 73y old  Male who presents with a chief complaint of FTT (12 Sep 2023 11:25)    Date of servie : 09-12-23 @ 12:51  INTERVAL HPI/OVERNIGHT EVENTS:  T(C): 36.9 (09-12-23 @ 12:32), Max: 37.2 (09-11-23 @ 17:00)  HR: 69 (09-12-23 @ 12:32) (68 - 80)  BP: 139/80 (09-12-23 @ 12:32) (128/78 - 151/69)  RR: 18 (09-12-23 @ 12:32) (18 - 18)  SpO2: 100% (09-12-23 @ 12:32) (100% - 100%)  Wt(kg): --  I&O's Summary      LABS:                        10.7   4.78  )-----------( 201      ( 12 Sep 2023 05:37 )             33.1     09-12    145  |  111<H>  |  16  ----------------------------<  124<H>  4.0   |  25  |  0.75    Ca    7.3<L>      12 Sep 2023 05:37  Phos  2.9     09-12  Mg     1.50     09-12        Urinalysis Basic - ( 12 Sep 2023 05:37 )    Color: x / Appearance: x / SG: x / pH: x  Gluc: 124 mg/dL / Ketone: x  / Bili: x / Urobili: x   Blood: x / Protein: x / Nitrite: x   Leuk Esterase: x / RBC: x / WBC x   Sq Epi: x / Non Sq Epi: x / Bacteria: x      CAPILLARY BLOOD GLUCOSE      POCT Blood Glucose.: 113 mg/dL (12 Sep 2023 11:59)  POCT Blood Glucose.: 114 mg/dL (12 Sep 2023 05:41)  POCT Blood Glucose.: 112 mg/dL (12 Sep 2023 00:50)  POCT Blood Glucose.: 151 mg/dL (11 Sep 2023 18:08)        Urinalysis Basic - ( 12 Sep 2023 05:37 )    Color: x / Appearance: x / SG: x / pH: x  Gluc: 124 mg/dL / Ketone: x  / Bili: x / Urobili: x   Blood: x / Protein: x / Nitrite: x   Leuk Esterase: x / RBC: x / WBC x   Sq Epi: x / Non Sq Epi: x / Bacteria: x        MEDICATIONS  (STANDING):  amLODIPine   Tablet 10 milliGRAM(s) Oral daily  collagenase Ointment 1 Application(s) Topical daily  dextrose 5% + sodium chloride 0.9%. 1000 milliLiter(s) (50 mL/Hr) IV Continuous <Continuous>  divalproex DR 1000 milliGRAM(s) Oral two times a day  levETIRAcetam  IVPB 500 milliGRAM(s) IV Intermittent every 12 hours  levothyroxine 88 MICROGram(s) Oral daily  piperacillin/tazobactam IVPB.. 3.375 Gram(s) IV Intermittent every 8 hours  QUEtiapine 75 milliGRAM(s) Oral at bedtime  rivaroxaban 10 milliGRAM(s) Oral with dinner  thiamine 100 milliGRAM(s) Oral daily    MEDICATIONS  (PRN):          PHYSICAL EXAM:  GENERAL: frail  HEAD:  Atraumatic, Normocephalic  CHEST/LUNG: Clear to percussion bilaterally; No rales, rhonchi, wheezing, or rubs  HEART: Regular rate and rhythm; No murmurs, rubs, or gallops  ABDOMEN: Soft, Nontender, Nondistended; Bowel sounds present  EXTREMITIES:  edema+    Care Discussed with Consultants/Other Providers [ x] YES  [ ] NO

## 2023-09-13 NOTE — PROGRESS NOTE ADULT - SUBJECTIVE AND OBJECTIVE BOX
[facility] – [place] [facility]   MD ARI MILLS MD KRISTINE SOLTANPOUR, DO ANGELA WONG, PA    TEL:  OFFICE: [phone]  From 5pm-7am Answering Service [phone]    -- RENAL FOLLOW UP NOTE ---Date of Service 09-13-23 @ 10:43    Patient is a 73y old  Male who presents with a chief complaint of FTT (13 Sep 2023 06:53)      Patient seen and examined at bedside.     VITALS:  T(F): 97.5 (09-13-23 @ 08:44), Max: 98.9 (09-13-23 @ 05:16)  HR: 74 (09-13-23 @ 08:44)  BP: 144/72 (09-13-23 @ 08:44)  RR: 18 (09-13-23 @ 08:44)  SpO2: 98% (09-13-23 @ 08:44)  Wt(kg): --      Weight (kg): 67 (09-12 @ 14:53)    PHYSICAL EXAM:  General: NAD  Neck: No JVD  Respiratory: CTAB, no wheezes, rales or rhonchi  Cardiovascular: S1, S2, RRR  Gastrointestinal: BS+, soft, NT/ND  Extremities: No peripheral edema    Hospital Medications:   MEDICATIONS  (STANDING):  amLODIPine   Tablet 10 milliGRAM(s) Oral daily  chlorhexidine 2% Cloths 1 Application(s) Topical daily  collagenase Ointment 1 Application(s) Topical daily  dextrose 5% + sodium chloride 0.9%. 1000 milliLiter(s) (50 mL/Hr) IV Continuous <Continuous>  divalproex DR 1000 milliGRAM(s) Oral two times a day  levETIRAcetam  IVPB 500 milliGRAM(s) IV Intermittent every 12 hours  levothyroxine 88 MICROGram(s) Oral daily  piperacillin/tazobactam IVPB.. 3.375 Gram(s) IV Intermittent every 8 hours  potassium chloride  10 mEq/100 mL IVPB 10 milliEquivalent(s) IV Intermittent every 1 hour  potassium phosphate IVPB 30 milliMole(s) IV Intermittent once  QUEtiapine 75 milliGRAM(s) Oral at bedtime  rivaroxaban 10 milliGRAM(s) Oral with dinner  thiamine 100 milliGRAM(s) Oral daily      LABS:  09-13    145  |  110<H>  |  11  ----------------------------<  130<H>  3.2<L>   |  25  |  0.83    Ca    7.6<L>      13 Sep 2023 06:17  Phos  1.8     09-13  Mg     1.40     09-13      Creatinine Trend: 0.83 <--, 0.75 <--, 0.80 <--, 0.82 <--, 0.80 <--, 0.86 <--, 0.85 <--, 1.00 <--, 1.00 <--, 1.08 <--, 1.16 <--, 1.27 <--, 1.33 <--, 1.42 <--, 1.51 <--, 1.67 <--    Phosphorus: 1.8 mg/dL (09-13 @ 06:17)                              9.9    5.36  )-----------( 203      ( 13 Sep 2023 06:17 )             29.4     Urine Studies:  Urinalysis - [09-13-23 @ 06:17]      Color  / Appearance  / SG  / pH       Gluc 130 / Ketone   / Bili  / Urobili        Blood  / Protein  / Leuk Est  / Nitrite       RBC  / WBC  / Hyaline  / Gran  / Sq Epi  / Non Sq Epi  / Bacteria     Urine Creatinine 184      [09-09-23 @ 00:34]  Urine Protein 186      [09-09-23 @ 00:34]  Urine Sodium 37      [09-09-23 @ 00:34]  Urine Urea Nitrogen 723.8      [09-09-23 @ 00:34]  Urine Potassium 57.2      [09-09-23 @ 00:34]  Urine Osmolality 513      [09-09-23 @ 00:34]    TSH 3.29      [08-07-23 @ 06:09]  Lipid: chol 117, TG 82, HDL 48, LDL --      [02-09-23 @ 06:40]        RADIOLOGY & ADDITIONAL STUDIES:

## 2023-09-13 NOTE — PROGRESS NOTE ADULT - ASSESSMENT
73-year-old male history of dementia, baseline mental status AxOx0, seizure disorder on Keppra and divalproex, history of DVT/PE off Eliquis, hypertension, hypothyroidism presenting sent in from his nursing home for evaluation of failure to thrive, found to have hypernatremia and new renal failure on outside labs.    Hypernatremia:  Due to dehydration, poor intake.  continue ivf  pt still NPO  na improving   - Trend BMP BID.    JESSICA:  Due to Pre-Renal azotemia.  - Hydrate as stated above.  resolved    hypophos  supplemented  monitor    hypokalemia  supplemented  monitor    htn  controlled  continue current meds  monitor

## 2023-09-13 NOTE — PROGRESS NOTE ADULT - SUBJECTIVE AND OBJECTIVE BOX
Subjective: Patient seen and examined. No new events except as noted.     SUBJECTIVE/ROS:  ROS limited       MEDICATIONS:  MEDICATIONS  (STANDING):  amLODIPine   Tablet 10 milliGRAM(s) Oral daily  chlorhexidine 2% Cloths 1 Application(s) Topical daily  collagenase Ointment 1 Application(s) Topical daily  dextrose 5% + sodium chloride 0.9%. 1000 milliLiter(s) (50 mL/Hr) IV Continuous <Continuous>  divalproex DR 1000 milliGRAM(s) Oral two times a day  levETIRAcetam  IVPB 500 milliGRAM(s) IV Intermittent every 12 hours  levothyroxine 88 MICROGram(s) Oral daily  piperacillin/tazobactam IVPB.. 3.375 Gram(s) IV Intermittent every 8 hours  QUEtiapine 75 milliGRAM(s) Oral at bedtime  rivaroxaban 10 milliGRAM(s) Oral with dinner  thiamine 100 milliGRAM(s) Oral daily      PHYSICAL EXAM:  T(C): 37.2 (09-13-23 @ 05:16), Max: 37.2 (09-13-23 @ 05:16)  HR: 75 (09-13-23 @ 05:16) (67 - 83)  BP: 141/73 (09-13-23 @ 05:16) (129/89 - 141/73)  RR: 18 (09-13-23 @ 05:16) (18 - 18)  SpO2: 99% (09-13-23 @ 05:16) (99% - 100%)  Wt(kg): --  I&O's Summary      Weight (kg): 67 (09-12 @ 14:53)      JVP: Normal  Neck: supple  Lung: clear   CV: S1 S2 , Murmur:  Abd: soft  Ext: No edema  neuro: Awake  Psych: flat affect  Skin: normal``    LABS/DATA:    CARDIAC MARKERS:                                10.7   4.78  )-----------( 201      ( 12 Sep 2023 05:37 )             33.1     09-12    145  |  111<H>  |  16  ----------------------------<  124<H>  4.0   |  25  |  0.75    Ca    7.3<L>      12 Sep 2023 05:37  Phos  2.9     09-12  Mg     1.50     09-12      proBNP:   Lipid Profile:   HgA1c:   TSH:     TELE:  EKG:

## 2023-09-13 NOTE — PROGRESS NOTE ADULT - SUBJECTIVE AND OBJECTIVE BOX
Patient is a 73y old  Male who presents with a chief complaint of FTT (13 Sep 2023 10:43)    Date of servie : 09-13-23 @ 15:23  INTERVAL HPI/OVERNIGHT EVENTS:  T(C): 37.2 (09-13-23 @ 12:39), Max: 37.2 (09-13-23 @ 05:16)  HR: 77 (09-13-23 @ 12:39) (74 - 83)  BP: 139/80 (09-13-23 @ 12:39) (129/89 - 144/72)  RR: 18 (09-13-23 @ 12:39) (18 - 18)  SpO2: 99% (09-13-23 @ 12:39) (98% - 100%)  Wt(kg): --  I&O's Summary      LABS:                        9.9    5.36  )-----------( 203      ( 13 Sep 2023 06:17 )             29.4     09-13    145  |  110<H>  |  11  ----------------------------<  130<H>  3.2<L>   |  25  |  0.83    Ca    7.6<L>      13 Sep 2023 06:17  Phos  1.8     09-13  Mg     1.40     09-13        Urinalysis Basic - ( 13 Sep 2023 06:17 )    Color: x / Appearance: x / SG: x / pH: x  Gluc: 130 mg/dL / Ketone: x  / Bili: x / Urobili: x   Blood: x / Protein: x / Nitrite: x   Leuk Esterase: x / RBC: x / WBC x   Sq Epi: x / Non Sq Epi: x / Bacteria: x      CAPILLARY BLOOD GLUCOSE      POCT Blood Glucose.: 98 mg/dL (13 Sep 2023 12:36)  POCT Blood Glucose.: 122 mg/dL (13 Sep 2023 06:09)  POCT Blood Glucose.: 119 mg/dL (12 Sep 2023 23:52)        Urinalysis Basic - ( 13 Sep 2023 06:17 )    Color: x / Appearance: x / SG: x / pH: x  Gluc: 130 mg/dL / Ketone: x  / Bili: x / Urobili: x   Blood: x / Protein: x / Nitrite: x   Leuk Esterase: x / RBC: x / WBC x   Sq Epi: x / Non Sq Epi: x / Bacteria: x        MEDICATIONS  (STANDING):  amLODIPine   Tablet 10 milliGRAM(s) Oral daily  cefTRIAXone   IVPB 1000 milliGRAM(s) IV Intermittent every 24 hours  chlorhexidine 2% Cloths 1 Application(s) Topical daily  collagenase Ointment 1 Application(s) Topical daily  dextrose 5% + lactated ringers. 1000 milliLiter(s) (60 mL/Hr) IV Continuous <Continuous>  divalproex DR 1000 milliGRAM(s) Oral two times a day  levETIRAcetam  IVPB 500 milliGRAM(s) IV Intermittent every 12 hours  levothyroxine 88 MICROGram(s) Oral daily  QUEtiapine 75 milliGRAM(s) Oral at bedtime  rivaroxaban 10 milliGRAM(s) Oral with dinner  thiamine 100 milliGRAM(s) Oral daily    MEDICATIONS  (PRN):          PHYSICAL EXAM:  GENERAL: NAD, well-groomed, well-developed  HEAD:  Atraumatic, Normocephalic  CHEST/LUNG: Clear to percussion bilaterally; No rales, rhonchi, wheezing, or rubs  HEART: Regular rate and rhythm; No murmurs, rubs, or gallops  ABDOMEN: Soft, Nontender, Nondistended; Bowel sounds present  EXTREMITIES:  2+ Peripheral Pulses, No clubbing, cyanosis, or edema  LYMPH: No lymphadenopathy noted  SKIN: No rashes or lesions    Care Discussed with Consultants/Other Providers [ ] YES  [ ] NO

## 2023-09-13 NOTE — PROGRESS NOTE ADULT - ASSESSMENT
73-year-old male history of dementia, baseline mental status AxOx0, seizure disorder on Keppra and divalproex, history of DVT/PE off Eliquis, hypertension, hypothyroidism presenting sent in from his nursing home for evaluation of failure to thrive, found to have hypernatremia and new renal failure on outside labs.  Per documentation that arrived with the patient, patient has been refusing to eat since yesterday.    1 Hypernatremia  - resolved   - renal fu     2 Lactic acidosis  - cw ceftriaxone  - IVF  - monitor    3 Seizure disorder- cw Keppra and valproic acid     4 hx of  DVT/PE  - cw AC    5 Dysphagia  - NPO now  - speech and swallow fu     Prognosis guarded

## 2023-09-14 NOTE — PROGRESS NOTE ADULT - SUBJECTIVE AND OBJECTIVE BOX
Patient is a 73y old  Male who presents with a chief complaint of FTT (14 Sep 2023 13:44)    Date of servie : 09-14-23 @ 14:35  INTERVAL HPI/OVERNIGHT EVENTS:  T(C): 37.2 (09-14-23 @ 12:19), Max: 37.2 (09-13-23 @ 20:02)  HR: 85 (09-14-23 @ 12:19) (78 - 85)  BP: 150/72 (09-14-23 @ 12:19) (146/79 - 153/53)  RR: 18 (09-14-23 @ 12:19) (17 - 18)  SpO2: 96% (09-14-23 @ 12:19) (96% - 99%)  Wt(kg): --  I&O's Summary      LABS:                        10.1   5.81  )-----------( 194      ( 14 Sep 2023 07:14 )             29.9     09-14    138  |  107  |  6<L>  ----------------------------<  128<H>  3.7   |  24  |  0.70    Ca    7.5<L>      14 Sep 2023 07:14  Phos  1.8     09-14  Mg     1.70     09-14        Urinalysis Basic - ( 14 Sep 2023 07:14 )    Color: x / Appearance: x / SG: x / pH: x  Gluc: 128 mg/dL / Ketone: x  / Bili: x / Urobili: x   Blood: x / Protein: x / Nitrite: x   Leuk Esterase: x / RBC: x / WBC x   Sq Epi: x / Non Sq Epi: x / Bacteria: x      CAPILLARY BLOOD GLUCOSE      POCT Blood Glucose.: 132 mg/dL (14 Sep 2023 11:57)  POCT Blood Glucose.: 113 mg/dL (14 Sep 2023 07:41)  POCT Blood Glucose.: 114 mg/dL (14 Sep 2023 01:28)  POCT Blood Glucose.: 91 mg/dL (13 Sep 2023 18:00)        Urinalysis Basic - ( 14 Sep 2023 07:14 )    Color: x / Appearance: x / SG: x / pH: x  Gluc: 128 mg/dL / Ketone: x  / Bili: x / Urobili: x   Blood: x / Protein: x / Nitrite: x   Leuk Esterase: x / RBC: x / WBC x   Sq Epi: x / Non Sq Epi: x / Bacteria: x        MEDICATIONS  (STANDING):  amLODIPine   Tablet 10 milliGRAM(s) Oral daily  cefTRIAXone   IVPB 1000 milliGRAM(s) IV Intermittent every 24 hours  chlorhexidine 2% Cloths 1 Application(s) Topical daily  collagenase Ointment 1 Application(s) Topical daily  dextrose 5% + lactated ringers. 1000 milliLiter(s) (60 mL/Hr) IV Continuous <Continuous>  divalproex DR 1000 milliGRAM(s) Oral two times a day  levETIRAcetam  IVPB 500 milliGRAM(s) IV Intermittent every 12 hours  levothyroxine 88 MICROGram(s) Oral daily  potassium phosphate IVPB 30 milliMole(s) IV Intermittent once  QUEtiapine 75 milliGRAM(s) Oral at bedtime  rivaroxaban 10 milliGRAM(s) Oral with dinner  thiamine 100 milliGRAM(s) Oral daily    MEDICATIONS  (PRN):          PHYSICAL EXAM:  GENERAL: frail  CHEST/LUNG: Clear to percussion bilaterally; No rales, rhonchi, wheezing, or rubs  HEART: Regular rate and rhythm; No murmurs, rubs, or gallops  ABDOMEN: Soft, Nontender, Nondistended; Bowel sounds present  EXTREMITIES:  edema +    Care Discussed with Consultants/Other Providers [ ] YES  [ ] NO

## 2023-09-14 NOTE — PROGRESS NOTE ADULT - SUBJECTIVE AND OBJECTIVE BOX
[facility] [place] [facility]   MD ARI MILLS MD KRISTINE SOLTANPOUR, DO ANGELA WONG, PA    TEL:  OFFICE: [phone]  From 5pm-7am Answering Service [phone]    -- RENAL FOLLOW UP NOTE ---Date of Service 09-14-23 @ 13:44    Patient is a 73y old  Male who presents with a chief complaint of FTT (14 Sep 2023 07:34)      Patient seen and examined at bedside. No chest pain/sob    VITALS:  T(F): 98.9 (09-14-23 @ 12:19), Max: 99 (09-13-23 @ 20:02)  HR: 85 (09-14-23 @ 12:19)  BP: 150/72 (09-14-23 @ 12:19)  RR: 18 (09-14-23 @ 12:19)  SpO2: 96% (09-14-23 @ 12:19)  Wt(kg): --        PHYSICAL EXAM:  General: NAD  Neck: No JVD  Respiratory: CTAB, no wheezes, rales or rhonchi  Cardiovascular: S1, S2, RRR  Gastrointestinal: BS+, soft, NT/ND  Extremities: No peripheral edema    Hospital Medications:   MEDICATIONS  (STANDING):  amLODIPine   Tablet 10 milliGRAM(s) Oral daily  cefTRIAXone   IVPB 1000 milliGRAM(s) IV Intermittent every 24 hours  chlorhexidine 2% Cloths 1 Application(s) Topical daily  collagenase Ointment 1 Application(s) Topical daily  dextrose 5% + lactated ringers. 1000 milliLiter(s) (60 mL/Hr) IV Continuous <Continuous>  divalproex DR 1000 milliGRAM(s) Oral two times a day  levETIRAcetam  IVPB 500 milliGRAM(s) IV Intermittent every 12 hours  levothyroxine 88 MICROGram(s) Oral daily  potassium phosphate IVPB 30 milliMole(s) IV Intermittent once  QUEtiapine 75 milliGRAM(s) Oral at bedtime  rivaroxaban 10 milliGRAM(s) Oral with dinner  thiamine 100 milliGRAM(s) Oral daily      LABS:  09-14    138  |  107  |  6<L>  ----------------------------<  128<H>  3.7   |  24  |  0.70    Ca    7.5<L>      14 Sep 2023 07:14  Phos  1.8     09-14  Mg     1.70     09-14      Creatinine Trend: 0.70 <--, 0.83 <--, 0.75 <--, 0.80 <--, 0.82 <--, 0.80 <--, 0.86 <--, 0.85 <--, 1.00 <--, 1.00 <--, 1.08 <--, 1.16 <--, 1.27 <--, 1.33 <--, 1.42 <--, 1.51 <--, 1.67 <--    Phosphorus: 1.8 mg/dL (09-14 @ 07:14)                              10.1   5.81  )-----------( 194      ( 14 Sep 2023 07:14 )             29.9     Urine Studies:  Urinalysis - [09-14-23 @ 07:14]      Color  / Appearance  / SG  / pH       Gluc 128 / Ketone   / Bili  / Urobili        Blood  / Protein  / Leuk Est  / Nitrite       RBC  / WBC  / Hyaline  / Gran  / Sq Epi  / Non Sq Epi  / Bacteria     Urine Creatinine 184      [09-09-23 @ 00:34]  Urine Protein 186      [09-09-23 @ 00:34]  Urine Sodium 37      [09-09-23 @ 00:34]  Urine Urea Nitrogen 723.8      [09-09-23 @ 00:34]  Urine Potassium 57.2      [09-09-23 @ 00:34]  Urine Osmolality 513      [09-09-23 @ 00:34]    TSH 3.29      [08-07-23 @ 06:09]  Lipid: chol 117, TG 82, HDL 48, LDL --      [02-09-23 @ 06:40]        RADIOLOGY & ADDITIONAL STUDIES:

## 2023-09-14 NOTE — PROGRESS NOTE ADULT - ASSESSMENT
73-year-old male history of dementia, baseline mental status AxOx0, seizure disorder on Keppra and divalproex, history of DVT/PE off Eliquis, hypertension, hypothyroidism presenting sent in from his nursing home for evaluation of failure to thrive, found to have hypernatremia and new renal failure on outside labs.  Per documentation that arrived with the patient, patient has been refusing to eat since yesterday.    1 Hypernatremia  - resolved   - renal fu     2 Lactic acidosis  - cw ceftriaxone  - IVF  - monitor    3 Seizure disorder- cw Keppra and valproic acid     4 hx of  DVT/PE  - cw AC    5 Dysphagia  - NPO now  - speech and swallow fu     Prognosis guarded   Palliative crae consult

## 2023-09-14 NOTE — SWALLOW BEDSIDE ASSESSMENT ADULT - COMMENTS
Hospitalist note 9/13 "73-year-old male history of dementia, baseline mental status AxOx0, seizure disorder on Keppra and divalproex, history of DVT/PE off Eliquis, hypertension, hypothyroidism presenting sent in from his nursing home for evaluation of failure to thrive, found to have hypernatremia and new renal failure on outside labs.  Per documentation that arrived with the patient, patient has been refusing to eat since yesterday"    Of Note: Patient is known to this service as patient was seen during a recent previous admission on 8/8 with recommendations of minced and moist solids and moderately thick liquids (please see note for details). Patient is seen during this admission for an initial clinical swallow assessment 9/10 at which time patient refused PO trials and the PO diet was deferred to the MD. (please see note).    Patient seen at bedside this AM for an initial assessment of the swallow function, at which time patient was alert. Patient gestures via head nodding and minimal one word responses to make wants/needs known. Patient follows simple directives and was receptive to PO trials.

## 2023-09-14 NOTE — SWALLOW BEDSIDE ASSESSMENT ADULT - SWALLOW EVAL: DIAGNOSIS
1. Mild oral dysphagia for puree, moderately thick liquids, mildly thick liquids and thin liquids marked by anterior spillage from the oral cavity with thin liquids due to reduced labial seal around cup with volitional bolus holding (~5 seconds) across trials and eventual transfer. 2. Functional pharyngeal phase for puree, moderately thick liquids and mildly thick liquids marked by a present pharyngeal swallow trigger with hyolaryngeal elevation noted upon digital palpation without evidence of impaired airway protection. 3. Moderate pharyngeal dysphagia for thin liquids marked by a suspected delayed pharyngeal swallow trigger with hyolaryngeal elevation noted upon digital palpation with weak throat clearing suggestive of impaired airway protection.

## 2023-09-14 NOTE — PROGRESS NOTE ADULT - SUBJECTIVE AND OBJECTIVE BOX
Subjective: Patient seen and examined. No new events except as noted.     SUBJECTIVE/ROS:  nad      MEDICATIONS:  MEDICATIONS  (STANDING):  amLODIPine   Tablet 10 milliGRAM(s) Oral daily  cefTRIAXone   IVPB 1000 milliGRAM(s) IV Intermittent every 24 hours  chlorhexidine 2% Cloths 1 Application(s) Topical daily  collagenase Ointment 1 Application(s) Topical daily  dextrose 5% + lactated ringers. 1000 milliLiter(s) (60 mL/Hr) IV Continuous <Continuous>  divalproex DR 1000 milliGRAM(s) Oral two times a day  levETIRAcetam  IVPB 500 milliGRAM(s) IV Intermittent every 12 hours  levothyroxine 88 MICROGram(s) Oral daily  QUEtiapine 75 milliGRAM(s) Oral at bedtime  rivaroxaban 10 milliGRAM(s) Oral with dinner  thiamine 100 milliGRAM(s) Oral daily      PHYSICAL EXAM:  T(C): 36.8 (09-14-23 @ 05:09), Max: 37.2 (09-13-23 @ 12:39)  HR: 82 (09-14-23 @ 05:09) (74 - 82)  BP: 146/79 (09-14-23 @ 05:09) (139/80 - 153/53)  RR: 18 (09-14-23 @ 05:09) (17 - 18)  SpO2: 97% (09-14-23 @ 05:09) (96% - 99%)  Wt(kg): --  I&O's Summary          JVP: Normal  Neck: supple  Lung: clear   CV: S1 S2 , Murmur:  Abd: soft  Ext: No edema  neuro: Awake / alert  Psych: flat affect  Skin: normal``    LABS/DATA:    CARDIAC MARKERS:                                9.9    5.36  )-----------( 203      ( 13 Sep 2023 06:17 )             29.4     09-13    145  |  110<H>  |  11  ----------------------------<  130<H>  3.2<L>   |  25  |  0.83    Ca    7.6<L>      13 Sep 2023 06:17  Phos  1.8     09-13  Mg     1.40     09-13      proBNP:   Lipid Profile:   HgA1c:   TSH:     TELE:  EKG:

## 2023-09-15 NOTE — PHYSICAL THERAPY INITIAL EVALUATION ADULT - PASSIVE RANGE OF MOTION EXAMINATION, REHAB EVAL
distal UE's WFL, edematous, + stiffness, pt grimacing/resistant with shoulder ROM; b/l hips/knees flexed in sidelying position in bed, difficult to rotate trunk to neutral pt grimacing/increased tone/stiffness; ankle DF WFL

## 2023-09-15 NOTE — PROGRESS NOTE ADULT - SUBJECTIVE AND OBJECTIVE BOX
[facility] [place] [facility]   MD ARI MILLS MD KRISTINE SOLTANPOUR, DO ANGELA WONG, PA    TEL:  OFFICE: [phone]  From 5pm-7am Answering Service [phone]    -- RENAL FOLLOW UP NOTE ---Date of Service 09-15-23 @ 11:46    Patient is a 73y old  Male who presents with a chief complaint of FTT (15 Sep 2023 06:51)      Patient seen and examined at bedside. No chest pain/sob    VITALS:  T(F): 99.1 (09-15-23 @ 08:15), Max: 99.1 (09-15-23 @ 08:15)  HR: 100 (09-15-23 @ 08:15)  BP: 144/84 (09-15-23 @ 08:15)  RR: 18 (09-15-23 @ 08:15)  SpO2: 100% (09-15-23 @ 08:15)  Wt(kg): --        PHYSICAL EXAM:  General: NAD  Neck: No JVD  Respiratory: CTAB, no wheezes, rales or rhonchi  Cardiovascular: S1, S2, RRR  Gastrointestinal: BS+, soft, NT/ND  Extremities: No peripheral edema    Hospital Medications:   MEDICATIONS  (STANDING):  amLODIPine   Tablet 10 milliGRAM(s) Oral daily  cefTRIAXone   IVPB 1000 milliGRAM(s) IV Intermittent every 24 hours  chlorhexidine 2% Cloths 1 Application(s) Topical daily  collagenase Ointment 1 Application(s) Topical daily  dextrose 5% + lactated ringers. 1000 milliLiter(s) (60 mL/Hr) IV Continuous <Continuous>  levETIRAcetam  IVPB 500 milliGRAM(s) IV Intermittent every 12 hours  levothyroxine 88 MICROGram(s) Oral daily  magnesium sulfate  IVPB 2 Gram(s) IV Intermittent once  QUEtiapine 75 milliGRAM(s) Oral at bedtime  rivaroxaban 10 milliGRAM(s) Oral with dinner  thiamine 100 milliGRAM(s) Oral daily  valproate sodium  IVPB 500 milliGRAM(s) IV Intermittent every 6 hours      LABS:  09-15    140  |  107  |  4<L>  ----------------------------<  163<H>  3.8   |  22  |  0.69    Ca    7.6<L>      15 Sep 2023 07:12  Phos  4.8     09-15  Mg     1.50     09-15      Creatinine Trend: 0.69 <--, 0.70 <--, 0.83 <--, 0.75 <--, 0.80 <--, 0.82 <--, 0.80 <--, 0.86 <--, 0.85 <--, 1.00 <--, 1.00 <--, 1.08 <--, 1.16 <--, 1.27 <--, 1.33 <--, 1.42 <--, 1.51 <--, 1.67 <--    Phosphorus: 4.8 mg/dL (09-15 @ 07:12)                              10.5   6.60  )-----------( 224      ( 15 Sep 2023 07:12 )             29.7     Urine Studies:  Urinalysis - [09-15-23 @ 07:12]      Color  / Appearance  / SG  / pH       Gluc 163 / Ketone   / Bili  / Urobili        Blood  / Protein  / Leuk Est  / Nitrite       RBC  / WBC  / Hyaline  / Gran  / Sq Epi  / Non Sq Epi  / Bacteria     Urine Creatinine 184      [09-09-23 @ 00:34]  Urine Protein 186      [09-09-23 @ 00:34]  Urine Sodium 37      [09-09-23 @ 00:34]  Urine Urea Nitrogen 723.8      [09-09-23 @ 00:34]  Urine Potassium 57.2      [09-09-23 @ 00:34]  Urine Osmolality 513      [09-09-23 @ 00:34]    TSH 3.29      [08-07-23 @ 06:09]  Lipid: chol 117, TG 82, HDL 48, LDL --      [02-09-23 @ 06:40]        RADIOLOGY & ADDITIONAL STUDIES:

## 2023-09-15 NOTE — PHYSICAL THERAPY INITIAL EVALUATION ADULT - GENERAL OBSERVATIONS, REHAB EVAL
Pt seen sidelying in bed, hips and knees flexed, pt contracted/+stiffness throughout; /72 ; pt follows commands poorly, grimaces with minimal movements to extremities Pt seen sidelying in bed, hips and knees flexed, pt contracted/+stiffness throughout; /72 ; pt follows commands poorly, grimaces with minimal movements to extremities; b/l heel wounds - dressings in place

## 2023-09-15 NOTE — PHYSICAL THERAPY INITIAL EVALUATION ADULT - ADDITIONAL COMMENTS
Pt was not verbalizing, unable to provide history, pt AXOXO at baseline, does not appear to be ambulatory

## 2023-09-15 NOTE — CONSULT NOTE ADULT - ASSESSMENT
Assessment  73-year-old male history of dementia, baseline mental status AxOx0, seizure disorder on Keppra and divalproex, history of DVT/PE off Eliquis, hypertension, hypothyroidism presenting sent in from his nursing home for evaluation of failure to thrive. Neurology team consulted for concern for seizures. Patient has been off of his depakote for one week.    Impression  work in progress please ignore  Altered mental status/drooling/decreased responsiveness in setting of missed doses of ASMs, concerning for seizure.     Recommendations: work in progress please ignore  [] obtain levels of AEDs before giving AM doses: keppra and valproic acid  [] continue seizure medications for now keppra 500mg BID, valproic acid total 2000 QD (presently in IV formulation as patient not tolerating PO)  [] vEEG  [ ] neuro checks q4hrs, seizure, fall & aspiration precautions  [ ] tele   [ ] DVT ppx as per primary team     Case to be seen in AM with team and attending. Recommendations finalized upon attending attestation. Assessment  73-year-old male history of dementia, baseline mental status AxOx0, seizure disorder on Keppra and divalproex, history of DVT/PE off Eliquis, hypertension, hypothyroidism presenting sent in from his nursing home for evaluation of failure to thrive. Neurology team consulted for concern for seizures. Patient has been off of his depakote for one week.    Impression    Decreased in mentation/staring episode/drooling/decreased responsiveness in setting of missed doses of ASMs, concerning for seizure.     Recommendations:   [] obtain levels of AEDs before giving AM doses: keppra and valproic acid  [] continue current seizure medications for now keppra 500mg BID, valproic acid total 500 q6hr (presently in IV formulation as patient not tolerating PO)  [ ] neuro checks q4hrs, seizure, fall & aspiration precautions  [ ] tele   [ ] DVT ppx as per primary team     For generalized tonic clonic seizures >3 min with derangement of vital signs - rescue 1 mg ativan.  Case to be seen in AM with team and attending. Recommendations finalized upon attending attestation. Assessment  73-year-old male history of dementia, baseline mental status AxOx0, seizure disorder on Keppra and divalproex, history of DVT/PE off Eliquis, hypertension, hypothyroidism presenting sent in from his nursing home for evaluation of failure to thrive. Neurology team consulted for concern for seizures. Patient has been off of his depakote for one week.    Impression    Decreased in mentation/staring episode/drooling/decreased responsiveness in setting of missed doses of ASMs, concerning for seizure.     Recommendations:   [] obtain levels of AEDs before giving AM doses: keppra and valproic acid  [] continue current seizure medications for now keppra 500mg BID, valproic acid 500 q6hr IV (presently in IV formulation as patient not tolerating PO)  [ ] neuro checks q4hrs, seizure, fall & aspiration precautions  [ ] tele   [ ] DVT ppx as per primary team     For generalized tonic clonic seizures >3 min with derangement of vital signs - rescue 1 mg ativan.  Case to be seen in AM with team and attending. Recommendations finalized upon attending attestation.

## 2023-09-15 NOTE — CONSULT NOTE ADULT - SUBJECTIVE AND OBJECTIVE BOX
Neurology - Consult Note    Spectra: [phone] ([facility]), [phone] ([facility])    HPI: 73-year-old male history of dementia, baseline mental status AxOx0, seizure disorder on Keppra and divalproex, history of DVT/PE off Eliquis, hypertension, hypothyroidism presenting sent in from his nursing home for evaluation of failure to thrive, found to have hypernatremia and new renal failure on outside labs.     Neurology team consulted for concern for seizures.    Wife notes patient was able to communicate with her day prior to evaluation, but has been nonverbal on day of initial neuro eval. Wife also notes patient had drooling on side of his mouth which he usually gets after a seizure.     Patient has not been getting his depakote for the last week, has only been on keppra. Depakote was then resumed in IV form on 9/15.    Has previously been evaluated by the neurology team on prior hospital presentations. Home meds: keppra 500mg BID, valproic acid 1000mg BID    Review of Systems:    NEUROLOGICAL: +As stated in HPI above  All other review of systems is negative unless indicated above.    Allergies:  fish (Hives; Angioedema)  No Known Drug Allergies  [place] (Swelling; Angioedema)    PMHx/PSHx/Family Hx: As above, otherwise see below   HTN (hypertension)  Diverticulitis  Diverticulosis  Hypothyroid  Deep vein thrombosis (DVT)  Pulmonary embolism    Social Hx:  Never smoker; no current use of tobacco, alcohol, or illicit drugs  Baseline functional status is requires assistance with ADLs    Medications:  MEDICATIONS  (STANDING):  amLODIPine   Tablet 10 milliGRAM(s) Oral daily  cefTRIAXone   IVPB 1000 milliGRAM(s) IV Intermittent every 24 hours  chlorhexidine 2% Cloths 1 Application(s) Topical daily  collagenase Ointment 1 Application(s) Topical daily  dextrose 5% + lactated ringers. 1000 milliLiter(s) (60 mL/Hr) IV Continuous <Continuous>  levETIRAcetam  IVPB 500 milliGRAM(s) IV Intermittent every 12 hours  levothyroxine 88 MICROGram(s) Oral daily  QUEtiapine 75 milliGRAM(s) Oral at bedtime  rivaroxaban 10 milliGRAM(s) Oral with dinner  thiamine 100 milliGRAM(s) Oral daily  valproate sodium  IVPB 500 milliGRAM(s) IV Intermittent every 6 hours    Vitals:  T(C): 37.3 (09-15-23 @ 12:05), Max: 37.3 (09-15-23 @ 08:15)  HR: 98 (09-15-23 @ 12:05) (82 - 100)  BP: 141/72 (09-15-23 @ 12:05) (119/96 - 144/84)  RR: 18 (09-15-23 @ 12:05) (17 - 18)  SpO2: 96% (09-15-23 @ 12:05) (96% - 100%)    Physical Examination: work in progress please ignore  General - non-toxic appearing male/female in no acute distress  Cardiovascular - peripheral pulses palpable, no edema  Respiratory - breathing comfortably with no increased work of breathing  MS: Eyes open, occasionally tracks and other times just stares forward. Only able to state his name. Otherwise not oriented place, situation, date time. Follows no commands.    Language: Speech is 1-2 words. Not repeating.   CNs: PERRL (R 3mm, L 3mm) and not sluggish. VFF to blink to threat. EOMI. No disconjugate gaze. Has head lying to R side on bed, difficult to make out asymmetry but may have. Not following for tongue testing.     Motor - Normal bulk and increased tone throughout. Has paratonia + addnl stiffness neck and all extremities (unclear chronicity). B/l UE some effort antigravity,  str present (R somewhat more than L). Both UE able to slow fall slightly. B/l LE held flexed at hips and knees. Some movement but none witnessed antigravity for b/l LE.    Sensation: Intact to noxious stimuli x4 extremities.   Reflexes L/R:  Biceps(C5) 3/3  BR(C6) 3/3   Triceps(C7)  2/2 Patellar(L4)  3/3      Ankles 2/3  Ankle clonus R only, non L side.  + glabellar reflex  - mistry reflex b/l  + palmomental reflex  Toes: downgoing  Coordination/ Gait: cannot assess     Labs:              10.5   6.60  )-----------( 224      ( 15 Sep 2023 07:12 )             29.7     09-15  140  |  107  |  4<L>  ----------------------------<  163<H>  3.8   |  22  |  0.69  Ca    7.6<L>      15 Sep 2023 07:12  Phos  4.8     09-15  Mg     1.50     09-15  POCT Blood Glucose.: 97 mg/dL (14 Sep 2023 18:09)    Radiology:  < from: MR Brain-Seizure, Epilepsy No Cont (08.11.23 @ 12:20) >  Significant motion artifact rendering the study near nondiagnostic.  1.  No evidence of large territorial infarct.  2.  Probable small lacunar infarcts throughout the basal ganglia,   thalami, and curt.  3.  Prominent increased T2/FLAIR signal throughout the deep and   periventricular white matter and curt, likely compatible with chronic   small vessel disease.  4.  Significant parenchymal volume loss.     Neurology - Consult Note    Spectra: [phone] ([facility]), [phone] ([facility])    HPI: 73-year-old male history of dementia, baseline mental status AxOx0, seizure disorder on Keppra and divalproex, history of DVT/PE off Eliquis, hypertension, hypothyroidism presenting sent in from his nursing home for evaluation of failure to thrive, found to have hypernatremia and new renal failure on outside labs.     Neurology team consulted for concern for seizures.  Per EMR: Wife notes patient was able to communicate with her day prior to evaluation, but has been nonverbal on day of initial neuro eval. Wife also notes patient had drooling on side of his mouth which he usually gets after a seizure.   Patient has not been getting his depakote since 9/8 has only been on keppra. Depakote was then resumed in IV form on 9/15.    Has previously been evaluated by the neurology team on prior hospital presentations. Home meds: keppra 500mg BID, valproic acid 1000mg BID. Presently on keppra 500 BID and valproic acid 500 q6hr.    Review of Systems:    NEUROLOGICAL: +As stated in HPI above  All other review of systems is negative unless indicated above.    Allergies:  fish (Hives; Angioedema)  No Known Drug Allergies  [place] (Swelling; Angioedema)    PMHx/PSHx/Family Hx: As above, otherwise see below   HTN (hypertension)  Diverticulitis  Diverticulosis  Hypothyroid  Deep vein thrombosis (DVT)  Pulmonary embolism    Social Hx:  Never smoker; no current use of tobacco, alcohol, or illicit drugs  Baseline functional status is requires assistance with ADLs    Medications:  MEDICATIONS  (STANDING):  amLODIPine   Tablet 10 milliGRAM(s) Oral daily  cefTRIAXone   IVPB 1000 milliGRAM(s) IV Intermittent every 24 hours  chlorhexidine 2% Cloths 1 Application(s) Topical daily  collagenase Ointment 1 Application(s) Topical daily  dextrose 5% + lactated ringers. 1000 milliLiter(s) (60 mL/Hr) IV Continuous <Continuous>  levETIRAcetam  IVPB 500 milliGRAM(s) IV Intermittent every 12 hours  levothyroxine 88 MICROGram(s) Oral daily  QUEtiapine 75 milliGRAM(s) Oral at bedtime  rivaroxaban 10 milliGRAM(s) Oral with dinner  thiamine 100 milliGRAM(s) Oral daily  valproate sodium  IVPB 500 milliGRAM(s) IV Intermittent every 6 hours    Vitals:  T(C): 37.3 (09-15-23 @ 12:05), Max: 37.3 (09-15-23 @ 08:15)  HR: 98 (09-15-23 @ 12:05) (82 - 100)  BP: 141/72 (09-15-23 @ 12:05) (119/96 - 144/84)  RR: 18 (09-15-23 @ 12:05) (17 - 18)  SpO2: 96% (09-15-23 @ 12:05) (96% - 100%)    Physical Examination:   General - male, curled up with knees bent in bed    MS: Eyes open, occasionally tracks and other times just stares forward. Mumbles, says 'yes' when asked what his name is. Otherwise not oriented place, situation, date time. Follows command to squeeze L hand.  Language: Speech is 1-2 words.  CNs: PERRL (R 4mm, L 4mm). VFF to blink to threat. Able to follow to the right, but does not look to the L with prompting. No disconjugate gaze. Is lying with whole body tilted to the L side, difficult to make out asymmetry.  Motor - Normal bulk and increased tone throughout. Has paratonia + stiffness in all extremities (noted on prior evaluation). B/l UE difficult to assess movement given knees bent/patient not following commands, able to move toes on L side, does not move toes on R,  strength present in L hand 4+, R hand appears swollen. B/l LE held flexed at hips and knees.   Sensation: Intact to noxious stimuli x4 extremities.   Reflexes L/R:  Biceps(C5) 2/2  BR(C6) 2/2   Triceps(C7)  2/2 Patellar(L4) 0/0 (of note difficult to assess as patient maintaining position of flexed hips/knees)  Toes: neutral  Coordination/ Gait: ARMANDO     Labs:              10.5   6.60  )-----------( 224      ( 15 Sep 2023 07:12 )             29.7     09-15  140  |  107  |  4<L>  ----------------------------<  163<H>  3.8   |  22  |  0.69  Ca    7.6<L>      15 Sep 2023 07:12  Phos  4.8     09-15  Mg     1.50     09-15  POCT Blood Glucose.: 97 mg/dL (14 Sep 2023 18:09)    Radiology:  < from: MR Brain-Seizure, Epilepsy No Cont (08.11.23 @ 12:20) >  Significant motion artifact rendering the study near nondiagnostic.  1.  No evidence of large territorial infarct.  2.  Probable small lacunar infarcts throughout the basal ganglia,   thalami, and curt.  3.  Prominent increased T2/FLAIR signal throughout the deep and   periventricular white matter and curt, likely compatible with chronic   small vessel disease.  4.  Significant parenchymal volume loss.

## 2023-09-15 NOTE — PROGRESS NOTE ADULT - SUBJECTIVE AND OBJECTIVE BOX
Subjective: Patient seen and examined. No new events except as noted.     SUBJECTIVE/ROS:  nad      MEDICATIONS:  MEDICATIONS  (STANDING):  amLODIPine   Tablet 10 milliGRAM(s) Oral daily  cefTRIAXone   IVPB 1000 milliGRAM(s) IV Intermittent every 24 hours  chlorhexidine 2% Cloths 1 Application(s) Topical daily  collagenase Ointment 1 Application(s) Topical daily  dextrose 5% + lactated ringers. 1000 milliLiter(s) (60 mL/Hr) IV Continuous <Continuous>  levETIRAcetam  IVPB 500 milliGRAM(s) IV Intermittent every 12 hours  levothyroxine 88 MICROGram(s) Oral daily  QUEtiapine 75 milliGRAM(s) Oral at bedtime  rivaroxaban 10 milliGRAM(s) Oral with dinner  thiamine 100 milliGRAM(s) Oral daily  valproate sodium  IVPB 500 milliGRAM(s) IV Intermittent every 6 hours      PHYSICAL EXAM:  T(C): 37 (09-15-23 @ 05:39), Max: 37.2 (09-14-23 @ 12:19)  HR: 82 (09-15-23 @ 00:23) (82 - 89)  BP: 141/69 (09-15-23 @ 05:39) (119/96 - 150/72)  RR: 18 (09-15-23 @ 05:39) (17 - 18)  SpO2: 99% (09-15-23 @ 05:39) (96% - 99%)  Wt(kg): --  I&O's Summary          JVP: Normal  Neck: supple  Lung: clear   CV: S1 S2 , Murmur:  Abd: soft  Ext: No edema  neuro: Awake / alert  Psych: flat affect  Skin: normal``    LABS/DATA:    CARDIAC MARKERS:                                10.1   5.81  )-----------( 194      ( 14 Sep 2023 07:14 )             29.9     09-14    138  |  107  |  6<L>  ----------------------------<  128<H>  3.7   |  24  |  0.70    Ca    7.5<L>      14 Sep 2023 07:14  Phos  1.8     09-14  Mg     1.70     09-14      proBNP:   Lipid Profile:   HgA1c:   TSH:     TELE:  EKG:

## 2023-09-15 NOTE — PROGRESS NOTE ADULT - SUBJECTIVE AND OBJECTIVE BOX
Patient is a 73y old  Male who presents with a chief complaint of FTT (15 Sep 2023 11:45)    Date of servie : 09-15-23 @ 16:00  INTERVAL HPI/OVERNIGHT EVENTS:  T(C): 37.3 (09-15-23 @ 12:05), Max: 37.3 (09-15-23 @ 08:15)  HR: 98 (09-15-23 @ 12:05) (82 - 100)  BP: 141/72 (09-15-23 @ 12:05) (119/96 - 144/84)  RR: 18 (09-15-23 @ 12:05) (17 - 18)  SpO2: 96% (09-15-23 @ 12:05) (96% - 100%)  Wt(kg): --  I&O's Summary      LABS:                        10.5   6.60  )-----------( 224      ( 15 Sep 2023 07:12 )             29.7     09-15    140  |  107  |  4<L>  ----------------------------<  163<H>  3.8   |  22  |  0.69    Ca    7.6<L>      15 Sep 2023 07:12  Phos  4.8     09-15  Mg     1.50     09-15        Urinalysis Basic - ( 15 Sep 2023 07:12 )    Color: x / Appearance: x / SG: x / pH: x  Gluc: 163 mg/dL / Ketone: x  / Bili: x / Urobili: x   Blood: x / Protein: x / Nitrite: x   Leuk Esterase: x / RBC: x / WBC x   Sq Epi: x / Non Sq Epi: x / Bacteria: x      CAPILLARY BLOOD GLUCOSE      POCT Blood Glucose.: 97 mg/dL (14 Sep 2023 18:09)        Urinalysis Basic - ( 15 Sep 2023 07:12 )    Color: x / Appearance: x / SG: x / pH: x  Gluc: 163 mg/dL / Ketone: x  / Bili: x / Urobili: x   Blood: x / Protein: x / Nitrite: x   Leuk Esterase: x / RBC: x / WBC x   Sq Epi: x / Non Sq Epi: x / Bacteria: x        MEDICATIONS  (STANDING):  amLODIPine   Tablet 10 milliGRAM(s) Oral daily  cefTRIAXone   IVPB 1000 milliGRAM(s) IV Intermittent every 24 hours  chlorhexidine 2% Cloths 1 Application(s) Topical daily  collagenase Ointment 1 Application(s) Topical daily  dextrose 5% + lactated ringers. 1000 milliLiter(s) (60 mL/Hr) IV Continuous <Continuous>  levETIRAcetam  IVPB 500 milliGRAM(s) IV Intermittent every 12 hours  levothyroxine 88 MICROGram(s) Oral daily  QUEtiapine 75 milliGRAM(s) Oral at bedtime  rivaroxaban 10 milliGRAM(s) Oral with dinner  thiamine 100 milliGRAM(s) Oral daily  valproate sodium  IVPB 500 milliGRAM(s) IV Intermittent every 6 hours    MEDICATIONS  (PRN):          PHYSICAL EXAM:  GENERAL: frail  CHEST/LUNG: Clear to percussion bilaterally; No rales, rhonchi, wheezing, or rubs  HEART: Regular rate and rhythm; No murmurs, rubs, or gallops  ABDOMEN: Soft, Nontender, Nondistended; Bowel sounds present  EXTREMITIES:  edema +    Care Discussed with Consultants/Other Providers [ ] YES  [ ] NO

## 2023-09-15 NOTE — PHYSICAL THERAPY INITIAL EVALUATION ADULT - IMPAIRMENTS FOUND, PT EVAL
arousal, attention, and cognition/cognitive impairment/fine motor/gait, locomotion, and balance/gross motor/integumentary integrity/muscle strength/posture/ROM/tone

## 2023-09-15 NOTE — CHART NOTE - NSCHARTNOTEFT_GEN_A_CORE
Wife at bedside concern pt had seizure today. Per wife pt was able to communicate with her yesterday but today is not verbal. Wife also reported pt with drooling on the side of his mouth which he typically get after seizure   Of note pt seen this am A&OX0 but did nodded his head when asked if his name is Dobbins. Pt now still A&O X0. Staring straight and not tracking. Noted yarning. UE contracted   Of note pt with poor PO last couple of days and NPO did not get any depakote given it cannot be crushed. Has been on standing Keppra. Depakote resumed on 9/15 am by night staff    Neurology consulted, attending updated

## 2023-09-15 NOTE — PHYSICAL THERAPY INITIAL EVALUATION ADULT - PERTINENT HX OF CURRENT PROBLEM, REHAB EVAL
73-year-old male history of dementia, baseline mental status AxOx0, seizure disorder on Keppra and divalproex, history of DVT/PE on Xarelto, hypertension, hypothyroidism presented sent in from his nursing home for evaluation of failure to thrive, found to have hypernatremia and new renal failure on outside labs

## 2023-09-15 NOTE — PROGRESS NOTE ADULT - ASSESSMENT
73-year-old male history of dementia, baseline mental status AxOx0, seizure disorder on Keppra and divalproex, history of DVT/PE off Eliquis, hypertension, hypothyroidism presenting sent in from his nursing home for evaluation of failure to thrive, found to have hypernatremia and new renal failure on outside labs.  Per documentation that arrived with the patient, patient has been refusing to eat since yesterday.    1 Hypernatremia  - resolved   - renal fu     2 Lactic acidosis  - cw ceftriaxone  - IVF  - monitor    3 Seizure disorder- cw Keppra and valproic acid     4 hx of  DVT/PE- cw AC    5 Dysphagia  - diet as per  speech and swallow fu       dc planning

## 2023-09-15 NOTE — PROGRESS NOTE ADULT - ASSESSMENT
73-year-old male history of dementia, baseline mental status AxOx0, seizure disorder on Keppra and divalproex, history of DVT/PE off Eliquis, hypertension, hypothyroidism presenting sent in from his nursing home for evaluation of failure to thrive, found to have hypernatremia and new renal failure on outside labs.    Hypernatremia:  Due to dehydration, poor intake.  continue ivf  pt still NPO  na improving   - Trend BMP BID.    JESSICA:  Due to Pre-Renal azotemia.  - Hydrate as stated above.  resolved    hypophos  supplemented  monitor    hypokalemia  supplemented  monitor    htn  controlled  continue current meds  monitor 73-year-old male history of dementia, baseline mental status AxOx0, seizure disorder on Keppra and divalproex, history of DVT/PE off Eliquis, hypertension, hypothyroidism presenting sent in from his nursing home for evaluation of failure to thrive, found to have hypernatremia and new renal failure on outside labs.    Hypernatremia:  Due to dehydration, poor intake.  continue ivf  on diet but not eating much  can dc ivf if eating more  na improving   - Trend BMP BID.    JESSICA:  Due to Pre-Renal azotemia.  - Hydrate as stated above.  resolved    hypophos  supplemented  monitor    hypokalemia  supplemented  monitor    htn  controlled  continue current meds  monitor

## 2023-09-16 NOTE — PROGRESS NOTE ADULT - SUBJECTIVE AND OBJECTIVE BOX
Subjective: Patient seen and examined. No new events except as noted.     SUBJECTIVE/ROS:  MEDICATIONS:  MEDICATIONS  (STANDING):  amLODIPine   Tablet 10 milliGRAM(s) Oral daily  cefTRIAXone   IVPB 1000 milliGRAM(s) IV Intermittent every 24 hours  chlorhexidine 2% Cloths 1 Application(s) Topical daily  collagenase Ointment 1 Application(s) Topical daily  levETIRAcetam  IVPB 500 milliGRAM(s) IV Intermittent every 12 hours  levothyroxine 88 MICROGram(s) Oral daily  QUEtiapine 75 milliGRAM(s) Oral at bedtime  rivaroxaban 10 milliGRAM(s) Oral with dinner  thiamine 100 milliGRAM(s) Oral daily  valproate sodium  IVPB 500 milliGRAM(s) IV Intermittent every 6 hours      PHYSICAL EXAM:  T(C): 37.5 (09-16-23 @ 04:23), Max: 37.5 (09-16-23 @ 04:23)  HR: 98 (09-16-23 @ 04:23) (80 - 100)  BP: 133/67 (09-16-23 @ 04:23) (133/67 - 144/84)  RR: 18 (09-16-23 @ 04:23) (17 - 18)  SpO2: 97% (09-16-23 @ 04:23) (95% - 100%)  Wt(kg): --  I&O's Summary          JVP: Normal  Neck: supple  Lung: clear   CV: S1 S2 , Murmur:  Abd: soft  Ext: No edema  neuro: Awake / alert  Psych: flat affect  Skin: normal``    LABS/DATA:    CARDIAC MARKERS:                                10.5   6.60  )-----------( 224      ( 15 Sep 2023 07:12 )             29.7     09-15    140  |  107  |  4<L>  ----------------------------<  163<H>  3.8   |  22  |  0.69    Ca    7.6<L>      15 Sep 2023 07:12  Phos  4.8     09-15  Mg     1.50     09-15      proBNP:   Lipid Profile:   HgA1c:   TSH:     TELE:  EKG:

## 2023-09-16 NOTE — PROGRESS NOTE ADULT - ASSESSMENT
73-year-old male history of dementia, baseline mental status AxOx0, seizure disorder on Keppra and divalproex, history of DVT/PE off Eliquis, hypertension, hypothyroidism presenting sent in from his nursing home for evaluation of failure to thrive, found to have hypernatremia and new renal failure on outside labs.  Per documentation that arrived with the patient, patient has been refusing to eat since yesterday. (09 Sep 2023 09:23)      check levels

## 2023-09-16 NOTE — PROGRESS NOTE ADULT - SUBJECTIVE AND OBJECTIVE BOX
Neurology Progress    PEPITO [id number]    HPI:  73-year-old male history of dementia, baseline mental status AxOx0, seizure disorder on Keppra and divalproex, history of DVT/PE off Eliquis, hypertension, hypothyroidism presenting sent in from his nursing home for evaluation of failure to thrive, found to have hypernatremia and new renal failure on outside labs.  Per documentation that arrived with the patient, patient has been refusing to eat since yesterday. (09 Sep 2023 09:23)      Past Medical History  HTN (hypertension)    Diverticulitis    Diverticulosis    Hypothyroid    Deep vein thrombosis (DVT)    Pulmonary embolism        Past Surgical History  No significant past surgical history        MEDICATIONS    amLODIPine   Tablet 10 milliGRAM(s) Oral daily  cefTRIAXone   IVPB 1000 milliGRAM(s) IV Intermittent every 24 hours  chlorhexidine 2% Cloths 1 Application(s) Topical daily  collagenase Ointment 1 Application(s) Topical daily  dextrose 5% + lactated ringers. 1000 milliLiter(s) IV Continuous <Continuous>  enoxaparin Injectable 65 milliGRAM(s) SubCutaneous every 12 hours  levETIRAcetam  IVPB 500 milliGRAM(s) IV Intermittent every 12 hours  levothyroxine 88 MICROGram(s) Oral daily  potassium chloride  10 mEq/100 mL IVPB 10 milliEquivalent(s) IV Intermittent once  QUEtiapine 75 milliGRAM(s) Oral at bedtime  thiamine 100 milliGRAM(s) Oral daily  valproate sodium  IVPB 500 milliGRAM(s) IV Intermittent every 6 hours         Family history: No history of dementia, strokes, or seizures   FAMILY HISTORY:    SOCIAL HISTORY -- No history of tobacco or alcohol use     Allergies    fish (Hives; Angioedema)  No Known Drug Allergies  [place] (Swelling; Angioedema)    Intolerances            Vital Signs Last 24 Hrs  T(C): 37.4 (16 Sep 2023 08:00), Max: 37.5 (16 Sep 2023 04:23)  T(F): 99.3 (16 Sep 2023 08:00), Max: 99.5 (16 Sep 2023 04:23)  HR: 100 (16 Sep 2023 08:00) (80 - 100)  BP: 167/75 (16 Sep 2023 08:00) (133/67 - 167/75)  BP(mean): --  RR: 18 (16 Sep 2023 08:00) (17 - 18)  SpO2: 100% (16 Sep 2023 08:00) (95% - 100%)    Parameters below as of 16 Sep 2023 08:00  Patient On (Oxygen Delivery Method): room air            On Neurological Examination:    Mental Status - Patient is alert, awake  Follow no simple commands   mumblkes    Cranial Nerves - Extraocular muscle intact  ANTONELLA Facial symmetry Tongue midline, CnV1to V3 intact gross hearing intact      Motor Exam - rigid le  moves ue     Sensory    withdraw     GENERAL Exam:     Nontoxic , No Acute Distress   	  HEENT:  normocephalic, atraumatic  		  LUNGS:	Clear bilaterally  No Wheeze      VASCULAR: no carotid brui  	  HEART:	 Normal S1S2   No murmur RRR        	  MUSCULOSKELETAL: Normal Range of Motion  	   SKIN:      Normal   No Ecchymosis               LABS:  CBC Full  -  ( 16 Sep 2023 06:09 )  WBC Count : 7.59 K/uL  RBC Count : 3.36 M/uL  Hemoglobin : 10.5 g/dL  Hematocrit : 30.8 %  Platelet Count - Automated : 264 K/uL  Mean Cell Volume : 91.7 fL  Mean Cell Hemoglobin : 31.3 pg  Mean Cell Hemoglobin Concentration : 34.1 gm/dL  Auto Neutrophil # : 3.45 K/uL  Auto Lymphocyte # : 3.08 K/uL  Auto Monocyte # : 0.89 K/uL  Auto Eosinophil # : 0.10 K/uL  Auto Basophil # : 0.02 K/uL  Auto Neutrophil % : 45.4 %  Auto Lymphocyte % : 40.6 %  Auto Monocyte % : 11.7 %  Auto Eosinophil % : 1.3 %  Auto Basophil % : 0.3 %    Urinalysis Basic - ( 16 Sep 2023 06:09 )    Color: x / Appearance: x / SG: x / pH: x  Gluc: 112 mg/dL / Ketone: x  / Bili: x / Urobili: x   Blood: x / Protein: x / Nitrite: x   Leuk Esterase: x / RBC: x / WBC x   Sq Epi: x / Non Sq Epi: x / Bacteria: x      09-16    138  |  104  |  3<L>  ----------------------------<  112<H>  3.4<L>   |  24  |  0.73    Ca    8.0<L>      16 Sep 2023 06:09  Phos  2.3     09-16  Mg     1.80     09-16      Hemoglobin A1C:       Vitamin B12   PT/INR - ( 16 Sep 2023 06:09 )   PT: 15.9 sec;   INR: 1.43 ratio         PTT - ( 16 Sep 2023 06:09 )  PTT:35.9 sec      RADIOLOGY    EKG      IMPRESSION  This is a  year old           schoenberg

## 2023-09-16 NOTE — PROGRESS NOTE ADULT - ASSESSMENT
73-year-old male history of dementia, baseline mental status AxOx0, seizure disorder on Keppra and divalproex, history of DVT/PE off Eliquis, hypertension, hypothyroidism presenting sent in from his nursing home for evaluation of failure to thrive, found to have hypernatremia and new renal failure on outside labs.    Hypernatremia:  Due to dehydration, poor intake.  continue ivf  on diet but not eating much  can dc ivf if po intake improves  na improving   - Trend BMP BID.    JESSICA:  Due to Pre-Renal azotemia.  improved  monitor bmp       hypophos  supplement today  monitor    hypokalemia  supplemented by team  monitor    htn  un controlled  start coreg 3.25mg BID if remains elevated   monitor

## 2023-09-16 NOTE — PROGRESS NOTE ADULT - SUBJECTIVE AND OBJECTIVE BOX
Patient is a 73y old  Male who presents with a chief complaint of FTT (16 Sep 2023 11:03)    Date of servie : 09-16-23 @ 17:56  INTERVAL HPI/OVERNIGHT EVENTS:  T(C): 36.8 (09-16-23 @ 12:00), Max: 37.5 (09-16-23 @ 04:23)  HR: 98 (09-16-23 @ 12:00) (80 - 100)  BP: 133/72 (09-16-23 @ 12:00) (133/67 - 167/75)  RR: 18 (09-16-23 @ 12:00) (17 - 18)  SpO2: 100% (09-16-23 @ 12:00) (95% - 100%)  Wt(kg): --  I&O's Summary      LABS:                        10.5   7.59  )-----------( 264      ( 16 Sep 2023 06:09 )             30.8     09-16    138  |  104  |  3<L>  ----------------------------<  112<H>  3.4<L>   |  24  |  0.73    Ca    8.0<L>      16 Sep 2023 06:09  Phos  2.3     09-16  Mg     1.80     09-16      PT/INR - ( 16 Sep 2023 06:09 )   PT: 15.9 sec;   INR: 1.43 ratio         PTT - ( 16 Sep 2023 06:09 )  PTT:35.9 sec  Urinalysis Basic - ( 16 Sep 2023 06:09 )    Color: x / Appearance: x / SG: x / pH: x  Gluc: 112 mg/dL / Ketone: x  / Bili: x / Urobili: x   Blood: x / Protein: x / Nitrite: x   Leuk Esterase: x / RBC: x / WBC x   Sq Epi: x / Non Sq Epi: x / Bacteria: x      CAPILLARY BLOOD GLUCOSE      POCT Blood Glucose.: 118 mg/dL (16 Sep 2023 08:42)        Urinalysis Basic - ( 16 Sep 2023 06:09 )    Color: x / Appearance: x / SG: x / pH: x  Gluc: 112 mg/dL / Ketone: x  / Bili: x / Urobili: x   Blood: x / Protein: x / Nitrite: x   Leuk Esterase: x / RBC: x / WBC x   Sq Epi: x / Non Sq Epi: x / Bacteria: x        MEDICATIONS  (STANDING):  amLODIPine   Tablet 10 milliGRAM(s) Oral daily  chlorhexidine 2% Cloths 1 Application(s) Topical daily  collagenase Ointment 1 Application(s) Topical daily  dextrose 5% + lactated ringers. 1000 milliLiter(s) (60 mL/Hr) IV Continuous <Continuous>  enoxaparin Injectable 65 milliGRAM(s) SubCutaneous every 12 hours  levETIRAcetam  IVPB 500 milliGRAM(s) IV Intermittent every 12 hours  QUEtiapine 75 milliGRAM(s) Oral at bedtime  thiamine 100 milliGRAM(s) Oral daily  valproate sodium  IVPB 500 milliGRAM(s) IV Intermittent every 6 hours    MEDICATIONS  (PRN):          PHYSICAL EXAM:  GENERAL: frail  CHEST/LUNG: Clear to percussion bilaterally; No rales, rhonchi, wheezing, or rubs  HEART: Regular rate and rhythm; No murmurs, rubs, or gallops  ABDOMEN: Soft, Nontender, Nondistended; Bowel sounds present  EXTREMITIES: edema +    Care Discussed with Consultants/Other Providers [ ] YES  [ ] NO

## 2023-09-16 NOTE — PROGRESS NOTE ADULT - SUBJECTIVE AND OBJECTIVE BOX
[facility] [place] [facility]   MD ARI MILLS MD RUORU WONG, PA    TEL:  FROM 9 AM to 5 PM ---OFFICE: [phone]    FROM 5 PM - 9 AM PLEASE CALL ANSWERING SERVICE: [phone]    RENAL FOLLOW UP NOTE--Date of Service 09-16-23 @ 11:03  --------------------------------------------------------------------------------  HPI:      Pt seen and examined at bedside.        PAST HISTORY  --------------------------------------------------------------------------------  No significant changes to PMH, PSH, FHx, SHx, unless otherwise noted    ALLERGIES & MEDICATIONS  --------------------------------------------------------------------------------  Allergies    fish (Hives; Angioedema)  No Known Drug Allergies  [place] (Swelling; Angioedema)    Intolerances      Standing Inpatient Medications  amLODIPine   Tablet 10 milliGRAM(s) Oral daily  cefTRIAXone   IVPB 1000 milliGRAM(s) IV Intermittent every 24 hours  chlorhexidine 2% Cloths 1 Application(s) Topical daily  collagenase Ointment 1 Application(s) Topical daily  dextrose 5% + lactated ringers. 1000 milliLiter(s) IV Continuous <Continuous>  enoxaparin Injectable 65 milliGRAM(s) SubCutaneous every 12 hours  levETIRAcetam  IVPB 500 milliGRAM(s) IV Intermittent every 12 hours  levothyroxine 88 MICROGram(s) Oral daily  potassium chloride  10 mEq/100 mL IVPB 10 milliEquivalent(s) IV Intermittent every 1 hour  potassium phosphate IVPB 15 milliMole(s) IV Intermittent once  QUEtiapine 75 milliGRAM(s) Oral at bedtime  thiamine 100 milliGRAM(s) Oral daily  valproate sodium  IVPB 500 milliGRAM(s) IV Intermittent every 6 hours    PRN Inpatient Medications      REVIEW OF SYSTEMS  --------------------------------------------------------------------------------  General: no fever  MSK: no edema     VITALS/PHYSICAL EXAM  --------------------------------------------------------------------------------  T(C): 37.4 (09-16-23 @ 08:00), Max: 37.5 (09-16-23 @ 04:23)  HR: 100 (09-16-23 @ 08:00) (80 - 100)  BP: 167/75 (09-16-23 @ 08:00) (133/67 - 167/75)  RR: 18 (09-16-23 @ 08:00) (17 - 18)  SpO2: 100% (09-16-23 @ 08:00) (95% - 100%)  Wt(kg): --        Physical Exam:  	General: NAD  Neck: No JVD  Respiratory: CTAB, no wheezes, rales or rhonchi  Cardiovascular: S1, S2, RRR  Gastrointestinal: BS+, soft, NT/ND  Extremities: No peripheral edema  LABS/STUDIES  --------------------------------------------------------------------------------              10.5   7.59  >-----------<  264      [09-16-23 @ 06:09]              30.8     138  |  104  |  3   ----------------------------<  112      [09-16-23 @ 06:09]  3.4   |  24  |  0.73        Ca     8.0     [09-16-23 @ 06:09]      Mg     1.80     [09-16-23 @ 06:09]      Phos  2.3     [09-16-23 @ 06:09]      PT/INR: PT 15.9 , INR 1.43       [09-16-23 @ 06:09]  PTT: 35.9       [09-16-23 @ 06:09]      Creatinine Trend:  SCr 0.73 [09-16 @ 06:09]  SCr 0.69 [09-15 @ 07:12]  SCr 0.70 [09-14 @ 07:14]  SCr 0.83 [09-13 @ 06:17]  SCr 0.75 [09-12 @ 05:37]    Urinalysis - [09-16-23 @ 06:09]      Color  / Appearance  / SG  / pH       Gluc 112 / Ketone   / Bili  / Urobili        Blood  / Protein  / Leuk Est  / Nitrite       RBC  / WBC  / Hyaline  / Gran  / Sq Epi  / Non Sq Epi  / Bacteria       TSH 3.29      [08-07-23 @ 06:09]  Lipid: chol 117, TG 82, HDL 48, LDL --      [02-09-23 @ 06:40]

## 2023-09-17 NOTE — PROGRESS NOTE ADULT - SUBJECTIVE AND OBJECTIVE BOX
[facility] [place] [facility]   MD ARI MILLS MD RUORU WONG, PA    TEL:  FROM 9 AM to 5 PM ---OFFICE: [phone]    FROM 5 PM - 9 AM PLEASE CALL ANSWERING SERVICE: [phone]    RENAL FOLLOW UP NOTE--Date of Service 09-17-23 @ 10:37  --------------------------------------------------------------------------------  HPI:  Pt seen and examined at bedside.     PAST HISTORY  --------------------------------------------------------------------------------  No significant changes to PMH, PSH, FHx, SHx, unless otherwise noted    ALLERGIES & MEDICATIONS  --------------------------------------------------------------------------------  Allergies    fish (Hives; Angioedema)  No Known Drug Allergies  [place] (Swelling; Angioedema)    Intolerances      Standing Inpatient Medications  amLODIPine   Tablet 10 milliGRAM(s) Oral daily  chlorhexidine 2% Cloths 1 Application(s) Topical daily  collagenase Ointment 1 Application(s) Topical daily  dextrose 5% + lactated ringers. 1000 milliLiter(s) IV Continuous <Continuous>  enoxaparin Injectable 65 milliGRAM(s) SubCutaneous every 12 hours  levETIRAcetam  IVPB 500 milliGRAM(s) IV Intermittent every 12 hours  levothyroxine Injectable 70 MICROGram(s) IV Push once  potassium phosphate IVPB 30 milliMole(s) IV Intermittent once  QUEtiapine 75 milliGRAM(s) Oral at bedtime  thiamine 100 milliGRAM(s) Oral daily  valproate sodium  IVPB 500 milliGRAM(s) IV Intermittent every 6 hours    PRN Inpatient Medications      REVIEW OF SYSTEMS  --------------------------------------------------------------------------------  General: no fever  MSK: no edema     VITALS/PHYSICAL EXAM  --------------------------------------------------------------------------------  T(C): 37.4 (09-17-23 @ 09:07), Max: 37.4 (09-17-23 @ 09:07)  HR: 77 (09-17-23 @ 09:07) (77 - 98)  BP: 133/87 (09-17-23 @ 09:07) (133/72 - 148/90)  RR: 18 (09-17-23 @ 09:07) (18 - 18)  SpO2: 95% (09-17-23 @ 09:07) (95% - 100%)  Wt(kg): --        Physical Exam:  	General: NAD  Neck: No JVD  Respiratory: CTAB, no wheezes, rales or rhonchi  Cardiovascular: S1, S2, RRR  Gastrointestinal: BS+, soft, NT/ND  Extremities: No peripheral edema  LABS/STUDIES  --------------------------------------------------------------------------------              10.5   7.59  >-----------<  264      [09-16-23 @ 06:09]              30.8     136  |  103  |  4   ----------------------------<  123      [09-17-23 @ 06:46]  3.8   |  24  |  0.70        Ca     8.0     [09-17-23 @ 06:46]      Mg     1.70     [09-17-23 @ 06:46]      Phos  2.3     [09-17-23 @ 06:46]      PT/INR: PT 15.9 , INR 1.43       [09-16-23 @ 06:09]  PTT: 35.9       [09-16-23 @ 06:09]      Creatinine Trend:  SCr 0.70 [09-17 @ 06:46]  SCr 0.73 [09-16 @ 06:09]  SCr 0.69 [09-15 @ 07:12]  SCr 0.70 [09-14 @ 07:14]  SCr 0.83 [09-13 @ 06:17]    Urinalysis - [09-17-23 @ 06:46]      Color  / Appearance  / SG  / pH       Gluc 123 / Ketone   / Bili  / Urobili        Blood  / Protein  / Leuk Est  / Nitrite       RBC  / WBC  / Hyaline  / Gran  / Sq Epi  / Non Sq Epi  / Bacteria       TSH 3.29      [08-07-23 @ 06:09]  Lipid: chol 117, TG 82, HDL 48, LDL --      [02-09-23 @ 06:40]

## 2023-09-17 NOTE — PROGRESS NOTE ADULT - SUBJECTIVE AND OBJECTIVE BOX
Patient is a 73y old  Male who presents with a chief complaint of FTT (17 Sep 2023 10:37)    Date of servie : 09-17-23 @ 10:55  INTERVAL HPI/OVERNIGHT EVENTS:  T(C): 37.4 (09-17-23 @ 09:07), Max: 37.4 (09-17-23 @ 09:07)  HR: 77 (09-17-23 @ 09:07) (77 - 98)  BP: 133/87 (09-17-23 @ 09:07) (133/72 - 148/90)  RR: 18 (09-17-23 @ 09:07) (18 - 18)  SpO2: 95% (09-17-23 @ 09:07) (95% - 100%)  Wt(kg): --  I&O's Summary      LABS:                        10.5   7.59  )-----------( 264      ( 16 Sep 2023 06:09 )             30.8     09-17    136  |  103  |  4<L>  ----------------------------<  123<H>  3.8   |  24  |  0.70    Ca    8.0<L>      17 Sep 2023 06:46  Phos  2.3     09-17  Mg     1.70     09-17      PT/INR - ( 16 Sep 2023 06:09 )   PT: 15.9 sec;   INR: 1.43 ratio         PTT - ( 16 Sep 2023 06:09 )  PTT:35.9 sec  Urinalysis Basic - ( 17 Sep 2023 06:46 )    Color: x / Appearance: x / SG: x / pH: x  Gluc: 123 mg/dL / Ketone: x  / Bili: x / Urobili: x   Blood: x / Protein: x / Nitrite: x   Leuk Esterase: x / RBC: x / WBC x   Sq Epi: x / Non Sq Epi: x / Bacteria: x      CAPILLARY BLOOD GLUCOSE      POCT Blood Glucose.: 101 mg/dL (17 Sep 2023 06:51)  POCT Blood Glucose.: 115 mg/dL (16 Sep 2023 23:47)  POCT Blood Glucose.: 106 mg/dL (16 Sep 2023 18:03)        Urinalysis Basic - ( 17 Sep 2023 06:46 )    Color: x / Appearance: x / SG: x / pH: x  Gluc: 123 mg/dL / Ketone: x  / Bili: x / Urobili: x   Blood: x / Protein: x / Nitrite: x   Leuk Esterase: x / RBC: x / WBC x   Sq Epi: x / Non Sq Epi: x / Bacteria: x        MEDICATIONS  (STANDING):  amLODIPine   Tablet 10 milliGRAM(s) Oral daily  chlorhexidine 2% Cloths 1 Application(s) Topical daily  collagenase Ointment 1 Application(s) Topical daily  dextrose 5% + lactated ringers. 1000 milliLiter(s) (60 mL/Hr) IV Continuous <Continuous>  enoxaparin Injectable 65 milliGRAM(s) SubCutaneous every 12 hours  levETIRAcetam  IVPB 500 milliGRAM(s) IV Intermittent every 12 hours  levothyroxine Injectable 70 MICROGram(s) IV Push once  potassium phosphate IVPB 30 milliMole(s) IV Intermittent once  QUEtiapine 75 milliGRAM(s) Oral at bedtime  thiamine 100 milliGRAM(s) Oral daily  valproate sodium  IVPB 500 milliGRAM(s) IV Intermittent every 6 hours    MEDICATIONS  (PRN):          PHYSICAL EXAM:  GENERAL: frail  HEAD:  Atraumatic, Normocephalic  CHEST/LUNG: Clear to percussion bilaterally; No rales, rhonchi, wheezing, or rubs  HEART: Regular rate and rhythm; No murmurs, rubs, or gallops  ABDOMEN: Soft, Nontender, Nondistended; Bowel sounds present  EXTREMITIES:  edema +    Care Discussed with Consultants/Other Providers [x ] YES  [ ] NO

## 2023-09-17 NOTE — PROGRESS NOTE ADULT - ASSESSMENT
73-year-old male history of dementia, baseline mental status AxOx0, seizure disorder on Keppra and divalproex, history of DVT/PE off Eliquis, hypertension, hypothyroidism presenting sent in from his nursing home for evaluation of failure to thrive, found to have hypernatremia and new renal failure on outside labs.    Hypernatremia:  Due to dehydration, poor intake.  continue ivf  PT with poor oral intake   can dc ivf if po intake improves  na improving   - Trend BMP BID.    JESSICA:  Due to Pre-Renal azotemia.  improved  monitor bmp       hypophos  supplement today  monitor    hypokalemia  supplement as needed   monitor    htn  bp stable  monitor

## 2023-09-17 NOTE — PROGRESS NOTE ADULT - ASSESSMENT
73-year-old male history of dementia, baseline mental status AxOx0, seizure disorder on Keppra and divalproex, history of DVT/PE off Eliquis, hypertension, hypothyroidism presenting sent in from his nursing home for evaluation of failure to thrive, found to have hypernatremia and new renal failure on outside labs.  Per documentation that arrived with the patient, patient has been refusing to eat since yesterday.    1 Hypernatremia  - resolved   - renal fu   - failed spee ch and swallow  - NPO      2 Seizure disorder-   cw Keppra and valproic acid     3 hx of  DVT/PE  - cw AC    4 Dementia  - fall precautions  - frequent reorientation     5 Dysphagia  - NPO     Palliative fu for gOC

## 2023-09-17 NOTE — PROGRESS NOTE ADULT - SUBJECTIVE AND OBJECTIVE BOX
73-year-old male history of dementia, baseline mental status AxOx0, seizure disorder on Keppra and divalproex, history of DVT/PE off Eliquis, hypertension, hypothyroidism presenting sent in from his nursing home for evaluation of failure to thrive, found to have hypernatremia and new renal failure on outside labs.  Per documentation that arrived with the patient, patient has been refusing to eat since yesterday. (09 Sep 2023 09:23)      Past Medical History  HTN (hypertension)    Diverticulitis    Diverticulosis    Hypothyroid    Deep vein thrombosis (DVT)    Pulmonary embolism        Past Surgical History  No significant past surgical history        MEDICATIONS    amLODIPine   Tablet 10 milliGRAM(s) Oral daily  cefTRIAXone   IVPB 1000 milliGRAM(s) IV Intermittent every 24 hours  chlorhexidine 2% Cloths 1 Application(s) Topical daily  collagenase Ointment 1 Application(s) Topical daily  dextrose 5% + lactated ringers. 1000 milliLiter(s) IV Continuous <Continuous>  enoxaparin Injectable 65 milliGRAM(s) SubCutaneous every 12 hours  levETIRAcetam  IVPB 500 milliGRAM(s) IV Intermittent every 12 hours  levothyroxine 88 MICROGram(s) Oral daily  potassium chloride  10 mEq/100 mL IVPB 10 milliEquivalent(s) IV Intermittent once  QUEtiapine 75 milliGRAM(s) Oral at bedtime  thiamine 100 milliGRAM(s) Oral daily  valproate sodium  IVPB 500 milliGRAM(s) IV Intermittent every 6 hours         Family history: No history of dementia, strokes, or seizures   FAMILY HISTORY:    SOCIAL HISTORY -- No history of tobacco or alcohol use     Allergies    fish (Hives; Angioedema)  No Known Drug Allergies  [place] (Swelling; Angioedema)    Intolerances            Vital Signs Last 24 Hrs  T(C): 37.4 (16 Sep 2023 08:00), Max: 37.5 (16 Sep 2023 04:23)  T(F): 99.3 (16 Sep 2023 08:00), Max: 99.5 (16 Sep 2023 04:23)  HR: 100 (16 Sep 2023 08:00) (80 - 100)  BP: 167/75 (16 Sep 2023 08:00) (133/67 - 167/75)  BP(mean): --  RR: 18 (16 Sep 2023 08:00) (17 - 18)  SpO2: 100% (16 Sep 2023 08:00) (95% - 100%)    Parameters below as of 16 Sep 2023 08:00  Patient On (Oxygen Delivery Method): room air            On Neurological Examination:    Mental Status - Patient is alert, awake  Follow no simple commands   mumblkes    Cranial Nerves - Extraocular muscle intact  ANTONELLA Facial symmetry Tongue midline, CnV1to V3 intact gross hearing intact      Motor Exam - rigid le  moves ue     Sensory    withdraw     GENERAL Exam:     Nontoxic , No Acute Distress   	  HEENT:  normocephalic, atraumatic  		  LUNGS:	Clear bilaterally  No Wheeze      VASCULAR: no carotid brui  	  HEART:	 Normal S1S2   No murmur RRR        	  MUSCULOSKELETAL: Normal Range of Motion  	   SKIN:      Normal   No Ecchymosis               LABS:  CBC Full  -  ( 16 Sep 2023 06:09 )  WBC Count : 7.59 K/uL  RBC Count : 3.36 M/uL  Hemoglobin : 10.5 g/dL  Hematocrit : 30.8 %  Platelet Count - Automated : 264 K/uL  Mean Cell Volume : 91.7 fL  Mean Cell Hemoglobin : 31.3 pg  Mean Cell Hemoglobin Concentration : 34.1 gm/dL  Auto Neutrophil # : 3.45 K/uL  Auto Lymphocyte # : 3.08 K/uL  Auto Monocyte # : 0.89 K/uL  Auto Eosinophil # : 0.10 K/uL  Auto Basophil # : 0.02 K/uL  Auto Neutrophil % : 45.4 %  Auto Lymphocyte % : 40.6 %  Auto Monocyte % : 11.7 %  Auto Eosinophil % : 1.3 %  Auto Basophil % : 0.3 %    Urinalysis Basic - ( 16 Sep 2023 06:09 )    Color: x / Appearance: x / SG: x / pH: x  Gluc: 112 mg/dL / Ketone: x  / Bili: x / Urobili: x   Blood: x / Protein: x / Nitrite: x   Leuk Esterase: x / RBC: x / WBC x   Sq Epi: x / Non Sq Epi: x / Bacteria: x      09-16    138  |  104  |  3<L>  ----------------------------<  112<H>  3.4<L>   |  24  |  0.73    Ca    8.0<L>      16 Sep 2023 06:09  Phos  2.3     09-16  Mg     1.80     09-16      Hemoglobin A1C:       Vitamin B12   PT/INR - ( 16 Sep 2023 06:09 )   PT: 15.9 sec;   INR: 1.43 ratio         PTT - ( 16 Sep 2023 06:09 )  PTT:35.9 sec      RADIOLOGY    EKG  valproic 38          schoenberg       Assessment and Plan:   · Assessment	  73-year-old male history of dementia, baseline mental status AxOx0, seizure disorder on Keppra and divalproex, history of DVT/PE off Eliquis, hypertension, hypothyroidism presenting sent in from his nursing home for evaluation of failure to thrive, found to have hypernatremia and new renal failure on outside labs.  Per documentation that arrived with the patient, patient has been refusing to eat since yesterday. (09 Sep 2023 09:23)      continue depakote     Electronic Signatures:  Schoenberg, Laura Gray (MD)

## 2023-09-17 NOTE — PROGRESS NOTE ADULT - SUBJECTIVE AND OBJECTIVE BOX
Subjective: Patient seen and examined. No new events except as noted.     SUBJECTIVE/ROS:  nad      MEDICATIONS:  MEDICATIONS  (STANDING):  amLODIPine   Tablet 10 milliGRAM(s) Oral daily  chlorhexidine 2% Cloths 1 Application(s) Topical daily  collagenase Ointment 1 Application(s) Topical daily  dextrose 5% + lactated ringers. 1000 milliLiter(s) (60 mL/Hr) IV Continuous <Continuous>  enoxaparin Injectable 65 milliGRAM(s) SubCutaneous every 12 hours  levETIRAcetam  IVPB 500 milliGRAM(s) IV Intermittent every 12 hours  levothyroxine Injectable 70 MICROGram(s) IV Push once  QUEtiapine 75 milliGRAM(s) Oral at bedtime  thiamine 100 milliGRAM(s) Oral daily  valproate sodium  IVPB 500 milliGRAM(s) IV Intermittent every 6 hours      PHYSICAL EXAM:  T(C): 37.1 (09-17-23 @ 05:52), Max: 37.4 (09-16-23 @ 08:00)  HR: 89 (09-17-23 @ 05:52) (86 - 100)  BP: 144/84 (09-17-23 @ 05:52) (133/72 - 167/75)  RR: 18 (09-17-23 @ 05:52) (18 - 18)  SpO2: 98% (09-17-23 @ 05:52) (98% - 100%)  Wt(kg): --  I&O's Summary          JVP: Normal  Neck: supple  Lung: clear   CV: S1 S2 , Murmur:  Abd: soft  Ext: No edema  neuro: Awake / alert  Psych: flat affect  Skin: normal``    LABS/DATA:    CARDIAC MARKERS:                                10.5   7.59  )-----------( 264      ( 16 Sep 2023 06:09 )             30.8     09-16    138  |  104  |  3<L>  ----------------------------<  112<H>  3.4<L>   |  24  |  0.73    Ca    8.0<L>      16 Sep 2023 06:09  Phos  2.3     09-16  Mg     1.80     09-16      proBNP:   Lipid Profile:   HgA1c:   TSH:     TELE:  EKG:

## 2023-09-18 NOTE — PROGRESS NOTE ADULT - SUBJECTIVE AND OBJECTIVE BOX
Subjective: Patient seen and examined. No new events except as noted.     SUBJECTIVE/ROS:  nad      MEDICATIONS:  MEDICATIONS  (STANDING):  amLODIPine   Tablet 10 milliGRAM(s) Oral daily  chlorhexidine 2% Cloths 1 Application(s) Topical daily  collagenase Ointment 1 Application(s) Topical daily  dextrose 5% + lactated ringers. 1000 milliLiter(s) (60 mL/Hr) IV Continuous <Continuous>  enoxaparin Injectable 65 milliGRAM(s) SubCutaneous every 12 hours  levETIRAcetam  IVPB 500 milliGRAM(s) IV Intermittent every 12 hours  levothyroxine Injectable 70 MICROGram(s) IV Push <User Schedule>  QUEtiapine 75 milliGRAM(s) Oral at bedtime  thiamine 100 milliGRAM(s) Oral daily  valproate sodium  IVPB 500 milliGRAM(s) IV Intermittent every 6 hours      PHYSICAL EXAM:  T(C): 37.1 (09-18-23 @ 04:37), Max: 37.4 (09-17-23 @ 09:07)  HR: 84 (09-18-23 @ 04:37) (77 - 98)  BP: 133/70 (09-18-23 @ 04:37) (128/72 - 134/88)  RR: 18 (09-18-23 @ 04:37) (17 - 18)  SpO2: 98% (09-18-23 @ 04:37) (95% - 100%)  Wt(kg): --  I&O's Summary    17 Sep 2023 07:01  -  18 Sep 2023 07:00  --------------------------------------------------------  IN: 0 mL / OUT: 500 mL / NET: -500 mL            JVP: Normal  Neck: supple  Lung: clear   CV: S1 S2 , Murmur:  Abd: soft  Ext: No edema  neuro: Awake / alert  Psych: flat affect  Skin: normal``    LABS/DATA:    CARDIAC MARKERS:            09-17    136  |  103  |  4<L>  ----------------------------<  123<H>  3.8   |  24  |  0.70    Ca    8.0<L>      17 Sep 2023 06:46  Phos  2.3     09-17  Mg     1.70     09-17      proBNP:   Lipid Profile:   HgA1c:   TSH:     TELE:  EKG:

## 2023-09-18 NOTE — PROGRESS NOTE ADULT - SUBJECTIVE AND OBJECTIVE BOX
Patient is a 73y old  Male who presents with a chief complaint of FTT (18 Sep 2023 14:31)    Date of servie : 09-18-23 @ 15:36  INTERVAL HPI/OVERNIGHT EVENTS:  T(C): 37.1 (09-18-23 @ 11:50), Max: 37.1 (09-18-23 @ 04:37)  HR: 77 (09-18-23 @ 11:50) (77 - 98)  BP: 122/59 (09-18-23 @ 11:50) (122/59 - 134/88)  RR: 18 (09-18-23 @ 11:50) (18 - 18)  SpO2: 100% (09-18-23 @ 11:50) (98% - 100%)  Wt(kg): --  I&O's Summary    17 Sep 2023 07:01  -  18 Sep 2023 07:00  --------------------------------------------------------  IN: 0 mL / OUT: 500 mL / NET: -500 mL        LABS:                        9.7    5.26  )-----------( 213      ( 18 Sep 2023 11:55 )             28.7     09-18    138  |  104  |  4<L>  ----------------------------<  171<H>  3.7   |  24  |  0.63    Ca    7.5<L>      18 Sep 2023 11:55  Phos  2.7     09-18  Mg     1.60     09-18        Urinalysis Basic - ( 18 Sep 2023 11:55 )    Color: x / Appearance: x / SG: x / pH: x  Gluc: 171 mg/dL / Ketone: x  / Bili: x / Urobili: x   Blood: x / Protein: x / Nitrite: x   Leuk Esterase: x / RBC: x / WBC x   Sq Epi: x / Non Sq Epi: x / Bacteria: x      CAPILLARY BLOOD GLUCOSE      POCT Blood Glucose.: 143 mg/dL (18 Sep 2023 11:31)  POCT Blood Glucose.: 64 mg/dL (18 Sep 2023 10:54)  POCT Blood Glucose.: 59 mg/dL (18 Sep 2023 10:51)  POCT Blood Glucose.: 92 mg/dL (18 Sep 2023 08:09)  POCT Blood Glucose.: 66 mg/dL (18 Sep 2023 07:06)  POCT Blood Glucose.: 68 mg/dL (18 Sep 2023 07:05)  POCT Blood Glucose.: 72 mg/dL (18 Sep 2023 06:06)  POCT Blood Glucose.: 82 mg/dL (17 Sep 2023 23:33)  POCT Blood Glucose.: 85 mg/dL (17 Sep 2023 17:56)        Urinalysis Basic - ( 18 Sep 2023 11:55 )    Color: x / Appearance: x / SG: x / pH: x  Gluc: 171 mg/dL / Ketone: x  / Bili: x / Urobili: x   Blood: x / Protein: x / Nitrite: x   Leuk Esterase: x / RBC: x / WBC x   Sq Epi: x / Non Sq Epi: x / Bacteria: x        MEDICATIONS  (STANDING):  amLODIPine   Tablet 10 milliGRAM(s) Oral daily  chlorhexidine 2% Cloths 1 Application(s) Topical daily  collagenase Ointment 1 Application(s) Topical daily  dextrose 5% + lactated ringers. 1000 milliLiter(s) (60 mL/Hr) IV Continuous <Continuous>  enoxaparin Injectable 65 milliGRAM(s) SubCutaneous every 12 hours  levETIRAcetam  IVPB 500 milliGRAM(s) IV Intermittent every 12 hours  levothyroxine Injectable 70 MICROGram(s) IV Push <User Schedule>  QUEtiapine 75 milliGRAM(s) Oral at bedtime  thiamine 100 milliGRAM(s) Oral daily  valproate sodium  IVPB 500 milliGRAM(s) IV Intermittent every 6 hours    MEDICATIONS  (PRN):          PHYSICAL EXAM:  GENERAL: frail  CHEST/LUNG: Clear to percussion bilaterally; No rales, rhonchi, wheezing, or rubs  HEART: Regular rate and rhythm; No murmurs, rubs, or gallops  ABDOMEN: Soft, Nontender, Nondistended; Bowel sounds present  EXTREMITIES: edema +    Care Discussed with Consultants/Other Providers [x ] YES  [ ] NO

## 2023-09-18 NOTE — CONSULT NOTE ADULT - PROBLEM SELECTOR RECOMMENDATION 2
- Creatinine- 1.67 on admission, JESSICA now improving  - Nephrology recommendations appreciated   - management as per primary team

## 2023-09-18 NOTE — SWALLOW BEDSIDE ASSESSMENT ADULT - SLP GENERAL OBSERVATIONS
Patient is awake/alert to verbal stimuli.
Patient is awake/alert to verbal stimuli and orally receptive to PO trials

## 2023-09-18 NOTE — SWALLOW BEDSIDE ASSESSMENT ADULT - ASR SWALLOW RECOMMEND DIAG
Objective testing NOT warranted at this time given patient refusal
Objective testing not warranted at this time given overt sign of impaired airway protection on thin liquids only and no recent chest imaging
Objective testing NOT warranted given patient exhibits overt signs of impaired airway protection with thin liquids (weak throat clearing).

## 2023-09-18 NOTE — CONSULT NOTE ADULT - TIME BILLING
Time spent for extensive review of the physical chart, electronic health record, and documentation to obtain collateral information including but not limited to:    - Current inpatient records (ED, H&P, primary team, and consultants if applicable)   - Inpatient values/results (biomarkers, immunoassays, imaging, and microbiology results)   - Current or proposed treatment plans   - Pharmacotherapy review   Time spent for counseling and education with family  Time spent discussing and coordinating care with primary team and interdisciplinary staff and floor staff

## 2023-09-18 NOTE — PROGRESS NOTE ADULT - ASSESSMENT
73-year-old male history of dementia, baseline mental status AxOx0, seizure disorder on Keppra and divalproex, history of DVT/PE off Eliquis, hypertension, hypothyroidism presenting sent in from his nursing home for evaluation of failure to thrive, found to have hypernatremia and new renal failure on outside labs.    Hypernatremia:  Due to dehydration, poor intake.  npo on ivf  f/u s/s  na improving   - Trend BMP BID.    JESSICA:  Due to Pre-Renal azotemia.  improved  monitor bmp       hypophos  supplement as needed   monitor    hypokalemia  supplement as needed   monitor    htn  bp stable  monitor

## 2023-09-18 NOTE — PROGRESS NOTE ADULT - SUBJECTIVE AND OBJECTIVE BOX
[facility]   MD ARI MILLS MD KRISTINE SOLTANPOUR, DO ANGELA WONG, PA    TEL:  OFFICE: [phone]  From 5pm-7am Answering Service [phone]    -- RENAL FOLLOW UP NOTE ---Date of Service 09-18-23 @ 13:38    Patient is a 73y old  Male who presents with a chief complaint of FTT (18 Sep 2023 07:49)      Patient seen and examined at bedside.     VITALS:  T(F): 98.8 (09-18-23 @ 11:50), Max: 98.8 (09-18-23 @ 04:37)  HR: 77 (09-18-23 @ 11:50)  BP: 122/59 (09-18-23 @ 11:50)  RR: 18 (09-18-23 @ 11:50)  SpO2: 100% (09-18-23 @ 11:50)  Wt(kg): --    09-17 @ 07:01  -  09-18 @ 07:00  --------------------------------------------------------  IN: 0 mL / OUT: 500 mL / NET: -500 mL          PHYSICAL EXAM:  General: NAD  Neck: No JVD  Respiratory: CTAB, no wheezes, rales or rhonchi  Cardiovascular: S1, S2, RRR  Gastrointestinal: BS+, soft, NT/ND  Extremities: No peripheral edema    Hospital Medications:   MEDICATIONS  (STANDING):  amLODIPine   Tablet 10 milliGRAM(s) Oral daily  chlorhexidine 2% Cloths 1 Application(s) Topical daily  collagenase Ointment 1 Application(s) Topical daily  dextrose 5% + lactated ringers. 1000 milliLiter(s) (60 mL/Hr) IV Continuous <Continuous>  enoxaparin Injectable 65 milliGRAM(s) SubCutaneous every 12 hours  levETIRAcetam  IVPB 500 milliGRAM(s) IV Intermittent every 12 hours  levothyroxine Injectable 70 MICROGram(s) IV Push <User Schedule>  QUEtiapine 75 milliGRAM(s) Oral at bedtime  thiamine 100 milliGRAM(s) Oral daily  valproate sodium  IVPB 500 milliGRAM(s) IV Intermittent every 6 hours      LABS:  09-18    138  |  104  |  4<L>  ----------------------------<  171<H>  3.7   |  24  |  0.63    Ca    7.5<L>      18 Sep 2023 11:55  Phos  2.7     09-18  Mg     1.60     09-18      Creatinine Trend: 0.63 <--, 0.70 <--, 0.73 <--, 0.69 <--, 0.70 <--, 0.83 <--, 0.75 <--, 0.80 <--, 0.82 <--    Phosphorus: 2.7 mg/dL (09-18 @ 11:55)                              9.7    5.26  )-----------( 213      ( 18 Sep 2023 11:55 )             28.7     Urine Studies:  Urinalysis - [09-18-23 @ 11:55]      Color  / Appearance  / SG  / pH       Gluc 171 / Ketone   / Bili  / Urobili        Blood  / Protein  / Leuk Est  / Nitrite       RBC  / WBC  / Hyaline  / Gran  / Sq Epi  / Non Sq Epi  / Bacteria       TSH 3.29      [08-07-23 @ 06:09]  Lipid: chol 117, TG 82, HDL 48, LDL --      [02-09-23 @ 06:40]        RADIOLOGY & ADDITIONAL STUDIES:

## 2023-09-18 NOTE — SWALLOW BEDSIDE ASSESSMENT ADULT - ADDITIONAL RECOMMENDATIONS
This department to follow up as schedule permits to reassess for safe oral diet program. Medical team further advised to reconsult if there is a change in medical status/as patient becomes medically optimized and patient is willing to participate in PO trials.
1. This service to follow up as schedule permits for diet tolerance. 2. Reconsult if change in medical status.
Monitor for any neurological/mental status changes that may impact patient's PO intake. This department to follow up as schedule permits to assess for diet tolerance. Medical team further advised to reconsult if there is a change in medical status and/or observed change in patient's tolerance of recommended PO diet.

## 2023-09-18 NOTE — GOALS OF CARE CONVERSATION - ADVANCED CARE PLANNING - CONVERSATION DETAILS
Wife shares that patient at baseline is verbal and interactive with her. Wife shares patient has had recurrent hospitalizations recently. She shared events leading to current hospitalization.     Advanced care planning extensively discussed regarding code status preferences. Wife shares patient should have a trial of resuscitative measures with CPR and intubation in event of a cardiopulmonary arrest. However, she would not patient on long term life support such as with trach placement.     Patient remains FULL CODE at this time.     Emotional support provided

## 2023-09-18 NOTE — CONSULT NOTE ADULT - SUBJECTIVE AND OBJECTIVE BOX
Date of Service 09-18-23 @ 14:31    HPI:  73-year-old male history of dementia, baseline mental status AxOx0, seizure disorder on Keppra and divalproex, history of DVT/PE off Eliquis, hypertension, hypothyroidism presenting sent in from his nursing home for evaluation of failure to thrive, found to have hypernatremia and new renal failure on outside labs.  Per documentation that arrived with the patient, patient has been refusing to eat since yesterday. (09 Sep 2023 09:23)    Interval History:   Patient awake with eyes open, nonverbal. Appears comfortable     PERTINENT PM/SXH:   HTN (hypertension)  Diverticulitis  Diverticulosis  Hypothyroid  Deep vein thrombosis (DVT)  Pulmonary embolism  No significant past surgical history    FAMILY HISTORY: unable to obtain due to encephalopathy     ITEMS NOT CHECKED ARE NOT PRESENT    SOCIAL HISTORY:   Significant other/partner[ x]  Children[ ]  Confucianism/Spirituality:   Substance hx:  [ ]   Tobacco hx:  [ ]   Alcohol hx: [ ]   Home Opioid hx:  [ ] I-Stop Reference No:  Living Situation: [x ]Home  [ ]Long term care  [ ]Rehab [ ]Other      ADVANCE DIRECTIVES:    MOLST  [ ]  Living Will  [ ]   DECISION MAKER(s):  [ ] Health Care Proxy(s)  [ x] Surrogate(s)  [ ] Guardian           Name(s): Phone Number(s):  Wife Leena Dobbins: [phone]     BASELINE (I)ADL(s) (prior to admission):  [place]: [ ]Total  [ ] Moderate [ ]Dependent    Allergies    fish (Hives; Angioedema)  No Known Drug Allergies  [place] (Swelling; Angioedema)    Intolerances    MEDICATIONS  (STANDING):  amLODIPine   Tablet 10 milliGRAM(s) Oral daily  chlorhexidine 2% Cloths 1 Application(s) Topical daily  collagenase Ointment 1 Application(s) Topical daily  dextrose 5% + lactated ringers. 1000 milliLiter(s) (60 mL/Hr) IV Continuous <Continuous>  enoxaparin Injectable 65 milliGRAM(s) SubCutaneous every 12 hours  levETIRAcetam  IVPB 500 milliGRAM(s) IV Intermittent every 12 hours  levothyroxine Injectable 70 MICROGram(s) IV Push <User Schedule>  QUEtiapine 75 milliGRAM(s) Oral at bedtime  thiamine 100 milliGRAM(s) Oral daily  valproate sodium  IVPB 500 milliGRAM(s) IV Intermittent every 6 hours    MEDICATIONS  (PRN):        ITEMS UNCHECKED ARE NOT PRESENT     PRESENT SYMPTOMS: [ ]Unable to self-report due to altered mental status  [ ] CPOT [ ] PAINADs [ ] RDOS  Source if other than patient:  [ ]Family   [ ]Team     Pain: [ ]yes [ ]no  QOL impact -   Location -                    Aggravating factors -  Quality -  Radiation -  Timing-  Severity (0-10 scale):  Minimal acceptable level / Pain goal (0-10 scale):     CPOT:    https://www.[facility].org/getattachment/[email or web address]/Critical-Care-Pain-Observation-Tool-(CPOT)    Dyspnea:                           [ ]Mild [ ]Moderate [ ]Severe  Anxiety:                             [ ]Mild [ ]Moderate [ ]Severe  Agitation:                          [ ]Mild [ ]Moderate [ ]Severe  Fatigue:                             [ ]Mild [ ]Moderate [ ]Severe  Nausea:                             [ ]Mild [ ]Moderate [ ]Severe  Loss of appetite:              [ ]Mild [ ]Moderate [ ]Severe  Constipation:                   [ ]Mild [ ]Moderate [ ]Severe  Diarrhea:                          [ ]Mild [ ]Moderate [ ]Severe  Pruritus:                            [ ]Mild [ ]Moderate [ ]Severe  Depression:                      [ ]Mild [ ]Moderate [ ]Severe    PCSSQ[Palliative Care Spiritual Screening Question]   Severity (0-10):  Score of 4 or > indicate consideration of Chaplaincy referral.  Chaplaincy Referral: [ ] yes [ ] refused [ ] following [ x] deferred    Caregiver [place]? : [ ] yes [ ] no [ ] Declined   [x ] Deferred            Social work referral [ ] Patient & Family Centered Care Referral [ ]     Anticipatory Grief present?:  [ ] yes [ ] no  [x ] Deferred                  Social work referral [ ] Chaplaincy Referral[ ]    Other Symptoms:  [ ]All other review of systems negative     PHYSICAL EXAM:  Vital Signs Last 24 Hrs  T(C): 37.1 (18 Sep 2023 11:50), Max: 37.1 (18 Sep 2023 04:37)  T(F): 98.8 (18 Sep 2023 11:50), Max: 98.8 (18 Sep 2023 04:37)  HR: 77 (18 Sep 2023 11:50) (77 - 98)  BP: 122/59 (18 Sep 2023 11:50) (122/59 - 134/88)  BP(mean): --  RR: 18 (18 Sep 2023 11:50) (18 - 18)  SpO2: 100% (18 Sep 2023 11:50) (98% - 100%)    Parameters below as of 18 Sep 2023 11:50  Patient On (Oxygen Delivery Method): room air         I&O's Summary    17 Sep 2023 07:01  -  18 Sep 2023 07:00  --------------------------------------------------------  IN: 0 mL / OUT: 500 mL / NET: -500 mL        GENERAL:  [ ]Alert  [ ]Oriented x   [ ]Lethargic  [ ]Cachexia  [ ]Unarousable  [ ]Verbal  [ ]Non-Verbal  [ ] No Distress  Behavioral:   [ ] Anxiety  [ ] Delirium [ ] Agitation [ ] Calm  [ ] Other  HEENT:  [ ]Normal  [ ] Temporal Wasting  [ ]Dry mouth   [ ]ET Tube/Trach  [ ]Oral lesions  [ ] Mucositis  PULMONARY:   [ ]Clear [ ]Tachypnea  [ ]Audible excessive secretions   [ ]Rhonchi        [ ]Right [ ]Left [ ]Bilateral  [ ]Crackles        [ ]Right [ ]Left [ ]Bilateral  [ ]Wheezing     [ ]Right [ ]Left [ ]Bilateral  [ ]Diminished breath sounds [ ]right [ ]left [ ]bilateral  CARDIOVASCULAR:    [ ]Regular [ ]Irregular [ ]Tachy  [ ]Dipak [ ]Murmur [ ]Other  GASTROINTESTINAL:  [ ]Soft  [ ]Distended   [ ]+BS  [ ]Non tender [ ]Tender  [ ]PEG [ ]OGT/ NGT  Last BM:   GENITOURINARY:  [ ]Normal [ ] Incontinent   [ ]Oliguria/Anuria   [ ]Gibson  MUSCULOSKELETAL:   [ ]Normal   [ ]Weakness  [ ]Bed/Wheelchair bound [ ]Edema  [  ] amputation  [  ] contraction  NEUROLOGIC:   [ ]No focal deficits  [ ]Cognitive impairment  [ ]Dysphagia [ ]Dysarthria [ ]Paresis [ ]Other   SKIN: See Nursing Skin Assessment for further details  [ ]Normal    [ ]Rash  [ ]Pressure ulcer(s)       Present on admission [ ]y [ ]n   [  ]  Wound    [  ] hyperpigmentation    CRITICAL CARE:  [ ] Shock Present  [ ]Septic [ ]Cardiogenic [ ]Neurologic [ ]Hypovolemic  [ ]  Vasopressors [ ]  Inotropes   [ ]Respiratory failure present [ ]Mechanical ventilation [ ]Non-invasive ventilatory support [ ]High flow    [ ]Acute  [ ]Chronic [ ]Hypoxic  [ ]Hypercarbic [ ]Other  [ ]Other organ failure     LABS:  reviewed                         9.7    5.26  )-----------( 213      ( 18 Sep 2023 11:55 )             28.7   09-18    138  |  104  |  4<L>  ----------------------------<  171<H>  3.7   |  24  |  0.63    Ca    7.5<L>      18 Sep 2023 11:55  Phos  2.7     09-18  Mg     1.60     09-18      Urinalysis Basic - ( 18 Sep 2023 11:55 )    Color: x / Appearance: x / SG: x / pH: x  Gluc: 171 mg/dL / Ketone: x  / Bili: x / Urobili: x   Blood: x / Protein: x / Nitrite: x   Leuk Esterase: x / RBC: x / WBC x   Sq Epi: x / Non Sq Epi: x / Bacteria: x      CAPILLARY BLOOD GLUCOSE      POCT Blood Glucose.: 143 mg/dL (18 Sep 2023 11:31)  POCT Blood Glucose.: 64 mg/dL (18 Sep 2023 10:54)  POCT Blood Glucose.: 59 mg/dL (18 Sep 2023 10:51)  POCT Blood Glucose.: 92 mg/dL (18 Sep 2023 08:09)  POCT Blood Glucose.: 66 mg/dL (18 Sep 2023 07:06)  POCT Blood Glucose.: 68 mg/dL (18 Sep 2023 07:05)  POCT Blood Glucose.: 72 mg/dL (18 Sep 2023 06:06)  POCT Blood Glucose.: 82 mg/dL (17 Sep 2023 23:33)  POCT Blood Glucose.: 85 mg/dL (17 Sep 2023 17:56)      RADIOLOGY & ADDITIONAL STUDIES: reviewed     PROTEIN CALORIE MALNUTRITION PRESENT: [ ]mild [ ]moderate [ ]severe [ ]underweight [ ]morbid obesity  https://www.andeal.org/vault/8493/web/files/ONC/Table_Clinical%20Characteristics%20to%20Document%20Malnutrition-White%20JV%20et%20al%691983.pdf    Height (cm): 182.9 (09-08-23 @ 22:06), 182.9 (08-07-23 @ 17:06), 172.7 (06-12-23 @ 17:54)  Weight (kg): 67 (09-12-23 @ 14:53), 68.9 (08-07-23 @ 17:06), 73.1 (06-13-23 @ 01:11)  BMI (kg/m2): 20 (09-12-23 @ 14:53), 20.6 (09-08-23 @ 22:06), 20.6 (08-07-23 @ 17:06)    [ ]PPSV2 < or = to 30% [ ]significant weight loss  [ ]poor nutritional intake  [ ]anasarca [ ]Artificial Nutrition      REFERRALS:   [ ]Chaplaincy  [ ]Hospice  [ ]Child Life  [ ]Social Work  [ ]Case management [ ]Holistic Therapy      Date of Service 09-18-23 @ 14:31    HPI:  73-year-old male history of dementia, baseline mental status AxOx0, seizure disorder on Keppra and divalproex, history of DVT/PE off Eliquis, hypertension, hypothyroidism presenting sent in from his nursing home for evaluation of failure to thrive, found to have hypernatremia and new renal failure on outside labs.  Per documentation that arrived with the patient, patient has been refusing to eat since yesterday. (09 Sep 2023 09:23)    Interval History:   Patient awake with eyes open, nonverbal. Appears comfortable     PERTINENT PM/SXH:   HTN (hypertension)  Diverticulitis  Diverticulosis  Hypothyroid  Deep vein thrombosis (DVT)  Pulmonary embolism  No significant past surgical history    FAMILY HISTORY: unable to obtain due to encephalopathy     ITEMS NOT CHECKED ARE NOT PRESENT    SOCIAL HISTORY:   Significant other/partner[ x]  Children[ ]  Pentecostal/Spirituality:   Substance hx:  [ ]   Tobacco hx:  [ ]   Alcohol hx: [ ]   Home Opioid hx:  [ ] I-Stop Reference No:  Living Situation: [x ]Home  [ ]Long term care  [ ]Rehab [ ]Other      ADVANCE DIRECTIVES:    MOLST  [ ]  Living Will  [ ]   DECISION MAKER(s):  [ ] Health Care Proxy(s)  [ x] Surrogate(s)  [ ] Guardian           Name(s): Phone Number(s):  Wife Leena Dobbins: [phone]     BASELINE (I)ADL(s) (prior to admission):  [place]: [ ]Total  [ ] Moderate [x ]Dependent    Allergies    fish (Hives; Angioedema)  No Known Drug Allergies  [place] (Swelling; Angioedema)    Intolerances    MEDICATIONS  (STANDING):  amLODIPine   Tablet 10 milliGRAM(s) Oral daily  chlorhexidine 2% Cloths 1 Application(s) Topical daily  collagenase Ointment 1 Application(s) Topical daily  dextrose 5% + lactated ringers. 1000 milliLiter(s) (60 mL/Hr) IV Continuous <Continuous>  enoxaparin Injectable 65 milliGRAM(s) SubCutaneous every 12 hours  levETIRAcetam  IVPB 500 milliGRAM(s) IV Intermittent every 12 hours  levothyroxine Injectable 70 MICROGram(s) IV Push <User Schedule>  QUEtiapine 75 milliGRAM(s) Oral at bedtime  thiamine 100 milliGRAM(s) Oral daily  valproate sodium  IVPB 500 milliGRAM(s) IV Intermittent every 6 hours    MEDICATIONS  (PRN):        ITEMS UNCHECKED ARE NOT PRESENT     PRESENT SYMPTOMS: [ x]Unable to self-report due to altered mental status  [ ] CPOT [x ] PAINADs [ x] RDOS  Source if other than patient:  [ ]Family   [ ]Team     Pain: [ ]yes [ ]no  QOL impact -   Location -                    Aggravating factors -  Quality -  Radiation -  Timing-  Severity (0-10 scale):  Minimal acceptable level / Pain goal (0-10 scale):     CPOT:    https://www.[facility].org/getattachment/[email or web address]/Critical-Care-Pain-Observation-Tool-(CPOT)    Dyspnea:                           [ ]Mild [ ]Moderate [ ]Severe  Anxiety:                             [ ]Mild [ ]Moderate [ ]Severe  Agitation:                          [ ]Mild [ ]Moderate [ ]Severe  Fatigue:                             [ ]Mild [ ]Moderate [ ]Severe  Nausea:                             [ ]Mild [ ]Moderate [ ]Severe  Loss of appetite:              [ ]Mild [ ]Moderate [ ]Severe  Constipation:                   [ ]Mild [ ]Moderate [ ]Severe  Diarrhea:                          [ ]Mild [ ]Moderate [ ]Severe      PCSSQ[Palliative Care Spiritual Screening Question]   Severity (0-10):  Score of 4 or > indicate consideration of Chaplaincy referral.  Chaplaincy Referral: [ ] yes [ ] refused [ ] following [ x] deferred    Caregiver [place]? : [ ] yes [ ] no [ ] Declined   [x ] Deferred            Social work referral [ ] Patient & Family Centered Care Referral [ ]     Anticipatory Grief present?:  [ ] yes [ ] no  [x ] Deferred                  Social work referral [ ] Chaplaincy Referral[ ]    Other Symptoms:  [ ]All other review of systems negative - unable to obtain due to encephalopathy     PHYSICAL EXAM:  Vital Signs Last 24 Hrs  T(C): 37.1 (18 Sep 2023 11:50), Max: 37.1 (18 Sep 2023 04:37)  T(F): 98.8 (18 Sep 2023 11:50), Max: 98.8 (18 Sep 2023 04:37)  HR: 77 (18 Sep 2023 11:50) (77 - 98)  BP: 122/59 (18 Sep 2023 11:50) (122/59 - 134/88)  BP(mean): --  RR: 18 (18 Sep 2023 11:50) (18 - 18)  SpO2: 100% (18 Sep 2023 11:50) (98% - 100%)    Parameters below as of 18 Sep 2023 11:50  Patient On (Oxygen Delivery Method): room air         I&O's Summary    17 Sep 2023 07:01  -  18 Sep 2023 07:00  --------------------------------------------------------  IN: 0 mL / OUT: 500 mL / NET: -500 mL        GENERAL:  [ x] Awake with eyes open [ ]Oriented x   [ ]Lethargic  [ ]Cachexia  [ ]Unarousable  [ ]Verbal  [ x]Non-Verbal  [ x] No Distress  Behavioral:   [ ] Anxiety  [ ] Delirium [ ] Agitation [ ] Calm  [ x] Other-encephalopathy   HEENT:  [x ]Normal  [ ] Temporal Wasting  [ ]Dry mouth   [ ]ET Tube/Trach  [ ]Oral lesions  [ ] Mucositis  PULMONARY:   [ ]Clear [ ]Tachypnea  [ ]Audible excessive secretions   [ ]Rhonchi        [ ]Right [ ]Left [ ]Bilateral  [ ]Crackles        [ ]Right [ ]Left [ ]Bilateral  [ ]Wheezing     [ ]Right [ ]Left [ ]Bilateral  [x ]Diminished breath sounds [ ]right [ ]left [x ]bilateral  CARDIOVASCULAR:    [x ]Regular [ ]Irregular [ ]Tachy  [ ]Dipak [ ]Murmur [ ]Other  GASTROINTESTINAL:  [ x]Soft  [ ]Distended   [ ]+BS  [ x]Non tender [ ]Tender  [ ]PEG [ ]OGT/ NGT  Last BM: 9/18  GENITOURINARY:  [ ]Normal [x] Incontinent   [ ]Oliguria/Anuria   [ ][place]  MUSCULOSKELETAL:   [ ]Normal   [ ]Weakness  [x ]Bed/Wheelchair bound [ ]Edema  [  ] amputation  [  ] contraction  NEUROLOGIC:   [ ]No focal deficits  [x ]Cognitive impairment  [ x]Dysphagia [ ]Dysarthria [ ]Paresis [x ]Other - unable to follow commands   SKIN: See Nursing Skin Assessment for further details  [ ]Normal    [ ]Rash  [x ]Pressure ulcer(s) - left heel      Present on admission [ ]y [ ]n   [  ]  Wound    [  ] hyperpigmentation    CRITICAL CARE:  [ ] Shock Present  [ ]Septic [ ]Cardiogenic [ ]Neurologic [ ]Hypovolemic  [ ]  Vasopressors [ ]  Inotropes   [ ]Respiratory failure present [ ]Mechanical ventilation [ ]Non-invasive ventilatory support [ ]High flow    [ ]Acute  [ ]Chronic [ ]Hypoxic  [ ]Hypercarbic [ ]Other  [ ]Other organ failure     LABS:  reviewed                         9.7    5.26  )-----------( 213      ( 18 Sep 2023 11:55 )             28.7   09-18    138  |  104  |  4<L>  ----------------------------<  171<H>  3.7   |  24  |  0.63    Ca    7.5<L>      18 Sep 2023 11:55  Phos  2.7     09-18  Mg     1.60     09-18      Urinalysis Basic - ( 18 Sep 2023 11:55 )    Color: x / Appearance: x / SG: x / pH: x  Gluc: 171 mg/dL / Ketone: x  / Bili: x / Urobili: x   Blood: x / Protein: x / Nitrite: x   Leuk Esterase: x / RBC: x / WBC x   Sq Epi: x / Non Sq Epi: x / Bacteria: x      CAPILLARY BLOOD GLUCOSE      POCT Blood Glucose.: 143 mg/dL (18 Sep 2023 11:31)  POCT Blood Glucose.: 64 mg/dL (18 Sep 2023 10:54)  POCT Blood Glucose.: 59 mg/dL (18 Sep 2023 10:51)  POCT Blood Glucose.: 92 mg/dL (18 Sep 2023 08:09)  POCT Blood Glucose.: 66 mg/dL (18 Sep 2023 07:06)  POCT Blood Glucose.: 68 mg/dL (18 Sep 2023 07:05)  POCT Blood Glucose.: 72 mg/dL (18 Sep 2023 06:06)  POCT Blood Glucose.: 82 mg/dL (17 Sep 2023 23:33)  POCT Blood Glucose.: 85 mg/dL (17 Sep 2023 17:56)      RADIOLOGY & ADDITIONAL STUDIES: reviewed     PROTEIN CALORIE MALNUTRITION PRESENT: [ ]mild [ ]moderate [ ]severe [ ]underweight [ ]morbid obesity  https://www.andeal.org/vault/4522/web/files/ONC/Table_Clinical%20Characteristics%20to%20Document%20Malnutrition-White%20JV%20et%20al%026837.pdf    Height (cm): 182.9 (09-08-23 @ 22:06), 182.9 (08-07-23 @ 17:06), 172.7 (06-12-23 @ 17:54)  Weight (kg): 67 (09-12-23 @ 14:53), 68.9 (08-07-23 @ 17:06), 73.1 (06-13-23 @ 01:11)  BMI (kg/m2): 20 (09-12-23 @ 14:53), 20.6 (09-08-23 @ 22:06), 20.6 (08-07-23 @ 17:06)    [ x]PPSV2 < or = to 30% [ ]significant weight loss  [ ]poor nutritional intake  [ ]anasarca [ ]Artificial Nutrition      REFERRALS:   [ ]Chaplaincy  [ ]Hospice  [ ]Child Life  [ ]Social Work  [ x]Case management [ ]Holistic Therapy

## 2023-09-18 NOTE — SWALLOW BEDSIDE ASSESSMENT ADULT - SWALLOW EVAL: DIAGNOSIS
1- Mild oral stage for puree, mildly thick liquids, and thin liquids marked by adequate oral containment, slow bolus manipulation, slow anterior to posterior transfer, adequate oral clearance. 2- Functional pharyngeal stage for puree and mildly thick liquids marked by initiation of the pharyngeal swallow with hyolaryngeal excursion upon palpation. 3- Severe pharyngeal stage for thin liquids marked by suspected delayed initiation of the pharyngeal swallow with hyolaryngeal excursion upon palpation with throat clearing post oral intake suggestive of impaired airway protection,

## 2023-09-18 NOTE — CONSULT NOTE ADULT - ASSESSMENT
73-year-old male history of dementia, baseline mental status AxOx0, seizure disorder on Keppra and divalproex, history of DVT/PE off Eliquis, hypertension, hypothyroidism presenting sent in from his nursing home for evaluation of failure to thrive, found to have hypernatremia and new renal failure on outside labs.  Palliative Care consulted for complex decision making  related to goals of care discussions

## 2023-09-18 NOTE — PROGRESS NOTE ADULT - ASSESSMENT
73-year-old male history of dementia, baseline mental status AxOx0, seizure disorder on Keppra and divalproex, history of DVT/PE off Eliquis, hypertension, hypothyroidism presenting sent in from his nursing home for evaluation of failure to thrive, found to have hypernatremia and new renal failure on outside labs.  Per documentation that arrived with the patient, patient has been refusing to eat since yesterday.    1 Hypernatremia  - resolved   - renal fu   - failed spee ch and swallow  - NPO      2 Seizure disorder-   cw Keppra and valproic acid     3 hx of  DVT/PE  - cw AC    4 Dementia  - fall precautions  - frequent reorientation     5 Dysphagia  - diet as per speech and swallow    dc planning

## 2023-09-18 NOTE — CONSULT NOTE ADULT - PROBLEM SELECTOR RECOMMENDATION 6
Spoke with wife Leena regarding advanced directives. Wife does share she would not want patient on long term life support, but would want a trial of resuscitative measures in event of a cardiopulmonary arrest.   Patient remains FULL CODE at this time.   Please see separate [facility] note.  >16 minutes spent on advanced care planning with family.

## 2023-09-18 NOTE — CONSULT NOTE ADULT - PROBLEM SELECTOR RECOMMENDATION 9
- Due to dehydration, poor intake  - Na -165 on admission ; hypernatremia now resolved  - management as per primary team

## 2023-09-18 NOTE — SWALLOW BEDSIDE ASSESSMENT ADULT - SWALLOW EVAL: RECOMMENDED DIET
Puree with Mildly Thick Liquids as tolerated
Puree with mildly thick liquids
Defer oral diet to MD's discretion given patient refusal of PO trials

## 2023-09-18 NOTE — SWALLOW BEDSIDE ASSESSMENT ADULT - COMMENTS
Progress Note- Hospitalist 9/17: "73-year-old male history of dementia, baseline mental status AxOx0, seizure disorder on Keppra and divalproex, history of DVT/PE off Eliquis, hypertension, hypothyroidism presenting sent in from his nursing home for evaluation of failure to thrive, found to have hypernatremia and new renal failure on outside labs.  Per documentation that arrived with the patient, patient has been refusing to eat since yesterday."    No recent chest imaging at this time.     Per nursing home charting, patient receives soft solids with pureed meats with mildly thick liquids.     Of note, patient is known to this service, seen on 9/10 and 9/14 with recent recommendations of Puree with Mildly Thick Liquids. (see reports for details)    Patient is awake/alert to verbal stimuli during clinical swallow evaluation this AM. Patient did not verbalize throughout evaluation however patient is orally receptive to PO trials with verbal encouragement. Additional/Advanced PO trials not administered given patient decline/refusal noted by tight labial seal upon utensil/cup presentations.

## 2023-09-18 NOTE — CONSULT NOTE ADULT - PROBLEM SELECTOR RECOMMENDATION 4
- likely multifactorial from hypernatremia / JESSICA that is now improving + delirium in setting of underlying dementia  - please coordinate care with family  -Frequent reassurance and verbal orientation   -Family members or other familiar persons by his bedside.   - Monitor for constipation, urinary retention, pain, hunger, thirst, etc.    - Promote sleep wake cycle and reorientation as indicated.  - Minimize use of benzodiazepines, opioids, anticholinergics, and other deliriogenic agents whenever possible.

## 2023-09-18 NOTE — CONSULT NOTE ADULT - PROBLEM SELECTOR RECOMMENDATION 7
Case reviewed with primary team  Thank you for allowing us to participate in your patient's care. Please page [phone] for any questions/concerns.

## 2023-09-18 NOTE — SWALLOW BEDSIDE ASSESSMENT ADULT - SWALLOW EVAL: RECOMMENDED FEEDING/EATING TECHNIQUES
Swallowing Guidelines: FEED WHEN ALERT STATE ONLY; Seated Upright during Mealtimes; Slow Pacing; Small Bites; Single/Small Sips; Allow for Swallow Prior to Next Presentation; Maintain Oral Hygiene

## 2023-09-19 NOTE — PROGRESS NOTE ADULT - SUBJECTIVE AND OBJECTIVE BOX
Subjective: Patient seen and examined. No new events except as noted.     SUBJECTIVE/ROS:  nad      MEDICATIONS:  MEDICATIONS  (STANDING):  amLODIPine   Tablet 10 milliGRAM(s) Oral daily  chlorhexidine 2% Cloths 1 Application(s) Topical daily  collagenase Ointment 1 Application(s) Topical daily  dextrose 5% + lactated ringers. 1000 milliLiter(s) (60 mL/Hr) IV Continuous <Continuous>  enoxaparin Injectable 65 milliGRAM(s) SubCutaneous every 12 hours  levETIRAcetam  IVPB 500 milliGRAM(s) IV Intermittent every 12 hours  levothyroxine Injectable 70 MICROGram(s) IV Push <User Schedule>  QUEtiapine 75 milliGRAM(s) Oral at bedtime  thiamine 100 milliGRAM(s) Oral daily  valproate sodium  IVPB 500 milliGRAM(s) IV Intermittent every 6 hours      PHYSICAL EXAM:  T(C): 36.7 (09-19-23 @ 03:49), Max: 37.1 (09-18-23 @ 08:15)  HR: 72 (09-19-23 @ 06:17) (72 - 86)  BP: 136/80 (09-19-23 @ 06:17) (122/59 - 143/76)  RR: 18 (09-19-23 @ 06:17) (18 - 18)  SpO2: 96% (09-19-23 @ 06:17) (96% - 100%)  Wt(kg): --  I&O's Summary    17 Sep 2023 07:01  -  18 Sep 2023 07:00  --------------------------------------------------------  IN: 0 mL / OUT: 500 mL / NET: -500 mL    18 Sep 2023 07:01  -  19 Sep 2023 06:43  --------------------------------------------------------  IN: 0 mL / OUT: 200 mL / NET: -200 mL            JVP: Normal  Neck: supple  Lung: clear   CV: S1 S2 , Murmur:  Abd: soft  Ext: No edema  neuro: Awake / alert  Psych: flat affect  Skin: normal``    LABS/DATA:    CARDIAC MARKERS:                                9.7    5.26  )-----------( 213      ( 18 Sep 2023 11:55 )             28.7     09-18    138  |  104  |  4<L>  ----------------------------<  171<H>  3.7   |  24  |  0.63    Ca    7.5<L>      18 Sep 2023 11:55  Phos  2.7     09-18  Mg     1.60     09-18      proBNP:   Lipid Profile:   HgA1c:   TSH:     TELE:  EKG:

## 2023-09-19 NOTE — PROGRESS NOTE ADULT - ASSESSMENT
73-year-old male history of dementia, baseline mental status AxOx0, seizure disorder on Keppra and divalproex, history of DVT/PE off Eliquis, hypertension, hypothyroidism presenting sent in from his nursing home for evaluation of failure to thrive, found to have hypernatremia and new renal failure on outside labs.    Hypernatremia:  Due to dehydration, poor intake.  poor po intake on ivf  f/u s/s  na improving   - Trend BMP BID.    JESSICA:  Due to Pre-Renal azotemia.  improved  monitor bmp       hypophos  supplement as needed   monitor    hypokalemia  supplement as needed   monitor    htn  bp stable  monitor

## 2023-09-19 NOTE — PROGRESS NOTE ADULT - SUBJECTIVE AND OBJECTIVE BOX
[facility] [place] [facility]   MD ARI MILLS MD ANGELA WONG, PA    TEL:  OFFICE: [phone]  From 5pm-7am Answering Service [phone]    -- RENAL FOLLOW UP NOTE ---Date of Service 09-19-23 @ 09:58    Patient is a 73y old  Male who presents with a chief complaint of FTT (19 Sep 2023 06:43)      Patient seen and examined at bedside.   VITALS:  T(F): 98.5 (09-19-23 @ 07:30), Max: 98.8 (09-18-23 @ 11:50)  HR: 78 (09-19-23 @ 07:30)  BP: 129/71 (09-19-23 @ 07:30)  RR: 17 (09-19-23 @ 07:30)  SpO2: 99% (09-19-23 @ 07:30)  Wt(kg): --    09-18 @ 07:01  -  09-19 @ 07:00  --------------------------------------------------------  IN: 0 mL / OUT: 200 mL / NET: -200 mL          PHYSICAL EXAM:  General: lethargic  Neck: No JVD  Respiratory: CTAB, no wheezes, rales or rhonchi  Cardiovascular: S1, S2, RRR  Gastrointestinal: BS+, soft, NT/ND  Extremities: No peripheral edema LE, UE edema    Hospital Medications:   MEDICATIONS  (STANDING):  amLODIPine   Tablet 10 milliGRAM(s) Oral daily  chlorhexidine 2% Cloths 1 Application(s) Topical daily  collagenase Ointment 1 Application(s) Topical daily  dextrose 5% + lactated ringers. 1000 milliLiter(s) (60 mL/Hr) IV Continuous <Continuous>  enoxaparin Injectable 65 milliGRAM(s) SubCutaneous every 12 hours  levETIRAcetam  IVPB 500 milliGRAM(s) IV Intermittent every 12 hours  levothyroxine Injectable 70 MICROGram(s) IV Push <User Schedule>  potassium phosphate / sodium phosphate Powder (PHOS-NaK) 1 Packet(s) Oral once  QUEtiapine 75 milliGRAM(s) Oral at bedtime  thiamine 100 milliGRAM(s) Oral daily  valproate sodium  IVPB 500 milliGRAM(s) IV Intermittent every 6 hours      LABS:  09-19    138  |  102  |  4<L>  ----------------------------<  85  3.5   |  26  |  0.64    Ca    7.9<L>      19 Sep 2023 06:00  Phos  1.9     09-19  Mg     1.70     09-19      Creatinine Trend: 0.64 <--, 0.63 <--, 0.70 <--, 0.73 <--, 0.69 <--, 0.70 <--, 0.83 <--    Phosphorus: 1.9 mg/dL (09-19 @ 06:00)  Phosphorus: 2.7 mg/dL (09-18 @ 11:55)                              10.1   4.33  )-----------( 182      ( 19 Sep 2023 06:00 )             30.5     Urine Studies:  Urinalysis - [09-19-23 @ 06:00]      Color  / Appearance  / SG  / pH       Gluc 85 / Ketone   / Bili  / Urobili        Blood  / Protein  / Leuk Est  / Nitrite       RBC  / WBC  / Hyaline  / Gran  / Sq Epi  / Non Sq Epi  / Bacteria       TSH 3.29      [08-07-23 @ 06:09]  Lipid: chol 117, TG 82, HDL 48, LDL --      [02-09-23 @ 06:40]        RADIOLOGY & ADDITIONAL STUDIES:

## 2023-09-19 NOTE — PROGRESS NOTE ADULT - SUBJECTIVE AND OBJECTIVE BOX
Patient is a 73y old  Male who presents with a chief complaint of FTT (19 Sep 2023 09:58)    Date of servie : 09-19-23 @ 16:25  INTERVAL HPI/OVERNIGHT EVENTS:  T(C): 36.3 (09-19-23 @ 12:27), Max: 36.9 (09-19-23 @ 07:30)  HR: 73 (09-19-23 @ 12:27) (72 - 78)  BP: 140/87 (09-19-23 @ 12:27) (129/71 - 143/76)  RR: 18 (09-19-23 @ 12:27) (17 - 18)  SpO2: 99% (09-19-23 @ 12:27) (96% - 99%)  Wt(kg): --  I&O's Summary    18 Sep 2023 07:01  -  19 Sep 2023 07:00  --------------------------------------------------------  IN: 0 mL / OUT: 200 mL / NET: -200 mL        LABS:                        10.1   4.33  )-----------( 182      ( 19 Sep 2023 06:00 )             30.5     09-19    138  |  102  |  4<L>  ----------------------------<  85  3.5   |  26  |  0.64    Ca    7.9<L>      19 Sep 2023 06:00  Phos  1.9     09-19  Mg     1.70     09-19        Urinalysis Basic - ( 19 Sep 2023 06:00 )    Color: x / Appearance: x / SG: x / pH: x  Gluc: 85 mg/dL / Ketone: x  / Bili: x / Urobili: x   Blood: x / Protein: x / Nitrite: x   Leuk Esterase: x / RBC: x / WBC x   Sq Epi: x / Non Sq Epi: x / Bacteria: x      CAPILLARY BLOOD GLUCOSE      POCT Blood Glucose.: 204 mg/dL (19 Sep 2023 13:49)  POCT Blood Glucose.: 73 mg/dL (19 Sep 2023 12:58)  POCT Blood Glucose.: 110 mg/dL (18 Sep 2023 18:03)        Urinalysis Basic - ( 19 Sep 2023 06:00 )    Color: x / Appearance: x / SG: x / pH: x  Gluc: 85 mg/dL / Ketone: x  / Bili: x / Urobili: x   Blood: x / Protein: x / Nitrite: x   Leuk Esterase: x / RBC: x / WBC x   Sq Epi: x / Non Sq Epi: x / Bacteria: x        MEDICATIONS  (STANDING):  amLODIPine   Tablet 10 milliGRAM(s) Oral daily  chlorhexidine 2% Cloths 1 Application(s) Topical daily  collagenase Ointment 1 Application(s) Topical daily  dextrose 5% + lactated ringers. 1000 milliLiter(s) (60 mL/Hr) IV Continuous <Continuous>  enoxaparin Injectable 65 milliGRAM(s) SubCutaneous every 12 hours  levETIRAcetam  IVPB 500 milliGRAM(s) IV Intermittent every 12 hours  levothyroxine Injectable 70 MICROGram(s) IV Push <User Schedule>  QUEtiapine 75 milliGRAM(s) Oral at bedtime  thiamine 100 milliGRAM(s) Oral daily  valproate sodium  IVPB 500 milliGRAM(s) IV Intermittent every 6 hours    MEDICATIONS  (PRN):          PHYSICAL EXAM:  GENERAL: frail  HEAD:  Atraumatic, Normocephalic  CHEST/LUNG: Clear to percussion bilaterally; No rales, rhonchi, wheezing, or rubs  HEART: Regular rate and rhythm; No murmurs, rubs, or gallops  ABDOMEN: Soft, Nontender, Nondistended; Bowel sounds present  EXTREMITIES:  edema +    Care Discussed with Consultants/Other Providers [ x] YES  [ ] NO

## 2023-09-20 NOTE — PROGRESS NOTE ADULT - SUBJECTIVE AND OBJECTIVE BOX
Subjective: Patient seen and examined. No new events except as noted.     SUBJECTIVE/ROS:  nad      MEDICATIONS:  MEDICATIONS  (STANDING):  amLODIPine   Tablet 10 milliGRAM(s) Oral daily  chlorhexidine 2% Cloths 1 Application(s) Topical daily  collagenase Ointment 1 Application(s) Topical daily  dextrose 5% + lactated ringers. 1000 milliLiter(s) (60 mL/Hr) IV Continuous <Continuous>  enoxaparin Injectable 65 milliGRAM(s) SubCutaneous every 12 hours  levETIRAcetam  IVPB 500 milliGRAM(s) IV Intermittent every 12 hours  levothyroxine Injectable 70 MICROGram(s) IV Push <User Schedule>  QUEtiapine 75 milliGRAM(s) Oral at bedtime  thiamine 100 milliGRAM(s) Oral daily  valproate sodium  IVPB 500 milliGRAM(s) IV Intermittent every 6 hours      PHYSICAL EXAM:  T(C): 36.6 (09-20-23 @ 06:00), Max: 36.9 (09-19-23 @ 07:30)  HR: 79 (09-20-23 @ 06:00) (65 - 79)  BP: 160/89 (09-20-23 @ 06:00) (129/71 - 160/89)  RR: 18 (09-20-23 @ 06:00) (17 - 18)  SpO2: 99% (09-20-23 @ 06:00) (98% - 99%)  Wt(kg): --  I&O's Summary    19 Sep 2023 07:01  -  20 Sep 2023 07:00  --------------------------------------------------------  IN: 0 mL / OUT: 200 mL / NET: -200 mL            JVP: Normal  Neck: supple  Lung: clear   CV: S1 S2 , Murmur:  Abd: soft  Ext: No edema  Psych: flat affect  Skin: normal``    LABS/DATA:    CARDIAC MARKERS:                                10.1   4.33  )-----------( 182      ( 19 Sep 2023 06:00 )             30.5     09-19    138  |  102  |  4<L>  ----------------------------<  85  3.5   |  26  |  0.64    Ca    7.9<L>      19 Sep 2023 06:00  Phos  1.9     09-19  Mg     1.70     09-19      proBNP:   Lipid Profile:   HgA1c:   TSH:     TELE:  EKG:             Subjective: Patient seen and examined. No new events except as noted.     SUBJECTIVE/ROS:  nad      MEDICATIONS:  MEDICATIONS  (STANDING):  amLODIPine   Tablet 10 milliGRAM(s) Oral daily  chlorhexidine 2% Cloths 1 Application(s) Topical daily  collagenase Ointment 1 Application(s) Topical daily  dextrose 5% + lactated ringers. 1000 milliLiter(s) (60 mL/Hr) IV Continuous <Continuous>  enoxaparin Injectable 65 milliGRAM(s) SubCutaneous every 12 hours  levETIRAcetam  IVPB 500 milliGRAM(s) IV Intermittent every 12 hours  levothyroxine Injectable 70 MICROGram(s) IV Push <User Schedule>  QUEtiapine 75 milliGRAM(s) Oral at bedtime  thiamine 100 milliGRAM(s) Oral daily  valproate sodium  IVPB 500 milliGRAM(s) IV Intermittent every 6 hours      PHYSICAL EXAM:  T(C): 36.6 (09-20-23 @ 06:00), Max: 36.9 (09-19-23 @ 07:30)  HR: 79 (09-20-23 @ 06:00) (65 - 79)  BP: 160/89 (09-20-23 @ 06:00) (129/71 - 160/89)  RR: 18 (09-20-23 @ 06:00) (17 - 18)  SpO2: 99% (09-20-23 @ 06:00) (98% - 99%)  Wt(kg): --  I&O's Summary    19 Sep 2023 07:01  -  20 Sep 2023 07:00  --------------------------------------------------------  IN: 0 mL / OUT: 200 mL / NET: -200 mL            JVP: Normal  Neck: supple  Lung: clear   CV: S1 S2 , Murmur:  Abd: soft  Ext: pos edema  Psych: flat affect  Skin: normal``    LABS/DATA:    CARDIAC MARKERS:                                10.1   4.33  )-----------( 182      ( 19 Sep 2023 06:00 )             30.5     09-19    138  |  102  |  4<L>  ----------------------------<  85  3.5   |  26  |  0.64    Ca    7.9<L>      19 Sep 2023 06:00  Phos  1.9     09-19  Mg     1.70     09-19      proBNP:   Lipid Profile:   HgA1c:   TSH:     TELE:  EKG:

## 2023-09-20 NOTE — PROGRESS NOTE ADULT - ASSESSMENT
73-year-old male history of dementia, baseline mental status AxOx0, seizure disorder on Keppra and divalproex, history of DVT/PE off Eliquis, hypertension, hypothyroidism presenting sent in from his nursing home for evaluation of failure to thrive, found to have hypernatremia and new renal failure on outside labs.  Per documentation that arrived with the patient, patient has been refusing to eat since yesterday.    1 Hypernatremia  - resolved   - renal fu   - failed spee ch and swallow  - NPO      2 Seizure disorder-   cw Keppra and valproic acid     3 hx of  DVT/PE  - cw AC4 Dementia  - fall precautions  - frequent reorientation     5 Dysphagia  - diet as per speech and swallow    dc planning pending resolution of dispo issues   dw  and    working on rehab placement  73-year-old male history of dementia, baseline mental status AxOx0, seizure disorder on Keppra and divalproex, history of DVT/PE off Eliquis, hypertension, hypothyroidism presenting sent in from his nursing home for evaluation of failure to thrive, found to have hypernatremia and new renal failure on outside labs.  Per documentation that arrived with the patient, patient has been refusing to eat since yesterday.    1 Hypernatremia  - resolved   - renal fu   - cw current diet       2 Seizure disorder-   cw Keppra and valproic acid     3 hx of  DVT/PE  - cw AC    4 Dementia  - fall precautions  - frequent reorientation     5 Dysphagia  - diet as per speech and swallow    dc planning pending resolution of dispo issues   dw  and    working on rehab placement

## 2023-09-20 NOTE — PROVIDER CONTACT NOTE (OTHER) - ASSESSMENT
patient has +2 edema bilaterally to the upper extremities. Blood was tried to be drawn from both PCA and RN. Sent what could be drawn to lab. Lab called back and said it was insufficient (Peter Zepeda).

## 2023-09-20 NOTE — PROGRESS NOTE ADULT - SUBJECTIVE AND OBJECTIVE BOX
[facility] [place] [facility]   MD ARI MILLS MD ANGELA WONG, PA    TEL:  OFFICE: [phone]  From 5pm-7am Answering Service [phone]    -- RENAL FOLLOW UP NOTE ---Date of Service 09-20-23 @ 12:22    Patient is a 73y old  Male who presents with a chief complaint of FTT (20 Sep 2023 07:25)      Patient seen and examined at bedside.     VITALS:  T(F): 97.4 (09-20-23 @ 07:30), Max: 98 (09-19-23 @ 16:30)  HR: 90 (09-20-23 @ 07:30)  BP: 142/92 (09-20-23 @ 07:30)  RR: 18 (09-20-23 @ 07:30)  SpO2: 98% (09-20-23 @ 07:30)  Wt(kg): --    09-19 @ 07:01  -  09-20 @ 07:00  --------------------------------------------------------  IN: 0 mL / OUT: 200 mL / NET: -200 mL          PHYSICAL EXAM:  General: NAD  Neck: No JVD  Respiratory: CTAB, no wheezes, rales or rhonchi  Cardiovascular: S1, S2, RRR  Gastrointestinal: BS+, soft, NT/ND  Extremities: No peripheral edema LE, + edema of UE    Hospital Medications:   MEDICATIONS  (STANDING):  amLODIPine   Tablet 10 milliGRAM(s) Oral daily  chlorhexidine 2% Cloths 1 Application(s) Topical daily  collagenase Ointment 1 Application(s) Topical daily  dextrose 5% + lactated ringers. 1000 milliLiter(s) (60 mL/Hr) IV Continuous <Continuous>  enoxaparin Injectable 65 milliGRAM(s) SubCutaneous every 12 hours  levETIRAcetam  IVPB 500 milliGRAM(s) IV Intermittent every 12 hours  levothyroxine Injectable 70 MICROGram(s) IV Push <User Schedule>  multivitamin/minerals 1 Tablet(s) Oral daily  QUEtiapine 75 milliGRAM(s) Oral at bedtime  thiamine 100 milliGRAM(s) Oral daily  valproate sodium  IVPB 500 milliGRAM(s) IV Intermittent every 6 hours      LABS:  09-20    136  |  106  |  3<L>  ----------------------------<  122<H>  3.9   |  21<L>  |  0.63    Ca    7.6<L>      20 Sep 2023 05:54  Phos  2.1     09-20  Mg     1.60     09-20      Creatinine Trend: 0.63 <--, 0.64 <--, 0.63 <--, 0.70 <--, 0.73 <--, 0.69 <--, 0.70 <--    Phosphorus: 2.1 mg/dL (09-20 @ 05:54)                              10.1   4.33  )-----------( 182      ( 19 Sep 2023 06:00 )             30.5     Urine Studies:  Urinalysis - [09-20-23 @ 05:54]      Color  / Appearance  / SG  / pH       Gluc 122 / Ketone   / Bili  / Urobili        Blood  / Protein  / Leuk Est  / Nitrite       RBC  / WBC  / Hyaline  / Gran  / Sq Epi  / Non Sq Epi  / Bacteria       TSH 3.29      [08-07-23 @ 06:09]  Lipid: chol 117, TG 82, HDL 48, LDL --      [02-09-23 @ 06:40]        RADIOLOGY & ADDITIONAL STUDIES:

## 2023-09-20 NOTE — PROGRESS NOTE ADULT - ASSESSMENT
73-year-old male history of dementia, baseline mental status AxOx0, seizure disorder on Keppra and divalproex, history of DVT/PE off Eliquis, hypertension, hypothyroidism presenting sent in from his nursing home for evaluation of failure to thrive, found to have hypernatremia and new renal failure on outside labs.    Hypernatremia:  Due to dehydration, poor intake.  poor po intake on ivf  na improving   pt now on puree diet. will hold off ivf for now  encourage po intake  check albumin level  - Trend BMP daily    JESSICA:  Due to Pre-Renal azotemia.  improved  monitor bmp       hypophos  supplemented  monitor    hypokalemia  supplement as needed   monitor    htn  bp stable  monitor 73-year-old male history of dementia, baseline mental status AxOx0, seizure disorder on Keppra and divalproex, history of DVT/PE off Eliquis, hypertension, hypothyroidism presenting sent in from his nursing home for evaluation of failure to thrive, found to have hypernatremia and new renal failure on outside labs.    Hypernatremia:  Due to dehydration, poor intake.  poor po intake on ivf  na improving   pt now on puree diet. will hold off ivf for now  encourage po intake  check albumin level  - Trend BMP daily    JESSICA:  Due to Pre-Renal azotemia.  improved  monitor bmp       hypophos  supplement as needed  monitor    hypokalemia  supplement as needed   monitor    htn  bp stable  monitor

## 2023-09-20 NOTE — PROGRESS NOTE ADULT - SUBJECTIVE AND OBJECTIVE BOX
Patient is a 73y old  Male who presents with a chief complaint of FTT (20 Sep 2023 12:21)    Date of servie : 09-20-23 @ 15:55  INTERVAL HPI/OVERNIGHT EVENTS:  T(C): 37 (09-20-23 @ 13:02), Max: 37 (09-20-23 @ 13:02)  HR: 102 (09-20-23 @ 13:02) (65 - 102)  BP: 149/92 (09-20-23 @ 13:02) (132/75 - 160/89)  RR: 22 (09-20-23 @ 13:02) (17 - 22)  SpO2: 95% (09-20-23 @ 13:02) (95% - 99%)  Wt(kg): --  I&O's Summary    19 Sep 2023 07:01  -  20 Sep 2023 07:00  --------------------------------------------------------  IN: 0 mL / OUT: 200 mL / NET: -200 mL        LABS:                        10.1   4.33  )-----------( 182      ( 19 Sep 2023 06:00 )             30.5     09-20    136  |  106  |  3<L>  ----------------------------<  122<H>  3.9   |  21<L>  |  0.63    Ca    7.6<L>      20 Sep 2023 05:54  Phos  2.1     09-20  Mg     1.60     09-20    TPro  x   /  Alb  1.7<L>  /  TBili  x   /  DBili  x   /  AST  x   /  ALT  x   /  AlkPhos  x   09-20      Urinalysis Basic - ( 20 Sep 2023 05:54 )    Color: x / Appearance: x / SG: x / pH: x  Gluc: 122 mg/dL / Ketone: x  / Bili: x / Urobili: x   Blood: x / Protein: x / Nitrite: x   Leuk Esterase: x / RBC: x / WBC x   Sq Epi: x / Non Sq Epi: x / Bacteria: x      CAPILLARY BLOOD GLUCOSE      POCT Blood Glucose.: 123 mg/dL (20 Sep 2023 12:22)  POCT Blood Glucose.: 121 mg/dL (20 Sep 2023 05:48)  POCT Blood Glucose.: 139 mg/dL (20 Sep 2023 00:25)  POCT Blood Glucose.: 87 mg/dL (19 Sep 2023 17:39)        Urinalysis Basic - ( 20 Sep 2023 05:54 )    Color: x / Appearance: x / SG: x / pH: x  Gluc: 122 mg/dL / Ketone: x  / Bili: x / Urobili: x   Blood: x / Protein: x / Nitrite: x   Leuk Esterase: x / RBC: x / WBC x   Sq Epi: x / Non Sq Epi: x / Bacteria: x        MEDICATIONS  (STANDING):  amLODIPine   Tablet 10 milliGRAM(s) Oral daily  chlorhexidine 2% Cloths 1 Application(s) Topical daily  collagenase Ointment 1 Application(s) Topical daily  enoxaparin Injectable 65 milliGRAM(s) SubCutaneous every 12 hours  levETIRAcetam  IVPB 500 milliGRAM(s) IV Intermittent every 12 hours  levothyroxine Injectable 70 MICROGram(s) IV Push <User Schedule>  multivitamin/minerals 1 Tablet(s) Oral daily  potassium phosphate IVPB 30 milliMole(s) IV Intermittent once  QUEtiapine 75 milliGRAM(s) Oral at bedtime  thiamine 100 milliGRAM(s) Oral daily  valproate sodium  IVPB 500 milliGRAM(s) IV Intermittent every 6 hours    MEDICATIONS  (PRN):          PHYSICAL EXAM:  GENERAL: frail  CHEST/LUNG: Clear to percussion bilaterally; No rales, rhonchi, wheezing, or rubs  HEART: Regular rate and rhythm; No murmurs, rubs, or gallops  ABDOMEN: Soft, Nontender, Nondistended; Bowel sounds present  EXTREMITIES: edema +    Care Discussed with Consultants/Other Providers [ ] YES  [ ] NO

## 2023-09-20 NOTE — PROGRESS NOTE ADULT - ASSESSMENT
HTN  stable    history of DVT  on xarelto  HTN  stable    history of DVT  on xarelto     Edema  likely due to poor nutrition with low albumin   nutritional optimization   diuretics as needed

## 2023-09-20 NOTE — CHART NOTE - NSCHARTNOTEFT_GEN_A_CORE
Pt's wife was seen at bedside as she had questions about pt's condition and disposition. All medical questions were answered and the bilateral heel wounds will take a long time to fully heel and it is not realistic to keep the pt in the hospital until the wounds fully heal, and it would be detrimental to the pt's health to stay in the hospital for such a long time. Disposition was also discussed and wife was informed that the  is working on the process of apply to rehab for wound care treatment. Pt's wife was informed the application is ongoing.     Cosmo Arce PA-C

## 2023-09-20 NOTE — ADVANCED PRACTICE NURSE CONSULT - RECOMMEDATIONS
Topical Recommendations    Continue to follow orders per podiatry,     L heel: Cleanse with NS, pat dry. Paint with betadine and allow to dry, cover with abdominal pad and kerlix, change daily. Ensure heel elevation boots on at all times.    R heel: Cleanse with NS, pat dry. Apply Liquid barrier film to periwound skin (allow to dry). Apply collagenase (Santyl), nickel-coin thickness, to entire base of wound. Cover with abdominal pad, kerlix, change daily. Ensure heel elevation boots on at all times.     Continue low air loss bed therapy, continue heel elevation, continue to turn & reposition per protocol, soft pillow between bony prominences, continue moisture management with barrier creams & single breathable pad, continue measures to decrease friction/shear/pressure. Continue with nutritional support as per dietary/orders.    Plan of care discussed with primary ACP Cosmo Arce.    Please contact Wound Care Service Line if we can be of further assistance (ext [phone]).  Topical Recommendations    Continue to follow orders per podiatry,     L heel: Cleanse with NS, pat dry. Paint with betadine and allow to dry, cover with abdominal pad and kerlix, change daily. Ensure heel elevation boots on at all times.    R heel: Cleanse with NS, pat dry. Apply Liquid barrier film to periwound skin (allow to dry). Apply collagenase (Santyl), nickel-coin thickness, to entire base of wound. Cover with silicone foam, abdominal pad, kerlix, change daily. Ensure heel elevation boots on at all times.     Continue low air loss bed therapy, continue heel elevation, continue to turn & reposition per protocol, soft pillow between bony prominences, continue moisture management with barrier creams & single breathable pad, continue measures to decrease friction/shear/pressure. Continue with nutritional support as per dietary/orders.    Plan of care discussed with primary ACP Cosmo Arce.    Please contact Wound Care Service Line if we can be of further assistance (ext [phone]).

## 2023-09-20 NOTE — ADVANCED PRACTICE NURSE CONSULT - REASON FOR CONSULT
Patient seen on skin care rounds after wound care referral received for assessment of skin impairment and recommendations of topical management of bilateral heels DTPI. As per H&P, patient is a 73-year-old male history of dementia, baseline mental status AxOx0, seizure disorder on Keppra and divalproex, history of DVT/PE off Eliquis, hypertension, hypothyroidism presenting sent in from his nursing home for evaluation of failure to thrive, found to have hypernatremia and new renal failure on outside labs.  Per documentation that arrived with the patient, patient has been refusing to eat since yesterday.    L lateral foot X-ray from 9/8/23: No fractures or dislocations. Tarsometatarsal alignment maintained without evidence for a Lisfranc injury. 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 Preserved remaining visualized joint spaces and no joint margin erosions. Small plantar posterior calcaneal enthesophytes. No lytic or blastic lesions.    Chart reviewed: WBC: 5.06, H&H: 11.2/34.9, Platelets: 103, INR: 1.38, serum albumin: 2.3, Sahil 9.
Patient known to [facility] service line last seen on 9/11/23, seen for re-evaluation prior to [facility] discharge.

## 2023-09-21 NOTE — PROGRESS NOTE ADULT - ASSESSMENT
HTN  stable    history of DVT  on xarelto     Edema  likely due to poor nutrition with low albumin   nutritional optimization   diuretics as needed

## 2023-09-21 NOTE — PROGRESS NOTE ADULT - ASSESSMENT
73-year-old male history of dementia, baseline mental status AxOx0, seizure disorder on Keppra and divalproex, history of DVT/PE off Eliquis, hypertension, hypothyroidism presenting sent in from his nursing home for evaluation of failure to thrive, found to have hypernatremia and new renal failure on outside labs.    Hypernatremia:  Due to dehydration, poor intake.  poor po intake on ivf  na improving   pt now on puree diet. will hold off ivf for now  encourage po intake  check albumin level  - Trend BMP daily    JESSICA:  Due to Pre-Renal azotemia.  improved  monitor bmp       hypophos  supplement as needed  monitor    hypokalemia  supplement as needed   monitor    hypocalcemia  sec to low albumin   consider albumin infusion  consider     htn  bp stable  monitor

## 2023-09-21 NOTE — CHART NOTE - NSCHARTNOTEFT_GEN_A_CORE
NUTRITION FOLLOW-UP:    73-year-old male history of dementia, baseline mental status AxOx0, seizure disorder on Keppra and divalproex, history of DVT/PE off Eliquis, hypertension, hypothyroidism presenting sent in from his nursing home for evaluation of failure to thrive, found to have hypernatremia and new renal failure on outside labs.  Per documentation that arrived with the patient, patient has been refusing to eat since yesterday.    Pt f/u per protocol, remains confused and Rn reports 0% intake of meals. No GI distress (nausea/vomiting/diarrhea/constipation.) at present. As per SLP re-eval (9/18/23)- Puree with Mildly Thick Liquids as tolerated. 1. Defer Nutrition Plan of Care to MD based on GOC discussion and decisions of Pt's family; 2. If alternate means of Nutrition to initiate, consult nutrition for recommendations;    Weight: 165# (9/21/23) via bed scale.    Labs:  09-21 Na 139 mmol/L Glu 66 mg/dL<L> K+ 4.3 mmol/L Cr 0.63 mg/dL BUN 2 mg/dL<L> Phos 3.1 mg/dL  09-21 @ 11:57 POCT 125 mg/dL  09-21 @ 09:26 POCT 129 mg/dL  09-21 @ 08:32 POCT 67 mg/dL  09-21 @ 08:29 POCT 62 mg/dL  09-21 @ 06:49 POCT 70 mg/dL  09-21 @ 00:47 POCT 88 mg/dL  09-20 @ 18:09 POCT 89 mg/dL    Current Diet: Diet, Pureed:   Mildly Thick Liquids (MILDTHICKLIQS) (09-18-23 @ 13:16)    Skin- Lt & Rt heel unstagable, +3 edema Rt & lt arm as per RN flow sheet    Estimated needs:  No changes since previous assessment    Nutrition Diagnosis:  Ongoing    RD to Remain Available:    Additional Recommendations:   1. Defer Nutrition Plan of Care to MD based on GOC discussion and decisions of Pt's family; 2. If alternate means of Nutrition to initiate, consult nutrition for recommendations;  Monitor PO intake, weight trends, nutrition related lab values, BMs/GI distress, hydration status, skin integrity.       Ama Maza RDN #[id number]

## 2023-09-21 NOTE — PROGRESS NOTE ADULT - SUBJECTIVE AND OBJECTIVE BOX
Patient is a 73y old  Male who presents with a chief complaint of FTT (21 Sep 2023 10:07)    Date of servie : 09-21-23 @ 13:21  INTERVAL HPI/OVERNIGHT EVENTS:  T(C): 36.4 (09-21-23 @ 12:18), Max: 36.8 (09-20-23 @ 20:40)  HR: 89 (09-21-23 @ 12:18) (73 - 105)  BP: 155/93 (09-21-23 @ 12:18) (140/105 - 155/93)  RR: 20 (09-21-23 @ 12:18) (18 - 20)  SpO2: 100% (09-21-23 @ 12:18) (96% - 100%)  Wt(kg): --  I&O's Summary    20 Sep 2023 07:01  -  21 Sep 2023 07:00  --------------------------------------------------------  IN: 0 mL / OUT: 300 mL / NET: -300 mL        LABS:                        10.7   3.10  )-----------( 169      ( 21 Sep 2023 06:44 )             32.0     09-21    139  |  105  |  2<L>  ----------------------------<  66<L>  4.3   |  26  |  0.63    Ca    7.9<L>      21 Sep 2023 06:44  Phos  3.1     09-21  Mg     1.60     09-21    TPro  x   /  Alb  1.9<L>  /  TBili  x   /  DBili  x   /  AST  x   /  ALT  x   /  AlkPhos  x   09-21      Urinalysis Basic - ( 21 Sep 2023 06:44 )    Color: x / Appearance: x / SG: x / pH: x  Gluc: 66 mg/dL / Ketone: x  / Bili: x / Urobili: x   Blood: x / Protein: x / Nitrite: x   Leuk Esterase: x / RBC: x / WBC x   Sq Epi: x / Non Sq Epi: x / Bacteria: x      CAPILLARY BLOOD GLUCOSE      POCT Blood Glucose.: 125 mg/dL (21 Sep 2023 11:57)  POCT Blood Glucose.: 129 mg/dL (21 Sep 2023 09:26)  POCT Blood Glucose.: 67 mg/dL (21 Sep 2023 08:32)  POCT Blood Glucose.: 62 mg/dL (21 Sep 2023 08:29)  POCT Blood Glucose.: 70 mg/dL (21 Sep 2023 06:49)  POCT Blood Glucose.: 88 mg/dL (21 Sep 2023 00:47)  POCT Blood Glucose.: 89 mg/dL (20 Sep 2023 18:09)        Urinalysis Basic - ( 21 Sep 2023 06:44 )    Color: x / Appearance: x / SG: x / pH: x  Gluc: 66 mg/dL / Ketone: x  / Bili: x / Urobili: x   Blood: x / Protein: x / Nitrite: x   Leuk Esterase: x / RBC: x / WBC x   Sq Epi: x / Non Sq Epi: x / Bacteria: x        MEDICATIONS  (STANDING):  amLODIPine   Tablet 10 milliGRAM(s) Oral daily  chlorhexidine 2% Cloths 1 Application(s) Topical daily  collagenase Ointment 1 Application(s) Topical daily  dextrose 5%. 1000 milliLiter(s) (50 mL/Hr) IV Continuous <Continuous>  dextrose 50% Injectable 25 Gram(s) IV Push once  dextrose 50% Injectable 25 Gram(s) IV Push once  dextrose 50% Injectable 12.5 Gram(s) IV Push once  dextrose Oral Gel 15 Gram(s) Oral once  enoxaparin Injectable 65 milliGRAM(s) SubCutaneous every 12 hours  glucagon  Injectable 1 milliGRAM(s) IntraMuscular once  levETIRAcetam  IVPB 500 milliGRAM(s) IV Intermittent every 12 hours  levothyroxine Injectable 70 MICROGram(s) IV Push <User Schedule>  multivitamin 1 Tablet(s) Oral daily  QUEtiapine 75 milliGRAM(s) Oral at bedtime  thiamine 100 milliGRAM(s) Oral daily  valproate sodium  IVPB 500 milliGRAM(s) IV Intermittent every 6 hours    MEDICATIONS  (PRN):          PHYSICAL EXAM:  GENERAL: frail  CHEST/LUNG: Clear to percussion bilaterally; No rales, rhonchi, wheezing, or rubs  HEART: Regular rate and rhythm; No murmurs, rubs, or gallops  ABDOMEN: Soft, Nontender, Nondistended; Bowel sounds present  EXTREMITIES:  edema +    Care Discussed with Consultants/Other Providers [x ] YES  [ ] NO

## 2023-09-21 NOTE — PROGRESS NOTE ADULT - SUBJECTIVE AND OBJECTIVE BOX
Subjective: Patient seen and examined. No new events except as noted.     SUBJECTIVE/ROS:  nad      MEDICATIONS:  MEDICATIONS  (STANDING):  amLODIPine   Tablet 10 milliGRAM(s) Oral daily  chlorhexidine 2% Cloths 1 Application(s) Topical daily  collagenase Ointment 1 Application(s) Topical daily  dextrose 50% Injectable 25 Gram(s) IV Push once  dextrose 50% Injectable 25 Gram(s) IV Push once  dextrose 50% Injectable 25 Gram(s) IV Push once  dextrose 50% Injectable 12.5 Gram(s) IV Push once  dextrose Oral Gel 15 Gram(s) Oral once  enoxaparin Injectable 65 milliGRAM(s) SubCutaneous every 12 hours  glucagon  Injectable 1 milliGRAM(s) IntraMuscular once  levETIRAcetam  IVPB 500 milliGRAM(s) IV Intermittent every 12 hours  levothyroxine Injectable 70 MICROGram(s) IV Push <User Schedule>  multivitamin 1 Tablet(s) Oral daily  QUEtiapine 75 milliGRAM(s) Oral at bedtime  thiamine 100 milliGRAM(s) Oral daily  valproate sodium  IVPB 500 milliGRAM(s) IV Intermittent every 6 hours      PHYSICAL EXAM:  T(C): 36.5 (09-21-23 @ 04:57), Max: 37 (09-20-23 @ 13:02)  HR: 105 (09-21-23 @ 04:57) (73 - 105)  BP: 140/105 (09-21-23 @ 04:57) (140/105 - 149/92)  RR: 19 (09-21-23 @ 04:57) (18 - 22)  SpO2: 98% (09-21-23 @ 04:57) (95% - 98%)  Wt(kg): --  I&O's Summary    20 Sep 2023 07:01  -  21 Sep 2023 07:00  --------------------------------------------------------  IN: 0 mL / OUT: 300 mL / NET: -300 mL            JVP: Normal  Neck: supple  Lung: clear   CV: S1 S2 , Murmur:  Abd: soft  Ext: pos edema  Psych: flat affect  Skin: normal``    LABS/DATA:    CARDIAC MARKERS:                                10.7   3.10  )-----------( 169      ( 21 Sep 2023 06:44 )             32.0     09-21    139  |  105  |  2<L>  ----------------------------<  66<L>  4.3   |  26  |  0.63    Ca    7.9<L>      21 Sep 2023 06:44  Phos  3.1     09-21  Mg     1.60     09-21    TPro  x   /  Alb  1.9<L>  /  TBili  x   /  DBili  x   /  AST  x   /  ALT  x   /  AlkPhos  x   09-21    proBNP:   Lipid Profile:   HgA1c:   TSH:     TELE:  EKG:

## 2023-09-21 NOTE — PROGRESS NOTE ADULT - SUBJECTIVE AND OBJECTIVE BOX
[facility] [place] [facility]   MD ARI MILLS MD ANGELA WONG, PA    TEL:  OFFICE: [phone]  From 5pm-7am Answering Service [phone]    -- RENAL FOLLOW UP NOTE ---Date of Service 09-21-23 @ 10:07    Patient is a 73y old  Male who presents with a chief complaint of FTT (21 Sep 2023 08:41)      Patient seen and examined at bedside.     VITALS:  T(F): 97.7 (09-21-23 @ 04:57), Max: 98.6 (09-20-23 @ 13:02)  HR: 105 (09-21-23 @ 04:57)  BP: 140/105 (09-21-23 @ 04:57)  RR: 19 (09-21-23 @ 04:57)  SpO2: 98% (09-21-23 @ 04:57)  Wt(kg): --    09-20 @ 07:01  -  09-21 @ 07:00  --------------------------------------------------------  IN: 0 mL / OUT: 300 mL / NET: -300 mL          PHYSICAL EXAM:  General: opens eyes when called  Neck: No JVD  Respiratory: CTAB, no wheezes, rales or rhonchi  Cardiovascular: S1, S2, RRR  Gastrointestinal: BS+, soft, NT/ND  Extremities: No peripheral edema LE +UE edema    Hospital Medications:   MEDICATIONS  (STANDING):  amLODIPine   Tablet 10 milliGRAM(s) Oral daily  chlorhexidine 2% Cloths 1 Application(s) Topical daily  collagenase Ointment 1 Application(s) Topical daily  dextrose 5%. 1000 milliLiter(s) (50 mL/Hr) IV Continuous <Continuous>  dextrose 50% Injectable 25 Gram(s) IV Push once  dextrose 50% Injectable 25 Gram(s) IV Push once  dextrose 50% Injectable 12.5 Gram(s) IV Push once  dextrose Oral Gel 15 Gram(s) Oral once  enoxaparin Injectable 65 milliGRAM(s) SubCutaneous every 12 hours  glucagon  Injectable 1 milliGRAM(s) IntraMuscular once  levETIRAcetam  IVPB 500 milliGRAM(s) IV Intermittent every 12 hours  levothyroxine Injectable 70 MICROGram(s) IV Push <User Schedule>  multivitamin 1 Tablet(s) Oral daily  QUEtiapine 75 milliGRAM(s) Oral at bedtime  thiamine 100 milliGRAM(s) Oral daily  valproate sodium  IVPB 500 milliGRAM(s) IV Intermittent every 6 hours      LABS:  09-21    139  |  105  |  2<L>  ----------------------------<  66<L>  4.3   |  26  |  0.63    Ca    7.9<L>      21 Sep 2023 06:44  Phos  3.1     09-21  Mg     1.60     09-21    TPro      /  Alb  1.9<L>  /  TBili      /  DBili      /  AST      /  ALT      /  AlkPhos      09-21    Creatinine Trend: 0.63 <--, 0.63 <--, 0.64 <--, 0.63 <--, 0.70 <--, 0.73 <--, 0.69 <--    Phosphorus: 3.1 mg/dL (09-21 @ 06:44)  Albumin: 1.9 g/dL (09-21 @ 06:44)                              10.7   3.10  )-----------( 169      ( 21 Sep 2023 06:44 )             32.0     Urine Studies:  Urinalysis - [09-21-23 @ 06:44]      Color  / Appearance  / SG  / pH       Gluc 66 / Ketone   / Bili  / Urobili        Blood  / Protein  / Leuk Est  / Nitrite       RBC  / WBC  / Hyaline  / Gran  / Sq Epi  / Non Sq Epi  / Bacteria       TSH 3.29      [08-07-23 @ 06:09]  Lipid: chol 117, TG 82, HDL 48, LDL --      [02-09-23 @ 06:40]        RADIOLOGY & ADDITIONAL STUDIES:

## 2023-09-21 NOTE — PROGRESS NOTE ADULT - ASSESSMENT
73-year-old male history of dementia, baseline mental status AxOx0, seizure disorder on Keppra and divalproex, history of DVT/PE off Eliquis, hypertension, hypothyroidism presenting sent in from his nursing home for evaluation of failure to thrive, found to have hypernatremia and new renal failure on outside labs.  Per documentation that arrived with the patient, patient has been refusing to eat since yesterday.    1 Hypernatremia  - resolved   - renal fu   - cw current diet       2 Seizure disorder-   cw Keppra and valproic acid     3 hx of  DVT/PE  - cw AC  4 Dementia  - fall precautions  - frequent reorientation     5 Dysphagia  - diet as per speech and swallow  - poor po intake  - with hypoglycemia  - will start D5W    Prognosis guarded

## 2023-09-22 NOTE — PROGRESS NOTE ADULT - SUBJECTIVE AND OBJECTIVE BOX
[facility] [place] [facility]   MD ARI MILLS MD ANGELA WONG, PA    TEL:  OFFICE: [phone]  From 5pm-7am Answering Service [phone]    -- RENAL FOLLOW UP NOTE ---Date of Service 09-22-23 @ 14:29    Patient is a 73y old  Male who presents with a chief complaint of FTT (22 Sep 2023 06:37)      Patient seen and examined at bedside.     VITALS:  T(F): 97.3 (09-22-23 @ 13:01), Max: 98 (09-21-23 @ 16:15)  HR: 92 (09-22-23 @ 13:01)  BP: 130/79 (09-22-23 @ 13:01)  RR: 18 (09-22-23 @ 13:01)  SpO2: 98% (09-22-23 @ 13:01)  Wt(kg): --    09-21 @ 07:01  -  09-22 @ 07:00  --------------------------------------------------------  IN: 0 mL / OUT: 1000 mL / NET: -1000 mL          PHYSICAL EXAM:  General: opens eyes when called  Neck: No JVD  Respiratory: CTAB, no wheezes, rales or rhonchi  Cardiovascular: S1, S2, RRR  Gastrointestinal: BS+, soft, NT/ND  Extremities: ++peripheral edema UE    Hospital Medications:   MEDICATIONS  (STANDING):  amLODIPine   Tablet 10 milliGRAM(s) Oral daily  chlorhexidine 2% Cloths 1 Application(s) Topical daily  collagenase Ointment 1 Application(s) Topical daily  dextrose 5%. 1000 milliLiter(s) (50 mL/Hr) IV Continuous <Continuous>  dextrose 50% Injectable 12.5 Gram(s) IV Push once  dextrose 50% Injectable 25 Gram(s) IV Push once  dextrose 50% Injectable 25 Gram(s) IV Push once  dextrose Oral Gel 15 Gram(s) Oral once  enoxaparin Injectable 65 milliGRAM(s) SubCutaneous every 12 hours  glucagon  Injectable 1 milliGRAM(s) IntraMuscular once  levETIRAcetam  IVPB 500 milliGRAM(s) IV Intermittent every 12 hours  levothyroxine Injectable 70 MICROGram(s) IV Push <User Schedule>  multivitamin 1 Tablet(s) Oral daily  QUEtiapine 75 milliGRAM(s) Oral at bedtime  thiamine 100 milliGRAM(s) Oral daily  valproate sodium  IVPB 500 milliGRAM(s) IV Intermittent every 6 hours      LABS:  09-21    139  |  105  |  2<L>  ----------------------------<  66<L>  4.3   |  26  |  0.63    Ca    7.9<L>      21 Sep 2023 06:44  Phos  3.1     09-21  Mg     1.60     09-21    TPro      /  Alb  1.9<L>  /  TBili      /  DBili      /  AST      /  ALT      /  AlkPhos      09-21    Creatinine Trend: 0.63 <--, 0.63 <--, 0.64 <--, 0.63 <--, 0.70 <--, 0.73 <--                                10.7   3.10  )-----------( 169      ( 21 Sep 2023 06:44 )             32.0     Urine Studies:  Urinalysis - [09-21-23 @ 06:44]      Color  / Appearance  / SG  / pH       Gluc 66 / Ketone   / Bili  / Urobili        Blood  / Protein  / Leuk Est  / Nitrite       RBC  / WBC  / Hyaline  / Gran  / Sq Epi  / Non Sq Epi  / Bacteria       TSH 3.29      [08-07-23 @ 06:09]  Lipid: chol 117, TG 82, HDL 48, LDL --      [02-09-23 @ 06:40]        RADIOLOGY & ADDITIONAL STUDIES:

## 2023-09-22 NOTE — PROGRESS NOTE ADULT - PROBLEM SELECTOR PLAN 6
Reached out to wife Leena to further discuss goals of care and concerns of hypoglycemia and hypernatremia due to poor PO intake from underlying dementia. Attempted to review recommendation to transition care to comfort centric/ symptom drive under umbrella of hospice as feeding tube would not improve patient's quality of life. However wife's main concern is his wounds and her main goal is for his wounds to be resolved and healed prior to discharge. She states she will take patient home once his wounds are healed.   Unable to have a thorough discussion as she became agitated when attempting to discuss patient's underlying dementia as likely cause of his current clinical status including wounds and poor PO intake. At this time, she states she will consult her  and was not able to engage in meaningful goals of care discussion.     Informed ACP. Reached out to wife Leena to further discuss goals of care and concerns of hypoglycemia and hypernatremia due to poor PO intake from underlying dementia. Attempted to review recommendation to transition care to comfort centric/ symptom drive under umbrella of hospice as feeding tube would not improve patient's quality of life. However wife's main concern is his wounds and her main goal is for his wounds to be resolved and healed prior to discharge. She states she will take patient home once his wounds are healed.   Unable to have a thorough discussion as she became upset when attempting to discuss patient's underlying dementia as likely cause of his current clinical status including wounds and poor PO intake. At this time, she states she will consult her  and did not want to engage in goals of care discussion.     Informed ACP. Reached out to wife Leena to further discuss goals of care and concerns of hypoglycemia due to poor PO intake from underlying dementia. Attempted to review recommendation to transition care to comfort centric/ symptom drive under umbrella of home hospice as feeding tube would not improve patient's quality of life. However wife's main concern is his wounds and her main goal is for his wounds to be resolved and healed prior to discharge. She states she will take patient home once his wounds are healed.   Unable to have a thorough discussion as she became upset when attempting to discuss patient's underlying dementia as likely cause of his current clinical status including wounds and poor PO intake. At this time, she states she will consult her  and did not want to engage in goals of care discussion.     Case reviewed and updated primary team.    Thank you for allowing us to participate in your patient's care.  Patient to be discharged from [facility] team consult service today.   Please re-consult if we can be of further assistance, page [phone].

## 2023-09-22 NOTE — PROGRESS NOTE ADULT - SUBJECTIVE AND OBJECTIVE BOX
Date of Service  : 9/22/2023    INTERVAL HPI/OVERNIGHT EVENTS:  Palliative care reconsulted for GOC due to hypoglycemia from poor PO intake    SUBJECTIVE AND OBJECTIVE:  Patient seen and examined at bedside. Patient awake with eyes open ; does not track with eyes, unable to follow commands, nonverbal.       Allergies    fish (Hives; Angioedema)  No Known Drug Allergies  Fitzhugh (Swelling; Angioedema)    Intolerances    MEDICATIONS  (STANDING):  albumin human 25% IVPB 50 milliLiter(s) IV Intermittent every 8 hours  amLODIPine   Tablet 10 milliGRAM(s) Oral daily  chlorhexidine 2% Cloths 1 Application(s) Topical daily  collagenase Ointment 1 Application(s) Topical daily  dextrose 5%. 1000 milliLiter(s) (50 mL/Hr) IV Continuous <Continuous>  dextrose 50% Injectable 12.5 Gram(s) IV Push once  dextrose 50% Injectable 25 Gram(s) IV Push once  dextrose 50% Injectable 25 Gram(s) IV Push once  dextrose Oral Gel 15 Gram(s) Oral once  enoxaparin Injectable 65 milliGRAM(s) SubCutaneous every 12 hours  glucagon  Injectable 1 milliGRAM(s) IntraMuscular once  levETIRAcetam  IVPB 500 milliGRAM(s) IV Intermittent every 12 hours  levothyroxine Injectable 70 MICROGram(s) IV Push <User Schedule>  multivitamin 1 Tablet(s) Oral daily  QUEtiapine 75 milliGRAM(s) Oral at bedtime  thiamine 100 milliGRAM(s) Oral daily  valproate sodium  IVPB 500 milliGRAM(s) IV Intermittent every 6 hours    MEDICATIONS  (PRN):      ITEMS UNCHECKED ARE NOT PRESENT    PRESENT SYMPTOMS: [x ]Unable to self-report due to altered mental status- see [ ] CPOT [ ]x PAINADS [ x] RDOS  Source if other than patient:  [ ]Family   [ ]Team     Pain:  [ ]yes [ ]no  QOL impact -   Location -                    Aggravating factors -  Quality -  Radiation -  Timing-  Severity (0-10 scale):  Minimal acceptable level / Pain Goal (0-10 scale):     Dyspnea:                           [ ]Mild [ ]Moderate [ ]Severe  Anxiety:                             [ ]Mild [ ]Moderate [ ]Severe  Agitation:                          [ ]Mild [ ]Moderate [ ]Severe  Fatigue:                             [ ]Mild [ ]Moderate [ ]Severe  Nausea:                             [ ]Mild [ ]Moderate [ ]Severe  Loss of appetite:              [ ]Mild [ ]Moderate [ ]Severe  Constipation:                   [ ]Mild [ ]Moderate [ ]Severe  Diarrhea:                          [ ]Mild [ ]Moderate [ ]Severe    CPOT:    https://www.[facility].org/getattachment/[email or web address]/Critical-Care-Pain-Observation-Tool-(CPOT)    PCSSQ[Palliative Care Spiritual Screening Question]   Severity (0-10):  Score of 4 or > indicate consideration of Chaplaincy referral.  Chaplaincy Referral: [ ] yes [ ] refused [ ] following [x ] deferred    Caregiver Medina? : [ ] yes [ ] no [ ] Declined [x ] Deferred              Social work referral [ ] Patient & Family Centered Care Referral [ ]     Anticipatory Grief present?:  [ ] yes [ ] no  [ x] Deferred                  Social work referral [ ] Chaplaincy Referral[ ]    Other Symptoms:  [ ]All other review of systems negative - unable to obtain due to encephalopathy     PHYSICAL EXAM:  Vital Signs Last 24 Hrs  T(C): 36.3 (22 Sep 2023 13:01), Max: 36.7 (21 Sep 2023 16:15)  T(F): 97.3 (22 Sep 2023 13:01), Max: 98 (21 Sep 2023 16:15)  HR: 92 (22 Sep 2023 15:44) (86 - 92)  BP: 164/97 (22 Sep 2023 15:44) (115/73 - 164/97)  BP(mean): --  RR: 18 (22 Sep 2023 13:01) (18 - 19)  SpO2: 98% (22 Sep 2023 13:01) (98% - 100%)    Parameters below as of 22 Sep 2023 13:01  Patient On (Oxygen Delivery Method): room air         I&O's Summary    21 Sep 2023 07:01  -  22 Sep 2023 07:00  --------------------------------------------------------  IN: 0 mL / OUT: 1000 mL / NET: -1000 mL    22 Sep 2023 07:01  -  22 Sep 2023 16:06  --------------------------------------------------------  IN: 300 mL / OUT: 1425 mL / NET: -1125 mL       GENERAL:  [ x] Awake with eyes open [ ]Oriented x   [ ]Lethargic  [ ]Cachexia  [ ]Unarousable  [ ]Verbal  [ x]Non-Verbal  [ x] No Distress  Behavioral:   [ ] Anxiety  [ ] Delirium [ ] Agitation [ ] Calm  [ x] Other-encephalopathy   HEENT:  [x ]Normal  [ ] Temporal Wasting  [ ]Dry mouth   [ ]ET Tube/Trach  [ ]Oral lesions  [ ] Mucositis  PULMONARY:   [ ]Clear [ ]Tachypnea  [ ]Audible excessive secretions   [ ]Rhonchi        [ ]Right [ ]Left [ ]Bilateral  [ ]Crackles        [ ]Right [ ]Left [ ]Bilateral  [ ]Wheezing     [ ]Right [ ]Left [ ]Bilateral  [x ]Diminished breath sounds [ ]right [ ]left [x ]bilateral  CARDIOVASCULAR:    [x ]Regular [ ]Irregular [ ]Tachy  [ ]Dipak [ ]Murmur [ ]Other  GASTROINTESTINAL:  [ x]Soft  [ ]Distended   [ ]+BS  [ x]Non tender [ ]Tender  [ ]PEG [ ]OGT/ NGT  Last BM: fecal incontinence   GENITOURINARY:  [ ]Normal [x] Incontinent   [ ]Oliguria/Anuria   [ ]Gibson  MUSCULOSKELETAL:   [ ]Normal   [ ]Weakness  [x ]Bed/Wheelchair bound [ x]Edema   [  ] amputation  [  ] contraction  NEUROLOGIC:   [ ]No focal deficits  [x ]Cognitive impairment  [ x]Dysphagia [ ]Dysarthria [ ]Paresis [x ]Other - unable to follow commands   SKIN: See Nursing Skin Assessment for further details  [ ]Normal    [ ]Rash  [x ]Pressure ulcer(s) - left heel      Present on admission [ ]y [ ]n   [  ]  Wound    [  ] hyperpigmentation    CRITICAL CARE:  [ ]Shock Present  [ ]Septic [ ]Cardiogenic [ ]Neurologic [ ]Hypovolemic  [ ]Vasopressors [ ]Inotropes  [ ]Respiratory failure present [ ]Mechanical Ventilation [ ]Non-invasive ventilatory support [ ]High-Flow   [ ]Acute  [ ]Chronic [ ]Hypoxic  [ ]Hypercarbic [ ]Other  [ ]Other organ failure     LABS:  reviewed                         10.7   3.10  )-----------( 169      ( 21 Sep 2023 06:44 )             32.0   09-21    139  |  105  |  2<L>  ----------------------------<  66<L>  4.3   |  26  |  0.63    Ca    7.9<L>      21 Sep 2023 06:44  Phos  3.1     09-21  Mg     1.60     09-21    TPro  x   /  Alb  1.9<L>  /  TBili  x   /  DBili  x   /  AST  x   /  ALT  x   /  AlkPhos  x   09-21    Urinalysis Basic - ( 21 Sep 2023 06:44 )    Color: x / Appearance: x / SG: x / pH: x  Gluc: 66 mg/dL / Ketone: x  / Bili: x / Urobili: x   Blood: x / Protein: x / Nitrite: x   Leuk Esterase: x / RBC: x / WBC x   Sq Epi: x / Non Sq Epi: x / Bacteria: x      CAPILLARY BLOOD GLUCOSE      POCT Blood Glucose.: 125 mg/dL (22 Sep 2023 12:43)  POCT Blood Glucose.: 111 mg/dL (22 Sep 2023 06:27)  POCT Blood Glucose.: 124 mg/dL (22 Sep 2023 00:54)  POCT Blood Glucose.: 114 mg/dL (21 Sep 2023 18:10)          RADIOLOGY & ADDITIONAL STUDIES: reviewed     Protein Calorie Malnutrition Present: [ ]mild [ ]moderate [ ]severe [ ]underweight [ ]morbid obesity  https://www.andeal.org/vault/2440/web/files/ONC/Table_Clinical%20Characteristics%20to%20Document%20Malnutrition-White%20JV%20et%20al%202012.pdf    Height (cm): 182.9 (09-08-23 @ 22:06), 182.9 (08-07-23 @ 17:06), 172.7 (06-12-23 @ 17:54)  Weight (kg): 67 (09-12-23 @ 14:53), 68.9 (08-07-23 @ 17:06), 73.1 (06-13-23 @ 01:11)  BMI (kg/m2): 20 (09-12-23 @ 14:53), 20.6 (09-08-23 @ 22:06), 20.6 (08-07-23 @ 17:06)    [x ]PPSV2 < or = 30%  [ ]significant weight loss [ ]poor nutritional intake [ ]anasarca   [ ]Artificial Nutrition    REFERRALS:   [ ]Chaplaincy  [ ]Hospice  [ ]Child Life  [ ]Social Work  [ x]Case management [ ]Holistic Therapy

## 2023-09-22 NOTE — PROGRESS NOTE ADULT - ASSESSMENT
73-year-old male history of dementia, baseline mental status AxOx0, seizure disorder on Keppra and divalproex, history of DVT/PE off Eliquis, hypertension, hypothyroidism presenting sent in from his nursing home for evaluation of failure to thrive, found to have hypernatremia and new renal failure on outside labs.  Per documentation that arrived with the patient, patient has been refusing to eat since yesterday.    1 Hypernatremia  - resolved   - renal fu   - cw current diet       2 Seizure disorder-   cw Keppra and valproic acid     3 hx of  DVT/PE  - cw AC    4 Dementia  - fall precautions  - frequent reorientation     5 Dysphagia- diet as per speech and swallow  - poor po intake  - with hypoglycemia  - started  D5W

## 2023-09-22 NOTE — PROGRESS NOTE ADULT - ASSESSMENT
HTN  stable    history of DVT  on lovenox     Edema  likely due to poor nutrition with low albumin   nutritional optimization   diuretics as needed

## 2023-09-22 NOTE — CHART NOTE - NSCHARTNOTEFT_GEN_A_CORE
d/w nephrology  will give 3 more doses of albumin today along with 20mg of IV lasix and monitor urine outpt / edema   Potassium repleted

## 2023-09-22 NOTE — PROGRESS NOTE ADULT - SUBJECTIVE AND OBJECTIVE BOX
Subjective: Patient seen and examined. No new events except as noted.     SUBJECTIVE/ROS:  LIMITED       MEDICATIONS:  MEDICATIONS  (STANDING):  albumin human 25% IVPB 50 milliLiter(s) IV Intermittent every 8 hours  amLODIPine   Tablet 10 milliGRAM(s) Oral daily  chlorhexidine 2% Cloths 1 Application(s) Topical daily  collagenase Ointment 1 Application(s) Topical daily  dextrose 5%. 1000 milliLiter(s) (50 mL/Hr) IV Continuous <Continuous>  dextrose 50% Injectable 12.5 Gram(s) IV Push once  dextrose 50% Injectable 25 Gram(s) IV Push once  dextrose 50% Injectable 25 Gram(s) IV Push once  dextrose Oral Gel 15 Gram(s) Oral once  enoxaparin Injectable 65 milliGRAM(s) SubCutaneous every 12 hours  glucagon  Injectable 1 milliGRAM(s) IntraMuscular once  levETIRAcetam  IVPB 500 milliGRAM(s) IV Intermittent every 12 hours  levothyroxine Injectable 70 MICROGram(s) IV Push <User Schedule>  multivitamin 1 Tablet(s) Oral daily  QUEtiapine 75 milliGRAM(s) Oral at bedtime  thiamine 100 milliGRAM(s) Oral daily  valproate sodium  IVPB 500 milliGRAM(s) IV Intermittent every 6 hours      PHYSICAL EXAM:  T(C): 36.5 (09-22-23 @ 05:37), Max: 36.7 (09-21-23 @ 08:50)  HR: 90 (09-22-23 @ 05:37) (86 - 97)  BP: 135/90 (09-22-23 @ 05:37) (115/73 - 160/78)  RR: 19 (09-22-23 @ 05:37) (18 - 20)  SpO2: 99% (09-22-23 @ 05:37) (97% - 100%)  Wt(kg): --  I&O's Summary    20 Sep 2023 07:01  -  21 Sep 2023 07:00  --------------------------------------------------------  IN: 0 mL / OUT: 300 mL / NET: -300 mL            JVP: Normal  Neck: supple  Lung: clear   CV: S1 S2 , Murmur:  Abd: soft  Ext: pos edema  Psych: flat affect  Skin: normal``    LABS/DATA:    CARDIAC MARKERS:                                10.7   3.10  )-----------( 169      ( 21 Sep 2023 06:44 )             32.0     09-21    139  |  105  |  2<L>  ----------------------------<  66<L>  4.3   |  26  |  0.63    Ca    7.9<L>      21 Sep 2023 06:44  Phos  3.1     09-21  Mg     1.60     09-21    TPro  x   /  Alb  1.9<L>  /  TBili  x   /  DBili  x   /  AST  x   /  ALT  x   /  AlkPhos  x   09-21    proBNP:   Lipid Profile:   HgA1c:   TSH:     TELE:  EKG:

## 2023-09-22 NOTE — PROGRESS NOTE ADULT - SUBJECTIVE AND OBJECTIVE BOX
Patient is a 73y old  Male who presents with a chief complaint of FTT (22 Sep 2023 14:29)    Date of servie : 09-22-23 @ 15:10  INTERVAL HPI/OVERNIGHT EVENTS:  T(C): 36.3 (09-22-23 @ 13:01), Max: 36.7 (09-21-23 @ 16:15)  HR: 92 (09-22-23 @ 13:01) (86 - 92)  BP: 130/79 (09-22-23 @ 13:01) (115/73 - 160/78)  RR: 18 (09-22-23 @ 13:01) (18 - 19)  SpO2: 98% (09-22-23 @ 13:01) (98% - 100%)  Wt(kg): --  I&O's Summary    21 Sep 2023 07:01  -  22 Sep 2023 07:00  --------------------------------------------------------  IN: 0 mL / OUT: 1000 mL / NET: -1000 mL        LABS:                        10.7   3.10  )-----------( 169      ( 21 Sep 2023 06:44 )             32.0     09-21    139  |  105  |  2<L>  ----------------------------<  66<L>  4.3   |  26  |  0.63    Ca    7.9<L>      21 Sep 2023 06:44  Phos  3.1     09-21  Mg     1.60     09-21    TPro  x   /  Alb  1.9<L>  /  TBili  x   /  DBili  x   /  AST  x   /  ALT  x   /  AlkPhos  x   09-21      Urinalysis Basic - ( 21 Sep 2023 06:44 )    Color: x / Appearance: x / SG: x / pH: x  Gluc: 66 mg/dL / Ketone: x  / Bili: x / Urobili: x   Blood: x / Protein: x / Nitrite: x   Leuk Esterase: x / RBC: x / WBC x   Sq Epi: x / Non Sq Epi: x / Bacteria: x      CAPILLARY BLOOD GLUCOSE      POCT Blood Glucose.: 125 mg/dL (22 Sep 2023 12:43)  POCT Blood Glucose.: 111 mg/dL (22 Sep 2023 06:27)  POCT Blood Glucose.: 124 mg/dL (22 Sep 2023 00:54)  POCT Blood Glucose.: 114 mg/dL (21 Sep 2023 18:10)        Urinalysis Basic - ( 21 Sep 2023 06:44 )    Color: x / Appearance: x / SG: x / pH: x  Gluc: 66 mg/dL / Ketone: x  / Bili: x / Urobili: x   Blood: x / Protein: x / Nitrite: x   Leuk Esterase: x / RBC: x / WBC x   Sq Epi: x / Non Sq Epi: x / Bacteria: x        MEDICATIONS  (STANDING):  amLODIPine   Tablet 10 milliGRAM(s) Oral daily  chlorhexidine 2% Cloths 1 Application(s) Topical daily  collagenase Ointment 1 Application(s) Topical daily  dextrose 5%. 1000 milliLiter(s) (50 mL/Hr) IV Continuous <Continuous>  dextrose 50% Injectable 12.5 Gram(s) IV Push once  dextrose 50% Injectable 25 Gram(s) IV Push once  dextrose 50% Injectable 25 Gram(s) IV Push once  dextrose Oral Gel 15 Gram(s) Oral once  enoxaparin Injectable 65 milliGRAM(s) SubCutaneous every 12 hours  glucagon  Injectable 1 milliGRAM(s) IntraMuscular once  levETIRAcetam  IVPB 500 milliGRAM(s) IV Intermittent every 12 hours  levothyroxine Injectable 70 MICROGram(s) IV Push <User Schedule>  multivitamin 1 Tablet(s) Oral daily  QUEtiapine 75 milliGRAM(s) Oral at bedtime  thiamine 100 milliGRAM(s) Oral daily  valproate sodium  IVPB 500 milliGRAM(s) IV Intermittent every 6 hours    MEDICATIONS  (PRN):          PHYSICAL EXAM:  GENERAL:frail  CHEST/LUNG: Clear to percussion bilaterally; No rales, rhonchi, wheezing, or rubs  HEART: Regular rate and rhythm; No murmurs, rubs, or gallops  ABDOMEN: Soft, Nontender, Nondistended; Bowel sounds present  EXTREMITIES:  edema +    Care Discussed with Consultants/Other Providers [ ] YES  [ ] NO

## 2023-09-22 NOTE — PROGRESS NOTE ADULT - ASSESSMENT
73-year-old male history of dementia, baseline mental status AxOx0, seizure disorder on Keppra and divalproex, history of DVT/PE off Eliquis, hypertension, hypothyroidism presenting sent in from his nursing home for evaluation of failure to thrive, found to have hypernatremia and new renal failure on outside labs.    Hypernatremia:  Due to dehydration, poor intake.  poor po intake on ivf  na improving   upper extremities very swollen likely sec to low albumin  albumin low getting iv albumin  encourage po intake  - Trend BMP daily  poor prognosis, GOC per team    JESSICA:  Due to Pre-Renal azotemia.  improved  monitor bmp       hypophos  supplement as needed  monitor    hypokalemia  supplement as needed   monitor    hypocalcemia  sec to low albumin   albumin infusionx3  consider     htn  bp stable  monitor

## 2023-09-23 NOTE — PROGRESS NOTE ADULT - SUBJECTIVE AND OBJECTIVE BOX
[facility]   MD ARI MILLS MD ANGELA WONG, PA    TEL:  OFFICE: [phone]  From 5pm-7am Answering Service [phone]    -- RENAL FOLLOW UP NOTE ---Date of Service 09-23-23 @ 12:56    Patient is a 73y old  Male who presents with a chief complaint of FTT (23 Sep 2023 11:32)      Patient seen and examined at bedside. No chest pain/sob    VITALS:  T(F): 98.1 (09-23-23 @ 04:36), Max: 98.1 (09-23-23 @ 04:36)  HR: 90 (09-23-23 @ 04:36)  BP: 140/76 (09-23-23 @ 04:36)  RR: 19 (09-23-23 @ 04:36)  SpO2: 100% (09-23-23 @ 04:36)  Wt(kg): --    09-22 @ 07:01  -  09-23 @ 07:00  --------------------------------------------------------  IN: 300 mL / OUT: 4575 mL / NET: -4275 mL          PHYSICAL EXAM:  General: NAD  Neck: No JVD  Respiratory: CTAB, no wheezes, rales or rhonchi  Cardiovascular: S1, S2, RRR  Gastrointestinal: BS+, soft, NT/ND  Extremities: + peripheral edema UE, no edema of [facility] Medications:   MEDICATIONS  (STANDING):  albumin human 25% IVPB 50 milliLiter(s) IV Intermittent every 8 hours  amLODIPine   Tablet 10 milliGRAM(s) Oral daily  chlorhexidine 2% Cloths 1 Application(s) Topical daily  collagenase Ointment 1 Application(s) Topical daily  dextrose 5%. 1000 milliLiter(s) (50 mL/Hr) IV Continuous <Continuous>  dextrose 50% Injectable 12.5 Gram(s) IV Push once  dextrose 50% Injectable 25 Gram(s) IV Push once  dextrose 50% Injectable 25 Gram(s) IV Push once  dextrose Oral Gel 15 Gram(s) Oral once  enoxaparin Injectable 65 milliGRAM(s) SubCutaneous every 12 hours  furosemide   Injectable 20 milliGRAM(s) IV Push once  glucagon  Injectable 1 milliGRAM(s) IntraMuscular once  levETIRAcetam  IVPB 500 milliGRAM(s) IV Intermittent every 12 hours  levothyroxine Injectable 70 MICROGram(s) IV Push <User Schedule>  multivitamin 1 Tablet(s) Oral daily  potassium chloride   Powder 40 milliEquivalent(s) Oral once  potassium phosphate / sodium phosphate Powder (PHOS-NaK) 1 Packet(s) Oral once  QUEtiapine 75 milliGRAM(s) Oral at bedtime  thiamine 100 milliGRAM(s) Oral daily  valproate sodium  IVPB 500 milliGRAM(s) IV Intermittent every 6 hours      LABS:  09-23    138  |  102  |  <2<L>  ----------------------------<  95  3.2<L>   |  29  |  0.60    Ca    8.3<L>      23 Sep 2023 07:00  Phos  2.1     09-23  Mg     1.60     09-23    TPro  7.0  /  Alb  3.1<L>  /  TBili  0.3  /  DBili      /  AST  22  /  ALT  5   /  AlkPhos  62  09-22    Creatinine Trend: 0.60 <--, 0.59 <--, 0.63 <--, 0.63 <--, 0.64 <--, 0.63 <--, 0.70 <--    Phosphorus: 2.1 mg/dL (09-23 @ 07:00)  Albumin: 3.1 g/dL (09-22 @ 16:25)                              9.6    3.55  )-----------( 178      ( 23 Sep 2023 07:00 )             28.2     Urine Studies:  Urinalysis - [09-23-23 @ 07:00]      Color  / Appearance  / SG  / pH       Gluc 95 / Ketone   / Bili  / Urobili        Blood  / Protein  / Leuk Est  / Nitrite       RBC  / WBC  / Hyaline  / Gran  / Sq Epi  / Non Sq Epi  / Bacteria       TSH 3.29      [08-07-23 @ 06:09]  Lipid: chol 117, TG 82, HDL 48, LDL --      [02-09-23 @ 06:40]        RADIOLOGY & ADDITIONAL STUDIES:

## 2023-09-23 NOTE — PROGRESS NOTE ADULT - SUBJECTIVE AND OBJECTIVE BOX
Subjective: Patient seen and examined. No new events except as noted.     SUBJECTIVE/ROS:  MEDICATIONS:  MEDICATIONS  (STANDING):  albumin human 25% IVPB 50 milliLiter(s) IV Intermittent every 8 hours  amLODIPine   Tablet 10 milliGRAM(s) Oral daily  chlorhexidine 2% Cloths 1 Application(s) Topical daily  collagenase Ointment 1 Application(s) Topical daily  dextrose 5%. 1000 milliLiter(s) (50 mL/Hr) IV Continuous <Continuous>  dextrose 50% Injectable 12.5 Gram(s) IV Push once  dextrose 50% Injectable 25 Gram(s) IV Push once  dextrose 50% Injectable 25 Gram(s) IV Push once  dextrose Oral Gel 15 Gram(s) Oral once  enoxaparin Injectable 65 milliGRAM(s) SubCutaneous every 12 hours  glucagon  Injectable 1 milliGRAM(s) IntraMuscular once  levETIRAcetam  IVPB 500 milliGRAM(s) IV Intermittent every 12 hours  levothyroxine Injectable 70 MICROGram(s) IV Push <User Schedule>  multivitamin 1 Tablet(s) Oral daily  QUEtiapine 75 milliGRAM(s) Oral at bedtime  thiamine 100 milliGRAM(s) Oral daily  valproate sodium  IVPB 500 milliGRAM(s) IV Intermittent every 6 hours      PHYSICAL EXAM:  T(C): 36.7 (09-23-23 @ 04:36), Max: 36.7 (09-22-23 @ 17:31)  HR: 90 (09-23-23 @ 04:36) (90 - 95)  BP: 140/76 (09-23-23 @ 04:36) (128/94 - 164/97)  RR: 19 (09-23-23 @ 04:36) (17 - 19)  SpO2: 100% (09-23-23 @ 04:36) (98% - 100%)  Wt(kg): --  I&O's Summary    22 Sep 2023 07:01  -  23 Sep 2023 07:00  --------------------------------------------------------  IN: 300 mL / OUT: 4575 mL / NET: -4275 mL            JVP: Normal  Neck: supple  Lung: clear   CV: S1 S2 , Murmur:  Abd: soft  Ext edema   Psych: flat affect  Skin: normal``    LABS/DATA:    CARDIAC MARKERS:                                9.6    3.55  )-----------( 178      ( 23 Sep 2023 07:00 )             28.2     09-22    139  |  101  |  <2<L>  ----------------------------<  118<H>  3.2<L>   |  27  |  0.59    Ca    8.7      22 Sep 2023 16:25    TPro  7.0  /  Alb  3.1<L>  /  TBili  0.3  /  DBili  x   /  AST  22  /  ALT  5   /  AlkPhos  62  09-22    proBNP:   Lipid Profile:   HgA1c:   TSH:     TELE:  EKG:

## 2023-09-23 NOTE — PROGRESS NOTE ADULT - SUBJECTIVE AND OBJECTIVE BOX
Patient is a 73y old  Male who presents with a chief complaint of FTT (23 Sep 2023 07:46)    Date of servie : 09-23-23 @ 11:32  INTERVAL HPI/OVERNIGHT EVENTS:  T(C): 36.7 (09-23-23 @ 04:36), Max: 36.7 (09-22-23 @ 17:31)  HR: 90 (09-23-23 @ 04:36) (90 - 95)  BP: 140/76 (09-23-23 @ 04:36) (128/94 - 164/97)  RR: 19 (09-23-23 @ 04:36) (17 - 19)  SpO2: 100% (09-23-23 @ 04:36) (98% - 100%)  Wt(kg): --  I&O's Summary    22 Sep 2023 07:01  -  23 Sep 2023 07:00  --------------------------------------------------------  IN: 300 mL / OUT: 4575 mL / NET: -4275 mL        LABS:                        9.6    3.55  )-----------( 178      ( 23 Sep 2023 07:00 )             28.2     09-23    138  |  102  |  <2<L>  ----------------------------<  95  3.2<L>   |  29  |  0.60    Ca    8.3<L>      23 Sep 2023 07:00  Phos  2.1     09-23  Mg     1.60     09-23    TPro  7.0  /  Alb  3.1<L>  /  TBili  0.3  /  DBili  x   /  AST  22  /  ALT  5   /  AlkPhos  62  09-22      Urinalysis Basic - ( 23 Sep 2023 07:00 )    Color: x / Appearance: x / SG: x / pH: x  Gluc: 95 mg/dL / Ketone: x  / Bili: x / Urobili: x   Blood: x / Protein: x / Nitrite: x   Leuk Esterase: x / RBC: x / WBC x   Sq Epi: x / Non Sq Epi: x / Bacteria: x      CAPILLARY BLOOD GLUCOSE      POCT Blood Glucose.: 95 mg/dL (23 Sep 2023 06:07)  POCT Blood Glucose.: 95 mg/dL (22 Sep 2023 23:48)  POCT Blood Glucose.: 124 mg/dL (22 Sep 2023 17:52)  POCT Blood Glucose.: 125 mg/dL (22 Sep 2023 12:43)        Urinalysis Basic - ( 23 Sep 2023 07:00 )    Color: x / Appearance: x / SG: x / pH: x  Gluc: 95 mg/dL / Ketone: x  / Bili: x / Urobili: x   Blood: x / Protein: x / Nitrite: x   Leuk Esterase: x / RBC: x / WBC x   Sq Epi: x / Non Sq Epi: x / Bacteria: x        MEDICATIONS  (STANDING):  albumin human 25% IVPB 50 milliLiter(s) IV Intermittent every 8 hours  amLODIPine   Tablet 10 milliGRAM(s) Oral daily  chlorhexidine 2% Cloths 1 Application(s) Topical daily  collagenase Ointment 1 Application(s) Topical daily  dextrose 5%. 1000 milliLiter(s) (50 mL/Hr) IV Continuous <Continuous>  dextrose 50% Injectable 12.5 Gram(s) IV Push once  dextrose 50% Injectable 25 Gram(s) IV Push once  dextrose 50% Injectable 25 Gram(s) IV Push once  dextrose Oral Gel 15 Gram(s) Oral once  enoxaparin Injectable 65 milliGRAM(s) SubCutaneous every 12 hours  glucagon  Injectable 1 milliGRAM(s) IntraMuscular once  levETIRAcetam  IVPB 500 milliGRAM(s) IV Intermittent every 12 hours  levothyroxine Injectable 70 MICROGram(s) IV Push <User Schedule>  multivitamin 1 Tablet(s) Oral daily  potassium chloride   Powder 40 milliEquivalent(s) Oral once  potassium phosphate / sodium phosphate Powder (PHOS-NaK) 1 Packet(s) Oral once  QUEtiapine 75 milliGRAM(s) Oral at bedtime  thiamine 100 milliGRAM(s) Oral daily  valproate sodium  IVPB 500 milliGRAM(s) IV Intermittent every 6 hours    MEDICATIONS  (PRN):          PHYSICAL EXAM:  GENERAL: frail  CHEST/LUNG: Clear to percussion bilaterally; No rales, rhonchi, wheezing, or rubs  HEART: Regular rate and rhythm; No murmurs, rubs, or gallops  ABDOMEN: Soft, Nontender, Nondistended; Bowel sounds present  EXTREMITIES:  edema +    Care Discussed with Consultants/Other Providers [x ] YES  [ ] NO

## 2023-09-24 NOTE — PROGRESS NOTE ADULT - ASSESSMENT
73-year-old male history of dementia, baseline mental status AxOx0, seizure disorder on Keppra and divalproex, history of DVT/PE off Eliquis, hypertension, hypothyroidism presenting sent in from his nursing home for evaluation of failure to thrive, found to have hypernatremia and new renal failure on outside labs.  Per documentation that arrived with the patient, patient has been refusing to eat since yesterday.    1 Hypernatremia  - resolved   - renal fu   - cw current diet       2 Seizure disorder-   cw Keppra and valproic acid     3 hx of  DVT/PE  - cw AC    4 Dementia  - fall precautions- frequent reorientation     5 Dysphagia- diet as per speech and swallow  - poor po intake  - with hypoglycemia  - started  D5W

## 2023-09-24 NOTE — PROGRESS NOTE ADULT - ASSESSMENT
73-year-old male history of dementia, baseline mental status AxOx0, seizure disorder on Keppra and divalproex, history of DVT/PE off Eliquis, hypertension, hypothyroidism presenting sent in from his nursing home for evaluation of failure to thrive, found to have hypernatremia and new renal failure on outside labs.    Hypernatremia:  Due to dehydration, poor intake.  poor po intake on ivf  na improving   upper extremities very swollen likely sec to low albumin  albumin low getting iv albumin, s/p lasix 20 x1 9/23  encourage po intake  - Trend BMP daily  poor prognosis, continue GOC per team    JESSICA:  Due to Pre-Renal azotemia.  improved  monitor bmp       hypophos  Better  supplement as needed  monitor    hypokalemia  Better  supplement as needed   monitor    hypocalcemia  sec to low albumin   Dropped again today  albumin infusionx6 were given  Monitor    htn  bp optimal  monitor 73-year-old male history of dementia, baseline mental status AxOx0, seizure disorder on Keppra and divalproex, history of DVT/PE off Eliquis, hypertension, hypothyroidism presenting sent in from his nursing home for evaluation of failure to thrive, found to have hypernatremia and new renal failure on outside labs.    Hypernatremia:  Due to dehydration, poor intake.  poor po intake on ivf  na improving   upper extremities swollen likely sec to low albumin  albumin low getting iv albumin-PRN, s/p lasix 20 x1 9/23  encourage po intake  - Trend BMP daily  poor prognosis, continue GOC per team    JESSICA:  Due to Pre-Renal azotemia.  improved  monitor bmp       hypophos  Better  supplement as needed  monitor    hypokalemia  Better  supplement as needed   monitor    hypocalcemia  sec to low albumin   Dropped again today  albumin infusionx6 were given  Monitor    htn  bp optimal  monitor

## 2023-09-24 NOTE — PROGRESS NOTE ADULT - SUBJECTIVE AND OBJECTIVE BOX
[facility] [place] [facility]   MD ARI MILLS MD RUORU WONG, PA    TEL:  OFFICE: [phone]      RENAL FOLLOW UP NOTE-- Date of Service ;; 09-24-23 @ 12:04  --------------------------------------------------------------------------------  HPI:  Pt seen and examined at bedside  Denies SOB, chest pain.         PAST HISTORY  --------------------------------------------------------------------------------  No significant changes to PMH, PSH, FHx, SHx, unless otherwise noted    ALLERGIES & MEDICATIONS  --------------------------------------------------------------------------------  Allergies    fish (Hives; Angioedema)  No Known Drug Allergies  [place] (Swelling; Angioedema)    Intolerances      Standing Inpatient Medications  amLODIPine   Tablet 10 milliGRAM(s) Oral daily  chlorhexidine 2% Cloths 1 Application(s) Topical daily  collagenase Ointment 1 Application(s) Topical daily  dextrose 5% + lactated ringers. 1000 milliLiter(s) IV Continuous <Continuous>  dextrose 50% Injectable 12.5 Gram(s) IV Push once  dextrose 50% Injectable 25 Gram(s) IV Push once  dextrose 50% Injectable 25 Gram(s) IV Push once  dextrose Oral Gel 15 Gram(s) Oral once  enoxaparin Injectable 65 milliGRAM(s) SubCutaneous every 12 hours  glucagon  Injectable 1 milliGRAM(s) IntraMuscular once  levETIRAcetam  IVPB 500 milliGRAM(s) IV Intermittent every 12 hours  levothyroxine Injectable 70 MICROGram(s) IV Push <User Schedule>  multivitamin 1 Tablet(s) Oral daily  QUEtiapine 75 milliGRAM(s) Oral at bedtime  thiamine 100 milliGRAM(s) Oral daily  valproate sodium  IVPB 500 milliGRAM(s) IV Intermittent every 6 hours    PRN Inpatient Medications      REVIEW OF SYSTEMS  --------------------------------------------------------------------------------  General: no fever  CVS: no chest pain  RESP: no sob, no cough  ABD: no abdominal pain  : no dysuria,  MSK: no edema. B/L UE swelling    VITALS/PHYSICAL EXAM  --------------------------------------------------------------------------------  T(C): 36.9 (09-24-23 @ 08:30), Max: 36.9 (09-24-23 @ 08:30)  HR: 90 (09-24-23 @ 08:30) (71 - 92)  BP: 142/85 (09-24-23 @ 08:30) (139/76 - 142/92)  RR: 18 (09-24-23 @ 08:30) (18 - 18)  SpO2: 100% (09-24-23 @ 08:30) (100% - 100%)  Wt(kg): --        09-23-23 @ 07:01  -  09-24-23 @ 07:00  --------------------------------------------------------  IN: 0 mL / OUT: 1100 mL / NET: -1100 mL      Physical Exam:  	Gen: NAD  	HEENT: MMM  	Pulm: CTA B/L  	CV: S1S2  	Abd: Soft, +BS  	Ext: No LE edema B/L                      Neuro: Awake , alert  	Skin: Warm and Dry   	Vascular access: none            LABS/STUDIES  --------------------------------------------------------------------------------              10.0   4.15  >-----------<  Clumped    [09-24-23 @ 07:17]              30.2     142  |  102  |  <2  ----------------------------<  79      [09-24-23 @ 07:17]  4.2   |  25  |  0.60        Ca     8.3     [09-24-23 @ 07:17]      Mg     1.70     [09-24-23 @ 07:17]      Phos  3.0     [09-24-23 @ 07:17]    TPro  7.0  /  Alb  3.1  /  TBili  0.3  /  DBili  x   /  AST  22  /  ALT  5   /  AlkPhos  62  [09-22-23 @ 16:25]          Creatinine Trend:  SCr 0.60 [09-24 @ 07:17]  SCr 0.58 [09-23 @ 21:12]  SCr 0.60 [09-23 @ 07:00]  SCr 0.59 [09-22 @ 16:25]  SCr 0.63 [09-21 @ 06:44]    Urinalysis - [09-24-23 @ 07:17]      Color  / Appearance  / SG  / pH       Gluc 79 / Ketone   / Bili  / Urobili        Blood  / Protein  / Leuk Est  / Nitrite       RBC  / WBC  / Hyaline  / Gran  / Sq Epi  / Non Sq Epi  / Bacteria       TSH 3.29      [08-07-23 @ 06:09]  Lipid: chol 117, TG 82, HDL 48, LDL --      [02-09-23 @ 06:40]

## 2023-09-24 NOTE — PROGRESS NOTE ADULT - SUBJECTIVE AND OBJECTIVE BOX
Subjective: Patient seen and examined. No new events except as noted.     SUBJECTIVE/ROS:        MEDICATIONS:  MEDICATIONS  (STANDING):  amLODIPine   Tablet 10 milliGRAM(s) Oral daily  chlorhexidine 2% Cloths 1 Application(s) Topical daily  collagenase Ointment 1 Application(s) Topical daily  dextrose 5% + lactated ringers. 1000 milliLiter(s) (50 mL/Hr) IV Continuous <Continuous>  dextrose 5%. 1000 milliLiter(s) (50 mL/Hr) IV Continuous <Continuous>  dextrose 50% Injectable 12.5 Gram(s) IV Push once  dextrose 50% Injectable 25 Gram(s) IV Push once  dextrose 50% Injectable 25 Gram(s) IV Push once  dextrose Oral Gel 15 Gram(s) Oral once  enoxaparin Injectable 65 milliGRAM(s) SubCutaneous every 12 hours  glucagon  Injectable 1 milliGRAM(s) IntraMuscular once  levETIRAcetam  IVPB 500 milliGRAM(s) IV Intermittent every 12 hours  levothyroxine Injectable 70 MICROGram(s) IV Push <User Schedule>  multivitamin 1 Tablet(s) Oral daily  QUEtiapine 75 milliGRAM(s) Oral at bedtime  thiamine 100 milliGRAM(s) Oral daily  valproate sodium  IVPB 500 milliGRAM(s) IV Intermittent every 6 hours      PHYSICAL EXAM:  T(C): 36.8 (09-24-23 @ 06:00), Max: 36.8 (09-24-23 @ 06:00)  HR: 92 (09-24-23 @ 06:00) (71 - 92)  BP: 140/83 (09-24-23 @ 06:00) (139/76 - 142/92)  RR: 18 (09-24-23 @ 06:00) (18 - 18)  SpO2: 100% (09-24-23 @ 06:00) (100% - 100%)  Wt(kg): --  I&O's Summary    23 Sep 2023 07:01  -  24 Sep 2023 07:00  --------------------------------------------------------  IN: 0 mL / OUT: 1100 mL / NET: -1100 mL            JVP: Normal  Neck: supple  Lung: clear   CV: S1 S2 , Murmur:  Abd: soft  Ext: pos edema  Psych: flat affect  Skin: normal``    LABS/DATA:    CARDIAC MARKERS:                                10.0   4.15  )-----------( x        ( 24 Sep 2023 07:17 )             30.2     09-23    140  |  101  |  <2<L>  ----------------------------<  97  3.9   |  29  |  0.58    Ca    8.7      23 Sep 2023 21:12  Phos  3.6     09-23  Mg     1.60     09-23    TPro  7.0  /  Alb  3.1<L>  /  TBili  0.3  /  DBili  x   /  AST  22  /  ALT  5   /  AlkPhos  62  09-22    proBNP:   Lipid Profile:   HgA1c:   TSH:     TELE:  EKG:

## 2023-09-24 NOTE — PROGRESS NOTE ADULT - SUBJECTIVE AND OBJECTIVE BOX
Patient is a 73y old  Male who presents with a chief complaint of FTT (24 Sep 2023 12:03)    Date of servie : 09-24-23 @ 18:22  INTERVAL HPI/OVERNIGHT EVENTS:  T(C): 36.7 (09-24-23 @ 12:10), Max: 36.9 (09-24-23 @ 08:30)  HR: 86 (09-24-23 @ 12:10) (71 - 92)  BP: 131/69 (09-24-23 @ 12:10) (131/69 - 142/85)  RR: 18 (09-24-23 @ 12:10) (18 - 18)  SpO2: 100% (09-24-23 @ 12:10) (100% - 100%)  Wt(kg): --  I&O's Summary    23 Sep 2023 07:01  -  24 Sep 2023 07:00  --------------------------------------------------------  IN: 0 mL / OUT: 1100 mL / NET: -1100 mL        LABS:                        10.0   4.15  )-----------( Clumped    ( 24 Sep 2023 07:17 )             30.2     09-24    142  |  102  |  <2<L>  ----------------------------<  79  4.2   |  25  |  0.60    Ca    8.3<L>      24 Sep 2023 07:17  Phos  3.0     09-24  Mg     1.70     09-24        Urinalysis Basic - ( 24 Sep 2023 07:17 )    Color: x / Appearance: x / SG: x / pH: x  Gluc: 79 mg/dL / Ketone: x  / Bili: x / Urobili: x   Blood: x / Protein: x / Nitrite: x   Leuk Esterase: x / RBC: x / WBC x   Sq Epi: x / Non Sq Epi: x / Bacteria: x      CAPILLARY BLOOD GLUCOSE      POCT Blood Glucose.: 92 mg/dL (24 Sep 2023 12:18)  POCT Blood Glucose.: 77 mg/dL (24 Sep 2023 06:25)  POCT Blood Glucose.: 71 mg/dL (24 Sep 2023 06:23)  POCT Blood Glucose.: 104 mg/dL (24 Sep 2023 00:20)        Urinalysis Basic - ( 24 Sep 2023 07:17 )    Color: x / Appearance: x / SG: x / pH: x  Gluc: 79 mg/dL / Ketone: x  / Bili: x / Urobili: x   Blood: x / Protein: x / Nitrite: x   Leuk Esterase: x / RBC: x / WBC x   Sq Epi: x / Non Sq Epi: x / Bacteria: x        MEDICATIONS  (STANDING):  amLODIPine   Tablet 10 milliGRAM(s) Oral daily  chlorhexidine 2% Cloths 1 Application(s) Topical daily  collagenase Ointment 1 Application(s) Topical daily  dextrose 5% + lactated ringers. 1000 milliLiter(s) (50 mL/Hr) IV Continuous <Continuous>  dextrose 50% Injectable 25 Gram(s) IV Push once  dextrose 50% Injectable 25 Gram(s) IV Push once  dextrose 50% Injectable 12.5 Gram(s) IV Push once  dextrose Oral Gel 15 Gram(s) Oral once  enoxaparin Injectable 65 milliGRAM(s) SubCutaneous every 12 hours  glucagon  Injectable 1 milliGRAM(s) IntraMuscular once  levETIRAcetam  IVPB 500 milliGRAM(s) IV Intermittent every 12 hours  levothyroxine Injectable 70 MICROGram(s) IV Push <User Schedule>  multivitamin 1 Tablet(s) Oral daily  QUEtiapine 75 milliGRAM(s) Oral at bedtime  thiamine 100 milliGRAM(s) Oral daily  valproate sodium  IVPB 500 milliGRAM(s) IV Intermittent every 6 hours    MEDICATIONS  (PRN):          PHYSICAL EXAM:  GENERAL: frail  CHEST/LUNG: Clear to percussion bilaterally; No rales, rhonchi, wheezing, or rubs  HEART: Regular rate and rhythm; No murmurs, rubs, or gallops  ABDOMEN: Soft, Nontender, Nondistended; Bowel sounds present  EXTREMITIES: edema +    Care Discussed with Consultants/Other Providers [ x] YES  [ ] NO

## 2023-09-25 NOTE — PROVIDER CONTACT NOTE (HYPOGLYCEMIA EVENT) - NS PROVIDER CONTACT CONTRIBUTING FACTORS OF EPISODE
Poor oral intake within the last 24 hours
Poor oral intake within the last 24 hours/Previous finger stick less than 100 mg/dL
Poor oral intake within the last 24 hours/Patient NPO greater than 8 hours/Previous finger stick less than 100 mg/dL

## 2023-09-25 NOTE — PROGRESS NOTE ADULT - SUBJECTIVE AND OBJECTIVE BOX
[facility] [place] [facility]   MD ARI MILLS MD ANGELA WONG, PA    TEL:  OFFICE: [phone]  From 5pm-7am Answering Service [phone]    -- RENAL FOLLOW UP NOTE ---Date of Service 09-25-23 @ 09:37    Patient is a 73y old  Male who presents with a chief complaint of FTT (24 Sep 2023 18:22)      Patient seen and examined at bedside.     VITALS:  T(F): 97.7 (09-25-23 @ 08:25), Max: 98.5 (09-24-23 @ 20:21)  HR: 77 (09-25-23 @ 08:25)  BP: 122/92 (09-25-23 @ 08:25)  RR: 18 (09-25-23 @ 08:25)  SpO2: 99% (09-25-23 @ 08:25)  Wt(kg): --        PHYSICAL EXAM:  General: awake  Neck: No JVD  Respiratory: CTAB, no wheezes, rales or rhonchi  Cardiovascular: S1, S2, RRR  Gastrointestinal: BS+, soft, NT/ND  Extremities: + peripheral edema UE    Hospital Medications:   MEDICATIONS  (STANDING):  amLODIPine   Tablet 10 milliGRAM(s) Oral daily  chlorhexidine 2% Cloths 1 Application(s) Topical daily  collagenase Ointment 1 Application(s) Topical daily  dextrose 5% + lactated ringers. 1000 milliLiter(s) (50 mL/Hr) IV Continuous <Continuous>  dextrose 50% Injectable 12.5 Gram(s) IV Push once  dextrose 50% Injectable 25 Gram(s) IV Push once  dextrose 50% Injectable 25 Gram(s) IV Push once  dextrose Oral Gel 15 Gram(s) Oral once  enoxaparin Injectable 65 milliGRAM(s) SubCutaneous every 12 hours  glucagon  Injectable 1 milliGRAM(s) IntraMuscular once  levETIRAcetam  IVPB 500 milliGRAM(s) IV Intermittent every 12 hours  levothyroxine Injectable 70 MICROGram(s) IV Push <User Schedule>  multivitamin 1 Tablet(s) Oral daily  potassium phosphate IVPB 15 milliMole(s) IV Intermittent once  QUEtiapine 75 milliGRAM(s) Oral at bedtime  thiamine 100 milliGRAM(s) Oral daily  valproate sodium  IVPB 500 milliGRAM(s) IV Intermittent every 6 hours      LABS:  09-25    141  |  102  |  <2<L>  ----------------------------<  73  3.7   |  28  |  0.60    Ca    8.4      25 Sep 2023 07:50  Phos  2.2     09-25  Mg     1.80     09-25      Creatinine Trend: 0.60 <--, 0.60 <--, 0.58 <--, 0.60 <--, 0.59 <--, 0.63 <--, 0.63 <--, 0.64 <--, 0.63 <--    Phosphorus: 2.2 mg/dL (09-25 @ 07:50)                              9.7    3.75  )-----------( 121      ( 25 Sep 2023 07:50 )             28.8     Urine Studies:  Urinalysis - [09-25-23 @ 07:50]      Color  / Appearance  / SG  / pH       Gluc 73 / Ketone   / Bili  / Urobili        Blood  / Protein  / Leuk Est  / Nitrite       RBC  / WBC  / Hyaline  / Gran  / Sq Epi  / Non Sq Epi  / Bacteria       TSH 3.29      [08-07-23 @ 06:09]  Lipid: chol 117, TG 82, HDL 48, LDL --      [02-09-23 @ 06:40]        RADIOLOGY & ADDITIONAL STUDIES:

## 2023-09-25 NOTE — PROVIDER CONTACT NOTE (HYPOGLYCEMIA EVENT) - NS PROVIDER CONTACT RECOMMEND-HYPO
Notify provider
Re-check FS q30 2x.
Dextrose 12.5 was given. Day shift nurse Radhika RN will do q15 vitals 2x and start continuous fluids.
consider D5% fluid maintnance

## 2023-09-25 NOTE — PROGRESS NOTE ADULT - ASSESSMENT
73-year-old male history of dementia, baseline mental status AxOx0, seizure disorder on Keppra and divalproex, history of DVT/PE off Eliquis, hypertension, hypothyroidism presenting sent in from his nursing home for evaluation of failure to thrive, found to have hypernatremia and new renal failure on outside labs.    Hypernatremia:  Due to dehydration, poor intake.  poor po intake on ivf  na improving   upper extremities swollen likely sec to low albumin  albumin low getting iv albumin-PRN, s/p lasix 20 x1 9/22 9/23  encourage po intake  - Trend BMP daily  poor prognosis, continue GOC per team    JESSICA:  Due to Pre-Renal azotemia.  improved  monitor bmp       hypophos  Better  supplement as needed  monitor    hypokalemia  Better  supplement as needed   monitor    hypophos  supplemented by team  monitor    hypocalcemia  sec to low albumin   albumin infusionx6 were given  Monitor    htn  bp optimal  monitor

## 2023-09-25 NOTE — PROGRESS NOTE ADULT - ASSESSMENT
73-year-old male history of dementia, baseline mental status AxOx0, seizure disorder on Keppra and divalproex, history of DVT/PE off Eliquis, hypertension, hypothyroidism presenting sent in from his nursing home for evaluation of failure to thrive, found to have hypernatremia and new renal failure on outside labs.  Per documentation that arrived with the patient, patient has been refusing to eat since yesterday.    1 Hypernatremia  - resolved   - renal fu   - cw current diet       2 Seizure disorder-   cw Keppra and valproic acid     3 hx of  DVT/PE  - cw AC    4 Dementia  - fall precautions- frequent reorientation     5 Dysphagia  - diet as per speech and swallow  - poor po intake  - with hypoglycemia  - cw   D5W    prognosis guarded

## 2023-09-25 NOTE — PROVIDER CONTACT NOTE (HYPOGLYCEMIA EVENT) - NS PROVIDER CONTACT BACKGROUND-HYPO
Age: 73y    Gender: Male    POCT Blood Glucose:  125 mg/dL (09-25-23 @ 18:52)  161 mg/dL (09-25-23 @ 18:26)  67 mg/dL (09-25-23 @ 17:47)  60 mg/dL (09-25-23 @ 17:46)  87 mg/dL (09-25-23 @ 12:28)  130 mg/dL (09-25-23 @ 09:37)  155 mg/dL (09-25-23 @ 09:06)  92 mg/dL (09-25-23 @ 08:18)      eMAR:  dextrose 50% Injectable   12.5 Gram(s) IV Push (09-25-23 @ 08:43)    dextrose 50% Injectable   12.5 Gram(s) IV Push (09-25-23 @ 07:31)    dextrose 50% Injectable   25 Gram(s) IV Push (09-25-23 @ 02:48)    dextrose 50% Injectable   25 Gram(s) IV Push (09-25-23 @ 17:59)    levothyroxine Injectable   70 MICROGram(s) IV Push (09-25-23 @ 06:49)    
Age: 73y    Gender: Male    POCT Blood Glucose:  66 mg/dL (09-25-23 @ 07:04)  166 mg/dL (09-25-23 @ 03:25)  176 mg/dL (09-25-23 @ 03:09)  87 mg/dL (09-25-23 @ 00:56)  86 mg/dL (09-24-23 @ 23:37)  91 mg/dL (09-24-23 @ 19:40)  92 mg/dL (09-24-23 @ 12:18)      eMAR:  dextrose 50% Injectable   25 Gram(s) IV Push (09-25-23 @ 02:48)    dextrose 50% Injectable   12.5 Gram(s) IV Push (09-25-23 @ 07:31)    levothyroxine Injectable   70 MICROGram(s) IV Push (09-25-23 @ 06:49)    
Age: 73y    Gender: Male    POCT Blood Glucose:  87 mg/dL (09-25-23 @ 00:56)  86 mg/dL (09-24-23 @ 23:37)  91 mg/dL (09-24-23 @ 19:40)  92 mg/dL (09-24-23 @ 12:18)  77 mg/dL (09-24-23 @ 06:25)  71 mg/dL (09-24-23 @ 06:23)      eMAR:  dextrose 50% Injectable   25 Gram(s) IV Push (09-25-23 @ 02:48)    Provider was contacted numerous times. Received okay to activate dextrose protocol after giving patient thickened apple juice. Patient is lethargic in the nights and unable to tolerate PO.         
Age: 73y    Gender: Male    POCT Blood Glucose:  125 mg/dL (09-21-23 @ 11:57)  129 mg/dL (09-21-23 @ 09:26)  67 mg/dL (09-21-23 @ 08:32)  62 mg/dL (09-21-23 @ 08:29)  70 mg/dL (09-21-23 @ 06:49)  88 mg/dL (09-21-23 @ 00:47)  89 mg/dL (09-20-23 @ 18:09)      eMAR:  dextrose 50% Injectable   25 Gram(s) IV Push (09-21-23 @ 09:03)    levothyroxine Injectable   70 MICROGram(s) IV Push (09-21-23 @ 04:37)    
Age: 73y    Gender: Male    POCT Blood Glucose:  77 mg/dL (09-24-23 @ 06:25)  71 mg/dL (09-24-23 @ 06:23)  104 mg/dL (09-24-23 @ 00:20)  80 mg/dL (09-23-23 @ 17:54)  95 mg/dL (09-23-23 @ 12:14)      eMAR:  levothyroxine Injectable   70 MICROGram(s) IV Push (09-24-23 @ 06:11)

## 2023-09-25 NOTE — PROVIDER CONTACT NOTE (HYPOGLYCEMIA EVENT) - NS PROVIDER CONTACT NOTE-TREATMENT-HYPO
Dextrose 50% 25 Grams IV Push
Dextrose 50% 12.5 Grams IV Push
Dextrose 50% 25 Grams IV Push
4 oz Fruit Juice (Specify quantity, date/time)/Dextrose 50% 25 Grams IV Push
4 oz Fruit Juice (Specify quantity, date/time)

## 2023-09-25 NOTE — PROGRESS NOTE ADULT - SUBJECTIVE AND OBJECTIVE BOX
Patient is a 73y old  Male who presents with a chief complaint of FTT (25 Sep 2023 09:35)    Date of servie : 09-25-23 @ 14:30  INTERVAL HPI/OVERNIGHT EVENTS:  T(C): 36.4 (09-25-23 @ 13:30), Max: 36.9 (09-24-23 @ 20:21)  HR: 78 (09-25-23 @ 13:30) (77 - 87)  BP: 139/87 (09-25-23 @ 13:30) (120/86 - 145/80)  RR: 18 (09-25-23 @ 13:30) (18 - 18)  SpO2: 98% (09-25-23 @ 13:30) (98% - 100%)  Wt(kg): --  I&O's Summary      LABS:                        9.7    3.75  )-----------( 121      ( 25 Sep 2023 07:50 )             28.8     09-25    141  |  102  |  <2<L>  ----------------------------<  73  3.7   |  28  |  0.60    Ca    8.4      25 Sep 2023 07:50  Phos  2.2     09-25  Mg     1.80     09-25        Urinalysis Basic - ( 25 Sep 2023 07:50 )    Color: x / Appearance: x / SG: x / pH: x  Gluc: 73 mg/dL / Ketone: x  / Bili: x / Urobili: x   Blood: x / Protein: x / Nitrite: x   Leuk Esterase: x / RBC: x / WBC x   Sq Epi: x / Non Sq Epi: x / Bacteria: x      CAPILLARY BLOOD GLUCOSE      POCT Blood Glucose.: 87 mg/dL (25 Sep 2023 12:28)  POCT Blood Glucose.: 130 mg/dL (25 Sep 2023 09:37)  POCT Blood Glucose.: 155 mg/dL (25 Sep 2023 09:06)  POCT Blood Glucose.: 92 mg/dL (25 Sep 2023 08:18)  POCT Blood Glucose.: 66 mg/dL (25 Sep 2023 07:04)  POCT Blood Glucose.: 166 mg/dL (25 Sep 2023 03:25)  POCT Blood Glucose.: 176 mg/dL (25 Sep 2023 03:09)  POCT Blood Glucose.: 87 mg/dL (25 Sep 2023 00:56)  POCT Blood Glucose.: 86 mg/dL (24 Sep 2023 23:37)  POCT Blood Glucose.: 91 mg/dL (24 Sep 2023 19:40)        Urinalysis Basic - ( 25 Sep 2023 07:50 )    Color: x / Appearance: x / SG: x / pH: x  Gluc: 73 mg/dL / Ketone: x  / Bili: x / Urobili: x   Blood: x / Protein: x / Nitrite: x   Leuk Esterase: x / RBC: x / WBC x   Sq Epi: x / Non Sq Epi: x / Bacteria: x        MEDICATIONS  (STANDING):  amLODIPine   Tablet 10 milliGRAM(s) Oral daily  chlorhexidine 2% Cloths 1 Application(s) Topical daily  collagenase Ointment 1 Application(s) Topical daily  dextrose 5% + lactated ringers. 1000 milliLiter(s) (50 mL/Hr) IV Continuous <Continuous>  dextrose 50% Injectable 25 Gram(s) IV Push once  dextrose 50% Injectable 25 Gram(s) IV Push once  dextrose 50% Injectable 12.5 Gram(s) IV Push once  dextrose Oral Gel 15 Gram(s) Oral once  enoxaparin Injectable 65 milliGRAM(s) SubCutaneous every 12 hours  glucagon  Injectable 1 milliGRAM(s) IntraMuscular once  levETIRAcetam  IVPB 500 milliGRAM(s) IV Intermittent every 12 hours  levothyroxine Injectable 70 MICROGram(s) IV Push <User Schedule>  multivitamin 1 Tablet(s) Oral daily  QUEtiapine 75 milliGRAM(s) Oral at bedtime  thiamine 100 milliGRAM(s) Oral daily  valproate sodium  IVPB 500 milliGRAM(s) IV Intermittent every 6 hours    MEDICATIONS  (PRN):          PHYSICAL EXAM:  GENERAL: NAD, well-groomed, well-developed  HEAD:  Atraumatic, Normocephalic  CHEST/LUNG: Clear to percussion bilaterally; No rales, rhonchi, wheezing, or rubs  HEART: Regular rate and rhythm; No murmurs, rubs, or gallops  ABDOMEN: Soft, Nontender, Nondistended; Bowel sounds present  EXTREMITIES:  2+ Peripheral Pulses, No clubbing, cyanosis, or edema  LYMPH: No lymphadenopathy noted  SKIN: No rashes or lesions    Care Discussed with Consultants/Other Providers [ ] YES  [ ] NO

## 2023-09-26 NOTE — PROGRESS NOTE ADULT - SUBJECTIVE AND OBJECTIVE BOX
Patient is a 73y old  Male who presents with a chief complaint of FTT (26 Sep 2023 11:43)    Date of servie : 09-26-23 @ 16:09  INTERVAL HPI/OVERNIGHT EVENTS:  T(C): 36.5 (09-26-23 @ 12:20), Max: 36.8 (09-25-23 @ 18:00)  HR: 95 (09-26-23 @ 12:20) (77 - 95)  BP: 135/52 (09-26-23 @ 12:20) (121/78 - 135/52)  RR: 18 (09-26-23 @ 12:20) (18 - 18)  SpO2: 99% (09-26-23 @ 12:20) (97% - 99%)  Wt(kg): --  I&O's Summary      LABS:                        9.4    3.33  )-----------( 122      ( 26 Sep 2023 07:19 )             28.3     09-26    138  |  103  |  2<L>  ----------------------------<  112<H>  3.5   |  28  |  0.60    Ca    8.0<L>      26 Sep 2023 07:19  Phos  2.1     09-26  Mg     1.90     09-26    TPro  x   /  Alb  2.4<L>  /  TBili  x   /  DBili  x   /  AST  x   /  ALT  x   /  AlkPhos  x   09-26      Urinalysis Basic - ( 26 Sep 2023 07:19 )    Color: x / Appearance: x / SG: x / pH: x  Gluc: 112 mg/dL / Ketone: x  / Bili: x / Urobili: x   Blood: x / Protein: x / Nitrite: x   Leuk Esterase: x / RBC: x / WBC x   Sq Epi: x / Non Sq Epi: x / Bacteria: x      CAPILLARY BLOOD GLUCOSE      POCT Blood Glucose.: 97 mg/dL (26 Sep 2023 12:16)  POCT Blood Glucose.: 100 mg/dL (26 Sep 2023 06:36)  POCT Blood Glucose.: 110 mg/dL (25 Sep 2023 23:54)  POCT Blood Glucose.: 121 mg/dL (25 Sep 2023 21:16)  POCT Blood Glucose.: 125 mg/dL (25 Sep 2023 18:52)  POCT Blood Glucose.: 161 mg/dL (25 Sep 2023 18:26)  POCT Blood Glucose.: 67 mg/dL (25 Sep 2023 17:47)  POCT Blood Glucose.: 60 mg/dL (25 Sep 2023 17:46)        Urinalysis Basic - ( 26 Sep 2023 07:19 )    Color: x / Appearance: x / SG: x / pH: x  Gluc: 112 mg/dL / Ketone: x  / Bili: x / Urobili: x   Blood: x / Protein: x / Nitrite: x   Leuk Esterase: x / RBC: x / WBC x   Sq Epi: x / Non Sq Epi: x / Bacteria: x        MEDICATIONS  (STANDING):  amLODIPine   Tablet 10 milliGRAM(s) Oral daily  chlorhexidine 2% Cloths 1 Application(s) Topical daily  collagenase Ointment 1 Application(s) Topical daily  dextrose 5% + sodium chloride 0.9%. 1000 milliLiter(s) (75 mL/Hr) IV Continuous <Continuous>  dextrose 50% Injectable 12.5 Gram(s) IV Push once  dextrose 50% Injectable 25 Gram(s) IV Push once  dextrose 50% Injectable 25 Gram(s) IV Push once  dextrose 50% Injectable 25 Gram(s) IV Push once  dextrose Oral Gel 15 Gram(s) Oral once  enoxaparin Injectable 65 milliGRAM(s) SubCutaneous every 12 hours  glucagon  Injectable 1 milliGRAM(s) IntraMuscular once  levETIRAcetam  IVPB 500 milliGRAM(s) IV Intermittent every 12 hours  levothyroxine Injectable 70 MICROGram(s) IV Push <User Schedule>  multivitamin 1 Tablet(s) Oral daily  QUEtiapine 75 milliGRAM(s) Oral at bedtime  thiamine 100 milliGRAM(s) Oral daily  valproate sodium  IVPB 500 milliGRAM(s) IV Intermittent every 6 hours    MEDICATIONS  (PRN):          PHYSICAL EXAM:  GENERAL: frail  CHEST/LUNG: Clear to percussion bilaterally; No rales, rhonchi, wheezing, or rubs  HEART: Regular rate and rhythm; No murmurs, rubs, or gallops  ABDOMEN: Soft, Nontender, Nondistended; Bowel sounds present  EXTREMITIES: edema +    Care Discussed with Consultants/Other Providers [ ] YES  [ ] NO

## 2023-09-26 NOTE — PROGRESS NOTE ADULT - SUBJECTIVE AND OBJECTIVE BOX
[facility] [place] [facility]   MD ARI MILLS MD ANGELA WONG, PA    TEL:  OFFICE: [phone]  From 5pm-7am Answering Service [phone]    -- RENAL FOLLOW UP NOTE ---Date of Service 09-26-23 @ 11:43    Patient is a 73y old  Male who presents with a chief complaint of FTT (26 Sep 2023 08:07)      Patient seen and examined at bedside.     VITALS:  T(F): 98.3 (09-26-23 @ 05:18), Max: 98.3 (09-26-23 @ 05:18)  HR: 89 (09-26-23 @ 05:18)  BP: 132/82 (09-26-23 @ 05:18)  RR: 18 (09-26-23 @ 05:18)  SpO2: 98% (09-26-23 @ 05:18)  Wt(kg): --        PHYSICAL EXAM:  General: NAD  Neck: No JVD  Respiratory: CTAB, no wheezes, rales or rhonchi  Cardiovascular: S1, S2, RRR  Gastrointestinal: BS+, soft, NT/ND  Extremities: + peripheral edema UE    Hospital Medications:   MEDICATIONS  (STANDING):  amLODIPine   Tablet 10 milliGRAM(s) Oral daily  chlorhexidine 2% Cloths 1 Application(s) Topical daily  collagenase Ointment 1 Application(s) Topical daily  dextrose 5% + sodium chloride 0.9%. 1000 milliLiter(s) (75 mL/Hr) IV Continuous <Continuous>  dextrose 50% Injectable 12.5 Gram(s) IV Push once  dextrose 50% Injectable 25 Gram(s) IV Push once  dextrose 50% Injectable 25 Gram(s) IV Push once  dextrose 50% Injectable 25 Gram(s) IV Push once  dextrose Oral Gel 15 Gram(s) Oral once  enoxaparin Injectable 65 milliGRAM(s) SubCutaneous every 12 hours  glucagon  Injectable 1 milliGRAM(s) IntraMuscular once  levETIRAcetam  IVPB 500 milliGRAM(s) IV Intermittent every 12 hours  levothyroxine Injectable 70 MICROGram(s) IV Push <User Schedule>  magnesium sulfate  IVPB 2 Gram(s) IV Intermittent once  multivitamin 1 Tablet(s) Oral daily  potassium chloride  10 mEq/100 mL IVPB 10 milliEquivalent(s) IV Intermittent every 1 hour  QUEtiapine 75 milliGRAM(s) Oral at bedtime  thiamine 100 milliGRAM(s) Oral daily  valproate sodium  IVPB 500 milliGRAM(s) IV Intermittent every 6 hours      LABS:  09-26    138  |  103  |  2<L>  ----------------------------<  112<H>  3.5   |  28  |  0.60    Ca    8.0<L>      26 Sep 2023 07:19  Phos  2.1     09-26  Mg     1.90     09-26    TPro      /  Alb  2.4<L>  /  TBili      /  DBili      /  AST      /  ALT      /  AlkPhos      09-26    Creatinine Trend: 0.60 <--, 0.60 <--, 0.60 <--, 0.58 <--, 0.60 <--, 0.59 <--, 0.63 <--, 0.63 <--    Phosphorus: 2.1 mg/dL (09-26 @ 07:19)  Albumin: 2.4 g/dL (09-26 @ 07:19)                              9.4    3.33  )-----------( 122      ( 26 Sep 2023 07:19 )             28.3     Urine Studies:  Urinalysis - [09-26-23 @ 07:19]      Color  / Appearance  / SG  / pH       Gluc 112 / Ketone   / Bili  / Urobili        Blood  / Protein  / Leuk Est  / Nitrite       RBC  / WBC  / Hyaline  / Gran  / Sq Epi  / Non Sq Epi  / Bacteria       TSH 3.29      [08-07-23 @ 06:09]  Lipid: chol 117, TG 82, HDL 48, LDL --      [02-09-23 @ 06:40]        RADIOLOGY & ADDITIONAL STUDIES:

## 2023-09-26 NOTE — PROGRESS NOTE ADULT - SUBJECTIVE AND OBJECTIVE BOX
Subjective: Patient seen and examined. No new events except as noted.     SUBJECTIVE/ROS:  nad      MEDICATIONS:  MEDICATIONS  (STANDING):  amLODIPine   Tablet 10 milliGRAM(s) Oral daily  chlorhexidine 2% Cloths 1 Application(s) Topical daily  collagenase Ointment 1 Application(s) Topical daily  dextrose 5% + lactated ringers. 1000 milliLiter(s) (75 mL/Hr) IV Continuous <Continuous>  dextrose 50% Injectable 12.5 Gram(s) IV Push once  dextrose 50% Injectable 25 Gram(s) IV Push once  dextrose 50% Injectable 25 Gram(s) IV Push once  dextrose 50% Injectable 25 Gram(s) IV Push once  dextrose Oral Gel 15 Gram(s) Oral once  enoxaparin Injectable 65 milliGRAM(s) SubCutaneous every 12 hours  glucagon  Injectable 1 milliGRAM(s) IntraMuscular once  levETIRAcetam  IVPB 500 milliGRAM(s) IV Intermittent every 12 hours  levothyroxine Injectable 70 MICROGram(s) IV Push <User Schedule>  multivitamin 1 Tablet(s) Oral daily  QUEtiapine 75 milliGRAM(s) Oral at bedtime  thiamine 100 milliGRAM(s) Oral daily  valproate sodium  IVPB 500 milliGRAM(s) IV Intermittent every 6 hours      PHYSICAL EXAM:  T(C): 36.8 (09-26-23 @ 05:18), Max: 36.8 (09-25-23 @ 18:00)  HR: 89 (09-26-23 @ 05:18) (77 - 95)  BP: 132/82 (09-26-23 @ 05:18) (121/78 - 139/87)  RR: 18 (09-26-23 @ 05:18) (18 - 18)  SpO2: 98% (09-26-23 @ 05:18) (97% - 99%)  Wt(kg): --  I&O's Summary          JVP: Normal  Neck: supple  Lung: clear   CV: S1 S2 , Murmur:  Abd: soft  Ext: pos edema  Psych: flat affect  Skin: normal``    LABS/DATA:    CARDIAC MARKERS:                                9.4    3.33  )-----------( 122      ( 26 Sep 2023 07:19 )             28.3     09-26    138  |  103  |  2<L>  ----------------------------<  112<H>  3.5   |  28  |  0.60    Ca    8.0<L>      26 Sep 2023 07:19  Phos  2.1     09-26  Mg     1.90     09-26    TPro  x   /  Alb  2.4<L>  /  TBili  x   /  DBili  x   /  AST  x   /  ALT  x   /  AlkPhos  x   09-26    proBNP:   Lipid Profile:   HgA1c:   TSH:     TELE:  EKG:

## 2023-09-26 NOTE — PROGRESS NOTE ADULT - ASSESSMENT
73-year-old male history of dementia, baseline mental status AxOx0, seizure disorder on Keppra and divalproex, history of DVT/PE off Eliquis, hypertension, hypothyroidism presenting sent in from his nursing home for evaluation of failure to thrive, found to have hypernatremia and new renal failure on outside labs.  Per documentation that arrived with the patient, patient has been refusing to eat since yesterday.    1 Hypernatremia  - resolved   - renal fu   - cw current diet       2 Seizure disorder-   cw Keppra and valproic acid     3 hx of  DVT/PE  - cw AC    4 Dementia  - fall precautions- frequent reorientation     5 Dysphagia  - diet as per speech and swallow  - poor po intake  - with hypoglycemia  - cw   D5W    prognosis guarded   family unrealistic about GOC  Palliative signed off

## 2023-09-27 NOTE — PROGRESS NOTE ADULT - SUBJECTIVE AND OBJECTIVE BOX
Patient is a 73y old  Male who presents with a chief complaint of FTT (27 Sep 2023 11:18)    Date of servie : 09-27-23 @ 13:53  INTERVAL HPI/OVERNIGHT EVENTS:  T(C): 36.6 (09-27-23 @ 10:29), Max: 36.6 (09-26-23 @ 16:39)  HR: 87 (09-27-23 @ 10:29) (83 - 92)  BP: 147/97 (09-27-23 @ 10:29) (134/77 - 147/97)  RR: 18 (09-27-23 @ 10:29) (18 - 18)  SpO2: 99% (09-27-23 @ 10:29) (98% - 100%)  Wt(kg): --  I&O's Summary      LABS:                        9.5    3.04  )-----------( 104      ( 27 Sep 2023 07:00 )             27.3     09-27    140  |  107  |  <2<L>  ----------------------------<  96  4.1   |  26  |  0.55    Ca    7.7<L>      27 Sep 2023 07:00  Phos  2.6     09-27  Mg     2.10     09-27    TPro  x   /  Alb  2.4<L>  /  TBili  x   /  DBili  x   /  AST  x   /  ALT  x   /  AlkPhos  x   09-27      Urinalysis Basic - ( 27 Sep 2023 07:00 )    Color: x / Appearance: x / SG: x / pH: x  Gluc: 96 mg/dL / Ketone: x  / Bili: x / Urobili: x   Blood: x / Protein: x / Nitrite: x   Leuk Esterase: x / RBC: x / WBC x   Sq Epi: x / Non Sq Epi: x / Bacteria: x      CAPILLARY BLOOD GLUCOSE      POCT Blood Glucose.: 77 mg/dL (27 Sep 2023 12:29)  POCT Blood Glucose.: 86 mg/dL (27 Sep 2023 05:18)  POCT Blood Glucose.: 94 mg/dL (27 Sep 2023 00:09)  POCT Blood Glucose.: 95 mg/dL (26 Sep 2023 17:49)        Urinalysis Basic - ( 27 Sep 2023 07:00 )    Color: x / Appearance: x / SG: x / pH: x  Gluc: 96 mg/dL / Ketone: x  / Bili: x / Urobili: x   Blood: x / Protein: x / Nitrite: x   Leuk Esterase: x / RBC: x / WBC x   Sq Epi: x / Non Sq Epi: x / Bacteria: x        MEDICATIONS  (STANDING):  amLODIPine   Tablet 10 milliGRAM(s) Oral daily  chlorhexidine 2% Cloths 1 Application(s) Topical daily  collagenase Ointment 1 Application(s) Topical daily  dextrose 50% Injectable 12.5 Gram(s) IV Push once  dextrose 50% Injectable 25 Gram(s) IV Push once  dextrose 50% Injectable 25 Gram(s) IV Push once  dextrose 50% Injectable 25 Gram(s) IV Push once  dextrose Oral Gel 15 Gram(s) Oral once  enoxaparin Injectable 65 milliGRAM(s) SubCutaneous every 12 hours  glucagon  Injectable 1 milliGRAM(s) IntraMuscular once  levETIRAcetam  IVPB 500 milliGRAM(s) IV Intermittent every 12 hours  levothyroxine Injectable 70 MICROGram(s) IV Push <User Schedule>  multivitamin 1 Tablet(s) Oral daily  QUEtiapine 75 milliGRAM(s) Oral at bedtime  thiamine 100 milliGRAM(s) Oral daily  valproate sodium  IVPB 500 milliGRAM(s) IV Intermittent every 6 hours    MEDICATIONS  (PRN):          PHYSICAL EXAM:  GENERAL: frail  CHEST/LUNG: Clear to percussion bilaterally; No rales, rhonchi, wheezing, or rubs  HEART: Regular rate and rhythm; No murmurs, rubs, or gallops  ABDOMEN: Soft, Nontender, Nondistended; Bowel sounds present  EXTREMITIES:  edema +    Care Discussed with Consultants/Other Providers [x ] YES  [ ] NO

## 2023-09-27 NOTE — PROGRESS NOTE ADULT - ASSESSMENT
73-year-old male history of dementia, baseline mental status AxOx0, seizure disorder on Keppra and divalproex, history of DVT/PE off Eliquis, hypertension, hypothyroidism presenting sent in from his nursing home for evaluation of failure to thrive, found to have hypernatremia and new renal failure on outside labs.    Hypernatremia:  Due to dehydration, poor intake.  poor po intake on ivf  na improving   upper extremities swollen likely sec to low albumin  albumin low getting iv albumin-PRN, s/p lasix 20 x1 9/22 9/23  encourage po intake  hold ivf today   - Trend BMP daily  poor prognosis, continue GOC per team    JESSICA:  Due to Pre-Renal azotemia.  improved  monitor bmp       hypophos  Better  supplement as needed  monitor    hypokalemia  supplement as needed   monitor    hypophos  supplemented by team  monitor    hypocalcemia  sec to low albumin   albumin infusionx6 were given  Monitor    htn  bp optimal  monitor 73-year-old male history of dementia, baseline mental status AxOx0, seizure disorder on Keppra and divalproex, history of DVT/PE off Eliquis, hypertension, hypothyroidism presenting sent in from his nursing home for evaluation of failure to thrive, found to have hypernatremia and new renal failure on outside labs.    Hypernatremia:  Due to dehydration, poor intake.  poor po intake on ivf  na improving   upper extremities swollen likely sec to low albumin  albumin low getting iv albumin-PRN, s/p lasix 20 x1 9/22 9/23  encourage po intake  hold ivf today   - Trend BMP daily  poor prognosis, continue GOC per team    JESSICA:  Due to Pre-Renal azotemia.  improved  monitor bmp       hypophos  supplement as needed  monitor    hypokalemia  supplement as needed   monitor    hypophos  supplemented by team  monitor    hypocalcemia  sec to low albumin   albumin infusionx6 were given  Monitor    htn  bp optimal  monitor

## 2023-09-27 NOTE — PROGRESS NOTE ADULT - SUBJECTIVE AND OBJECTIVE BOX
Subjective: Patient seen and examined. No new events except as noted.     SUBJECTIVE/ROS:        MEDICATIONS:  MEDICATIONS  (STANDING):  amLODIPine   Tablet 10 milliGRAM(s) Oral daily  chlorhexidine 2% Cloths 1 Application(s) Topical daily  collagenase Ointment 1 Application(s) Topical daily  dextrose 5% + sodium chloride 0.9%. 1000 milliLiter(s) (75 mL/Hr) IV Continuous <Continuous>  dextrose 50% Injectable 25 Gram(s) IV Push once  dextrose 50% Injectable 25 Gram(s) IV Push once  dextrose 50% Injectable 25 Gram(s) IV Push once  dextrose 50% Injectable 12.5 Gram(s) IV Push once  dextrose Oral Gel 15 Gram(s) Oral once  enoxaparin Injectable 65 milliGRAM(s) SubCutaneous every 12 hours  glucagon  Injectable 1 milliGRAM(s) IntraMuscular once  levETIRAcetam  IVPB 500 milliGRAM(s) IV Intermittent every 12 hours  levothyroxine Injectable 70 MICROGram(s) IV Push <User Schedule>  multivitamin 1 Tablet(s) Oral daily  QUEtiapine 75 milliGRAM(s) Oral at bedtime  thiamine 100 milliGRAM(s) Oral daily  valproate sodium  IVPB 500 milliGRAM(s) IV Intermittent every 6 hours      PHYSICAL EXAM:  T(C): 36.6 (09-27-23 @ 05:19), Max: 36.6 (09-26-23 @ 16:39)  HR: 85 (09-27-23 @ 05:19) (83 - 95)  BP: 142/79 (09-27-23 @ 05:19) (134/77 - 142/79)  RR: 18 (09-27-23 @ 05:19) (18 - 18)  SpO2: 100% (09-27-23 @ 05:19) (98% - 100%)  Wt(kg): --  I&O's Summary          JVP: Normal  Neck: supple  Lung: clear   CV: S1 S2 , Murmur:  Abd: soft  Ext: pos edema  neuro: Awake  Psych: flat affect  Skin: normal``    LABS/DATA:    CARDIAC MARKERS:                                9.5    3.04  )-----------( 104      ( 27 Sep 2023 07:00 )             27.3     09-27    140  |  107  |  <2<L>  ----------------------------<  96  4.1   |  26  |  0.55    Ca    7.7<L>      27 Sep 2023 07:00  Phos  2.6     09-27  Mg     2.10     09-27    TPro  x   /  Alb  2.4<L>  /  TBili  x   /  DBili  x   /  AST  x   /  ALT  x   /  AlkPhos  x   09-27    proBNP:   Lipid Profile:   HgA1c:   TSH:     TELE:  EKG:

## 2023-09-27 NOTE — PROGRESS NOTE ADULT - SUBJECTIVE AND OBJECTIVE BOX
[facility] [place] [facility]   MD ARI MILLS MD ANGELA WONG, PA    TEL:  OFFICE: [phone]  From 5pm-7am Answering Service [phone]    -- RENAL FOLLOW UP NOTE ---Date of Service 09-27-23 @ 11:19    Patient is a 73y old  Male who presents with a chief complaint of FTT (27 Sep 2023 08:50)      Patient seen and examined at bedside.     VITALS:  T(F): 97.8 (09-27-23 @ 05:19), Max: 97.9 (09-26-23 @ 16:39)  HR: 85 (09-27-23 @ 05:19)  BP: 142/79 (09-27-23 @ 05:19)  RR: 18 (09-27-23 @ 05:19)  SpO2: 100% (09-27-23 @ 05:19)  Wt(kg): --        PHYSICAL EXAM:  General: NAD  Neck: No JVD  Respiratory: CTAB, no wheezes, rales or rhonchi  Cardiovascular: S1, S2, RRR  Gastrointestinal: BS+, soft, NT/ND  Extremities: +UE edema    Hospital Medications:   MEDICATIONS  (STANDING):  amLODIPine   Tablet 10 milliGRAM(s) Oral daily  chlorhexidine 2% Cloths 1 Application(s) Topical daily  collagenase Ointment 1 Application(s) Topical daily  dextrose 50% Injectable 12.5 Gram(s) IV Push once  dextrose 50% Injectable 25 Gram(s) IV Push once  dextrose 50% Injectable 25 Gram(s) IV Push once  dextrose 50% Injectable 25 Gram(s) IV Push once  dextrose Oral Gel 15 Gram(s) Oral once  enoxaparin Injectable 65 milliGRAM(s) SubCutaneous every 12 hours  glucagon  Injectable 1 milliGRAM(s) IntraMuscular once  levETIRAcetam  IVPB 500 milliGRAM(s) IV Intermittent every 12 hours  levothyroxine Injectable 70 MICROGram(s) IV Push <User Schedule>  multivitamin 1 Tablet(s) Oral daily  QUEtiapine 75 milliGRAM(s) Oral at bedtime  thiamine 100 milliGRAM(s) Oral daily  valproate sodium  IVPB 500 milliGRAM(s) IV Intermittent every 6 hours      LABS:  09-27    140  |  107  |  <2<L>  ----------------------------<  96  4.1   |  26  |  0.55    Ca    7.7<L>      27 Sep 2023 07:00  Phos  2.6     09-27  Mg     2.10     09-27    TPro      /  Alb  2.4<L>  /  TBili      /  DBili      /  AST      /  ALT      /  AlkPhos      09-27    Creatinine Trend: 0.55 <--, 0.60 <--, 0.60 <--, 0.60 <--, 0.58 <--, 0.60 <--, 0.59 <--, 0.63 <--    Albumin: 2.4 g/dL (09-27 @ 07:00)  Phosphorus: 2.6 mg/dL (09-27 @ 07:00)                              9.5    3.04  )-----------( 104      ( 27 Sep 2023 07:00 )             27.3     Urine Studies:  Urinalysis - [09-27-23 @ 07:00]      Color  / Appearance  / SG  / pH       Gluc 96 / Ketone   / Bili  / Urobili        Blood  / Protein  / Leuk Est  / Nitrite       RBC  / WBC  / Hyaline  / Gran  / Sq Epi  / Non Sq Epi  / Bacteria       TSH 3.29      [08-07-23 @ 06:09]  Lipid: chol 117, TG 82, HDL 48, LDL --      [02-09-23 @ 06:40]        RADIOLOGY & ADDITIONAL STUDIES:

## 2023-09-27 NOTE — CHART NOTE - NSCHARTNOTEFT_GEN_A_CORE
Notified by RN that patient's blood glucose was 77 and poor po intake. Discussed with Dr. Forman, will restart D5NS IVF.

## 2023-09-28 NOTE — PROGRESS NOTE ADULT - SUBJECTIVE AND OBJECTIVE BOX
Subjective: Patient seen and examined. No new events except as noted.     SUBJECTIVE/ROS:    MEDICATIONS:  MEDICATIONS  (STANDING):  amLODIPine   Tablet 10 milliGRAM(s) Oral daily  chlorhexidine 2% Cloths 1 Application(s) Topical daily  collagenase Ointment 1 Application(s) Topical daily  dextrose 5% + sodium chloride 0.9%. 1000 milliLiter(s) (75 mL/Hr) IV Continuous <Continuous>  dextrose 50% Injectable 25 Gram(s) IV Push once  dextrose 50% Injectable 25 Gram(s) IV Push once  dextrose 50% Injectable 25 Gram(s) IV Push once  dextrose 50% Injectable 12.5 Gram(s) IV Push once  dextrose Oral Gel 15 Gram(s) Oral once  enoxaparin Injectable 65 milliGRAM(s) SubCutaneous every 12 hours  glucagon  Injectable 1 milliGRAM(s) IntraMuscular once  levETIRAcetam  IVPB 500 milliGRAM(s) IV Intermittent every 12 hours  levothyroxine Injectable 70 MICROGram(s) IV Push <User Schedule>  multivitamin 1 Tablet(s) Oral daily  QUEtiapine 75 milliGRAM(s) Oral at bedtime  thiamine 100 milliGRAM(s) Oral daily  valproate sodium  IVPB 500 milliGRAM(s) IV Intermittent every 6 hours      PHYSICAL EXAM:  T(C): 36.7 (09-28-23 @ 04:55), Max: 36.8 (09-27-23 @ 20:08)  HR: 98 (09-28-23 @ 04:55) (80 - 99)  BP: 166/97 (09-28-23 @ 04:55) (144/86 - 177/101)  RR: 18 (09-28-23 @ 04:55) (18 - 18)  SpO2: 100% (09-28-23 @ 04:55) (99% - 100%)  Wt(kg): --  I&O's Summary    27 Sep 2023 07:01  -  28 Sep 2023 07:00  --------------------------------------------------------  IN: 100 mL / OUT: 0 mL / NET: 100 mL            JVP: Normal  Neck: supple  Lung: clear   CV: S1 S2 , Murmur:  Abd: soft  Psych: flat affect  Skin: normal``    LABS/DATA:    CARDIAC MARKERS:                                9.5    3.04  )-----------( 104      ( 27 Sep 2023 07:00 )             27.3     09-27    140  |  107  |  <2<L>  ----------------------------<  96  4.1   |  26  |  0.55    Ca    7.7<L>      27 Sep 2023 07:00  Phos  2.6     09-27  Mg     2.10     09-27    TPro  x   /  Alb  2.4<L>  /  TBili  x   /  DBili  x   /  AST  x   /  ALT  x   /  AlkPhos  x   09-27    proBNP:   Lipid Profile:   HgA1c:   TSH:     TELE:  EKG:

## 2023-09-28 NOTE — CHART NOTE - NSCHARTNOTEFT_GEN_A_CORE
Called pt's spouse, Leena Dobbins,  and discussed with her pt's poor PO intake and general health status. Per pt's wife, she is absolutely against a PEG tube at this time. Mrs. Dobbins also shared that she has had discussions with her children ( who reside out of country) regarding making the patient a DNR/ DNI as they do not want the patient to suffer. Pt states she has spoken to her daughter who is pro DNR/ DNI and is waiting to speak with her son. Mrs. Dobbins did inform that she will be visiting the patient later on this afternoon. Will attempt to further discuss GOC with pt's spouse when she is here.

## 2023-09-28 NOTE — PROGRESS NOTE ADULT - ASSESSMENT
73-year-old male history of dementia, baseline mental status AxOx0, seizure disorder on Keppra and divalproex, history of DVT/PE off Eliquis, hypertension, hypothyroidism presenting sent in from his nursing home for evaluation of failure to thrive, found to have hypernatremia and new renal failure on outside labs.  Per documentation that arrived with the patient, patient has been refusing to eat since yesterday.    1 Hypernatremia  - resolved   - renal fu   - cw current diet     2 Seizure disorder-   cw Keppra and valproic acid     3 hx of  DVT/PE  - cw AC    4 Dementia  - fall precautions  - frequent reorientation     5 Dysphagia  - diet as per speech and swallow  - poor po intake  - Will call GI for ? PEG consult     prognosis guarded   family unrealistic about GOC  called wife who said she is in the bus and cannot talk

## 2023-09-28 NOTE — PROGRESS NOTE ADULT - SUBJECTIVE AND OBJECTIVE BOX
Patient is a 73y old  Male who presents with a chief complaint of FTT (28 Sep 2023 10:52)    Date of servie : 09-28-23 @ 11:32  INTERVAL HPI/OVERNIGHT EVENTS:  T(C): 36.8 (09-28-23 @ 09:00), Max: 36.8 (09-27-23 @ 20:08)  HR: 93 (09-28-23 @ 09:00) (80 - 99)  BP: 120/60 (09-28-23 @ 09:00) (120/60 - 177/101)  RR: 18 (09-28-23 @ 09:00) (18 - 18)  SpO2: 99% (09-28-23 @ 09:00) (99% - 100%)  Wt(kg): --  I&O's Summary    27 Sep 2023 07:01  -  28 Sep 2023 07:00  --------------------------------------------------------  IN: 100 mL / OUT: 0 mL / NET: 100 mL        LABS:                        9.5    3.04  )-----------( 104      ( 27 Sep 2023 07:00 )             27.3     09-27    140  |  107  |  <2<L>  ----------------------------<  96  4.1   |  26  |  0.55    Ca    7.7<L>      27 Sep 2023 07:00  Phos  2.6     09-27  Mg     2.10     09-27    TPro  x   /  Alb  2.4<L>  /  TBili  x   /  DBili  x   /  AST  x   /  ALT  x   /  AlkPhos  x   09-27      Urinalysis Basic - ( 27 Sep 2023 07:00 )    Color: x / Appearance: x / SG: x / pH: x  Gluc: 96 mg/dL / Ketone: x  / Bili: x / Urobili: x   Blood: x / Protein: x / Nitrite: x   Leuk Esterase: x / RBC: x / WBC x   Sq Epi: x / Non Sq Epi: x / Bacteria: x      CAPILLARY BLOOD GLUCOSE      POCT Blood Glucose.: 89 mg/dL (28 Sep 2023 06:00)  POCT Blood Glucose.: 119 mg/dL (27 Sep 2023 23:50)  POCT Blood Glucose.: 111 mg/dL (27 Sep 2023 17:53)  POCT Blood Glucose.: 107 mg/dL (27 Sep 2023 15:46)  POCT Blood Glucose.: 77 mg/dL (27 Sep 2023 12:29)        Urinalysis Basic - ( 27 Sep 2023 07:00 )    Color: x / Appearance: x / SG: x / pH: x  Gluc: 96 mg/dL / Ketone: x  / Bili: x / Urobili: x   Blood: x / Protein: x / Nitrite: x   Leuk Esterase: x / RBC: x / WBC x   Sq Epi: x / Non Sq Epi: x / Bacteria: x        MEDICATIONS  (STANDING):  amLODIPine   Tablet 10 milliGRAM(s) Oral daily  chlorhexidine 2% Cloths 1 Application(s) Topical daily  collagenase Ointment 1 Application(s) Topical daily  dextrose 10%. 1000 milliLiter(s) (30 mL/Hr) IV Continuous <Continuous>  dextrose 50% Injectable 25 Gram(s) IV Push once  dextrose 50% Injectable 25 Gram(s) IV Push once  dextrose 50% Injectable 12.5 Gram(s) IV Push once  dextrose 50% Injectable 25 Gram(s) IV Push once  dextrose Oral Gel 15 Gram(s) Oral once  enoxaparin Injectable 65 milliGRAM(s) SubCutaneous every 12 hours  glucagon  Injectable 1 milliGRAM(s) IntraMuscular once  levETIRAcetam  IVPB 500 milliGRAM(s) IV Intermittent every 12 hours  levothyroxine Injectable 70 MICROGram(s) IV Push <User Schedule>  multivitamin 1 Tablet(s) Oral daily  mupirocin 2% Ointment 1 Application(s) Topical two times a day  QUEtiapine 75 milliGRAM(s) Oral at bedtime  thiamine 100 milliGRAM(s) Oral daily  valproate sodium  IVPB 500 milliGRAM(s) IV Intermittent every 6 hours    MEDICATIONS  (PRN):          PHYSICAL EXAM:  GENERAL: frail  HEAD:  Atraumatic, Normocephalic  CHEST/LUNG: coarse breath sounds present   HEART: s1s2+  ABDOMEN: Soft, Nontender, Nondistended; Bowel sounds present  EXTREMITIES:  edema +    Care Discussed with Consultants/Other Providers [ ] YES  [ ] NO

## 2023-09-28 NOTE — PROGRESS NOTE ADULT - SUBJECTIVE AND OBJECTIVE BOX
[facility] [place] [facility]   MD ARI MILLS MD ANGELA WONG, PA    TEL:  OFFICE: [phone]  From 5pm-7am Answering Service [phone]    -- RENAL FOLLOW UP NOTE ---Date of Service 09-28-23 @ 10:53    Patient is a 73y old  Male who presents with a chief complaint of FTT (28 Sep 2023 08:26)      Patient seen and examined at bedside.     VITALS:  T(F): 98.3 (09-28-23 @ 09:00), Max: 98.3 (09-27-23 @ 20:08)  HR: 93 (09-28-23 @ 09:00)  BP: 120/60 (09-28-23 @ 09:00)  RR: 18 (09-28-23 @ 09:00)  SpO2: 99% (09-28-23 @ 09:00)  Wt(kg): --    09-27 @ 07:01  -  09-28 @ 07:00  --------------------------------------------------------  IN: 100 mL / OUT: 0 mL / NET: 100 mL          PHYSICAL EXAM:  General: NAD  Neck: No JVD  Respiratory: CTAB, no wheezes, rales or rhonchi  Cardiovascular: S1, S2, RRR  Gastrointestinal: BS+, soft, NT/ND  Extremities: UE edema    Hospital Medications:   MEDICATIONS  (STANDING):  amLODIPine   Tablet 10 milliGRAM(s) Oral daily  chlorhexidine 2% Cloths 1 Application(s) Topical daily  collagenase Ointment 1 Application(s) Topical daily  dextrose 10%. 1000 milliLiter(s) (30 mL/Hr) IV Continuous <Continuous>  dextrose 50% Injectable 25 Gram(s) IV Push once  dextrose 50% Injectable 12.5 Gram(s) IV Push once  dextrose 50% Injectable 25 Gram(s) IV Push once  dextrose 50% Injectable 25 Gram(s) IV Push once  dextrose Oral Gel 15 Gram(s) Oral once  enoxaparin Injectable 65 milliGRAM(s) SubCutaneous every 12 hours  glucagon  Injectable 1 milliGRAM(s) IntraMuscular once  levETIRAcetam  IVPB 500 milliGRAM(s) IV Intermittent every 12 hours  levothyroxine Injectable 70 MICROGram(s) IV Push <User Schedule>  multivitamin 1 Tablet(s) Oral daily  mupirocin 2% Ointment 1 Application(s) Topical two times a day  QUEtiapine 75 milliGRAM(s) Oral at bedtime  thiamine 100 milliGRAM(s) Oral daily  valproate sodium  IVPB 500 milliGRAM(s) IV Intermittent every 6 hours      LABS:  09-27    140  |  107  |  <2<L>  ----------------------------<  96  4.1   |  26  |  0.55    Ca    7.7<L>      27 Sep 2023 07:00  Phos  2.6     09-27  Mg     2.10     09-27    TPro      /  Alb  2.4<L>  /  TBili      /  DBili      /  AST      /  ALT      /  AlkPhos      09-27    Creatinine Trend: 0.55 <--, 0.60 <--, 0.60 <--, 0.60 <--, 0.58 <--, 0.60 <--, 0.59 <--                                9.5    3.04  )-----------( 104      ( 27 Sep 2023 07:00 )             27.3     Urine Studies:  Urinalysis - [09-27-23 @ 07:00]      Color  / Appearance  / SG  / pH       Gluc 96 / Ketone   / Bili  / Urobili        Blood  / Protein  / Leuk Est  / Nitrite       RBC  / WBC  / Hyaline  / Gran  / Sq Epi  / Non Sq Epi  / Bacteria       TSH 3.29      [08-07-23 @ 06:09]  Lipid: chol 117, TG 82, HDL 48, LDL --      [02-09-23 @ 06:40]        RADIOLOGY & ADDITIONAL STUDIES:

## 2023-09-28 NOTE — PROGRESS NOTE ADULT - ASSESSMENT
73-year-old male history of dementia, baseline mental status AxOx0, seizure disorder on Keppra and divalproex, history of DVT/PE off Eliquis, hypertension, hypothyroidism presenting sent in from his nursing home for evaluation of failure to thrive, found to have hypernatremia and new renal failure on outside labs.    Hypernatremia:  Due to dehydration, poor intake.  poor po intake on ivf  na improving   upper extremities swollen likely sec to low albumin  albumin low getting iv albumin-PRN, s/p lasix 20 x1 9/22 9/23  pt still having poor po intake with persist hypoglycemia. on d5+ns. worsen edema this am. dc ivf  start d10 @ 30cc/hr. ordered lasix 20 iv x1  encourage po intake  - Trend BMP daily  poor prognosis, continue GOC per team    JESSICA:  Due to Pre-Renal azotemia.  improved  monitor bmp       hypophos  supplement as needed  monitor    hypokalemia  supplement as needed   monitor    hypocalcemia  sec to low albumin   albumin infusionx6 were given  Monitor    htn  bp optimal  monitor

## 2023-09-29 NOTE — PROGRESS NOTE ADULT - ASSESSMENT
73-year-old male history of dementia, baseline mental status AxOx0, seizure disorder on Keppra and divalproex, history of DVT/PE off Eliquis, hypertension, hypothyroidism presenting sent in from his nursing home for evaluation of failure to thrive, found to have hypernatremia and new renal failure on outside labs.    Hypernatremia:  Due to dehydration, poor intake.  poor po intake on ivf  na improving   upper extremities swollen likely sec to low albumin  albumin low getting iv albumin-PRN, s/p lasix 20 x1 9/22 9/23  pt still having poor po intake with persist hypoglycemia. increase d10@ 50cc/hr  given lasix 20x1 9/28 edema persists but better will give another dose of lasix. bp ok  encourage po intake  - Trend BMP daily  poor prognosis, continue GOC per team    JESSICA:  Due to Pre-Renal azotemia.  improved  monitor bmp       hypophos  supplement as needed  monitor    hypokalemia  supplement as needed   monitor    hypocalcemia  sec to low albumin   albumin infusionx6 were given  Monitor    htn  bp optimal  monitor

## 2023-09-29 NOTE — PROGRESS NOTE ADULT - SUBJECTIVE AND OBJECTIVE BOX
[facility] [place] [facility]   MD ARI IMLLS MD ANGELA WONG, PA    TEL:  OFFICE: [phone]  From 5pm-7am Answering Service [phone]    -- RENAL FOLLOW UP NOTE ---Date of Service 09-29-23 @ 10:03    Patient is a 73y old  Male who presents with a chief complaint of FTT (29 Sep 2023 07:59)      Patient seen and examined at bedside.     VITALS:  T(F): 97.6 (09-28-23 @ 21:17), Max: 98.2 (09-28-23 @ 13:32)  HR: 88 (09-28-23 @ 21:17)  BP: 142/90 (09-28-23 @ 21:17)  RR: 18 (09-28-23 @ 21:17)  SpO2: 100% (09-28-23 @ 21:17)  Wt(kg): --        PHYSICAL EXAM:  General: lethargic but opens eyes when called  Neck: No JVD  Respiratory: CTAB, no wheezes, rales or rhonchi  Cardiovascular: S1, S2, RRR  Gastrointestinal: BS+, soft, NT/ND  Extremities: +peripheral edema UE    Hospital Medications:   MEDICATIONS  (STANDING):  amLODIPine   Tablet 10 milliGRAM(s) Oral daily  chlorhexidine 2% Cloths 1 Application(s) Topical daily  collagenase Ointment 1 Application(s) Topical daily  dextrose 10%. 1000 milliLiter(s) (50 mL/Hr) IV Continuous <Continuous>  dextrose 50% Injectable 12.5 Gram(s) IV Push once  dextrose 50% Injectable 25 Gram(s) IV Push once  dextrose 50% Injectable 25 Gram(s) IV Push once  dextrose 50% Injectable 25 Gram(s) IV Push once  dextrose Oral Gel 15 Gram(s) Oral once  enoxaparin Injectable 65 milliGRAM(s) SubCutaneous every 12 hours  glucagon  Injectable 1 milliGRAM(s) IntraMuscular once  levETIRAcetam  IVPB 500 milliGRAM(s) IV Intermittent every 12 hours  levothyroxine Injectable 70 MICROGram(s) IV Push <User Schedule>  multivitamin 1 Tablet(s) Oral daily  mupirocin 2% Ointment 1 Application(s) Topical two times a day  QUEtiapine 75 milliGRAM(s) Oral at bedtime  thiamine 100 milliGRAM(s) Oral daily  valproate sodium  IVPB 500 milliGRAM(s) IV Intermittent every 6 hours      LABS:  09-29    139  |  104  |  2<L>  ----------------------------<  70  4.0   |  26  |  0.59    Ca    8.0<L>      29 Sep 2023 06:38  Phos  2.4     09-29  Mg     1.70     09-29      Creatinine Trend: 0.59 <--, 0.62 <--, 0.55 <--, 0.60 <--, 0.60 <--, 0.60 <--, 0.58 <--, 0.60 <--, 0.59 <--    Phosphorus: 2.4 mg/dL (09-29 @ 06:38)                              10.7   3.45  )-----------( Clumped    ( 29 Sep 2023 06:38 )             31.4     Urine Studies:  Urinalysis - [09-29-23 @ 06:38]      Color  / Appearance  / SG  / pH       Gluc 70 / Ketone   / Bili  / Urobili        Blood  / Protein  / Leuk Est  / Nitrite       RBC  / WBC  / Hyaline  / Gran  / Sq Epi  / Non Sq Epi  / Bacteria       TSH 3.29      [08-07-23 @ 06:09]  Lipid: chol 117, TG 82, HDL 48, LDL --      [02-09-23 @ 06:40]        RADIOLOGY & ADDITIONAL STUDIES:

## 2023-09-29 NOTE — PROGRESS NOTE ADULT - SUBJECTIVE AND OBJECTIVE BOX
Subjective: Patient seen and examined. No new events except as noted.     SUBJECTIVE/ROS:  ros limited      MEDICATIONS:  MEDICATIONS  (STANDING):  amLODIPine   Tablet 10 milliGRAM(s) Oral daily  chlorhexidine 2% Cloths 1 Application(s) Topical daily  collagenase Ointment 1 Application(s) Topical daily  dextrose 10%. 1000 milliLiter(s) (50 mL/Hr) IV Continuous <Continuous>  dextrose 50% Injectable 25 Gram(s) IV Push once  dextrose 50% Injectable 12.5 Gram(s) IV Push once  dextrose 50% Injectable 25 Gram(s) IV Push once  dextrose 50% Injectable 25 Gram(s) IV Push once  dextrose Oral Gel 15 Gram(s) Oral once  enoxaparin Injectable 65 milliGRAM(s) SubCutaneous every 12 hours  glucagon  Injectable 1 milliGRAM(s) IntraMuscular once  levETIRAcetam  IVPB 500 milliGRAM(s) IV Intermittent every 12 hours  levothyroxine Injectable 70 MICROGram(s) IV Push <User Schedule>  multivitamin 1 Tablet(s) Oral daily  mupirocin 2% Ointment 1 Application(s) Topical two times a day  QUEtiapine 75 milliGRAM(s) Oral at bedtime  thiamine 100 milliGRAM(s) Oral daily  valproate sodium  IVPB 500 milliGRAM(s) IV Intermittent every 6 hours      PHYSICAL EXAM:  T(C): 36.4 (09-28-23 @ 21:17), Max: 36.8 (09-28-23 @ 09:00)  HR: 88 (09-28-23 @ 21:17) (80 - 99)  BP: 142/90 (09-28-23 @ 21:17) (120/60 - 143/84)  RR: 18 (09-28-23 @ 21:17) (18 - 18)  SpO2: 100% (09-28-23 @ 21:17) (97% - 100%)  Wt(kg): --  I&O's Summary          JVP: Normal  Neck: supple  Lung: clear   CV: S1 S2 , Murmur:  Abd: soft  Ext: pos edema  Psych: flat affect      LABS/DATA:    CARDIAC MARKERS:            09-29    139  |  104  |  2<L>  ----------------------------<  70  4.0   |  26  |  0.59    Ca    8.0<L>      29 Sep 2023 06:38  Phos  2.4     09-29  Mg     1.70     09-29      proBNP:   Lipid Profile:   HgA1c:   TSH:     TELE:  EKG:

## 2023-09-29 NOTE — PROGRESS NOTE ADULT - ASSESSMENT
73-year-old male history of dementia, baseline mental status AxOx0, seizure disorder on Keppra and divalproex, history of DVT/PE off Eliquis, hypertension, hypothyroidism presenting sent in from his nursing home for evaluation of failure to thrive, found to have hypernatremia and new renal failure on outside labs.  Per documentation that arrived with the patient, patient has been refusing to eat since yesterday.    1 Hypernatremia  - resolved   - renal fu   - cw current diet     2 Seizure disorder-   cw Keppra and valproic acid     3 hx of  DVT/PE  - cw AC    4 Dementia  - fall precautions  - frequent reorientation     5 Dysphagia  - diet as per speech and swallow  - poor po intake  - family refused PEG    Palliative reconsult

## 2023-09-30 NOTE — PROGRESS NOTE ADULT - SUBJECTIVE AND OBJECTIVE BOX
PEPITO BALL  73y  Patient is a 73y old  Male who presents with a chief complaint of FTT (30 Sep 2023 11:35)    HPI:  Followed for Hypernatremia.    HEALTH ISSUES - PROBLEM Dx:  Hypernatremia    JESSICA (acute kidney injury)    Dysphagia    Other encephalopathy    Functional quadriplegia    Counseling regarding advance directives and goals of care    Encounter for palliative care    Hypoglycemia    Adult failure to thrive    Dementia          MEDICATIONS  (STANDING):  amLODIPine   Tablet 10 milliGRAM(s) Oral daily  chlorhexidine 2% Cloths 1 Application(s) Topical daily  collagenase Ointment 1 Application(s) Topical daily  dextrose 10%. 1000 milliLiter(s) (50 mL/Hr) IV Continuous <Continuous>  dextrose 50% Injectable 12.5 Gram(s) IV Push once  dextrose 50% Injectable 25 Gram(s) IV Push once  dextrose 50% Injectable 25 Gram(s) IV Push once  dextrose 50% Injectable 25 Gram(s) IV Push once  dextrose Oral Gel 15 Gram(s) Oral once  enoxaparin Injectable 65 milliGRAM(s) SubCutaneous every 12 hours  furosemide   Injectable 20 milliGRAM(s) IV Push once  glucagon  Injectable 1 milliGRAM(s) IntraMuscular once  levETIRAcetam  IVPB 500 milliGRAM(s) IV Intermittent every 12 hours  levothyroxine Injectable 70 MICROGram(s) IV Push <User Schedule>  multivitamin 1 Tablet(s) Oral daily  mupirocin 2% Ointment 1 Application(s) Topical two times a day  potassium phosphate IVPB 15 milliMole(s) IV Intermittent once  QUEtiapine 75 milliGRAM(s) Oral at bedtime  thiamine 100 milliGRAM(s) Oral daily  valproate sodium  IVPB 500 milliGRAM(s) IV Intermittent every 6 hours    MEDICATIONS  (PRN):    Vital Signs Last 24 Hrs  T(C): 37 (30 Sep 2023 22:05), Max: 37.1 (30 Sep 2023 12:45)  T(F): 98.6 (30 Sep 2023 22:05), Max: 98.8 (30 Sep 2023 12:45)  HR: 89 (30 Sep 2023 22:05) (85 - 100)  BP: 147/74 (30 Sep 2023 22:05) (129/69 - 150/95)  BP(mean): --  RR: 18 (30 Sep 2023 22:05) (18 - 19)  SpO2: 99% (30 Sep 2023 22:05) (96% - 100%)    Parameters below as of 30 Sep 2023 22:05  Patient On (Oxygen Delivery Method): room air      Daily     Daily     PHYSICAL EXAM:  Constitutional: He  appears comfortable and not distressed. Not diaphoretic.    Neck:  The thyroid is normal. Trachea is midline.     Breasts: Normal examination.    Respiratory: The lungs are clear to auscultation. No dullness and expansion is normal.    Cardiovascular: S1 and S2 are normal. No mummurs, rubs or gallops are present.    Gastrointestinal: The abdomen is soft. No tenderness is present. No masses are present. Bowel sounds are normal.    Genitourinary: The bladder is not distended. No CVA tenderness is present.    Extremities: No edema is noted. No deformities are present.    Neurological: Tone, power and sensation are normal.     Skin: No leasions are seen  or palpated.    Lymph Nodes: No lymphadenopathy is present.    Psychiatric: Mood is appropriate. No hallucinations or flight of ideas are noted.                              10.7   3.45  )-----------( Clumped    ( 29 Sep 2023 06:38 )             31.4     09-29    139  |  104  |  2<L>  ----------------------------<  70  4.0   |  26  |  0.59    Ca    8.0<L>      29 Sep 2023 06:38  Phos  2.4     09-29  Mg     1.70     09-29

## 2023-09-30 NOTE — PROGRESS NOTE ADULT - ASSESSMENT
73-year-old male history of dementia, baseline mental status AxOx0, seizure disorder on Keppra and divalproex, history of DVT/PE off Eliquis, hypertension, hypothyroidism presenting sent in from his nursing home for evaluation of failure to thrive, found to have hypernatremia and new renal failure on outside labs.    Hypernatremia:  Due to dehydration, poor intake.  He has poor oral intake, now on IVF.  Na is improving now.  upper extremities swollen likely sec to low albumin  encourage po intake  - Trend BMP daily  - poor prognosis, continue GOC as per medical team    JESSICA:  Due to Pre-Renal azotemia.  improved  monitor bmp       Hypophosphatemia:  supplement as needed  monitor    Hypokalemia  supplement as needed   monitor    Hypocalcemia  Due to low albumin   Albumin infusion x6 were given      Hypertension:  bp optimal  monitor Bilobed Transposition Flap Text: The defect edges were debeveled with a #15 scalpel blade.  Given the location of the defect and the proximity to free margins a bilobed transposition flap was deemed most appropriate.  Using a sterile surgical marker, an appropriate bilobe flap drawn around the defect.    The area thus outlined was incised deep to adipose tissue with a #15 scalpel blade.  The skin margins were undermined to an appropriate distance in all directions utilizing iris scissors.

## 2023-09-30 NOTE — PROGRESS NOTE ADULT - SUBJECTIVE AND OBJECTIVE BOX
Subjective: Patient seen and examined. No new events except as noted.     SUBJECTIVE/ROS:  nad      MEDICATIONS:  MEDICATIONS  (STANDING):  amLODIPine   Tablet 10 milliGRAM(s) Oral daily  chlorhexidine 2% Cloths 1 Application(s) Topical daily  collagenase Ointment 1 Application(s) Topical daily  dextrose 10%. 1000 milliLiter(s) (50 mL/Hr) IV Continuous <Continuous>  dextrose 50% Injectable 12.5 Gram(s) IV Push once  dextrose 50% Injectable 25 Gram(s) IV Push once  dextrose 50% Injectable 25 Gram(s) IV Push once  dextrose 50% Injectable 25 Gram(s) IV Push once  dextrose Oral Gel 15 Gram(s) Oral once  enoxaparin Injectable 65 milliGRAM(s) SubCutaneous every 12 hours  furosemide   Injectable 20 milliGRAM(s) IV Push once  glucagon  Injectable 1 milliGRAM(s) IntraMuscular once  levETIRAcetam  IVPB 500 milliGRAM(s) IV Intermittent every 12 hours  levothyroxine Injectable 70 MICROGram(s) IV Push <User Schedule>  multivitamin 1 Tablet(s) Oral daily  mupirocin 2% Ointment 1 Application(s) Topical two times a day  potassium phosphate IVPB 15 milliMole(s) IV Intermittent once  QUEtiapine 75 milliGRAM(s) Oral at bedtime  thiamine 100 milliGRAM(s) Oral daily  valproate sodium  IVPB 500 milliGRAM(s) IV Intermittent every 6 hours      PHYSICAL EXAM:  T(C): 36.5 (09-29-23 @ 13:00), Max: 36.5 (09-29-23 @ 13:00)  HR: 85 (09-30-23 @ 05:49) (85 - 100)  BP: 129/69 (09-30-23 @ 05:49) (110/88 - 129/69)  RR: 18 (09-29-23 @ 13:00) (18 - 18)  SpO2: 100% (09-29-23 @ 13:00) (100% - 100%)  Wt(kg): --  I&O's Summary          JVP: Normal  Neck: supple  Lung: clear   CV: S1 S2 , Murmur:  Abd: soft  Ext: pos edema  Psych: flat affect  Skin: normal``    LABS/DATA:    CARDIAC MARKERS:                                10.7   3.45  )-----------( Clumped    ( 29 Sep 2023 06:38 )             31.4     09-29    139  |  104  |  2<L>  ----------------------------<  70  4.0   |  26  |  0.59    Ca    8.0<L>      29 Sep 2023 06:38  Phos  2.4     09-29  Mg     1.70     09-29      proBNP:   Lipid Profile:   HgA1c:   TSH:     TELE:  EKG:

## 2023-09-30 NOTE — PROGRESS NOTE ADULT - SUBJECTIVE AND OBJECTIVE BOX
Patient is a 73y old  Male who presents with a chief complaint of FTT (30 Sep 2023 07:08)    Date of servie : 09-30-23 @ 11:35  INTERVAL HPI/OVERNIGHT EVENTS:  T(C): 36.5 (09-29-23 @ 13:00), Max: 36.5 (09-29-23 @ 13:00)  HR: 85 (09-30-23 @ 05:49) (85 - 100)  BP: 129/69 (09-30-23 @ 05:49) (110/88 - 129/69)  RR: 18 (09-29-23 @ 13:00) (18 - 18)  SpO2: 100% (09-29-23 @ 13:00) (100% - 100%)  Wt(kg): --  I&O's Summary      LABS:                        10.7   3.45  )-----------( Clumped    ( 29 Sep 2023 06:38 )             31.4     09-29    139  |  104  |  2<L>  ----------------------------<  70  4.0   |  26  |  0.59    Ca    8.0<L>      29 Sep 2023 06:38  Phos  2.4     09-29  Mg     1.70     09-29        Urinalysis Basic - ( 29 Sep 2023 06:38 )    Color: x / Appearance: x / SG: x / pH: x  Gluc: 70 mg/dL / Ketone: x  / Bili: x / Urobili: x   Blood: x / Protein: x / Nitrite: x   Leuk Esterase: x / RBC: x / WBC x   Sq Epi: x / Non Sq Epi: x / Bacteria: x      CAPILLARY BLOOD GLUCOSE      POCT Blood Glucose.: 96 mg/dL (30 Sep 2023 05:21)  POCT Blood Glucose.: 86 mg/dL (30 Sep 2023 01:51)  POCT Blood Glucose.: 89 mg/dL (30 Sep 2023 00:05)  POCT Blood Glucose.: 82 mg/dL (29 Sep 2023 17:46)  POCT Blood Glucose.: 87 mg/dL (29 Sep 2023 12:37)        Urinalysis Basic - ( 29 Sep 2023 06:38 )    Color: x / Appearance: x / SG: x / pH: x  Gluc: 70 mg/dL / Ketone: x  / Bili: x / Urobili: x   Blood: x / Protein: x / Nitrite: x   Leuk Esterase: x / RBC: x / WBC x   Sq Epi: x / Non Sq Epi: x / Bacteria: x        MEDICATIONS  (STANDING):  amLODIPine   Tablet 10 milliGRAM(s) Oral daily  chlorhexidine 2% Cloths 1 Application(s) Topical daily  collagenase Ointment 1 Application(s) Topical daily  dextrose 10%. 1000 milliLiter(s) (50 mL/Hr) IV Continuous <Continuous>  dextrose 50% Injectable 25 Gram(s) IV Push once  dextrose 50% Injectable 25 Gram(s) IV Push once  dextrose 50% Injectable 12.5 Gram(s) IV Push once  dextrose 50% Injectable 25 Gram(s) IV Push once  dextrose Oral Gel 15 Gram(s) Oral once  enoxaparin Injectable 65 milliGRAM(s) SubCutaneous every 12 hours  furosemide   Injectable 20 milliGRAM(s) IV Push once  glucagon  Injectable 1 milliGRAM(s) IntraMuscular once  levETIRAcetam  IVPB 500 milliGRAM(s) IV Intermittent every 12 hours  levothyroxine Injectable 70 MICROGram(s) IV Push <User Schedule>  multivitamin 1 Tablet(s) Oral daily  mupirocin 2% Ointment 1 Application(s) Topical two times a day  potassium phosphate IVPB 15 milliMole(s) IV Intermittent once  QUEtiapine 75 milliGRAM(s) Oral at bedtime  thiamine 100 milliGRAM(s) Oral daily  valproate sodium  IVPB 500 milliGRAM(s) IV Intermittent every 6 hours    MEDICATIONS  (PRN):          PHYSICAL EXAM:  GENERAL: frail  CHEST/LUNG: Clear to percussion bilaterally; No rales, rhonchi, wheezing, or rubs  HEART: Regular rate and rhythm; No murmurs, rubs, or gallops  ABDOMEN: Soft, Nontender, Nondistended; Bowel sounds present  EXTREMITIES:  edema +    Care Discussed with Consultants/Other Providers [x ] YES  [ ] NO

## 2023-10-01 NOTE — PROGRESS NOTE ADULT - SUBJECTIVE AND OBJECTIVE BOX
Subjective: Patient seen and examined. No new events except as noted.     SUBJECTIVE/ROS:  nad      MEDICATIONS:  MEDICATIONS  (STANDING):  amLODIPine   Tablet 10 milliGRAM(s) Oral daily  chlorhexidine 2% Cloths 1 Application(s) Topical daily  collagenase Ointment 1 Application(s) Topical daily  dextrose 10%. 1000 milliLiter(s) (50 mL/Hr) IV Continuous <Continuous>  dextrose 50% Injectable 12.5 Gram(s) IV Push once  dextrose 50% Injectable 25 Gram(s) IV Push once  dextrose 50% Injectable 25 Gram(s) IV Push once  dextrose 50% Injectable 25 Gram(s) IV Push once  dextrose Oral Gel 15 Gram(s) Oral once  enoxaparin Injectable 65 milliGRAM(s) SubCutaneous every 12 hours  furosemide   Injectable 20 milliGRAM(s) IV Push once  glucagon  Injectable 1 milliGRAM(s) IntraMuscular once  levETIRAcetam  IVPB 500 milliGRAM(s) IV Intermittent every 12 hours  levothyroxine Injectable 70 MICROGram(s) IV Push <User Schedule>  multivitamin 1 Tablet(s) Oral daily  mupirocin 2% Ointment 1 Application(s) Topical two times a day  potassium phosphate IVPB 15 milliMole(s) IV Intermittent once  QUEtiapine 75 milliGRAM(s) Oral at bedtime  thiamine 100 milliGRAM(s) Oral daily  valproate sodium  IVPB 500 milliGRAM(s) IV Intermittent every 6 hours      PHYSICAL EXAM:  T(C): 36.9 (10-01-23 @ 05:00), Max: 37.1 (09-30-23 @ 12:45)  HR: 92 (10-01-23 @ 05:00) (89 - 100)  BP: 128/77 (10-01-23 @ 05:00) (128/77 - 150/95)  RR: 18 (10-01-23 @ 05:00) (18 - 19)  SpO2: 96% (10-01-23 @ 05:00) (96% - 100%)  Wt(kg): --  I&O's Summary          JVP: Normal  Neck: supple  Lung: clear   CV: S1 S2 , Murmur:  Abd: soft  Psych: flat affect  Skin: normal``    LABS/DATA:    CARDIAC MARKERS:      proBNP:   Lipid Profile:   HgA1c:   TSH:     TELE:  EKG:

## 2023-10-01 NOTE — PROGRESS NOTE ADULT - SUBJECTIVE AND OBJECTIVE BOX
Patient is a 73y old  Male who presents with a chief complaint of FTT (01 Oct 2023 07:07)    Date of servie : 10-01-23 @ 11:49  INTERVAL HPI/OVERNIGHT EVENTS:  T(C): 37.3 (10-01-23 @ 11:23), Max: 37.3 (10-01-23 @ 11:23)  HR: 105 (10-01-23 @ 11:23) (89 - 105)  BP: 168/96 (10-01-23 @ 11:23) (128/77 - 168/96)  RR: 18 (10-01-23 @ 11:23) (18 - 19)  SpO2: 90% (10-01-23 @ 11:23) (90% - 100%)  Wt(kg): --  I&O's Summary      LABS:                        10.4   3.64  )-----------( 94       ( 01 Oct 2023 07:36 )             30.8     10-01    138  |  101  |  4<L>  ----------------------------<  87  3.7   |  27  |  0.62    Ca    7.6<L>      01 Oct 2023 07:36  Phos  2.5     10-01  Mg     1.70     10-01        Urinalysis Basic - ( 01 Oct 2023 07:36 )    Color: x / Appearance: x / SG: x / pH: x  Gluc: 87 mg/dL / Ketone: x  / Bili: x / Urobili: x   Blood: x / Protein: x / Nitrite: x   Leuk Esterase: x / RBC: x / WBC x   Sq Epi: x / Non Sq Epi: x / Bacteria: x      CAPILLARY BLOOD GLUCOSE      POCT Blood Glucose.: 85 mg/dL (01 Oct 2023 08:41)  POCT Blood Glucose.: 90 mg/dL (01 Oct 2023 06:07)  POCT Blood Glucose.: 85 mg/dL (30 Sep 2023 23:52)  POCT Blood Glucose.: 91 mg/dL (30 Sep 2023 18:03)  POCT Blood Glucose.: 105 mg/dL (30 Sep 2023 12:30)        Urinalysis Basic - ( 01 Oct 2023 07:36 )    Color: x / Appearance: x / SG: x / pH: x  Gluc: 87 mg/dL / Ketone: x  / Bili: x / Urobili: x   Blood: x / Protein: x / Nitrite: x   Leuk Esterase: x / RBC: x / WBC x   Sq Epi: x / Non Sq Epi: x / Bacteria: x        MEDICATIONS  (STANDING):  amLODIPine   Tablet 10 milliGRAM(s) Oral daily  chlorhexidine 2% Cloths 1 Application(s) Topical daily  collagenase Ointment 1 Application(s) Topical daily  dextrose 10%. 1000 milliLiter(s) (50 mL/Hr) IV Continuous <Continuous>  dextrose 50% Injectable 12.5 Gram(s) IV Push once  dextrose 50% Injectable 25 Gram(s) IV Push once  dextrose 50% Injectable 25 Gram(s) IV Push once  dextrose 50% Injectable 25 Gram(s) IV Push once  dextrose Oral Gel 15 Gram(s) Oral once  enoxaparin Injectable 65 milliGRAM(s) SubCutaneous every 12 hours  furosemide   Injectable 20 milliGRAM(s) IV Push once  glucagon  Injectable 1 milliGRAM(s) IntraMuscular once  levETIRAcetam  IVPB 500 milliGRAM(s) IV Intermittent every 12 hours  levothyroxine Injectable 70 MICROGram(s) IV Push <User Schedule>  multivitamin 1 Tablet(s) Oral daily  mupirocin 2% Ointment 1 Application(s) Topical two times a day  potassium phosphate IVPB 15 milliMole(s) IV Intermittent once  QUEtiapine 75 milliGRAM(s) Oral at bedtime  thiamine 100 milliGRAM(s) Oral daily  valproate sodium  IVPB 500 milliGRAM(s) IV Intermittent every 6 hours    MEDICATIONS  (PRN):          PHYSICAL EXAM:  GENERAL: frail  CHEST/LUNG: Clear to percussion bilaterally; No rales, rhonchi, wheezing, or rubs  HEART: Regular rate and rhythm; No murmurs, rubs, or gallops  ABDOMEN: Soft, Nontender, Nondistended; Bowel sounds present  EXTREMITIES: edema +    Care Discussed with Consultants/Other Providers [ ] YES  [ ] NO

## 2023-10-02 NOTE — PROGRESS NOTE ADULT - ASSESSMENT
73-year-old male history of dementia, baseline mental status AxOx0, seizure disorder on Keppra and divalproex, history of DVT/PE off Eliquis, hypertension, hypothyroidism presenting sent in from his nursing home for evaluation of failure to thrive, found to have hypernatremia and new renal failure on outside labs.    Hypernatremia/hyponatremia:  Hypernatremia was due to dehydration, poor intake.  Na now low-edematous  likely sec to low albumin  start lasix 20mg IV QD    JESSICA:  Due to Pre-Renal azotemia.  improved  monitor bmp     Hypophosphatemia:  supplement 30mmol today  monitor    Hypokalemia  supplement as needed   monitor    Hypocalcemia  Due to low albumin   Albumin infusion x6 were given      Hypertension:  bp fluctuating  monitor

## 2023-10-02 NOTE — PROGRESS NOTE ADULT - SUBJECTIVE AND OBJECTIVE BOX
Patient is a 73y old  Male who presents with a chief complaint of FTT (02 Oct 2023 06:51)    Date of servie : 10-02-23 @ 14:20  INTERVAL HPI/OVERNIGHT EVENTS:  T(C): 36.4 (10-02-23 @ 13:00), Max: 37 (10-01-23 @ 20:28)  HR: 94 (10-02-23 @ 13:00) (93 - 102)  BP: 143/78 (10-02-23 @ 13:00) (114/72 - 143/78)  RR: 18 (10-02-23 @ 13:00) (18 - 18)  SpO2: 99% (10-02-23 @ 13:00) (94% - 100%)  Wt(kg): --  I&O's Summary    01 Oct 2023 07:01  -  02 Oct 2023 07:00  --------------------------------------------------------  IN: 700 mL / OUT: 0 mL / NET: 700 mL        LABS:                        10.8   4.76  )-----------( 87       ( 02 Oct 2023 07:05 )             31.7     10-02    132<L>  |  98  |  6<L>  ----------------------------<  90  3.9   |  26  |  0.61    Ca    8.0<L>      02 Oct 2023 07:05  Phos  2.3     10-02  Mg     1.60     10-02        Urinalysis Basic - ( 02 Oct 2023 07:05 )    Color: x / Appearance: x / SG: x / pH: x  Gluc: 90 mg/dL / Ketone: x  / Bili: x / Urobili: x   Blood: x / Protein: x / Nitrite: x   Leuk Esterase: x / RBC: x / WBC x   Sq Epi: x / Non Sq Epi: x / Bacteria: x      CAPILLARY BLOOD GLUCOSE      POCT Blood Glucose.: 130 mg/dL (02 Oct 2023 12:09)  POCT Blood Glucose.: 95 mg/dL (02 Oct 2023 05:55)  POCT Blood Glucose.: 101 mg/dL (02 Oct 2023 02:34)  POCT Blood Glucose.: 108 mg/dL (01 Oct 2023 17:41)        Urinalysis Basic - ( 02 Oct 2023 07:05 )    Color: x / Appearance: x / SG: x / pH: x  Gluc: 90 mg/dL / Ketone: x  / Bili: x / Urobili: x   Blood: x / Protein: x / Nitrite: x   Leuk Esterase: x / RBC: x / WBC x   Sq Epi: x / Non Sq Epi: x / Bacteria: x        MEDICATIONS  (STANDING):  amLODIPine   Tablet 10 milliGRAM(s) Oral daily  chlorhexidine 2% Cloths 1 Application(s) Topical daily  collagenase Ointment 1 Application(s) Topical daily  dextrose 10%. 1000 milliLiter(s) (50 mL/Hr) IV Continuous <Continuous>  dextrose 50% Injectable 25 Gram(s) IV Push once  dextrose 50% Injectable 25 Gram(s) IV Push once  dextrose 50% Injectable 25 Gram(s) IV Push once  dextrose 50% Injectable 12.5 Gram(s) IV Push once  dextrose Oral Gel 15 Gram(s) Oral once  enoxaparin Injectable 65 milliGRAM(s) SubCutaneous every 12 hours  furosemide   Injectable 40 milliGRAM(s) IV Push once  glucagon  Injectable 1 milliGRAM(s) IntraMuscular once  levETIRAcetam  IVPB 500 milliGRAM(s) IV Intermittent every 12 hours  levothyroxine Injectable 70 MICROGram(s) IV Push <User Schedule>  multivitamin 1 Tablet(s) Oral daily  mupirocin 2% Ointment 1 Application(s) Topical two times a day  QUEtiapine 75 milliGRAM(s) Oral at bedtime  thiamine 100 milliGRAM(s) Oral daily  valproate sodium  IVPB 500 milliGRAM(s) IV Intermittent every 6 hours    MEDICATIONS  (PRN):          PHYSICAL EXAM:  GENERAL: frail  CHEST/LUNG: Clear to percussion bilaterally; No rales, rhonchi, wheezing, or rubs  HEART: Regular rate and rhythm; No murmurs, rubs, or gallops  ABDOMEN: Soft, Nontender, Nondistended; Bowel sounds present  EXTREMITIES:  edema +    Care Discussed with Consultants/Other Providers [x ] YES  [ ] NO

## 2023-10-02 NOTE — PROGRESS NOTE ADULT - SUBJECTIVE AND OBJECTIVE BOX
Subjective: Patient seen and examined. No new events except as noted.     SUBJECTIVE/ROS:  nad      MEDICATIONS:  MEDICATIONS  (STANDING):  amLODIPine   Tablet 10 milliGRAM(s) Oral daily  chlorhexidine 2% Cloths 1 Application(s) Topical daily  collagenase Ointment 1 Application(s) Topical daily  dextrose 10%. 1000 milliLiter(s) (50 mL/Hr) IV Continuous <Continuous>  dextrose 50% Injectable 25 Gram(s) IV Push once  dextrose 50% Injectable 12.5 Gram(s) IV Push once  dextrose 50% Injectable 25 Gram(s) IV Push once  dextrose 50% Injectable 25 Gram(s) IV Push once  dextrose Oral Gel 15 Gram(s) Oral once  enoxaparin Injectable 65 milliGRAM(s) SubCutaneous every 12 hours  glucagon  Injectable 1 milliGRAM(s) IntraMuscular once  levETIRAcetam  IVPB 500 milliGRAM(s) IV Intermittent every 12 hours  levothyroxine Injectable 70 MICROGram(s) IV Push <User Schedule>  multivitamin 1 Tablet(s) Oral daily  mupirocin 2% Ointment 1 Application(s) Topical two times a day  QUEtiapine 75 milliGRAM(s) Oral at bedtime  thiamine 100 milliGRAM(s) Oral daily  valproate sodium  IVPB 500 milliGRAM(s) IV Intermittent every 6 hours      PHYSICAL EXAM:  T(C): 36.8 (10-02-23 @ 05:09), Max: 37.3 (10-01-23 @ 11:23)  HR: 93 (10-02-23 @ 05:09) (93 - 105)  BP: 114/72 (10-02-23 @ 05:09) (114/72 - 168/96)  RR: 18 (10-02-23 @ 05:09) (18 - 18)  SpO2: 100% (10-02-23 @ 05:09) (90% - 100%)  Wt(kg): --  I&O's Summary    01 Oct 2023 07:01  -  02 Oct 2023 06:51  --------------------------------------------------------  IN: 600 mL / OUT: 0 mL / NET: 600 mL            JVP: Normal  Neck: supple  Lung: clear   CV: S1 S2 , Murmur:  Abd: soft  Ext: No edema  Skin: normal``    LABS/DATA:    CARDIAC MARKERS:                                10.4   3.64  )-----------( 94       ( 01 Oct 2023 07:36 )             30.8     10-01    138  |  101  |  4<L>  ----------------------------<  87  3.7   |  27  |  0.62    Ca    7.6<L>      01 Oct 2023 07:36  Phos  2.5     10-01  Mg     1.70     10-01      proBNP:   Lipid Profile:   HgA1c:   TSH:     TELE:  EKG:

## 2023-10-02 NOTE — PROGRESS NOTE ADULT - SUBJECTIVE AND OBJECTIVE BOX
Date of Service  : 10/2/2023    INTERVAL HPI/OVERNIGHT EVENTS:  Palliative care reconsulted for GOC     SUBJECTIVE AND OBJECTIVE:  Patient seen and examined at bedside. Patient lethargic; intermittently awakes with eyes open ; does not track with eyes, unable to follow commands, nonverbal.       Allergies    fish (Hives; Angioedema)  No Known Drug Allergies  Blackwell (Swelling; Angioedema)    Intolerances    MEDICATIONS  (STANDING):  amLODIPine   Tablet 10 milliGRAM(s) Oral daily  chlorhexidine 2% Cloths 1 Application(s) Topical daily  collagenase Ointment 1 Application(s) Topical daily  dextrose 10%. 1000 milliLiter(s) (50 mL/Hr) IV Continuous <Continuous>  dextrose 50% Injectable 25 Gram(s) IV Push once  dextrose 50% Injectable 25 Gram(s) IV Push once  dextrose 50% Injectable 12.5 Gram(s) IV Push once  dextrose 50% Injectable 25 Gram(s) IV Push once  dextrose Oral Gel 15 Gram(s) Oral once  enoxaparin Injectable 65 milliGRAM(s) SubCutaneous every 12 hours  furosemide   Injectable 40 milliGRAM(s) IV Push once  glucagon  Injectable 1 milliGRAM(s) IntraMuscular once  levETIRAcetam  IVPB 500 milliGRAM(s) IV Intermittent every 12 hours  levothyroxine Injectable 70 MICROGram(s) IV Push <User Schedule>  multivitamin 1 Tablet(s) Oral daily  mupirocin 2% Ointment 1 Application(s) Topical two times a day  QUEtiapine 75 milliGRAM(s) Oral at bedtime  thiamine 100 milliGRAM(s) Oral daily  valproate sodium  IVPB 500 milliGRAM(s) IV Intermittent every 6 hours    MEDICATIONS  (PRN):      ITEMS UNCHECKED ARE NOT PRESENT    PRESENT SYMPTOMS: [x ]Unable to self-report due to altered mental status- see [ ] CPOT [ ]x PAINADS [ x] RDOS  Source if other than patient:  [ ]Family   [ ]Team     Pain:  [ ]yes [ ]no  QOL impact -   Location -                    Aggravating factors -  Quality -  Radiation -  Timing-  Severity (0-10 scale):  Minimal acceptable level / Pain Goal (0-10 scale):     Dyspnea:                           [ ]Mild [ ]Moderate [ ]Severe  Anxiety:                             [ ]Mild [ ]Moderate [ ]Severe  Agitation:                          [ ]Mild [ ]Moderate [ ]Severe  Fatigue:                             [ ]Mild [ ]Moderate [ ]Severe  Nausea:                             [ ]Mild [ ]Moderate [ ]Severe  Loss of appetite:              [ ]Mild [ ]Moderate [ ]Severe  Constipation:                   [ ]Mild [ ]Moderate [ ]Severe  Diarrhea:                          [ ]Mild [ ]Moderate [ ]Severe    CPOT:    https://[email or web address].org/getattachment/[email or web address]/Critical-Care-Pain-Observation-Tool-(CPOT)    PCSSQ[Palliative Care Spiritual Screening Question]   Severity (0-10):  Score of 4 or > indicate consideration of Chaplaincy referral.  Chaplaincy Referral: [ ] yes [ ] refused [ ] following [x ] deferred    Caregiver Clinton? : [ ] yes [ ] no [ ] Declined [x ] Deferred              Social work referral [ ] Patient & Family Centered Care Referral [ ]     Anticipatory Grief present?:  [ ] yes [ ] no  [ x] Deferred                  Social work referral [ ] Chaplaincy Referral[ ]    Other Symptoms:  [ ]All other review of systems negative - unable to obtain due to encephalopathy     PHYSICAL EXAM:  Vital Signs Last 24 Hrs  T(C): 36.4 (02 Oct 2023 13:00), Max: 37 (01 Oct 2023 20:28)  T(F): 97.6 (02 Oct 2023 13:00), Max: 98.6 (01 Oct 2023 20:28)  HR: 94 (02 Oct 2023 13:00) (93 - 102)  BP: 143/78 (02 Oct 2023 13:00) (114/72 - 143/78)  BP(mean): --  RR: 18 (02 Oct 2023 13:00) (18 - 18)  SpO2: 99% (02 Oct 2023 13:00) (94% - 100%)    Parameters below as of 02 Oct 2023 13:00  Patient On (Oxygen Delivery Method): room air  O2 Flow (L/min): 10       GENERAL:  [ ] Awake  [ ]Oriented x   [x ]Lethargic but awakes intermittently and opens eyes  [ ]Cachexia  [ ]Unarousable  [ ]Verbal  [ x]Non-Verbal  [ x] No Distress  Behavioral:   [ ] Anxiety  [ ] Delirium [ ] Agitation [ ] Calm  [ x] Other-encephalopathy   HEENT:  [x ]Normal  [ ] Temporal Wasting  [ ]Dry mouth   [ ]ET Tube/Trach  [ ]Oral lesions  [ ] Mucositis  PULMONARY:   [ ]Clear [ ]Tachypnea  [ ]Audible excessive secretions   [ ]Rhonchi        [ ]Right [ ]Left [ ]Bilateral  [ ]Crackles        [ ]Right [ ]Left [ ]Bilateral  [ ]Wheezing     [ ]Right [ ]Left [ ]Bilateral  [x ]Diminished breath sounds [ ]right [ ]left [x ]bilateral  CARDIOVASCULAR:    [x ]Regular [ ]Irregular [ ]Tachy  [ ]Dipak [ ]Murmur [ ]Other  GASTROINTESTINAL:  [ x]Soft  [ ]Distended   [ ]+BS  [ x]Non tender [ ]Tender  [ ]PEG [ ]OGT/ NGT  Last BM: fecal incontinence   GENITOURINARY:  [ ]Normal [x] Incontinent   [ ]Oliguria/Anuria   [ ]Gibson  MUSCULOSKELETAL:   [ ]Normal   [ ]Weakness  [x ]Bed/Wheelchair bound [ x]Edema   [  ] amputation  [  ] contraction  NEUROLOGIC:   [ ]No focal deficits  [x ]Cognitive impairment  [ x]Dysphagia [ ]Dysarthria [ ]Paresis [x ]Other - unable to follow commands   SKIN: See Nursing Skin Assessment for further details  [ ]Normal    [ ]Rash  [ ]Pressure ulcer(s) -       Present on admission [ ]y [ ]n   [  ]  Wound    [  ] hyperpigmentation    CRITICAL CARE:  [ ]Shock Present  [ ]Septic [ ]Cardiogenic [ ]Neurologic [ ]Hypovolemic  [ ]Vasopressors [ ]Inotropes  [ ]Respiratory failure present [ ]Mechanical Ventilation [ ]Non-invasive ventilatory support [ ]High-Flow   [ ]Acute  [ ]Chronic [ ]Hypoxic  [ ]Hypercarbic [ ]Other  [ ]Other organ failure     LABS:  reviewed                                           10.8   4.76  )-----------( 87       ( 02 Oct 2023 07:05 )             31.7       10-02    132<L>  |  98  |  6<L>  ----------------------------<  90  3.9   |  26  |  0.61    Ca    8.0<L>      02 Oct 2023 07:05  Phos  2.3     10-02  Mg     1.60     10-02        RADIOLOGY & ADDITIONAL STUDIES: reviewed     Protein Calorie Malnutrition Present: [ ]mild [ ]moderate [ ]severe [ ]underweight [ ]morbid obesity  https://www.andeal.org/krissy/2440/web/files/ONC/Table_Clinical%20Characteristics%20to%20Document%20Malnutrition-White%20JV%20et%20al%774659.pdf    Height (cm): 182.9 (09-08-23 @ 22:06), 182.9 (08-07-23 @ 17:06), 172.7 (06-12-23 @ 17:54)  Weight (kg): 67 (09-12-23 @ 14:53), 68.9 (08-07-23 @ 17:06), 73.1 (06-13-23 @ 01:11)  BMI (kg/m2): 20 (09-12-23 @ 14:53), 20.6 (09-08-23 @ 22:06), 20.6 (08-07-23 @ 17:06)    [x ]PPSV2 < or = 30%  [ ]significant weight loss [ ]poor nutritional intake [ ]anasarca   [ ]Artificial Nutrition    REFERRALS:   [ ]Chaplaincy  [ ]Hospice  [ ]Child Life  [ ]Social Work  [ x]Case management [ ]Holistic Therapy

## 2023-10-02 NOTE — CHART NOTE - NSCHARTNOTEFT_GEN_A_CORE
Updated MOLST form completed with patient wife Leena and Palliative Attending Dr Bolaños.     As per discussion, patient made DNR with trial of intubation. Additionally, Leena expressed she would want trial of feeding tube (NGT) but would not want PEG tube. Allowed time for Leena to ask questions. All questions answered to best of writer's ability.    Shreyas Irwin PA-C,   Internal Medicine ACP   In house pager #[phone]

## 2023-10-02 NOTE — PROGRESS NOTE ADULT - PROBLEM SELECTOR PLAN 7
Case reviewed with primary team.    Thank you for allowing us to participate in your patient's care. Please page [phone] for any questions/concerns.

## 2023-10-02 NOTE — PROGRESS NOTE ADULT - PROBLEM SELECTOR PLAN 6
Spoke with wife Leena at bedside to review advanced directives.   Per discussion with wife, Patient is DNR with Trial of intubation/ non-invasive ventilation.   She does not want trach/ PEG but okay with trial of NG.   Reviewed concerns of poor po intake likely due to underlying dementia +/- delirium. Reviewed that IVF are only temporary means of managing hypoglycemia. Wife hoping that if he has an underlying infection, such as UTI that can be reversed, he can improve. However, discussed concerns that prognosis remains guarded and continues to have no clinical improvement would recommend transitioning to comfort centric approach of care.   Encouraged wife to have ongoing discussions with her family    Please see separate GOC note.  16 minutes spent on advanced care planning with family.

## 2023-10-02 NOTE — CHART NOTE - NSCHARTNOTEFT_GEN_A_CORE
40 mg IV Lasix ordered discussed with Dr Arciniega.    Shreyas Irwin PA-C,   Internal Medicine ACP   In house pager #[phone]

## 2023-10-02 NOTE — PROGRESS NOTE ADULT - SUBJECTIVE AND OBJECTIVE BOX
Dr. Arciniega  Office [phone] (9 am to 5 pm)  Service: [phone] (5pm to 9am)  Paula MONTENEGRO      RENAL PROGRESS NOTE: DATE OF SERVICE 10-02-23 @ 22:42    Patient is a 73y old  Male who presents with a chief complaint of FTT (02 Oct 2023 17:39)      Patient seen and examined at bedside. No chest pain/sob    VITALS:  T(F): 99.1 (10-02-23 @ 21:51), Max: 99.1 (10-02-23 @ 21:51)  HR: 91 (10-02-23 @ 21:51)  BP: 165/79 (10-02-23 @ 21:51)  RR: 18 (10-02-23 @ 21:51)  SpO2: 99% (10-02-23 @ 21:51)  Wt(kg): --    10-01 @ 07:01  -  10-02 @ 07:00  --------------------------------------------------------  IN: 700 mL / OUT: 0 mL / NET: 700 mL          PHYSICAL EXAM:  Constitutional: NAD  Neck: No JVD  Respiratory: CTAB, no wheezes, rales or rhonchi  Cardiovascular: S1, S2, RRR  Gastrointestinal: BS+, soft, NT/ND  Extremities: 2+ peripheral edema    Hospital Medications:   MEDICATIONS  (STANDING):  amLODIPine   Tablet 10 milliGRAM(s) Oral daily  chlorhexidine 2% Cloths 1 Application(s) Topical daily  collagenase Ointment 1 Application(s) Topical daily  dextrose 10%. 1000 milliLiter(s) (50 mL/Hr) IV Continuous <Continuous>  dextrose 50% Injectable 25 Gram(s) IV Push once  dextrose 50% Injectable 12.5 Gram(s) IV Push once  dextrose 50% Injectable 25 Gram(s) IV Push once  dextrose 50% Injectable 25 Gram(s) IV Push once  dextrose Oral Gel 15 Gram(s) Oral once  enoxaparin Injectable 65 milliGRAM(s) SubCutaneous every 12 hours  glucagon  Injectable 1 milliGRAM(s) IntraMuscular once  levETIRAcetam  IVPB 500 milliGRAM(s) IV Intermittent every 12 hours  levothyroxine Injectable 70 MICROGram(s) IV Push <User Schedule>  multivitamin 1 Tablet(s) Oral daily  mupirocin 2% Ointment 1 Application(s) Topical two times a day  QUEtiapine 75 milliGRAM(s) Oral at bedtime  thiamine 100 milliGRAM(s) Oral daily  valproate sodium  IVPB 500 milliGRAM(s) IV Intermittent every 6 hours      LABS:  10-02    132<L>  |  98  |  6<L>  ----------------------------<  90  3.9   |  26  |  0.61    Ca    8.0<L>      02 Oct 2023 07:05  Phos  2.3     10-02  Mg     1.60     10-02      Creatinine Trend: 0.61 <--, 0.62 <--, 0.59 <--, 0.62 <--, 0.55 <--, 0.60 <--    Phosphorus: 2.3 mg/dL (10-02 @ 07:05)                              10.8   4.76  )-----------( 87       ( 02 Oct 2023 07:05 )             31.7     Urine Studies:  Urinalysis - [10-02-23 @ 07:05]      Color  / Appearance  / SG  / pH       Gluc 90 / Ketone   / Bili  / Urobili        Blood  / Protein  / Leuk Est  / Nitrite       RBC  / WBC  / Hyaline  / Gran  / Sq Epi  / Non Sq Epi  / Bacteria       TSH 3.29      [08-07-23 @ 06:09]  Lipid: chol 117, TG 82, HDL 48, LDL --      [02-09-23 @ 06:40]        RADIOLOGY & ADDITIONAL STUDIES:

## 2023-10-02 NOTE — GOALS OF CARE CONVERSATION - ADVANCED CARE PLANNING - CONVERSATION DETAILS
Advanced care planning extensively discussed  Per discussion with wife, Patient is DNR with Trial of intubation/ non-invasive ventilation. However, she would not want trach placement/ long term ventilation.     Reviewed concerns of poor po intake and hypoglycemia episodes. Reviewed that current IVF are only temporary means of managing hypoglycemia. However, wife hoping that if he has an underlying infection, such as UTI that can be reversed, he can improve. Discussed poor po intake more likely due to underlying dementia +/- delirium  Wife shares she does not want PEG placement but okay with trial of NG. Reviewed that artificial nutrition via NGT is a temporary form of nutrition.     Explained that patient's overall prognosis is guarded and if patient's clinical status continues to not improve significantly to allow for sufficient PO intake, would recommend transitioning to comfort centric approach of care.   Wife tearful.   Encouraged wife to have ongoing discussions with her family and medical team Advanced care planning extensively discussed  Per discussion with wife, Patient is DNR with Trial of intubation/ non-invasive ventilation. However, she would not want trach placement/ long term ventilation.     Reviewed concerns of poor po intake and hypoglycemia episodes. Reviewed that current IVF are only temporary means of managing hypoglycemia. However, wife hoping that if he has an underlying infection, such as UTI that can be reversed, he can improve. Discussed poor po intake more likely due to underlying dementia +/- delirium  Wife shares she does not want PEG placement but okay with trial of NG. Reviewed that artificial nutrition via NGT is only a temporary form of nutrition.     Explained that patient's overall prognosis is guarded and if patient's clinical status continues to not improve significantly to allow for sufficient PO intake, would recommend transitioning to comfort centric approach of care.   Wife tearful.   Encouraged wife to have ongoing discussions with her family and medical team

## 2023-10-02 NOTE — PROGRESS NOTE ADULT - PROBLEM SELECTOR PLAN 1
- patient with episodes of hypoglycemia due to poor po intake  - on IVF  - management as per primary team

## 2023-10-03 NOTE — PROGRESS NOTE ADULT - ASSESSMENT
HTN  cont current meds    edema   from poor oncotic pressure  nutritional optimization     GOC as per palliative care

## 2023-10-03 NOTE — PROGRESS NOTE ADULT - SUBJECTIVE AND OBJECTIVE BOX
Subjective: Patient seen and examined. No new events except as noted.     SUBJECTIVE/ROS:  ROS limited       MEDICATIONS:  MEDICATIONS  (STANDING):  amLODIPine   Tablet 10 milliGRAM(s) Oral daily  chlorhexidine 2% Cloths 1 Application(s) Topical daily  collagenase Ointment 1 Application(s) Topical daily  dextrose 10%. 1000 milliLiter(s) (50 mL/Hr) IV Continuous <Continuous>  dextrose 50% Injectable 12.5 Gram(s) IV Push once  dextrose 50% Injectable 25 Gram(s) IV Push once  dextrose 50% Injectable 25 Gram(s) IV Push once  dextrose 50% Injectable 25 Gram(s) IV Push once  dextrose Oral Gel 15 Gram(s) Oral once  enoxaparin Injectable 65 milliGRAM(s) SubCutaneous every 12 hours  glucagon  Injectable 1 milliGRAM(s) IntraMuscular once  levETIRAcetam  IVPB 500 milliGRAM(s) IV Intermittent every 12 hours  levothyroxine Injectable 70 MICROGram(s) IV Push <User Schedule>  multivitamin 1 Tablet(s) Oral daily  QUEtiapine 75 milliGRAM(s) Oral at bedtime  thiamine 100 milliGRAM(s) Oral daily  valproate sodium  IVPB 500 milliGRAM(s) IV Intermittent every 6 hours      PHYSICAL EXAM:  T(C): 37.3 (10-02-23 @ 21:51), Max: 37.3 (10-02-23 @ 21:51)  HR: 91 (10-02-23 @ 21:51) (91 - 94)  BP: 165/79 (10-02-23 @ 21:51) (143/78 - 165/79)  RR: 18 (10-02-23 @ 21:51) (18 - 18)  SpO2: 99% (10-02-23 @ 21:51) (99% - 99%)  Wt(kg): --  I&O's Summary    01 Oct 2023 07:01  -  02 Oct 2023 07:00  --------------------------------------------------------  IN: 700 mL / OUT: 0 mL / NET: 700 mL            JVP: Normal  Neck: supple  Lung: clear   CV: S1 S2 , Murmur:  Abd: soft  Ext: pos edema  Skin: normal``    LABS/DATA:    CARDIAC MARKERS:                                10.8   4.76  )-----------( 87       ( 02 Oct 2023 07:05 )             31.7     10-02    132<L>  |  98  |  6<L>  ----------------------------<  90  3.9   |  26  |  0.61    Ca    8.0<L>      02 Oct 2023 07:05  Phos  2.3     10-02  Mg     1.60     10-02      proBNP:   Lipid Profile:   HgA1c:   TSH:     TELE:  EKG:

## 2023-10-03 NOTE — PROGRESS NOTE ADULT - SUBJECTIVE AND OBJECTIVE BOX
Patient is a 73y old  Male who presents with a chief complaint of FTT (03 Oct 2023 12:35)    Date of servie : 10-03-23 @ 14:41  INTERVAL HPI/OVERNIGHT EVENTS:  T(C): 36.8 (10-03-23 @ 12:11), Max: 37.3 (10-02-23 @ 21:51)  HR: 93 (10-03-23 @ 12:11) (91 - 93)  BP: 125/82 (10-03-23 @ 12:11) (125/82 - 165/79)  RR: 17 (10-03-23 @ 12:11) (17 - 18)  SpO2: 99% (10-02-23 @ 21:51) (99% - 99%)  Wt(kg): --  I&O's Summary      LABS:                        10.1   5.40  )-----------( 83       ( 03 Oct 2023 06:47 )             29.4     10-03    134<L>  |  97<L>  |  5<L>  ----------------------------<  92  3.0<L>   |  26  |  0.65    Ca    7.7<L>      03 Oct 2023 06:47  Phos  3.1     10-03  Mg     1.50     10-03        Urinalysis Basic - ( 03 Oct 2023 06:47 )    Color: x / Appearance: x / SG: x / pH: x  Gluc: 92 mg/dL / Ketone: x  / Bili: x / Urobili: x   Blood: x / Protein: x / Nitrite: x   Leuk Esterase: x / RBC: x / WBC x   Sq Epi: x / Non Sq Epi: x / Bacteria: x      CAPILLARY BLOOD GLUCOSE      POCT Blood Glucose.: 111 mg/dL (03 Oct 2023 09:09)  POCT Blood Glucose.: 116 mg/dL (03 Oct 2023 05:17)  POCT Blood Glucose.: 108 mg/dL (02 Oct 2023 23:53)  POCT Blood Glucose.: 145 mg/dL (02 Oct 2023 18:08)        Urinalysis Basic - ( 03 Oct 2023 06:47 )    Color: x / Appearance: x / SG: x / pH: x  Gluc: 92 mg/dL / Ketone: x  / Bili: x / Urobili: x   Blood: x / Protein: x / Nitrite: x   Leuk Esterase: x / RBC: x / WBC x   Sq Epi: x / Non Sq Epi: x / Bacteria: x        MEDICATIONS  (STANDING):  amLODIPine   Tablet 10 milliGRAM(s) Oral daily  chlorhexidine 2% Cloths 1 Application(s) Topical daily  collagenase Ointment 1 Application(s) Topical daily  dextrose 10%. 1000 milliLiter(s) (50 mL/Hr) IV Continuous <Continuous>  dextrose 50% Injectable 12.5 Gram(s) IV Push once  dextrose 50% Injectable 25 Gram(s) IV Push once  dextrose 50% Injectable 25 Gram(s) IV Push once  dextrose 50% Injectable 25 Gram(s) IV Push once  dextrose Oral Gel 15 Gram(s) Oral once  enoxaparin Injectable 65 milliGRAM(s) SubCutaneous every 12 hours  glucagon  Injectable 1 milliGRAM(s) IntraMuscular once  levETIRAcetam  IVPB 500 milliGRAM(s) IV Intermittent every 12 hours  levothyroxine Injectable 70 MICROGram(s) IV Push <User Schedule>  magnesium sulfate  IVPB 1 Gram(s) IV Intermittent once  multivitamin 1 Tablet(s) Oral daily  potassium chloride   Powder 40 milliEquivalent(s) Oral once  potassium chloride  10 mEq/100 mL IVPB 10 milliEquivalent(s) IV Intermittent every 1 hour  QUEtiapine 75 milliGRAM(s) Oral at bedtime  thiamine 100 milliGRAM(s) Oral daily  valproate sodium  IVPB 500 milliGRAM(s) IV Intermittent every 6 hours    MEDICATIONS  (PRN):          PHYSICAL EXAM:  GENERAL: rail  CHEST/LUNG: Clear to percussion bilaterally; No rales, rhonchi, wheezing, or rubs  HEART: Regular rate and rhythm; No murmurs, rubs, or gallops  ABDOMEN: Soft, Nontender, Nondistended; Bowel sounds present  EXTREMITIES: edema +    Care Discussed with Consultants/Other Providers [ ] YES  [ ] NO

## 2023-10-03 NOTE — PROGRESS NOTE ADULT - ASSESSMENT
73-year-old male history of dementia, baseline mental status AxOx0, seizure disorder on Keppra and divalproex, history of DVT/PE off Eliquis, hypertension, hypothyroidism presenting sent in from his nursing home for evaluation of failure to thrive, found to have hypernatremia and new renal failure on outside labs.    Hypernatremia/hyponatremia:  Hypernatremia was due to dehydration, poor intake.  Na now low-edematous  likely sec to low albumin  on d10  better today s/p lasix 40 10/2    JESSICA:  Due to Pre-Renal azotemia.  improved  monitor bmp     Hypophosphatemia:  supplemented  monitor    Hypokalemia  supplemented  monitor    Hypocalcemia  Due to low albumin   Albumin infusion x6 were given      Hypertension:  bp fluctuating  monitor    FTT  f/u GOC  prognosis poor

## 2023-10-03 NOTE — PROGRESS NOTE ADULT - SUBJECTIVE AND OBJECTIVE BOX
[facility] [place] [facility]   MD ARI MILLS MD ANGELA WONG, PA    TEL:  OFFICE: [phone]  From 5pm-7am Answering Service [phone]    -- RENAL FOLLOW UP NOTE ---Date of Service 10-03-23 @ 12:36    Patient is a 73y old  Male who presents with a chief complaint of FTT (03 Oct 2023 06:57)      Patient seen and examined at bedside.     VITALS:  T(F): 98.2 (10-03-23 @ 12:11), Max: 99.1 (10-02-23 @ 21:51)  HR: 93 (10-03-23 @ 12:11)  BP: 125/82 (10-03-23 @ 12:11)  RR: 17 (10-03-23 @ 12:11)  SpO2: 99% (10-02-23 @ 21:51)  Wt(kg): --        PHYSICAL EXAM:  General: lethargic   Neck: No JVD  Respiratory: CTAB, no wheezes, rales or rhonchi  Cardiovascular: S1, S2, RRR  Gastrointestinal: BS+, soft, NT/ND  Extremities: UE peripheral edema    Hospital Medications:   MEDICATIONS  (STANDING):  amLODIPine   Tablet 10 milliGRAM(s) Oral daily  chlorhexidine 2% Cloths 1 Application(s) Topical daily  collagenase Ointment 1 Application(s) Topical daily  dextrose 10%. 1000 milliLiter(s) (50 mL/Hr) IV Continuous <Continuous>  dextrose 50% Injectable 25 Gram(s) IV Push once  dextrose 50% Injectable 25 Gram(s) IV Push once  dextrose 50% Injectable 12.5 Gram(s) IV Push once  dextrose 50% Injectable 25 Gram(s) IV Push once  dextrose Oral Gel 15 Gram(s) Oral once  enoxaparin Injectable 65 milliGRAM(s) SubCutaneous every 12 hours  glucagon  Injectable 1 milliGRAM(s) IntraMuscular once  levETIRAcetam  IVPB 500 milliGRAM(s) IV Intermittent every 12 hours  levothyroxine Injectable 70 MICROGram(s) IV Push <User Schedule>  magnesium sulfate  IVPB 1 Gram(s) IV Intermittent once  multivitamin 1 Tablet(s) Oral daily  potassium chloride   Powder 40 milliEquivalent(s) Oral once  potassium chloride  10 mEq/100 mL IVPB 10 milliEquivalent(s) IV Intermittent every 1 hour  QUEtiapine 75 milliGRAM(s) Oral at bedtime  thiamine 100 milliGRAM(s) Oral daily  valproate sodium  IVPB 500 milliGRAM(s) IV Intermittent every 6 hours      LABS:  10-03    134<L>  |  97<L>  |  5<L>  ----------------------------<  92  3.0<L>   |  26  |  0.65    Ca    7.7<L>      03 Oct 2023 06:47  Phos  3.1     10-03  Mg     1.50     10-03      Creatinine Trend: 0.65 <--, 0.61 <--, 0.62 <--, 0.59 <--, 0.62 <--, 0.55 <--    Phosphorus: 3.1 mg/dL (10-03 @ 06:47)                              10.1   5.40  )-----------( 83       ( 03 Oct 2023 06:47 )             29.4     Urine Studies:  Urinalysis - [10-03-23 @ 06:47]      Color  / Appearance  / SG  / pH       Gluc 92 / Ketone   / Bili  / Urobili        Blood  / Protein  / Leuk Est  / Nitrite       RBC  / WBC  / Hyaline  / Gran  / Sq Epi  / Non Sq Epi  / Bacteria       TSH 3.29      [08-07-23 @ 06:09]  Lipid: chol 117, TG 82, HDL 48, LDL --      [02-09-23 @ 06:40]        RADIOLOGY & ADDITIONAL STUDIES:

## 2023-10-04 NOTE — DIETITIAN NUTRITION RISK NOTIFICATION - UPON NUTRITIONAL ASSESSMENT BY THE REGISTERED DIETITIAN YOUR PATIENT WAS DETERMINED TO MEET CRITERIA/HAS EVIDENCE OF THE FOLLOWING DIAGNOSIS:
Severe protein-calorie malnutrition Oxybutynin Counseling:  I discussed with the patient the risks of oxybutynin including but not limited to skin rash, drowsiness, dry mouth, difficulty urinating, and blurred vision.

## 2023-10-04 NOTE — CHART NOTE - NSCHARTNOTEFT_GEN_A_CORE
NUTRITION FOLLOW-UP:    73-year-old male history of dementia, baseline mental status AxOx0, seizure disorder on Keppra and divalproex, history of DVT/PE off Eliquis, hypertension, hypothyroidism presenting sent in from his nursing home for evaluation of failure to thrive, found to have hypernatremia and new renal failure on outside labs.  Per documentation that arrived with the patient, patient has been refusing to eat since yesterday.    Pt f/u per protocol, remains with poor po intake per RN. GOC discussion (10/2/23)- Wife shares she does not want PEG placement but okay with trial of NG. Reviewed that artificial nutrition via NGT is only a temporary form of nutrition. Provider stated if patient's clinical status continues to not improve significantly to allow for sufficient PO intake, would recommend transitioning to comfort centric approach of care. Will remain available as needed if NGT placed for alternate means of nutrition. Noted wt. loss of 5.4% in one week likely due to pt with poor po intake and edema. Pt meets criteria for severe protein calorie  malnutrition.    Weight: 156.5# (9/27/23), 165# (9/21/23) via bed scale.    Labs:  10-04 Na 135 mmol/L Glu 117 mg/dL<H> K+ 3.3 mmol/L<L> Cr 0.64 mg/dL BUN 6 mg/dL<L> Phos 2.0 mg/dL<L>  10-04 @ 11:52 POCT 143 mg/dL  10-04 @ 05:48 POCT 114 mg/dL  10-04 @ 05:24 POCT 117 mg/dL  10-03 @ 23:35 POCT 106 mg/dL  10-03 @ 18:10 POCT 109 mg/dL    Current Diet: Diet, Pureed:   Mildly Thick Liquids (MILDTHICKLIQS) (09-18-23 @ 13:16)    Skin- Lt & Rt heel unstagable, +1 edema Rt & Lt foot, +2 edema Lt 7 Rt arm, +3 edema Lt R hand as per RN flow sheet    MEDICATIONS  (STANDING):  amLODIPine   Tablet 10 milliGRAM(s) Oral daily  chlorhexidine 2% Cloths 1 Application(s) Topical daily  collagenase Ointment 1 Application(s) Topical daily  dextrose 10%. 1000 milliLiter(s) (50 mL/Hr) IV Continuous <Continuous>  dextrose 50% Injectable 25 Gram(s) IV Push once  dextrose 50% Injectable 25 Gram(s) IV Push once  dextrose 50% Injectable 12.5 Gram(s) IV Push once  dextrose 50% Injectable 25 Gram(s) IV Push once  dextrose Oral Gel 15 Gram(s) Oral once  enoxaparin Injectable 65 milliGRAM(s) SubCutaneous every 12 hours  furosemide   Injectable 20 milliGRAM(s) IV Push daily  glucagon  Injectable 1 milliGRAM(s) IntraMuscular once  levETIRAcetam  IVPB 500 milliGRAM(s) IV Intermittent every 12 hours  levothyroxine Injectable 70 MICROGram(s) IV Push <User Schedule>  multivitamin 1 Tablet(s) Oral daily  potassium chloride   Powder 40 milliEquivalent(s) Oral once  potassium chloride  10 mEq/100 mL IVPB 10 milliEquivalent(s) IV Intermittent every 1 hour  potassium phosphate IVPB 30 milliMole(s) IV Intermittent once  QUEtiapine 75 milliGRAM(s) Oral at bedtime  thiamine 100 milliGRAM(s) Oral daily  valproate sodium  IVPB 500 milliGRAM(s) IV Intermittent every 6 hours    Estimated needs:  No changes since previous assessment    Nutrition Diagnosis:  Newly Diagnosed- Severe Malnutrition related to inadequate energy intake as evidenced by <50% nutritional needs >5 days, 5% wt. loss in one week.  Goal/Expected Outcome-Pt will meet >75% nutirtional needs    RD to Remain Available:    Additional Recommendations:   Will remain available as needed if NGT placed for alternate means of nutrition.  Monitor PO intake, weight trends, nutrition related lab values, BMs/GI distress, hydration status, skin integrity.       Ama Maza RDN #[id number] NUTRITION FOLLOW-UP:    73-year-old male history of dementia, baseline mental status AxOx0, seizure disorder on Keppra and divalproex, history of DVT/PE off Eliquis, hypertension, hypothyroidism presenting sent in from his nursing home for evaluation of failure to thrive, found to have hypernatremia and new renal failure on outside labs.  Per documentation that arrived with the patient, patient has been refusing to eat since yesterday.    Pt f/u per protocol, remains with poor po intake per RN. GOC discussion (10/2/23)- Wife shares she does not want PEG placement but okay with trial of NG. Reviewed that artificial nutrition via NGT is only a temporary form of nutrition. Provider stated if patient's clinical status continues to not improve significantly to allow for sufficient PO intake, would recommend transitioning to comfort centric approach of care. Will remain available as needed if NGT placed for alternate means of nutrition. Noted wt. loss of 5.4% in one week likely due to pt with poor po intake and edema. Pt meets criteria for severe protein calorie  malnutrition.    Weight: 156.5# (9/27/23), 165# (9/21/23) via bed scale.    Labs:  10-04 Na 135 mmol/L Glu 117 mg/dL<H> K+ 3.3 mmol/L<L> Cr 0.64 mg/dL BUN 6 mg/dL<L> Phos 2.0 mg/dL<L>  10-04 @ 11:52 POCT 143 mg/dL  10-04 @ 05:48 POCT 114 mg/dL  10-04 @ 05:24 POCT 117 mg/dL  10-03 @ 23:35 POCT 106 mg/dL  10-03 @ 18:10 POCT 109 mg/dL    Current Diet: Diet, Pureed:   Mildly Thick Liquids (MILDTHICKLIQS) (09-18-23 @ 13:16)    Skin- Lt & Rt heel unstagable, +1 edema Rt & Lt foot, +2 edema Lt 7 Rt arm, +3 edema Lt R hand as per RN flow sheet    MEDICATIONS  (STANDING):  amLODIPine   Tablet 10 milliGRAM(s) Oral daily  chlorhexidine 2% Cloths 1 Application(s) Topical daily  collagenase Ointment 1 Application(s) Topical daily  dextrose 10%. 1000 milliLiter(s) (50 mL/Hr) IV Continuous <Continuous>  dextrose 50% Injectable 25 Gram(s) IV Push once  dextrose 50% Injectable 25 Gram(s) IV Push once  dextrose 50% Injectable 12.5 Gram(s) IV Push once  dextrose 50% Injectable 25 Gram(s) IV Push once  dextrose Oral Gel 15 Gram(s) Oral once  enoxaparin Injectable 65 milliGRAM(s) SubCutaneous every 12 hours  furosemide   Injectable 20 milliGRAM(s) IV Push daily  glucagon  Injectable 1 milliGRAM(s) IntraMuscular once  levETIRAcetam  IVPB 500 milliGRAM(s) IV Intermittent every 12 hours  levothyroxine Injectable 70 MICROGram(s) IV Push <User Schedule>  multivitamin 1 Tablet(s) Oral daily  potassium chloride   Powder 40 milliEquivalent(s) Oral once  potassium chloride  10 mEq/100 mL IVPB 10 milliEquivalent(s) IV Intermittent every 1 hour  potassium phosphate IVPB 30 milliMole(s) IV Intermittent once  QUEtiapine 75 milliGRAM(s) Oral at bedtime  thiamine 100 milliGRAM(s) Oral daily  valproate sodium  IVPB 500 milliGRAM(s) IV Intermittent every 6 hours    Estimated needs:  No changes since previous assessment    Nutrition Diagnosis:  Newly Diagnosed- Severe Malnutrition related to inadequate energy intake as evidenced by <50% nutritional needs >5 days, >5% wt. loss in one week, moderate fluid accumulation  Goal/Expected Outcome-Pt will meet >75% nutritional needs    RD to Remain Available:    Additional Recommendations:   Will remain available as needed if NGT placed for alternate means of nutrition.  Monitor PO intake, weight trends, nutrition related lab values, BMs/GI distress, hydration status, skin integrity.       Ama Maza RDN #[id number]

## 2023-10-04 NOTE — PROGRESS NOTE ADULT - SUBJECTIVE AND OBJECTIVE BOX
[facility] [place] [facility]   MD ARI MILLS MD ANGELA WONG, PA    TEL:  OFFICE: [phone]  From 5pm-7am Answering Service [phone]    -- RENAL FOLLOW UP NOTE ---Date of Service 10-04-23 @ 11:30    Patient is a 73y old  Male who presents with a chief complaint of FTT (04 Oct 2023 07:14)      Patient seen and examined at bedside.    VITALS:  T(F): 97.9 (10-03-23 @ 20:26), Max: 98.2 (10-03-23 @ 12:11)  HR: 87 (10-03-23 @ 20:26)  BP: 154/94 (10-03-23 @ 20:26)  RR: 18 (10-03-23 @ 20:26)  SpO2: 97% (10-03-23 @ 20:26)  Wt(kg): --    10-03 @ 07:01  -  10-04 @ 07:00  --------------------------------------------------------  IN: 650 mL / OUT: 0 mL / NET: 650 mL          PHYSICAL EXAM:  General: lethargic  Neck: No JVD  Respiratory: CTAB, no wheezes, rales or rhonchi  Cardiovascular: S1, S2, RRR  Gastrointestinal: BS+, soft, NT/ND  Extremities: + peripheral edema    Hospital Medications:   MEDICATIONS  (STANDING):  amLODIPine   Tablet 10 milliGRAM(s) Oral daily  chlorhexidine 2% Cloths 1 Application(s) Topical daily  collagenase Ointment 1 Application(s) Topical daily  dextrose 10%. 1000 milliLiter(s) (50 mL/Hr) IV Continuous <Continuous>  dextrose 50% Injectable 12.5 Gram(s) IV Push once  dextrose 50% Injectable 25 Gram(s) IV Push once  dextrose 50% Injectable 25 Gram(s) IV Push once  dextrose 50% Injectable 25 Gram(s) IV Push once  dextrose Oral Gel 15 Gram(s) Oral once  enoxaparin Injectable 65 milliGRAM(s) SubCutaneous every 12 hours  furosemide   Injectable 20 milliGRAM(s) IV Push daily  glucagon  Injectable 1 milliGRAM(s) IntraMuscular once  levETIRAcetam  IVPB 500 milliGRAM(s) IV Intermittent every 12 hours  levothyroxine Injectable 70 MICROGram(s) IV Push <User Schedule>  multivitamin 1 Tablet(s) Oral daily  potassium chloride   Powder 40 milliEquivalent(s) Oral once  potassium chloride  10 mEq/100 mL IVPB 10 milliEquivalent(s) IV Intermittent every 1 hour  potassium phosphate IVPB 30 milliMole(s) IV Intermittent once  QUEtiapine 75 milliGRAM(s) Oral at bedtime  thiamine 100 milliGRAM(s) Oral daily  valproate sodium  IVPB 500 milliGRAM(s) IV Intermittent every 6 hours      LABS:  10-04    135  |  96<L>  |  6<L>  ----------------------------<  117<H>  3.3<L>   |  28  |  0.64    Ca    8.1<L>      04 Oct 2023 05:30  Phos  2.0     10-04  Mg     1.80     10-04      Creatinine Trend: 0.64 <--, 0.65 <--, 0.61 <--, 0.62 <--, 0.59 <--, 0.62 <--    Phosphorus: 2.0 mg/dL (10-04 @ 05:30)                              10.2   3.67  )-----------( 95       ( 04 Oct 2023 05:30 )             29.3     Urine Studies:  Urinalysis - [10-04-23 @ 05:30]      Color  / Appearance  / SG  / pH       Gluc 117 / Ketone   / Bili  / Urobili        Blood  / Protein  / Leuk Est  / Nitrite       RBC  / WBC  / Hyaline  / Gran  / Sq Epi  / Non Sq Epi  / Bacteria       TSH 3.29      [08-07-23 @ 06:09]  Lipid: chol 117, TG 82, HDL 48, LDL --      [02-09-23 @ 06:40]        RADIOLOGY & ADDITIONAL STUDIES:

## 2023-10-04 NOTE — PROGRESS NOTE ADULT - ASSESSMENT
73-year-old male history of dementia, baseline mental status AxOx0, seizure disorder on Keppra and divalproex, history of DVT/PE off Eliquis, hypertension, hypothyroidism presenting sent in from his nursing home for evaluation of failure to thrive, found to have hypernatremia and new renal failure on outside labs.    Hypernatremia/hyponatremia:  Hypernatremia was due to dehydration, poor intake.  Na now low-edematous  likely sec to low albumin  on d10  start lasix [id number]    JESSICA:  Due to Pre-Renal azotemia.  improved  monitor bmp     Hypophosphatemia:  supplemented  monitor    Hypokalemia  supplemented  monitor    Hypocalcemia  Due to low albumin   Albumin infusion x6 were given      Hypertension:  bp fluctuating  monitor    FTT  f/u GOC  prognosis poor

## 2023-10-04 NOTE — PROGRESS NOTE ADULT - SUBJECTIVE AND OBJECTIVE BOX
Patient is a 73y old  Male who presents with a chief complaint of FTT (04 Oct 2023 11:29)    Date of servie : 10-04-23 @ 15:17  INTERVAL HPI/OVERNIGHT EVENTS:  T(C): 36.9 (10-04-23 @ 12:14), Max: 36.9 (10-04-23 @ 12:14)  HR: 105 (10-04-23 @ 12:14) (87 - 105)  BP: 146/79 (10-04-23 @ 12:14) (146/79 - 154/94)  RR: 20 (10-04-23 @ 12:14) (18 - 20)  SpO2: 94% (10-04-23 @ 12:14) (94% - 97%)  Wt(kg): --  I&O's Summary    03 Oct 2023 07:01  -  04 Oct 2023 07:00  --------------------------------------------------------  IN: 650 mL / OUT: 0 mL / NET: 650 mL        LABS:                        10.2   3.67  )-----------( 95       ( 04 Oct 2023 05:30 )             29.3     10-04    135  |  96<L>  |  6<L>  ----------------------------<  117<H>  3.3<L>   |  28  |  0.64    Ca    8.1<L>      04 Oct 2023 05:30  Phos  2.0     10-04  Mg     1.80     10-04        Urinalysis Basic - ( 04 Oct 2023 05:30 )    Color: x / Appearance: x / SG: x / pH: x  Gluc: 117 mg/dL / Ketone: x  / Bili: x / Urobili: x   Blood: x / Protein: x / Nitrite: x   Leuk Esterase: x / RBC: x / WBC x   Sq Epi: x / Non Sq Epi: x / Bacteria: x      CAPILLARY BLOOD GLUCOSE      POCT Blood Glucose.: 143 mg/dL (04 Oct 2023 11:52)  POCT Blood Glucose.: 114 mg/dL (04 Oct 2023 05:48)  POCT Blood Glucose.: 117 mg/dL (04 Oct 2023 05:24)  POCT Blood Glucose.: 106 mg/dL (03 Oct 2023 23:35)  POCT Blood Glucose.: 109 mg/dL (03 Oct 2023 18:10)        Urinalysis Basic - ( 04 Oct 2023 05:30 )    Color: x / Appearance: x / SG: x / pH: x  Gluc: 117 mg/dL / Ketone: x  / Bili: x / Urobili: x   Blood: x / Protein: x / Nitrite: x   Leuk Esterase: x / RBC: x / WBC x   Sq Epi: x / Non Sq Epi: x / Bacteria: x        MEDICATIONS  (STANDING):  amLODIPine   Tablet 10 milliGRAM(s) Oral daily  chlorhexidine 2% Cloths 1 Application(s) Topical daily  collagenase Ointment 1 Application(s) Topical daily  dextrose 10%. 1000 milliLiter(s) (50 mL/Hr) IV Continuous <Continuous>  dextrose 50% Injectable 25 Gram(s) IV Push once  dextrose 50% Injectable 25 Gram(s) IV Push once  dextrose 50% Injectable 25 Gram(s) IV Push once  dextrose 50% Injectable 12.5 Gram(s) IV Push once  dextrose Oral Gel 15 Gram(s) Oral once  enoxaparin Injectable 65 milliGRAM(s) SubCutaneous every 12 hours  furosemide   Injectable 20 milliGRAM(s) IV Push daily  glucagon  Injectable 1 milliGRAM(s) IntraMuscular once  levETIRAcetam  IVPB 500 milliGRAM(s) IV Intermittent every 12 hours  levothyroxine Injectable 70 MICROGram(s) IV Push <User Schedule>  multivitamin 1 Tablet(s) Oral daily  potassium chloride   Powder 40 milliEquivalent(s) Oral once  potassium chloride  10 mEq/100 mL IVPB 10 milliEquivalent(s) IV Intermittent every 1 hour  potassium phosphate IVPB 30 milliMole(s) IV Intermittent once  QUEtiapine 75 milliGRAM(s) Oral at bedtime  thiamine 100 milliGRAM(s) Oral daily  valproate sodium  IVPB 500 milliGRAM(s) IV Intermittent every 6 hours    MEDICATIONS  (PRN):          PHYSICAL EXAM:  GENERAL: frail  HEAD:  Atraumatic, Normocephalic  CHEST/LUNG: Clear to percussion bilaterally; No rales, rhonchi, wheezing, or rubs  HEART: Regular rate and rhythm; No murmurs, rubs, or gallops  ABDOMEN: Soft, Nontender, Nondistended; Bowel sounds present  EXTREMITIES:  edema +    Care Discussed with Consultants/Other Providers [ ] YES  [ ] NO

## 2023-10-04 NOTE — PROGRESS NOTE ADULT - ASSESSMENT
73-year-old male history of dementia, baseline mental status AxOx0, seizure disorder on Keppra and divalproex, history of DVT/PE off Eliquis, hypertension, hypothyroidism presenting sent in from his nursing home for evaluation of failure to thrive, found to have hypernatremia and new renal failure on outside labs.  Per documentation that arrived with the patient, patient has been refusing to eat since yesterday.    1 Hypernatremia  - resolved   - renal fu   - cw current diet     2 Seizure disorder-   cw Keppra and valproic acid     3 hx of  DVT/PE  - cw AC    4 Dementia  - fall precautions  - frequent reorientation     5 Dysphagia  - diet as per speech and swallow  - poor po intake  - family refused PEG    DNR, DNI   DC planning     Palliative reconsult

## 2023-10-04 NOTE — PROGRESS NOTE ADULT - SUBJECTIVE AND OBJECTIVE BOX
Subjective: Patient seen and examined. No new events except as noted.     SUBJECTIVE/ROS:  MEDICATIONS:  MEDICATIONS  (STANDING):  amLODIPine   Tablet 10 milliGRAM(s) Oral daily  chlorhexidine 2% Cloths 1 Application(s) Topical daily  collagenase Ointment 1 Application(s) Topical daily  dextrose 10%. 1000 milliLiter(s) (50 mL/Hr) IV Continuous <Continuous>  dextrose 50% Injectable 12.5 Gram(s) IV Push once  dextrose 50% Injectable 25 Gram(s) IV Push once  dextrose 50% Injectable 25 Gram(s) IV Push once  dextrose 50% Injectable 25 Gram(s) IV Push once  dextrose Oral Gel 15 Gram(s) Oral once  enoxaparin Injectable 65 milliGRAM(s) SubCutaneous every 12 hours  glucagon  Injectable 1 milliGRAM(s) IntraMuscular once  levETIRAcetam  IVPB 500 milliGRAM(s) IV Intermittent every 12 hours  levothyroxine Injectable 70 MICROGram(s) IV Push <User Schedule>  multivitamin 1 Tablet(s) Oral daily  potassium chloride   Powder 40 milliEquivalent(s) Oral once  QUEtiapine 75 milliGRAM(s) Oral at bedtime  thiamine 100 milliGRAM(s) Oral daily  valproate sodium  IVPB 500 milliGRAM(s) IV Intermittent every 6 hours      PHYSICAL EXAM:  T(C): 36.6 (10-03-23 @ 20:26), Max: 36.8 (10-03-23 @ 12:11)  HR: 87 (10-03-23 @ 20:26) (87 - 93)  BP: 154/94 (10-03-23 @ 20:26) (125/82 - 154/94)  RR: 18 (10-03-23 @ 20:26) (17 - 18)  SpO2: 97% (10-03-23 @ 20:26) (97% - 97%)  Wt(kg): --  I&O's Summary    03 Oct 2023 07:01  -  04 Oct 2023 07:00  --------------------------------------------------------  IN: 650 mL / OUT: 0 mL / NET: 650 mL            JVP: Normal  Neck: supple  Lung: clear   CV: S1 S2 , Murmur:  Abd: soft  Ext: pos edema  Psych: flat affect  Skin: normal``    LABS/DATA:    CARDIAC MARKERS:                                10.1   5.40  )-----------( 83       ( 03 Oct 2023 06:47 )             29.4     10-03    134<L>  |  97<L>  |  5<L>  ----------------------------<  92  3.0<L>   |  26  |  0.65    Ca    7.7<L>      03 Oct 2023 06:47  Phos  3.1     10-03  Mg     1.50     10-03      proBNP:   Lipid Profile:   HgA1c:   TSH:     TELE:  EKG:

## 2023-10-05 NOTE — PROGRESS NOTE ADULT - SUBJECTIVE AND OBJECTIVE BOX
Subjective: Patient seen and examined. No new events except as noted.     SUBJECTIVE/ROS:  nad      MEDICATIONS:  MEDICATIONS  (STANDING):  amLODIPine   Tablet 10 milliGRAM(s) Oral daily  chlorhexidine 2% Cloths 1 Application(s) Topical daily  collagenase Ointment 1 Application(s) Topical daily  dextrose 10%. 1000 milliLiter(s) (50 mL/Hr) IV Continuous <Continuous>  dextrose 50% Injectable 12.5 Gram(s) IV Push once  dextrose 50% Injectable 25 Gram(s) IV Push once  dextrose 50% Injectable 25 Gram(s) IV Push once  dextrose 50% Injectable 25 Gram(s) IV Push once  dextrose Oral Gel 15 Gram(s) Oral once  enoxaparin Injectable 65 milliGRAM(s) SubCutaneous every 12 hours  furosemide   Injectable 20 milliGRAM(s) IV Push daily  glucagon  Injectable 1 milliGRAM(s) IntraMuscular once  levETIRAcetam  IVPB 500 milliGRAM(s) IV Intermittent every 12 hours  levothyroxine Injectable 70 MICROGram(s) IV Push <User Schedule>  multivitamin 1 Tablet(s) Oral daily  potassium chloride   Powder 40 milliEquivalent(s) Oral once  QUEtiapine 75 milliGRAM(s) Oral at bedtime  thiamine 100 milliGRAM(s) Oral daily  valproate sodium  IVPB 500 milliGRAM(s) IV Intermittent every 6 hours      PHYSICAL EXAM:  T(C): 37.1 (10-05-23 @ 05:37), Max: 37.1 (10-05-23 @ 05:37)  HR: 91 (10-05-23 @ 05:37) (91 - 105)  BP: 110/73 (10-05-23 @ 05:37) (110/73 - 160/91)  RR: 19 (10-05-23 @ 05:37) (19 - 20)  SpO2: 97% (10-05-23 @ 05:37) (94% - 100%)  Wt(kg): --  I&O's Summary          JVP: Normal  Neck: supple  Lung: clear   CV: S1 S2 , Murmur:  Abd: soft  Psych: flat affect  Skin: normal``    LABS/DATA:    CARDIAC MARKERS:                                10.2   3.67  )-----------( 95       ( 04 Oct 2023 05:30 )             29.3     10-04    135  |  96<L>  |  6<L>  ----------------------------<  117<H>  3.3<L>   |  28  |  0.64    Ca    8.1<L>      04 Oct 2023 05:30  Phos  2.0     10-04  Mg     1.80     10-04      proBNP:   Lipid Profile:   HgA1c:   TSH:     TELE:  EKG:

## 2023-10-05 NOTE — PROGRESS NOTE ADULT - ASSESSMENT
73-year-old male history of dementia, baseline mental status AxOx0, seizure disorder on Keppra and divalproex, history of DVT/PE off Eliquis, hypertension, hypothyroidism presenting sent in from his nursing home for evaluation of failure to thrive, found to have hypernatremia and new renal failure on outside labs.    Hypernatremia/hyponatremia:  Hypernatremia was due to dehydration, poor intake.  Na now low-edematous  likely sec to low albumin  on d10  start lasix [id number]    JESSICA:  Due to Pre-Renal azotemia.  improved  monitor bmp     Hypophosphatemia:  supplemented  monitor    Hypokalemia  supplemented  monitor    Hypocalcemia  Due to low albumin   Albumin infusion x6 were given      Hypertension:  bp fluctuating  monitor    FTT  f/u GOC  prognosis poor

## 2023-10-05 NOTE — PROGRESS NOTE ADULT - SUBJECTIVE AND OBJECTIVE BOX
[facility] [place] [facility]   MD ARI MILLS MD ANGELA WONG, PA    TEL:  OFFICE: [phone]  From 5pm-7am Answering Service [phone]    -- RENAL FOLLOW UP NOTE ---Date of Service 10-05-23 @ 10:22    Patient is a 73y old  Male who presents with a chief complaint of FTT (05 Oct 2023 08:34)      Patient seen and examined at bedside.    VITALS:  T(F): 98.7 (10-05-23 @ 05:37), Max: 98.7 (10-05-23 @ 05:37)  HR: 91 (10-05-23 @ 05:37)  BP: 110/73 (10-05-23 @ 05:37)  RR: 19 (10-05-23 @ 05:37)  SpO2: 97% (10-05-23 @ 05:37)  Wt(kg): --        PHYSICAL EXAM:  General: opens eyes when called but non verbal  Neck: No JVD  Respiratory: CTAB, no wheezes, rales or rhonchi  Cardiovascular: S1, S2, RRR  Gastrointestinal: BS+, soft, NT/ND  Extremities: ++ UE edema    Hospital Medications:   MEDICATIONS  (STANDING):  amLODIPine   Tablet 10 milliGRAM(s) Oral daily  chlorhexidine 2% Cloths 1 Application(s) Topical daily  collagenase Ointment 1 Application(s) Topical daily  dextrose 10%. 1000 milliLiter(s) (50 mL/Hr) IV Continuous <Continuous>  dextrose 50% Injectable 25 Gram(s) IV Push once  dextrose 50% Injectable 12.5 Gram(s) IV Push once  dextrose 50% Injectable 25 Gram(s) IV Push once  dextrose 50% Injectable 25 Gram(s) IV Push once  dextrose Oral Gel 15 Gram(s) Oral once  enoxaparin Injectable 65 milliGRAM(s) SubCutaneous every 12 hours  furosemide   Injectable 20 milliGRAM(s) IV Push daily  glucagon  Injectable 1 milliGRAM(s) IntraMuscular once  levETIRAcetam  IVPB 500 milliGRAM(s) IV Intermittent every 12 hours  levothyroxine Injectable 70 MICROGram(s) IV Push <User Schedule>  multivitamin 1 Tablet(s) Oral daily  potassium chloride   Powder 40 milliEquivalent(s) Oral once  QUEtiapine 75 milliGRAM(s) Oral at bedtime  thiamine 100 milliGRAM(s) Oral daily  valproate sodium  IVPB 500 milliGRAM(s) IV Intermittent every 6 hours      LABS:  10-04    135  |  96<L>  |  6<L>  ----------------------------<  117<H>  3.3<L>   |  28  |  0.64    Ca    8.1<L>      04 Oct 2023 05:30  Phos  2.0     10-04  Mg     1.80     10-04      Creatinine Trend: 0.64 <--, 0.65 <--, 0.61 <--, 0.62 <--, 0.59 <--, 0.62 <--                                10.2   3.67  )-----------( 95       ( 04 Oct 2023 05:30 )             29.3     Urine Studies:  Urinalysis - [10-04-23 @ 05:30]      Color  / Appearance  / SG  / pH       Gluc 117 / Ketone   / Bili  / Urobili        Blood  / Protein  / Leuk Est  / Nitrite       RBC  / WBC  / Hyaline  / Gran  / Sq Epi  / Non Sq Epi  / Bacteria       TSH 3.29      [08-07-23 @ 06:09]  Lipid: chol 117, TG 82, HDL 48, LDL --      [02-09-23 @ 06:40]        RADIOLOGY & ADDITIONAL STUDIES:

## 2023-10-05 NOTE — PROGRESS NOTE ADULT - SUBJECTIVE AND OBJECTIVE BOX
Patient is a 73y old  Male who presents with a chief complaint of FTT (05 Oct 2023 10:21)    Date of servie : 10-05-23 @ 14:40  INTERVAL HPI/OVERNIGHT EVENTS:  T(C): 36.9 (10-05-23 @ 12:05), Max: 37.1 (10-05-23 @ 05:37)  HR: 92 (10-05-23 @ 12:05) (91 - 95)  BP: 135/75 (10-05-23 @ 12:05) (110/73 - 160/91)  RR: 18 (10-05-23 @ 12:05) (18 - 20)  SpO2: 97% (10-05-23 @ 12:05) (97% - 100%)  Wt(kg): --  I&O's Summary      LABS:                        10.3   5.13  )-----------( 79       ( 05 Oct 2023 12:41 )             30.1     10-05    136  |  100  |  6<L>  ----------------------------<  113<H>  3.7   |  27  |  0.68    Ca    8.0<L>      05 Oct 2023 12:41  Phos  3.6     10-05  Mg     1.50     10-05        Urinalysis Basic - ( 05 Oct 2023 12:41 )    Color: x / Appearance: x / SG: x / pH: x  Gluc: 113 mg/dL / Ketone: x  / Bili: x / Urobili: x   Blood: x / Protein: x / Nitrite: x   Leuk Esterase: x / RBC: x / WBC x   Sq Epi: x / Non Sq Epi: x / Bacteria: x      CAPILLARY BLOOD GLUCOSE      POCT Blood Glucose.: 85 mg/dL (05 Oct 2023 12:33)  POCT Blood Glucose.: 124 mg/dL (05 Oct 2023 06:12)  POCT Blood Glucose.: 107 mg/dL (05 Oct 2023 00:36)  POCT Blood Glucose.: 116 mg/dL (04 Oct 2023 18:21)        Urinalysis Basic - ( 05 Oct 2023 12:41 )    Color: x / Appearance: x / SG: x / pH: x  Gluc: 113 mg/dL / Ketone: x  / Bili: x / Urobili: x   Blood: x / Protein: x / Nitrite: x   Leuk Esterase: x / RBC: x / WBC x   Sq Epi: x / Non Sq Epi: x / Bacteria: x        MEDICATIONS  (STANDING):  amLODIPine   Tablet 10 milliGRAM(s) Oral daily  chlorhexidine 2% Cloths 1 Application(s) Topical daily  collagenase Ointment 1 Application(s) Topical daily  dextrose 10%. 1000 milliLiter(s) (50 mL/Hr) IV Continuous <Continuous>  dextrose 50% Injectable 25 Gram(s) IV Push once  dextrose 50% Injectable 25 Gram(s) IV Push once  dextrose 50% Injectable 25 Gram(s) IV Push once  dextrose 50% Injectable 12.5 Gram(s) IV Push once  dextrose Oral Gel 15 Gram(s) Oral once  enoxaparin Injectable 65 milliGRAM(s) SubCutaneous every 12 hours  furosemide   Injectable 20 milliGRAM(s) IV Push daily  glucagon  Injectable 1 milliGRAM(s) IntraMuscular once  levETIRAcetam  IVPB 500 milliGRAM(s) IV Intermittent every 12 hours  levothyroxine Injectable 70 MICROGram(s) IV Push <User Schedule>  multivitamin 1 Tablet(s) Oral daily  potassium chloride   Powder 40 milliEquivalent(s) Oral once  QUEtiapine 75 milliGRAM(s) Oral at bedtime  thiamine 100 milliGRAM(s) Oral daily  valproate sodium  IVPB 500 milliGRAM(s) IV Intermittent every 6 hours    MEDICATIONS  (PRN):          PHYSICAL EXAM:  GENERAL: frail  CHEST/LUNG: Clear to percussion bilaterally; No rales, rhonchi, wheezing, or rubs  HEART: Regular rate and rhythm; No murmurs, rubs, or gallops  ABDOMEN: Soft, Nontender, Nondistended; Bowel sounds present  EXTREMITIES: edema +    Care Discussed with Consultants/Other Providers [ ] YES  [ ] NO

## 2023-10-05 NOTE — PROVIDER CONTACT NOTE (OTHER) - RECOMMENDATIONS
Notify provider
Notify provider. Have provider come to bedside to assess. hold amlodipine.
PA to assess at bedside for US
Notify provider. Hold Amlodipine
ACP made aware
provider notified
Re-order IVF
Notify provider. Hold Seroquel.
Notify provider. Hold Amlodipine
Notify provider. Hold Quetiapine.

## 2023-10-05 NOTE — [facility]VANCED PRACTICE NURSE CONSULT - ASSESSMENT
General: A&Ox1, somnolent, able to be turned with 2x assistance, incontinent of urine and stool. Skin warm, dry with increased moisture in intertriginous folds, adequate skin turgor, scattered areas of hyperpigmentation and hypopigmentation, scattered areas of ecchymosis on bilateral upper extremities. Blanchable erythema on bilateral heels.     Vascular: Bilateral lower extremities with scattered areas of hyper and hypopigmentation. Dry, flaky skin. Thickened-yellow hyperpigmented overgrown toenails. No temperature changes or edema noted. Capillary refill <3 seconds. Palpable DP/PT pulses, with bilateral monophasic doppler sounds.     R heel: Unstageable pressure injury as evidenced by wound measuring [id number] with 100% black, dry boggy eschar in place. Periwound intact. No induration, no crepitus, no fluctuance, no erythema, no edema, no temperature changes, no overt signs of infection noted. Goals of care: Prevent from friction, pressure, shearing. Monitor for tissue type changes.    L heel: Unstageable pressure injury as evidenced by wound measuring [id number] with 100% black, softening boggy eschar in place. Purple maroon discoloration circumferentially at periwound. No induration, no crepitus, no fluctuance, no erythema, no edema, no temperature changes, no overt signs of infection noted. Goals of care: Prevent from friction, pressure, shearing. Monitor for tissue type changes.
left arm cleansed with CHG. Ultra sound guidance, placed Accucath ace intravasular peripheral catheter system 20G /5.71cm into left brachial vein. Brisk blood return and flushed with 20Mls of normal saline. Minimal blood loss and patient tolerated procedure well. CHG dressing placed. All sharps accounted for. LOT#: [id number] , REF#: [id number]
General: A&Ox0x lethargic, able to be turned with 2x assistance, incontinent of urine and stool. Skin warm, dry with increased moisture in intertriginous folds, adequate skin turgor, scattered areas of hyperpigmentation and hypopigmentation, scattered areas of ecchymosis on bilateral upper extremities.    Vascular: Bilateral lower extremities with scattered areas of hyper and hypopigmentation. Dry, flaky skin. Thickened-yellow hyperpigmented overgrown toenails. No temperature changes or edema noted. Capillary refill <3 seconds. Palpable DP/PT pulses, with bilateral monophasic doppler sounds.     R heel: Unstageable pressure injury, now healing, originally measuring [id number] with 100% black, dry boggy eschar in place, now measuring [id number] with 40% red moist granular/dermis mixture at center of wound, 50% reepithelialization circumferentially, and 10% black eschar to periwound circumferentially. No induration, no crepitus, no fluctuance, no erythema, no edema, no temperature changes, no overt signs of infection noted. Goals of care: Prevent from friction, pressure, shearing. Monitor for tissue type changes.    L heel: Unstageable pressure injury, measuring [id number] with 100% black, dry eschar in place.No induration, no crepitus, no fluctuance, no erythema, no edema, no temperature changes, no overt signs of infection noted. Goals of care: Prevent from friction, pressure, shearing. Monitor for tissue type changes.

## 2023-10-05 NOTE — PROGRESS NOTE ADULT - ASSESSMENT
73-year-old male history of dementia, baseline mental status AxOx0, seizure disorder on Keppra and divalproex, history of DVT/PE off Eliquis, hypertension, hypothyroidism presenting sent in from his nursing home for evaluation of failure to thrive, found to have hypernatremia and new renal failure on outside labs.  Per documentation that arrived with the patient, patient has been refusing to eat since yesterday.    1 Hypernatremia  - resolved   - renal fu   - cw current diet     2 Seizure disorder-   cw Keppra and valproic acid     3 hx of  DVT/PE  - cw AC    4 Dementia  - fall precautions  - frequent reorientation     5 Dysphagia  - diet as per speech and swallow  - poor po intake  - family refused PEG    DNR, DNI   DC planning     Palliative reconsult appreciated  prognosis guarded  ethics consult

## 2023-10-06 NOTE — PROGRESS NOTE ADULT - SUBJECTIVE AND OBJECTIVE BOX
[facility] [place] [facility]   MD ARI MILLS MD ANGELA WONG, PA    TEL:  OFFICE: [phone]  From 5pm-7am Answering Service [phone]    -- RENAL FOLLOW UP NOTE ---Date of Service 10-06-23 @ 11:27    Patient is a 73y old  Male who presents with a chief complaint of FTT (06 Oct 2023 06:47)      Patient seen and examined at bedside.   VITALS:  T(F): 97.8 (10-06-23 @ 05:29), Max: 98.7 (10-05-23 @ 18:00)  HR: 97 (10-06-23 @ 05:29)  BP: 119/60 (10-06-23 @ 05:29)  RR: 17 (10-06-23 @ 05:29)  SpO2: 99% (10-06-23 @ 05:29)  Wt(kg): --        PHYSICAL EXAM:  General: Nonverbal  Neck: No JVD  Respiratory: CTAB, no wheezes, rales or rhonchi  Cardiovascular: S1, S2, RRR  Gastrointestinal: BS+, soft, NT/ND  Extremities: + UE peripheral edema    Hospital Medications:   MEDICATIONS  (STANDING):  amLODIPine   Tablet 10 milliGRAM(s) Oral daily  chlorhexidine 2% Cloths 1 Application(s) Topical daily  collagenase Ointment 1 Application(s) Topical daily  dextrose 50% Injectable 25 Gram(s) IV Push once  dextrose 50% Injectable 25 Gram(s) IV Push once  dextrose 50% Injectable 12.5 Gram(s) IV Push once  dextrose 50% Injectable 25 Gram(s) IV Push once  dextrose Oral Gel 15 Gram(s) Oral once  enoxaparin Injectable 65 milliGRAM(s) SubCutaneous every 12 hours  glucagon  Injectable 1 milliGRAM(s) IntraMuscular once  levETIRAcetam  IVPB 500 milliGRAM(s) IV Intermittent every 12 hours  levothyroxine Injectable 70 MICROGram(s) IV Push <User Schedule>  multivitamin 1 Tablet(s) Oral daily  potassium chloride   Powder 40 milliEquivalent(s) Oral once  QUEtiapine 75 milliGRAM(s) Oral at bedtime  thiamine 100 milliGRAM(s) Oral daily  valproate sodium  IVPB 500 milliGRAM(s) IV Intermittent every 6 hours      LABS:  10-05    136  |  100  |  6<L>  ----------------------------<  113<H>  3.7   |  27  |  0.68    Ca    8.0<L>      05 Oct 2023 12:41  Phos  3.6     10-05  Mg     1.50     10-05      Creatinine Trend: 0.68 <--, 0.64 <--, 0.65 <--, 0.61 <--, 0.62 <--    Phosphorus: 3.6 mg/dL (10-05 @ 12:41)                              10.3   5.13  )-----------( 79       ( 05 Oct 2023 12:41 )             30.1     Urine Studies:  Urinalysis - [10-05-23 @ 12:41]      Color  / Appearance  / SG  / pH       Gluc 113 / Ketone   / Bili  / Urobili        Blood  / Protein  / Leuk Est  / Nitrite       RBC  / WBC  / Hyaline  / Gran  / Sq Epi  / Non Sq Epi  / Bacteria       TSH 3.29      [08-07-23 @ 06:09]  Lipid: chol 117, TG 82, HDL 48, LDL --      [02-09-23 @ 06:40]        RADIOLOGY & ADDITIONAL STUDIES:

## 2023-10-06 NOTE — PROGRESS NOTE ADULT - SUBJECTIVE AND OBJECTIVE BOX
Patient is a 73y old  Male who presents with a chief complaint of FTT (06 Oct 2023 11:26)    Date of servie : 10-06-23 @ 14:47  INTERVAL HPI/OVERNIGHT EVENTS:  T(C): 36.6 (10-06-23 @ 05:29), Max: 37.1 (10-05-23 @ 18:00)  HR: 97 (10-06-23 @ 05:29) (82 - 97)  BP: 119/60 (10-06-23 @ 05:29) (119/60 - 153/69)  RR: 17 (10-06-23 @ 05:29) (17 - 18)  SpO2: 99% (10-06-23 @ 05:29) (99% - 99%)  Wt(kg): --  I&O's Summary      LABS:                        9.3    8.31  )-----------( 73       ( 06 Oct 2023 11:45 )             26.5     10-06    136  |  100  |  7   ----------------------------<  79  3.0<L>   |  29  |  0.64    Ca    7.9<L>      06 Oct 2023 11:45  Phos  2.7     10-06  Mg     1.60     10-06        Urinalysis Basic - ( 06 Oct 2023 11:45 )    Color: x / Appearance: x / SG: x / pH: x  Gluc: 79 mg/dL / Ketone: x  / Bili: x / Urobili: x   Blood: x / Protein: x / Nitrite: x   Leuk Esterase: x / RBC: x / WBC x   Sq Epi: x / Non Sq Epi: x / Bacteria: x      CAPILLARY BLOOD GLUCOSE      POCT Blood Glucose.: 99 mg/dL (06 Oct 2023 06:57)  POCT Blood Glucose.: 97 mg/dL (05 Oct 2023 23:41)  POCT Blood Glucose.: 111 mg/dL (05 Oct 2023 20:00)        Urinalysis Basic - ( 06 Oct 2023 11:45 )    Color: x / Appearance: x / SG: x / pH: x  Gluc: 79 mg/dL / Ketone: x  / Bili: x / Urobili: x   Blood: x / Protein: x / Nitrite: x   Leuk Esterase: x / RBC: x / WBC x   Sq Epi: x / Non Sq Epi: x / Bacteria: x        MEDICATIONS  (STANDING):  amLODIPine   Tablet 10 milliGRAM(s) Oral daily  chlorhexidine 2% Cloths 1 Application(s) Topical daily  collagenase Ointment 1 Application(s) Topical daily  dextrose 50% Injectable 12.5 Gram(s) IV Push once  dextrose 50% Injectable 25 Gram(s) IV Push once  dextrose 50% Injectable 25 Gram(s) IV Push once  dextrose 50% Injectable 25 Gram(s) IV Push once  dextrose Oral Gel 15 Gram(s) Oral once  enoxaparin Injectable 65 milliGRAM(s) SubCutaneous every 12 hours  glucagon  Injectable 1 milliGRAM(s) IntraMuscular once  levETIRAcetam  IVPB 500 milliGRAM(s) IV Intermittent every 12 hours  levothyroxine Injectable 70 MICROGram(s) IV Push <User Schedule>  magnesium sulfate  IVPB 1 Gram(s) IV Intermittent once  potassium chloride   Powder 40 milliEquivalent(s) Oral once  potassium chloride  20 mEq/100 mL IVPB 20 milliEquivalent(s) IV Intermittent every 2 hours  QUEtiapine 75 milliGRAM(s) Oral at bedtime  thiamine Injectable 100 milliGRAM(s) IV Push daily  valproate sodium  IVPB 500 milliGRAM(s) IV Intermittent every 6 hours    MEDICATIONS  (PRN):          PHYSICAL EXAM:  GENERAL: frail  HEAD:  Atraumatic, Normocephalic  CHEST/LUNG: Coarse breath sounds present   HEART: s1s2+  ABDOMEN: Soft, Nontender, Nondistended; Bowel sounds present  EXTREMITIES: edema +    Care Discussed with Consultants/Other Providers [x ] YES  [ ] NO

## 2023-10-06 NOTE — PROGRESS NOTE ADULT - ASSESSMENT
73-year-old male history of dementia, baseline mental status AxOx0, seizure disorder on Keppra and divalproex, history of DVT/PE off Eliquis, hypertension, hypothyroidism presenting sent in from his nursing home for evaluation of failure to thrive, found to have hypernatremia and new renal failure on outside labs.  Per documentation that arrived with the patient, patient has been refusing to eat since yesterday.    1 Hypernatremia  - resolved   - renal fu   - cw current diet     2 Seizure disorder-   cw Keppra and valproic acid     3 hx of  DVT/PE  - cw AC    4 Dementia  - fall precautions  - frequent reorientation     5 Dysphagia  - diet as per speech and swallow- poor po intake  - family refused PEG    6 Hypoglycemia  - low FS may be  from poor perfusion  - monitor BMP onlt  - dc d5 and monitor       DNR, DNI   DC planning     Palliative reconsult appreciated  prognosis guarded  ethics consult

## 2023-10-06 NOTE — PROGRESS NOTE ADULT - SUBJECTIVE AND OBJECTIVE BOX
Subjective: Patient seen and examined. No new events except as noted.     SUBJECTIVE/ROS:        MEDICATIONS:  MEDICATIONS  (STANDING):  amLODIPine   Tablet 10 milliGRAM(s) Oral daily  chlorhexidine 2% Cloths 1 Application(s) Topical daily  collagenase Ointment 1 Application(s) Topical daily  dextrose 10%. 1000 milliLiter(s) (50 mL/Hr) IV Continuous <Continuous>  dextrose 50% Injectable 12.5 Gram(s) IV Push once  dextrose 50% Injectable 25 Gram(s) IV Push once  dextrose 50% Injectable 25 Gram(s) IV Push once  dextrose 50% Injectable 25 Gram(s) IV Push once  dextrose Oral Gel 15 Gram(s) Oral once  enoxaparin Injectable 65 milliGRAM(s) SubCutaneous every 12 hours  glucagon  Injectable 1 milliGRAM(s) IntraMuscular once  levETIRAcetam  IVPB 500 milliGRAM(s) IV Intermittent every 12 hours  levothyroxine Injectable 70 MICROGram(s) IV Push <User Schedule>  multivitamin 1 Tablet(s) Oral daily  potassium chloride   Powder 40 milliEquivalent(s) Oral once  QUEtiapine 75 milliGRAM(s) Oral at bedtime  thiamine 100 milliGRAM(s) Oral daily  valproate sodium  IVPB 500 milliGRAM(s) IV Intermittent every 6 hours      PHYSICAL EXAM:  T(C): 36.6 (10-06-23 @ 05:29), Max: 37.1 (10-05-23 @ 18:00)  HR: 97 (10-06-23 @ 05:29) (82 - 97)  BP: 119/60 (10-06-23 @ 05:29) (119/60 - 153/69)  RR: 17 (10-06-23 @ 05:29) (17 - 18)  SpO2: 99% (10-06-23 @ 05:29) (97% - 99%)  Wt(kg): --  I&O's Summary          JVP: Normal  Neck: supple  Lung: clear   CV: S1 S2 , Murmur:  Abd: soft  Psych: flat affect  Skin: normal``    LABS/DATA:    CARDIAC MARKERS:                                10.3   5.13  )-----------( 79       ( 05 Oct 2023 12:41 )             30.1     10-05    136  |  100  |  6<L>  ----------------------------<  113<H>  3.7   |  27  |  0.68    Ca    8.0<L>      05 Oct 2023 12:41  Phos  3.6     10-05  Mg     1.50     10-05      proBNP:   Lipid Profile:   HgA1c:   TSH:     TELE:  EKG:

## 2023-10-06 NOTE — PROGRESS NOTE ADULT - ASSESSMENT
73-year-old male history of dementia, baseline mental status AxOx0, seizure disorder on Keppra and divalproex, history of DVT/PE off Eliquis, hypertension, hypothyroidism presenting sent in from his nursing home for evaluation of failure to thrive, found to have hypernatremia and new renal failure on outside labs.    Hypernatremia/hyponatremia:  Hypernatremia was due to dehydration, poor intake.  Na now low-edematous  likely sec to low albumin  on d10  still edematous. continue lasix 20 daily    JESSICA:  Due to Pre-Renal azotemia.  improved  monitor bmp     Hypophosphatemia:  supplemented  monitor    Hypokalemia  supplemented  monitor    Hypocalcemia  Due to low albumin   Albumin infusion x6 were given      Hypertension:  bp fluctuating  monitor    FTT  f/u GOC  prognosis poor

## 2023-10-07 NOTE — PROGRESS NOTE ADULT - ASSESSMENT
73-year-old male history of dementia, baseline mental status AxOx0, seizure disorder on Keppra and divalproex, history of DVT/PE off Eliquis, hypertension, hypothyroidism presenting sent in from his nursing home for evaluation of failure to thrive, found to have hypernatremia and new renal failure on outside labs.    Hypernatremia/hyponatremia:  Hypernatremia was due to dehydration, poor intake.  Na now low-edematous  likely sec to low albumin  on d10  still edematous.   Continue lasix 20mg IVP daily    JESSICA:  Due to Pre-Renal azotemia.  improved  monitor bmp     Hypophosphatemia:  supplemented  monitor    Hypokalemia  supplemented  monitor    Hypocalcemia  Due to low albumin   Albumin infusion x6 were given    Hypertension:  bp fluctuating  c/w bp meds.  monitor    FTT  f/u GOC  prognosis poor

## 2023-10-07 NOTE — PROGRESS NOTE ADULT - SUBJECTIVE AND OBJECTIVE BOX
Subjective: Patient seen and examined. No new events except as noted.     SUBJECTIVE/ROS:  nad  MEDICATIONS:  MEDICATIONS  (STANDING):  amLODIPine   Tablet 10 milliGRAM(s) Oral daily  chlorhexidine 2% Cloths 1 Application(s) Topical daily  collagenase Ointment 1 Application(s) Topical daily  dextrose 50% Injectable 12.5 Gram(s) IV Push once  dextrose 50% Injectable 25 Gram(s) IV Push once  dextrose 50% Injectable 25 Gram(s) IV Push once  dextrose 50% Injectable 25 Gram(s) IV Push once  dextrose Oral Gel 15 Gram(s) Oral once  enoxaparin Injectable 65 milliGRAM(s) SubCutaneous every 12 hours  furosemide   Injectable 20 milliGRAM(s) IV Push daily  glucagon  Injectable 1 milliGRAM(s) IntraMuscular once  levETIRAcetam  IVPB 500 milliGRAM(s) IV Intermittent every 12 hours  levothyroxine Injectable 70 MICROGram(s) IV Push <User Schedule>  potassium chloride   Powder 40 milliEquivalent(s) Oral once  QUEtiapine 75 milliGRAM(s) Oral at bedtime  thiamine Injectable 100 milliGRAM(s) IV Push daily  valproate sodium  IVPB 500 milliGRAM(s) IV Intermittent every 6 hours      PHYSICAL EXAM:  T(C): 37 (10-07-23 @ 05:34), Max: 37 (10-07-23 @ 05:34)  HR: 78 (10-07-23 @ 05:34) (78 - 94)  BP: 141/98 (10-07-23 @ 05:34) (123/70 - 141/98)  RR: 18 (10-07-23 @ 05:34) (18 - 19)  SpO2: 100% (10-07-23 @ 05:34) (100% - 100%)  Wt(kg): --  I&O's Summary    06 Oct 2023 07:01  -  07 Oct 2023 07:00  --------------------------------------------------------  IN: 0 mL / OUT: 0 mL / NET: 0 mL            JVP: Normal  Neck: supple  Lung: clear   CV: S1 S2 , Murmur:  Abd: soft  Psych: flat affect  Skin: normal``    LABS/DATA:    CARDIAC MARKERS:                                10.0   x     )-----------( x        ( 07 Oct 2023 05:32 )             29.5     10-07    133<L>  |  100  |  x   ----------------------------<  x   4.2   |  x   |  x     Ca    7.6<L>      07 Oct 2023 01:10  Phos  2.5     10-07  Mg     2.00     10-07      proBNP:   Lipid Profile:   HgA1c:   TSH:     TELE:  EKG:

## 2023-10-07 NOTE — GOALS OF CARE CONVERSATION - ADVANCED CARE PLANNING - CONVERSATION DETAILS
Wife endorses wishes for NGT placement. Explained and discussed with wife that NGT placement will only be a temporary means of nutrition and has risks including aspiration. After discussion, Leena still wishes to pursue NGT placement. Discussed with Dr. Forman.

## 2023-10-07 NOTE — PROGRESS NOTE ADULT - SUBJECTIVE AND OBJECTIVE BOX
[facility]   MD ARI MILLS MD ANGELA WONG, PA    TEL:  OFFICE: [phone]  From 5pm-7am Answering Service [phone]    -- RENAL FOLLOW UP NOTE ---Date of Service 10-07-23 @ 17:58    Patient is a 73y old  Male who presents with a chief complaint of FTT (07 Oct 2023 11:46)      Patient seen and examined at bedside. No chest pain/sob    VITALS:  T(F): 98 (10-07-23 @ 12:22), Max: 98.6 (10-07-23 @ 05:34)  HR: 80 (10-07-23 @ 12:22)  BP: 150/88 (10-07-23 @ 12:22)  RR: 18 (10-07-23 @ 12:22)  SpO2: 98% (10-07-23 @ 12:22)  Wt(kg): --    10-06 @ 07:01  -  10-07 @ 07:00  --------------------------------------------------------  IN: 0 mL / OUT: 0 mL / NET: 0 mL          PHYSICAL EXAM:  General: NAD  Neck: No JVD  Respiratory: CTAB, no wheezes, rales or rhonchi  Cardiovascular: S1, S2, RRR  Gastrointestinal: BS+, soft, NT/ND  Extremities: + b/l pedal edema; 2+ edema to b/l UE.    Hospital Medications:   MEDICATIONS  (STANDING):  amLODIPine   Tablet 10 milliGRAM(s) Oral daily  chlorhexidine 2% Cloths 1 Application(s) Topical daily  collagenase Ointment 1 Application(s) Topical daily  dexAMETHasone     Tablet 4 milliGRAM(s) Oral two times a day  dextrose 5% + sodium chloride 0.9%. 1000 milliLiter(s) (65 mL/Hr) IV Continuous <Continuous>  dextrose 50% Injectable 25 Gram(s) IV Push once  dextrose 50% Injectable 12.5 Gram(s) IV Push once  dextrose 50% Injectable 25 Gram(s) IV Push once  dextrose 50% Injectable 25 Gram(s) IV Push once  dextrose Oral Gel 15 Gram(s) Oral once  enoxaparin Injectable 65 milliGRAM(s) SubCutaneous every 12 hours  furosemide   Injectable 20 milliGRAM(s) IV Push daily  glucagon  Injectable 1 milliGRAM(s) IntraMuscular once  levETIRAcetam  IVPB 500 milliGRAM(s) IV Intermittent every 12 hours  levothyroxine Injectable 70 MICROGram(s) IV Push <User Schedule>  QUEtiapine 75 milliGRAM(s) Oral at bedtime  thiamine Injectable 100 milliGRAM(s) IV Push daily  valproate sodium  IVPB 500 milliGRAM(s) IV Intermittent every 6 hours      LABS:  10-07    133<L>  |  100  |  8   ----------------------------<  50<LL>  4.2   |  20<L>  |  0.60    Ca    7.6<L>      07 Oct 2023 05:32  Phos  2.5     10-07  Mg     2.00     10-07      Creatinine Trend: 0.60 <--, 0.61 <--, 0.59 <--, 0.64 <--, 0.68 <--, 0.64 <--, 0.65 <--, 0.61 <--, 0.62 <--    Phosphorus: 2.5 mg/dL (10-07 @ 05:32)                              10.0   4.80  )-----------( 35       ( 07 Oct 2023 05:32 )             29.5     Urine Studies:  Urinalysis - [10-07-23 @ 05:32]      Color  / Appearance  / SG  / pH       Gluc 50 / Ketone   / Bili  / Urobili        Blood  / Protein  / Leuk Est  / Nitrite       RBC  / WBC  / Hyaline  / Gran  / Sq Epi  / Non Sq Epi  / Bacteria       TSH 3.29      [08-07-23 @ 06:09]  Lipid: chol 117, TG 82, HDL 48, LDL --      [02-09-23 @ 06:40]        RADIOLOGY & ADDITIONAL STUDIES:

## 2023-10-07 NOTE — PROGRESS NOTE ADULT - ASSESSMENT
73-year-old male history of dementia, baseline mental status AxOx0, seizure disorder on Keppra and divalproex, history of DVT/PE off Eliquis, hypertension, hypothyroidism presenting sent in from his nursing home for evaluation of failure to thrive, found to have hypernatremia and new renal failure on outside labs.  Per documentation that arrived with the patient, patient has been refusing to eat since yesterday.    1 Hypernatremia  - resolved   - renal fu   - cw current diet     2 Seizure disorder-   cw Keppra and valproic acid     3 hx of  DVT/PE  - cw AC    4 Dementia  - fall precautions  - frequent reorientation   5 Dysphagia  - diet as per speech and swallow- poor po intake  - family refused PEG    6 Hypoglycemia  - FS low on BMP as well  - restart d5  - started decadron  - monitor FS

## 2023-10-07 NOTE — CHART NOTE - NSCHARTNOTEFT_GEN_A_CORE
NGT placed without complications. Per CXR prelim read: NG tube courses through the diaphragm, loops in the region of the stomach, and terminates in the region of the gastroesophageal junction. NGT readjusted @54cm, will order repeat CXR and endorse to night team to f/u.

## 2023-10-07 NOTE — PROGRESS NOTE ADULT - SUBJECTIVE AND OBJECTIVE BOX
Patient is a 73y old  Male who presents with a chief complaint of FTT (07 Oct 2023 07:32)    Date of servie : 10-07-23 @ 11:50  INTERVAL HPI/OVERNIGHT EVENTS:  T(C): 37 (10-07-23 @ 05:34), Max: 37 (10-07-23 @ 05:34)  HR: 78 (10-07-23 @ 05:34) (78 - 94)  BP: 141/98 (10-07-23 @ 05:34) (123/70 - 141/98)  RR: 18 (10-07-23 @ 05:34) (18 - 19)  SpO2: 100% (10-07-23 @ 05:34) (100% - 100%)  Wt(kg): --  I&O's Summary    06 Oct 2023 07:01  -  07 Oct 2023 07:00  --------------------------------------------------------  IN: 0 mL / OUT: 0 mL / NET: 0 mL        LABS:                        10.0   4.80  )-----------( 35       ( 07 Oct 2023 05:32 )             29.5     10-07    133<L>  |  100  |  8   ----------------------------<  50<LL>  4.2   |  20<L>  |  0.60    Ca    7.6<L>      07 Oct 2023 05:32  Phos  2.5     10-07  Mg     2.00     10-07        Urinalysis Basic - ( 07 Oct 2023 05:32 )    Color: x / Appearance: x / SG: x / pH: x  Gluc: 50 mg/dL / Ketone: x  / Bili: x / Urobili: x   Blood: x / Protein: x / Nitrite: x   Leuk Esterase: x / RBC: x / WBC x   Sq Epi: x / Non Sq Epi: x / Bacteria: x      CAPILLARY BLOOD GLUCOSE      POCT Blood Glucose.: 202 mg/dL (07 Oct 2023 09:00)  POCT Blood Glucose.: 68 mg/dL (07 Oct 2023 08:43)  POCT Blood Glucose.: 63 mg/dL (07 Oct 2023 08:15)        Urinalysis Basic - ( 07 Oct 2023 05:32 )    Color: x / Appearance: x / SG: x / pH: x  Gluc: 50 mg/dL / Ketone: x  / Bili: x / Urobili: x   Blood: x / Protein: x / Nitrite: x   Leuk Esterase: x / RBC: x / WBC x   Sq Epi: x / Non Sq Epi: x / Bacteria: x        MEDICATIONS  (STANDING):  amLODIPine   Tablet 10 milliGRAM(s) Oral daily  chlorhexidine 2% Cloths 1 Application(s) Topical daily  collagenase Ointment 1 Application(s) Topical daily  dexAMETHasone     Tablet 4 milliGRAM(s) Oral two times a day  dextrose 5% + sodium chloride 0.9%. 1000 milliLiter(s) (65 mL/Hr) IV Continuous <Continuous>  dextrose 50% Injectable 25 Gram(s) IV Push once  dextrose 50% Injectable 25 Gram(s) IV Push once  dextrose 50% Injectable 12.5 Gram(s) IV Push once  dextrose 50% Injectable 25 Gram(s) IV Push once  dextrose Oral Gel 15 Gram(s) Oral once  enoxaparin Injectable 65 milliGRAM(s) SubCutaneous every 12 hours  furosemide   Injectable 20 milliGRAM(s) IV Push daily  glucagon  Injectable 1 milliGRAM(s) IntraMuscular once  levETIRAcetam  IVPB 500 milliGRAM(s) IV Intermittent every 12 hours  levothyroxine Injectable 70 MICROGram(s) IV Push <User Schedule>  QUEtiapine 75 milliGRAM(s) Oral at bedtime  thiamine Injectable 100 milliGRAM(s) IV Push daily  valproate sodium  IVPB 500 milliGRAM(s) IV Intermittent every 6 hours    MEDICATIONS  (PRN):          PHYSICAL EXAM:  GENERAL: FRAIL  CHEST/LUNG: Clear to percussion bilaterally; No rales, rhonchi, wheezing, or rubs  HEART: Regular rate and rhythm; No murmurs, rubs, or gallops  ABDOMEN: Soft, Nontender, Nondistended; Bowel sounds present  EXTREMITIES:  edema +    Care Discussed with Consultants/Other Providers [ x] YES  [ ] NO

## 2023-10-08 NOTE — PROGRESS NOTE ADULT - SUBJECTIVE AND OBJECTIVE BOX
Dr. Arciniega  Office [phone] (9 am to 5 pm)  Service: [phone] (5pm to 9am)  Paula MONTENEGRO      RENAL PROGRESS NOTE: DATE OF SERVICE 10-08-23 @ 10:28    Patient is a 73y old  Male who presents with a chief complaint of FTT (07 Oct 2023 17:58)      Patient seen and examined at bedside. No apparent distress, lethargic    VITALS:  T(F): 97.8 (10-08-23 @ 08:00), Max: 98 (10-07-23 @ 12:22)  HR: 91 (10-08-23 @ 08:00)  BP: 122/61 (10-08-23 @ 08:00)  RR: 19 (10-08-23 @ 08:00)  SpO2: 94% (10-08-23 @ 08:00)  Wt(kg): --        PHYSICAL EXAM:  Constitutional: NAD  Neck: No JVD  Respiratory: CTAB, no wheezes, rales or rhonchi  Cardiovascular: S1, S2, RRR  Gastrointestinal: BS+, soft, NT/ND  Extremities: 2-3+ peripheral edema    Hospital Medications:   MEDICATIONS  (STANDING):  amLODIPine   Tablet 10 milliGRAM(s) Oral daily  chlorhexidine 2% Cloths 1 Application(s) Topical daily  collagenase Ointment 1 Application(s) Topical daily  dexAMETHasone     Tablet 4 milliGRAM(s) Oral two times a day  dextrose 5% + sodium chloride 0.9%. 1000 milliLiter(s) (65 mL/Hr) IV Continuous <Continuous>  dextrose 50% Injectable 12.5 Gram(s) IV Push once  dextrose 50% Injectable 25 Gram(s) IV Push once  dextrose 50% Injectable 25 Gram(s) IV Push once  dextrose 50% Injectable 25 Gram(s) IV Push once  dextrose Oral Gel 15 Gram(s) Oral once  enoxaparin Injectable 65 milliGRAM(s) SubCutaneous every 12 hours  furosemide   Injectable 20 milliGRAM(s) IV Push daily  glucagon  Injectable 1 milliGRAM(s) IntraMuscular once  levETIRAcetam  IVPB 500 milliGRAM(s) IV Intermittent every 12 hours  levothyroxine Injectable 70 MICROGram(s) IV Push <User Schedule>  potassium chloride  10 mEq/100 mL IVPB 10 milliEquivalent(s) IV Intermittent every 1 hour  QUEtiapine 75 milliGRAM(s) Oral at bedtime  thiamine Injectable 100 milliGRAM(s) IV Push daily  valproate sodium  IVPB 500 milliGRAM(s) IV Intermittent every 6 hours      LABS:  10-08    143  |  107  |  6<L>  ----------------------------<  147<H>  3.1<L>   |  28  |  0.62    Ca    8.0<L>      08 Oct 2023 06:40  Phos  2.5     10-08  Mg     1.90     10-08      Creatinine Trend: 0.62 <--, 0.60 <--, 0.61 <--, 0.59 <--, 0.64 <--, 0.68 <--, 0.64 <--, 0.65 <--, 0.61 <--    Phosphorus: 2.5 mg/dL (10-08 @ 06:40)                              8.7    4.79  )-----------( 44       ( 08 Oct 2023 06:40 )             26.0     Urine Studies:  Urinalysis - [10-08-23 @ 06:40]      Color  / Appearance  / SG  / pH       Gluc 147 / Ketone   / Bili  / Urobili        Blood  / Protein  / Leuk Est  / Nitrite       RBC  / WBC  / Hyaline  / Gran  / Sq Epi  / Non Sq Epi  / Bacteria       TSH 3.29      [08-07-23 @ 06:09]  Lipid: chol 117, TG 82, HDL 48, LDL --      [02-09-23 @ 06:40]        RADIOLOGY & ADDITIONAL STUDIES:

## 2023-10-08 NOTE — PROGRESS NOTE ADULT - ASSESSMENT
73-year-old male history of dementia, baseline mental status AxOx0, seizure disorder on Keppra and divalproex, history of DVT/PE off Eliquis, hypertension, hypothyroidism presenting sent in from his nursing home for evaluation of failure to thrive, found to have hypernatremia and new renal failure on outside labs.    Hypernatremia/hyponatremia:  Hypernatremia was due to dehydration, poor intake.  Na now low-edematous  likely sec to low albumin  on IVF-65cc/hr-d/c IVF since now has NGT to feed  still edematous.   Continue lasix 20mg IVP daily    JESSICA:  Due to Pre-Renal azotemia.  improved  monitor bmp     Hypophosphatemia:  supplemented  monitor    Hypokalemia  give 30meq IV + 40 meq via NGT  monitor    Hypocalcemia  Due to low albumin   Albumin infusion x6 were given    Hypertension:  bp fluctuating  c/w bp meds.  monitor    FTT  f/u GOC  prognosis poor

## 2023-10-08 NOTE — PROGRESS NOTE ADULT - SUBJECTIVE AND OBJECTIVE BOX
Patient is a 73y old  Male who presents with a chief complaint of FTT (08 Oct 2023 10:28)    Date of servie : 10-08-23 @ 14:26  INTERVAL HPI/OVERNIGHT EVENTS:  T(C): 36.6 (10-08-23 @ 08:00), Max: 36.6 (10-08-23 @ 05:15)  HR: 91 (10-08-23 @ 08:00) (74 - 91)  BP: 122/61 (10-08-23 @ 08:00) (105/45 - 122/61)  RR: 19 (10-08-23 @ 08:00) (16 - 19)  SpO2: 94% (10-08-23 @ 08:00) (94% - 95%)  Wt(kg): --  I&O's Summary      LABS:                        8.7    4.79  )-----------( 44       ( 08 Oct 2023 06:40 )             26.0     10-08    143  |  107  |  6<L>  ----------------------------<  147<H>  3.1<L>   |  28  |  0.62    Ca    8.0<L>      08 Oct 2023 06:40  Phos  2.5     10-08  Mg     1.90     10-08        Urinalysis Basic - ( 08 Oct 2023 06:40 )    Color: x / Appearance: x / SG: x / pH: x  Gluc: 147 mg/dL / Ketone: x  / Bili: x / Urobili: x   Blood: x / Protein: x / Nitrite: x   Leuk Esterase: x / RBC: x / WBC x   Sq Epi: x / Non Sq Epi: x / Bacteria: x      CAPILLARY BLOOD GLUCOSE      POCT Blood Glucose.: 148 mg/dL (08 Oct 2023 07:05)  POCT Blood Glucose.: 94 mg/dL (07 Oct 2023 23:40)  POCT Blood Glucose.: 94 mg/dL (07 Oct 2023 18:11)        Urinalysis Basic - ( 08 Oct 2023 06:40 )    Color: x / Appearance: x / SG: x / pH: x  Gluc: 147 mg/dL / Ketone: x  / Bili: x / Urobili: x   Blood: x / Protein: x / Nitrite: x   Leuk Esterase: x / RBC: x / WBC x   Sq Epi: x / Non Sq Epi: x / Bacteria: x        MEDICATIONS  (STANDING):  amLODIPine   Tablet 10 milliGRAM(s) Oral daily  chlorhexidine 2% Cloths 1 Application(s) Topical daily  collagenase Ointment 1 Application(s) Topical daily  dexAMETHasone     Tablet 4 milliGRAM(s) Oral two times a day  dextrose 5% + sodium chloride 0.9%. 1000 milliLiter(s) (65 mL/Hr) IV Continuous <Continuous>  dextrose 50% Injectable 25 Gram(s) IV Push once  dextrose 50% Injectable 12.5 Gram(s) IV Push once  dextrose 50% Injectable 25 Gram(s) IV Push once  dextrose 50% Injectable 25 Gram(s) IV Push once  dextrose Oral Gel 15 Gram(s) Oral once  enoxaparin Injectable 65 milliGRAM(s) SubCutaneous every 12 hours  furosemide   Injectable 20 milliGRAM(s) IV Push daily  glucagon  Injectable 1 milliGRAM(s) IntraMuscular once  levETIRAcetam  IVPB 500 milliGRAM(s) IV Intermittent every 12 hours  levothyroxine Injectable 70 MICROGram(s) IV Push <User Schedule>  QUEtiapine 75 milliGRAM(s) Oral at bedtime  thiamine Injectable 100 milliGRAM(s) IV Push daily  valproate sodium  IVPB 500 milliGRAM(s) IV Intermittent every 6 hours    MEDICATIONS  (PRN):          PHYSICAL EXAM:  GENERAL: NAD, well-groomed, well-developed  HEAD:  Atraumatic, Normocephalic  CHEST/LUNG: Clear to percussion bilaterally; No rales, rhonchi, wheezing, or rubs  HEART: Regular rate and rhythm; No murmurs, rubs, or gallops  ABDOMEN: Soft, Nontender, Nondistended; Bowel sounds present  EXTREMITIES:  edema +    Care Discussed with Consultants/Other Providers [ ] YES  [ ] NO

## 2023-10-09 NOTE — PROGRESS NOTE ADULT - ASSESSMENT
HTN  cont current meds    edema   from poor oncotic pressure  nutritional optimization   lasix as needed     GOC as per palliative care

## 2023-10-09 NOTE — CHART NOTE - NSCHARTNOTEFT_GEN_A_CORE
Source: Patient [X ] sleeping at time of visit   Family [ ]     other [X ] electronic chart, RN, ACP    Diet : Diet, NPO with Tube Feed:   Tube Feeding Modality: Nasogastric  Glucerna 1.5 Terrence (GLUCERNA1.5RTH)  Total Volume for 24 Hours (mL): 720  Continuous  Starting Tube Feed Rate {mL per Hour}: 15  Increase Tube Feed Rate by (mL): 5     Every hour  Until Goal Tube Feed Rate (mL per Hour): 30  Tube Feed Duration (in Hours): 24  Tube Feed Start Time: 15:00 (10-08-23 @ 14:33)    Nutrition follow-up note, severe malnutrition. Consulted for TF. Per chart review, 73-year-old male history of dementia, baseline mental status AxOx0, seizure disorder on Keppra and divalproex, history of DVT/PE off Eliquis, hypertension, hypothyroidism presenting sent in from his nursing home for evaluation of failure to thrive, found to have hypernatremia and new renal failure on outside labs.     Patient with poor po intake noted and family refusing PEG placement. Per GO 10/7, family endorses wishes for NGT with understanding that this will only be a temporary means of nutrition and has risks including aspiration. NGT placed on 10/7. Pending ethic team consult. Patient transferred to this floor this am 10/9. TF order in place however, on hold?- RD spoke to ACP made aware regarding TF on hold no IVF at this time. Plan to initiate TF as per ACP. Recommendation for TF below. Patient appears to be edematous-RN agrees. Anasarca noted in provider handoff.       Current Weight: 9/27- 71kg  9/12 67kg     6% weight gain-weight change likely fluid related.   Height: 182.9cm  BMI: 21.2kg/m2  IBW: 178lbs/81kg (+/-10%)        Pertinent Medications: MEDICATIONS  (STANDING):  amLODIPine   Tablet 10 milliGRAM(s) Oral daily  chlorhexidine 2% Cloths 1 Application(s) Topical daily  collagenase Ointment 1 Application(s) Topical daily  dexAMETHasone     Tablet 4 milliGRAM(s) Oral two times a day  dextrose 50% Injectable 12.5 Gram(s) IV Push once  dextrose 50% Injectable 25 Gram(s) IV Push once  dextrose 50% Injectable 25 Gram(s) IV Push once  dextrose 50% Injectable 25 Gram(s) IV Push once  dextrose Oral Gel 15 Gram(s) Oral once  enoxaparin Injectable 65 milliGRAM(s) SubCutaneous every 12 hours  furosemide   Injectable 20 milliGRAM(s) IV Push two times a day  glucagon  Injectable 1 milliGRAM(s) IntraMuscular once  levETIRAcetam  IVPB 500 milliGRAM(s) IV Intermittent every 12 hours  levothyroxine Injectable 70 MICROGram(s) IV Push <User Schedule>  pantoprazole   Suspension 40 milliGRAM(s) Enteral Tube daily  potassium phosphate / sodium phosphate Tablet (K-PHOS No. 2) 1 Tablet(s) Oral three times a day  QUEtiapine 75 milliGRAM(s) Oral at bedtime  sodium chloride 0.65% Nasal 1 Spray(s) Both Nostrils every 4 hours  thiamine Injectable 100 milliGRAM(s) IV Push daily  valproate sodium  IVPB 500 milliGRAM(s) IV Intermittent every 6 hours    MEDICATIONS  (PRN):    Pertinent Labs:  10-09 Na143 mmol/L Glu 153 mg/dL<H> K+ 3.5 mmol/L Cr  0.61 mg/dL BUN 7 mg/dL 10-09 Phos 2.2 mg/dL<L>      Skin: Right and left unstageable pressure injury    Estimated Needs:   [ ] no change since previous assessment  [X ] recalculated: IBW: 81kg  Estimated Energy Needs (25-30 kcal/kg of IBW): 2025-2430kcal/d  Estimated Protein Needds (1.2-1.4g/kg of IBW): 97-113g pro/d    Previous Nutrition Diagnosis: Severe malnutrition    Nutrition Diagnosis is [ X] ongoing  [ ] resolved [ ] not applicable       Education:    [  ] Given today    Type of education provided:    [  ] Given on previous assessment by RD    [X ] Not applicable 2/2 cognitive deficit    [  ] Pt refusal of education offered    [ X ] Not applicable 2/2 current prognosis    [  ] Not warranted at present    Recommend:    1. When TF initiated, recommend Glucerna1.5cal via NGT continuous feeds start at 10cc and increase by 5mL every 6 hours to goal TF rate of 60cc/hr [id number] (goal rate provides total volume of 1440mL, 2160kcal, 119g pro and 1093mL free water).   2. Additional fluids per MD discretion.   3. Monitor weights, labs, BM's, skin integrity, tolerance to EN.   4. Further adjustments to rate/volume/duration/free water provision of enteral feeds dependant on long term monitoring of patient's tolerance, weight trends and needs.     French Dowling, MS, CDN, RDN     pager [phone] or TEAMS

## 2023-10-09 NOTE — CHART NOTE - NSCHARTNOTEFT_GEN_A_CORE
[facility] NIGHT MEDICINE COVERAGE    Notified by RN that pt was satting 89% upon arrival to 8N from 4S.  Pt placed on NC oxygen 3L/min, now satting 91%.  Pt seen at bedside, pt A&Ox0 at baseline.  Pt in no obvious respiratory distress, no tremors noted.  No oral secretion noted.  On auscultation, crackles noted at b/l bases.  Edema noted in b/l UE.  Chest PT performed, SpO2 improved to 92-93, pt noted have significant nasal congestion.  Oxygen delivery changed to 50% Venturi Mask, now 97%.  Requested tube feeds be held, and for pt receive nasotracheal suction.    Vital Signs Last 24 Hrs  T(C): 36.5 (08 Oct 2023 20:04), Max: 36.6 (08 Oct 2023 05:15)  T(F): 97.7 (08 Oct 2023 20:04), Max: 97.8 (08 Oct 2023 05:15)  HR: 72 (08 Oct 2023 20:04) (72 - 91)  BP: 134/55 (08 Oct 2023 20:04) (105/45 - 134/55)  RR: 18 (08 Oct 2023 20:04) (16 - 19)  SpO2: 94% (08 Oct 2023 20:04) (92% - 95%)  O2 Parameters below as of 08 Oct 2023 20:04  Patient On (Oxygen Delivery Method): room air    Plan:  -Hold tube feeds for now.  -Repeat CXR ordered - on writer's wet read, concern for congestion in R lung field (mucus plugging vs. fluid overload).  -Pillow placed under pt's back on R side to help lean to his left.  -Lasix 20mg IVP x1 ordered, consider increasing diuresis as needed.  -Chest PT and nasotracheal suctioning PRN.  -Ocean spray q4h ordered to attempt to help clear nasal congestion.  -VBG ordered.  -Wean oxygen as pt tolerates.  -C/w Lovenox 65mg BID.  -Event endorsed to 8N provider.    D/w RN, will continue to monitor.    Mamadou Duffy PA-C  Department of Medicine - [facility]  In-House Pager: #[phone] [facility] NIGHT MEDICINE COVERAGE    Notified by RN that pt was satting 89% upon arrival to 8N from 4S.  Pt placed on NC oxygen 3L/min, now satting 91%.  Pt seen at bedside, pt A&Ox0 at baseline.  Pt in no obvious respiratory distress, no tremors noted.  No oral secretion noted.  On auscultation, crackles noted at b/l bases.  Edema noted in b/l UE.  Chest PT performed, SpO2 improved to 92-93, pt noted have significant nasal congestion.  Oxygen delivery changed to 50% Venturi Mask, now 97%.  Requested tube feeds be held, and for pt receive nasotracheal suction.    Vital Signs Last 24 Hrs  T(C): 36.5 (08 Oct 2023 20:04), Max: 36.6 (08 Oct 2023 05:15)  T(F): 97.7 (08 Oct 2023 20:04), Max: 97.8 (08 Oct 2023 05:15)  HR: 72 (08 Oct 2023 20:04) (72 - 91)  BP: 134/55 (08 Oct 2023 20:04) (105/45 - 134/55)  RR: 18 (08 Oct 2023 20:04) (16 - 19)  SpO2: 94% (08 Oct 2023 20:04) (92% - 95%)  O2 Parameters below as of 08 Oct 2023 20:04  Patient On (Oxygen Delivery Method): room air    Plan:  -Hold tube feeds for now.  -Repeat CXR ordered - on writer's wet read, concern for congestion in R lung field (mucus plugging vs. fluid overload).  -Pillow placed under pt's back on R side to help lean to his left.  -Lasix 20mg IVP x1 ordered, consider increasing diuresis as needed.  -Chest PT and nasotracheal suctioning PRN.  -Ocean spray q4h ordered to attempt to help clear nasal congestion.  -VBG ordered.  -Wean oxygen as pt tolerates.  -C/w Lovenox 65mg BID.  -Event endorsed to 8N provider.    D/w RN, will continue to monitor.    ADDENDUM:  Attending physician, Dr. Forman, notified regarding above event.    LIYA ChandraC  Department of Medicine - [facility]  In-House Pager: #[phone]

## 2023-10-09 NOTE — PROGRESS NOTE ADULT - SUBJECTIVE AND OBJECTIVE BOX
[facility] [place] [facility]OLOGY [facility]ACT[facility]   MD ARI MILLS MD ANGELA WONG, PA    TEL:  OFFICE: [phone]  From 5pm-7am Answering Service [phone]    -- RENAL FOLLOW UP NOTE ---Date of Service 10-09-23 @ 12:37    Patient is a 73y old  Male who presents with a chief complaint of FTT (09 Oct 2023 08:06)      Patient seen and examined at bedside.    VITALS:  T(F): 97.6 (10-09-23 @ 04:19), Max: 97.7 (10-08-23 @ 20:04)  HR: 89 (10-09-23 @ 05:45)  BP: 113/68 (10-09-23 @ 05:45)  RR: 20 (10-09-23 @ 04:19)  SpO2: 97% (10-09-23 @ 04:19)  Wt(kg): --        PHYSICAL EXAM:  General: NAD  Neck: No JVD  Respiratory: CTAB, no wheezes, rales or rhonchi  Cardiovascular: S1, S2, RRR  Gastrointestinal: BS+, soft, NT/ND, NGT  Extremities: edematous     Hospital Medications:   MEDICATIONS  (STANDING):  amLODIPine   Tablet 10 milliGRAM(s) Oral daily  chlorhexidine 2% Cloths 1 Application(s) Topical daily  collagenase Ointment 1 Application(s) Topical daily  dexAMETHasone     Tablet 4 milliGRAM(s) Oral two times a day  dextrose 50% Injectable 25 Gram(s) IV Push once  dextrose 50% Injectable 12.5 Gram(s) IV Push once  dextrose 50% Injectable 25 Gram(s) IV Push once  dextrose 50% Injectable 25 Gram(s) IV Push once  dextrose Oral Gel 15 Gram(s) Oral once  enoxaparin Injectable 65 milliGRAM(s) SubCutaneous every 12 hours  furosemide   Injectable 20 milliGRAM(s) IV Push two times a day  glucagon  Injectable 1 milliGRAM(s) IntraMuscular once  levETIRAcetam  IVPB 500 milliGRAM(s) IV Intermittent every 12 hours  levothyroxine Injectable 70 MICROGram(s) IV Push <User Schedule>  pantoprazole   Suspension 40 milliGRAM(s) Enteral Tube daily  potassium phosphate / sodium phosphate Tablet (K-PHOS No. 2) 1 Tablet(s) Oral three times a day  QUEtiapine 75 milliGRAM(s) Oral at bedtime  sodium chloride 0.65% Nasal 1 Spray(s) Both Nostrils every 4 hours  thiamine Injectable 100 milliGRAM(s) IV Push daily  valproate sodium  IVPB 500 milliGRAM(s) IV Intermittent every 6 hours      LABS:  10-09    143  |  107  |  7   ----------------------------<  153<H>  3.5   |  27  |  0.61    Ca    8.0<L>      09 Oct 2023 05:20  Phos  2.2     10-09  Mg     1.70     10-09      Creatinine Trend: 0.61 <--, 0.62 <--, 0.60 <--, 0.61 <--, 0.59 <--, 0.64 <--, 0.68 <--, 0.64 <--, 0.65 <--    Phosphorus: 2.2 mg/dL (10-09 @ 05:20)                              9.2    4.49  )-----------( 34       ( 09 Oct 2023 05:20 )             27.7     Urine Studies:  Urinalysis - [10-09-23 @ 05:20]      Color  / Appearance  / SG  / pH       Gluc 153 / Ketone   / Bili  / Urobili        Blood  / Protein  / Leuk Est  / Nitrite       RBC  / WBC  / Hyaline  / Gran  / Sq Epi  / Non Sq Epi  / Bacteria       TSH 3.29      [08-07-23 @ 06:09]  Lipid: chol 117, TG 82, HDL 48, LDL --      [02-09-23 @ 06:40]        RADIOLOGY & ADDITIONAL STUDIES:

## 2023-10-09 NOTE — CHART NOTE - NSCHARTNOTEFT_GEN_A_CORE
[facility] ACP COVERAGE     Pt seen at bedside s/p lasix 20mg IVP. Pt more alert, opening eyes to tactile stimuli. Pt is incontinent but nirav saturated with urine. Pt does not appear in acute distress, mild crackles on b/l lung fields still present.   Advised RN to keep on venti mask for now and titrate down as patient can tolerate. Due to blocked nasal passages from congestion, cannot use NC for O2 supplementation.   BL upper extremities very edematous, labs (CBC/BMP w/mg/phos/VBG comprehensive) obtained by writer via US guided arterial stick - will follow up results.   Continue holding TF with FS q6 hrs.     Will continue monitoring   NICHOLAS Salas PA-C  [id number] [facility]NIGHT MEDICINE ACP COVERAGE     Pt seen at bedside s/p lasix 20mg IVP. Pt more alert, opening eyes to tactile stimuli. Pt is incontinent but nirav saturated with urine. Pt does not appear in acute distress, mild crackles on b/l lung fields still present.   Advised RN to keep on venti mask for now and titrate down as patient can tolerate. Due to blocked nasal passages from congestion, cannot use NC for O2 supplementation.   BL upper extremities very edematous, labs (CBC/BMP w/mg/phos/VBG comprehensive) obtained by writer via US guided arterial stick - will follow up results.   Continue holding TF with FS q6 hrs.     Vital Signs Last 24 Hrs  T(C): 36.4 (09 Oct 2023 04:19), Max: 36.6 (08 Oct 2023 08:00)  T(F): 97.6 (09 Oct 2023 04:19), Max: 97.8 (08 Oct 2023 08:00)  HR: 69 (09 Oct 2023 04:19) (69 - 91)  BP: 108/59 (09 Oct 2023 04:19) (108/59 - 134/55)  BP(mean): --  RR: 20 (09 Oct 2023 04:19) (18 - 20)  SpO2: 97% (09 Oct 2023 04:19) (92% - 97%)    Parameters below as of 09 Oct 2023 04:19  Patient On (Oxygen Delivery Method): mask, Venturi  O2 Flow (L/min): 15  O2 Concentration (%): 50    Will continue monitoring   NICHOLAS Salas PA-C  [id number]

## 2023-10-09 NOTE — PROGRESS NOTE ADULT - ASSESSMENT
73-year-old male history of dementia, baseline mental status AxOx0, seizure disorder on Keppra and divalproex, history of DVT/PE off Eliquis, hypertension, hypothyroidism presenting sent in from his nursing home for evaluation of failure to thrive, found to have hypernatremia and new renal failure on outside labs.  Per documentation that arrived with the patient, patient has been refusing to eat since yesterday.    1 Hypernatremia  - resolved   - renal fu   - cw current diet     2 Seizure disorder-   cw Keppra and valproic acid     3 hx of  DVT/PE  - cw AC    4 Dementia  - fall precautions  - frequent reorientation     5 Dysphagia  - diet as per speech and swallow- poor po intake  - family refused PEG    6 Hypoglycemia  - FS low on BMP as well  - restart d5  - started decadron    7 Hypoxia  - pulm fu   - oxygen support     prognosis guarded  DNR with trial of intubation  palliative following  ethics consult pending    pt camiloley to have any meaningful recovery  family unrealistic about GOC

## 2023-10-09 NOTE — PROGRESS NOTE ADULT - ASSESSMENT
73-year-old male history of dementia, baseline mental status AxOx0, seizure disorder on Keppra and divalproex, history of DVT/PE off Eliquis, hypertension, hypothyroidism presenting sent in from his nursing home for evaluation of failure to thrive, found to have hypernatremia and new renal failure on outside labs.    Hypernatremia/hyponatremia:  Hypernatremia was due to dehydration, poor intake.  Na improved howevere edematous  likely sec to low albumin  on IVF-65cc/hr-d/c IVF since now has NGT to feed  still edematous. on lasix 20mg IVP daily increase to bid    JESSICA:  Due to Pre-Renal azotemia.  improved  monitor bmp     Hypophosphatemia:  supplemented  monitor    Hypokalemia  supplement as needed   monitor    Hypocalcemia  Due to low albumin   Albumin infusion x6 were given    Hypertension:  bp fluctuating  c/w bp meds.  monitor    FTT  f/u GOC  NGT in place. dc ivf  prognosis poor 73-year-old male history of dementia, baseline mental status AxOx0, seizure disorder on Keppra and divalproex, history of DVT/PE off Eliquis, hypertension, hypothyroidism presenting sent in from his nursing home for evaluation of failure to thrive, found to have hypernatremia and new renal failure on outside labs.    Hypernatremia/hyponatremia:  Hypernatremia was due to dehydration, poor intake.  Na improved howevere edematous  likely sec to low albumin  d/c IVF since now has NGT to feed  still edematous. on lasix 20mg IVP daily increase to bid    JESSICA:  Due to Pre-Renal azotemia.  improved  monitor bmp     Hypophosphatemia:  supplemented  monitor    Hypokalemia  supplement as needed   monitor    Hypocalcemia  Due to low albumin   Albumin infusion x6 were given    Hypertension:  bp fluctuating  c/w bp meds.  monitor    FTT  f/u GOC  NGT in place. dc ivf  prognosis poor

## 2023-10-09 NOTE — PROGRESS NOTE ADULT - SUBJECTIVE AND OBJECTIVE BOX
Patient is a 73y old  Male who presents with a chief complaint of FTT (09 Oct 2023 12:36)    Date of servie : 10-09-23 @ 13:49  INTERVAL HPI/OVERNIGHT EVENTS:  T(C): 36.4 (10-09-23 @ 04:19), Max: 36.5 (10-08-23 @ 20:04)  HR: 89 (10-09-23 @ 05:45) (69 - 89)  BP: 113/68 (10-09-23 @ 05:45) (108/59 - 134/55)  RR: 20 (10-09-23 @ 04:19) (18 - 20)  SpO2: 97% (10-09-23 @ 04:19) (94% - 97%)  Wt(kg): --  I&O's Summary      LABS:                        9.2    4.49  )-----------( 34       ( 09 Oct 2023 05:20 )             27.7     10-09    143  |  107  |  7   ----------------------------<  153<H>  3.5   |  27  |  0.61    Ca    8.0<L>      09 Oct 2023 05:20  Phos  2.2     10-09  Mg     1.70     10-09        Urinalysis Basic - ( 09 Oct 2023 05:20 )    Color: x / Appearance: x / SG: x / pH: x  Gluc: 153 mg/dL / Ketone: x  / Bili: x / Urobili: x   Blood: x / Protein: x / Nitrite: x   Leuk Esterase: x / RBC: x / WBC x   Sq Epi: x / Non Sq Epi: x / Bacteria: x      CAPILLARY BLOOD GLUCOSE      POCT Blood Glucose.: 119 mg/dL (09 Oct 2023 12:51)  POCT Blood Glucose.: 133 mg/dL (09 Oct 2023 06:24)  POCT Blood Glucose.: 149 mg/dL (09 Oct 2023 00:37)  POCT Blood Glucose.: 114 mg/dL (08 Oct 2023 18:20)    ABG - ( 09 Oct 2023 05:20 )  pH, Arterial: 7.48  pH, Blood: x     /  pCO2: 39    /  pO2: 91    / HCO3: 29    / Base Excess: 5.1   /  SaO2: 97.6                Urinalysis Basic - ( 09 Oct 2023 05:20 )    Color: x / Appearance: x / SG: x / pH: x  Gluc: 153 mg/dL / Ketone: x  / Bili: x / Urobili: x   Blood: x / Protein: x / Nitrite: x   Leuk Esterase: x / RBC: x / WBC x   Sq Epi: x / Non Sq Epi: x / Bacteria: x        MEDICATIONS  (STANDING):  amLODIPine   Tablet 10 milliGRAM(s) Oral daily  chlorhexidine 2% Cloths 1 Application(s) Topical daily  collagenase Ointment 1 Application(s) Topical daily  dexAMETHasone     Tablet 4 milliGRAM(s) Oral two times a day  dextrose 50% Injectable 25 Gram(s) IV Push once  dextrose 50% Injectable 12.5 Gram(s) IV Push once  dextrose 50% Injectable 25 Gram(s) IV Push once  dextrose 50% Injectable 25 Gram(s) IV Push once  dextrose Oral Gel 15 Gram(s) Oral once  enoxaparin Injectable 65 milliGRAM(s) SubCutaneous every 12 hours  furosemide   Injectable 20 milliGRAM(s) IV Push two times a day  glucagon  Injectable 1 milliGRAM(s) IntraMuscular once  levETIRAcetam  IVPB 500 milliGRAM(s) IV Intermittent every 12 hours  levothyroxine Injectable 70 MICROGram(s) IV Push <User Schedule>  pantoprazole   Suspension 40 milliGRAM(s) Enteral Tube daily  potassium phosphate / sodium phosphate Tablet (K-PHOS No. 2) 1 Tablet(s) Oral three times a day  QUEtiapine 75 milliGRAM(s) Oral at bedtime  sodium chloride 0.65% Nasal 1 Spray(s) Both Nostrils every 4 hours  thiamine Injectable 100 milliGRAM(s) IV Push daily  valproate sodium  IVPB 500 milliGRAM(s) IV Intermittent every 6 hours    MEDICATIONS  (PRN):          PHYSICAL EXAM:  GENERAL:frail  CHEST/LUNG: coarse breath sounds +  HEART: Regular rate and rhythm; No murmurs, rubs, or gallops  ABDOMEN: Soft, Nontender, Nondistended; Bowel sounds present  EXTREMITIES:  edema +    Care Discussed with Consultants/Other Providers [ ] YES  [ ] NO

## 2023-10-09 NOTE — PROGRESS NOTE ADULT - SUBJECTIVE AND OBJECTIVE BOX
Subjective: Patient seen and examined. No new events except as noted.     SUBJECTIVE/ROS:  nad      MEDICATIONS:  MEDICATIONS  (STANDING):  amLODIPine   Tablet 10 milliGRAM(s) Oral daily  chlorhexidine 2% Cloths 1 Application(s) Topical daily  collagenase Ointment 1 Application(s) Topical daily  dexAMETHasone     Tablet 4 milliGRAM(s) Oral two times a day  dextrose 50% Injectable 25 Gram(s) IV Push once  dextrose 50% Injectable 25 Gram(s) IV Push once  dextrose 50% Injectable 12.5 Gram(s) IV Push once  dextrose 50% Injectable 25 Gram(s) IV Push once  dextrose Oral Gel 15 Gram(s) Oral once  enoxaparin Injectable 65 milliGRAM(s) SubCutaneous every 12 hours  furosemide   Injectable 20 milliGRAM(s) IV Push daily  glucagon  Injectable 1 milliGRAM(s) IntraMuscular once  levETIRAcetam  IVPB 500 milliGRAM(s) IV Intermittent every 12 hours  levothyroxine Injectable 70 MICROGram(s) IV Push <User Schedule>  QUEtiapine 75 milliGRAM(s) Oral at bedtime  sodium chloride 0.65% Nasal 1 Spray(s) Both Nostrils every 4 hours  thiamine Injectable 100 milliGRAM(s) IV Push daily  valproate sodium  IVPB 500 milliGRAM(s) IV Intermittent every 6 hours      PHYSICAL EXAM:  T(C): 36.4 (10-09-23 @ 04:19), Max: 36.5 (10-08-23 @ 20:04)  HR: 89 (10-09-23 @ 05:45) (69 - 89)  BP: 113/68 (10-09-23 @ 05:45) (108/59 - 134/55)  RR: 20 (10-09-23 @ 04:19) (18 - 20)  SpO2: 97% (10-09-23 @ 04:19) (92% - 97%)  Wt(kg): --  I&O's Summary          JVP: Normal  Neck: supple  Lung: clear   CV: S1 S2 , Murmur:  Abd: soft  Ext: No edema  neuro: Awake / alert  Psych: flat affect  Skin: normal``    LABS/DATA:    CARDIAC MARKERS:                                9.2    4.49  )-----------( 34       ( 09 Oct 2023 05:20 )             27.7     10-09    143  |  107  |  7   ----------------------------<  153<H>  3.5   |  27  |  0.61    Ca    8.0<L>      09 Oct 2023 05:20  Phos  2.2     10-09  Mg     1.70     10-09      proBNP:   Lipid Profile:   HgA1c:   TSH:     TELE:  EKG:

## 2023-10-10 NOTE — PROVIDER CONTACT NOTE (OTHER) - ASSESSMENT
Pt lethargic but easily arousable. VSS. Pt lethargic but easily arousable. /78, 90% SPO2, rr20, temp 97.7 axil Pt lethargic but easily arousable. /78, 95% SPO2, rr20, temp 97.7 axil

## 2023-10-10 NOTE — PROGRESS NOTE ADULT - SUBJECTIVE AND OBJECTIVE BOX
Subjective: Patient seen and examined. No new events except as noted.     SUBJECTIVE/ROS:        MEDICATIONS:  MEDICATIONS  (STANDING):  amLODIPine   Tablet 10 milliGRAM(s) Oral daily  chlorhexidine 2% Cloths 1 Application(s) Topical daily  collagenase Ointment 1 Application(s) Topical daily  dexAMETHasone     Tablet 4 milliGRAM(s) Oral two times a day  dextrose 50% Injectable 12.5 Gram(s) IV Push once  dextrose 50% Injectable 25 Gram(s) IV Push once  dextrose 50% Injectable 25 Gram(s) IV Push once  dextrose 50% Injectable 25 Gram(s) IV Push once  dextrose Oral Gel 15 Gram(s) Oral once  enoxaparin Injectable 65 milliGRAM(s) SubCutaneous every 12 hours  furosemide   Injectable 20 milliGRAM(s) IV Push two times a day  glucagon  Injectable 1 milliGRAM(s) IntraMuscular once  levETIRAcetam  IVPB 500 milliGRAM(s) IV Intermittent every 12 hours  levothyroxine Injectable 70 MICROGram(s) IV Push <User Schedule>  pantoprazole   Suspension 40 milliGRAM(s) Enteral Tube daily  QUEtiapine 75 milliGRAM(s) Oral at bedtime  sodium chloride 0.65% Nasal 1 Spray(s) Both Nostrils every 4 hours  thiamine Injectable 100 milliGRAM(s) IV Push daily  valproate sodium  IVPB 500 milliGRAM(s) IV Intermittent every 6 hours      PHYSICAL EXAM:  T(C): 36.4 (10-09-23 @ 21:13), Max: 36.6 (10-09-23 @ 14:44)  HR: 99 (10-09-23 @ 21:13) (73 - 99)  BP: 114/93 (10-09-23 @ 21:13) (114/93 - 123/70)  RR: 19 (10-09-23 @ 14:44) (19 - 19)  SpO2: 100% (10-09-23 @ 14:44) (100% - 100%)  Wt(kg): --  I&O's Summary          JVP: Normal  Neck: supple  Lung: clear   CV: S1 S2 , Murmur:  Abd: soft  Ext: No edema  Psych: flat affect  Skin: normal``    LABS/DATA:    CARDIAC MARKERS:                                9.2    4.49  )-----------( 34       ( 09 Oct 2023 05:20 )             27.7     10-10    144  |  107  |  11  ----------------------------<  122<H>  3.4<L>   |  24  |  0.63    Ca    7.8<L>      10 Oct 2023 05:53  Phos  2.5     10-10  Mg     1.70     10-10      proBNP:   Lipid Profile:   HgA1c:   TSH:     TELE:  EKG:

## 2023-10-10 NOTE — PROVIDER CONTACT NOTE (OTHER) - BACKGROUND
Pt admitted with failure to thrive
Pt with poor PO intake. Frequent episodes of hypoglycemia. Started on D5NS to maintain glucose level. Order appears to be discontinued today.
Pt admitted with failure to thrive
Pt with B/L upper ex edema. on lasix. IV inserted 10/27 with US. order for extension. Area noted to be red, warm, firm and tender on assessment.
failure to thrive
hx of DVT/demential, Dx FTT.
Pt admitted with failure to thrive
patient aox0. unable to speak.
patient admitted for failure to thrive, A&Ox0 and nonverbal
Pt admitted with failure to thrive

## 2023-10-10 NOTE — CHART NOTE - NSCHARTNOTEFT_GEN_A_CORE
Patient with platelet count of 24k on AM labs. Therapeutic Lovenox held. Will order venodynes pending negative b/l LE duplex. Case discussed with medicine attending.

## 2023-10-10 NOTE — PROGRESS NOTE ADULT - ASSESSMENT
73-year-old male history of dementia, baseline mental status AxOx0, seizure disorder on Keppra and divalproex, history of DVT/PE off Eliquis, hypertension, hypothyroidism presenting sent in from his nursing home for evaluation of failure to thrive, found to have hypernatremia and new renal failure on outside labs.    Hypernatremia/hyponatremia:  Hypernatremia was due to dehydration, poor intake.  Na improved howevere edematous  likely sec to low albumin  d/c IVF since now has NGT to feed  still edematous. on lasix 20mg IVP daily increase to bid    JESSICA:  Due to Pre-Renal azotemia.  improved  monitor bmp     Hypophosphatemia:  supplemented  monitor    Hypokalemia  supplement as needed   monitor    Hypocalcemia  Due to low albumin   Albumin infusion x6 were given    Hypertension:  bp fluctuating  c/w bp meds.  monitor    FTT  f/u GOC  NGT in place. dc ivf  prognosis poor    hypothermia  now hypoxic on NRB  pulm on board  chest x ray noted  prognosis poor 73-year-old male history of dementia, baseline mental status AxOx0, seizure disorder on Keppra and divalproex, history of DVT/PE off Eliquis, hypertension, hypothyroidism presenting sent in from his nursing home for evaluation of failure to thrive, found to have hypernatremia and new renal failure on outside labs.    Hypernatremia/hyponatremia:  Hypernatremia was due to dehydration, poor intake.  Na improved howevere edematous  likely sec to low albumin  d/c IVF since now has NGT to feed  still edematous. on lasix 20mg IVP bid-monitor    JESSICA:  Due to Pre-Renal azotemia.  improved  monitor bmp     Hypophosphatemia:  supplemented  monitor    Hypokalemia  supplement as needed   monitor    Hypocalcemia  Due to low albumin   Albumin infusion x6 were given    Hypertension:  bp fluctuating  c/w bp meds.  monitor    FTT  f/u GOC  NGT in place. dc ivf  prognosis poor    hypothermia  now hypoxic on NRB  pulm on board  chest x ray noted  prognosis poor

## 2023-10-10 NOTE — CHART NOTE - NSCHARTNOTEFT_GEN_A_CORE
Per discussion with patient's wife, Leena, at bedside, she is interested in inpatient hospice placement as patient is unable to tolerate PO and she does not want to stop antiepileptic medications on discharge.

## 2023-10-10 NOTE — CONSULT NOTE ADULT - CONSULT REQUESTED DATE/TIME
11-Sep-2023 19:36
09-Oct-2023
10-Oct-2023 11:05
10-Sep-2023 06:20
15-Sep-2023 18:35
18-Sep-2023 14:31
09-Sep-2023 12:07

## 2023-10-10 NOTE — CHART NOTE - NSCHARTNOTEFT_GEN_A_CORE
Ethics emailed for consult for assistance with GOC, complicated decision making. Appreciate recommendations. Palliative care already following.    Provider attempted to call patient's wife, Leena, to discuss GOC, however, Leena requested that provider call back later. Will call again later to discuss GOC. Ethics emailed for consult for assistance with GOC, complicated decision making. Appreciate recommendations. Palliative care already following.    Provider attempted to call patient's wife, Leena, to discuss GOC, however, Leena requested that provider call back later. Will call again later to discuss GOC.    ADDENDUM: Provider discussed GOC at length with patient's wife, Leena. Leena understands that patient has a very poor prognosis. Provider explained concern for aspiration with NGT in place; also discussed patient's increasing O2 requirements as well as thrombocytopenia. Per discussion, Leena continues to want DNR/DNI and decided to discontinue NG tube for feeds. Leena decided on comfort measures only, no blood products, no further lab draws and is interested in hospice services at home. MOLST form filled out based on discussion and placed in patient's chart. Ethics emailed for consult for assistance with GOC, complicated decision making. Appreciate recommendations. Palliative care already following.    Provider attempted to call patient's wife, Leena, to discuss GOC, however, Leena requested that provider call back later. Will call again later to discuss GOC.    ADDENDUM: Provider discussed GOC at length with patient's wife, Leena. Leena understands that patient has a very poor prognosis. Provider explained concern for aspiration with NGT in place; also discussed patient's increasing O2 requirements as well as thrombocytopenia. Per discussion, Leena continues to want DNR/DNI and decided to discontinue NG tube for feeds. Leena decided on comfort measures only, no blood products, no further lab draws and is interested in hospice services at home. MOLST form filled out based on discussion and placed in patient's chart. NGT removed per GOC.

## 2023-10-10 NOTE — PROVIDER CONTACT NOTE (OTHER) - SITUATION
Pt lethargic, unable to give PO Quetiapine
patient had 8 beats of Vtach
unable to get morning labs
Pt lethargic, unable to give PO Amlodipine
Unable to get am labs, pt hard stick, very edematous.
Pt lethargic, unable to give PO Amlodipine
Pt sound congested, lethargic, and weak cough. Bright Red Blood noted upon suctioning. pt on Lovenox q12 sq.
Pt with LUE noted to be red, warm, firm, edematous. IV near site removed.
Pt blood sugar 77 at 1200. IVF were discontinued.
Pt lethargic, unable to give PO Seroquel
Pt more lethargic this morning, unable to give oral AM amlodipine.
antione adams from lab stated that cbc came back clumped need to resend

## 2023-10-10 NOTE — PROVIDER CONTACT NOTE (OTHER) - DATE AND TIME:
02-Oct-2023 23:43
03-Oct-2023 21:38
03-Oct-2023 07:40
27-Sep-2023 13:48
28-Sep-2023 07:41
05-Oct-2023 12:46
10-Oct-2023 12:00
24-Sep-2023 08:56
04-Oct-2023 06:25
20-Sep-2023 07:04
22-Sep-2023 01:20
28-Sep-2023 07:43

## 2023-10-10 NOTE — CONSULT NOTE ADULT - CONSULT REASON
goals of care
heel wound
seizures
CV evaluation
Ethics team consulted to assist with further goal of care planning
Hypernatremia
FTT : hypoxic resp failure

## 2023-10-10 NOTE — PROVIDER CONTACT NOTE (OTHER) - REASON
RUE red/edema
8 beats of VTach
IVF for hypoglycemia
Pt more lethargic this morning, unable to give oral AM amlodipine.
Unable to get am labs, pt hard stick, very edematous.
Pt lethargic, unable to give PO Amlodipine
Pt lethargic, unable to give PO Quetiapine
Pt lethargic, unable to give PO Quetiapine
Bleeding
Pt lethargic, unable to give PO Amlodipine
antione adams from lab stated that cbc came back clumped need to resend
Morning Labs unable to be drawn

## 2023-10-10 NOTE — CONSULT NOTE ADULT - ASSESSMENT
73-year-old male history of dementia, baseline mental status AxOx0, seizure disorder on Keppra and divalproex, history of DVT/PE off Eliquis, hypertension, hypothyroidism presenting sent in from his nursing home for evaluation of failure to thrive, found to have hypernatremia and new renal failure on outside labs.  Per documentation that arrived with the patient, patient has been refusing to eat since yesterday. 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 effusion:  need ct chest non contrast    Thrombocytopenia  -low plts:   -no ac secondary to it:    Dementia  -supportive care:  -family has refused for peg tueb  Seizure disorders  -cont anti sz medications  Hx of pe and dvt  -was on ac before:  now dced as he is having low plts  HTN  -blood pressure is controlled  Hypothyroidism   -on synthroid:     pt is doing very poorly:  not retaining co2: can change o high flow: needs ct chest  : need to clarify goal of care   as to how aggressive pts family want to be : /l:    alice acp

## 2023-10-10 NOTE — PROGRESS NOTE ADULT - SUBJECTIVE AND OBJECTIVE BOX
[facility]   MD ARI MILLS MD ANGELA WONG, PA    TEL:  OFFICE: [phone]  From 5pm-7am Answering Service [phone]    -- RENAL FOLLOW UP NOTE ---Date of Service 10-10-23 @ 12:19    Patient is a 73y old  Male who presents with a chief complaint of FTT (10 Oct 2023 11:05)      Patient seen and examined at bedside.    VITALS:  T(F): 94.6 (10-10-23 @ 09:30), Max: 97.8 (10-09-23 @ 14:44)  HR: 100 (10-10-23 @ 09:30)  BP: 134/84 (10-10-23 @ 09:30)  RR: 19 (10-10-23 @ 09:30)  SpO2: 90% (10-10-23 @ 09:30)  Wt(kg): --        PHYSICAL EXAM:  General: ill appearing, on NRB  Neck: No JVD  Respiratory: + rhonchi  Cardiovascular: S1, S2, RRR  Gastrointestinal: BS+, soft, NT/ND  Extremities: ++ peripheral edema    Hospital Medications:   MEDICATIONS  (STANDING):  amLODIPine   Tablet 10 milliGRAM(s) Oral daily  chlorhexidine 2% Cloths 1 Application(s) Topical daily  collagenase Ointment 1 Application(s) Topical daily  dexAMETHasone     Tablet 4 milliGRAM(s) Oral two times a day  dextrose 50% Injectable 25 Gram(s) IV Push once  dextrose 50% Injectable 12.5 Gram(s) IV Push once  dextrose 50% Injectable 25 Gram(s) IV Push once  dextrose 50% Injectable 25 Gram(s) IV Push once  dextrose Oral Gel 15 Gram(s) Oral once  furosemide   Injectable 20 milliGRAM(s) IV Push two times a day  glucagon  Injectable 1 milliGRAM(s) IntraMuscular once  levETIRAcetam  IVPB 500 milliGRAM(s) IV Intermittent every 12 hours  levothyroxine Injectable 70 MICROGram(s) IV Push <User Schedule>  pantoprazole   Suspension 40 milliGRAM(s) Enteral Tube daily  potassium chloride  10 mEq/100 mL IVPB 10 milliEquivalent(s) IV Intermittent every 1 hour  QUEtiapine 75 milliGRAM(s) Oral at bedtime  sodium chloride 0.65% Nasal 1 Spray(s) Both Nostrils every 4 hours  thiamine Injectable 100 milliGRAM(s) IV Push daily  valproate sodium  IVPB 500 milliGRAM(s) IV Intermittent every 6 hours      LABS:  10-10    144  |  107  |  11  ----------------------------<  122<H>  3.4<L>   |  24  |  0.63    Ca    7.8<L>      10 Oct 2023 05:53  Phos  2.5     10-10  Mg     1.70     10-10    TPro      /  Alb  2.0<L>  /  TBili      /  DBili      /  AST      /  ALT      /  AlkPhos      10-10    Creatinine Trend: 0.63 <--, 0.61 <--, 0.62 <--, 0.60 <--, 0.61 <--, 0.59 <--, 0.64 <--, 0.68 <--, 0.64 <--    Phosphorus: 2.5 mg/dL (10-10 @ 05:53)  Albumin: 2.0 g/dL (10-10 @ 05:53)                              10.5   7.52  )-----------( 24       ( 10 Oct 2023 05:53 )             32.2     Urine Studies:  Urinalysis - [10-10-23 @ 05:53]      Color  / Appearance  / SG  / pH       Gluc 122 / Ketone   / Bili  / Urobili        Blood  / Protein  / Leuk Est  / Nitrite       RBC  / WBC  / Hyaline  / Gran  / Sq Epi  / Non Sq Epi  / Bacteria       TSH 3.29      [08-07-23 @ 06:09]  Lipid: chol 117, TG 82, HDL 48, LDL --      [02-09-23 @ 06:40]        RADIOLOGY & ADDITIONAL STUDIES:

## 2023-10-10 NOTE — PROVIDER CONTACT NOTE (OTHER) - ACTION/TREATMENT ORDERED:
Pass onto day shift nurse as per ACP
preform EKG and continue to monitor
provider notified
Provider notified. Provider came to bedside to assess. As per ACP to push am amlodipine to later time when pt more alert to take oral medications.
D5NS reordered at same rate. BG to be rechecked.
PA intend to assess, will continue to monitor.
Provider aware.
Orders placed for b/l upper ext US. IV site removed. New IV site obtained via US on R arm.
Provider aware.
Provider notified. endorsed to day shift RN to reattempt.

## 2023-10-10 NOTE — PROGRESS NOTE ADULT - ASSESSMENT
73-year-old male history of dementia, baseline mental status AxOx0, seizure disorder on Keppra and divalproex, history of DVT/PE off Eliquis, hypertension, hypothyroidism presenting sent in from his nursing home for evaluation of failure to thrive, found to have hypernatremia and new renal failure on outside labs.  Per documentation that arrived with the patient, patient has been refusing to eat since yesterday.    1 Hypernatremia  - resolved   - renal fu   - cw current diet     2 Seizure disorder-   cw Keppra and valproic acid     3 hx of  DVT/PE  - cw AC    4 Dementia  - fall precautions  - frequent reorientation     5 Dysphagia  - diet as per speech and swallow- poor po intake  - family refused PEG    NOW COMFORT MEASURES  DNT, DNI

## 2023-10-10 NOTE — CONSULT NOTE ADULT - SUBJECTIVE AND OBJECTIVE BOX
10-10-23 @ 11:06    Patient is a 73y old  Male who presents with a chief complaint of FTT (10 Oct 2023 07:08)      HPI:  73-year-old male history of dementia, baseline mental status AxOx0, seizure disorder on Keppra and divalproex, history of DVT/PE off Eliquis, hypertension, hypothyroidism presenting sent in from his nursing home for evaluation of failure to thrive, found to have hypernatremia and new renal failure on outside labs.  Per documentation that arrived with the patient, patient has been refusing to eat since yesterday. (09 Sep 2023 09:23)   he was admitted before multiple times before in hospital : he was evaluated by pulm team in 2022: where he was found to have left soleal vein dvt:   currently pt is on 100?5 NRB and is not able to participate in history taking:     ?FOLLOWING PRESENT  [y ] Hx of PE/DVT, [x ] Hx COPD, [ x] Hx of Asthma, [y ] Hx of Hospitalization, [x ]  Hx of BiPAP/CPAP use, [x ] Hx of CARMENCITA    Allergies    fish (Hives; Angioedema)  No Known Drug Allergies  [place] (Swelling; Angioedema)    Intolerances        PAST MEDICAL & SURGICAL HISTORY:  HTN (hypertension)      Diverticulosis      Hypothyroid      Deep vein thrombosis (DVT)      Pulmonary embolism      No significant past surgical history          FAMILY HISTORY:      Social History: [former smoker  ] TOBACCO                  [x  ] ETOH                                 [x ] IVDA/DRUGS    REVIEW OF SYSTEMS    cant obtain from pt   General:	    Skin/Breast:  	  Ophthalmologic:  	  ENMT:	    Respiratory and Thorax:  	  Cardiovascular:	    Gastrointestinal:	    Genitourinary:	    Musculoskeletal:	    Neurological:	    Psychiatric:	    Hematology/Lymphatics:	    Endocrine:	    Allergic/Immunologic:	    MEDICATIONS  (STANDING):  amLODIPine   Tablet 10 milliGRAM(s) Oral daily  chlorhexidine 2% Cloths 1 Application(s) Topical daily  collagenase Ointment 1 Application(s) Topical daily  dexAMETHasone     Tablet 4 milliGRAM(s) Oral two times a day  dextrose 50% Injectable 25 Gram(s) IV Push once  dextrose 50% Injectable 12.5 Gram(s) IV Push once  dextrose 50% Injectable 25 Gram(s) IV Push once  dextrose 50% Injectable 25 Gram(s) IV Push once  dextrose Oral Gel 15 Gram(s) Oral once  furosemide   Injectable 20 milliGRAM(s) IV Push two times a day  glucagon  Injectable 1 milliGRAM(s) IntraMuscular once  levETIRAcetam  IVPB 500 milliGRAM(s) IV Intermittent every 12 hours  levothyroxine Injectable 70 MICROGram(s) IV Push <User Schedule>  pantoprazole   Suspension 40 milliGRAM(s) Enteral Tube daily  potassium chloride  10 mEq/100 mL IVPB 10 milliEquivalent(s) IV Intermittent every 1 hour  QUEtiapine 75 milliGRAM(s) Oral at bedtime  sodium chloride 0.65% Nasal 1 Spray(s) Both Nostrils every 4 hours  thiamine Injectable 100 milliGRAM(s) IV Push daily  valproate sodium  IVPB 500 milliGRAM(s) IV Intermittent every 6 hours    MEDICATIONS  (PRN):       Vital Signs Last 24 Hrs  T(C): 34.8 (10 Oct 2023 09:30), Max: 36.6 (09 Oct 2023 14:44)  T(F): 94.6 (10 Oct 2023 09:30), Max: 97.8 (09 Oct 2023 14:44)  HR: 100 (10 Oct 2023 09:30) (73 - 100)  BP: 134/84 (10 Oct 2023 09:30) (111/62 - 134/84)  BP(mean): --  RR: 19 (10 Oct 2023 09:30) (18 - 19)  SpO2: 90% (10 Oct 2023 09:30) (88% - 100%)    Parameters below as of 10 Oct 2023 09:30  Patient On (Oxygen Delivery Method): mask, nonrebreather  O2 Flow (L/min): 10  Orthostatic VS          I&O's Summary      Physical Exam:   GENERAL: NAD, well-groomed, well-developed  HEENT: ANTONELLA/   Atraumatic, Normocephalic  ENMT: No tonsillar erythema, exudates, or enlargement; Moist mucous membranes, Good dentition, No lesions  NECK: Supple, No JVD, Normal thyroid  CHEST/LUNG: poor air entry   CVS: Regular rate and rhythm; No murmurs, rubs, or gallops  GI: : Soft, Nontender, Nondistended; Bowel sounds present  NERVOUS SYSTEM:  Unresponsive on 100% NRB   EXTREMITIES: + edema  LYMPH: No lymphadenopathy noted  SKIN: No rashes or lesions  ENDOCRINOLOGY: No Thyromegaly  PSYCH: unresponsive    Labs:  ABG - ( 09 Oct 2023 05:20 )  pH, Arterial: 7.48  pH, Blood: x     /  pCO2: 39    /  pO2: 91    / HCO3: 29    / Base Excess: 5.1   /  SaO2: 97.6            COVID-19 PCR: NotDetec (14 Aug 2023 10:57)  SARS-CoV-2: NotDetec (06 Aug 2023 22:44)  SARS-CoV-2: NotDetec (12 Jun 2023 19:32)                              10.5   7.52  )-----------( 24       ( 10 Oct 2023 05:53 )             32.2                         9.2    4.49  )-----------( 34       ( 09 Oct 2023 05:20 )             27.7                         8.7    4.79  )-----------( 44       ( 08 Oct 2023 06:40 )             26.0                         10.0   4.80  )-----------( 35       ( 07 Oct 2023 05:32 )             29.5                         9.3    8.31  )-----------( 73       ( 06 Oct 2023 11:45 )             26.5     10-10    144  |  107  |  11  ----------------------------<  122<H>  3.4<L>   |  24  |  0.63  10-09    143  |  107  |  7   ----------------------------<  153<H>  3.5   |  27  |  0.61  10-08    143  |  107  |  6<L>  ----------------------------<  147<H>  3.1<L>   |  28  |  0.62  10-07    133<L>  |  100  |  8   ----------------------------<  50<LL>  4.2   |  20<L>  |  0.60  10-07    132<L>  |  100  |  8   ----------------------------<  70  4.2   |  22  |  0.61  10-06    133<L>  |  102  |  7   ----------------------------<  74  4.8   |  24  |  0.59  10-06    136  |  100  |  7   ----------------------------<  79  3.0<L>   |  29  |  0.64    Ca    7.8<L>      10 Oct 2023 05:53  Ca    8.0<L>      09 Oct 2023 05:20  Phos  2.5     10-10  Phos  2.2     10-09  Mg     1.70     10-10  Mg     1.70     10-09    TPro  x   /  Alb  2.0<L>  /  TBili  x   /  DBili  x   /  AST  x   /  ALT  x   /  AlkPhos  x   10-10    CAPILLARY BLOOD GLUCOSE      POCT Blood Glucose.: 127 mg/dL (09 Oct 2023 18:02)  POCT Blood Glucose.: 119 mg/dL (09 Oct 2023 12:51)    LIVER FUNCTIONS - ( 10 Oct 2023 05:53 )  Alb: 2.0 g/dL / Pro: x     / ALK PHOS: x     / ALT: x     / AST: x     / GGT: x             Urinalysis Basic - ( 10 Oct 2023 05:53 )    Color: x / Appearance: x / SG: x / pH: x  Gluc: 122 mg/dL / Ketone: x  / Bili: x / Urobili: x   Blood: x / Protein: x / Nitrite: x   Leuk Esterase: x / RBC: x / WBC x   Sq Epi: x / Non Sq Epi: x / Bacteria: x      D DImer      Studies  Chest X-RAY  CT SCAN Chest   CT Abdomen  Venous Dopplers: LE:   Others      ra< from: Xray Chest 1 View- PORTABLE-Urgent (Xray Chest 1 View- PORTABLE-Urgent .) (10.09.23 @ 04:19) >  No acute bony abnormality.    IMPRESSION:  Increased moderate right pleural effusion/adjacent atelectasis.    A 3 cm area of consolidation is new.    --- End of Report ---    < end of copied text >  < from: Xray Chest 1 View- PORTABLE-Urgent (Xray Chest 1 View- PORTABLE-Urgent .) (10.07.23 @ 19:53) >  The heart is not accurately assessed in this AP projection.  The lungs are clear.  There is no pneumothorax or pleural effusion.  No acute abnormalities in the visualized osseous structures.    10/7/2023 6:52 PM  Stable exam.    IMPRESSION:  NG tube in place with tip terminating in the stomach.    --- End of Report ---      < from: TTE Echo Complete w/o Contrast w/ Doppler (06.15.23 @ 19:50) >  Tricuspid Valve: Structurally normal tricuspid valve, with normal leaflet   excursion. The tricuspid valve is normal in structure. Mild-moderate   tricuspid regurgitation is visualized. Estimated pulmonary artery   systolic pressure is 39.6 mmHg assuming a right atrial pressure of 8   mmHg, which is consistent with borderline pulmonary hypertension.   Adequate TR velocity was not obtained to accurately assess RVSP.  Aortic Valve: The aortic valve was not well visualized. Sclerotic aortic   valve with decreased opening. No evidence of aortic valve regurgitation   is seen.  Pulmonic Valve: The pulmonic valve was not well visualized. No indication   of pulmonic valve regurgitation.  Aorta: Aortic root measured at Sinus of Valsalva is normal.  Venous: The inferior vena cava was normal sized, with respiratory size   variation greater than 50%.      Summary:   1. Left ventricular ejection fraction, by visual estimation, is 60 to   65%.   2. Technically limited study.   3. Normal global left ventricular systolic function.   4. Normal left ventricular internal cavity size.   5. Spectral Doppler shows impaired relaxation pattern of left   ventricular myocardial filling (Grade I diastolic dysfunction).   6. Normal right ventricular size and function.   7. Normal left atrial size.   8. Normal right atrial size.   9. There is no evidence of pericardial effusion.  10. Mild thickening and calcification of the anterior and posterior   mitral valve leaflets.  11. Trace mitral valve regurgitation.  12. Mild-moderate tricuspid regurgitation.  13. Sclerotic aortic valve with decreased opening.  14. Estimated pulmonary artery systolic pressure is 39.6 mmHg assuming a   right atrial pressure of 8 mmHg, which is consistent with borderline   pulmonary hypertension.    < end of copied text >  < from: TTE Echo Complete w/o Contrast w/ Doppler (06.15.23 @ 19:50) >  Tricuspid Valve: Structurally normal tricuspid valve, with normal leaflet   excursion. The tricuspid valve is normal in structure. Mild-moderate   tricuspid regurgitation is visualized. Estimated pulmonary artery   systolic pressure is 39.6 mmHg assuming a right atrial pressure of 8   mmHg, which is consistent with borderline pulmonary hypertension.   Adequate TR velocity was not obtained to accurately assess RVSP.  Aortic Valve: The aortic valve was not well visualized. Sclerotic aortic   valve with decreased opening. No evidence of aortic valve regurgitation   is seen.  Pulmonic Valve: The pulmonic valve was not well visualized. No indication   of pulmonic valve regurgitation.  Aorta: Aortic root measured at Sinus of Valsalva is normal.  Venous: The inferior vena cava was normal sized, with respiratory size   variation greater than 50%.      Summary:   1. Left ventricular ejection fraction, by visual estimation, is 60 to   65%.   2. Technically limited study.   3. Normal global left ventricular systolic function.   4. Normal left ventricular internal cavity size.   5. Spectral Doppler shows impaired relaxation pattern of left   ventricular myocardial filling (Grade I diastolic dysfunction).   6. Normal right ventricular size and function.   7. Normal left atrial size.   8. Normal right atrial size.   9. There is no evidence of pericardial effusion.  10. Mild thickening and calcification of the anterior and posterior   mitral valve leaflets.  11. Trace mitral valve regurgitation.  12. Mild-moderate tricuspid regurgitation.  13. Sclerotic aortic valve with decreased opening.  14. Estimated pulmonary artery systolic pressure is 39.6 mmHg assuming a   right atrial pressure of 8 mmHg, which is consistent with borderline   pulmonary hypertension.    < end of copied text >      STEPHANI GAONA MD; Resident Radiologist  This document has been electronically signed.  EDGAR FOSTER MD; Attending Radiologist  This document has been electronically signed. Oct  8 2023 10:16AM    < end of copied text >

## 2023-10-10 NOTE — PROGRESS NOTE ADULT - SUBJECTIVE AND OBJECTIVE BOX
Patient is a 73y old  Male who presents with a chief complaint of FTT (10 Oct 2023 13:26)    Date of servie : 10-10-23 @ 15:12  INTERVAL HPI/OVERNIGHT EVENTS:  T(C): 36.4 (10-10-23 @ 15:07), Max: 36.5 (10-10-23 @ 09:52)  HR: 86 (10-10-23 @ 15:07) (86 - 100)  BP: 107/72 (10-10-23 @ 15:07) (107/72 - 134/84)  RR: 20 (10-10-23 @ 15:07) (18 - 20)  SpO2: 95% (10-10-23 @ 15:07) (88% - 95%)  Wt(kg): --  I&O's Summary      LABS:                        10.5   7.52  )-----------( 24       ( 10 Oct 2023 05:53 )             32.2     10-10    144  |  107  |  11  ----------------------------<  122<H>  3.4<L>   |  24  |  0.63    Ca    7.8<L>      10 Oct 2023 05:53  Phos  2.5     10-10  Mg     1.70     10-10    TPro  x   /  Alb  2.0<L>  /  TBili  x   /  DBili  x   /  AST  x   /  ALT  x   /  AlkPhos  x   10-10      Urinalysis Basic - ( 10 Oct 2023 05:53 )    Color: x / Appearance: x / SG: x / pH: x  Gluc: 122 mg/dL / Ketone: x  / Bili: x / Urobili: x   Blood: x / Protein: x / Nitrite: x   Leuk Esterase: x / RBC: x / WBC x   Sq Epi: x / Non Sq Epi: x / Bacteria: x      CAPILLARY BLOOD GLUCOSE      POCT Blood Glucose.: 132 mg/dL (10 Oct 2023 12:25)  POCT Blood Glucose.: 127 mg/dL (09 Oct 2023 18:02)    ABG - ( 09 Oct 2023 05:20 )  pH, Arterial: 7.48  pH, Blood: x     /  pCO2: 39    /  pO2: 91    / HCO3: 29    / Base Excess: 5.1   /  SaO2: 97.6                Urinalysis Basic - ( 10 Oct 2023 05:53 )    Color: x / Appearance: x / SG: x / pH: x  Gluc: 122 mg/dL / Ketone: x  / Bili: x / Urobili: x   Blood: x / Protein: x / Nitrite: x   Leuk Esterase: x / RBC: x / WBC x   Sq Epi: x / Non Sq Epi: x / Bacteria: x        MEDICATIONS  (STANDING):  amLODIPine   Tablet 10 milliGRAM(s) Oral daily  chlorhexidine 2% Cloths 1 Application(s) Topical daily  collagenase Ointment 1 Application(s) Topical daily  dexAMETHasone     Tablet 4 milliGRAM(s) Oral two times a day  dextrose 50% Injectable 12.5 Gram(s) IV Push once  dextrose 50% Injectable 25 Gram(s) IV Push once  dextrose 50% Injectable 25 Gram(s) IV Push once  dextrose 50% Injectable 25 Gram(s) IV Push once  dextrose Oral Gel 15 Gram(s) Oral once  furosemide   Injectable 20 milliGRAM(s) IV Push two times a day  glucagon  Injectable 1 milliGRAM(s) IntraMuscular once  levETIRAcetam  IVPB 500 milliGRAM(s) IV Intermittent every 12 hours  levothyroxine Injectable 70 MICROGram(s) IV Push <User Schedule>  pantoprazole   Suspension 40 milliGRAM(s) Enteral Tube daily  potassium chloride  10 mEq/100 mL IVPB 10 milliEquivalent(s) IV Intermittent every 1 hour  QUEtiapine 75 milliGRAM(s) Oral at bedtime  sodium chloride 0.65% Nasal 1 Spray(s) Both Nostrils every 4 hours  thiamine Injectable 100 milliGRAM(s) IV Push daily  valproate sodium  IVPB 500 milliGRAM(s) IV Intermittent every 6 hours    MEDICATIONS  (PRN):          PHYSICAL EXAM:  GENERAL: frail  CHEST/LUNG: Coarse breath sounds present   HEART: Rs1s2+  ABDOMEN: Soft, Nontender, Nondistended; Bowel sounds present  EXTREMITIES: edema +    Care Discussed with Consultants/Other Providers [ ] YES  [ ] NO

## 2023-10-11 NOTE — PROGRESS NOTE ADULT - SUBJECTIVE AND OBJECTIVE BOX
Patient is a 73y old  Male who presents with a chief complaint of FTT (11 Oct 2023 13:01)    Date of servie : 10-11-23 @ 15:25  INTERVAL HPI/OVERNIGHT EVENTS:  T(C): 36.4 (10-11-23 @ 14:52), Max: 36.9 (10-10-23 @ 21:11)  HR: 81 (10-11-23 @ 14:52) (73 - 88)  BP: 153/77 (10-11-23 @ 14:52) (100/63 - 153/77)  RR: 18 (10-11-23 @ 14:52) (18 - 20)  SpO2: 96% (10-11-23 @ 14:52) (96% - 100%)  Wt(kg): --  I&O's Summary      LABS:                        10.5   7.52  )-----------( 24       ( 10 Oct 2023 05:53 )             32.2     10-10    144  |  107  |  11  ----------------------------<  122<H>  3.4<L>   |  24  |  0.63    Ca    7.8<L>      10 Oct 2023 05:53  Phos  2.5     10-10  Mg     1.70     10-10    TPro  x   /  Alb  2.0<L>  /  TBili  x   /  DBili  x   /  AST  x   /  ALT  x   /  AlkPhos  x   10-10      Urinalysis Basic - ( 10 Oct 2023 05:53 )    Color: x / Appearance: x / SG: x / pH: x  Gluc: 122 mg/dL / Ketone: x  / Bili: x / Urobili: x   Blood: x / Protein: x / Nitrite: x   Leuk Esterase: x / RBC: x / WBC x   Sq Epi: x / Non Sq Epi: x / Bacteria: x      CAPILLARY BLOOD GLUCOSE            Urinalysis Basic - ( 10 Oct 2023 05:53 )    Color: x / Appearance: x / SG: x / pH: x  Gluc: 122 mg/dL / Ketone: x  / Bili: x / Urobili: x   Blood: x / Protein: x / Nitrite: x   Leuk Esterase: x / RBC: x / WBC x   Sq Epi: x / Non Sq Epi: x / Bacteria: x        MEDICATIONS  (STANDING):  amLODIPine   Tablet 10 milliGRAM(s) Oral daily  chlorhexidine 2% Cloths 1 Application(s) Topical daily  collagenase Ointment 1 Application(s) Topical daily  dexAMETHasone     Tablet 4 milliGRAM(s) Oral two times a day  dextrose 50% Injectable 12.5 Gram(s) IV Push once  dextrose 50% Injectable 25 Gram(s) IV Push once  dextrose 50% Injectable 25 Gram(s) IV Push once  dextrose 50% Injectable 25 Gram(s) IV Push once  dextrose Oral Gel 15 Gram(s) Oral once  furosemide   Injectable 20 milliGRAM(s) IV Push two times a day  glucagon  Injectable 1 milliGRAM(s) IntraMuscular once  levETIRAcetam  IVPB 500 milliGRAM(s) IV Intermittent every 12 hours  levothyroxine Injectable 70 MICROGram(s) IV Push <User Schedule>  pantoprazole   Suspension 40 milliGRAM(s) Enteral Tube daily  QUEtiapine 75 milliGRAM(s) Oral at bedtime  sodium chloride 0.65% Nasal 1 Spray(s) Both Nostrils every 4 hours  thiamine Injectable 100 milliGRAM(s) IV Push daily  valproate sodium  IVPB 500 milliGRAM(s) IV Intermittent every 6 hours    MEDICATIONS  (PRN):          PHYSICAL EXAM:  GENERAL: frail  CHEST/LUNG: Coarse breath sounds +  HEART: Regular rate and rhythm; No murmurs, rubs, or gallops  ABDOMEN: Soft, Nontender, Nondistended; Bowel sounds present  EXTREMITIES:  edema +    Care Discussed with Consultants/Other Providers [ ] YES  [ ] NO

## 2023-10-11 NOTE — PROGRESS NOTE ADULT - SUBJECTIVE AND OBJECTIVE BOX
[facility] [place] [facility]   MD ARI MILLS MD ANGELA WONG, PA    TEL:  OFFICE: [phone]  From 5pm-7am Answering Service [phone]    -- RENAL FOLLOW UP NOTE ---Date of Service 10-11-23 @ 13:02    Patient is a 73y old  Male who presents with a chief complaint of FTT (11 Oct 2023 12:02)      Patient seen and examined at bedside   VITALS:  T(F): 97.8 (10-11-23 @ 06:00), Max: 98.5 (10-10-23 @ 21:11)  HR: 73 (10-11-23 @ 06:00)  BP: 100/63 (10-11-23 @ 06:00)  RR: 19 (10-11-23 @ 06:00)  SpO2: 99% (10-11-23 @ 06:00)  Wt(kg): --        PHYSICAL EXAM:  General: non responding   Neck: No JVD  Respiratory: CTAB, no wheezes, rales or rhonchi  Cardiovascular: S1, S2, RRR  Gastrointestinal: BS+, soft, NT/ND  Extremities: + upper peripheral edema    Hospital Medications:   MEDICATIONS  (STANDING):  amLODIPine   Tablet 10 milliGRAM(s) Oral daily  chlorhexidine 2% Cloths 1 Application(s) Topical daily  collagenase Ointment 1 Application(s) Topical daily  dexAMETHasone     Tablet 4 milliGRAM(s) Oral two times a day  dextrose 50% Injectable 12.5 Gram(s) IV Push once  dextrose 50% Injectable 25 Gram(s) IV Push once  dextrose 50% Injectable 25 Gram(s) IV Push once  dextrose 50% Injectable 25 Gram(s) IV Push once  dextrose Oral Gel 15 Gram(s) Oral once  furosemide   Injectable 20 milliGRAM(s) IV Push two times a day  glucagon  Injectable 1 milliGRAM(s) IntraMuscular once  levETIRAcetam  IVPB 500 milliGRAM(s) IV Intermittent every 12 hours  levothyroxine Injectable 70 MICROGram(s) IV Push <User Schedule>  pantoprazole   Suspension 40 milliGRAM(s) Enteral Tube daily  QUEtiapine 75 milliGRAM(s) Oral at bedtime  sodium chloride 0.65% Nasal 1 Spray(s) Both Nostrils every 4 hours  thiamine Injectable 100 milliGRAM(s) IV Push daily  valproate sodium  IVPB 500 milliGRAM(s) IV Intermittent every 6 hours      LABS:  10-10    144  |  107  |  11  ----------------------------<  122<H>  3.4<L>   |  24  |  0.63    Ca    7.8<L>      10 Oct 2023 05:53  Phos  2.5     10-10  Mg     1.70     10-10    TPro      /  Alb  2.0<L>  /  TBili      /  DBili      /  AST      /  ALT      /  AlkPhos      10-10    Creatinine Trend: 0.63 <--, 0.61 <--, 0.62 <--, 0.60 <--, 0.61 <--, 0.59 <--, 0.64 <--, 0.68 <--                                10.5   7.52  )-----------( 24       ( 10 Oct 2023 05:53 )             32.2     Urine Studies:  Urinalysis - [10-10-23 @ 05:53]      Color  / Appearance  / SG  / pH       Gluc 122 / Ketone   / Bili  / Urobili        Blood  / Protein  / Leuk Est  / Nitrite       RBC  / WBC  / Hyaline  / Gran  / Sq Epi  / Non Sq Epi  / Bacteria       TSH 3.29      [08-07-23 @ 06:09]  Lipid: chol 117, TG 82, HDL 48, LDL --      [02-09-23 @ 06:40]        RADIOLOGY & ADDITIONAL STUDIES:

## 2023-10-11 NOTE — PROGRESS NOTE ADULT - SUBJECTIVE AND OBJECTIVE BOX
Subjective: Patient seen and examined. No new events except as noted.     SUBJECTIVE/ROS:        MEDICATIONS:  MEDICATIONS  (STANDING):  amLODIPine   Tablet 10 milliGRAM(s) Oral daily  chlorhexidine 2% Cloths 1 Application(s) Topical daily  collagenase Ointment 1 Application(s) Topical daily  dexAMETHasone     Tablet 4 milliGRAM(s) Oral two times a day  dextrose 50% Injectable 25 Gram(s) IV Push once  dextrose 50% Injectable 25 Gram(s) IV Push once  dextrose 50% Injectable 12.5 Gram(s) IV Push once  dextrose 50% Injectable 25 Gram(s) IV Push once  dextrose Oral Gel 15 Gram(s) Oral once  furosemide   Injectable 20 milliGRAM(s) IV Push two times a day  glucagon  Injectable 1 milliGRAM(s) IntraMuscular once  levETIRAcetam  IVPB 500 milliGRAM(s) IV Intermittent every 12 hours  levothyroxine Injectable 70 MICROGram(s) IV Push <User Schedule>  pantoprazole   Suspension 40 milliGRAM(s) Enteral Tube daily  QUEtiapine 75 milliGRAM(s) Oral at bedtime  sodium chloride 0.65% Nasal 1 Spray(s) Both Nostrils every 4 hours  thiamine Injectable 100 milliGRAM(s) IV Push daily  valproate sodium  IVPB 500 milliGRAM(s) IV Intermittent every 6 hours      PHYSICAL EXAM:  T(C): 36.6 (10-11-23 @ 06:00), Max: 36.9 (10-10-23 @ 21:11)  HR: 73 (10-11-23 @ 06:00) (73 - 86)  BP: 100/63 (10-11-23 @ 06:00) (100/63 - 125/60)  RR: 19 (10-11-23 @ 06:00) (18 - 20)  SpO2: 99% (10-11-23 @ 06:00) (95% - 100%)  Wt(kg): --  I&O's Summary          JVP: Normal  Neck: supple  Lung: clear   CV: S1 S2 , Murmur:  Abd: soft  Psych: flat affect  Skin: normal``    LABS/DATA:    CARDIAC MARKERS:                                10.5   7.52  )-----------( 24       ( 10 Oct 2023 05:53 )             32.2     10-10    144  |  107  |  11  ----------------------------<  122<H>  3.4<L>   |  24  |  0.63    Ca    7.8<L>      10 Oct 2023 05:53  Phos  2.5     10-10  Mg     1.70     10-10    TPro  x   /  Alb  2.0<L>  /  TBili  x   /  DBili  x   /  AST  x   /  ALT  x   /  AlkPhos  x   10-10    proBNP:   Lipid Profile:   HgA1c:   TSH:     TELE:  EKG:

## 2023-10-11 NOTE — PROGRESS NOTE ADULT - NS_MD_PANP_GEN_ALL_CORE

## 2023-10-11 NOTE — PROGRESS NOTE ADULT - NS ATTEND BILL GEN_ALL_CORE
Attending to bill

## 2023-10-11 NOTE — PROGRESS NOTE ADULT - ASSESSMENT
73-year-old male history of dementia, baseline mental status AxOx0, seizure disorder on Keppra and divalproex, history of DVT/PE off Eliquis, hypertension, hypothyroidism presenting sent in from his nursing home for evaluation of failure to thrive, found to have hypernatremia and new renal failure on outside labs.  Per documentation that arrived with the patient, patient has been refusing to eat since yesterday.    1 Hypernatremia  - resolved   - renal fu   - cw current diet     2 Seizure disorder-   cw Keppra and valproic acid     3 hx of  DVT/PE  - cw AC    4 Dementia  - fall precautions  - frequent reorientation     5 Dysphagia- family refused PEG    NOW COMFORT MEASURES  DNT, DNI

## 2023-10-11 NOTE — PROGRESS NOTE ADULT - ASSESSMENT
73-year-old male history of dementia, baseline mental status AxOx0, seizure disorder on Keppra and divalproex, history of DVT/PE off Eliquis, hypertension, hypothyroidism presenting sent in from his nursing home for evaluation of failure to thrive, found to have hypernatremia and new renal failure on outside labs.  Per documentation that arrived with the patient, patient has been refusing to eat since yesterday. 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 effusion:  need ct chest non contrast    10/11: new developements noted:  now pt is due for hospice care: no blood draws:  and c omfort care:   Thrombocytopenia  -low plts:   -no ac secondary to it:    Dementia  -supportive care:  -family has refused for peg tueb  Seizure disorders  -cont anti sz medications  Hx of pe and dvt  -was on ac before:  now dced as he is having low plts  HTN  -blood pressure is controlled  Hypothyroidism   -on synthroid:     pt is doing very poorly:  not retaining co2: can change o high flow: needs ct chest  : need to clarify goal of care   as to how aggressive pts family want to be : /l:    alice acp:  for hospice care now:  will sign off

## 2023-10-11 NOTE — PROGRESS NOTE ADULT - NS ATTEND OPT1 GEN_ALL_CORE
I attest my time as attending is greater than 50% of the total combined time spent on qualifying patient care activities by the PA/NP and attending.

## 2023-10-11 NOTE — PROGRESS NOTE ADULT - ASSESSMENT
73-year-old male history of dementia, baseline mental status AxOx0, seizure disorder on Keppra and divalproex, history of DVT/PE off Eliquis, hypertension, hypothyroidism presenting sent in from his nursing home for evaluation of failure to thrive, found to have hypernatremia and new renal failure on outside labs.    Hypernatremia/hyponatremia:  Hypernatremia was due to dehydration, poor intake.  Na improved howevere edematous  likely sec to low albumin  d/c IVF since now has NGT   still edematous. on lasix 20mg IVP bid-monitor  poor prognosis. family planning for hospice. nephrology will sign off at this time. please call if any questions    JESSICA:  Due to Pre-Renal azotemia.  improved  monitor bmp     Hypophosphatemia:  supplemented  monitor    Hypokalemia  supplement as needed   monitor    Hypocalcemia  Due to low albumin   Albumin infusion x6 were given    Hypertension:  bp fluctuating  c/w bp meds.  monitor    FTT  f/u GOC  NGT in place. dc ivf  prognosis poor    hypothermia  now hypoxic on NRB  pulm on board  chest x ray noted  prognosis poor

## 2023-10-11 NOTE — PROGRESS NOTE ADULT - SUBJECTIVE AND OBJECTIVE BOX
Date of Service: 10-11-23 @ 12:03    Patient is a 73y old  Male who presents with a chief complaint of FTT (11 Oct 2023 10:10)      Any change in ROS: doing  poorly 100% NRB     MEDICATIONS  (STANDING):  amLODIPine   Tablet 10 milliGRAM(s) Oral daily  chlorhexidine 2% Cloths 1 Application(s) Topical daily  collagenase Ointment 1 Application(s) Topical daily  dexAMETHasone     Tablet 4 milliGRAM(s) Oral two times a day  dextrose 50% Injectable 25 Gram(s) IV Push once  dextrose 50% Injectable 12.5 Gram(s) IV Push once  dextrose 50% Injectable 25 Gram(s) IV Push once  dextrose 50% Injectable 25 Gram(s) IV Push once  dextrose Oral Gel 15 Gram(s) Oral once  furosemide   Injectable 20 milliGRAM(s) IV Push two times a day  glucagon  Injectable 1 milliGRAM(s) IntraMuscular once  levETIRAcetam  IVPB 500 milliGRAM(s) IV Intermittent every 12 hours  levothyroxine Injectable 70 MICROGram(s) IV Push <User Schedule>  pantoprazole   Suspension 40 milliGRAM(s) Enteral Tube daily  QUEtiapine 75 milliGRAM(s) Oral at bedtime  sodium chloride 0.65% Nasal 1 Spray(s) Both Nostrils every 4 hours  thiamine Injectable 100 milliGRAM(s) IV Push daily  valproate sodium  IVPB 500 milliGRAM(s) IV Intermittent every 6 hours    MEDICATIONS  (PRN):    Vital Signs Last 24 Hrs  T(C): 36.6 (11 Oct 2023 06:00), Max: 36.9 (10 Oct 2023 21:11)  T(F): 97.8 (11 Oct 2023 06:00), Max: 98.5 (10 Oct 2023 21:11)  HR: 73 (11 Oct 2023 06:00) (73 - 86)  BP: 100/63 (11 Oct 2023 06:00) (100/63 - 125/60)  BP(mean): --  RR: 19 (11 Oct 2023 06:00) (18 - 20)  SpO2: 99% (11 Oct 2023 06:00) (95% - 100%)    Parameters below as of 11 Oct 2023 06:00  Patient On (Oxygen Delivery Method): mask, nonrebreather  O2 Flow (L/min): 10      I&O's Summary        Physical Exam:   GENERAL: NAD, well-groomed, well-developed  HEENT: ANTONELLA/   Atraumatic, Normocephalic  ENMT: No tonsillar erythema, exudates, or enlargement; Moist mucous membranes, Good dentition, No lesions  NECK: Supple, No JVD, Normal thyroid  CHEST/LUNG: Clear to auscultaion  CVS: Regular rate and rhythm; No murmurs, rubs, or gallops  GI: : Soft, Nontender, Nondistended; Bowel sounds present  NERVOUS SYSTEM: unresponsive  EXTREMITIES:  2+ Peripheral Pulses, No clubbing, cyanosis, or edema  LYMPH: No lymphadenopathy noted  SKIN: No rashes or lesions  ENDOCRINOLOGY: No Thyromegaly  PSYCH:  unreposnsive:"    Labs:                              10.5   7.52  )-----------( 24       ( 10 Oct 2023 05:53 )             32.2                         9.2    4.49  )-----------( 34       ( 09 Oct 2023 05:20 )             27.7                         8.7    4.79  )-----------( 44       ( 08 Oct 2023 06:40 )             26.0     10-10    144  |  107  |  11  ----------------------------<  122<H>  3.4<L>   |  24  |  0.63  10-09    143  |  107  |  7   ----------------------------<  153<H>  3.5   |  27  |  0.61  10-08    143  |  107  |  6<L>  ----------------------------<  147<H>  3.1<L>   |  28  |  0.62    Ca    7.8<L>      10 Oct 2023 05:53  Phos  2.5     10-10  Mg     1.70     10-10    TPro  x   /  Alb  2.0<L>  /  TBili  x   /  DBili  x   /  AST  x   /  ALT  x   /  AlkPhos  x   10-10    CAPILLARY BLOOD GLUCOSE      POCT Blood Glucose.: 132 mg/dL (10 Oct 2023 12:25)      LIVER FUNCTIONS - ( 10 Oct 2023 05:53 )  Alb: 2.0 g/dL / Pro: x     / ALK PHOS: x     / ALT: x     / AST: x     / GGT: x             Urinalysis Basic - ( 10 Oct 2023 05:53 )    Color: x / Appearance: x / SG: x / pH: x  Gluc: 122 mg/dL / Ketone: x  / Bili: x / Urobili: x   Blood: x / Protein: x / Nitrite: x   Leuk Esterase: x / RBC: x / WBC x   Sq Epi: x / Non Sq Epi: x / Bacteria: x            RECENT CULTURES:        RESPIRATORY CULTURES:    rad< from: Xray Chest 1 View- PORTABLE-Urgent (Xray Chest 1 View- PORTABLE-Urgent .) (10.09.23 @ 04:19) >    ACC: [id number] EXAM:  XR CHEST PORTABLE URGENT 1V   ORDERED BY: LUIS ANTONIO BROWN     PROCEDURE DATE:  10/09/2023          INTERPRETATION:  EXAMINATION: XR CHEST URGENT    CLINICAL INDICATION: Desaturation on room air, on NGT feeds    TECHNIQUE: Single frontal, portable view of the chest was obtained.    COMPARISON: Chest x-ray 10/7/2023.    FINDINGS:  Support devices: Enteric tube tip in stomach.  The cardiomediastinal silhouette is  not accurately assessed in this AP   projection.  Increased moderate right pleural effusion with new airspace opacities. 3   cm area of alveolar consolidation, new  No pneumothorax.  No acute bony abnormality.    IMPRESSION:  Increased moderate right pleural effusion/adjacent atelectasis.    A 3 cm area of consolidation is new.    --- End of Report ---          FELIX HANLEY MD; Resident Radiologist  This document has been electronically signed.  ROSIE GERARD DO; Attending Radiologist  This document has been electronically signed. Oct  9 2023  1:57PM    < end of copied text >        Studies  Chest X-RAY  CT SCAN Chest   Venous Dopplers: LE:   CT Abdomen  Others

## 2023-10-11 NOTE — CHART NOTE - NSCHARTNOTEFT_GEN_A_CORE
Ordered pleasure feed diet as per discussion with pt's spouse Leena. Risks of aspiration discussed. Leena expresses understanding and in agreement with pleasure feeds.     Inocencia Hwang PA-C  Department of Medicine   In-house pager #[phone]

## 2023-10-11 NOTE — PROGRESS NOTE ADULT - NS ATTEND AMEND GEN_ALL_CORE FT
Hypernatremia improving. Continue D5W
Hypernatremia-better-still NPO-continue suggested IVF. Monitor electrolytes closely
as above
as above
remained edematous-increase lasix 20mg IV BID
remains NPO-continue IVF
Continue daily lasix
as  above
as above. Did not get phosphorus overnight. Supplement phosphorus for hypophosphatemia.
continue diuresis-monitor renal function and electrolytes closely
continue lasix
stable   monitor
D 10 and lasix as above
Renal function better  Supplement K 40meq  Monitor electrolytes
Scr improving.
Supplement as needed phosphorus and potassium.
as above
has dependent edema-improve nutrition
as above
renal function and electrolytes are better-monitor
renal function and electrolytes are better-monitor closely
supplement K  still hypervolemic, continue lasix
monitor electrolytes
stable  monitor electrolytes   as above

## 2023-10-12 NOTE — PROGRESS NOTE ADULT - SUBJECTIVE AND OBJECTIVE BOX
Subjective: Patient seen and examined. No new events except as noted.     SUBJECTIVE/ROS:  MEDICATIONS:  MEDICATIONS  (STANDING):  amLODIPine   Tablet 10 milliGRAM(s) Oral daily  chlorhexidine 2% Cloths 1 Application(s) Topical daily  collagenase Ointment 1 Application(s) Topical daily  dexAMETHasone     Tablet 4 milliGRAM(s) Oral two times a day  dextrose 50% Injectable 25 Gram(s) IV Push once  dextrose 50% Injectable 12.5 Gram(s) IV Push once  dextrose 50% Injectable 25 Gram(s) IV Push once  dextrose 50% Injectable 25 Gram(s) IV Push once  dextrose Oral Gel 15 Gram(s) Oral once  furosemide   Injectable 20 milliGRAM(s) IV Push two times a day  glucagon  Injectable 1 milliGRAM(s) IntraMuscular once  levETIRAcetam  IVPB 500 milliGRAM(s) IV Intermittent every 12 hours  levothyroxine Injectable 70 MICROGram(s) IV Push <User Schedule>  pantoprazole   Suspension 40 milliGRAM(s) Enteral Tube daily  QUEtiapine 75 milliGRAM(s) Oral at bedtime  sodium chloride 0.65% Nasal 1 Spray(s) Both Nostrils every 4 hours  thiamine Injectable 100 milliGRAM(s) IV Push daily  valproate sodium  IVPB 500 milliGRAM(s) IV Intermittent every 6 hours      PHYSICAL EXAM:  T(C): 36.8 (10-12-23 @ 05:30), Max: 36.9 (10-11-23 @ 21:44)  HR: 80 (10-12-23 @ 05:30) (80 - 88)  BP: 120/65 (10-12-23 @ 05:30) (120/65 - 153/77)  RR: 18 (10-12-23 @ 05:30) (18 - 20)  SpO2: 97% (10-12-23 @ 05:30) (96% - 99%)  Wt(kg): --  I&O's Summary          JVP: Normal  Neck: supple  Lung: clear   CV: S1 S2 , Murmur:  Abd: soft  Psych: flat affect  Skin: normal``    LABS/DATA:    CARDIAC MARKERS:                  proBNP:   Lipid Profile:   HgA1c:   TSH:     TELE:  EKG:

## 2023-10-13 NOTE — PROGRESS NOTE ADULT - ASSESSMENT
73-year-old male history of dementia, baseline mental status AxOx0, seizure disorder on Keppra and divalproex, history of DVT/PE off Eliquis, hypertension, hypothyroidism presenting sent in from his nursing home for evaluation of failure to thrive, found to have hypernatremia and new renal failure on outside labs.  Per documentation that arrived with the patient, patient has been refusing to eat since yesterday.   73-year-old male history of dementia, baseline mental status AxOx0, seizure disorder on Keppra and divalproex, history of DVT/PE off Eliquis, hypertension, hypothyroidism presenting sent in from his nursing home for evaluation of failure to thrive, found to have hypernatremia and new renal failure on outside labs. Now comfort care per family with plans for hospice.

## 2023-10-13 NOTE — PROGRESS NOTE ADULT - SUBJECTIVE AND OBJECTIVE BOX
Patient is a 73y old  Male who presents with a chief complaint of FTT (12 Oct 2023 07:30)      SUBJECTIVE / OVERNIGHT EVENTS: Patient seen and examined at bedside. No acute events overnight. Pt denies fevers, chills, chest pain, abdominal pain, n/v/d.    MEDICATIONS  (STANDING):  amLODIPine   Tablet 10 milliGRAM(s) Oral daily  chlorhexidine 2% Cloths 1 Application(s) Topical daily  collagenase Ointment 1 Application(s) Topical daily  dexAMETHasone     Tablet 4 milliGRAM(s) Oral two times a day  dextrose 50% Injectable 25 Gram(s) IV Push once  dextrose 50% Injectable 25 Gram(s) IV Push once  dextrose 50% Injectable 12.5 Gram(s) IV Push once  dextrose 50% Injectable 25 Gram(s) IV Push once  dextrose Oral Gel 15 Gram(s) Oral once  furosemide   Injectable 20 milliGRAM(s) IV Push two times a day  glucagon  Injectable 1 milliGRAM(s) IntraMuscular once  levETIRAcetam  IVPB 500 milliGRAM(s) IV Intermittent every 12 hours  levothyroxine Injectable 70 MICROGram(s) IV Push <User Schedule>  pantoprazole   Suspension 40 milliGRAM(s) Enteral Tube daily  QUEtiapine 75 milliGRAM(s) Oral at bedtime  sodium chloride 0.65% Nasal 1 Spray(s) Both Nostrils every 4 hours  thiamine Injectable 100 milliGRAM(s) IV Push daily  valproate sodium  IVPB 500 milliGRAM(s) IV Intermittent every 6 hours    MEDICATIONS  (PRN):      Vital Signs Last 24 Hrs  T(C): 36.7 (13 Oct 2023 07:00), Max: 36.8 (12 Oct 2023 13:28)  T(F): 98.1 (13 Oct 2023 07:00), Max: 98.3 (12 Oct 2023 13:28)  HR: 73 (13 Oct 2023 07:00) (69 - 73)  BP: 121/57 (13 Oct 2023 07:00) (121/57 - 137/48)  BP(mean): --  RR: 20 (13 Oct 2023 07:00) (18 - 20)  SpO2: 96% (13 Oct 2023 07:00) (96% - 100%)    Parameters below as of 13 Oct 2023 07:00  Patient On (Oxygen Delivery Method): mask, nonrebreather  O2 Flow (L/min): 10    CAPILLARY BLOOD GLUCOSE        I&O's Summary      PHYSICAL EXAM:  GENERAL: NAD, resting comfortably in bed  HEAD:  Atraumatic, Normocephalic  EYES: EOMI, PERRLA, conjunctiva and sclera clear  NECK: Supple, No JVD  CHEST/LUNG: Clear to auscultation bilaterally; No wheeze  HEART: Regular rate and rhythm; No murmurs, rubs, or gallops  ABDOMEN: Soft, Nontender, Nondistended; Bowel sounds present  EXTREMITIES:  2+ Peripheral Pulses, No clubbing, cyanosis, or edema  PSYCH: AAOx3  NEUROLOGY: non-focal  SKIN: No rashes or lesions    LABS:                    RADIOLOGY & ADDITIONAL TESTS:    Imaging Personally Reviewed:    Consultant(s) Notes Reviewed:      Care Discussed with Consultants/Other Providers:    Brianna Cavazos MD, Internal Medicine Resident     Patient is a 73y old  Male who presents with a chief complaint of FTT (12 Oct 2023 07:30)      SUBJECTIVE / OVERNIGHT EVENTS: Patient seen and examined at bedside. No acute events overnight. ROS unable to be performed    MEDICATIONS  (STANDING):  amLODIPine   Tablet 10 milliGRAM(s) Oral daily  chlorhexidine 2% Cloths 1 Application(s) Topical daily  collagenase Ointment 1 Application(s) Topical daily  dexAMETHasone     Tablet 4 milliGRAM(s) Oral two times a day  dextrose 50% Injectable 25 Gram(s) IV Push once  dextrose 50% Injectable 25 Gram(s) IV Push once  dextrose 50% Injectable 12.5 Gram(s) IV Push once  dextrose 50% Injectable 25 Gram(s) IV Push once  dextrose Oral Gel 15 Gram(s) Oral once  furosemide   Injectable 20 milliGRAM(s) IV Push two times a day  glucagon  Injectable 1 milliGRAM(s) IntraMuscular once  levETIRAcetam  IVPB 500 milliGRAM(s) IV Intermittent every 12 hours  levothyroxine Injectable 70 MICROGram(s) IV Push <User Schedule>  pantoprazole   Suspension 40 milliGRAM(s) Enteral Tube daily  QUEtiapine 75 milliGRAM(s) Oral at bedtime  sodium chloride 0.65% Nasal 1 Spray(s) Both Nostrils every 4 hours  thiamine Injectable 100 milliGRAM(s) IV Push daily  valproate sodium  IVPB 500 milliGRAM(s) IV Intermittent every 6 hours    MEDICATIONS  (PRN):      Vital Signs Last 24 Hrs  T(C): 36.7 (13 Oct 2023 07:00), Max: 36.8 (12 Oct 2023 13:28)  T(F): 98.1 (13 Oct 2023 07:00), Max: 98.3 (12 Oct 2023 13:28)  HR: 73 (13 Oct 2023 07:00) (69 - 73)  BP: 121/57 (13 Oct 2023 07:00) (121/57 - 137/48)  BP(mean): --  RR: 20 (13 Oct 2023 07:00) (18 - 20)  SpO2: 96% (13 Oct 2023 07:00) (96% - 100%)    Parameters below as of 13 Oct 2023 07:00  Patient On (Oxygen Delivery Method): mask, nonrebreather  O2 Flow (L/min): 10    CAPILLARY BLOOD GLUCOSE        I&O's Summary      PHYSICAL EXAM:  GENERAL: NAD, resting comfortably in bed  HEAD:  Atraumatic, Normocephalic  EYES: EOMI, PERRLA, conjunctiva and sclera clear  NECK: Supple, No JVD  CHEST/LUNG: Clear to auscultation bilaterally; No wheeze  HEART: Regular rate and rhythm; No murmurs, rubs, or gallops  ABDOMEN: Soft, Nontender, Nondistended; Bowel sounds present  EXTREMITIES:  2+ Peripheral Pulses, No clubbing, cyanosis, or edema  PSYCH: AAOx0-1  NEUROLOGY: non-focal  SKIN: No rashes or lesions    LABS:                    RADIOLOGY & ADDITIONAL TESTS:    Imaging Personally Reviewed:    Consultant(s) Notes Reviewed:      Care Discussed with Consultants/Other Providers:    Brianna Cavazos MD, Internal Medicine Resident

## 2023-10-13 NOTE — PROGRESS NOTE ADULT - PROBLEM SELECTOR PLAN 1
- resolved   - renal fu   - cw current diet Pt with dysphagia, in need of nutritional optimization, family refusing PEG tube placement   Per GOC conversations, family understands risks of aspiration and wishes to c/w with pleasure feeds  Pt has edema from poor oncotic pressure  Hospice, palliative, and ethics team consulted- pt now comfort measures,   Code status: DNR/ DNI

## 2023-10-13 NOTE — PROGRESS NOTE ADULT - SUBJECTIVE AND OBJECTIVE BOX
Subjective: Patient seen and examined. No new events except as noted.     SUBJECTIVE/ROS:      MEDICATIONS:  MEDICATIONS  (STANDING):  amLODIPine   Tablet 10 milliGRAM(s) Oral daily  chlorhexidine 2% Cloths 1 Application(s) Topical daily  collagenase Ointment 1 Application(s) Topical daily  dexAMETHasone     Tablet 4 milliGRAM(s) Oral two times a day  dextrose 50% Injectable 25 Gram(s) IV Push once  dextrose 50% Injectable 12.5 Gram(s) IV Push once  dextrose 50% Injectable 25 Gram(s) IV Push once  dextrose 50% Injectable 25 Gram(s) IV Push once  dextrose Oral Gel 15 Gram(s) Oral once  furosemide   Injectable 20 milliGRAM(s) IV Push two times a day  glucagon  Injectable 1 milliGRAM(s) IntraMuscular once  levETIRAcetam  IVPB 500 milliGRAM(s) IV Intermittent every 12 hours  levothyroxine Injectable 70 MICROGram(s) IV Push <User Schedule>  pantoprazole   Suspension 40 milliGRAM(s) Enteral Tube daily  QUEtiapine 75 milliGRAM(s) Oral at bedtime  sodium chloride 0.65% Nasal 1 Spray(s) Both Nostrils every 4 hours  thiamine Injectable 100 milliGRAM(s) IV Push daily  valproate sodium  IVPB 500 milliGRAM(s) IV Intermittent every 6 hours      PHYSICAL EXAM:  T(C): 36.7 (10-13-23 @ 07:00), Max: 36.8 (10-12-23 @ 13:28)  HR: 73 (10-13-23 @ 07:00) (69 - 73)  BP: 121/57 (10-13-23 @ 07:00) (121/57 - 137/48)  RR: 20 (10-13-23 @ 07:00) (18 - 20)  SpO2: 96% (10-13-23 @ 07:00) (96% - 100%)  Wt(kg): --  I&O's Summary          JVP: Normal  Neck: supple  Lung: clear   CV: S1 S2 , Murmur:  Abd: soft  Psych: flat affect  Skin: normal``    LABS/DATA:    CARDIAC MARKERS:                  proBNP:   Lipid Profile:   HgA1c:   TSH:     TELE:  EKG:

## 2023-10-13 NOTE — CHART NOTE - NSCHARTNOTESELECT_GEN_ALL_CORE
Event Note
Nutrition Services
Event Note
NGT placement/Event Note
Nutrition Follow-up Note/Nutrition Services
Nutrition Services
Podiatry/Event Note

## 2023-10-14 NOTE — PROGRESS NOTE ADULT - PROBLEM SELECTOR PLAN 5
cw IV Keppra and valproic acid
cw Keppra and valproic acid
Patient requires total support for all ADL's.   Please assist with ADLs  Skin care as per protocol.
Patient requires total support for all ADL's.   Please assist with ADLs  Skin care as per protocol.

## 2023-10-14 NOTE — PROGRESS NOTE ADULT - SUBJECTIVE AND OBJECTIVE BOX
Subjective: Patient seen and examined. No new events except as noted.     SUBJECTIVE/ROS:  MEDICATIONS:  MEDICATIONS  (STANDING):  amLODIPine   Tablet 10 milliGRAM(s) Oral daily  chlorhexidine 2% Cloths 1 Application(s) Topical daily  dexAMETHasone     Tablet 4 milliGRAM(s) Oral two times a day  dextrose 50% Injectable 12.5 Gram(s) IV Push once  dextrose 50% Injectable 25 Gram(s) IV Push once  dextrose 50% Injectable 25 Gram(s) IV Push once  dextrose 50% Injectable 25 Gram(s) IV Push once  dextrose Oral Gel 15 Gram(s) Oral once  furosemide   Injectable 20 milliGRAM(s) IV Push two times a day  glucagon  Injectable 1 milliGRAM(s) IntraMuscular once  levETIRAcetam  IVPB 500 milliGRAM(s) IV Intermittent every 12 hours  pantoprazole   Suspension 40 milliGRAM(s) Enteral Tube daily  QUEtiapine 75 milliGRAM(s) Oral at bedtime  sodium chloride 0.65% Nasal 1 Spray(s) Both Nostrils every 4 hours  thiamine Injectable 100 milliGRAM(s) IV Push daily  valproate sodium  IVPB 500 milliGRAM(s) IV Intermittent every 6 hours      PHYSICAL EXAM:  T(C): 36.8 (10-14-23 @ 05:32), Max: 37.2 (10-13-23 @ 21:36)  HR: 86 (10-14-23 @ 05:32) (73 - 86)  BP: 119/52 (10-14-23 @ 05:32) (116/60 - 148/58)  RR: 17 (10-14-23 @ 05:32) (17 - 20)  SpO2: 92% (10-14-23 @ 05:32) (92% - 100%)  Wt(kg): --  I&O's Summary          JVP: Normal  Neck: supple  Lung: clear   CV: S1 S2 , Murmur:  Abd: soft  Ext: No edema  Psych: flat affect  Skin: normal``    LABS/DATA:    CARDIAC MARKERS:                  proBNP:   Lipid Profile:   HgA1c:   TSH:     TELE:  EKG:

## 2023-10-14 NOTE — PROGRESS NOTE ADULT - REASON FOR ADMISSION
FTT

## 2023-10-14 NOTE — PROGRESS NOTE ADULT - SUBJECTIVE AND OBJECTIVE BOX
Patient is a 73y old  Male who presents with a chief complaint of FTT (14 Oct 2023 06:51)      SUBJECTIVE / OVERNIGHT EVENTS: Patient seen and examined at bedside. No acute events overnight. ROS unable to be performed 2/2 mental status    MEDICATIONS  (STANDING):  amLODIPine   Tablet 10 milliGRAM(s) Oral daily  chlorhexidine 2% Cloths 1 Application(s) Topical daily  dexAMETHasone     Tablet 4 milliGRAM(s) Oral two times a day  dextrose 50% Injectable 12.5 Gram(s) IV Push once  dextrose 50% Injectable 25 Gram(s) IV Push once  dextrose 50% Injectable 25 Gram(s) IV Push once  dextrose 50% Injectable 25 Gram(s) IV Push once  dextrose Oral Gel 15 Gram(s) Oral once  furosemide   Injectable 20 milliGRAM(s) IV Push two times a day  glucagon  Injectable 1 milliGRAM(s) IntraMuscular once  levETIRAcetam  IVPB 500 milliGRAM(s) IV Intermittent every 12 hours  pantoprazole   Suspension 40 milliGRAM(s) Enteral Tube daily  QUEtiapine 75 milliGRAM(s) Oral at bedtime  sodium chloride 0.65% Nasal 1 Spray(s) Both Nostrils every 4 hours  thiamine Injectable 100 milliGRAM(s) IV Push daily  valproate sodium  IVPB 500 milliGRAM(s) IV Intermittent every 6 hours    MEDICATIONS  (PRN):      Vital Signs Last 24 Hrs  T(C): 36.8 (14 Oct 2023 05:32), Max: 37.2 (13 Oct 2023 21:36)  T(F): 98.3 (14 Oct 2023 05:32), Max: 98.9 (13 Oct 2023 21:36)  HR: 86 (14 Oct 2023 05:32) (77 - 86)  BP: 119/52 (14 Oct 2023 05:32) (116/60 - 148/58)  BP(mean): --  RR: 17 (14 Oct 2023 05:32) (17 - 20)  SpO2: 92% (14 Oct 2023 05:32) (92% - 100%)    Parameters below as of 13 Oct 2023 14:30  Patient On (Oxygen Delivery Method): mask, nonrebreather      CAPILLARY BLOOD GLUCOSE        I&O's Summary      PHYSICAL EXAM:  GENERAL: NAD, on 10 L NRB  HEAD:  Atraumatic, Normocephalic  EYES: EOMI, PERRLA, conjunctiva and sclera clear, blinks eyes  NECK: Supple, No JVD  CHEST/LUNG: rhoncorous breath sounds b/l, no wheeze  HEART: Regular rate and rhythm; No murmurs, rubs, or gallops  ABDOMEN: Soft, Nontender, Nondistended; Bowel sounds present  EXTREMITIES:  2+ Peripheral Pulses, No clubbing, cyanosis, or edema  PSYCH: AAOx0, unable to follow commands  NEUROLOGY: non-focal  SKIN: No rashes or lesions    LABS:                    RADIOLOGY & ADDITIONAL TESTS:    Imaging Personally Reviewed:    Consultant(s) Notes Reviewed:      Care Discussed with Consultants/Other Providers:    Brianna Cavazos MD, Internal Medicine Resident

## 2023-10-14 NOTE — PROGRESS NOTE ADULT - PROBLEM SELECTOR PLAN 7
Pt DNR/DNI  Comfort measures  DVT ppx discontinued  Diet: Pureed, mildly thick  Dispo: To SNF Pt DNR/DNI  Comfort measures  DVT ppx discontinued  Diet: Pureed, mildly thick, prior. Currently w/o NG tube or PEG tube  Dispo: To SNF

## 2023-10-14 NOTE — PROGRESS NOTE ADULT - PROVIDER SPECIALTY LIST ADULT
Cardiology
Cardiology
Hospitalist
Nephrology
Neurology
Cardiology
Hospitalist
Nephrology
Cardiology
Hospitalist
Nephrology
Neurology
Cardiology
Hospitalist
Nephrology
Pulmonology
Internal Medicine
Palliative Care
Internal Medicine
Palliative Care

## 2023-10-14 NOTE — PROGRESS NOTE ADULT - NUTRITIONAL ASSESSMENT
This patient has been assessed with a concern for Malnutrition and has been determined to have a diagnosis/diagnoses of Severe protein-calorie malnutrition.    This patient is being managed with:   Diet Pureed-  Mildly Thick Liquids (MILDTHICKLIQS)  Entered: Oct 11 2023  6:26PM  
This patient has been assessed with a concern for Malnutrition and has been determined to have a diagnosis/diagnoses of Severe protein-calorie malnutrition.    This patient is being managed with:   Diet Pureed-  Mildly Thick Liquids (MILDTHICKLIQS)  Entered: Sep 18 2023  1:16PM  
This patient has been assessed with a concern for Malnutrition and has been determined to have a diagnosis/diagnoses of Severe protein-calorie malnutrition.    This patient is being managed with:   Diet Pureed-  Mildly Thick Liquids (MILDTHICKLIQS)  Entered: Sep 18 2023  1:16PM  
This patient has been assessed with a concern for Malnutrition and has been determined to have a diagnosis/diagnoses of Severe protein-calorie malnutrition.  
This patient has been assessed with a concern for Malnutrition and has been determined to have a diagnosis/diagnoses of Severe protein-calorie malnutrition.    This patient is being managed with:   Diet Pureed-  Mildly Thick Liquids (MILDTHICKLIQS)  Entered: Sep 18 2023  1:16PM  
This patient has been assessed with a concern for Malnutrition and has been determined to have a diagnosis/diagnoses of Severe protein-calorie malnutrition.  
This patient has been assessed with a concern for Malnutrition and has been determined to have a diagnosis/diagnoses of Severe protein-calorie malnutrition.    This patient is being managed with:   Diet NPO with Tube Feed-  Tube Feeding Modality: Nasogastric  Glucerna 1.5 Terrence (GLUCERNA1.5RTH)  Total Volume for 24 Hours (mL): 720  Continuous  Starting Tube Feed Rate {mL per Hour}: 15  Increase Tube Feed Rate by (mL): 5     Every hour  Until Goal Tube Feed Rate (mL per Hour): 30  Tube Feed Duration (in Hours): 24  Tube Feed Start Time: 15:00  Entered: Oct  8 2023  2:33PM  
This patient has been assessed with a concern for Malnutrition and has been determined to have a diagnosis/diagnoses of Severe protein-calorie malnutrition.    This patient is being managed with:   Diet Pureed-  Mildly Thick Liquids (MILDTHICKLIQS)  Entered: Sep 18 2023  1:16PM  
This patient has been assessed with a concern for Malnutrition and has been determined to have a diagnosis/diagnoses of Severe protein-calorie malnutrition.    This patient is being managed with:   Diet Pureed-  Mildly Thick Liquids (MILDTHICKLIQS)  Entered: Oct 11 2023  6:26PM

## 2023-10-14 NOTE — PROGRESS NOTE ADULT - ATTENDING COMMENTS
Agree with house staff a and p  on comfort measures  family keeps going back and forth about hospice  will follow with palliative again
Agree with house staff a and p  awaiting hospice, now on comfort measures

## 2023-10-14 NOTE — PROGRESS NOTE ADULT - PROBLEM SELECTOR PROBLEM 6
HTN (hypertension)
Encounter for palliative care
Counseling regarding advance directives and goals of care
HTN (hypertension)

## 2023-10-14 NOTE — PROGRESS NOTE ADULT - PROBLEM SELECTOR PROBLEM 7
Counseling regarding advance directives and goals of care
Encounter for palliative care
Counseling regarding advance directives and goals of care

## 2023-10-14 NOTE — PROGRESS NOTE ADULT - ASSESSMENT
73-year-old male history of dementia, baseline mental status AxOx0, seizure disorder on Keppra and divalproex, history of DVT/PE off Eliquis, hypertension, hypothyroidism presenting sent in from his nursing home for evaluation of failure to thrive, found to have hypernatremia and new renal failure on outside labs. Per GOC conversations, pt now on comfort measures only.

## 2023-10-14 NOTE — PROGRESS NOTE ADULT - PROBLEM SELECTOR PLAN 1
Pt with dysphagia, in need of nutritional optimization, family refusing PEG tube placement   Per GOC conversations, family understands risks of aspiration and wishes to c/w with pleasure feeds  Pt has edema from poor oncotic pressure  Hospice, palliative, and ethics team consulted- pt now comfort measures,   Code status: DNR/ DNI

## 2023-10-15 NOTE — DISCHARGE NOTE FOR THE EXPIRED PATIENT - HOSPITAL COURSE
73M with medical history of dementia, baseline mental status AAOx0, seizure disorder on Keppra and divalproex, history of DVT/PE on Xarelto (not currently on anticoagulation), hypertension, hypothyroidism presenting sent in from his nursing home for evaluation of failure to thrive, found to have hypernatremia and new renal failure on outside labs. Patient was treated with free water to correct sodium levels.     On admission, patient was noted to have dysphagia and was in need of nutritional optimization, family decided to not pursue PEG tube placement with full understanding of the risks of aspiration and wishes to c/w with pleasure feeds. Patient was made comfort and DNR/DNI. Patient was found to be unresponsive by nursing in the morning of 10/15, who contacted the provider at 4:11 am. Patient was examined and was found to not have any pupillary or corneal reflex, absent breath sounds, no heart sounds nor responses to any stimuli. Patient was pronounced dead at 4:18 am on 10/15.

## 2023-12-06 NOTE — DISCHARGE NOTE PROVIDER - CARE PROVIDER_API CALL
Mamadou Brock)  Neurology; Neurophysiology  [address] [facility], [address]  [place] [address]  Phone: [phone]  Fax: [phone]  Follow Up Time:     Yanick Foster  Cardiovascular Disease  [address] [place] [id number]  Phone: [phone]  Fax: [phone]  Follow Up Time:     PMD,   Phone: (   )    -  Fax: (   )    -  Follow Up Time:    No

## 2024-02-16 NOTE — CHART NOTE - NSCHARTNOTEFT_GEN_A_CORE
Endocrine team was called. Curbside recs given. Patient's LT4 dose was increased to 88mcg po daily on 4/12 when he was noted to have TSH 14.4 and FT4 0.3. Recommend check repeat Free T4. Endocrine team will follow up and see if adjustment is warranted. Patient can follow up with PMD as outpt or at Endocrine practice [address] [place] [phone].    Carolynn Merritt DO [Time Spent: ___ minutes] : I have spent [unfilled] minutes of time on the encounter.

## 2024-03-07 NOTE — PATIENT PROFILE ADULT - NSPROPOAPRESSUREINJURY_GEN_A_NUR
"Subjective:     CHIEF COMPLAINT  Chief Complaint   Patient presents with    Wound Infection     R FOREARM X 3 DAYS       HPI  Sy Sanz is a very pleasant 39 y.o. male who presents to the clinic with a believed infected wound on his right forearm x 3 days.  Patient states he had a small blister on his right forearm that he was picking at it.  A few days ago he woke up and noted there was surrounding redness and the area was slightly painful.  Over the following days redness has gradually spread up the forearm and pain and swelling is worsened.  Does not believe he has been running fever.  No nausea or vomiting.  No joint pain.    REVIEW OF SYSTEMS  Review of Systems   Constitutional:  Negative for chills and fever.   Gastrointestinal:  Negative for nausea and vomiting.   Musculoskeletal:  Negative for joint pain and myalgias.   Skin:         Skin infection to right forearm   Neurological:  Negative for dizziness and headaches.       PAST MEDICAL HISTORY  Patient Active Problem List    Diagnosis Date Noted    Dental infection 07/02/2022    Primary insomnia 07/02/2022    Dental abscess 07/02/2022    Appendicitis, acute 07/07/2018    HIV (human immunodeficiency virus infection) ([facility]) 07/07/2018       SURGICAL HISTORY   has a past surgical history that includes club foot release (Bilateral) and appendectomy laparoscopic (7/7/2018).    ALLERGIES  Allergies   Allergen Reactions    Morphine Anaphylaxis     Pt turned \"red\" and was put into an \"ice bath\" after receiving morphine at age 4     Benadryl Allergy      Pt gets \"amped up, jittery and cannot sleep\"    Diphenhydramine Anxiety       CURRENT MEDICATIONS  Home Medications       Reviewed by Jhon Blake P.A.-C. (Physician Assistant) on 03/06/24 at 1902  Med List Status: <None>     Medication Last Dose Status   albuterol 108 (90 Base) MCG/ACT Aero Soln inhalation aerosol PRN Active   amphetamine-dextroamphetamine (ADDERALL) 10 MG Tab Not Taking " "Active   bictegravir-emtricitab-TAF (BIKTARVY) -25 mg Tab tablet Taking Active   cyclobenzaprine (FLEXERIL) 10 mg Tab Taking Active   escitalopram (LEXAPRO) 10 MG Tab Taking Active   hydrOXYzine pamoate (VISTARIL) 50 MG Cap Taking Active   ibuprofen (MOTRIN) 800 MG Tab Taking Active   methocarbamol (ROBAXIN) 750 MG Tab PRN Active   olanzapine (ZYPREXA) 10 MG tablet Taking Active   tadalafil (CIALIS) 5 MG tablet PRN Active   traZODone (DESYREL) 50 MG Tab Taking Active   zolpidem (AMBIEN) 5 MG Tab PRN Active                    SOCIAL HISTORY  Social History     Tobacco Use    Smoking status: Some Days     Current packs/day: 0.25     Average packs/day: 0.3 packs/day for 4.0 years (1.0 ttl pk-yrs)     Types: Cigarettes    Smokeless tobacco: Never   Vaping Use    Vaping Use: Every day    Substances: Nicotine   Substance and Sexual Activity    Alcohol use: No    Drug use: Yes     Types: Inhaled     Comment: Meth on 12/2    Sexual activity: Not on file       FAMILY HISTORY  Family History   Problem Relation Age of Onset    Sleep Apnea Mother     Sleep Apnea Father           Objective:     VITAL SIGNS: /78   Pulse (!) 124   Temp 37.4 °C (99.3 °F) (Temporal)   Resp 12   Ht 1.803 m (5' 11\")   Wt 79.4 kg (175 lb)   SpO2 98%   BMI 24.41 kg/m²     PHYSICAL EXAM  Physical Exam  Constitutional:       General: He is not in acute distress.     Appearance: Normal appearance. He is not ill-appearing, toxic-appearing or diaphoretic.   HENT:      Head: Normocephalic and atraumatic.   Eyes:      Conjunctiva/sclera: Conjunctivae normal.   Pulmonary:      Effort: Pulmonary effort is normal.   Musculoskeletal:      Cervical back: Normal range of motion.   Skin:            Comments: Patient has a 1 x 1 cm ulcerative blisterlike lesion to the dorsal and radial side of his right forearm with granulation tissue present.  There is moderate surrounding erythema with slight streaking up the volar aspect of the right forearm.  No " active discharge or drainage.  Maintains full range of motion of the extremity.  Sensation intact distally.   Neurological:      General: No focal deficit present.      Mental Status: He is alert and oriented to person, place, and time. Mental status is at baseline.         Assessment/Plan:     1. Cellulitis of right upper extremity  cefTRIAXone (Rocephin) 1 g in lidocaine (Xylocaine) 1 % 4 mL for IM use    sulfamethoxazole-trimethoprim (BACTRIM DS) 800-160 MG tablet          MDM/Comments:    Patient has an infected abrasion to the right forearm that has progressively worsened over the last 3 days.  No underlying fluctuance present however there is fairly significant surrounding redness with ascending streaking up the volar aspect of the right forearm.  Patient is currently tachycardic in clinic possibly secondary to infection.  1 g Rocephin provided in clinic.  Course of Bactrim sent to the pharmacy.  Advised outlining the redness after being on antibiotics for 24 hours.  Make sure redness is gradually improving.  Strict return/ED precautions discussed for any worsening signs of infection, fevers, nausea, vomiting.    Differential diagnosis, natural history, supportive care, and indications for immediate follow-up discussed. All questions answered. Patient agrees with the plan of care.    Follow-up as needed if symptoms worsen or fail to improve to PCP, Urgent care or Emergency Room.    I have personally reviewed prior external notes and test results pertinent to today's visit.  I have independently reviewed and interpreted all diagnostics ordered during this urgent care acute visit.   Discussed management options (risks,benefits, and alternatives to treatment). Pt expresses understanding and the treatment plan was agreed upon. Questions were encouraged and answered to pt's satisfaction.    Please note that this dictation was created using voice recognition software. I have made a reasonable attempt to correct  obvious errors, but I expect that there are errors of grammar and possibly content that I did not discover before finalizing the note.   no

## 2024-03-29 NOTE — BH CONSULTATION LIAISON PROGRESS NOTE - NSBHPTASSESSDT_PSY_A_CORE
[[email or web address]_[email or web address]:[id number]==]
11-May-2022 13:03
10-May-2022 13:40
19-May-2022 12:02

## 2024-09-11 NOTE — GOALS OF CARE CONVERSATION - ADVANCED CARE PLANNING - CONVERSATION/DISCUSSION
No
Diagnosis/Prognosis/MOLST Discussed/Treatment Options
Diagnosis/Prognosis/MOLST Discussed/Treatment Options
Diagnosis/Prognosis/Treatment Options

## 2025-03-31 NOTE — ED ADULT TRIAGE NOTE - IDEAL BODY WEIGHT(KG)
66 Include Z78.9 (Other Specified Conditions Influencing Health Status) As An Associated Diagnosis?: No Detail Level: Simple Consent: The patient's consent was obtained including but not limited to risks of crusting, scabbing, blistering, scarring, darker or lighter pigmentary change, recurrence, incomplete removal and infection. Anesthesia Type: 1% lidocaine with epinephrine Medical Necessity Clause: This procedure was medically necessary because the lesions that were treated were: Medical Necessity Information: It is in your best interest to select a reason for this procedure from the list below. All of these items fulfill various CMS LCD requirements except the new and changing color options. Post-Care Instructions: I reviewed with the patient in detail post-care instructions. Patient is to wear sunprotection, and avoid picking at any of the treated lesions. Pt may apply Vaseline to crusted or scabbing areas

## 2025-06-11 NOTE — H&P ADULT - NSICDXNOPASTSURGICALHX_GEN_ALL_CORE
Patient with significant troponin elevation greater than 22,973, now trending down.  No chest pain.  Concern for NSTEMI.  Initially treated with IV heparin now discontinued per recommendations from cardiology.  Patient adamantly refused cardiac catheterization as recommended by cardiology.   <-- Click to add NO significant Past Surgical History